# Patient Record
Sex: FEMALE | Race: WHITE | NOT HISPANIC OR LATINO | Employment: FULL TIME | URBAN - METROPOLITAN AREA
[De-identification: names, ages, dates, MRNs, and addresses within clinical notes are randomized per-mention and may not be internally consistent; named-entity substitution may affect disease eponyms.]

---

## 2018-01-19 ENCOUNTER — APPOINTMENT (OUTPATIENT)
Dept: LAB | Facility: HOSPITAL | Age: 58
End: 2018-01-19

## 2018-01-19 ENCOUNTER — OFFICE VISIT (OUTPATIENT)
Dept: INTERNAL MEDICINE | Facility: CLINIC | Age: 58
End: 2018-01-19

## 2018-01-19 VITALS
HEIGHT: 60 IN | SYSTOLIC BLOOD PRESSURE: 132 MMHG | WEIGHT: 220.7 LBS | HEART RATE: 83 BPM | DIASTOLIC BLOOD PRESSURE: 84 MMHG | OXYGEN SATURATION: 96 % | TEMPERATURE: 98 F | BODY MASS INDEX: 43.33 KG/M2

## 2018-01-19 DIAGNOSIS — Z79.899 HIGH RISK MEDICATION USE: ICD-10-CM

## 2018-01-19 DIAGNOSIS — M17.0 PRIMARY OSTEOARTHRITIS OF BOTH KNEES: Primary | ICD-10-CM

## 2018-01-19 DIAGNOSIS — Z87.892 HX OF ANAPHYLAXIS: ICD-10-CM

## 2018-01-19 PROBLEM — M17.10 PRIMARY OSTEOARTHRITIS OF KNEE: Status: ACTIVE | Noted: 2018-01-19

## 2018-01-19 LAB
ALBUMIN SERPL-MCNC: 4.1 G/DL (ref 3.5–5.2)
ALBUMIN/GLOB SERPL: 1.1 G/DL
ALP SERPL-CCNC: 64 U/L (ref 39–117)
ALT SERPL W P-5'-P-CCNC: 27 U/L (ref 1–33)
ANION GAP SERPL CALCULATED.3IONS-SCNC: 12.6 MMOL/L
AST SERPL-CCNC: 21 U/L (ref 1–32)
BILIRUB SERPL-MCNC: 0.3 MG/DL (ref 0.1–1.2)
BUN BLD-MCNC: 30 MG/DL (ref 6–20)
BUN/CREAT SERPL: 25.9 (ref 7–25)
CALCIUM SPEC-SCNC: 9.1 MG/DL (ref 8.6–10.5)
CHLORIDE SERPL-SCNC: 99 MMOL/L (ref 98–107)
CO2 SERPL-SCNC: 26.4 MMOL/L (ref 22–29)
CREAT BLD-MCNC: 1.16 MG/DL (ref 0.57–1)
GFR SERPL CREATININE-BSD FRML MDRD: 48 ML/MIN/1.73
GLOBULIN UR ELPH-MCNC: 3.8 GM/DL
GLUCOSE BLD-MCNC: 95 MG/DL (ref 65–99)
POTASSIUM BLD-SCNC: 3.8 MMOL/L (ref 3.5–5.2)
PROT SERPL-MCNC: 7.9 G/DL (ref 6–8.5)
SODIUM BLD-SCNC: 138 MMOL/L (ref 136–145)

## 2018-01-19 PROCEDURE — 99214 OFFICE O/P EST MOD 30 MIN: CPT | Performed by: FAMILY MEDICINE

## 2018-01-19 PROCEDURE — 80053 COMPREHEN METABOLIC PANEL: CPT | Performed by: FAMILY MEDICINE

## 2018-01-19 PROCEDURE — 36415 COLL VENOUS BLD VENIPUNCTURE: CPT | Performed by: FAMILY MEDICINE

## 2018-01-19 RX ORDER — DOXYCYCLINE HYCLATE 50 MG/1
324 CAPSULE, GELATIN COATED ORAL
COMMUNITY
End: 2018-08-27 | Stop reason: SDUPTHER

## 2018-01-19 RX ORDER — ACETAMINOPHEN 160 MG
TABLET,DISINTEGRATING ORAL
Refills: 5 | COMMUNITY
Start: 2018-01-05 | End: 2018-07-09 | Stop reason: SDUPTHER

## 2018-01-19 RX ORDER — DICLOFENAC SODIUM 75 MG/1
75 TABLET, DELAYED RELEASE ORAL ONCE
Qty: 1 TABLET | Refills: 0 | Status: SHIPPED | OUTPATIENT
Start: 2018-01-19 | End: 2018-01-19

## 2018-01-19 RX ORDER — ONDANSETRON 4 MG/1
TABLET, ORALLY DISINTEGRATING ORAL
Refills: 5 | COMMUNITY
Start: 2018-01-05 | End: 2018-07-09 | Stop reason: SDUPTHER

## 2018-01-19 RX ORDER — MECLIZINE HYDROCHLORIDE 25 MG/1
TABLET ORAL
Refills: 5 | COMMUNITY
Start: 2018-01-05 | End: 2018-01-25 | Stop reason: SDUPTHER

## 2018-01-19 RX ORDER — VALSARTAN AND HYDROCHLOROTHIAZIDE 320; 25 MG/1; MG/1
TABLET, FILM COATED ORAL
Refills: 5 | COMMUNITY
Start: 2017-12-20 | End: 2018-03-20 | Stop reason: SDUPTHER

## 2018-01-19 NOTE — PROGRESS NOTES
Subjective   Pedrito Powell is a 57 y.o. female.     Chief Complaint   Patient presents with   • Est care visit     saw dr andersen   • follow for HTN   • follow for cholesterol   • recent vertigo         History of Present Illness     Patient has a past medical history of having an anaphylaxis.  Patient is allergic to many antibiotics.  She's also allergic to many other agents when contact with the skin.  Patient's had evaluation done and a dermatologist.  Patient states that her EpiPen is  and she needs to have a new one.    Patient has a past medical history of some primary osteoarthritis affects both of her knees.  Patient notes that she gets regular steroid injections in the past for this.  Patient notes that she uses diclofenac once a day which seems have helped her pain.  She notes she is a refill on his diclofenac.    The following portions of the patient's history were reviewed and updated as appropriate: allergies, current medications, past family history, past medical history, past social history, past surgical history and problem list.    Review of Systems   Constitutional: Negative for chills and fever.   HENT: Negative for congestion, rhinorrhea, sinus pain and sore throat.    Eyes: Negative for photophobia and visual disturbance.   Respiratory: Negative for cough, chest tightness and shortness of breath.    Cardiovascular: Negative for chest pain and palpitations.   Gastrointestinal: Negative for diarrhea, nausea and vomiting.   Genitourinary: Negative for dysuria, frequency and urgency.   Musculoskeletal: Positive for arthralgias.   Skin: Negative for rash and wound.   Neurological: Negative for dizziness and syncope.   Psychiatric/Behavioral: Negative for behavioral problems and confusion.       Objective   Physical Exam   Constitutional: She is oriented to person, place, and time. She appears well-developed and well-nourished.   HENT:   Head: Normocephalic and atraumatic.   Right Ear:  External ear normal.   Left Ear: External ear normal.   Mouth/Throat: Oropharynx is clear and moist.   Eyes: EOM are normal.   Neck: Normal range of motion. Neck supple.   Cardiovascular: Normal rate, regular rhythm and normal heart sounds.    Pulmonary/Chest: Effort normal and breath sounds normal. No respiratory distress.   Musculoskeletal: Normal range of motion.   Lymphadenopathy:     She has no cervical adenopathy.   Neurological: She is alert and oriented to person, place, and time.   Skin: Skin is warm.   Psychiatric: She has a normal mood and affect. Her behavior is normal.   Nursing note and vitals reviewed.      Assessment/Plan   Pedrito was seen today for est care visit, follow for htn, follow for cholesterol and recent vertigo.    Diagnoses and all orders for this visit:    Primary osteoarthritis of both knees  -     diclofenac (VOLTAREN) 75 MG EC tablet; Take 1 tablet by mouth 1 (One) Time for 1 dose.    Hx of anaphylaxis        -     We'll give EpiPen.    High risk medication use  -     Comprehensive Metabolic Panel  -     Check CMP for renal function due to being on extended period of time on NSAIDs.          No Follow-up on file.    Dictated utilizing Dragon Voice Recognition Software

## 2018-01-20 NOTE — PROGRESS NOTES
Please inform the patient of the following abnormal results.  Creatnine slightly elevated. Take diclofenac once a day. Will recheck in one month. If it continues to rise or stay elevated, will need stop. Recommended drinking plenty of water.

## 2018-01-22 RX ORDER — DICLOFENAC SODIUM 75 MG/1
75 TABLET, DELAYED RELEASE ORAL DAILY
Qty: 30 TABLET | Refills: 0 | Status: SHIPPED | OUTPATIENT
Start: 2018-01-22 | End: 2018-02-22 | Stop reason: SDUPTHER

## 2018-01-23 ENCOUNTER — TELEPHONE (OUTPATIENT)
Dept: INTERNAL MEDICINE | Facility: CLINIC | Age: 58
End: 2018-01-23

## 2018-01-23 DIAGNOSIS — R79.89 ELEVATED SERUM CREATININE: Primary | ICD-10-CM

## 2018-01-23 NOTE — TELEPHONE ENCOUNTER
----- Message from Teo Fraser MD sent at 1/20/2018  5:49 PM EST -----  Please inform the patient of the following abnormal results.  Creatnine slightly elevated. Take diclofenac once a day. Will recheck in one month. If it continues to rise or stay elevated, will need stop. Recommended drinking plenty of water.

## 2018-01-23 NOTE — TELEPHONE ENCOUNTER
Patient notified and voiced understanding. Patient advised to contact office if they have any questions. Lab appointment scheduled for one month.

## 2018-01-23 NOTE — TELEPHONE ENCOUNTER
LVM- patient notified. Patient advised to contact office if they have any questions. Patient advised to contact office to schedule one month lab appointment. Lab order put into [facility] per Dr. Fraser.

## 2018-01-25 RX ORDER — MECLIZINE HYDROCHLORIDE 25 MG/1
25 TABLET ORAL 3 TIMES DAILY PRN
Qty: 90 TABLET | Refills: 5 | Status: SHIPPED | OUTPATIENT
Start: 2018-01-25 | End: 2018-08-31 | Stop reason: SDUPTHER

## 2018-02-22 RX ORDER — DICLOFENAC SODIUM 75 MG/1
75 TABLET, DELAYED RELEASE ORAL DAILY
Qty: 90 TABLET | Refills: 1 | Status: SHIPPED | OUTPATIENT
Start: 2018-02-22 | End: 2018-08-28 | Stop reason: SDUPTHER

## 2018-02-28 ENCOUNTER — RESULTS ENCOUNTER (OUTPATIENT)
Dept: INTERNAL MEDICINE | Facility: CLINIC | Age: 58
End: 2018-02-28

## 2018-02-28 DIAGNOSIS — R79.89 ELEVATED SERUM CREATININE: ICD-10-CM

## 2018-03-20 RX ORDER — OMEPRAZOLE 40 MG/1
40 CAPSULE, DELAYED RELEASE ORAL DAILY
Qty: 90 CAPSULE | Refills: 1 | Status: SHIPPED | OUTPATIENT
Start: 2018-03-20 | End: 2018-08-27 | Stop reason: SDUPTHER

## 2018-03-20 RX ORDER — VALSARTAN AND HYDROCHLOROTHIAZIDE 320; 25 MG/1; MG/1
1 TABLET, FILM COATED ORAL DAILY
Qty: 90 TABLET | Refills: 1 | Status: SHIPPED | OUTPATIENT
Start: 2018-03-20 | End: 2018-09-19 | Stop reason: ALTCHOICE

## 2018-04-03 ENCOUNTER — RESULTS ENCOUNTER (OUTPATIENT)
Dept: INTERNAL MEDICINE | Facility: CLINIC | Age: 58
End: 2018-04-03

## 2018-04-03 ENCOUNTER — OFFICE VISIT (OUTPATIENT)
Dept: INTERNAL MEDICINE | Facility: CLINIC | Age: 58
End: 2018-04-03

## 2018-04-03 VITALS
WEIGHT: 215.7 LBS | DIASTOLIC BLOOD PRESSURE: 68 MMHG | OXYGEN SATURATION: 98 % | HEART RATE: 82 BPM | HEIGHT: 60 IN | SYSTOLIC BLOOD PRESSURE: 104 MMHG | BODY MASS INDEX: 42.35 KG/M2

## 2018-04-03 DIAGNOSIS — I10 HYPERTENSION, UNSPECIFIED TYPE: ICD-10-CM

## 2018-04-03 DIAGNOSIS — Z23 NEED FOR TDAP VACCINATION: ICD-10-CM

## 2018-04-03 DIAGNOSIS — Z00.00 WELL WOMAN EXAM (NO GYNECOLOGICAL EXAM): Primary | ICD-10-CM

## 2018-04-03 DIAGNOSIS — E78.5 HYPERLIPIDEMIA, UNSPECIFIED HYPERLIPIDEMIA TYPE: ICD-10-CM

## 2018-04-03 DIAGNOSIS — Z13.1 SCREENING FOR DIABETES MELLITUS: ICD-10-CM

## 2018-04-03 DIAGNOSIS — Z13.29 SCREENING FOR THYROID DISORDER: ICD-10-CM

## 2018-04-03 DIAGNOSIS — Z11.59 ENCOUNTER FOR HEPATITIS C SCREENING TEST FOR LOW RISK PATIENT: ICD-10-CM

## 2018-04-03 DIAGNOSIS — Z12.39 SCREENING FOR BREAST CANCER: ICD-10-CM

## 2018-04-03 DIAGNOSIS — Z12.11 ENCOUNTER FOR SCREENING COLONOSCOPY: ICD-10-CM

## 2018-04-03 DIAGNOSIS — Z00.00 WELL WOMAN EXAM (NO GYNECOLOGICAL EXAM): ICD-10-CM

## 2018-04-03 PROCEDURE — 99396 PREV VISIT EST AGE 40-64: CPT | Performed by: FAMILY MEDICINE

## 2018-04-03 PROCEDURE — 90715 TDAP VACCINE 7 YRS/> IM: CPT | Performed by: FAMILY MEDICINE

## 2018-04-03 PROCEDURE — 90471 IMMUNIZATION ADMIN: CPT | Performed by: FAMILY MEDICINE

## 2018-04-03 NOTE — PROGRESS NOTES
Subjective   Pedrito Powell is a 57 y.o. female and is here for a comprehensive physical exam. The patient reports no problems.    Pt is not UTD with annual gyn exam and mammo. Patient is due for pap exam and will see gynecology for this. Will place referral for mammogram today.     Do you take any herbs or supplements that were not prescribed by a doctor? no      Social History:   Social History     Social History   • Marital status:      Spouse name: N/A   • Number of children: N/A   • Years of education: N/A     Occupational History   • Not on file.     Social History Main Topics   • Smoking status: Never Smoker   • Smokeless tobacco: Never Used   • Alcohol use No   • Drug use: No   • Sexual activity: Not on file     Other Topics Concern   • Not on file     Social History Narrative   • No narrative on file       Family History: No family history on file.    Past Medical History:   Past Medical History:   Diagnosis Date   • Arthritis    • GERD (gastroesophageal reflux disease)    • Hyperlipidemia    • Hypertension            Review of Systems    Review of Systems   Constitutional: Negative for chills and fever.   HENT: Negative for congestion, rhinorrhea, sinus pain and sore throat.    Eyes: Negative for photophobia and visual disturbance.   Respiratory: Negative for cough, chest tightness and shortness of breath.    Cardiovascular: Negative for chest pain and palpitations.   Gastrointestinal: Negative for diarrhea, nausea and vomiting.   Genitourinary: Negative for dysuria, frequency and urgency.   Skin: Negative for rash and wound.   Neurological: Negative for dizziness and syncope.   Psychiatric/Behavioral: Negative for behavioral problems and confusion.       Objective   Physical Exam   Constitutional: She is oriented to person, place, and time. She appears well-developed and well-nourished.   HENT:   Head: Normocephalic and atraumatic.   Right Ear: External ear normal.   Left Ear: External ear  normal.   Mouth/Throat: Oropharynx is clear and moist.   Eyes: EOM are normal.   Neck: Normal range of motion. Neck supple.   Cardiovascular: Normal rate, regular rhythm and normal heart sounds.    Pulmonary/Chest: Effort normal and breath sounds normal. No respiratory distress.   Musculoskeletal: Normal range of motion.   Lymphadenopathy:     She has no cervical adenopathy.   Neurological: She is alert and oriented to person, place, and time.   Skin: Skin is warm.   Psychiatric: She has a normal mood and affect. Her behavior is normal.   Nursing note and vitals reviewed.      Medications:   Current Outpatient Prescriptions:   •  EPINEPHrine (AUVI-Q IJ), Inject  as directed., Disp: , Rfl:   •  aspirin 325 MG tablet, Take 325 mg by mouth daily., Disp: , Rfl:   •  atorvastatin (LIPITOR) 20 MG tablet, Take 80 mg by mouth Daily., Disp: , Rfl:   •  Cholecalciferol (VITAMIN D3) 2000 units capsule, TK ONE C PO QD, Disp: , Rfl: 5  •  diclofenac (VOLTAREN) 75 MG EC tablet, Take 1 tablet by mouth Daily., Disp: 90 tablet, Rfl: 1  •  ferrous gluconate (FERGON) 324 MG tablet, Take 324 mg by mouth Daily With Breakfast., Disp: , Rfl:   •  meclizine (ANTIVERT) 25 MG tablet, Take 1 tablet by mouth 3 (Three) Times a Day As Needed for dizziness., Disp: 90 tablet, Rfl: 5  •  omeprazole (priLOSEC) 40 MG capsule, Take 1 capsule by mouth Daily., Disp: 90 capsule, Rfl: 1  •  ondansetron ODT (ZOFRAN-ODT) 4 MG disintegrating tablet, DISSOLVE ONE T PO BID, Disp: , Rfl: 5  •  valsartan-hydrochlorothiazide (DIOVAN-HCT) 320-25 MG per tablet, Take 1 tablet by mouth Daily., Disp: 90 tablet, Rfl: 1       Assessment/Plan   Healthy female exam.      1. Healthcare Maintenance:  2. Patient Counseling:  --Nutrition: Stressed importance of moderation in sodium/caffeine intake, saturated fat and cholesterol, caloric balance, sufficient intake of fresh fruits, vegetables, fiber, calcium and vit D  --Exercise: Recommended 30 mins of daily  exercise.  --Immunizations reviewed. Needs tdap.  --Discussed benefits of screening colonoscopy. Referral placed.  --Will order mammogram today.    Diagnoses and all orders for this visit:    Well woman exam (no gynecological exam)  -     Comprehensive Metabolic Panel; Future  -     CBC & Differential; Future    Hypertension, unspecified type  -     Comprehensive Metabolic Panel; Future    Hyperlipidemia, unspecified hyperlipidemia type  -     Lipid Panel With LDL / HDL Ratio; Future    Screening for diabetes mellitus  -     Hemoglobin A1c; Future    Screening for thyroid disorder  -     Thyroid Panel With TSH; Future    Encounter for hepatitis C screening test for low risk patient  -     Hepatitis C Antibody; Future    Encounter for screening colonoscopy  -     Ambulatory Referral For Screening Colonoscopy    Screening for breast cancer  -     Mammo Screening Bilateral With CAD; Future    Need for Tdap vaccination  -     Tdap Vaccine Greater Than or Equal To 8yo IM    Other orders  -     EPINEPHrine (AUVI-Q IJ); Inject  as directed.        No Follow-up on file.             Dictated utilizing Dragon Voice Recognition Software

## 2018-04-05 LAB
ALBUMIN SERPL-MCNC: 4.2 G/DL (ref 3.5–5.2)
ALBUMIN/GLOB SERPL: 1.5 G/DL
ALP SERPL-CCNC: 60 U/L (ref 39–117)
ALT SERPL-CCNC: 17 U/L (ref 1–33)
AST SERPL-CCNC: 16 U/L (ref 1–32)
BASOPHILS # BLD AUTO: 0.04 10*3/MM3 (ref 0–0.2)
BASOPHILS NFR BLD AUTO: 0.6 % (ref 0–1.5)
BILIRUB SERPL-MCNC: 0.6 MG/DL (ref 0.1–1.2)
BUN SERPL-MCNC: 37 MG/DL (ref 6–20)
BUN/CREAT SERPL: 21.9 (ref 7–25)
CALCIUM SERPL-MCNC: 9.1 MG/DL (ref 8.6–10.5)
CHLORIDE SERPL-SCNC: 100 MMOL/L (ref 98–107)
CHOLEST SERPL-MCNC: 167 MG/DL (ref 0–200)
CO2 SERPL-SCNC: 26.7 MMOL/L (ref 22–29)
CREAT SERPL-MCNC: 1.69 MG/DL (ref 0.57–1)
EOSINOPHIL # BLD AUTO: 0.27 10*3/MM3 (ref 0–0.7)
EOSINOPHIL NFR BLD AUTO: 4.1 % (ref 0.3–6.2)
ERYTHROCYTE [DISTWIDTH] IN BLOOD BY AUTOMATED COUNT: 12.8 % (ref 11.7–13)
FT4I SERPL CALC-MCNC: 2.3 (ref 1.2–4.9)
GFR SERPLBLD CREATININE-BSD FMLA CKD-EPI: 31 ML/MIN/1.73
GFR SERPLBLD CREATININE-BSD FMLA CKD-EPI: 38 ML/MIN/1.73
GLOBULIN SER CALC-MCNC: 2.8 GM/DL
GLUCOSE SERPL-MCNC: 92 MG/DL (ref 65–99)
HBA1C MFR BLD: 5.7 % (ref 4.8–5.6)
HCT VFR BLD AUTO: 31.6 % (ref 35.6–45.5)
HCV AB S/CO SERPL IA: <0.1 S/CO RATIO (ref 0–0.9)
HDLC SERPL-MCNC: 48 MG/DL (ref 40–60)
HGB BLD-MCNC: 10.1 G/DL (ref 11.9–15.5)
IMM GRANULOCYTES # BLD: 0 10*3/MM3 (ref 0–0.03)
IMM GRANULOCYTES NFR BLD: 0 % (ref 0–0.5)
LDLC SERPL CALC-MCNC: 102 MG/DL (ref 0–100)
LDLC/HDLC SERPL: 2.13 {RATIO}
LYMPHOCYTES # BLD AUTO: 1.79 10*3/MM3 (ref 0.9–4.8)
LYMPHOCYTES NFR BLD AUTO: 27.5 % (ref 19.6–45.3)
MCH RBC QN AUTO: 29.1 PG (ref 26.9–32)
MCHC RBC AUTO-ENTMCNC: 32 G/DL (ref 32.4–36.3)
MCV RBC AUTO: 91.1 FL (ref 80.5–98.2)
MONOCYTES # BLD AUTO: 0.38 10*3/MM3 (ref 0.2–1.2)
MONOCYTES NFR BLD AUTO: 5.8 % (ref 5–12)
NEUTROPHILS # BLD AUTO: 4.04 10*3/MM3 (ref 1.9–8.1)
NEUTROPHILS NFR BLD AUTO: 62 % (ref 42.7–76)
PLATELET # BLD AUTO: 241 10*3/MM3 (ref 140–500)
POTASSIUM SERPL-SCNC: 3.9 MMOL/L (ref 3.5–5.2)
PROT SERPL-MCNC: 7 G/DL (ref 6–8.5)
RBC # BLD AUTO: 3.47 10*6/MM3 (ref 3.9–5.2)
SODIUM SERPL-SCNC: 139 MMOL/L (ref 136–145)
T3RU NFR SERPL: 28 % (ref 24–39)
T4 SERPL-MCNC: 8.3 UG/DL (ref 4.5–12)
TRIGL SERPL-MCNC: 84 MG/DL (ref 0–150)
TSH SERPL DL<=0.005 MIU/L-ACNC: 1.58 UIU/ML (ref 0.45–4.5)
VLDLC SERPL CALC-MCNC: 16.8 MG/DL (ref 5–40)
WBC # BLD AUTO: 6.52 10*3/MM3 (ref 4.5–10.7)

## 2018-04-11 NOTE — PROGRESS NOTES
Please inform the patient of the following abnormal results.  LDL slightly elevated but patient on high dose statin. Patient should be taking 80mg of atorvastatin.  Patient is anemic. Unclear the etiology at this time. Patient should get iron studies done at next office visit.

## 2018-04-12 ENCOUNTER — TELEPHONE (OUTPATIENT)
Dept: INTERNAL MEDICINE | Facility: CLINIC | Age: 58
End: 2018-04-12

## 2018-04-12 NOTE — TELEPHONE ENCOUNTER
----- Message from Teo Fraser MD sent at 4/11/2018  6:40 PM EDT -----  Please inform the patient of the following abnormal results.  LDL slightly elevated but patient on high dose statin. Patient should be taking 80mg of atorvastatin.  Patient is anemic. Unclear the etiology at this time. Patient should get iron studies done at next office visit.

## 2018-05-02 ENCOUNTER — OFFICE VISIT (OUTPATIENT)
Dept: INTERNAL MEDICINE | Facility: CLINIC | Age: 58
End: 2018-05-02

## 2018-05-02 VITALS
DIASTOLIC BLOOD PRESSURE: 78 MMHG | RESPIRATION RATE: 18 BRPM | WEIGHT: 215 LBS | HEIGHT: 60 IN | OXYGEN SATURATION: 98 % | SYSTOLIC BLOOD PRESSURE: 111 MMHG | HEART RATE: 69 BPM | BODY MASS INDEX: 42.21 KG/M2

## 2018-05-02 DIAGNOSIS — L08.9 INFECTED BLISTER OF LEFT FOOT, SUBSEQUENT ENCOUNTER: ICD-10-CM

## 2018-05-02 DIAGNOSIS — S90.822D INFECTED BLISTER OF LEFT FOOT, SUBSEQUENT ENCOUNTER: ICD-10-CM

## 2018-05-02 DIAGNOSIS — R42 VERTIGO: Primary | ICD-10-CM

## 2018-05-02 PROBLEM — S90.822A INFECTED BLISTER OF LEFT FOOT: Status: ACTIVE | Noted: 2018-05-02

## 2018-05-02 PROCEDURE — 99214 OFFICE O/P EST MOD 30 MIN: CPT | Performed by: FAMILY MEDICINE

## 2018-05-02 RX ORDER — HEPATITIS A VACCINE, INACTIVATED 50 [IU]/ML
INJECTION, SUSPENSION INTRAMUSCULAR
Refills: 0 | COMMUNITY
Start: 2018-04-25 | End: 2018-07-09

## 2018-05-02 NOTE — PROGRESS NOTES
Subjective   Pedrito Powell is a 57 y.o. female.     Chief Complaint   Patient presents with   • follow up on vertigo   • follow up on foot infection         History of Present Illness     The patient presents today for follow-up after a blister on her left foot.  She is seen at the urgent care and was started on new percent ointment.  She notes that when he seems to be helping significantly.  She notes the blisters started from a sunburn that got infected.  Patient still has a wound that is appears to be open, but healing well on her left foot.    Patient's here for follow-up on her vertigo.  Patient created a vertigo log.  It appears that according to his vertigo log patient has vertigo signs and symptoms multiple times a day on average of 4 times.  She normally has the vertigo every time she wakes up in the morning.  However the vertigo can also happen while she is doing other activities such as walking, driving, which is household chores.  She has not seen a neurologist regarding this.  She does take some meclizine which seemed to help her signs and symptoms.  However does not keep the vertigo away.    The following portions of the patient's history were reviewed and updated as appropriate: allergies, current medications, past family history, past medical history, past social history, past surgical history and problem list.    Review of Systems   Constitutional: Negative for chills and fever.   HENT: Negative for congestion, rhinorrhea, sinus pain and sore throat.    Eyes: Negative for photophobia and visual disturbance.   Respiratory: Negative for cough, chest tightness and shortness of breath.    Cardiovascular: Negative for chest pain and palpitations.   Gastrointestinal: Negative for diarrhea, nausea and vomiting.   Genitourinary: Negative for dysuria, frequency and urgency.   Skin: Positive for wound. Negative for rash.   Neurological: Negative for syncope.   Psychiatric/Behavioral: Negative for  behavioral problems and confusion.       Objective   Physical Exam   Constitutional: She is oriented to person, place, and time. She appears well-developed and well-nourished.   HENT:   Head: Normocephalic and atraumatic.   Right Ear: External ear normal.   Left Ear: External ear normal.   Mouth/Throat: Oropharynx is clear and moist.   Eyes: EOM are normal.   Neck: Normal range of motion. Neck supple.   Cardiovascular: Normal rate, regular rhythm and normal heart sounds.    Pulmonary/Chest: Effort normal and breath sounds normal. No respiratory distress.   Musculoskeletal: Normal range of motion.   Lymphadenopathy:     She has no cervical adenopathy.   Neurological: She is alert and oriented to person, place, and time.   Skin: Skin is warm.   Open wound on left foot.  Wound is clean and dry.  Appears to be healing well.  Mild erythema around the open wound.   Psychiatric: She has a normal mood and affect. Her behavior is normal.   Nursing note and vitals reviewed.      Assessment/Plan   Pedrito was seen today for follow up on vertigo and follow up on foot infection.    Diagnoses and all orders for this visit:    Vertigo  -     Ambulatory Referral to Neurology  -     Patient should continue using meclizine.  I recommended patient at this time she needs to see neurology for further evaluation and treatment plans.    Infected blister of left foot, subsequent encounter  -     mupirocin (BACTROBAN) 2 % ointment; Apply  topically Daily.  -     Patient should keep wound clean and dry.  Patient should apply gauze with Kerlix.          No Follow-up on file.    Dictated utilizing Dragon Voice Recognition Software

## 2018-06-05 ENCOUNTER — HOSPITAL ENCOUNTER (OUTPATIENT)
Dept: MAMMOGRAPHY | Facility: HOSPITAL | Age: 58
Discharge: HOME | End: 2018-06-05
Admitting: FAMILY MEDICINE

## 2018-06-05 DIAGNOSIS — Z12.39 SCREENING FOR BREAST CANCER: ICD-10-CM

## 2018-06-05 PROCEDURE — 77067 SCR MAMMO BI INCL CAD: CPT

## 2018-06-07 ENCOUNTER — TELEPHONE (OUTPATIENT)
Dept: INTERNAL MEDICINE | Facility: CLINIC | Age: 58
End: 2018-06-07

## 2018-06-20 RX ORDER — ATORVASTATIN CALCIUM 40 MG/1
40 TABLET, FILM COATED ORAL DAILY
Qty: 90 TABLET | Refills: 1 | Status: SHIPPED | OUTPATIENT
Start: 2018-06-20 | End: 2018-12-13 | Stop reason: DRUGHIGH

## 2018-07-09 ENCOUNTER — OFFICE VISIT (OUTPATIENT)
Dept: INTERNAL MEDICINE | Facility: CLINIC | Age: 58
End: 2018-07-09

## 2018-07-09 VITALS
SYSTOLIC BLOOD PRESSURE: 119 MMHG | RESPIRATION RATE: 18 BRPM | DIASTOLIC BLOOD PRESSURE: 73 MMHG | HEART RATE: 68 BPM | HEIGHT: 60 IN | OXYGEN SATURATION: 99 % | WEIGHT: 225.2 LBS | BODY MASS INDEX: 44.21 KG/M2

## 2018-07-09 DIAGNOSIS — S90.822D INFECTED BLISTER OF LEFT FOOT, SUBSEQUENT ENCOUNTER: ICD-10-CM

## 2018-07-09 DIAGNOSIS — R42 VERTIGO: Primary | ICD-10-CM

## 2018-07-09 DIAGNOSIS — L08.9 INFECTED BLISTER OF LEFT FOOT, SUBSEQUENT ENCOUNTER: ICD-10-CM

## 2018-07-09 PROCEDURE — 99213 OFFICE O/P EST LOW 20 MIN: CPT | Performed by: FAMILY MEDICINE

## 2018-07-09 RX ORDER — ACETAMINOPHEN 160 MG
2000 TABLET,DISINTEGRATING ORAL DAILY
Qty: 90 CAPSULE | Refills: 3 | Status: SHIPPED | OUTPATIENT
Start: 2018-07-09 | End: 2019-07-11 | Stop reason: SDUPTHER

## 2018-07-09 RX ORDER — ONDANSETRON 4 MG/1
TABLET, ORALLY DISINTEGRATING ORAL
Qty: 180 TABLET | Refills: 1 | Status: SHIPPED | OUTPATIENT
Start: 2018-07-09 | End: 2018-10-08 | Stop reason: SDUPTHER

## 2018-07-09 RX ORDER — TRIAMCINOLONE ACETONIDE 1 MG/G
CREAM TOPICAL
Refills: 1 | COMMUNITY
Start: 2018-05-07 | End: 2018-11-12

## 2018-07-14 NOTE — PROGRESS NOTES
Subjective   Pedrito Powell is a 57 y.o. female.     Chief Complaint   Patient presents with   • Follow-up     for vertigo, saw dr mccrary   • Follow-up     rash on foot, still bothering her         History of Present Illness     Patient presents today for follow-up for vertigo.  Patient states that she saw neurology, and patient was told to continue the meclizine as needed.  Patient states that her vertigo happens every few days.  She states that she takes meclizine she feels better.  Is unclear the cause of patient's vertigo.  Was suggested by neurology for patient to continue the meclizine as needed.  She denies any side effects of the medication.    Patient notes that she has a wound on the left foot is not seemed to heal.  She states his been there for several weeks.  She has been applying some mupirocin ointment on it which seemed to make it better.    The following portions of the patient's history were reviewed and updated as appropriate: allergies, current medications, past family history, past medical history, past social history, past surgical history and problem list.    Review of Systems   Constitutional: Negative.    HENT: Negative.    Respiratory: Negative.    Cardiovascular: Negative.    Musculoskeletal: Negative.    Skin: Positive for wound.   Neurological: Positive for dizziness.   Psychiatric/Behavioral: Negative.        Objective   Physical Exam   Constitutional: She is oriented to person, place, and time. She appears well-developed and well-nourished.   HENT:   Head: Normocephalic and atraumatic.   Eyes: EOM are normal.   Neck: Normal range of motion. Neck supple.   Cardiovascular: Normal rate, regular rhythm and normal heart sounds.    Pulmonary/Chest: Effort normal and breath sounds normal. No respiratory distress.   Neurological: She is alert and oriented to person, place, and time.   Skin:   Mildly erythematous wound on left foot which appears to be clean and dry.  No exudate.   Psychiatric:  She has a normal mood and affect. Her behavior is normal.   Nursing note and vitals reviewed.      Assessment/Plan   Pedrito was seen today for follow-up and follow-up.    Diagnoses and all orders for this visit:    Vertigo        -     We'll continue meclizine as needed.  Patient's currently stable.    Infected blister of left foot, subsequent encounter  -     mupirocin (BACTROBAN) 2 % ointment; Apply  topically Daily.  -     Discussed the patient should continue the medicine.  She can also try bacitracin.  Recommended patient to allow the wound still open night for it to breathe.  Patient is to notify me if it gets any worse.          No Follow-up on file.    Dictated utilizing Dragon Voice Recognition Software

## 2018-08-27 ENCOUNTER — TELEPHONE (OUTPATIENT)
Dept: INTERNAL MEDICINE | Facility: CLINIC | Age: 58
End: 2018-08-27

## 2018-08-27 RX ORDER — DOXYCYCLINE HYCLATE 50 MG/1
324 CAPSULE, GELATIN COATED ORAL
Qty: 30 TABLET | Refills: 1 | Status: SHIPPED | OUTPATIENT
Start: 2018-08-27 | End: 2018-10-30 | Stop reason: SDUPTHER

## 2018-08-27 RX ORDER — OMEPRAZOLE 40 MG/1
40 CAPSULE, DELAYED RELEASE ORAL DAILY
Qty: 90 CAPSULE | Refills: 0 | Status: SHIPPED | OUTPATIENT
Start: 2018-08-27 | End: 2018-11-26 | Stop reason: SDUPTHER

## 2018-08-28 RX ORDER — DICLOFENAC SODIUM 75 MG/1
75 TABLET, DELAYED RELEASE ORAL DAILY
Qty: 90 TABLET | Refills: 0 | Status: SHIPPED | OUTPATIENT
Start: 2018-08-28 | End: 2018-11-26 | Stop reason: SDUPTHER

## 2018-08-31 RX ORDER — MECLIZINE HYDROCHLORIDE 25 MG/1
25 TABLET ORAL 3 TIMES DAILY PRN
Qty: 270 TABLET | Refills: 1 | Status: SHIPPED | OUTPATIENT
Start: 2018-08-31 | End: 2018-10-08 | Stop reason: SDUPTHER

## 2018-09-14 ENCOUNTER — TELEPHONE (OUTPATIENT)
Dept: INTERNAL MEDICINE | Facility: CLINIC | Age: 58
End: 2018-09-14

## 2018-09-19 RX ORDER — HYDROCHLOROTHIAZIDE 25 MG/1
25 TABLET ORAL DAILY
Qty: 90 TABLET | Refills: 1 | Status: SHIPPED | OUTPATIENT
Start: 2018-09-19 | End: 2019-03-06 | Stop reason: SDUPTHER

## 2018-09-19 RX ORDER — IRBESARTAN 300 MG/1
300 TABLET ORAL NIGHTLY
Qty: 90 TABLET | Refills: 1 | Status: SHIPPED | OUTPATIENT
Start: 2018-09-19 | End: 2019-03-06 | Stop reason: SDUPTHER

## 2018-09-19 NOTE — TELEPHONE ENCOUNTER
Estelle ([phone]) called stating that Irbesartan-HCTZ 300/25 mg is not made. Per Dr. Fraser separate the tablets. Irbesartan 300 mg daily and HCTZ 25 mg daily. LVM- Estelle notified.

## 2018-10-08 RX ORDER — MECLIZINE HYDROCHLORIDE 25 MG/1
25 TABLET ORAL 3 TIMES DAILY PRN
Qty: 270 TABLET | Refills: 1 | Status: SHIPPED | OUTPATIENT
Start: 2018-10-08 | End: 2019-06-25 | Stop reason: SDUPTHER

## 2018-10-08 RX ORDER — ONDANSETRON 4 MG/1
TABLET, ORALLY DISINTEGRATING ORAL
Qty: 180 TABLET | Refills: 1 | Status: SHIPPED | OUTPATIENT
Start: 2018-10-08 | End: 2018-12-03 | Stop reason: SDUPTHER

## 2018-10-30 RX ORDER — DOXYCYCLINE HYCLATE 50 MG/1
324 CAPSULE, GELATIN COATED ORAL
Qty: 90 TABLET | Refills: 1 | Status: SHIPPED | OUTPATIENT
Start: 2018-10-30 | End: 2019-03-06 | Stop reason: SDUPTHER

## 2018-11-12 ENCOUNTER — OFFICE VISIT (OUTPATIENT)
Dept: INTERNAL MEDICINE | Facility: CLINIC | Age: 58
End: 2018-11-12

## 2018-11-12 VITALS
HEART RATE: 78 BPM | HEIGHT: 60 IN | WEIGHT: 229.2 LBS | DIASTOLIC BLOOD PRESSURE: 80 MMHG | OXYGEN SATURATION: 98 % | SYSTOLIC BLOOD PRESSURE: 130 MMHG | TEMPERATURE: 98.5 F | BODY MASS INDEX: 45 KG/M2

## 2018-11-12 DIAGNOSIS — J02.9 SORE THROAT: ICD-10-CM

## 2018-11-12 DIAGNOSIS — J40 BRONCHITIS: Primary | ICD-10-CM

## 2018-11-12 DIAGNOSIS — R68.89 FLU-LIKE SYMPTOMS: ICD-10-CM

## 2018-11-12 LAB
EXPIRATION DATE: NORMAL
EXPIRATION DATE: NORMAL
FLUAV AG NPH QL: NEGATIVE
FLUBV AG NPH QL: NEGATIVE
INTERNAL CONTROL: NORMAL
INTERNAL CONTROL: NORMAL
Lab: NORMAL
Lab: NORMAL
S PYO AG THROAT QL: NEGATIVE

## 2018-11-12 PROCEDURE — 87804 INFLUENZA ASSAY W/OPTIC: CPT | Performed by: FAMILY MEDICINE

## 2018-11-12 PROCEDURE — 99214 OFFICE O/P EST MOD 30 MIN: CPT | Performed by: FAMILY MEDICINE

## 2018-11-12 PROCEDURE — 87880 STREP A ASSAY W/OPTIC: CPT | Performed by: FAMILY MEDICINE

## 2018-11-12 RX ORDER — IPRATROPIUM BROMIDE 42 UG/1
1 SPRAY, METERED NASAL DAILY
Refills: 11 | COMMUNITY
Start: 2018-08-30 | End: 2021-05-03

## 2018-11-12 RX ORDER — AZELASTINE 1 MG/ML
1 SPRAY, METERED NASAL DAILY
Refills: 11 | COMMUNITY
Start: 2018-08-30

## 2018-11-12 RX ORDER — GUAIFENESIN AND CODEINE PHOSPHATE 100; 10 MG/5ML; MG/5ML
5 SOLUTION ORAL 3 TIMES DAILY PRN
Qty: 150 ML | Refills: 0 | Status: SHIPPED | OUTPATIENT
Start: 2018-11-12 | End: 2018-11-27

## 2018-11-12 RX ORDER — CEFIXIME 400 MG/1
400 CAPSULE ORAL DAILY
Qty: 10 CAPSULE | Refills: 0 | Status: SHIPPED | OUTPATIENT
Start: 2018-11-12 | End: 2018-11-22

## 2018-11-12 NOTE — PROGRESS NOTES
Subjective   Pedrito Powell is a 58 y.o. female.     Chief Complaint   Patient presents with   • Sore Throat     x4 days   • Cough   • Chills   • Nasal Congestion         Sore Throat    This is a new problem. The current episode started in the past 7 days. The problem has been unchanged. There has been no fever. Associated symptoms include congestion and coughing. She has tried acetaminophen for the symptoms. The treatment provided no relief.   Cough   This is a new problem. The current episode started in the past 7 days. The problem has been unchanged. The problem occurs constantly. The cough is non-productive. Associated symptoms include chills, nasal congestion, a sore throat and wheezing. Nothing aggravates the symptoms. She has tried nothing for the symptoms. The treatment provided no relief. Her past medical history is significant for bronchitis.   Chills   Associated symptoms include chills, congestion, coughing and a sore throat.        The following portions of the patient's history were reviewed and updated as appropriate: allergies, current medications, past family history, past medical history, past social history, past surgical history and problem list.    Review of Systems   Constitutional: Positive for chills.   HENT: Positive for congestion and sore throat. Negative for sinus pressure and sinus pain.    Respiratory: Positive for cough and wheezing.    Cardiovascular: Negative.    Gastrointestinal: Negative.    Psychiatric/Behavioral: Negative.        Objective   Physical Exam   Constitutional: She is oriented to person, place, and time. She appears well-developed and well-nourished.   HENT:   Head: Normocephalic and atraumatic.   Right Ear: External ear normal.   Left Ear: External ear normal.   Mouth/Throat: Oropharynx is clear and moist.   Eyes: EOM are normal.   Neck: Normal range of motion. Neck supple.   Cardiovascular: Normal rate, regular rhythm and normal heart sounds.   Pulmonary/Chest:  Effort normal. No respiratory distress. She has wheezes.   Musculoskeletal: Normal range of motion.   Lymphadenopathy:     She has no cervical adenopathy.   Neurological: She is alert and oriented to person, place, and time.   Skin: Skin is warm.   Psychiatric: She has a normal mood and affect. Her behavior is normal.   Nursing note and vitals reviewed.      Assessment/Plan   Pedrito was seen today for sore throat, cough, chills and nasal congestion.    Diagnoses and all orders for this visit:    Bronchitis  -     guaifenesin-codeine (GUAIFENESIN AC) 100-10 MG/5ML liquid; Take 5 mL by mouth 3 (Three) Times a Day As Needed for Cough.  -     Cefixime (SUPRAX) 400 MG tablet; Take 1 tablet by mouth Daily for 10 days.  -     umeclidinium-vilanterol (ANORO ELLIPTA) 62.5-25 MCG/INH aerosol powder  inhaler; Inhale 1 puff Daily.    Sore throat  -     POCT rapid strep A    Flu-like symptoms  -     POCT Influenza A/B          No Follow-up on file.    Dictated utilizing Dragon Voice Recognition Software

## 2018-11-26 RX ORDER — OMEPRAZOLE 40 MG/1
40 CAPSULE, DELAYED RELEASE ORAL DAILY
Qty: 90 CAPSULE | Refills: 1 | Status: SHIPPED | OUTPATIENT
Start: 2018-11-26 | End: 2019-06-12 | Stop reason: SDUPTHER

## 2018-11-26 RX ORDER — DICLOFENAC SODIUM 75 MG/1
75 TABLET, DELAYED RELEASE ORAL DAILY
Qty: 90 TABLET | Refills: 1 | Status: SHIPPED | OUTPATIENT
Start: 2018-11-26 | End: 2018-11-27

## 2018-11-27 ENCOUNTER — OFFICE VISIT (OUTPATIENT)
Dept: INTERNAL MEDICINE | Facility: CLINIC | Age: 58
End: 2018-11-27

## 2018-11-27 VITALS
WEIGHT: 228.9 LBS | BODY MASS INDEX: 44.94 KG/M2 | HEART RATE: 69 BPM | HEIGHT: 60 IN | SYSTOLIC BLOOD PRESSURE: 116 MMHG | OXYGEN SATURATION: 98 % | DIASTOLIC BLOOD PRESSURE: 62 MMHG

## 2018-11-27 DIAGNOSIS — M67.431 GANGLION OF RIGHT WRIST: ICD-10-CM

## 2018-11-27 DIAGNOSIS — I10 HYPERTENSION, UNSPECIFIED TYPE: Primary | ICD-10-CM

## 2018-11-27 DIAGNOSIS — M17.0 PRIMARY OSTEOARTHRITIS OF BOTH KNEES: ICD-10-CM

## 2018-11-27 PROCEDURE — 99214 OFFICE O/P EST MOD 30 MIN: CPT | Performed by: FAMILY MEDICINE

## 2018-11-27 RX ORDER — NAPROXEN 500 MG/1
500 TABLET ORAL DAILY
Qty: 90 TABLET | Refills: 2 | OUTPATIENT
Start: 2018-11-27 | End: 2018-12-03

## 2018-11-27 NOTE — PROGRESS NOTES
Subjective   Pedrito Powell is a 58 y.o. female.     Chief Complaint   Patient presents with   • Hypertension   • Hyperlipidemia   • Osteoarthritis         History of Present Illness     Patient has essential hypertension.  Patient's blood pressures 116/62.  Patient currently taken hydrochlorothiazide 25 mg daily and olmesartan 300 mg daily.  Patient denies any side effects of the medication.    She continues to have osteoporosis in both knees which causes her pain.  Patient would like to continue taking diclofenac every day.  She notes that the medication seems to work well for her.    She notes having a ganglionic cyst on the right wrist.  Patient notes that it is tender.  Patient notes that she does not want any surgical intervention at this time.    The following portions of the patient's history were reviewed and updated as appropriate: allergies, current medications, past family history, past medical history, past social history, past surgical history and problem list.    Review of Systems   Constitutional: Negative for chills and fever.   HENT: Negative for congestion, rhinorrhea, sinus pain and sore throat.    Eyes: Negative for photophobia and visual disturbance.   Respiratory: Negative for cough, chest tightness and shortness of breath.    Cardiovascular: Negative for chest pain and palpitations.   Gastrointestinal: Negative for diarrhea, nausea and vomiting.   Genitourinary: Negative for dysuria, frequency and urgency.   Skin: Negative for rash and wound.   Neurological: Negative for dizziness and syncope.   Psychiatric/Behavioral: Negative for behavioral problems and confusion.       Objective   Physical Exam   Constitutional: She is oriented to person, place, and time. She appears well-developed and well-nourished.   HENT:   Head: Normocephalic and atraumatic.   Right Ear: External ear normal.   Left Ear: External ear normal.   Mouth/Throat: Oropharynx is clear and moist.   Eyes: EOM are normal.    Neck: Normal range of motion. Neck supple.   Cardiovascular: Normal rate, regular rhythm and normal heart sounds.   Pulmonary/Chest: Effort normal and breath sounds normal. No respiratory distress.   Musculoskeletal: Normal range of motion.   ganglionic right wrist.   Lymphadenopathy:     She has no cervical adenopathy.   Neurological: She is alert and oriented to person, place, and time.   Skin: Skin is warm.   Psychiatric: She has a normal mood and affect. Her behavior is normal.   Nursing note and vitals reviewed.      Assessment/Plan   Pedrito was seen today for hypertension, hyperlipidemia and osteoarthritis.    Diagnoses and all orders for this visit:    Hypertension, unspecified type  -     Comprehensive Metabolic Panel    Primary osteoarthritis of both knees  -     naproxen (NAPROSYN) 500 MG tablet; Take 1 tablet by mouth Daily.  -     Ambulatory Referral to Physical Therapy Aquatics, Evaluate and treat  -     We'll discontinue diclofenac at this time.  We'll switch patient over to naproxen.  Discussed benefits and naproxen.  More tolerable than diclofenac.    Ganglion of right wrist        -     No action at this time.  We'll continue to monitor.          No Follow-up on file.    Dictated utilizing Dragon Voice Recognition Software

## 2018-11-28 LAB
ALBUMIN SERPL-MCNC: 4.3 G/DL (ref 3.5–5.2)
ALBUMIN/GLOB SERPL: 1.5 G/DL
ALP SERPL-CCNC: 61 U/L (ref 39–117)
ALT SERPL-CCNC: 19 U/L (ref 1–33)
AST SERPL-CCNC: 20 U/L (ref 1–32)
BILIRUB SERPL-MCNC: 0.3 MG/DL (ref 0.1–1.2)
BUN SERPL-MCNC: 33 MG/DL (ref 6–20)
BUN/CREAT SERPL: 22.4 (ref 7–25)
CALCIUM SERPL-MCNC: 9.4 MG/DL (ref 8.6–10.5)
CHLORIDE SERPL-SCNC: 101 MMOL/L (ref 98–107)
CO2 SERPL-SCNC: 27.1 MMOL/L (ref 22–29)
CREAT SERPL-MCNC: 1.47 MG/DL (ref 0.57–1)
GLOBULIN SER CALC-MCNC: 2.9 GM/DL
GLUCOSE SERPL-MCNC: 98 MG/DL (ref 65–99)
POTASSIUM SERPL-SCNC: 4.3 MMOL/L (ref 3.5–5.2)
PROT SERPL-MCNC: 7.2 G/DL (ref 6–8.5)
SODIUM SERPL-SCNC: 141 MMOL/L (ref 136–145)

## 2018-12-03 ENCOUNTER — TELEPHONE (OUTPATIENT)
Dept: INTERNAL MEDICINE | Facility: CLINIC | Age: 58
End: 2018-12-03

## 2018-12-03 ENCOUNTER — HOSPITAL ENCOUNTER (EMERGENCY)
Facility: HOSPITAL | Age: 58
Discharge: HOME | End: 2018-12-03
Attending: EMERGENCY MEDICINE | Admitting: EMERGENCY MEDICINE

## 2018-12-03 VITALS
DIASTOLIC BLOOD PRESSURE: 91 MMHG | HEART RATE: 72 BPM | BODY MASS INDEX: 44.37 KG/M2 | SYSTOLIC BLOOD PRESSURE: 133 MMHG | TEMPERATURE: 98.8 F | WEIGHT: 226 LBS | OXYGEN SATURATION: 97 % | HEIGHT: 60 IN | RESPIRATION RATE: 16 BRPM

## 2018-12-03 DIAGNOSIS — N18.9 CHRONIC RENAL IMPAIRMENT, UNSPECIFIED CKD STAGE: ICD-10-CM

## 2018-12-03 DIAGNOSIS — D64.9 CHRONIC ANEMIA: ICD-10-CM

## 2018-12-03 DIAGNOSIS — T50.905A ADVERSE REACTION TO OVER-THE-COUNTER MEDICATION, INITIAL ENCOUNTER: ICD-10-CM

## 2018-12-03 DIAGNOSIS — R11.0 NAUSEA: Primary | ICD-10-CM

## 2018-12-03 LAB
ANION GAP SERPL CALCULATED.3IONS-SCNC: 11.6 MMOL/L
BASOPHILS # BLD AUTO: 0.05 10*3/MM3 (ref 0–0.2)
BASOPHILS NFR BLD AUTO: 0.7 % (ref 0–1.5)
BUN BLD-MCNC: 18 MG/DL (ref 6–20)
BUN/CREAT SERPL: 13 (ref 7–25)
CALCIUM SPEC-SCNC: 9.5 MG/DL (ref 8.6–10.5)
CHLORIDE SERPL-SCNC: 98 MMOL/L (ref 98–107)
CO2 SERPL-SCNC: 25.4 MMOL/L (ref 22–29)
CREAT BLD-MCNC: 1.38 MG/DL (ref 0.57–1)
DEPRECATED RDW RBC AUTO: 40.8 FL (ref 37–54)
EOSINOPHIL # BLD AUTO: 0.27 10*3/MM3 (ref 0–0.7)
EOSINOPHIL NFR BLD AUTO: 3.7 % (ref 0.3–6.2)
ERYTHROCYTE [DISTWIDTH] IN BLOOD BY AUTOMATED COUNT: 12.7 % (ref 11.7–13)
GFR SERPL CREATININE-BSD FRML MDRD: 39 ML/MIN/1.73
GLUCOSE BLD-MCNC: 121 MG/DL (ref 65–99)
HCT VFR BLD AUTO: 31.8 % (ref 35.6–45.5)
HGB BLD-MCNC: 10.7 G/DL (ref 11.9–15.5)
IMM GRANULOCYTES # BLD: 0.01 10*3/MM3 (ref 0–0.03)
IMM GRANULOCYTES NFR BLD: 0.1 % (ref 0–0.5)
LYMPHOCYTES # BLD AUTO: 1.33 10*3/MM3 (ref 0.9–4.8)
LYMPHOCYTES NFR BLD AUTO: 18 % (ref 19.6–45.3)
MCH RBC QN AUTO: 29.2 PG (ref 26.9–32)
MCHC RBC AUTO-ENTMCNC: 33.6 G/DL (ref 32.4–36.3)
MCV RBC AUTO: 86.6 FL (ref 80.5–98.2)
MONOCYTES # BLD AUTO: 0.52 10*3/MM3 (ref 0.2–1.2)
MONOCYTES NFR BLD AUTO: 7.1 % (ref 5–12)
NEUTROPHILS # BLD AUTO: 5.2 10*3/MM3 (ref 1.9–8.1)
NEUTROPHILS NFR BLD AUTO: 70.5 % (ref 42.7–76)
NRBC BLD MANUAL-RTO: 0 /100 WBC (ref 0–0)
PLAT MORPH BLD: NORMAL
PLATELET # BLD AUTO: 299 10*3/MM3 (ref 140–500)
PMV BLD AUTO: 10.3 FL (ref 6–12)
POTASSIUM BLD-SCNC: 3.7 MMOL/L (ref 3.5–5.2)
RBC # BLD AUTO: 3.67 10*6/MM3 (ref 3.9–5.2)
RBC MORPH BLD: NORMAL
SODIUM BLD-SCNC: 135 MMOL/L (ref 136–145)
WBC MORPH BLD: NORMAL
WBC NRBC COR # BLD: 7.37 10*3/MM3 (ref 4.5–10.7)

## 2018-12-03 PROCEDURE — 85025 COMPLETE CBC W/AUTO DIFF WBC: CPT | Performed by: EMERGENCY MEDICINE

## 2018-12-03 PROCEDURE — 99283 EMERGENCY DEPT VISIT LOW MDM: CPT

## 2018-12-03 PROCEDURE — 80048 BASIC METABOLIC PNL TOTAL CA: CPT | Performed by: EMERGENCY MEDICINE

## 2018-12-03 PROCEDURE — 85007 BL SMEAR W/DIFF WBC COUNT: CPT | Performed by: EMERGENCY MEDICINE

## 2018-12-03 RX ORDER — ONDANSETRON 4 MG/1
4 TABLET, ORALLY DISINTEGRATING ORAL EVERY 6 HOURS PRN
Qty: 12 TABLET | Refills: 0 | Status: SHIPPED | OUTPATIENT
Start: 2018-12-03 | End: 2018-12-07

## 2018-12-03 RX ORDER — ONDANSETRON 4 MG/1
4 TABLET, ORALLY DISINTEGRATING ORAL ONCE
Status: COMPLETED | OUTPATIENT
Start: 2018-12-03 | End: 2018-12-03

## 2018-12-03 RX ADMIN — ONDANSETRON 4 MG: 4 TABLET, ORALLY DISINTEGRATING ORAL at 16:48

## 2018-12-03 NOTE — DISCHARGE INSTRUCTIONS
Stop taking Naprosyn.  Take Zofran as prescribed.  Follow-up with your primary care doctor if symptoms persist.  Return to the emergency department for vomiting, worsening symptoms, or other concern.

## 2018-12-03 NOTE — ED PROVIDER NOTES
EMERGENCY DEPARTMENT ENCOUNTER    CHIEF COMPLAINT  Chief Complaint: Vomiting  History given by: Pt  History limited by: Nothing  Room Number:   PMD: Teo Fraser MD      HPI:  Pt is a 58 y.o. female who presents complaining of vomiting and dry heaves for 12 hours after taking naprosen 2 days ago.The pt has not vomited since. The pt had never taken the medication before, but her PCP wanted her to try it instead of diclofenac. The pt denies diarrhea, rash, difficulty swallowing, or difficulty breathing, and confirms nausea and dizziness. The pt was not around anyone with similar symptoms.     Duration:  12 hours  Onset: Gradual  Timing: Intermittent  Location: GI  Quality: Dry heaves  Intensity/Severity: Moderate  Progression: Improving  Associated Symptoms: Chills, nausea, dizziness  Aggravating Factors: Taking naprosen  Alleviating Factors: Unknown  Previous Episodes: None  Treatment before arrival: None    PAST MEDICAL HISTORY  Active Ambulatory Problems     Diagnosis Date Noted   • Hx of anaphylaxis 2018   • Primary osteoarthritis of knee 2018   • High risk medication use 2018   • Hypertension 2018   • Hyperlipidemia 2018   • Vertigo 2018   • Infected blister of left foot 2018   • Ganglion of right wrist 2018     Resolved Ambulatory Problems     Diagnosis Date Noted   • No Resolved Ambulatory Problems     Past Medical History:   Diagnosis Date   • Arthritis    • GERD (gastroesophageal reflux disease)    • Hyperlipidemia    • Hypertension        PAST SURGICAL HISTORY  Past Surgical History:   Procedure Laterality Date   •  SECTION     • HYSTERECTOMY     • OOPHORECTOMY     • SHOULDER MANIPULATION         FAMILY HISTORY  No family history on file.    SOCIAL HISTORY  Social History     Socioeconomic History   • Marital status:      Spouse name: Not on file   • Number of children: Not on file   • Years of education: Not on file   • Highest  Reason For Visit  69 year old male pt known to me from inpt consult  - Agree with note, okay to discharge home, will need outpatient angiogram  I reviewed the CT angiogram and he has bilateral superficial femoral artery and popliteal disease, and tibial disease     Past Medical History   1. History of Abnormal prothrombin time (PT) (R79.1)   2. History of Abnormal weight loss (R63.4)   3. History of Anemia (D64.9)   4. History of H/O total hip arthroplasty, right (Z96.641)   5. History of Hip pain, right (M25.551)   6. History of edema (Z87.898)   7. History of rheumatic fever (Z86.79)   8. History of upper gastrointestinal hemorrhage (Z87.19)   9. History of Knee pain (M25.569)   10. History of Laceration   11. History of Laryngeal Cancer   12. History of Lightheadedness (R42)   13. History of Lung mass (R91.8)   14. History of Olecranon bursitis of right elbow (M70.21)   15. History of Osteoarthritis of right hip, unspecified osteoarthritis type (M16.11)   16. History of Pneumonia    Surgical History   1. History of Appendectomy   2. History of Biopsy Lung Percutaneous   3. History of Surgical Lysis Of Intestinal Adhesions   4. History of Total Laryngectomy With Radical Neck Dissection    Family History   1. Family history of hypertension (Z82.49)   2. Family history of hypertension (Z82.49) : Mother   3. Family history of type 2 diabetes mellitus (Z83.3) : Mother    Social History   · Denied: History of Alcohol Use (History)   · Exercise Habits   · Personal history of nicotine dependence (Z87.891)    Allergies   1. No Known Drug Allergies    Presenting Meds   1. AmLODIPine Besylate 10 MG Oral Tablet; TAKE 1 TABLET DAILY;   Therapy: 31Jan2018 to (Last Rx:31Jan2018)  Requested for: 31Jan2018 Ordered   2. Aspirin EC 81 MG Oral Tablet Delayed Release; TAKE 1 TABLET (81 MG) BY ORAL   ROUTE ONCE DAILY;   Therapy: 23Apr2018 to (Evaluate:19Nov2018) Recorded   3. Atorvastatin Calcium 10 MG Oral Tablet; TAKE 1 TABLET  DAILY;   Therapy: 23Apr2018 to (Evaluate:[id number])  Requested for: 57Bem9510; Last   Rx:[id number] Ordered   4. Benazepril HCl - 40 MG Oral Tablet; TAKE 1 TABLET DAILY;   Therapy: 31Jan2018 to (Last Rx:31Jan2018)  Requested for: 31Jan2018 Ordered   5. Colchicine-Probenecid 0.5-500 MG Oral Tablet; TAKE 1 TABLET TWICE A DAY AS   DIRECTED;   Therapy: 14Apr2017 to (Last Rx:14Apr2017)  Requested for: 15Usa6204 Ordered   6. Leuprolide Acetate 1 MG/0.2ML Injection Kit; USE AS DIRECTED;   Therapy: (Recorded:20Mar2013) to Recorded   7. Metoprolol Succinate  MG Oral Tablet Extended Release 24 Hour; TAKE 1 TABLET   DAILY;   Therapy: 31Jan2018 to (Last Rx:31Jan2018)  Requested for: 31Jan2018 Ordered   8. PredniSONE 5 MG Oral Tablet; TAKE 1 TABLET TWICE DAILY;   Therapy: 89Wip9235 to (Evaluate:53Jja2819) Recorded   9. Triamterene-HCTZ 75-50 MG Oral Tablet; TAKE 1 TABLET DAILY;   Therapy: 31Jan2018 to (Last Rx:31Jan2018)  Requested for: 31Jan2018 Ordered   10. Viagra 100 MG Oral Tablet; TAKE AS DIRECTED; Last Rx:17Gbt2735 Ordered   11. Zytiga 250 MG Oral Tablet; TAKE 4 TABLETS DAILY;    Therapy: (Recorded:97Kng2276) to Recorded    Physical Exam    Lungs   Full expansion and clear to auscultation    Cardiovascular   Auscultation of heart: Regular rhythm, normal S1,S2 without S3. No pathological murmurs.      Venous Ulcers:. left inner distal calf 4x3 cm eschar, outer distal calf 3x2, 2x2 cm eschar. dry.      Time  Total face to face time spent with patient 15 minutes. Greater than 50% of the total time was spent counseling and/or coordinating care.      Discussion/Summary    Peripheral Arterial Disease ( PAD)   - counseled on atherosclerosis of the extremity arteries  - risk factors include Diabetes, hypercholesterolemia, smoking, overweight, sedentary lifestyle, hypertension, etc  - we discussed claudication, calf pain with walking relieved by rest, is most common symptom.   - counseled on exercise walking regimen, 4-5 days  education level: Not on file   Social Needs   • Financial resource strain: Not on file   • Food insecurity - worry: Not on file   • Food insecurity - inability: Not on file   • Transportation needs - medical: Not on file   • Transportation needs - non-medical: Not on file   Occupational History   • Not on file   Tobacco Use   • Smoking status: Never Smoker   • Smokeless tobacco: Never Used   Substance and Sexual Activity   • Alcohol use: No   • Drug use: No   • Sexual activity: Not on file   Other Topics Concern   • Not on file   Social History Narrative   • Not on file       ALLERGIES  Ciprofloxacin; Latex; Other; Penicillins; and Sulfa antibiotics    REVIEW OF SYSTEMS  Review of Systems   Constitutional: Positive for chills. Negative for fever.   HENT: Negative for sore throat.    Eyes: Negative.    Respiratory: Negative for cough and shortness of breath.    Cardiovascular: Negative for chest pain.   Gastrointestinal: Positive for nausea and vomiting. Negative for abdominal pain and diarrhea.   Genitourinary: Negative for dysuria.   Musculoskeletal: Negative for neck pain.   Skin: Negative for rash.   Allergic/Immunologic: Negative.    Neurological: Positive for dizziness. Negative for weakness, numbness and headaches.   Hematological: Negative.    Psychiatric/Behavioral: Negative.    All other systems reviewed and are negative.      PHYSICAL EXAM  ED Triage Vitals   Temp Heart Rate Resp BP SpO2   12/03/18 1538 12/03/18 1538 12/03/18 1538 12/03/18 1633 12/03/18 1538   98.8 °F (37.1 °C) 94 16 (!) 160/108 98 %      Temp src Heart Rate Source Patient Position BP Location FiO2 (%)   12/03/18 1538 12/03/18 1538 12/03/18 1633 12/03/18 1633 --   Tympanic Monitor Sitting Right arm        Physical Exam   Constitutional: She is oriented to person, place, and time. No distress.   HENT:   Mouth/Throat: Oropharynx is clear and moist.   No angioedema   Eyes: EOM are normal. Right eye exhibits no nystagmus. Left eye exhibits no  nystagmus.   Neck: Normal range of motion.   Cardiovascular: Normal rate and regular rhythm.   Pulmonary/Chest: Effort normal and breath sounds normal. No respiratory distress.   Abdominal: Soft. There is no tenderness.   Neurological: She is alert and oriented to person, place, and time. She has normal sensation and normal strength.   Skin: Skin is warm and dry.   Psychiatric: Affect normal.   Nursing note and vitals reviewed.      LAB RESULTS  Lab Results (last 24 hours)     Procedure Component Value Units Date/Time    CBC & Differential [[id number]] Collected:  12/03/18 1649    Specimen:  Blood Updated:  12/03/18 1723    Narrative:       The following orders were created for panel order CBC & Differential.  Procedure                               Abnormality         Status                     ---------                               -----------         ------                     Scan Slide[[id number]]                   Normal              Final result               CBC Auto Differential[[id number]]        Abnormal            Final result                 Please view results for these tests on the individual orders.    Basic Metabolic Panel [[id number]]  (Abnormal) Collected:  12/03/18 1649    Specimen:  Blood Updated:  12/03/18 1718     Glucose 121 mg/dL      BUN 18 mg/dL      Creatinine 1.38 mg/dL      Sodium 135 mmol/L      Potassium 3.7 mmol/L      Chloride 98 mmol/L      CO2 25.4 mmol/L      Calcium 9.5 mg/dL      eGFR Non African Amer 39 mL/min/1.73      BUN/Creatinine Ratio 13.0     Anion Gap 11.6 mmol/L     Narrative:       GFR Normal >60  Chronic Kidney Disease <60  Kidney Failure <15    CBC Auto Differential [[id number]]  (Abnormal) Collected:  12/03/18 1649    Specimen:  Blood Updated:  12/03/18 1723     WBC 7.37 10*3/mm3      RBC 3.67 10*6/mm3      Hemoglobin 10.7 g/dL      Hematocrit 31.8 %      MCV 86.6 fL      MCH 29.2 pg      MCHC 33.6 g/dL      RDW 12.7 %      RDW-SD 40.8 fl      MPV 10.3 fL       per week, for minimum 30-45 minutes of walking, and walk through pain as much as possible   - aspirin recommended, 81 or 325 mg qd    he has LEFT leg critical limb ischemia, w gangrenous changes distal calf  risk of leg amputation discussed in detail  - plan for LEFT lower extremity angiogram, possible angioplasty/stent under monitored anesthesia care (MAC) , outpatient low risk procedure  risk    - risks of bleeding, embolization , MI, contrast nephropathy etc discussed- patient agrees to proceed   - aspirin, plavix - don't stop for angiogram   -pre - op labs    of note, s/p laryngectomy 2004     PCP           Signatures   Electronically signed by : BRIAN OLIVEIRA M.D.; Oct 17 2018  3:46PM CST     Platelets 299 10*3/mm3      Neutrophil % 70.5 %      Lymphocyte % 18.0 %      Monocyte % 7.1 %      Eosinophil % 3.7 %      Basophil % 0.7 %      Immature Grans % 0.1 %      Neutrophils, Absolute 5.20 10*3/mm3      Lymphocytes, Absolute 1.33 10*3/mm3      Monocytes, Absolute 0.52 10*3/mm3      Eosinophils, Absolute 0.27 10*3/mm3      Basophils, Absolute 0.05 10*3/mm3      Immature Grans, Absolute 0.01 10*3/mm3      nRBC 0.0 /100 WBC     Scan Slide [[id number]]  (Normal) Collected:  12/03/18 1649    Specimen:  Blood Updated:  12/03/18 1723     RBC Morphology Normal     WBC Morphology Normal     Platelet Morphology Normal          I ordered the above labs and reviewed the results    PROCEDURES  Procedures      PROGRESS AND CONSULTS  ED Course as of Dec 03 1801   Mon Dec 03, 2018   1736 1.47 six days ago Creatinine: (!) 1.38 [WH]   1737 stable Hemoglobin: (!) 10.7 []      ED Course User Index  [WH] Severo Dahl MD     1644 Ordered BMP and CBC for further evaluation. Ordered Zofran for nausea.    1748 Rechecked the pt and discussed her lab results, which are similar to previous results. Discussed that the pt could be experiencing side effects from the medication, but it also could have been a stomach bug. Discussed that the pt can stop naprosen. Discussed the plan to discharge the pt and all questions were addressed.     MEDICAL DECISION MAKING  Results were reviewed/discussed with the patient and they were also made aware of online access. Pt also made aware that some labs, such as cultures, will not be resulted during ER visit and follow up with PMD is necessary.     MDM  Number of Diagnoses or Management Options     Amount and/or Complexity of Data Reviewed  Clinical lab tests: ordered and reviewed (Creatinine: 1.38, Sodium: 135)  Decide to obtain previous medical records or to obtain history from someone other than the patient: yes  Review and summarize past medical records: yes    Patient Progress  Patient  progress: improved         DIAGNOSIS  Final diagnoses:   Nausea   Chronic renal impairment, unspecified CKD stage   Chronic anemia   Adverse reaction to over-the-counter medication, initial encounter       DISPOSITION  Disposition: Discharged.    Patient discharged in stable condition.    Reviewed implications of results, diagnosis, meds, responsibility to follow up, warning signs and symptoms of possible worsening, potential complications and reasons to return to ER, including new or worsening symptoms.    Patient/Family voiced understanding of above instructions.    Discussed plan for discharge, as there is no emergent indication for admission.  Pt/family is agreeable and understands need for follow up and repeat testing.  Pt is aware that discharge does not mean that nothing is wrong but it indicates no emergency is present and they must continue care with follow-up as given below or physician of their choice.     FOLLOW-UP  Teo Fraser MD  [id number] [address]  [phone]    Call in 2 days  If symptoms persist         Medication List      Changed    ondansetron ODT 4 MG disintegrating tablet  Commonly known as:  ZOFRAN-ODT  Take 1 tablet by mouth Every 6 (Six) Hours As Needed for Nausea or   Vomiting. DISSOLVE ONE TABLET ON TONGUE BID  What changed:    how much to take  how to take this  when to take this  reasons to take this        Stop    naproxen 500 MG tablet  Commonly known as:  NAPROSYN            Latest Documented Vital Signs:  As of 6:01 PM  BP- (!) 160/108 HR- 82 Temp- 98.8 °F (37.1 °C) (Tympanic) O2 sat- 98%    --  Documentation assistance provided by evelin Small for Dr. Dahl.  Information recorded by the gatitoe was done at my direction and has been verified and validated by me.     Lucille Small  12/03/18 1706       Lucille Small  12/03/18 1731       Lucille Small  12/03/18 0701       Severo Dahl MD  12/03/18 [id number]

## 2018-12-03 NOTE — TELEPHONE ENCOUNTER
Patient states she took 2 doses of the naproxen and it has made her feel weak and shakey.  Please advise

## 2018-12-04 NOTE — TELEPHONE ENCOUNTER
Patient notified, states she went to ED yesterday and was treated with zofran  Patient states she is still feeling terrible, made appt for 12/6/18, advised to go to ER if sx worsen

## 2018-12-05 ENCOUNTER — OFFICE VISIT (OUTPATIENT)
Dept: INTERNAL MEDICINE | Facility: CLINIC | Age: 58
End: 2018-12-05

## 2018-12-05 VITALS
HEART RATE: 91 BPM | HEIGHT: 60 IN | DIASTOLIC BLOOD PRESSURE: 82 MMHG | OXYGEN SATURATION: 99 % | BODY MASS INDEX: 43.15 KG/M2 | SYSTOLIC BLOOD PRESSURE: 104 MMHG | TEMPERATURE: 98.4 F | WEIGHT: 219.8 LBS

## 2018-12-05 DIAGNOSIS — R10.84 GENERALIZED ABDOMINAL PAIN: Primary | ICD-10-CM

## 2018-12-05 PROCEDURE — 99213 OFFICE O/P EST LOW 20 MIN: CPT | Performed by: FAMILY MEDICINE

## 2018-12-05 NOTE — PROGRESS NOTES
Subjective   Pedrito Powell is a 58 y.o. female.     Chief Complaint   Patient presents with   • Chills   • Fatigue   • Constipation   • No Appetite         History of Present Illness     Patient notes that over the weekend when she started taken naproxen, she started to have significant abdominal pain.  Patient believes that she could be allergic to the medication.  Patient went to the emergency room was evaluated, and was believed the patient had a viral infection.  Patient notes that she still has not had a bowel movement in 5 days.  Patient notes that she is tender on abdomen.  Patient denies any constipation this time.  Patient denies any bloatedness time.    The following portions of the patient's history were reviewed and updated as appropriate: allergies, current medications, past family history, past medical history, past social history, past surgical history and problem list.    Review of Systems   Constitutional: Negative.    HENT: Negative.    Respiratory: Negative.    Cardiovascular: Negative.    Gastrointestinal: Positive for abdominal distention and abdominal pain. Negative for constipation, diarrhea and nausea.   Psychiatric/Behavioral: Negative.        Objective   Physical Exam   Constitutional: She is oriented to person, place, and time. She appears well-developed and well-nourished.   HENT:   Head: Normocephalic and atraumatic.   Eyes: EOM are normal.   Neck: Normal range of motion. Neck supple.   Cardiovascular: Normal rate, regular rhythm and normal heart sounds.   Pulmonary/Chest: Effort normal and breath sounds normal. No respiratory distress.   Abdominal: Soft. She exhibits no distension. There is no tenderness. There is no guarding.   Neurological: She is alert and oriented to person, place, and time.   Psychiatric: She has a normal mood and affect. Her behavior is normal.   Nursing note and vitals reviewed.      Assessment/Plan   Pedrito was seen today for chills, fatigue, constipation  and no appetite.    Diagnoses and all orders for this visit:    Generalized abdominal pain  -     CT Abdomen Pelvis Without Contrast  -     Patient has not had a bowel movement in 5+ days.  We'll get a CT scan of abdomen to rule out any obstruction.          No Follow-up on file.    Dictated utilizing Dragon Voice Recognition Software

## 2018-12-07 ENCOUNTER — HOSPITAL ENCOUNTER (OUTPATIENT)
Facility: HOSPITAL | Age: 58
Discharge: HOME | End: 2018-12-10
Attending: EMERGENCY MEDICINE | Admitting: EMERGENCY MEDICINE

## 2018-12-07 DIAGNOSIS — R11.10 VOMITING AND DIARRHEA: Primary | ICD-10-CM

## 2018-12-07 DIAGNOSIS — R11.2 INTRACTABLE VOMITING WITH NAUSEA, UNSPECIFIED VOMITING TYPE: ICD-10-CM

## 2018-12-07 DIAGNOSIS — N18.30 CHRONIC RENAL FAILURE, STAGE 3 (MODERATE) (HCC): ICD-10-CM

## 2018-12-07 DIAGNOSIS — R19.7 VOMITING AND DIARRHEA: Primary | ICD-10-CM

## 2018-12-07 LAB
ALBUMIN SERPL-MCNC: 3.9 G/DL (ref 3.5–5.2)
ALBUMIN/GLOB SERPL: 1.1 G/DL
ALP SERPL-CCNC: 62 U/L (ref 39–117)
ALT SERPL W P-5'-P-CCNC: 15 U/L (ref 1–33)
ANION GAP SERPL CALCULATED.3IONS-SCNC: 13.8 MMOL/L
AST SERPL-CCNC: 18 U/L (ref 1–32)
B PARAPERT DNA SPEC QL NAA+PROBE: NOT DETECTED
B PERT DNA SPEC QL NAA+PROBE: NOT DETECTED
BASOPHILS # BLD AUTO: 0.04 10*3/MM3 (ref 0–0.2)
BASOPHILS NFR BLD AUTO: 0.4 % (ref 0–1.5)
BILIRUB SERPL-MCNC: 0.6 MG/DL (ref 0.1–1.2)
BILIRUB UR QL STRIP: NEGATIVE
BUN BLD-MCNC: 24 MG/DL (ref 6–20)
BUN/CREAT SERPL: 14.6 (ref 7–25)
C PNEUM DNA NPH QL NAA+NON-PROBE: NOT DETECTED
CALCIUM SPEC-SCNC: 8.9 MG/DL (ref 8.6–10.5)
CHLORIDE SERPL-SCNC: 95 MMOL/L (ref 98–107)
CLARITY UR: CLEAR
CO2 SERPL-SCNC: 28.2 MMOL/L (ref 22–29)
COLOR UR: YELLOW
CREAT BLD-MCNC: 1.64 MG/DL (ref 0.57–1)
DEPRECATED RDW RBC AUTO: 39.4 FL (ref 37–54)
EOSINOPHIL # BLD AUTO: 0.23 10*3/MM3 (ref 0–0.7)
EOSINOPHIL NFR BLD AUTO: 2.4 % (ref 0.3–6.2)
ERYTHROCYTE [DISTWIDTH] IN BLOOD BY AUTOMATED COUNT: 12.3 % (ref 11.7–13)
FLUAV H1 2009 PAND RNA NPH QL NAA+PROBE: NOT DETECTED
FLUAV H1 HA GENE NPH QL NAA+PROBE: NOT DETECTED
FLUAV H3 RNA NPH QL NAA+PROBE: NOT DETECTED
FLUAV SUBTYP SPEC NAA+PROBE: NOT DETECTED
FLUBV RNA ISLT QL NAA+PROBE: NOT DETECTED
GFR SERPL CREATININE-BSD FRML MDRD: 32 ML/MIN/1.73
GLOBULIN UR ELPH-MCNC: 3.4 GM/DL
GLUCOSE BLD-MCNC: 123 MG/DL (ref 65–99)
GLUCOSE UR STRIP-MCNC: NEGATIVE MG/DL
HADV DNA SPEC NAA+PROBE: NOT DETECTED
HCOV 229E RNA SPEC QL NAA+PROBE: NOT DETECTED
HCOV HKU1 RNA SPEC QL NAA+PROBE: NOT DETECTED
HCOV NL63 RNA SPEC QL NAA+PROBE: NOT DETECTED
HCOV OC43 RNA SPEC QL NAA+PROBE: NOT DETECTED
HCT VFR BLD AUTO: 34.8 % (ref 35.6–45.5)
HGB BLD-MCNC: 11.4 G/DL (ref 11.9–15.5)
HGB UR QL STRIP.AUTO: NEGATIVE
HMPV RNA NPH QL NAA+NON-PROBE: NOT DETECTED
HPIV1 RNA SPEC QL NAA+PROBE: NOT DETECTED
HPIV2 RNA SPEC QL NAA+PROBE: NOT DETECTED
HPIV3 RNA NPH QL NAA+PROBE: NOT DETECTED
HPIV4 P GENE NPH QL NAA+PROBE: NOT DETECTED
IMM GRANULOCYTES # BLD: 0 10*3/MM3 (ref 0–0.03)
IMM GRANULOCYTES NFR BLD: 0 % (ref 0–0.5)
KETONES UR QL STRIP: NEGATIVE
LEUKOCYTE ESTERASE UR QL STRIP.AUTO: NEGATIVE
LIPASE SERPL-CCNC: 38 U/L (ref 13–60)
LYMPHOCYTES # BLD AUTO: 1.31 10*3/MM3 (ref 0.9–4.8)
LYMPHOCYTES NFR BLD AUTO: 13.9 % (ref 19.6–45.3)
M PNEUMO IGG SER IA-ACNC: NOT DETECTED
MCH RBC QN AUTO: 28.9 PG (ref 26.9–32)
MCHC RBC AUTO-ENTMCNC: 32.8 G/DL (ref 32.4–36.3)
MCV RBC AUTO: 88.3 FL (ref 80.5–98.2)
MONOCYTES # BLD AUTO: 0.83 10*3/MM3 (ref 0.2–1.2)
MONOCYTES NFR BLD AUTO: 8.8 % (ref 5–12)
NEUTROPHILS # BLD AUTO: 7 10*3/MM3 (ref 1.9–8.1)
NEUTROPHILS NFR BLD AUTO: 74.5 % (ref 42.7–76)
NITRITE UR QL STRIP: NEGATIVE
PH UR STRIP.AUTO: <=5 [PH] (ref 5–8)
PLATELET # BLD AUTO: 275 10*3/MM3 (ref 140–500)
PMV BLD AUTO: 9.2 FL (ref 6–12)
POTASSIUM BLD-SCNC: 3.3 MMOL/L (ref 3.5–5.2)
PROT SERPL-MCNC: 7.3 G/DL (ref 6–8.5)
PROT UR QL STRIP: NEGATIVE
RBC # BLD AUTO: 3.94 10*6/MM3 (ref 3.9–5.2)
RHINOVIRUS RNA SPEC NAA+PROBE: NOT DETECTED
RSV RNA NPH QL NAA+NON-PROBE: NOT DETECTED
SODIUM BLD-SCNC: 137 MMOL/L (ref 136–145)
SP GR UR STRIP: 1.01 (ref 1–1.03)
UROBILINOGEN UR QL STRIP: NORMAL
WBC NRBC COR # BLD: 9.41 10*3/MM3 (ref 4.5–10.7)

## 2018-12-07 PROCEDURE — 25010000002 ENOXAPARIN PER 10 MG: Performed by: HOSPITALIST

## 2018-12-07 PROCEDURE — 85025 COMPLETE CBC W/AUTO DIFF WBC: CPT | Performed by: EMERGENCY MEDICINE

## 2018-12-07 PROCEDURE — 25010000002 PROMETHAZINE PER 50 MG: Performed by: HOSPITALIST

## 2018-12-07 PROCEDURE — 99284 EMERGENCY DEPT VISIT MOD MDM: CPT

## 2018-12-07 PROCEDURE — 80053 COMPREHEN METABOLIC PANEL: CPT | Performed by: EMERGENCY MEDICINE

## 2018-12-07 PROCEDURE — 87798 DETECT AGENT NOS DNA AMP: CPT | Performed by: HOSPITALIST

## 2018-12-07 PROCEDURE — 82533 TOTAL CORTISOL: CPT | Performed by: INTERNAL MEDICINE

## 2018-12-07 PROCEDURE — G0378 HOSPITAL OBSERVATION PER HR: HCPCS

## 2018-12-07 PROCEDURE — 96375 TX/PRO/DX INJ NEW DRUG ADDON: CPT

## 2018-12-07 PROCEDURE — 96376 TX/PRO/DX INJ SAME DRUG ADON: CPT

## 2018-12-07 PROCEDURE — 25010000002 ONDANSETRON PER 1 MG: Performed by: EMERGENCY MEDICINE

## 2018-12-07 PROCEDURE — 96360 HYDRATION IV INFUSION INIT: CPT

## 2018-12-07 PROCEDURE — 87633 RESP VIRUS 12-25 TARGETS: CPT | Performed by: HOSPITALIST

## 2018-12-07 PROCEDURE — 87486 CHLMYD PNEUM DNA AMP PROBE: CPT | Performed by: HOSPITALIST

## 2018-12-07 PROCEDURE — 96372 THER/PROPH/DIAG INJ SC/IM: CPT

## 2018-12-07 PROCEDURE — 87581 M.PNEUMON DNA AMP PROBE: CPT | Performed by: HOSPITALIST

## 2018-12-07 PROCEDURE — 25010000002 PROMETHAZINE PER 50 MG: Performed by: EMERGENCY MEDICINE

## 2018-12-07 PROCEDURE — 81003 URINALYSIS AUTO W/O SCOPE: CPT | Performed by: EMERGENCY MEDICINE

## 2018-12-07 PROCEDURE — 96361 HYDRATE IV INFUSION ADD-ON: CPT

## 2018-12-07 PROCEDURE — 83690 ASSAY OF LIPASE: CPT | Performed by: EMERGENCY MEDICINE

## 2018-12-07 RX ORDER — SODIUM CHLORIDE 0.9 % (FLUSH) 0.9 %
3-10 SYRINGE (ML) INJECTION AS NEEDED
Status: DISCONTINUED | OUTPATIENT
Start: 2018-12-07 | End: 2018-12-10 | Stop reason: HOSPADM

## 2018-12-07 RX ORDER — ALBUTEROL SULFATE 90 UG/1
2 AEROSOL, METERED RESPIRATORY (INHALATION) EVERY 4 HOURS PRN
COMMUNITY

## 2018-12-07 RX ORDER — PROMETHAZINE HYDROCHLORIDE 25 MG/ML
12.5 INJECTION, SOLUTION INTRAMUSCULAR; INTRAVENOUS ONCE
Status: COMPLETED | OUTPATIENT
Start: 2018-12-07 | End: 2018-12-07

## 2018-12-07 RX ORDER — FLUTICASONE PROPIONATE 50 MCG
2 SPRAY, SUSPENSION (ML) NASAL DAILY
COMMUNITY
End: 2019-03-27

## 2018-12-07 RX ORDER — ATORVASTATIN CALCIUM 20 MG/1
40 TABLET, FILM COATED ORAL DAILY
Status: DISCONTINUED | OUTPATIENT
Start: 2018-12-07 | End: 2018-12-10 | Stop reason: HOSPADM

## 2018-12-07 RX ORDER — SODIUM CHLORIDE 0.9 % (FLUSH) 0.9 %
3 SYRINGE (ML) INJECTION EVERY 12 HOURS SCHEDULED
Status: DISCONTINUED | OUTPATIENT
Start: 2018-12-07 | End: 2018-12-10 | Stop reason: HOSPADM

## 2018-12-07 RX ORDER — BISACODYL 10 MG
10 SUPPOSITORY, RECTAL RECTAL DAILY PRN
Status: DISCONTINUED | OUTPATIENT
Start: 2018-12-07 | End: 2018-12-10 | Stop reason: HOSPADM

## 2018-12-07 RX ORDER — SODIUM CHLORIDE 0.9 % (FLUSH) 0.9 %
10 SYRINGE (ML) INJECTION AS NEEDED
Status: DISCONTINUED | OUTPATIENT
Start: 2018-12-07 | End: 2018-12-10 | Stop reason: HOSPADM

## 2018-12-07 RX ORDER — ONDANSETRON 2 MG/ML
4 INJECTION INTRAMUSCULAR; INTRAVENOUS ONCE
Status: COMPLETED | OUTPATIENT
Start: 2018-12-07 | End: 2018-12-07

## 2018-12-07 RX ORDER — SCOLOPAMINE TRANSDERMAL SYSTEM 1 MG/1
1 PATCH, EXTENDED RELEASE TRANSDERMAL
Status: DISCONTINUED | OUTPATIENT
Start: 2018-12-07 | End: 2018-12-10 | Stop reason: HOSPADM

## 2018-12-07 RX ORDER — DICLOFENAC SODIUM AND MISOPROSTOL 75; 200 MG/1; UG/1
1 TABLET, DELAYED RELEASE ORAL 2 TIMES DAILY
COMMUNITY
End: 2018-12-07

## 2018-12-07 RX ORDER — VALSARTAN AND HYDROCHLOROTHIAZIDE 160; 12.5 MG/1; MG/1
2 TABLET, FILM COATED ORAL DAILY
COMMUNITY
End: 2018-12-07 | Stop reason: ALTCHOICE

## 2018-12-07 RX ORDER — MECLIZINE HYDROCHLORIDE 25 MG/1
25 TABLET ORAL 3 TIMES DAILY PRN
Status: DISCONTINUED | OUTPATIENT
Start: 2018-12-07 | End: 2018-12-10 | Stop reason: HOSPADM

## 2018-12-07 RX ORDER — SODIUM CHLORIDE 9 MG/ML
100 INJECTION, SOLUTION INTRAVENOUS CONTINUOUS
Status: DISCONTINUED | OUTPATIENT
Start: 2018-12-07 | End: 2018-12-08

## 2018-12-07 RX ORDER — PANTOPRAZOLE SODIUM 40 MG/1
40 TABLET, DELAYED RELEASE ORAL EVERY MORNING
Status: DISCONTINUED | OUTPATIENT
Start: 2018-12-08 | End: 2018-12-10 | Stop reason: HOSPADM

## 2018-12-07 RX ORDER — PROMETHAZINE HYDROCHLORIDE 12.5 MG/1
12.5 TABLET ORAL EVERY 6 HOURS PRN
Status: DISCONTINUED | OUTPATIENT
Start: 2018-12-07 | End: 2018-12-10 | Stop reason: HOSPADM

## 2018-12-07 RX ORDER — ONDANSETRON 2 MG/ML
4 INJECTION INTRAMUSCULAR; INTRAVENOUS EVERY 6 HOURS PRN
Status: DISCONTINUED | OUTPATIENT
Start: 2018-12-07 | End: 2018-12-10 | Stop reason: HOSPADM

## 2018-12-07 RX ORDER — MONTELUKAST SODIUM 10 MG/1
10 TABLET ORAL DAILY
Status: DISCONTINUED | OUTPATIENT
Start: 2018-12-07 | End: 2018-12-10 | Stop reason: HOSPADM

## 2018-12-07 RX ORDER — POTASSIUM CHLORIDE 750 MG/1
40 CAPSULE, EXTENDED RELEASE ORAL ONCE
Status: COMPLETED | OUTPATIENT
Start: 2018-12-07 | End: 2018-12-07

## 2018-12-07 RX ORDER — DICLOFENAC SODIUM 75 MG/1
75 TABLET, DELAYED RELEASE ORAL DAILY
COMMUNITY
End: 2019-07-25 | Stop reason: SDUPTHER

## 2018-12-07 RX ORDER — MONTELUKAST SODIUM 10 MG/1
10 TABLET ORAL DAILY
COMMUNITY
End: 2019-01-09 | Stop reason: ALTCHOICE

## 2018-12-07 RX ORDER — ONDANSETRON 4 MG/1
4 TABLET, FILM COATED ORAL EVERY 6 HOURS PRN
Status: DISCONTINUED | OUTPATIENT
Start: 2018-12-07 | End: 2018-12-10 | Stop reason: HOSPADM

## 2018-12-07 RX ORDER — PROMETHAZINE HYDROCHLORIDE 25 MG/ML
12.5 INJECTION, SOLUTION INTRAMUSCULAR; INTRAVENOUS EVERY 6 HOURS PRN
Status: DISCONTINUED | OUTPATIENT
Start: 2018-12-07 | End: 2018-12-10 | Stop reason: HOSPADM

## 2018-12-07 RX ORDER — ONDANSETRON 4 MG/1
4 TABLET, ORALLY DISINTEGRATING ORAL EVERY 6 HOURS PRN
Status: DISCONTINUED | OUTPATIENT
Start: 2018-12-07 | End: 2018-12-10 | Stop reason: HOSPADM

## 2018-12-07 RX ORDER — ALBUTEROL SULFATE 2.5 MG/3ML
2.5 SOLUTION RESPIRATORY (INHALATION) EVERY 6 HOURS PRN
Status: DISCONTINUED | OUTPATIENT
Start: 2018-12-07 | End: 2018-12-10 | Stop reason: HOSPADM

## 2018-12-07 RX ORDER — ASPIRIN 325 MG
325 TABLET ORAL DAILY
Status: DISCONTINUED | OUTPATIENT
Start: 2018-12-07 | End: 2018-12-10 | Stop reason: HOSPADM

## 2018-12-07 RX ORDER — ACETAMINOPHEN 325 MG/1
650 TABLET ORAL EVERY 4 HOURS PRN
Status: DISCONTINUED | OUTPATIENT
Start: 2018-12-07 | End: 2018-12-10 | Stop reason: HOSPADM

## 2018-12-07 RX ORDER — ONDANSETRON 4 MG/1
4 TABLET, ORALLY DISINTEGRATING ORAL EVERY 6 HOURS PRN
COMMUNITY
End: 2019-03-06

## 2018-12-07 RX ORDER — BISACODYL 5 MG/1
5 TABLET, DELAYED RELEASE ORAL DAILY PRN
Status: DISCONTINUED | OUTPATIENT
Start: 2018-12-07 | End: 2018-12-10 | Stop reason: HOSPADM

## 2018-12-07 RX ADMIN — PROMETHAZINE HYDROCHLORIDE 12.5 MG: 25 INJECTION INTRAMUSCULAR; INTRAVENOUS at 20:46

## 2018-12-07 RX ADMIN — ENOXAPARIN SODIUM 40 MG: 40 INJECTION SUBCUTANEOUS at 17:42

## 2018-12-07 RX ADMIN — ONDANSETRON 4 MG: 2 INJECTION INTRAMUSCULAR; INTRAVENOUS at 13:35

## 2018-12-07 RX ADMIN — ACETAMINOPHEN 650 MG: 325 TABLET, FILM COATED ORAL at 20:46

## 2018-12-07 RX ADMIN — POTASSIUM CHLORIDE 40 MEQ: 750 CAPSULE, EXTENDED RELEASE ORAL at 17:41

## 2018-12-07 RX ADMIN — SODIUM CHLORIDE 100 ML/HR: 9 INJECTION, SOLUTION INTRAVENOUS at 16:18

## 2018-12-07 RX ADMIN — SCOPALAMINE 1 PATCH: 1 PATCH, EXTENDED RELEASE TRANSDERMAL at 17:41

## 2018-12-07 RX ADMIN — SODIUM CHLORIDE, PRESERVATIVE FREE 3 ML: 5 INJECTION INTRAVENOUS at 20:39

## 2018-12-07 RX ADMIN — PROMETHAZINE HYDROCHLORIDE 12.5 MG: 25 INJECTION INTRAMUSCULAR; INTRAVENOUS at 11:09

## 2018-12-07 RX ADMIN — SODIUM CHLORIDE 2000 ML: 9 INJECTION, SOLUTION INTRAVENOUS at 11:09

## 2018-12-07 NOTE — ED PROVIDER NOTES
" EMERGENCY DEPARTMENT ENCOUNTER    CHIEF COMPLAINT  Chief Complaint: V/D  History given by: Pt  History limited by: Nothing  Room Number:   PMD: Teo Fraser MD      HPI:  Pt is a 58 y.o. female who presents complaining of V/D for the last week. Pt reports that she was seen in the ER on 12/3 for her sx and stopped on her naprosyn, but states that since stopping the medication, her sx did not improve. Pt reports she had an outpt CT abd/pelvis at [facility] yesterday that was ordered by her PCP that she saw two days ago for recheck to rule out obstruction. She states she does not know the results. Pt also fatigue, weakness, chills, and \"dull\" abdominal pain. Pt denies fever, hematemesis, blood in stool, or dysuria.     Duration:  One week  Onset: gradual  Timing: intermittent  Quality: vomiting  Intensity/Severity: moderate  Progression: unchanged  Associated Symptoms: diarrhea, fatigue, chills, \"dull\" abdominal pain, generalized weakness  Aggravating Factors: none  Alleviating Factors: none  Previous Episodes: None stated  Treatment before arrival: Pt was seen in the ER on 12/3 and by her PCP on  for her sx. She had an outpt CT abd/pelvis at [facility]    PAST MEDICAL HISTORY  Active Ambulatory Problems     Diagnosis Date Noted   • Hx of anaphylaxis 2018   • Primary osteoarthritis of knee 2018   • High risk medication use 2018   • Hypertension 2018   • Hyperlipidemia 2018   • Vertigo 2018   • Infected blister of left foot 2018   • Ganglion of right wrist 2018     Resolved Ambulatory Problems     Diagnosis Date Noted   • No Resolved Ambulatory Problems     Past Medical History:   Diagnosis Date   • Arthritis    • GERD (gastroesophageal reflux disease)    • Hyperlipidemia    • Hypertension        PAST SURGICAL HISTORY  Past Surgical History:   Procedure Laterality Date   •  SECTION     • HYSTERECTOMY     • OOPHORECTOMY     • SHOULDER MANIPULATION   "       FAMILY HISTORY  History reviewed. No pertinent family history.    SOCIAL HISTORY  Social History     Socioeconomic History   • Marital status:      Spouse name: Not on file   • Number of children: Not on file   • Years of education: Not on file   • Highest education level: Not on file   Social Needs   • Financial resource strain: Not on file   • Food insecurity - worry: Not on file   • Food insecurity - inability: Not on file   • Transportation needs - medical: Not on file   • Transportation needs - non-medical: Not on file   Occupational History   • Not on file   Tobacco Use   • Smoking status: Never Smoker   • Smokeless tobacco: Never Used   Substance and Sexual Activity   • Alcohol use: No   • Drug use: No   • Sexual activity: Not on file   Other Topics Concern   • Not on file   Social History Narrative   • Not on file       ALLERGIES  Ciprofloxacin; Gold-containing drug products; Latex; Methyldibromoglutaronitrile; Neomycin; Omnicef [cefdinir]; Other; Palladium chloride; Penicillins; and Sulfa antibiotics    REVIEW OF SYSTEMS  Review of Systems   Constitutional: Positive for chills and fatigue. Negative for fever.   HENT: Negative for sore throat.    Eyes: Negative.    Respiratory: Negative for cough and shortness of breath.    Cardiovascular: Negative for chest pain.   Gastrointestinal: Positive for abdominal pain, diarrhea, nausea and vomiting.   Genitourinary: Negative for dysuria.   Musculoskeletal: Negative for neck pain.   Skin: Negative for rash.   Allergic/Immunologic: Negative.    Neurological: Positive for weakness (generalized). Negative for numbness and headaches.   Hematological: Negative.    Psychiatric/Behavioral: Negative.    All other systems reviewed and are negative.      PHYSICAL EXAM  ED Triage Vitals [12/07/18 1022]   Temp Heart Rate Resp BP SpO2   98.4 °F (36.9 °C) 96 18 -- 99 %      Temp src Heart Rate Source Patient Position BP Location FiO2 (%)   Tympanic -- -- -- --        Physical Exam   Constitutional: She is oriented to person, place, and time. No distress.   HENT:   Head: Normocephalic and atraumatic.   Mouth/Throat: Mucous membranes are dry.   Eyes: EOM are normal. Pupils are equal, round, and reactive to light.   Neck: Normal range of motion. Neck supple.   Cardiovascular: Normal rate, regular rhythm and normal heart sounds.   Pulmonary/Chest: Effort normal and breath sounds normal. No respiratory distress.   Abdominal: Soft. She exhibits no distension. There is tenderness (diffuse). There is no rebound and no guarding.   Musculoskeletal: Normal range of motion. She exhibits no edema.   Neurological: She is alert and oriented to person, place, and time. She has normal sensation and normal strength.   Skin: Skin is warm and dry. No rash noted.   Psychiatric: Mood and affect normal.   Nursing note and vitals reviewed.      LAB RESULTS  Lab Results (last 24 hours)     Procedure Component Value Units Date/Time    CBC & Differential [[id number]] Collected:  12/07/18 1106    Specimen:  Blood Updated:  12/07/18 1123    Narrative:       The following orders were created for panel order CBC & Differential.  Procedure                               Abnormality         Status                     ---------                               -----------         ------                     CBC Auto Differential[[id number]]        Abnormal            Final result                 Please view results for these tests on the individual orders.    Comprehensive Metabolic Panel [[id number]]  (Abnormal) Collected:  12/07/18 1106    Specimen:  Blood Updated:  12/07/18 1142     Glucose 123 mg/dL      BUN 24 mg/dL      Creatinine 1.64 mg/dL      Sodium 137 mmol/L      Potassium 3.3 mmol/L      Chloride 95 mmol/L      CO2 28.2 mmol/L      Calcium 8.9 mg/dL      Total Protein 7.3 g/dL      Albumin 3.90 g/dL      ALT (SGPT) 15 U/L      AST (SGOT) 18 U/L      Alkaline Phosphatase 62 U/L      Total Bilirubin 0.6  mg/dL      eGFR Non African Amer 32 mL/min/1.73      Globulin 3.4 gm/dL      A/G Ratio 1.1 g/dL      BUN/Creatinine Ratio 14.6     Anion Gap 13.8 mmol/L     Lipase [[id number]]  (Normal) Collected:  12/07/18 1106    Specimen:  Blood Updated:  12/07/18 1142     Lipase 38 U/L     CBC Auto Differential [[id number]]  (Abnormal) Collected:  12/07/18 1106    Specimen:  Blood Updated:  12/07/18 1123     WBC 9.41 10*3/mm3      RBC 3.94 10*6/mm3      Hemoglobin 11.4 g/dL      Hematocrit 34.8 %      MCV 88.3 fL      MCH 28.9 pg      MCHC 32.8 g/dL      RDW 12.3 %      RDW-SD 39.4 fl      MPV 9.2 fL      Platelets 275 10*3/mm3      Neutrophil % 74.5 %      Lymphocyte % 13.9 %      Monocyte % 8.8 %      Eosinophil % 2.4 %      Basophil % 0.4 %      Immature Grans % 0.0 %      Neutrophils, Absolute 7.00 10*3/mm3      Lymphocytes, Absolute 1.31 10*3/mm3      Monocytes, Absolute 0.83 10*3/mm3      Eosinophils, Absolute 0.23 10*3/mm3      Basophils, Absolute 0.04 10*3/mm3      Immature Grans, Absolute 0.00 10*3/mm3     Urinalysis With Microscopic If Indicated (No Culture) - Urine, Clean Catch [[id number]]  (Normal) Collected:  12/07/18 1243    Specimen:  Urine, Clean Catch Updated:  12/07/18 1302     Color, UA Yellow     Appearance, UA Clear     pH, UA <=5.0     Specific Gravity, UA 1.010     Glucose, UA Negative     Ketones, UA Negative     Bilirubin, UA Negative     Blood, UA Negative     Protein, UA Negative     Leuk Esterase, UA Negative     Nitrite, UA Negative     Urobilinogen, UA 0.2 E.U./dL    Narrative:       Urine microscopic not indicated.          I ordered the above labs and reviewed the results    PROCEDURES  Procedures      PROGRESS AND CONSULTS       1103 - Lab work ordered for further evaluation. IVF and phenergan ordered.      1125 - Rechecked pt. Pt is resting comfortably in NAD. Informed pt of the results of her CT abd/pelvis at [facility] which was negative acute. Stated the plan to collect lab work.     1248 -  Rechecked pt. Pt reports she feels dizzy, but states that her nausea is improved. Stated the results of her lab work which shows decreased renal function compared to the 3rd and restated her CT abd/pelvis results which was negative acute. D/w pt the option of admitting over night for observation. Pt understands and agrees with the plan for admission. All questions answered.     1249 - Call placed with A. Zofran ordered.     1311 - Discussed pt care with Dr. Saha ([facility]) who agrees to admit the pt to observation.     MEDICAL DECISION MAKING  Results were reviewed/discussed with the patient and they were also made aware of online access. Pt also made aware that some labs, such as cultures, will not be resulted during ER visit and follow up with PMD is necessary.     MDM  Number of Diagnoses or Management Options     Amount and/or Complexity of Data Reviewed  Clinical lab tests: ordered and reviewed (BUN - 24  Creatinine - 1.64)  Decide to obtain previous medical records or to obtain history from someone other than the patient: yes  Review and summarize past medical records: yes (Pt was seen in  ED on 12/3/18 for similar complaints and stopped on her naprosyn. Reviewed pt's CT abd/pelvis at [facility] yesterday which showed no acute abdominal or pelvic abnormality. )  Discuss the patient with other providers: yes (Dr. Saha ([facility]))           DIAGNOSIS  Final diagnoses:   Vomiting and diarrhea   Intractable vomiting with nausea, unspecified vomiting type   Chronic renal failure, stage 3 (moderate) (CMS/HCC)       DISPOSITION  ADMISSION    Discussed treatment plan and reason for admission with pt/family and admitting physician.  Pt/family voiced understanding of the plan for admission for further testing/treatment as needed.         Latest Documented Vital Signs:  As of 1:14 PM  BP- 117/81 HR- 67 Temp- 98.4 °F (36.9 °C) (Tympanic) O2 sat- 99%    --  Documentation assistance provided by evelin Hammer for  Dr. Muñoz.  Information recorded by the scribe was done at my direction and has been verified and validated by me.       Al Hammer  12/07/18 1315       Franklyn Muñoz MD  12/07/18 6734

## 2018-12-07 NOTE — H&P
Name: Pedrito Powell ADMIT: 2018   : 1960  PCP: Teo Fraser MD    MRN: [id number] LOS: 0 days   AGE/SEX: 58 y.o. female  ROOM: [facility]/     Chief Complaint   Patient presents with   • Vomiting   • Diarrhea   • Weakness - Generalized   • Dizziness       Subjective   Patient is a 58 y.o. female who presents to [facility] with the above chief complaint.   Her symptoms started about a week ago with nausea vomiting abdominal pain and diarrhea.  It lasted about a night and then resolved.  She didn't have any more bowel movements for a few days and went back to her primary care doctor.  From reading his note she didn't describe any of the symptoms she was having prior to this episode of constipation.  She continued to have nausea and some abdominal discomfort but no overt pain or tenderness.  This started until last night when she had symptoms all night long.  She's had 3 episodes of diarrhea today and 2 episodes of emesis.  She denies any blood or mucus in her stool or emesis.  Stools are mainly watery brown and emesis is mainly bilious.  She denies any changes to her diet in the last few weeks denies any travel.  She has recently changed her medication.  She was taken from Voltaren to Naprosyn.  This was about the same time her symptoms started.  She's been off the Naprosyn for a week but is still having symptoms.  She denies any fevers but has had chills all week.  She's felt very fatigued and weak she's lost a few pounds and is unable to quantify how much weight loss.  This is actually a very difficult history to take.  She had to be redirected multiple times to answer the questions that were asked.  She would often go off on tangents which made it difficult to focus the history.    History of Present Illness    Past Medical History:   Diagnosis Date   • Anemia    • Arthritis    • GERD (gastroesophageal reflux disease)    • Hyperlipidemia    • Hypertension    • Vertigo      Past  Surgical History:   Procedure Laterality Date   •  SECTION     • HYSTERECTOMY     • OOPHORECTOMY     • SHOULDER MANIPULATION       History reviewed. No pertinent family history.  Social History     Tobacco Use   • Smoking status: Never Smoker   • Smokeless tobacco: Never Used   Substance Use Topics   • Alcohol use: No   • Drug use: No     Medications Prior to Admission   Medication Sig Dispense Refill Last Dose   • albuterol 108 (90 Base) MCG/ACT inhaler Inhale 2 puffs Every 4 (Four) Hours As Needed for Wheezing.      • aspirin 325 MG tablet Take 325 mg by mouth daily.   Taking   • atorvastatin (LIPITOR) 40 MG tablet Take 1 tablet by mouth Daily. (Patient taking differently: Take 80 mg by mouth Daily.) 90 tablet 1 Taking   • azelastine (ASTELIN) 0.1 % nasal spray 1 spray into the nostril(s) as directed by provider Daily.  11 Taking   • Cholecalciferol (VITAMIN D3) 2000 units capsule Take 1 capsule by mouth Daily. 90 capsule 3 Taking   • diclofenac (VOLTAREN) 75 MG EC tablet Take 75 mg by mouth Daily.   Past Week at Unknown time   • EPINEPHrine (AUVI-Q IJ) Inject  as directed As Needed.      • ferrous gluconate (FERGON) 324 MG tablet Take 1 tablet by mouth Daily With Breakfast. (Patient taking differently: Take 324 mg by mouth Daily.) 90 tablet 1 Taking   • fluticasone (FLONASE) 50 MCG/ACT nasal spray 2 sprays into the nostril(s) as directed by provider Daily.      • hydrochlorothiazide (HYDRODIURIL) 25 MG tablet Take 1 tablet by mouth Daily. 90 tablet 1 Taking   • ipratropium (ATROVENT) 0.06 % nasal spray 1 spray into the nostril(s) as directed by provider Daily.  11 Taking   • irbesartan (AVAPRO) 300 MG tablet Take 1 tablet by mouth Every Night. (Patient taking differently: Take 300 mg by mouth Daily.) 90 tablet 1 Taking   • meclizine (ANTIVERT) 25 MG tablet Take 1 tablet by mouth 3 (Three) Times a Day As Needed for dizziness. 270 tablet 1 Taking   • montelukast (SINGULAIR) 10 MG tablet Take 10 mg by mouth  Daily.      • omeprazole (priLOSEC) 40 MG capsule Take 1 capsule by mouth Daily. 90 capsule 1 Taking   • ondansetron ODT (ZOFRAN-ODT) 4 MG disintegrating tablet Take 4 mg by mouth Every 6 (Six) Hours As Needed for Nausea or Vomiting.        Allergies:  Chocolate; Nickel; Nuts; Ciprofloxacin; Citric acid; Gold-containing drug products; Influenza vaccines; Latex; Methyldibromoglutaronitrile; Neomycin; Omnicef [cefdinir]; Other; Palladium chloride; Penicillins; Sulfa antibiotics; Tomato; and Yeast-related products    Review of Systems   Constitutional: Negative for chills, fatigue and fever.   HENT: Negative for congestion, nosebleeds and rhinorrhea.    Eyes: Negative for photophobia, redness and visual disturbance.   Respiratory: Negative for cough, chest tightness and wheezing.    Cardiovascular: Negative for chest pain, palpitations and leg swelling.   Gastrointestinal: Positive for diarrhea, nausea and vomiting. Negative for abdominal distention, blood in stool and constipation.   Endocrine: Negative for polydipsia, polyphagia and polyuria.   Genitourinary: Negative for difficulty urinating, dysuria and hematuria.   Musculoskeletal: Negative for arthralgias, gait problem and myalgias.   Skin: Negative for pallor and rash.   Neurological: Negative for dizziness, facial asymmetry and headaches.   Hematological: Negative for adenopathy. Does not bruise/bleed easily.   Psychiatric/Behavioral: Negative for agitation, behavioral problems and confusion.        Objective    Vital Signs  Temp:  [97.3 °F (36.3 °C)-98.4 °F (36.9 °C)] 97.3 °F (36.3 °C)  Heart Rate:  [50-96] 72  Resp:  [13-18] 16  BP: ()/(55-99) 104/55  SpO2:  [96 %-100 %] 100 %  on   ;   Device (Oxygen Therapy): room air  Body mass index is 42.77 kg/m².    Physical Exam   Constitutional: She is oriented to person, place, and time. She appears well-developed and well-nourished.   HENT:   Head: Normocephalic and atraumatic.   Nose: Nose normal.   Eyes:  Conjunctivae and EOM are normal.   Neck: Neck supple. No thyromegaly present.   Cardiovascular: Normal rate, regular rhythm and normal heart sounds.   Pulmonary/Chest: Effort normal and breath sounds normal.   Abdominal: Soft. Bowel sounds are normal. She exhibits no distension and no mass. There is tenderness. There is no guarding.   Musculoskeletal: Normal range of motion. She exhibits no edema.   Neurological: She is alert and oriented to person, place, and time. No cranial nerve deficit.   Skin: Skin is warm and dry. Capillary refill takes less than 2 seconds.   Psychiatric: She has a normal mood and affect. Her behavior is normal.   Nursing note and vitals reviewed.      Results Review:   I reviewed the patient's new clinical results.  Results from last 7 days   Lab Units  12/07/18   1106  12/03/18   1649   WBC 10*3/mm3  9.41  7.37   HEMOGLOBIN g/dL  11.4*  10.7*   PLATELETS 10*3/mm3  275  299     Results from last 7 days   Lab Units  12/07/18   1106  12/03/18   1649   SODIUM mmol/L  137  135*   POTASSIUM mmol/L  3.3*  3.7   CHLORIDE mmol/L  95*  98   CO2 mmol/L  28.2  25.4   BUN mg/dL  24*  18   CREATININE mg/dL  1.64*  1.38*   GLUCOSE mg/dL  123*  121*   ALBUMIN g/dL  3.90   --    BILIRUBIN mg/dL  0.6   --    ALK PHOS U/L  62   --    AST (SGOT) U/L  18   --    ALT (SGPT) U/L  15   --    Estimated Creatinine Clearance: 39.5 mL/min (A) (by C-G formula based on SCr of 1.64 mg/dL (H)).        Invalid input(s): LDLCALC  Results from last 7 days   Lab Units  12/07/18   1243   NITRITE UA   Negative     No orders to display     Assessment/Plan       Hypertension    Hyperlipidemia    Vertigo    Vomiting and diarrhea      Assessment & Plan  I think this is likely to be either a viral gastroenteritis or gastritis from the Naprosyn.  She has epigastric tenderness consistent with gastritis but this does not usually cause the diarrhea.  We'll check a GI PCR panel as well as a respiratory viral panel.  We'll continue her  PPI.  I've ordered Zofran and Phenergan to be alternated for the nausea and vomiting.  We'll reorder her meclizine for her vertigo symptoms.  Gastroenterology's been consulted for assistance will follow up their recommendations.  If her nausea and vomiting stopped and her diarrhea ceases she can probably go home tomorrow after some IV hydration.  We'll follow-up how she response to current plan.  It was discussed with her and her family at the bedside and all are in agreement with the plan as dictated.      I discussed the patients findings and my recommendations with patient, family and nursing staff.    Maynor Saha MD  [facility]  12/07/18  4:26 PM

## 2018-12-07 NOTE — ED TRIAGE NOTES
PT brought to er by family. Pt complains of nasuea, vomiting since Monday. PT states she was seen here for symptoms, has not improved. Complains of weakness and dizziness w activity. States she fell against her bed today due to weakness.

## 2018-12-07 NOTE — PROGRESS NOTES
Clinical Pharmacy Services: Medication History    Pedrito Powell is a 58 y.o. female presenting to [facility] for   Chief Complaint   Patient presents with   • Vomiting   • Diarrhea   • Weakness - Generalized   • Dizziness       She  has a past medical history of Arthritis, GERD (gastroesophageal reflux disease), Hyperlipidemia, and Hypertension.    Allergies as of 12/07/2018 - Reviewed 12/07/2018   Allergen Reaction Noted   • Ciprofloxacin  09/06/2016   • Gold-containing drug products Unknown (See Comments) 12/07/2018   • Latex  08/02/2016   • Methyldibromoglutaronitrile Unknown (See Comments) 12/07/2018   • Neomycin Unknown (See Comments) 12/07/2018   • Omnicef [cefdinir] Unknown (See Comments) 12/07/2018   • Other  09/06/2016   • Palladium chloride Unknown (See Comments) 12/07/2018   • Penicillins  09/06/2016   • Sulfa antibiotics  09/06/2016       Medication information was obtained from: Patient  Pharmacy and Phone Number: [facility] [phone]    Prior to Admission Medications     Prescriptions Last Dose Informant Patient Reported? Taking?    albuterol 108 (90 Base) MCG/ACT inhaler  Self Yes Yes    Inhale 2 puffs Every 4 (Four) Hours As Needed for Wheezing.    aspirin 325 MG tablet  Self Yes Yes    Take 325 mg by mouth daily.    atorvastatin (LIPITOR) 40 MG tablet  Self No Yes    Take 1 tablet by mouth Daily.    Patient taking differently:  Take 80 mg by mouth Daily.    azelastine (ASTELIN) 0.1 % nasal spray  Self Yes Yes    1 spray into the nostril(s) as directed by provider Daily.    Cholecalciferol (VITAMIN D3) 2000 units capsule  Self No Yes    Take 1 capsule by mouth Daily.    diclofenac (VOLTAREN) 75 MG EC tablet Past Week Self Yes Yes    Take 75 mg by mouth Daily.    EPINEPHrine (AUVI-Q IJ)  Self Yes Yes    Inject  as directed As Needed.    ferrous gluconate (FERGON) 324 MG tablet  Self No Yes    Take 1 tablet by mouth Daily With Breakfast.    Patient taking differently:  Take 324 mg  by mouth 2 (Two) Times a Day.    fluticasone (FLONASE) 50 MCG/ACT nasal spray  Self Yes Yes    2 sprays into the nostril(s) as directed by provider Daily.    hydrochlorothiazide (HYDRODIURIL) 25 MG tablet  Self No Yes    Take 1 tablet by mouth Daily.    ipratropium (ATROVENT) 0.06 % nasal spray  Self Yes Yes    1 spray into the nostril(s) as directed by provider Daily.    irbesartan (AVAPRO) 300 MG tablet  Self No Yes    Take 1 tablet by mouth Every Night.    meclizine (ANTIVERT) 25 MG tablet  Self No Yes    Take 1 tablet by mouth 3 (Three) Times a Day As Needed for dizziness.    montelukast (SINGULAIR) 10 MG tablet  Self Yes Yes    Take 10 mg by mouth Daily.    omeprazole (priLOSEC) 40 MG capsule  Self No Yes    Take 1 capsule by mouth Daily.    ondansetron ODT (ZOFRAN-ODT) 4 MG disintegrating tablet  Self Yes Yes    Take 4 mg by mouth Every 6 (Six) Hours As Needed for Nausea or Vomiting.            Medication notes: Valsartan-HCTZ removed, has been replaced with HCTZ and Avapro. Patient says she no longer uses the Anoro inhaler but does have a Ventolin inhaler to use PRN.    This medication list is complete to the best of my knowledge as of 12/7/2018    Please call if questions.    Savana Mendez, Medication History Technician  12/7/2018 3:10 PM

## 2018-12-08 ENCOUNTER — ON CAMPUS - OUTPATIENT (OUTPATIENT)
Dept: URBAN - METROPOLITAN AREA HOSPITAL 114 | Facility: HOSPITAL | Age: 58
End: 2018-12-08

## 2018-12-08 DIAGNOSIS — R19.7 DIARRHEA, UNSPECIFIED: ICD-10-CM

## 2018-12-08 DIAGNOSIS — R11.10 VOMITING, UNSPECIFIED: ICD-10-CM

## 2018-12-08 LAB
ADV 40+41 DNA STL QL NAA+NON-PROBE: NOT DETECTED
ANION GAP SERPL CALCULATED.3IONS-SCNC: 11.3 MMOL/L
ASTRO TYP 1-8 RNA STL QL NAA+NON-PROBE: NOT DETECTED
BUN BLD-MCNC: 14 MG/DL (ref 6–20)
BUN/CREAT SERPL: 12.6 (ref 7–25)
C CAYETANENSIS DNA STL QL NAA+NON-PROBE: NOT DETECTED
CALCIUM SPEC-SCNC: 8.5 MG/DL (ref 8.6–10.5)
CAMPY SP DNA.DIARRHEA STL QL NAA+PROBE: NOT DETECTED
CHLORIDE SERPL-SCNC: 103 MMOL/L (ref 98–107)
CO2 SERPL-SCNC: 25.7 MMOL/L (ref 22–29)
CORTIS SERPL-MCNC: 12.29 MCG/DL
CREAT BLD-MCNC: 1.11 MG/DL (ref 0.57–1)
CRYPTOSP STL CULT: NOT DETECTED
DEPRECATED RDW RBC AUTO: 41.5 FL (ref 37–54)
E COLI DNA SPEC QL NAA+PROBE: NOT DETECTED
E HISTOLYT AG STL-ACNC: NOT DETECTED
EAEC PAA PLAS AGGR+AATA ST NAA+NON-PRB: NOT DETECTED
EC STX1 + STX2 GENES STL NAA+PROBE: NOT DETECTED
EPEC EAE GENE STL QL NAA+NON-PROBE: NOT DETECTED
ERYTHROCYTE [DISTWIDTH] IN BLOOD BY AUTOMATED COUNT: 12.6 % (ref 11.7–13)
ETEC LTA+ST1A+ST1B TOX ST NAA+NON-PROBE: NOT DETECTED
G LAMBLIA DNA SPEC QL NAA+PROBE: NOT DETECTED
GFR SERPL CREATININE-BSD FRML MDRD: 50 ML/MIN/1.73
GLUCOSE BLD-MCNC: 95 MG/DL (ref 65–99)
HCT VFR BLD AUTO: 31.8 % (ref 35.6–45.5)
HGB BLD-MCNC: 10.1 G/DL (ref 11.9–15.5)
LACTOFERRIN STL QL LA: NEGATIVE
MAGNESIUM SERPL-MCNC: 1.7 MG/DL (ref 1.6–2.6)
MCH RBC QN AUTO: 28.7 PG (ref 26.9–32)
MCHC RBC AUTO-ENTMCNC: 31.8 G/DL (ref 32.4–36.3)
MCV RBC AUTO: 90.3 FL (ref 80.5–98.2)
NOROVIRUS GI+II RNA STL QL NAA+NON-PROBE: NOT DETECTED
P SHIGELLOIDES DNA STL QL NAA+NON-PROBE: NOT DETECTED
PHOSPHATE SERPL-MCNC: 3 MG/DL (ref 2.5–4.5)
PLATELET # BLD AUTO: 206 10*3/MM3 (ref 140–500)
PMV BLD AUTO: 9.1 FL (ref 6–12)
POTASSIUM BLD-SCNC: 3.9 MMOL/L (ref 3.5–5.2)
RBC # BLD AUTO: 3.52 10*6/MM3 (ref 3.9–5.2)
RV RNA STL NAA+PROBE: NOT DETECTED
SALMONELLA DNA SPEC QL NAA+PROBE: NOT DETECTED
SAPO I+II+IV+V RNA STL QL NAA+NON-PROBE: NOT DETECTED
SHIGELLA SP+EIEC IPAH STL QL NAA+PROBE: NOT DETECTED
SODIUM BLD-SCNC: 140 MMOL/L (ref 136–145)
V CHOLERAE DNA SPEC QL NAA+PROBE: NOT DETECTED
VIBRIO DNA SPEC NAA+PROBE: NOT DETECTED
WBC NRBC COR # BLD: 5.36 10*3/MM3 (ref 4.5–10.7)
YERSINIA STL CULT: NOT DETECTED

## 2018-12-08 PROCEDURE — 84100 ASSAY OF PHOSPHORUS: CPT | Performed by: HOSPITALIST

## 2018-12-08 PROCEDURE — 96376 TX/PRO/DX INJ SAME DRUG ADON: CPT

## 2018-12-08 PROCEDURE — 25010000002 ONDANSETRON PER 1 MG: Performed by: HOSPITALIST

## 2018-12-08 PROCEDURE — 99243 OFF/OP CNSLTJ NEW/EST LOW 30: CPT | Performed by: INTERNAL MEDICINE

## 2018-12-08 PROCEDURE — 80048 BASIC METABOLIC PNL TOTAL CA: CPT | Performed by: HOSPITALIST

## 2018-12-08 PROCEDURE — 83631 LACTOFERRIN FECAL (QUANT): CPT | Performed by: HOSPITALIST

## 2018-12-08 PROCEDURE — 85027 COMPLETE CBC AUTOMATED: CPT | Performed by: HOSPITALIST

## 2018-12-08 PROCEDURE — 25010000002 ENOXAPARIN PER 10 MG: Performed by: HOSPITALIST

## 2018-12-08 PROCEDURE — G0378 HOSPITAL OBSERVATION PER HR: HCPCS

## 2018-12-08 PROCEDURE — 83735 ASSAY OF MAGNESIUM: CPT | Performed by: HOSPITALIST

## 2018-12-08 PROCEDURE — 87507 IADNA-DNA/RNA PROBE TQ 12-25: CPT | Performed by: HOSPITALIST

## 2018-12-08 PROCEDURE — 96372 THER/PROPH/DIAG INJ SC/IM: CPT

## 2018-12-08 RX ORDER — CHOLESTYRAMINE 4 G/9G
1 POWDER, FOR SUSPENSION ORAL EVERY 12 HOURS SCHEDULED
Status: DISCONTINUED | OUTPATIENT
Start: 2018-12-08 | End: 2018-12-08

## 2018-12-08 RX ORDER — LOPERAMIDE HYDROCHLORIDE 2 MG/1
2 CAPSULE ORAL
Status: DISCONTINUED | OUTPATIENT
Start: 2018-12-08 | End: 2018-12-10 | Stop reason: HOSPADM

## 2018-12-08 RX ADMIN — LOPERAMIDE HYDROCHLORIDE 2 MG: 2 CAPSULE ORAL at 20:14

## 2018-12-08 RX ADMIN — SODIUM CHLORIDE, PRESERVATIVE FREE 3 ML: 5 INJECTION INTRAVENOUS at 09:06

## 2018-12-08 RX ADMIN — ONDANSETRON 4 MG: 2 INJECTION INTRAMUSCULAR; INTRAVENOUS at 23:27

## 2018-12-08 RX ADMIN — LOPERAMIDE HYDROCHLORIDE 2 MG: 2 CAPSULE ORAL at 23:04

## 2018-12-08 RX ADMIN — SODIUM CHLORIDE, PRESERVATIVE FREE 3 ML: 5 INJECTION INTRAVENOUS at 21:47

## 2018-12-08 RX ADMIN — PANTOPRAZOLE SODIUM 40 MG: 40 TABLET, DELAYED RELEASE ORAL at 09:11

## 2018-12-08 RX ADMIN — ENOXAPARIN SODIUM 40 MG: 40 INJECTION SUBCUTANEOUS at 17:36

## 2018-12-08 RX ADMIN — ONDANSETRON 4 MG: 2 INJECTION INTRAMUSCULAR; INTRAVENOUS at 17:37

## 2018-12-08 RX ADMIN — MONTELUKAST SODIUM 10 MG: 10 TABLET, FILM COATED ORAL at 09:05

## 2018-12-08 RX ADMIN — ASPIRIN 325 MG: 325 TABLET ORAL at 09:06

## 2018-12-08 RX ADMIN — ATORVASTATIN CALCIUM 40 MG: 20 TABLET, FILM COATED ORAL at 09:05

## 2018-12-08 NOTE — PLAN OF CARE
Problem: Patient Care Overview  Goal: Plan of Care Review  Outcome: Ongoing (interventions implemented as appropriate)   12/07/18 1942   Coping/Psychosocial   Plan of Care Reviewed With patient   Plan of Care Review   Progress no change   OTHER   Outcome Summary No vomiting or BM since admit. Intermittent nausea. Some dizziness noted with ambulation. Oneil CL diet. IVF infusing. GI consult called. Cont to monitor.       Problem: Nausea/Vomiting (Adult)  Goal: Identify Related Risk Factors and Signs and Symptoms  Outcome: Outcome(s) achieved Date Met: 12/07/18    Goal: Symptom Relief  Outcome: Ongoing (interventions implemented as appropriate)    Goal: Adequate Hydration  Outcome: Ongoing (interventions implemented as appropriate)      Problem: Fall Risk (Adult)  Goal: Identify Related Risk Factors and Signs and Symptoms  Outcome: Outcome(s) achieved Date Met: 12/07/18    Goal: Absence of Fall  Outcome: Ongoing (interventions implemented as appropriate)

## 2018-12-08 NOTE — CONSULTS
Inpatient Gastroenterology Consult  Consult performed by: Richie Reed MD  Consult ordered by: Maynor Saha MD          Patient Care Team:  Teo Fraser MD as PCP - General (Family Medicine)    Chief complaint: nausea and vomiting    Subjective     History of Present Illness  Pleasant lady who presented with recurrent nausea and vomiting.  She states this happened last weekend, then improved.  This did recur this week and she presented.  She had some diarrhea last weekend, but then this past Thursday just the nausea and  Vomiting.  She wondered if it was a food allergy, but does not think so .  She had been taking an NSAID as well.  With fluid hydration, she is much better, tolerating liquids,  Having no bowel movements . She has not had an upper or lower tract evaluation   Review of Systems   Constitutional: Positive for fatigue.   Gastrointestinal: Positive for abdominal pain, nausea and vomiting.   Neurological: Positive for weakness.        Past Medical History:   Diagnosis Date   • Anemia    • Arthritis    • GERD (gastroesophageal reflux disease)    • Hyperlipidemia    • Hypertension    • Vertigo    ,   Past Surgical History:   Procedure Laterality Date   •  SECTION     • HYSTERECTOMY     • OOPHORECTOMY     • SHOULDER MANIPULATION     , History reviewed. No pertinent family history.,   Social History     Tobacco Use   • Smoking status: Never Smoker   • Smokeless tobacco: Never Used   Substance Use Topics   • Alcohol use: No   • Drug use: No   ,   Medications Prior to Admission   Medication Sig Dispense Refill Last Dose   • albuterol 108 (90 Base) MCG/ACT inhaler Inhale 2 puffs Every 4 (Four) Hours As Needed for Wheezing.      • aspirin 325 MG tablet Take 325 mg by mouth daily.   Taking   • atorvastatin (LIPITOR) 40 MG tablet Take 1 tablet by mouth Daily. (Patient taking differently: Take 80 mg by mouth Daily.) 90 tablet 1 Taking   • azelastine (ASTELIN) 0.1 % nasal spray 1 spray into  the nostril(s) as directed by provider Daily.  11 Taking   • Cholecalciferol (VITAMIN D3) 2000 units capsule Take 1 capsule by mouth Daily. 90 capsule 3 Taking   • diclofenac (VOLTAREN) 75 MG EC tablet Take 75 mg by mouth Daily.   Past Week at Unknown time   • EPINEPHrine (AUVI-Q IJ) Inject  as directed As Needed.      • ferrous gluconate (FERGON) 324 MG tablet Take 1 tablet by mouth Daily With Breakfast. (Patient taking differently: Take 324 mg by mouth Daily.) 90 tablet 1 Taking   • fluticasone (FLONASE) 50 MCG/ACT nasal spray 2 sprays into the nostril(s) as directed by provider Daily.      • hydrochlorothiazide (HYDRODIURIL) 25 MG tablet Take 1 tablet by mouth Daily. 90 tablet 1 Taking   • ipratropium (ATROVENT) 0.06 % nasal spray 1 spray into the nostril(s) as directed by provider Daily.  11 Taking   • irbesartan (AVAPRO) 300 MG tablet Take 1 tablet by mouth Every Night. (Patient taking differently: Take 300 mg by mouth Daily.) 90 tablet 1 Taking   • meclizine (ANTIVERT) 25 MG tablet Take 1 tablet by mouth 3 (Three) Times a Day As Needed for dizziness. 270 tablet 1 Taking   • montelukast (SINGULAIR) 10 MG tablet Take 10 mg by mouth Daily.      • omeprazole (priLOSEC) 40 MG capsule Take 1 capsule by mouth Daily. 90 capsule 1 Taking   • ondansetron ODT (ZOFRAN-ODT) 4 MG disintegrating tablet Take 4 mg by mouth Every 6 (Six) Hours As Needed for Nausea or Vomiting.      , Scheduled Meds:    aspirin 325 mg Oral Daily   atorvastatin 40 mg Oral Daily   enoxaparin 40 mg Subcutaneous Q24H   montelukast 10 mg Oral Daily   pantoprazole 40 mg Oral QAM   Scopolamine 1 patch Transdermal Q72H   sodium chloride 3 mL Intravenous Q12H   , Continuous Infusions:    sodium chloride 100 mL/hr Last Rate: 100 mL/hr (12/07/18 1907)   , PRN Meds:  •  acetaminophen  •  albuterol  •  bisacodyl  •  bisacodyl  •  meclizine  •  ondansetron **OR** ondansetron ODT **OR** ondansetron  •  ondansetron ODT  •  promethazine **OR** promethazine  •   [COMPLETED] Insert peripheral IV **AND** sodium chloride  •  sodium chloride and Allergies:  Chocolate; Nickel; Nuts; Ciprofloxacin; Citric acid; Gold-containing drug products; Influenza vaccines; Latex; Methyldibromoglutaronitrile; Neomycin; Omnicef [cefdinir]; Other; Palladium chloride; Penicillins; Sulfa antibiotics; Tomato; and Yeast-related products    Objective      Vital Signs  Temp:  [97.3 °F (36.3 °C)-98.6 °F (37 °C)] 98.4 °F (36.9 °C)  Heart Rate:  [50-96] 78  Resp:  [13-18] 16  BP: ()/(46-99) 96/46    Physical Exam   Constitutional: She is oriented to person, place, and time. She appears well-developed and well-nourished.   HENT:   Mouth/Throat: Oropharynx is clear and moist.   Neck: Neck supple.   Cardiovascular: Normal rate and regular rhythm.   Pulmonary/Chest: Effort normal and breath sounds normal.   Abdominal: Soft. Bowel sounds are normal.   Neurological: She is alert and oriented to person, place, and time.   Skin: Skin is warm and dry.   Psychiatric: She has a normal mood and affect.       Results Review:    I reviewed the patient's new clinical results.        Assessment/Plan       Hypertension    Hyperlipidemia    Vertigo    Vomiting and diarrhea      Assessment:  (Nausea and vomiting  This may represent a self limited gastroenteritis, gastritis, or even early evolution of a functional syndrome, such as cyclic vomiting syndrome.  Given nsaid history cant exclude possible ulcer disease).     Plan:   (Advance diet,  Continue supportive caer  If she continues to improve, I am ok with discharge and consider outpatient endoscopic evalution (upper endoscopy and screening colonoscopy in the future)    Thanks for consult ).       I discussed the patients findings and my recommendations with patient and nursing staff    Richie Reed MD  12/08/18  7:13 AM    Time: Critical care 20 min

## 2018-12-08 NOTE — PROGRESS NOTES
LOS: 0 days     Name: Pedrito Powell  Age/Sex: 58 y.o. female  :  1960        PCP: Teo Fraser MD    Subjective   3 loose stools this AM with some consistency but no blood or mucus.  No fevers, Appetite is better  General: No Fever or Chills, Cardiac: No Chest Pain or Palpitations, Resp: No Cough or SOA and Other: No bleeding      aspirin 325 mg Oral Daily   atorvastatin 40 mg Oral Daily   cholestyramine 1 packet Oral Q12H   enoxaparin 40 mg Subcutaneous Q24H   montelukast 10 mg Oral Daily   pantoprazole 40 mg Oral QAM   Scopolamine 1 patch Transdermal Q72H   sodium chloride 3 mL Intravenous Q12H          Objective   Vital Signs  Temp:  [97.3 °F (36.3 °C)-98.6 °F (37 °C)] 98.3 °F (36.8 °C)  Heart Rate:  [70-85] 72  Resp:  [13-16] 16  BP: ()/(46-86) 106/75  Body mass index is 42.83 kg/m².    Intake/Output Summary (Last 24 hours) at 2018 1348  Last data filed at 2018 0536  Gross per 24 hour   Intake 720 ml   Output 1100 ml   Net -380 ml       Physical Exam   Constitutional: She is oriented to person, place, and time. She appears well-developed and well-nourished.   HENT:   Head: Normocephalic and atraumatic.   Eyes: Conjunctivae and EOM are normal.   Neck: Neck supple.   Cardiovascular: Normal rate, regular rhythm and normal heart sounds.   Pulmonary/Chest: Effort normal and breath sounds normal.   Abdominal: Soft. Bowel sounds are normal. There is tenderness.   Musculoskeletal: Normal range of motion. She exhibits no edema.   Neurological: She is alert and oriented to person, place, and time.   Skin: Skin is warm. Capillary refill takes less than 2 seconds.   Nursing note and vitals reviewed.        Results Review:       I reviewed the patient's new clinical results.  Results from last 7 days   Lab Units  18   0708  18   1106  18   1649   WBC 10*3/mm3  5.36  9.41  7.37   HEMOGLOBIN g/dL  10.1*  11.4*  10.7*   PLATELETS 10*3/mm3  206  275  299     Results from last  7 days   Lab Units  12/08/18   0708  12/07/18   1106  12/03/18   1649   SODIUM mmol/L  140  137  135*   POTASSIUM mmol/L  3.9  3.3*  3.7   CHLORIDE mmol/L  103  95*  98   CO2 mmol/L  25.7  28.2  25.4   BUN mg/dL  14  24*  18   CREATININE mg/dL  1.11*  1.64*  1.38*   CALCIUM mg/dL  8.5*  8.9  9.5   MAGNESIUM mg/dL  1.7   --    --    PHOSPHORUS mg/dL  3.0   --    --    Estimated Creatinine Clearance: 58.5 mL/min (A) (by C-G formula based on SCr of 1.11 mg/dL (H)).      Assessment/Plan     Hypertension    Hyperlipidemia    Vertigo    Vomiting and diarrhea      PLAN  - no further vomiting or nausea, but still with diarrhea, PCR negative from stool and nares.  Lactoferring negative.  Unlikely infectious or inflammatory.  ?IBS  - add questran today and discuss further with Dr Reed  - Cr back to baseline and drinking liquids will DC IVFs.  - still with some epigastric tenderness    Disposition  Home tomorrow if diarrhea is managable      Maynor Saha MD  [facility]  12/08/18  1:48 PM

## 2018-12-08 NOTE — PLAN OF CARE
Problem: Patient Care Overview  Goal: Plan of Care Review  Outcome: Ongoing (interventions implemented as appropriate)   12/08/18 0643   Coping/Psychosocial   Plan of Care Reviewed With patient   Plan of Care Review   Progress no change   OTHER   Outcome Summary Vss and on RA. A&0x4. Ambulating with stand-by assist. IV Phenergan administered X 1. Per patient, she wasn't able to open her eyes and continued to squeeze eyes shut after Phenergan administered. Pt. assessed and no issues noted. Patient opened eyes once nurse wasn't in her visual field. No c/o pain.  No diarrhea or vomiting noted.

## 2018-12-09 PROBLEM — R11.2 INTRACTABLE VOMITING WITH NAUSEA: Status: ACTIVE | Noted: 2018-12-07

## 2018-12-09 LAB
ALBUMIN SERPL-MCNC: 3.5 G/DL (ref 3.5–5.2)
ANION GAP SERPL CALCULATED.3IONS-SCNC: 10.5 MMOL/L
BUN BLD-MCNC: 12 MG/DL (ref 6–20)
BUN/CREAT SERPL: 10.4 (ref 7–25)
CALCIUM SPEC-SCNC: 8.4 MG/DL (ref 8.6–10.5)
CHLORIDE SERPL-SCNC: 104 MMOL/L (ref 98–107)
CO2 SERPL-SCNC: 26.5 MMOL/L (ref 22–29)
CREAT BLD-MCNC: 1.15 MG/DL (ref 0.57–1)
GFR SERPL CREATININE-BSD FRML MDRD: 48 ML/MIN/1.73
GLUCOSE BLD-MCNC: 89 MG/DL (ref 65–99)
PHOSPHATE SERPL-MCNC: 3 MG/DL (ref 2.5–4.5)
POTASSIUM BLD-SCNC: 3.4 MMOL/L (ref 3.5–5.2)
SODIUM BLD-SCNC: 141 MMOL/L (ref 136–145)

## 2018-12-09 PROCEDURE — 80069 RENAL FUNCTION PANEL: CPT | Performed by: HOSPITALIST

## 2018-12-09 PROCEDURE — 87493 C DIFF AMPLIFIED PROBE: CPT | Performed by: INTERNAL MEDICINE

## 2018-12-09 PROCEDURE — 99214 OFFICE O/P EST MOD 30 MIN: CPT | Performed by: INTERNAL MEDICINE

## 2018-12-09 PROCEDURE — G0378 HOSPITAL OBSERVATION PER HR: HCPCS

## 2018-12-09 PROCEDURE — 94799 UNLISTED PULMONARY SVC/PX: CPT

## 2018-12-09 RX ORDER — POLYETHYLENE GLYCOL 3350, SODIUM CHLORIDE, POTASSIUM CHLORIDE, SODIUM BICARBONATE, AND SODIUM SULFATE 240; 5.84; 2.98; 6.72; 22.72 G/4L; G/4L; G/4L; G/4L; G/4L
2000 POWDER, FOR SOLUTION ORAL DAILY
Status: COMPLETED | OUTPATIENT
Start: 2018-12-09 | End: 2018-12-10

## 2018-12-09 RX ADMIN — SODIUM CHLORIDE, PRESERVATIVE FREE 3 ML: 5 INJECTION INTRAVENOUS at 21:16

## 2018-12-09 RX ADMIN — ACETAMINOPHEN 650 MG: 325 TABLET, FILM COATED ORAL at 23:22

## 2018-12-09 RX ADMIN — SODIUM CHLORIDE, PRESERVATIVE FREE 3 ML: 5 INJECTION INTRAVENOUS at 09:57

## 2018-12-09 RX ADMIN — ATORVASTATIN CALCIUM 40 MG: 20 TABLET, FILM COATED ORAL at 09:57

## 2018-12-09 RX ADMIN — POLYETHYLENE GLYCOL-3350 AND ELECTROLYTES WITH FLAVOR PACK 2000 ML: 240; 5.84; 2.98; 6.72; 22.72 POWDER, FOR SOLUTION ORAL at 21:16

## 2018-12-09 RX ADMIN — MONTELUKAST SODIUM 10 MG: 10 TABLET, FILM COATED ORAL at 09:57

## 2018-12-09 RX ADMIN — PANTOPRAZOLE SODIUM 40 MG: 40 TABLET, DELAYED RELEASE ORAL at 06:29

## 2018-12-09 NOTE — PLAN OF CARE
Problem: Patient Care Overview  Goal: Plan of Care Review  Outcome: Ongoing (interventions implemented as appropriate)   12/09/18 0511   Coping/Psychosocial   Plan of Care Reviewed With patient   OTHER   Outcome Summary VItal signs as recorded. Not in any distress. Zofran given for nausea with good resultt. Two dosed of immodium given per MAR with good results.  Resting well at this time. Fall precautions enforced.Monitored,       Problem: Nausea/Vomiting (Adult)  Goal: Symptom Relief  Outcome: Ongoing (interventions implemented as appropriate)    Goal: Adequate Hydration  Outcome: Ongoing (interventions implemented as appropriate)      Problem: Fall Risk (Adult)  Goal: Absence of Fall  Outcome: Ongoing (interventions implemented as appropriate)

## 2018-12-09 NOTE — PROGRESS NOTES
LOS: 0 days     Name: Pedrito Powell  Age/Sex: 58 y.o. female  :  1960        PCP: Teo Fraser MD    Subjective   9 loose stools yesterday and 2 this AM no blood or mucus.  No fevers, Appetite is better  General: No Fever or Chills, Cardiac: No Chest Pain or Palpitations, Resp: No Cough or SOA and Other: No bleeding      aspirin 325 mg Oral Daily   atorvastatin 40 mg Oral Daily   enoxaparin 40 mg Subcutaneous Q24H   montelukast 10 mg Oral Daily   pantoprazole 40 mg Oral QAM   Scopolamine 1 patch Transdermal Q72H   sodium chloride 3 mL Intravenous Q12H          Objective   Vital Signs  Temp:  [98.1 °F (36.7 °C)-98.8 °F (37.1 °C)] 98.8 °F (37.1 °C)  Heart Rate:  [60-77] 63  Resp:  [16] 16  BP: ()/(56-99) 106/59  Body mass index is 41.85 kg/m².    Intake/Output Summary (Last 24 hours) at 2018 1334  Last data filed at 2018 0630  Gross per 24 hour   Intake 300 ml   Output 1700 ml   Net -1400 ml       Physical Exam   Constitutional: She is oriented to person, place, and time. She appears well-developed and well-nourished.   HENT:   Head: Normocephalic and atraumatic.   Eyes: Conjunctivae and EOM are normal.   Neck: Neck supple.   Cardiovascular: Normal rate, regular rhythm and normal heart sounds.   Pulmonary/Chest: Effort normal and breath sounds normal.   Abdominal: Soft. Bowel sounds are normal. There is tenderness.   Musculoskeletal: Normal range of motion. She exhibits no edema.   Neurological: She is alert and oriented to person, place, and time.   Skin: Skin is warm. Capillary refill takes less than 2 seconds.   Nursing note and vitals reviewed.        Results Review:       I reviewed the patient's new clinical results.  Results from last 7 days   Lab Units  18   0708  18   1106  18   1649   WBC 10*3/mm3  5.36  9.41  7.37   HEMOGLOBIN g/dL  10.1*  11.4*  10.7*   PLATELETS 10*3/mm3  206  275  299     Results from last 7 days   Lab Units  18   06   12/08/18   0708  12/07/18   1106  12/03/18   1649   SODIUM mmol/L  141  140  137  135*   POTASSIUM mmol/L  3.4*  3.9  3.3*  3.7   CHLORIDE mmol/L  104  103  95*  98   CO2 mmol/L  26.5  25.7  28.2  25.4   BUN mg/dL  12  14  24*  18   CREATININE mg/dL  1.15*  1.11*  1.64*  1.38*   CALCIUM mg/dL  8.4*  8.5*  8.9  9.5   MAGNESIUM mg/dL   --   1.7   --    --    PHOSPHORUS mg/dL  3.0  3.0   --    --    Estimated Creatinine Clearance: 55.7 mL/min (A) (by C-G formula based on SCr of 1.15 mg/dL (H)).      Assessment/Plan     Hypertension    Hyperlipidemia    Vertigo    Vomiting and diarrhea      PLAN  - no further vomiting or nausea, but still with diarrhea, PCR negative from stool and nares.  Lactoferring negative.  Unlikely infectious or inflammatory.  ?IBS, GI planning c-scope and EGD tomorrow to evaluate gastritis and microscopic colitis  - No improvement with immodium and refused questran over concern for allergy  - Cr back to baseline and drinking liquids will DC IVFs.  - still with some epigastric tenderness    Disposition  Home tomorrow following endoscopy      Maynor Saha MD  [facility]  12/09/18  1:34 PM

## 2018-12-09 NOTE — PROGRESS NOTES
"   LOS: 0 days   Patient Care Team:  Teo Fraser MD as PCP - General (Family Medicine)    Chief Complaint: diarrhea , nausea    Subjective     History of Present Illness  She had a good afternoon then developed diarrhea,  \"does not feel right\"  Subjective:  Symptoms:  Stable.    Diet:  Adequate intake.    Activity level: Impaired due to weakness.    Pain:  She complains of pain that is mild.        History taken from: patient chart family    Objective     Vital Signs  Temp:  [98.1 °F (36.7 °C)-98.8 °F (37.1 °C)] 98.8 °F (37.1 °C)  Heart Rate:  [60-77] 63  Resp:  [16] 16  BP: ()/(56-99) 106/59    Objective:  General Appearance:  Well-appearing.    Vital signs: (most recent): Blood pressure 106/59, pulse 63, temperature 98.8 °F (37.1 °C), temperature source Oral, resp. rate 16, height 152.4 cm (60\"), weight 97.2 kg (214 lb 4.6 oz), SpO2 100 %, not currently breastfeeding.  Vital signs are normal.    Output: Producing urine and producing stool.    Lungs:  Normal effort.    Heart: Normal rate.    Abdomen: Abdomen is soft.  Bowel sounds are normal.   There is no abdominal tenderness.     Neurological: Patient is alert and oriented to person, place and time.    Skin:  Warm and dry.              Results Review:     I reviewed the patient's new clinical results.    Medication Review: done    Assessment/Plan       Hypertension    Hyperlipidemia    Vertigo    Vomiting and diarrhea      Assessment:  (Nausea  Diarrhea  Dyspepsia    Certainly a post infecitous IBS is possible,  Altered rachel).     Plan:   (Continue supportive care, cdiff ordered  Patient keeps saying she does not \"feel right\" offered to do egd, given nsaid history and dyspepsia, and a colonoscopy to exclude microscopic colitis,  She will  Think about it,  Certainly she understands this could be done as outpatient eventually if she chooses. Can have dr Kapoor do them tomorrow. ).       Richie Reed MD  12/09/18  9:25 AM    Time: Critical care 20 " min

## 2018-12-10 ENCOUNTER — ANESTHESIA (OUTPATIENT)
Dept: GASTROENTEROLOGY | Facility: HOSPITAL | Age: 58
End: 2018-12-10

## 2018-12-10 ENCOUNTER — ANESTHESIA EVENT (OUTPATIENT)
Dept: GASTROENTEROLOGY | Facility: HOSPITAL | Age: 58
End: 2018-12-10

## 2018-12-10 VITALS
TEMPERATURE: 98.2 F | SYSTOLIC BLOOD PRESSURE: 145 MMHG | OXYGEN SATURATION: 100 % | HEART RATE: 77 BPM | BODY MASS INDEX: 43.09 KG/M2 | DIASTOLIC BLOOD PRESSURE: 73 MMHG | RESPIRATION RATE: 16 BRPM | HEIGHT: 60 IN | WEIGHT: 219.5 LBS

## 2018-12-10 LAB
ALBUMIN SERPL-MCNC: 3.9 G/DL (ref 3.5–5.2)
ANION GAP SERPL CALCULATED.3IONS-SCNC: 14.1 MMOL/L
BASOPHILS # BLD AUTO: 0.04 10*3/MM3 (ref 0–0.2)
BASOPHILS NFR BLD AUTO: 0.6 % (ref 0–1.5)
BUN BLD-MCNC: 12 MG/DL (ref 6–20)
BUN/CREAT SERPL: 10.4 (ref 7–25)
C DIFF TOX GENS STL QL NAA+PROBE: NEGATIVE
CALCIUM SPEC-SCNC: 8.8 MG/DL (ref 8.6–10.5)
CHLORIDE SERPL-SCNC: 100 MMOL/L (ref 98–107)
CO2 SERPL-SCNC: 25.9 MMOL/L (ref 22–29)
CREAT BLD-MCNC: 1.15 MG/DL (ref 0.57–1)
DEPRECATED RDW RBC AUTO: 39.7 FL (ref 37–54)
EOSINOPHIL # BLD AUTO: 0.41 10*3/MM3 (ref 0–0.7)
EOSINOPHIL NFR BLD AUTO: 6 % (ref 0.3–6.2)
ERYTHROCYTE [DISTWIDTH] IN BLOOD BY AUTOMATED COUNT: 12.4 % (ref 11.7–13)
GFR SERPL CREATININE-BSD FRML MDRD: 48 ML/MIN/1.73
GLUCOSE BLD-MCNC: 106 MG/DL (ref 65–99)
HCT VFR BLD AUTO: 31.2 % (ref 35.6–45.5)
HGB BLD-MCNC: 10.4 G/DL (ref 11.9–15.5)
IMM GRANULOCYTES # BLD: 0.02 10*3/MM3 (ref 0–0.03)
IMM GRANULOCYTES NFR BLD: 0.3 % (ref 0–0.5)
LYMPHOCYTES # BLD AUTO: 1.49 10*3/MM3 (ref 0.9–4.8)
LYMPHOCYTES NFR BLD AUTO: 21.9 % (ref 19.6–45.3)
MCH RBC QN AUTO: 28.9 PG (ref 26.9–32)
MCHC RBC AUTO-ENTMCNC: 33.3 G/DL (ref 32.4–36.3)
MCV RBC AUTO: 86.7 FL (ref 80.5–98.2)
MONOCYTES # BLD AUTO: 0.46 10*3/MM3 (ref 0.2–1.2)
MONOCYTES NFR BLD AUTO: 6.8 % (ref 5–12)
NEUTROPHILS # BLD AUTO: 4.39 10*3/MM3 (ref 1.9–8.1)
NEUTROPHILS NFR BLD AUTO: 64.7 % (ref 42.7–76)
PHOSPHATE SERPL-MCNC: 3.3 MG/DL (ref 2.5–4.5)
PLATELET # BLD AUTO: 236 10*3/MM3 (ref 140–500)
PMV BLD AUTO: 9.6 FL (ref 6–12)
POTASSIUM BLD-SCNC: 3.3 MMOL/L (ref 3.5–5.2)
RBC # BLD AUTO: 3.6 10*6/MM3 (ref 3.9–5.2)
SODIUM BLD-SCNC: 140 MMOL/L (ref 136–145)
WBC NRBC COR # BLD: 6.79 10*3/MM3 (ref 4.5–10.7)

## 2018-12-10 PROCEDURE — 43239 EGD BIOPSY SINGLE/MULTIPLE: CPT | Performed by: INTERNAL MEDICINE

## 2018-12-10 PROCEDURE — 80069 RENAL FUNCTION PANEL: CPT | Performed by: HOSPITALIST

## 2018-12-10 PROCEDURE — G0378 HOSPITAL OBSERVATION PER HR: HCPCS

## 2018-12-10 PROCEDURE — 45380 COLONOSCOPY AND BIOPSY: CPT | Performed by: INTERNAL MEDICINE

## 2018-12-10 PROCEDURE — 25010000002 PROPOFOL 10 MG/ML EMULSION: Performed by: ANESTHESIOLOGY

## 2018-12-10 PROCEDURE — 85025 COMPLETE CBC W/AUTO DIFF WBC: CPT | Performed by: HOSPITALIST

## 2018-12-10 PROCEDURE — 87081 CULTURE SCREEN ONLY: CPT | Performed by: INTERNAL MEDICINE

## 2018-12-10 PROCEDURE — 88305 TISSUE EXAM BY PATHOLOGIST: CPT | Performed by: INTERNAL MEDICINE

## 2018-12-10 RX ORDER — PROPOFOL 10 MG/ML
VIAL (ML) INTRAVENOUS CONTINUOUS PRN
Status: DISCONTINUED | OUTPATIENT
Start: 2018-12-10 | End: 2018-12-10 | Stop reason: SURG

## 2018-12-10 RX ORDER — LOPERAMIDE HYDROCHLORIDE 2 MG/1
2 CAPSULE ORAL
Start: 2018-12-10

## 2018-12-10 RX ORDER — PROPOFOL 10 MG/ML
VIAL (ML) INTRAVENOUS AS NEEDED
Status: DISCONTINUED | OUTPATIENT
Start: 2018-12-10 | End: 2018-12-10 | Stop reason: SURG

## 2018-12-10 RX ORDER — LIDOCAINE HYDROCHLORIDE 20 MG/ML
INJECTION, SOLUTION INFILTRATION; PERINEURAL AS NEEDED
Status: DISCONTINUED | OUTPATIENT
Start: 2018-12-10 | End: 2018-12-10 | Stop reason: SURG

## 2018-12-10 RX ORDER — SODIUM CHLORIDE, SODIUM LACTATE, POTASSIUM CHLORIDE, CALCIUM CHLORIDE 600; 310; 30; 20 MG/100ML; MG/100ML; MG/100ML; MG/100ML
1000 INJECTION, SOLUTION INTRAVENOUS CONTINUOUS
Status: DISCONTINUED | OUTPATIENT
Start: 2018-12-10 | End: 2018-12-10 | Stop reason: HOSPADM

## 2018-12-10 RX ADMIN — PANTOPRAZOLE SODIUM 40 MG: 40 TABLET, DELAYED RELEASE ORAL at 06:48

## 2018-12-10 RX ADMIN — SODIUM CHLORIDE, POTASSIUM CHLORIDE, SODIUM LACTATE AND CALCIUM CHLORIDE: 600; 310; 30; 20 INJECTION, SOLUTION INTRAVENOUS at 13:09

## 2018-12-10 RX ADMIN — MONTELUKAST SODIUM 10 MG: 10 TABLET, FILM COATED ORAL at 14:47

## 2018-12-10 RX ADMIN — POLYETHYLENE GLYCOL-3350 AND ELECTROLYTES WITH FLAVOR PACK 300 ML: 240; 5.84; 2.98; 6.72; 22.72 POWDER, FOR SOLUTION ORAL at 05:48

## 2018-12-10 RX ADMIN — PROPOFOL 100 MG: 10 INJECTION, EMULSION INTRAVENOUS at 13:11

## 2018-12-10 RX ADMIN — LIDOCAINE HYDROCHLORIDE 60 MG: 20 INJECTION, SOLUTION INFILTRATION; PERINEURAL at 13:11

## 2018-12-10 RX ADMIN — PROPOFOL 100 MCG/KG/MIN: 10 INJECTION, EMULSION INTRAVENOUS at 13:11

## 2018-12-10 RX ADMIN — SODIUM CHLORIDE, PRESERVATIVE FREE 3 ML: 5 INJECTION INTRAVENOUS at 14:50

## 2018-12-10 RX ADMIN — ATORVASTATIN CALCIUM 40 MG: 20 TABLET, FILM COATED ORAL at 14:48

## 2018-12-10 RX ADMIN — ASPIRIN 325 MG: 325 TABLET ORAL at 14:47

## 2018-12-10 RX ADMIN — SODIUM CHLORIDE, POTASSIUM CHLORIDE, SODIUM LACTATE AND CALCIUM CHLORIDE 1000 ML: 600; 310; 30; 20 INJECTION, SOLUTION INTRAVENOUS at 13:01

## 2018-12-10 NOTE — ANESTHESIA POSTPROCEDURE EVALUATION
Patient: Pedrito Powell    Procedure Summary     Date:  12/10/18 Room / Location:  [facility] ENDOSCOPY 1 /  [facility]    Anesthesia Start:  1309 Anesthesia Stop:  1352    Procedures:       ESOPHAGOGASTRODUODENOSCOPY with bx (N/A Esophagus)      colonscopy to cecum and ti (N/A ) Diagnosis:       Vomiting and diarrhea      Intractable vomiting with nausea, unspecified vomiting type      (Vomiting and diarrhea [R11.10, R19.7])      (Intractable vomiting with nausea, unspecified vomiting type [R11.2])    Surgeon:  Zbigniew Kapoor MD Provider:  Zahra Francis MD    Anesthesia Type:  MAC ASA Status:  3          Anesthesia Type: MAC  Last vitals  BP   90/49 (12/10/18 1351)   Temp   37 °C (98.6 °F) (12/10/18 1254)   Pulse   70 (12/10/18 1351)   Resp   14 (12/10/18 1351)     SpO2   99 % (12/10/18 1351)     Post Anesthesia Care and Evaluation    Patient location during evaluation: bedside  Patient participation: complete - patient participated  Level of consciousness: awake  Pain management: adequate  Airway patency: patent  Anesthetic complications: No anesthetic complications    Cardiovascular status: acceptable  Respiratory status: acceptable  Hydration status: acceptable

## 2018-12-10 NOTE — ANESTHESIA PREPROCEDURE EVALUATION
Anesthesia Evaluation                  Airway   Mallampati: III  TM distance: >3 FB  Neck ROM: full  No difficulty expected  Dental      Comment: One lower left front cap    Pulmonary - normal exam   Cardiovascular - normal exam    (+) hypertension, hyperlipidemia,       Neuro/Psych  GI/Hepatic/Renal/Endo    (+)  GERD,      Musculoskeletal     Abdominal    Substance History      OB/GYN          Other   (+) arthritis                   Anesthesia Plan    ASA 3     MAC     intravenous induction   Anesthetic plan, all risks, benefits, and alternatives have been provided, discussed and informed consent has been obtained with: patient.

## 2018-12-10 NOTE — DISCHARGE SUMMARY
Date of Admission: 12/7/2018  Date of Discharge:  12/10/2018    PCP: Teo Fraser MD      DISCHARGE DIAGNOSIS    Hypertension    Hyperlipidemia    Vertigo    Vomiting and diarrhea    Intractable vomiting with nausea      SECONDARY DIAGNOSES  Past Medical History:   Diagnosis Date   • Anemia    • Arthritis    • GERD (gastroesophageal reflux disease)    • Hyperlipidemia    • Hypertension    • Vertigo        CONSULTS   Consults     Date and Time Order Name Status Description    12/7/2018 1616 Inpatient Gastroenterology Consult Completed     12/7/2018 4379 [facility] (on-call MD unless specified) Completed           PROCEDURES PERFORMED  No orders to display   EGD and C-scope completed      HOSPITAL COURSE  Patient is a 58 y.o. female presented to [facility] complaining of diarrhea nausea and vomiting.  Please see the admitting history and physical for further details.  She was admitted to the hospital intractable nausea and vomiting as well as diarrhea.  Her symptoms and admitted ongoing for a few days she had some hemoconcentration and dehydration on admission.  She was aggressively hydrated and started on supportive medications with antiemetics and a scopolamine patch.  Her nausea and vomiting resolved but she continued to have diarrhea.  Given the continued diarrhea she was taken for endoscopic evaluation.  Biopsies were taken but no overt pathologic findings were noted.  She did have stool studies done that were negative for infectious etiologies and fecal lactoferrin was negative.  At this time she stable for discharge from the hospital.  I encouraged her to start a bland diet at home and slowly reintroduce foods.  She likely has some component of irritable bowel syndrome.  She has follow-up arranged with the gastroenterologist to go over her biopsy results and discuss her symptoms further in about a month.  She should follow-up with primary care physician shortly after the  "holidays.      CONDITION ON DISCHARGE  Stable.      VITAL SIGNS  /73 (BP Location: Right arm)   Pulse 77   Temp 98.2 °F (36.8 °C) (Oral)   Resp 16   Ht 152.4 cm (60\")   Wt 99.6 kg (219 lb 8 oz)   SpO2 100%   BMI 42.87 kg/m²   Objective      DISCHARGE DISPOSITION   Home or Self Care      DISCHARGE MEDICATIONS     Discharge Medications      New Medications      Instructions Start Date   loperamide 2 MG capsule  Commonly known as:  IMODIUM   2 mg, Oral, Every 2 Hours PRN         Changes to Medications      Instructions Start Date   atorvastatin 40 MG tablet  Commonly known as:  LIPITOR  What changed:  how much to take   40 mg, Oral, Daily      ferrous gluconate 324 MG tablet  Commonly known as:  FERGON  What changed:  when to take this   324 mg, Oral, Daily With Breakfast      irbesartan 300 MG tablet  Commonly known as:  AVAPRO  What changed:  when to take this   300 mg, Oral, Nightly         Continue These Medications      Instructions Start Date   albuterol 108 (90 Base) MCG/ACT inhaler  Commonly known as:  PROVENTIL HFA;VENTOLIN HFA;PROAIR HFA   2 puffs, Inhalation, Every 4 Hours PRN      aspirin 325 MG tablet   325 mg, Oral, Daily      AUVI-Q IJ   Injection, As Needed      azelastine 0.1 % nasal spray  Commonly known as:  ASTELIN   1 spray, Nasal, Daily      diclofenac 75 MG EC tablet  Commonly known as:  VOLTAREN   75 mg, Oral, Daily      fluticasone 50 MCG/ACT nasal spray  Commonly known as:  FLONASE   2 sprays, Nasal, Daily      hydrochlorothiazide 25 MG tablet  Commonly known as:  HYDRODIURIL   25 mg, Oral, Daily      ipratropium 0.06 % nasal spray  Commonly known as:  ATROVENT   1 spray, Nasal, Daily      meclizine 25 MG tablet  Commonly known as:  ANTIVERT   25 mg, Oral, 3 Times Daily PRN      montelukast 10 MG tablet  Commonly known as:  SINGULAIR   10 mg, Oral, Daily      omeprazole 40 MG capsule  Commonly known as:  priLOSEC   40 mg, Oral, Daily      ondansetron ODT 4 MG disintegrating " tablet  Commonly known as:  ZOFRAN-ODT   4 mg, Oral, Every 6 Hours PRN      Vitamin D3 2000 units capsule   2,000 Units, Oral, Daily           Diet Instructions     Diet: Cardiac      Discharge Diet:  Cardiac         Activity Instructions     Activity as Tolerated          Future Appointments   Date Time Provider Department Center   12/10/2018  4:30 PM Anais Aguilera, PT [facility] None   2/26/2019  4:00 PM Teo Fraser MD [facility] None     Additional Instructions for the Follow-ups that You Need to Schedule     Discharge Follow-up with PCP   As directed       Currently Documented PCP:    Teo Fraser MD    PCP Phone Number:    [phone]     Follow Up Details:  4-6 weeks         Discharge Follow-up with Specified Provider: Dr De Luna; 1 Month   As directed      To:  Dr De Luna    Follow Up:  1 Month           Follow-up Information     Teo Fraser MD .    Specialty:  Family Medicine  Why:  4-6 weeks  Contact information:  [id number] [address]  [phone]                   TEST  RESULTS PENDING AT DISCHARGE   Order Current Status    Tissue Pathology Exam In process    Urease For H Pylori - Tissue, Stomach In process             Maynor Saha MD  [facility]  12/10/18  4:03 PM      Time: greater than 30 minutes.

## 2018-12-10 NOTE — PLAN OF CARE
Problem: Patient Care Overview  Goal: Plan of Care Review  Outcome: Ongoing (interventions implemented as appropriate)   12/10/18 0549   Coping/Psychosocial   Plan of Care Reviewed With patient   OTHER   Outcome Summary Vital signs as charted. NPO except bowel prep. Last BM urine colored watery stool with no formed stool seen. Stool for C- diff sent prior to the start of bowel prep was negative.. Tylenol given for headache with good results. Consented for EGD and colonoscopy. Refused bed alarm,monitored closely.      Goal: Discharge Needs Assessment  Outcome: Ongoing (interventions implemented as appropriate)      Problem: Nausea/Vomiting (Adult)  Goal: Symptom Relief  Outcome: Ongoing (interventions implemented as appropriate)    Goal: Adequate Hydration  Outcome: Ongoing (interventions implemented as appropriate)      Problem: Fall Risk (Adult)  Goal: Absence of Fall  Outcome: Ongoing (interventions implemented as appropriate)

## 2018-12-11 LAB
CYTO UR: NORMAL
LAB AP CASE REPORT: NORMAL
PATH REPORT.FINAL DX SPEC: NORMAL
PATH REPORT.GROSS SPEC: NORMAL
UREASE TISS QL: NEGATIVE

## 2018-12-13 RX ORDER — ATORVASTATIN CALCIUM 80 MG/1
80 TABLET, FILM COATED ORAL DAILY
Qty: 90 TABLET | Refills: 1 | Status: SHIPPED | OUTPATIENT
Start: 2018-12-13 | End: 2019-07-02 | Stop reason: SDUPTHER

## 2018-12-19 ENCOUNTER — TREATMENT (OUTPATIENT)
Dept: PHYSICAL THERAPY | Facility: CLINIC | Age: 58
End: 2018-12-19

## 2018-12-19 DIAGNOSIS — G89.29 BILATERAL CHRONIC KNEE PAIN: ICD-10-CM

## 2018-12-19 DIAGNOSIS — R26.9 GAIT DISTURBANCE: ICD-10-CM

## 2018-12-19 DIAGNOSIS — M17.0 PRIMARY OSTEOARTHRITIS OF BOTH KNEES: Primary | ICD-10-CM

## 2018-12-19 DIAGNOSIS — M25.561 BILATERAL CHRONIC KNEE PAIN: ICD-10-CM

## 2018-12-19 DIAGNOSIS — M25.562 BILATERAL CHRONIC KNEE PAIN: ICD-10-CM

## 2018-12-19 PROCEDURE — 97035 APP MDLTY 1+ULTRASOUND EA 15: CPT | Performed by: PHYSICAL THERAPIST

## 2018-12-19 PROCEDURE — 97530 THERAPEUTIC ACTIVITIES: CPT | Performed by: PHYSICAL THERAPIST

## 2018-12-19 PROCEDURE — 97110 THERAPEUTIC EXERCISES: CPT | Performed by: PHYSICAL THERAPIST

## 2018-12-19 PROCEDURE — 97162 PT EVAL MOD COMPLEX 30 MIN: CPT | Performed by: PHYSICAL THERAPIST

## 2018-12-19 PROCEDURE — 97140 MANUAL THERAPY 1/> REGIONS: CPT | Performed by: PHYSICAL THERAPIST

## 2018-12-26 ENCOUNTER — TELEPHONE (OUTPATIENT)
Dept: INTERNAL MEDICINE | Facility: CLINIC | Age: 58
End: 2018-12-26

## 2019-01-02 ENCOUNTER — TREATMENT (OUTPATIENT)
Dept: PHYSICAL THERAPY | Facility: CLINIC | Age: 59
End: 2019-01-02

## 2019-01-02 DIAGNOSIS — M17.0 PRIMARY OSTEOARTHRITIS OF BOTH KNEES: Primary | ICD-10-CM

## 2019-01-02 DIAGNOSIS — G89.29 BILATERAL CHRONIC KNEE PAIN: ICD-10-CM

## 2019-01-02 DIAGNOSIS — R26.9 GAIT DISTURBANCE: ICD-10-CM

## 2019-01-02 DIAGNOSIS — M25.562 BILATERAL CHRONIC KNEE PAIN: ICD-10-CM

## 2019-01-02 DIAGNOSIS — M25.561 BILATERAL CHRONIC KNEE PAIN: ICD-10-CM

## 2019-01-02 PROCEDURE — 97110 THERAPEUTIC EXERCISES: CPT | Performed by: PHYSICAL THERAPIST

## 2019-01-02 PROCEDURE — 97140 MANUAL THERAPY 1/> REGIONS: CPT | Performed by: PHYSICAL THERAPIST

## 2019-01-02 PROCEDURE — 97530 THERAPEUTIC ACTIVITIES: CPT | Performed by: PHYSICAL THERAPIST

## 2019-01-02 NOTE — PROGRESS NOTES
Physical Therapy Daily Progress Note    Patient Name: Pedrito Powell         :  1960  Referring Physician: Teo Fraser MD    Subjective   Pedrito Powell reports: taping very helpful in decreasing pain and even felt better after tape came off - just starting on (R) knee today -   Cramping in post/lat lower legs and bottom of feet at night more so over the last two nights - was on her feet more over the last few days as well -     Objective   Mildly antalgic gait -   Very tender infrapatellar region, med PF Jt, medial joint line and pes region (B) (R>L) knees    See Exercise, Manual, and Modality Logs for complete treatment.     Functional / Therapeutic Activities:  20 min  · TAPING / BRACING: K-Tape to 1) Unload PF Jt (B) knees; 2) Unload Infrapatellar reg(L) knees; 3) Unload Medial jt knee (B)  · Jt protection, ADL modification; Posture and      Assessment/Plan  (B) Knee pain R>L:  (B) knee OA; RA  Taping greatly reduced her pain and improved gait -     Progress strengthening /stabilization /functional activity       _________________________________________________  Manual Therapy:    10     mins  41369;  Therapeutic Exercise:    30     mins  16205;     Neuromuscular Dion:        mins  07755;    Therapeutic Activity:     20     mins  56446;     Gait Training:           mins  85696;     Ultrasound:          mins  18888;    Electrical Stimulation:         mins  13052 ( );  Dry Needling          mins self-pay    Timed Treatment:   60   mins                  Total Treatment:     60   mins    Antonio Coley PT  Physical Therapist

## 2019-01-07 NOTE — PROGRESS NOTES
Physical Therapy Daily Progress Note     Patient Name: Pedrito Powell         :  1960  Referring Physician: Teo Fraser MD     Subjective   Pedrito Powell reports: taping very helpful in decreasing pain - popping ant/inf (R) knee with walking, -   Pain in (R) ant and medial; Less so on (L)    Objective   Mildly antalgic gait -   Very tender infrapatellar region, med PF Jt, medial joint line and pes region (B) (R>>L) knees     See Exercise, Manual, and Modality Logs for complete treatment.     Functional / Therapeutic Activities:  25 min  · TAPING / BRACING: K-Tape to 1) Unload PF Jt (B) knees; 2) Unload Infrapatellar reg(L) knees; 3) Unload Medial jt knee (B) (Difficult alleviating inf patellar popping (R) knee - had to re-apply and modify repeatedly)  · Jt protection, ADL modification; Posture and       Assessment/Plan  (B) Knee pain R>L:  (B) knee OA; RA  Taping greatly reduced her pain and improved gait -      Progress strengthening /stabilization /functional activity     _________________________________________________  Manual Therapy:                 mins  26317;  Therapeutic Exercise:    35     mins  83754;     Neuromuscular Dion:        mins  14065;    Therapeutic Activity:      25     mins  45914;     Gait Training:                      mins  19292;     Ultrasound:                          mins  23909;    Electrical Stimulation:         mins  39380 ( );  Dry Needling                       mins self-pay     Timed Treatment:   60   mins                  Total Treatment:     60   mins     Antonio Coley PT  Physical Therapist

## 2019-01-08 ENCOUNTER — TREATMENT (OUTPATIENT)
Dept: PHYSICAL THERAPY | Facility: CLINIC | Age: 59
End: 2019-01-08

## 2019-01-08 ENCOUNTER — TELEPHONE (OUTPATIENT)
Dept: INTERNAL MEDICINE | Facility: CLINIC | Age: 59
End: 2019-01-08

## 2019-01-08 DIAGNOSIS — M17.0 PRIMARY OSTEOARTHRITIS OF BOTH KNEES: Primary | ICD-10-CM

## 2019-01-08 DIAGNOSIS — G89.29 BILATERAL CHRONIC KNEE PAIN: ICD-10-CM

## 2019-01-08 DIAGNOSIS — R79.89 ELEVATED SERUM CREATININE: Primary | ICD-10-CM

## 2019-01-08 DIAGNOSIS — M25.562 BILATERAL CHRONIC KNEE PAIN: ICD-10-CM

## 2019-01-08 DIAGNOSIS — R26.9 GAIT DISTURBANCE: ICD-10-CM

## 2019-01-08 DIAGNOSIS — M25.561 BILATERAL CHRONIC KNEE PAIN: ICD-10-CM

## 2019-01-08 PROCEDURE — 97530 THERAPEUTIC ACTIVITIES: CPT | Performed by: PHYSICAL THERAPIST

## 2019-01-08 PROCEDURE — 97110 THERAPEUTIC EXERCISES: CPT | Performed by: PHYSICAL THERAPIST

## 2019-01-08 NOTE — TELEPHONE ENCOUNTER
LVM- patient notified (ok per HIPAA). Patient advised to contact office if they have any questions. Patient advised to contact office to schedule a one month nonfasting lab appointment. Lab order put into [facility].

## 2019-01-08 NOTE — TELEPHONE ENCOUNTER
----- Message from Teo Fraser MD sent at 12/31/2018 12:02 PM EST -----  Have a recheck in bmp in one month. Improving, but lets see if it can get better. It currently could be at baseline.

## 2019-01-09 ENCOUNTER — OFFICE VISIT (OUTPATIENT)
Dept: GASTROENTEROLOGY | Facility: CLINIC | Age: 59
End: 2019-01-09

## 2019-01-09 VITALS
DIASTOLIC BLOOD PRESSURE: 76 MMHG | WEIGHT: 224 LBS | TEMPERATURE: 98.1 F | HEIGHT: 60 IN | BODY MASS INDEX: 43.98 KG/M2 | SYSTOLIC BLOOD PRESSURE: 116 MMHG

## 2019-01-09 DIAGNOSIS — R10.12 LUQ ABDOMINAL PAIN: Primary | ICD-10-CM

## 2019-01-09 PROCEDURE — 99214 OFFICE O/P EST MOD 30 MIN: CPT | Performed by: INTERNAL MEDICINE

## 2019-01-09 NOTE — PROGRESS NOTES
Chief Complaint   Patient presents with   • Follow-up     post scopes   • Nausea     history of vertigo   • Diarrhea   • Difficulty Swallowing       History of Present Illness: 59 yo female who had an EGD and colonoscopy 12/10/18. She feels better. Her LUQ abdominal discomfort is better. She had a CT abd/pelvis without IV contrast on 12/7/18 at [facility]that showed a small ventral hernia below the umbilicus. The LUQ abdominal discomfort is not better and is getting better. No nausea or vomiting. NO fevers, chills. No diarrhea or constipation. NO rectal bleeding or melena. Her weight has increased. She was admitted to [facility] last month:  Date of Admission: 12/7/2018  Date of Discharge:  12/10/2018     PCP: Teo Fraser MD        DISCHARGE DIAGNOSIS    Hypertension    Hyperlipidemia    Vertigo    Vomiting and diarrhea    Intractable vomiting with nausea        SECONDARY DIAGNOSES  Medical History        Past Medical History:   Diagnosis Date   • Anemia     • Arthritis     • GERD (gastroesophageal reflux disease)     • Hyperlipidemia     • Hypertension     • Vertigo              CONSULTS          Consults      Date and Time Order Name Status Description     12/7/2018 1616 Inpatient Gastroenterology Consult Completed       12/7/2018 1249 [facility] (on-call MD unless specified) Completed               PROCEDURES PERFORMED  No orders to display   EGD and C-scope completed        HOSPITAL COURSE  Patient is a 58 y.o. female presented to [facility] complaining of diarrhea nausea and vomiting.  Please see the admitting history and physical for further details.  She was admitted to the hospital intractable nausea and vomiting as well as diarrhea.  Her symptoms and admitted ongoing for a few days she had some hemoconcentration and dehydration on admission.  She was aggressively hydrated and started on supportive medications with antiemetics and a scopolamine patch.  Her nausea and vomiting resolved  but she continued to have diarrhea.  Given the continued diarrhea she was taken for endoscopic evaluation.  Biopsies were taken but no overt pathologic findings were noted.  She did have stool studies done that were negative for infectious etiologies and fecal lactoferrin was negative.  At this time she stable for discharge from the hospital.  I encouraged her to start a bland diet at home and slowly reintroduce foods.  She likely has some component of irritable bowel syndrome.  She has follow-up arranged with the gastroenterologist to go over her biopsy results and discuss her symptoms further in about a month.  She should follow-up with primary care physician shortly after the holidays.             Past Medical History:   Diagnosis Date   • Anemia    • Arthritis    • GERD (gastroesophageal reflux disease)    • Hyperlipidemia    • Hypertension    • Vertigo        Past Surgical History:   Procedure Laterality Date   •  SECTION     • COLONOSCOPY N/A 12/10/2018    normal ileum, IH, hyperplastic polyp, otherwise normal exam   • ENDOSCOPY N/A 12/10/2018    no gross lesions in esophagus or examined duodenum, erythematous mucosa in stomach, biopsies benign   • HYSTERECTOMY     • OOPHORECTOMY     • SHOULDER MANIPULATION           Current Outpatient Medications:   •  albuterol 108 (90 Base) MCG/ACT inhaler, Inhale 2 puffs Every 4 (Four) Hours As Needed for Wheezing., Disp: , Rfl:   •  aspirin 325 MG tablet, Take 325 mg by mouth daily., Disp: , Rfl:   •  atorvastatin (LIPITOR) 80 MG tablet, Take 1 tablet by mouth Daily., Disp: 90 tablet, Rfl: 1  •  azelastine (ASTELIN) 0.1 % nasal spray, 1 spray into the nostril(s) as directed by provider Daily., Disp: , Rfl: 11  •  Cholecalciferol (VITAMIN D3) 2000 units capsule, Take 1 capsule by mouth Daily., Disp: 90 capsule, Rfl: 3  •  diclofenac (VOLTAREN) 75 MG EC tablet, Take 75 mg by mouth Daily., Disp: , Rfl:   •  EPINEPHrine (AUVI-Q IJ), Inject  as directed As Needed.,  Disp: , Rfl:   •  ferrous gluconate (FERGON) 324 MG tablet, Take 1 tablet by mouth Daily With Breakfast. (Patient taking differently: Take 324 mg by mouth Daily.), Disp: 90 tablet, Rfl: 1  •  fluticasone (FLONASE) 50 MCG/ACT nasal spray, 2 sprays into the nostril(s) as directed by provider Daily., Disp: , Rfl:   •  hydrochlorothiazide (HYDRODIURIL) 25 MG tablet, Take 1 tablet by mouth Daily., Disp: 90 tablet, Rfl: 1  •  ipratropium (ATROVENT) 0.06 % nasal spray, 1 spray into the nostril(s) as directed by provider Daily., Disp: , Rfl: 11  •  irbesartan (AVAPRO) 300 MG tablet, Take 1 tablet by mouth Every Night. (Patient taking differently: Take 300 mg by mouth Daily.), Disp: 90 tablet, Rfl: 1  •  loperamide (IMODIUM) 2 MG capsule, Take 1 capsule by mouth Every 2 (Two) Hours As Needed for Diarrhea (max 4 doses daily)., Disp: , Rfl:   •  meclizine (ANTIVERT) 25 MG tablet, Take 1 tablet by mouth 3 (Three) Times a Day As Needed for dizziness., Disp: 270 tablet, Rfl: 1  •  omeprazole (priLOSEC) 40 MG capsule, Take 1 capsule by mouth Daily., Disp: 90 capsule, Rfl: 1  •  ondansetron ODT (ZOFRAN-ODT) 4 MG disintegrating tablet, Take 4 mg by mouth Every 6 (Six) Hours As Needed for Nausea or Vomiting., Disp: , Rfl:     Allergies   Allergen Reactions   • Chocolate Anaphylaxis   • Nickel Anaphylaxis   • Nuts Anaphylaxis   • Ciprofloxacin Other (See Comments)     THRUSH PER PATIENT   • Citric Acid Unknown (See Comments)     Unsure     • Influenza Vaccines Nausea And Vomiting   • Methyldibromoglutaronitrile Unknown (See Comments)      PATIENT UNSURE OF REACTION.   • Neomycin Unknown (See Comments)      PATIENT UNSURE OF REACTION.   • Omnicef [Cefdinir] Other (See Comments)     THRUSH PER PATIENT   • Palladium Chloride Unknown (See Comments)      PATIENT UNSURE OF REACTION   • Penicillins Other (See Comments)     THRUSH PER PATIENT   • Sulfa Antibiotics Other (See Comments)     THRUSH PER PATIENT   • Tomato Hives   • Yeast-Related  Products Hives   • Gold-Containing Drug Products Rash         • Latex Rash       Family History   Problem Relation Age of Onset   • Colon cancer Maternal Grandfather        Social History     Socioeconomic History   • Marital status:      Spouse name: Not on file   • Number of children: Not on file   • Years of education: Not on file   • Highest education level: Not on file   Social Needs   • Financial resource strain: Not on file   • Food insecurity - worry: Not on file   • Food insecurity - inability: Not on file   • Transportation needs - medical: Not on file   • Transportation needs - non-medical: Not on file   Occupational History   • Not on file   Tobacco Use   • Smoking status: Never Smoker   • Smokeless tobacco: Never Used   Substance and Sexual Activity   • Alcohol use: No   • Drug use: No   • Sexual activity: Defer   Other Topics Concern   • Not on file   Social History Narrative   • Not on file       Review of Systems   HENT: Positive for rhinorrhea.    Gastrointestinal: Positive for abdominal pain.   All other systems reviewed and are negative.      Vitals:    01/09/19 1300   BP: 116/76   Temp: 98.1 °F (36.7 °C)       Physical Exam   Constitutional: She is oriented to person, place, and time. She appears well-developed and well-nourished. No distress.   HENT:   Head: Normocephalic and atraumatic. Hair is normal.   Right Ear: Hearing, tympanic membrane, external ear and ear canal normal. No drainage. No decreased hearing is noted.   Left Ear: Hearing, tympanic membrane, external ear and ear canal normal. No decreased hearing is noted.   Nose: No nasal deformity.   Mouth/Throat: Oropharynx is clear and moist.   Eyes: Conjunctivae, EOM and lids are normal. Pupils are equal, round, and reactive to light. Right eye exhibits no discharge. Left eye exhibits no discharge.   Neck: Normal range of motion. Neck supple. No JVD present. No tracheal deviation present. No thyromegaly present.   Cardiovascular:  Normal rate, regular rhythm, normal heart sounds, intact distal pulses and normal pulses. Exam reveals no gallop and no friction rub.   No murmur heard.  Pulmonary/Chest: Effort normal and breath sounds normal. No respiratory distress. She has no wheezes. She has no rales. She exhibits no tenderness.   Abdominal: Soft. Bowel sounds are normal. She exhibits no distension and no mass. There is no tenderness. There is no rebound and no guarding. No hernia.   Small umbilical hernia just below umbilcus, easily decompressed.    Musculoskeletal: Normal range of motion. She exhibits no edema, tenderness or deformity.   Lymphadenopathy:     She has no cervical adenopathy.   Neurological: She is alert and oriented to person, place, and time. She has normal reflexes. She displays normal reflexes. No cranial nerve deficit. She exhibits normal muscle tone. Coordination normal.   Skin: Skin is warm and dry. No rash noted. She is not diaphoretic. No erythema.   Psychiatric: She has a normal mood and affect. Her behavior is normal. Judgment and thought content normal.   Vitals reviewed.      Pedrito was seen today for follow-up, nausea, diarrhea and difficulty swallowing.    Diagnoses and all orders for this visit:    LUQ abdominal pain  -     FL Small Bowel Follow Through; Future      Assessment:  1) LUQ abdominal pain    Recommendations:  1) Small bowel follow through.  2) Patient to call for results.  3) Lose weight  4) We discussed her avoiding NSAIDs because of her CRI.    Return Patient to call for results of SBFT..    Zbigniew Kapoor MD  1/9/2019

## 2019-01-10 ENCOUNTER — TREATMENT (OUTPATIENT)
Dept: PHYSICAL THERAPY | Facility: CLINIC | Age: 59
End: 2019-01-10

## 2019-01-10 DIAGNOSIS — M25.561 BILATERAL CHRONIC KNEE PAIN: ICD-10-CM

## 2019-01-10 DIAGNOSIS — M17.0 PRIMARY OSTEOARTHRITIS OF BOTH KNEES: Primary | ICD-10-CM

## 2019-01-10 DIAGNOSIS — G89.29 BILATERAL CHRONIC KNEE PAIN: ICD-10-CM

## 2019-01-10 DIAGNOSIS — R26.9 GAIT DISTURBANCE: ICD-10-CM

## 2019-01-10 DIAGNOSIS — M25.562 BILATERAL CHRONIC KNEE PAIN: ICD-10-CM

## 2019-01-10 PROCEDURE — 97530 THERAPEUTIC ACTIVITIES: CPT | Performed by: PHYSICAL THERAPIST

## 2019-01-10 PROCEDURE — 97110 THERAPEUTIC EXERCISES: CPT | Performed by: PHYSICAL THERAPIST

## 2019-01-14 ENCOUNTER — TREATMENT (OUTPATIENT)
Dept: PHYSICAL THERAPY | Facility: CLINIC | Age: 59
End: 2019-01-14

## 2019-01-14 DIAGNOSIS — M17.0 PRIMARY OSTEOARTHRITIS OF BOTH KNEES: Primary | ICD-10-CM

## 2019-01-14 DIAGNOSIS — G89.29 BILATERAL CHRONIC KNEE PAIN: ICD-10-CM

## 2019-01-14 DIAGNOSIS — R26.9 GAIT DISTURBANCE: ICD-10-CM

## 2019-01-14 DIAGNOSIS — M25.561 BILATERAL CHRONIC KNEE PAIN: ICD-10-CM

## 2019-01-14 DIAGNOSIS — M25.562 BILATERAL CHRONIC KNEE PAIN: ICD-10-CM

## 2019-01-14 PROCEDURE — 97530 THERAPEUTIC ACTIVITIES: CPT | Performed by: PHYSICAL THERAPIST

## 2019-01-14 PROCEDURE — 97110 THERAPEUTIC EXERCISES: CPT | Performed by: PHYSICAL THERAPIST

## 2019-01-14 NOTE — PROGRESS NOTES
Physical Therapy Daily Progress Note     Patient Name: Pedrito Powell         :  1960  Referring Physician: Teo Fraser MD     Subjective   Pedrito Powell reports: taping very helpful in decreasing pain - popping ant/inf (R) knee with walking, -   Pain in (R) ant and medial; Less so on (L)     Objective   Mildly antalgic gait -   Very tender infrapatellar region, med PF Jt, medial joint line and pes region (B) (R>>L) knees     See Exercise, Manual, and Modality Logs for complete treatment.     Functional / Therapeutic Activities:  20 min  · TAPING / BRACING: K-Tape to 1) Unload PF Jt (B) knees; 2) Unload Infrapatellar reg(L) knees; 3) Unload Medial jt knee (B)   · Jt protection, ADL modification; Posture and       Assessment/Plan  (B) Knee pain R>L:  (B) knee OA; RA  Taping greatly reduced her pain and improved gait -      Progress strengthening /stabilization /functional activity     _________________________________________________  Manual Therapy:                 mins  51508;  Therapeutic Exercise:    35     mins  30937;     Neuromuscular Dion:        mins  82163;    Therapeutic Activity:      20     mins  98826;     Gait Training:                      mins  59238;     Ultrasound:                          mins  75507;    Electrical Stimulation:         mins  32076 ( );  Dry Needling                       mins self-pay     Timed Treatment:   55   mins                  Total Treatment:     55   mins     Antonio Coley PT  Physical Therapist

## 2019-01-14 NOTE — PROGRESS NOTES
Physical Therapy Daily Progress Note     Patient Name: Pedrito Powell         :  1960  Referring Physician: Teo Fraser MD     Subjective   Pedrito Powell reports: taping very helpful in decreasing pain - popping ant/inf (R) knee with walking, -   Pain in (R) ant and medial; Less so on (L)     Objective   Mildly antalgic gait -   Very tender infrapatellar region, med PF Jt, medial joint line and pes region (B) (R>>L) knees     See Exercise, Manual, and Modality Logs for complete treatment.     Functional / Therapeutic Activities:  15 min  · TAPING / BRACING: K-Tape to 1) Unload PF Jt (B) knees; 2) Unload Infrapatellar reg(L) knees; 3) Unload Medial jt knee (B)   · Jt protection, ADL modification; Posture and       Assessment/Plan  (B) Knee pain R>L:  (B) knee OA; RA  Taping greatly reduced her pain and improved gait -      Progress strengthening /stabilization /functional activity     _________________________________________________  Manual Therapy:                 mins  83374;  Therapeutic Exercise:    40     mins  07398;     Neuromuscular Dion:        mins  51041;    Therapeutic Activity:      15     mins  02582;     Gait Training:                      mins  99233;     Ultrasound:                          mins  17867;    Electrical Stimulation:         mins  43323 ( );  Dry Needling                       mins self-pay     Timed Treatment:   55   mins                  Total Treatment:     55   mins     Antonio Coley PT  Physical Therapist

## 2019-01-17 ENCOUNTER — TREATMENT (OUTPATIENT)
Dept: PHYSICAL THERAPY | Facility: CLINIC | Age: 59
End: 2019-01-17

## 2019-01-17 DIAGNOSIS — R26.9 GAIT DISTURBANCE: ICD-10-CM

## 2019-01-17 DIAGNOSIS — M25.561 BILATERAL CHRONIC KNEE PAIN: ICD-10-CM

## 2019-01-17 DIAGNOSIS — M17.0 PRIMARY OSTEOARTHRITIS OF BOTH KNEES: Primary | ICD-10-CM

## 2019-01-17 DIAGNOSIS — G89.29 BILATERAL CHRONIC KNEE PAIN: ICD-10-CM

## 2019-01-17 DIAGNOSIS — M25.562 BILATERAL CHRONIC KNEE PAIN: ICD-10-CM

## 2019-01-17 PROCEDURE — 97530 THERAPEUTIC ACTIVITIES: CPT | Performed by: PHYSICAL THERAPIST

## 2019-01-17 PROCEDURE — 97110 THERAPEUTIC EXERCISES: CPT | Performed by: PHYSICAL THERAPIST

## 2019-01-21 NOTE — PROGRESS NOTES
Physical Therapy Daily Progress Note     Patient Name: Pedrito Powell         :  1960  Referring Physician: Teo Fraser MD     Subjective   Pedrito Powell reports: taping very helpful in decreasing pain - Less popping ant/inf (R) knee with walking, -   Pain in (R) ant and medial; Less so on (L)  Cramping in inner thigh (L) after last session for that night and next day -      Objective   Mildly antalgic gait -   Very tender infrapatellar region, med PF Jt, medial joint line and pes region (B) (R>>L) knees       See Exercise, Manual, and Modality Logs for complete treatment.     Functional / Therapeutic Activities:  20 min  · TAPING / BRACING: K-Tape to 1) Unload PF Jt (B) knees; 2) Unload Infrapatellar reg(L) knees; 3) Unload Medial jt knee (B)   · Jt protection, ADL modification; Posture and       Assessment/Plan  (B) Knee pain R>L:  (B) knee OA; RA  Taping greatly reduced her pain and improved gait -      Progress strengthening /stabilization /functional activity     _________________________________________________  Manual Therapy:                 mins  49171;  Therapeutic Exercise:    35     mins  16569;     Neuromuscular Dion:        mins  52944;    Therapeutic Activity:      20     mins  29028;     Gait Training:                      mins  70400;     Ultrasound:                          mins  40330;    Electrical Stimulation:         mins  18995 ( );  Dry Needling                       mins self-pay     Timed Treatment:   35   mins                  Total Treatment:    55   mins     Antonio Coley PT  Physical Therapist

## 2019-01-22 ENCOUNTER — TREATMENT (OUTPATIENT)
Dept: PHYSICAL THERAPY | Facility: CLINIC | Age: 59
End: 2019-01-22

## 2019-01-22 DIAGNOSIS — M25.561 BILATERAL CHRONIC KNEE PAIN: ICD-10-CM

## 2019-01-22 DIAGNOSIS — M17.0 PRIMARY OSTEOARTHRITIS OF BOTH KNEES: Primary | ICD-10-CM

## 2019-01-22 DIAGNOSIS — M25.562 BILATERAL CHRONIC KNEE PAIN: ICD-10-CM

## 2019-01-22 DIAGNOSIS — G89.29 BILATERAL CHRONIC KNEE PAIN: ICD-10-CM

## 2019-01-22 DIAGNOSIS — R26.9 GAIT DISTURBANCE: ICD-10-CM

## 2019-01-22 PROCEDURE — 97530 THERAPEUTIC ACTIVITIES: CPT | Performed by: PHYSICAL THERAPIST

## 2019-01-22 PROCEDURE — 97110 THERAPEUTIC EXERCISES: CPT | Performed by: PHYSICAL THERAPIST

## 2019-01-24 ENCOUNTER — TREATMENT (OUTPATIENT)
Dept: PHYSICAL THERAPY | Facility: CLINIC | Age: 59
End: 2019-01-24

## 2019-01-24 DIAGNOSIS — R26.9 GAIT DISTURBANCE: ICD-10-CM

## 2019-01-24 DIAGNOSIS — M17.0 PRIMARY OSTEOARTHRITIS OF BOTH KNEES: Primary | ICD-10-CM

## 2019-01-24 DIAGNOSIS — M25.561 BILATERAL CHRONIC KNEE PAIN: ICD-10-CM

## 2019-01-24 DIAGNOSIS — G89.29 BILATERAL CHRONIC KNEE PAIN: ICD-10-CM

## 2019-01-24 DIAGNOSIS — M25.562 BILATERAL CHRONIC KNEE PAIN: ICD-10-CM

## 2019-01-24 PROCEDURE — 97530 THERAPEUTIC ACTIVITIES: CPT | Performed by: PHYSICAL THERAPIST

## 2019-01-24 PROCEDURE — 97035 APP MDLTY 1+ULTRASOUND EA 15: CPT | Performed by: PHYSICAL THERAPIST

## 2019-01-24 PROCEDURE — 97110 THERAPEUTIC EXERCISES: CPT | Performed by: PHYSICAL THERAPIST

## 2019-01-27 NOTE — PROGRESS NOTES
Physical Therapy Daily Progress Note     Patient Name: Pedrito Powell         :  1960  Referring Physician: Teo Fraser MD     Subjective   Pedrito Powell reports: taping very helpful in decreasing pain mostly in (L) knee - No longer noting Cramping in inner thigh (L) -  Persistent popping anterior inferior (R) knee with ambulation more-so as her tape loosens / comes off -       Objective   Antalgic gait - (R) LE -    Very tender infrapatellar region (R) knee; Tender medial jt line (B) knees        See Exercise, Manual, and Modality Logs for complete treatment.     Functional / Therapeutic Activities:  20 min  · TAPING / BRACING: K-Tape to 1) Unload PF Jt (B) knees; 2) Unload Infrapatellar reg(B) knees; 3) Unload Medial jt knee (B)   · Jt protection, ADL modification; Posture and       Assessment/Plan  (B) Knee pain R>L:  (B) knee OA; RA  Taping greatly reduced her pain and improved gait l<<R, but persistent popping infrapatellar region (R) knee -  Pt may benefit from seeing Orthopedist for further assessment / intervention (I.e. Injection, etc) -      Progress strengthening /stabilization /functional activity     _________________________________________________  Manual Therapy:                 mins  23188;  Therapeutic Exercise:    30     mins  64872;     Neuromuscular Dion:        mins  84756;    Therapeutic Activity:      20     mins  29773;     Gait Training:                      mins  88381;     Ultrasound:                          mins  94495;    Electrical Stimulation:         mins  80867 ( );  Dry Needling                       mins self-pay     Timed Treatment:   50   mins                  Total Treatment:    50   mins     Antonio Coley PT  Physical Therapist

## 2019-01-28 ENCOUNTER — TREATMENT (OUTPATIENT)
Dept: PHYSICAL THERAPY | Facility: CLINIC | Age: 59
End: 2019-01-28

## 2019-01-28 DIAGNOSIS — R26.9 GAIT DISTURBANCE: ICD-10-CM

## 2019-01-28 DIAGNOSIS — M25.561 BILATERAL CHRONIC KNEE PAIN: ICD-10-CM

## 2019-01-28 DIAGNOSIS — M25.562 BILATERAL CHRONIC KNEE PAIN: ICD-10-CM

## 2019-01-28 DIAGNOSIS — G89.29 BILATERAL CHRONIC KNEE PAIN: ICD-10-CM

## 2019-01-28 DIAGNOSIS — M17.0 PRIMARY OSTEOARTHRITIS OF BOTH KNEES: Primary | ICD-10-CM

## 2019-01-28 PROCEDURE — 97110 THERAPEUTIC EXERCISES: CPT | Performed by: PHYSICAL THERAPIST

## 2019-01-28 PROCEDURE — 97033 APP MDLTY 1+IONTPHRSIS EA 15: CPT | Performed by: PHYSICAL THERAPIST

## 2019-01-28 PROCEDURE — 97530 THERAPEUTIC ACTIVITIES: CPT | Performed by: PHYSICAL THERAPIST

## 2019-01-28 NOTE — PROGRESS NOTES
Physical Therapy Daily Progress Note     Patient Name: Pedrito Powell         :  1960  Referring Physician: Teo Fraser MD     Subjective   Pedrito Powell reports: taping very helpful in decreasing pain mostly in (L) knee - No longer noting Cramping in inner thigh (L) -  Persistent popping anterior inferior (R) knee with ambulation more-so as her tape loosens / comes off -       Objective   Antalgic gait - (R) LE -    Very tender infrapatellar region (R) knee; Tender medial jt line (B) knees   Inferior tipped patella (R)     See Exercise, Manual, and Modality Logs for complete treatment.     Functional / Therapeutic Activities:  20 min  · TAPING / BRACING: K-Tape to 1) w/ Leukotape to address inf tilt pat (R);  2) Unload Infrapatellar reg(R) ; 3) Unload Medial jt knee (L)   · Jt protection, ADL modification; Posture and       Assessment/Plan  (B) Knee pain R>L:  (B) knee OA; RA  Taping greatly reduced her pain and improved gait l<<R, but persistent popping infrapatellar region (R) knee -  Pt may benefit from seeing Orthopedist for further assessment / intervention (I.e. Injection, etc) -      Progress strengthening /stabilization /functional activity     _________________________________________________  Manual Therapy:                 mins  24477;  Therapeutic Exercise:    30     mins  94400;     Neuromuscular Dion:        mins  58021;    Therapeutic Activity:      20     mins  63082;     Gait Training:                      mins  53014;     Ultrasound:                    10      mins  72453;    Electrical Stimulation:         mins  78515 ( );  Dry Needling                       mins self-pay     Timed Treatment:   60   mins                  Total Treatment:    60   mins     Antonio Coley PT  Physical Therapist

## 2019-02-01 ENCOUNTER — OFFICE VISIT (OUTPATIENT)
Dept: INTERNAL MEDICINE | Facility: CLINIC | Age: 59
End: 2019-02-01

## 2019-02-01 VITALS
WEIGHT: 224.1 LBS | BODY MASS INDEX: 44 KG/M2 | HEIGHT: 60 IN | SYSTOLIC BLOOD PRESSURE: 138 MMHG | RESPIRATION RATE: 18 BRPM | HEART RATE: 71 BPM | TEMPERATURE: 98.2 F | OXYGEN SATURATION: 100 % | DIASTOLIC BLOOD PRESSURE: 92 MMHG

## 2019-02-01 DIAGNOSIS — D64.9 ANEMIA, UNSPECIFIED TYPE: ICD-10-CM

## 2019-02-01 DIAGNOSIS — K29.00 ACUTE GASTRITIS WITHOUT HEMORRHAGE, UNSPECIFIED GASTRITIS TYPE: ICD-10-CM

## 2019-02-01 DIAGNOSIS — R53.83 FATIGUE, UNSPECIFIED TYPE: Primary | ICD-10-CM

## 2019-02-01 PROCEDURE — 99214 OFFICE O/P EST MOD 30 MIN: CPT | Performed by: FAMILY MEDICINE

## 2019-02-02 LAB
ALBUMIN SERPL-MCNC: 4.1 G/DL (ref 3.5–5.2)
ALBUMIN/GLOB SERPL: 1.4 G/DL
ALP SERPL-CCNC: 62 U/L (ref 39–117)
ALT SERPL-CCNC: 18 U/L (ref 1–33)
AST SERPL-CCNC: 18 U/L (ref 1–32)
BASOPHILS # BLD AUTO: 0.04 10*3/MM3 (ref 0–0.2)
BASOPHILS NFR BLD AUTO: 0.5 % (ref 0–1.5)
BILIRUB SERPL-MCNC: 0.3 MG/DL (ref 0.1–1.2)
BUN SERPL-MCNC: 26 MG/DL (ref 6–20)
BUN/CREAT SERPL: 20.5 (ref 7–25)
CALCIUM SERPL-MCNC: 9.1 MG/DL (ref 8.6–10.5)
CHLORIDE SERPL-SCNC: 101 MMOL/L (ref 98–107)
CO2 SERPL-SCNC: 26.1 MMOL/L (ref 22–29)
CREAT SERPL-MCNC: 1.27 MG/DL (ref 0.57–1)
EOSINOPHIL # BLD AUTO: 0.25 10*3/MM3 (ref 0–0.7)
EOSINOPHIL NFR BLD AUTO: 3.1 % (ref 0.3–6.2)
ERYTHROCYTE [DISTWIDTH] IN BLOOD BY AUTOMATED COUNT: 13.3 % (ref 11.7–13)
FT4I SERPL CALC-MCNC: 2.4 (ref 1.2–4.9)
GLOBULIN SER CALC-MCNC: 3 GM/DL
GLUCOSE SERPL-MCNC: 90 MG/DL (ref 65–99)
HCT VFR BLD AUTO: 31.7 % (ref 35.6–45.5)
HGB BLD-MCNC: 10.1 G/DL (ref 11.9–15.5)
IMM GRANULOCYTES # BLD AUTO: 0.02 10*3/MM3 (ref 0–0.03)
IMM GRANULOCYTES NFR BLD AUTO: 0.2 % (ref 0–0.5)
LYMPHOCYTES # BLD AUTO: 1.9 10*3/MM3 (ref 0.9–4.8)
LYMPHOCYTES NFR BLD AUTO: 23.3 % (ref 19.6–45.3)
MCH RBC QN AUTO: 28.5 PG (ref 26.9–32)
MCHC RBC AUTO-ENTMCNC: 31.9 G/DL (ref 32.4–36.3)
MCV RBC AUTO: 89.5 FL (ref 80.5–98.2)
MONOCYTES # BLD AUTO: 0.42 10*3/MM3 (ref 0.2–1.2)
MONOCYTES NFR BLD AUTO: 5.1 % (ref 5–12)
NEUTROPHILS # BLD AUTO: 5.54 10*3/MM3 (ref 1.9–8.1)
NEUTROPHILS NFR BLD AUTO: 67.8 % (ref 42.7–76)
PLATELET # BLD AUTO: 282 10*3/MM3 (ref 140–500)
POTASSIUM SERPL-SCNC: 3.6 MMOL/L (ref 3.5–5.2)
PROT SERPL-MCNC: 7.1 G/DL (ref 6–8.5)
RBC # BLD AUTO: 3.54 10*6/MM3 (ref 3.9–5.2)
SODIUM SERPL-SCNC: 141 MMOL/L (ref 136–145)
T3RU NFR SERPL: 26 % (ref 24–39)
T4 SERPL-MCNC: 9.3 UG/DL (ref 4.5–12)
TSH SERPL DL<=0.005 MIU/L-ACNC: 1.36 UIU/ML (ref 0.45–4.5)
WBC # BLD AUTO: 8.17 10*3/MM3 (ref 4.5–10.7)

## 2019-02-05 ENCOUNTER — TREATMENT (OUTPATIENT)
Dept: PHYSICAL THERAPY | Facility: CLINIC | Age: 59
End: 2019-02-05

## 2019-02-05 DIAGNOSIS — G89.29 BILATERAL CHRONIC KNEE PAIN: ICD-10-CM

## 2019-02-05 DIAGNOSIS — M25.561 BILATERAL CHRONIC KNEE PAIN: ICD-10-CM

## 2019-02-05 DIAGNOSIS — M17.0 PRIMARY OSTEOARTHRITIS OF BOTH KNEES: Primary | ICD-10-CM

## 2019-02-05 DIAGNOSIS — R26.9 GAIT DISTURBANCE: ICD-10-CM

## 2019-02-05 DIAGNOSIS — M25.562 BILATERAL CHRONIC KNEE PAIN: ICD-10-CM

## 2019-02-05 PROCEDURE — 97110 THERAPEUTIC EXERCISES: CPT | Performed by: PHYSICAL THERAPIST

## 2019-02-06 LAB
FERRITIN SERPL-MCNC: 133.5 NG/ML (ref 13–150)
IRON SATN MFR SERPL: 14 % (ref 20–50)
IRON SERPL-MCNC: 43 MCG/DL (ref 37–145)
Lab: NORMAL
METHYLMALONATE SERPL-SCNC: 242 NMOL/L (ref 0–378)
TIBC SERPL-MCNC: 302 MCG/DL
UIBC SERPL-MCNC: 259 MCG/DL
VIT B12 SERPL-MCNC: 607 PG/ML (ref 211–946)

## 2019-02-06 NOTE — PROGRESS NOTES
Please inform the patient of the following abnormal results.  Anemia may be the cause of patients fatigue. Patient should get evaluation by hematology.

## 2019-02-08 ENCOUNTER — TELEPHONE (OUTPATIENT)
Dept: INTERNAL MEDICINE | Facility: CLINIC | Age: 59
End: 2019-02-08

## 2019-02-08 ENCOUNTER — RESULTS ENCOUNTER (OUTPATIENT)
Dept: INTERNAL MEDICINE | Facility: CLINIC | Age: 59
End: 2019-02-08

## 2019-02-08 DIAGNOSIS — R79.89 ELEVATED SERUM CREATININE: ICD-10-CM

## 2019-02-08 DIAGNOSIS — D64.9 ANEMIA, UNSPECIFIED TYPE: Primary | ICD-10-CM

## 2019-02-08 NOTE — TELEPHONE ENCOUNTER
LVM- patient notified (ok per HIPAA). Patient advised to contact office if they have any questions. Referral entered into Epic.

## 2019-02-08 NOTE — PROGRESS NOTES
Physical Therapy Daily Progress Note     Patient Name: Pedrito Powell         :  1960  Referring Physician: Teo Fraser MD     Subjective   Pedrito Powell reports: decreased popping of (R) knee - Pain mostly medial (R) knee > (L) - Unable to step up w/ (R) knee - Taping very helpful in decreasing medial knee pain and improving support / function-      Objective   Much less Antalgic gait with increased gait RLE --    Less tender infrapatellar region (R) knee; Tender medial jt line (R>L) knees   Inferior tipped patella (R)  (+++) Step Up test (R) with severe pain at infrapatellar region and pain lasting     See Exercise, Manual, and Modality Logs for complete treatment.     Functional / Therapeutic Activities:  05 min  · TAPING / BRACIN strips to unload medial knee (R) at end of session   · Jt protection, ADL modification; Posture and       Assessment/Plan  (B) Knee pain R>L:  (B) knee OA; RA  Taping greatly reduced her pain and improved gait , but now no longer requires taping-  Pt may benefit from seeing Orthopedist for further assessment / intervention (I.e. Injection, etc) due to persistent pain (R) knee and limited function -   Improved tolerance to TE/TA w/o tape , but severe pain with attempting step up RLE -      Progress strengthening /stabilization /functional activity     _________________________________________________  Manual Therapy:                 mins  89277;  Therapeutic Exercise:    30     mins  08541;     Neuromuscular Dion:        mins  51928;    Therapeutic Activity:      05    mins  18865;     Iontophoresis:                      mins  30854;     Ultrasound:                           mins  06561;    Electrical Stimulation:         mins  19397 ( );  Dry Needling                       mins self-pay     Timed Treatment:   30   mins                  Total Treatment:    30   mins     Antonio Coley, PT  Physical Therapist

## 2019-02-08 NOTE — TELEPHONE ENCOUNTER
----- Message from Teo Fraser MD sent at 2/5/2019  9:08 PM EST -----  Please inform the patient of the following abnormal results.  Anemia may be the cause of patients fatigue. Patient should get evaluation by hematology.

## 2019-02-11 ENCOUNTER — TREATMENT (OUTPATIENT)
Dept: PHYSICAL THERAPY | Facility: CLINIC | Age: 59
End: 2019-02-11

## 2019-02-11 DIAGNOSIS — M25.562 BILATERAL CHRONIC KNEE PAIN: ICD-10-CM

## 2019-02-11 DIAGNOSIS — M25.561 BILATERAL CHRONIC KNEE PAIN: ICD-10-CM

## 2019-02-11 DIAGNOSIS — G89.29 BILATERAL CHRONIC KNEE PAIN: ICD-10-CM

## 2019-02-11 DIAGNOSIS — M17.0 PRIMARY OSTEOARTHRITIS OF BOTH KNEES: Primary | ICD-10-CM

## 2019-02-11 DIAGNOSIS — R26.9 GAIT DISTURBANCE: ICD-10-CM

## 2019-02-11 PROCEDURE — 97140 MANUAL THERAPY 1/> REGIONS: CPT | Performed by: PHYSICAL THERAPIST

## 2019-02-11 PROCEDURE — 97530 THERAPEUTIC ACTIVITIES: CPT | Performed by: PHYSICAL THERAPIST

## 2019-02-11 PROCEDURE — 97110 THERAPEUTIC EXERCISES: CPT | Performed by: PHYSICAL THERAPIST

## 2019-02-11 NOTE — PROGRESS NOTES
Physical Therapy Daily Progress Note     Patient Name: Pedrito Powell         :  1960  Referring Physician: Teo Fraser MD     Subjective   Pedrito Powell reports: decreased popping of (R) knee - Pain mostly medial (R) knee > (L) - Unable to step up w/ (R) knee - Taping very helpful in decreasing medial knee pain and improving support / function-      Increased pain ant knee/ infrapatellar region and medial patella (R) with popping anterior / inf knee starting Saturday evening and ;  Locked Friday while walking and couldn't straighten knee and had severe pain ;  (L) sore medial / ant/med     Objective    Antalgic gait with decreased WB-ing RLE --    Very tender infrapatellar region, medial PF Jt, medial jt line and pes region (R) knee; Tender medial jt line (L) knee   Inferior tipped patella (R)  (+++) Step Up test (R) with severe pain at infrapatellar region and pain lasting     See Exercise, Manual, and Modality Logs for complete treatment.     Functional / Therapeutic Activities:  20 min  · TAPING / BRACING: K-Tape to 1) Unload PF Jt (R); 2) Unload Infrapatellar reg(; 3) Unload Medial knee (B);   · Jt protection, ADL modification; Posture and       Assessment/Plan  (B) Knee pain R>L:  (B) knee OA; RA  Taping greatly reduced her pain and improved gait , but now no longer requires taping-  Pt may benefit from seeing Orthopedist for further assessment / intervention (I.e. Injection, etc) due to persistent pain (R) knee and limited function -   Severe pain with attempting step up RLE -   Increased pain over weekend with increased activity - taping helpful      Progress strengthening /stabilization /functional activity     _________________________________________________  Manual Therapy:            10     mins  51267;  Therapeutic Exercise:    15     mins  99888;     Neuromuscular Dion:        mins  07740;    Therapeutic Activity:      20    mins  01366;      Iontophoresis:                      mins  67299;     Ultrasound:                    10       mins  54234;    Electrical Stimulation:         mins  54744 ( );  Dry Needling                       mins self-pay     Timed Treatment:   55   mins                  Total Treatment:    55   mins     Antonio Coley PT  Physical Therapist

## 2019-02-12 NOTE — PROGRESS NOTES
Subjective   Pedrito Powell is a 58 y.o. female.     Chief Complaint   Patient presents with    hospital follow up     12/07/18 for gastritis. doing better but still having fatigue         History of Present Illness     Patient presents today with history of fatigue.  Patient also was recently in the emergency room due to gastritis.  Patient also continues to feel fatigued.  She has a history of anemia in the past.  She does not show a chronic anemia.  Patient notes that she feels tired and weak.  However she denies any GI blood loss or any loss of blood in other forms.  Patient notes that she is taking omeprazole for gastritis.  She notes that the medication seems to be helping that well.  She denies any side effects of this medicine.    The following portions of the patient's history were reviewed and updated as appropriate: allergies, current medications, past family history, past medical history, past social history, past surgical history and problem list.    Review of Systems   Constitutional: Negative for chills and fever.   HENT: Negative for congestion, rhinorrhea, sinus pain and sore throat.    Eyes: Negative for photophobia and visual disturbance.   Respiratory: Negative for cough, chest tightness and shortness of breath.    Cardiovascular: Negative for chest pain and palpitations.   Gastrointestinal: Negative for diarrhea, nausea and vomiting.   Genitourinary: Negative for dysuria, frequency and urgency.   Skin: Negative for rash and wound.   Neurological: Negative for dizziness and syncope.   Psychiatric/Behavioral: Negative for behavioral problems and confusion.       Objective   Physical Exam   Constitutional: She is oriented to person, place, and time. She appears well-developed and well-nourished.   HENT:   Head: Normocephalic and atraumatic.   Right Ear: External ear normal.   Left Ear: External ear normal.   Mouth/Throat: Oropharynx is clear and moist.   Eyes: EOM are normal.   Neck: Normal  range of motion. Neck supple.   Cardiovascular: Normal rate, regular rhythm and normal heart sounds.   Pulmonary/Chest: Effort normal and breath sounds normal. No respiratory distress.   Musculoskeletal: Normal range of motion.   Lymphadenopathy:     She has no cervical adenopathy.   Neurological: She is alert and oriented to person, place, and time.   Skin: Skin is warm.   Psychiatric: She has a normal mood and affect. Her behavior is normal.   Nursing note and vitals reviewed.      Assessment/Plan   Pedrito was seen today for hospital follow up.    Diagnoses and all orders for this visit:    Fatigue, unspecified type  -     Comprehensive Metabolic Panel  -     CBC & Differential  -     Thyroid Panel With TSH    Acute gastritis without hemorrhage, unspecified gastritis type        -     Continue omeprazole at this time.  Patient signs and symptoms have improved significantly.  I also told patient to avoid the use of NSAIDs like her diclofenac which could aggravate her gastritis.    Anemia, unspecified type  -     Iron and TIBC  -     Ferritin  -     Vitamin B12  -     Methylmalonic Acid, Serum          No Follow-up on file.    Dictated utilizing Dragon Voice Recognition Software

## 2019-02-14 ENCOUNTER — TREATMENT (OUTPATIENT)
Dept: PHYSICAL THERAPY | Facility: CLINIC | Age: 59
End: 2019-02-14

## 2019-02-14 DIAGNOSIS — M25.561 BILATERAL CHRONIC KNEE PAIN: ICD-10-CM

## 2019-02-14 DIAGNOSIS — R26.9 GAIT DISTURBANCE: ICD-10-CM

## 2019-02-14 DIAGNOSIS — M17.0 PRIMARY OSTEOARTHRITIS OF BOTH KNEES: Primary | ICD-10-CM

## 2019-02-14 DIAGNOSIS — G89.29 BILATERAL CHRONIC KNEE PAIN: ICD-10-CM

## 2019-02-14 DIAGNOSIS — M25.562 BILATERAL CHRONIC KNEE PAIN: ICD-10-CM

## 2019-02-14 PROCEDURE — 97110 THERAPEUTIC EXERCISES: CPT | Performed by: PHYSICAL THERAPIST

## 2019-02-14 PROCEDURE — 97530 THERAPEUTIC ACTIVITIES: CPT | Performed by: PHYSICAL THERAPIST

## 2019-02-18 NOTE — PROGRESS NOTES
Physical Therapy Daily Progress Note     Patient Name: Pedrito Powell         :  1960  Referring Physician: Teo Fraser MD     Subjective   Pedrito Powell reports: feeling much better after last session with decreased pain and popping of (R) knee - Pain mostly anterior and medial (R) knee and medial (L) - Unable to step up w/ (R) knee - Taping very helpful in decreasing medial knee pain and improving support / function-      Objective    Less of an Antalgic gait with decreased WB-ing RLE --    Tender infrapatellar region, medial PF Jt, medial jt line and pes region (R) knee; Tender medial jt line (L) knee   Inferior tipped patella (R)  (+++) Step Up test (R) with severe pain at infrapatellar region and pain lasting     See Exercise, Manual, and Modality Logs for complete treatment.     Functional / Therapeutic Activities:  25 min  · TAPING / BRACING: K-Tape to 1) Unload PF Jt (R); 2) Unload Infrapatellar reg(; 3) Unload Medial knee (B);   · SEE EXERCISE FLOW SHEET -   · Jt protection, ADL modification; Posture and       Assessment/Plan  (B) Knee pain R>L:  (B) knee OA; RA  Taping greatly reduced her pain and improved gait , but now no longer requires taping-  Severe pain with attempting step up RLE -   Pt may benefit from seeing Orthopedist for further assessment / intervention (I.e. Injection, etc) due to persistent pain (R) knee and limited function -        Progress strengthening /stabilization /functional activity  _________________________________________________  Manual Therapy:                 mins  86802;  Therapeutic Exercise:   30     mins  76299;     Neuromuscular Dion:        mins  51301;    Therapeutic Activity:      25    mins  83359;     Iontophoresis:                      mins  24025;     Ultrasound:                           mins  11051;    Electrical Stimulation:         mins  18032 ( );  Dry Needling                       mins self-pay     Timed  Treatment:   55   mins                  Total Treatment:    55   mins     Antonio Coley, PT  Physical Therapist

## 2019-02-20 ENCOUNTER — TREATMENT (OUTPATIENT)
Dept: PHYSICAL THERAPY | Facility: CLINIC | Age: 59
End: 2019-02-20

## 2019-02-20 DIAGNOSIS — R26.9 GAIT DISTURBANCE: ICD-10-CM

## 2019-02-20 DIAGNOSIS — M25.561 BILATERAL CHRONIC KNEE PAIN: ICD-10-CM

## 2019-02-20 DIAGNOSIS — G89.29 BILATERAL CHRONIC KNEE PAIN: ICD-10-CM

## 2019-02-20 DIAGNOSIS — M25.562 BILATERAL CHRONIC KNEE PAIN: ICD-10-CM

## 2019-02-20 DIAGNOSIS — M17.0 PRIMARY OSTEOARTHRITIS OF BOTH KNEES: Primary | ICD-10-CM

## 2019-02-20 PROCEDURE — 97530 THERAPEUTIC ACTIVITIES: CPT | Performed by: PHYSICAL THERAPIST

## 2019-02-20 PROCEDURE — 97110 THERAPEUTIC EXERCISES: CPT | Performed by: PHYSICAL THERAPIST

## 2019-02-22 ENCOUNTER — TREATMENT (OUTPATIENT)
Dept: PHYSICAL THERAPY | Facility: CLINIC | Age: 59
End: 2019-02-22

## 2019-02-22 ENCOUNTER — TELEPHONE (OUTPATIENT)
Dept: INTERNAL MEDICINE | Facility: CLINIC | Age: 59
End: 2019-02-22

## 2019-02-22 DIAGNOSIS — G89.29 BILATERAL CHRONIC KNEE PAIN: ICD-10-CM

## 2019-02-22 DIAGNOSIS — M25.562 BILATERAL CHRONIC KNEE PAIN: ICD-10-CM

## 2019-02-22 DIAGNOSIS — M25.561 BILATERAL CHRONIC KNEE PAIN: ICD-10-CM

## 2019-02-22 DIAGNOSIS — R26.9 GAIT DISTURBANCE: ICD-10-CM

## 2019-02-22 DIAGNOSIS — M17.0 PRIMARY OSTEOARTHRITIS OF BOTH KNEES: Primary | ICD-10-CM

## 2019-02-22 PROCEDURE — 97530 THERAPEUTIC ACTIVITIES: CPT | Performed by: PHYSICAL THERAPIST

## 2019-02-22 PROCEDURE — 97110 THERAPEUTIC EXERCISES: CPT | Performed by: PHYSICAL THERAPIST

## 2019-02-22 NOTE — TELEPHONE ENCOUNTER
----- Message from Teo Fraser MD sent at 2/17/2019  9:12 PM EST -----  The labs were reviewed. Please inform patient that labs were normal.

## 2019-02-24 ENCOUNTER — HOSPITAL ENCOUNTER (EMERGENCY)
Facility: HOSPITAL | Age: 59
Discharge: HOME | End: 2019-02-24
Attending: EMERGENCY MEDICINE | Admitting: EMERGENCY MEDICINE

## 2019-02-24 ENCOUNTER — APPOINTMENT (OUTPATIENT)
Dept: GENERAL RADIOLOGY | Facility: HOSPITAL | Age: 59
End: 2019-02-24

## 2019-02-24 VITALS
SYSTOLIC BLOOD PRESSURE: 121 MMHG | HEIGHT: 60 IN | RESPIRATION RATE: 18 BRPM | WEIGHT: 218 LBS | OXYGEN SATURATION: 100 % | HEART RATE: 101 BPM | DIASTOLIC BLOOD PRESSURE: 74 MMHG | BODY MASS INDEX: 42.8 KG/M2 | TEMPERATURE: 100.5 F

## 2019-02-24 DIAGNOSIS — J10.1 INFLUENZA A: Primary | ICD-10-CM

## 2019-02-24 LAB
ALBUMIN SERPL-MCNC: 3.7 G/DL (ref 3.5–5.2)
ALBUMIN/GLOB SERPL: 1.2 G/DL
ALP SERPL-CCNC: 54 U/L (ref 39–117)
ALT SERPL W P-5'-P-CCNC: 23 U/L (ref 1–33)
ANION GAP SERPL CALCULATED.3IONS-SCNC: 12.5 MMOL/L
AST SERPL-CCNC: 18 U/L (ref 1–32)
BASOPHILS # BLD AUTO: 0.06 10*3/MM3 (ref 0–0.2)
BASOPHILS NFR BLD AUTO: 0.9 % (ref 0–1.5)
BILIRUB SERPL-MCNC: 0.4 MG/DL (ref 0.1–1.2)
BUN BLD-MCNC: 25 MG/DL (ref 6–20)
BUN/CREAT SERPL: 19.5 (ref 7–25)
CALCIUM SPEC-SCNC: 8.7 MG/DL (ref 8.6–10.5)
CHLORIDE SERPL-SCNC: 100 MMOL/L (ref 98–107)
CO2 SERPL-SCNC: 24.5 MMOL/L (ref 22–29)
CREAT BLD-MCNC: 1.28 MG/DL (ref 0.57–1)
D-LACTATE SERPL-SCNC: 0.9 MMOL/L (ref 0.5–2)
DEPRECATED RDW RBC AUTO: 42.9 FL (ref 37–54)
EOSINOPHIL # BLD AUTO: 0.28 10*3/MM3 (ref 0–0.4)
EOSINOPHIL NFR BLD AUTO: 4 % (ref 0.3–6.2)
ERYTHROCYTE [DISTWIDTH] IN BLOOD BY AUTOMATED COUNT: 13.2 % (ref 12.3–15.4)
FLUAV AG NPH QL: POSITIVE
FLUBV AG NPH QL IA: NEGATIVE
GFR SERPL CREATININE-BSD FRML MDRD: 43 ML/MIN/1.73
GLOBULIN UR ELPH-MCNC: 3.2 GM/DL
GLUCOSE BLD-MCNC: 105 MG/DL (ref 65–99)
HCT VFR BLD AUTO: 31.6 % (ref 34–46.6)
HGB BLD-MCNC: 10 G/DL (ref 12–15.9)
IMM GRANULOCYTES # BLD AUTO: 0.03 10*3/MM3 (ref 0–0.05)
IMM GRANULOCYTES NFR BLD AUTO: 0.4 % (ref 0–0.5)
LIPASE SERPL-CCNC: 24 U/L (ref 13–60)
LYMPHOCYTES # BLD AUTO: 0.31 10*3/MM3 (ref 0.7–3.1)
LYMPHOCYTES NFR BLD AUTO: 4.4 % (ref 19.6–45.3)
MCH RBC QN AUTO: 28.3 PG (ref 26.6–33)
MCHC RBC AUTO-ENTMCNC: 31.6 G/DL (ref 31.5–35.7)
MCV RBC AUTO: 89.5 FL (ref 79–97)
MONOCYTES # BLD AUTO: 0.52 10*3/MM3 (ref 0.1–0.9)
MONOCYTES NFR BLD AUTO: 7.4 % (ref 5–12)
NEUTROPHILS # BLD AUTO: 5.84 10*3/MM3 (ref 1.4–7)
NEUTROPHILS NFR BLD AUTO: 82.9 % (ref 42.7–76)
NRBC BLD AUTO-RTO: 0 /100 WBC (ref 0–0)
PLATELET # BLD AUTO: 212 10*3/MM3 (ref 140–450)
PMV BLD AUTO: 9.4 FL (ref 6–12)
POTASSIUM BLD-SCNC: 3.8 MMOL/L (ref 3.5–5.2)
PROCALCITONIN SERPL-MCNC: 0.05 NG/ML (ref 0.1–0.25)
PROT SERPL-MCNC: 6.9 G/DL (ref 6–8.5)
RBC # BLD AUTO: 3.53 10*6/MM3 (ref 3.77–5.28)
S PYO AG THROAT QL: NEGATIVE
SODIUM BLD-SCNC: 137 MMOL/L (ref 136–145)
WBC NRBC COR # BLD: 7.04 10*3/MM3 (ref 3.4–10.8)

## 2019-02-24 PROCEDURE — 96374 THER/PROPH/DIAG INJ IV PUSH: CPT

## 2019-02-24 PROCEDURE — 25010000002 ONDANSETRON PER 1 MG: Performed by: PHYSICIAN ASSISTANT

## 2019-02-24 PROCEDURE — 99284 EMERGENCY DEPT VISIT MOD MDM: CPT

## 2019-02-24 PROCEDURE — 84145 PROCALCITONIN (PCT): CPT | Performed by: PHYSICIAN ASSISTANT

## 2019-02-24 PROCEDURE — 71046 X-RAY EXAM CHEST 2 VIEWS: CPT

## 2019-02-24 PROCEDURE — 85025 COMPLETE CBC W/AUTO DIFF WBC: CPT | Performed by: PHYSICIAN ASSISTANT

## 2019-02-24 PROCEDURE — 87081 CULTURE SCREEN ONLY: CPT | Performed by: PHYSICIAN ASSISTANT

## 2019-02-24 PROCEDURE — 83605 ASSAY OF LACTIC ACID: CPT | Performed by: PHYSICIAN ASSISTANT

## 2019-02-24 PROCEDURE — 87880 STREP A ASSAY W/OPTIC: CPT | Performed by: PHYSICIAN ASSISTANT

## 2019-02-24 PROCEDURE — 87804 INFLUENZA ASSAY W/OPTIC: CPT | Performed by: PHYSICIAN ASSISTANT

## 2019-02-24 PROCEDURE — 83690 ASSAY OF LIPASE: CPT | Performed by: PHYSICIAN ASSISTANT

## 2019-02-24 PROCEDURE — 80053 COMPREHEN METABOLIC PANEL: CPT | Performed by: PHYSICIAN ASSISTANT

## 2019-02-24 RX ORDER — OSELTAMIVIR PHOSPHATE 30 MG/1
30 CAPSULE ORAL ONCE
Status: COMPLETED | OUTPATIENT
Start: 2019-02-24 | End: 2019-02-24

## 2019-02-24 RX ORDER — OSELTAMIVIR PHOSPHATE 30 MG/1
30 CAPSULE ORAL 2 TIMES DAILY
Qty: 10 CAPSULE | Refills: 0 | Status: SHIPPED | OUTPATIENT
Start: 2019-02-24 | End: 2019-03-06

## 2019-02-24 RX ORDER — ACETAMINOPHEN 500 MG
1000 TABLET ORAL ONCE
Status: COMPLETED | OUTPATIENT
Start: 2019-02-24 | End: 2019-02-24

## 2019-02-24 RX ORDER — SODIUM CHLORIDE 0.9 % (FLUSH) 0.9 %
10 SYRINGE (ML) INJECTION AS NEEDED
Status: DISCONTINUED | OUTPATIENT
Start: 2019-02-24 | End: 2019-02-24 | Stop reason: HOSPADM

## 2019-02-24 RX ORDER — ONDANSETRON 2 MG/ML
4 INJECTION INTRAMUSCULAR; INTRAVENOUS ONCE
Status: COMPLETED | OUTPATIENT
Start: 2019-02-24 | End: 2019-02-24

## 2019-02-24 RX ADMIN — ONDANSETRON HYDROCHLORIDE 4 MG: 2 SOLUTION INTRAMUSCULAR; INTRAVENOUS at 15:32

## 2019-02-24 RX ADMIN — OSELTAMIVIR PHOSPHATE 30 MG: 30 CAPSULE ORAL at 15:41

## 2019-02-24 RX ADMIN — ACETAMINOPHEN 1000 MG: 500 TABLET, FILM COATED ORAL at 13:23

## 2019-02-25 NOTE — PROGRESS NOTES
Physical Therapy Daily Progress Note     Patient Name: Pedrito Powell         :  1960  Referring Physician: Teo Fraser MD     Subjective   Pedrito Powell reports: feeling much better after last session with decreased pain and popping of (R) knee - Pain mostly anterior and medial (R) knee and medial (L) - Unable to step up w/ (R) knee - Taping very helpful in decreasing medial knee pain and improving support / function-      Objective    Less of an Antalgic gait with decreased WB-ing RLE --    Tender infrapatellar region, medial PF Jt, medial jt line and pes region (R) knee; Tender medial jt line (L) knee   Inferior tipped patella (R)  (+++) Step Up test (R) with severe pain at infrapatellar region and pain lasting     See Exercise, Manual, and Modality Logs for complete treatment.     Functional / Therapeutic Activities:  25 min  · TAPING / BRACING: K-Tape to 1) Unload PF Jt (R); 2) Unload Infrapatellar reg(R); 3) Unload Medial knee (B);   · SEE EXERCISE FLOW SHEET -   · Jt protection, ADL modification; Posture and       Assessment/Plan  (B) Knee pain R>L:  (B) knee OA; RA  Taping greatly reduced her pain and improved gait , but now no longer requires taping-  Severe pain with attempting step up RLE -   Pt may benefit from seeing Orthopedist for further assessment / intervention (I.e. Injection, etc) due to persistent pain (R) knee and limited function -         Progress strengthening /stabilization /functional activity  _________________________________________________  Manual Therapy:                 mins  34664;  Therapeutic Exercise:   30     mins  33016;     Neuromuscular Dion:        mins  66623;    Therapeutic Activity:      25    mins  60643;     Iontophoresis:                      mins  76115;     Ultrasound:                           mins  27366;    Electrical Stimulation:         mins  89478 ( );  Dry Needling                       mins self-pay     Timed  Treatment:   55   mins                  Total Treatment:    55   mins     Antonio Coley, PT  Physical Therapist

## 2019-02-25 NOTE — PROGRESS NOTES
Physical Therapy Daily Progress Note     Patient Name: Pedrito Powell         :  1960  Referring Physician: Teo Fraser MD     Subjective   Pedrito Powell reports: feeling much better after last session with decreased pain and popping of (R) knee - Pain mostly anterior and medial (R) knee and medial (L) - Unable to step up w/ (R) knee - Taping very helpful in decreasing medial knee pain and improving support / function-      Objective    Less of an Antalgic gait with decreased WB-ing RLE --    Tender infrapatellar region, medial PF Jt, medial jt line and pes region (R) knee; Tender medial jt line (L) knee   Inferior tipped patella (R)  (+++) Step Up test (R) with severe pain at infrapatellar region and pain lasting     See Exercise, Manual, and Modality Logs for complete treatment.  (DN 1in infrapatellar reg and med PF jt and med knee jt (R)) -      Functional / Therapeutic Activities:  25 min  · TAPING / BRACING: K-Tape to 1) Unload PF Jt (R); 2) Unload Infrapatellar reg(R); 3) Unload Medial knee (B);   · SEE EXERCISE FLOW SHEET -   · Jt protection, ADL modification; Posture and       Assessment/Plan  (B) Knee pain R>L:  (B) knee OA; RA  Taping greatly reduced her pain and improved gait , but now no longer requires taping-  Severe pain with attempting step up RLE -   Pt may benefit from seeing Orthopedist for further assessment / intervention (I.e. Injection, etc) due to persistent pain (R) knee and limited function -         Progress strengthening /stabilization /functional activity  _________________________________________________  Manual Therapy:                 mins  71903;  Therapeutic Exercise:   30     mins  27952;     Neuromuscular Dion:        mins  35677;    Therapeutic Activity:      25    mins  82939;     Iontophoresis:                      mins  56419;     Ultrasound:                           mins  58857;    Electrical Stimulation:         mins  40561 ( );  Dry  Jesica                       mins self-pay     Timed Treatment:   55   mins                  Total Treatment:    70   mins     Antonio Coley, PT  Physical Therapist

## 2019-02-26 LAB — BACTERIA SPEC AEROBE CULT: NORMAL

## 2019-03-04 ENCOUNTER — TREATMENT (OUTPATIENT)
Dept: PHYSICAL THERAPY | Facility: CLINIC | Age: 59
End: 2019-03-04

## 2019-03-04 DIAGNOSIS — G89.29 BILATERAL CHRONIC KNEE PAIN: ICD-10-CM

## 2019-03-04 DIAGNOSIS — R26.9 GAIT DISTURBANCE: ICD-10-CM

## 2019-03-04 DIAGNOSIS — M25.562 BILATERAL CHRONIC KNEE PAIN: ICD-10-CM

## 2019-03-04 DIAGNOSIS — M17.0 PRIMARY OSTEOARTHRITIS OF BOTH KNEES: Primary | ICD-10-CM

## 2019-03-04 DIAGNOSIS — M25.561 BILATERAL CHRONIC KNEE PAIN: ICD-10-CM

## 2019-03-04 PROCEDURE — 97530 THERAPEUTIC ACTIVITIES: CPT | Performed by: PHYSICAL THERAPIST

## 2019-03-04 PROCEDURE — 97110 THERAPEUTIC EXERCISES: CPT | Performed by: PHYSICAL THERAPIST

## 2019-03-06 ENCOUNTER — APPOINTMENT (OUTPATIENT)
Dept: ONCOLOGY | Facility: CLINIC | Age: 59
End: 2019-03-06

## 2019-03-06 ENCOUNTER — APPOINTMENT (OUTPATIENT)
Dept: OTHER | Facility: HOSPITAL | Age: 59
End: 2019-03-06

## 2019-03-06 ENCOUNTER — OFFICE VISIT (OUTPATIENT)
Dept: INTERNAL MEDICINE | Facility: CLINIC | Age: 59
End: 2019-03-06

## 2019-03-06 VITALS
HEART RATE: 80 BPM | WEIGHT: 226.9 LBS | HEIGHT: 60 IN | BODY MASS INDEX: 44.55 KG/M2 | DIASTOLIC BLOOD PRESSURE: 82 MMHG | TEMPERATURE: 98.2 F | OXYGEN SATURATION: 99 % | SYSTOLIC BLOOD PRESSURE: 126 MMHG

## 2019-03-06 DIAGNOSIS — D64.9 ANEMIA, UNSPECIFIED TYPE: ICD-10-CM

## 2019-03-06 DIAGNOSIS — I10 HYPERTENSION, UNSPECIFIED TYPE: Primary | ICD-10-CM

## 2019-03-06 DIAGNOSIS — R79.89 ELEVATED SERUM CREATININE: ICD-10-CM

## 2019-03-06 DIAGNOSIS — J40 BRONCHITIS: ICD-10-CM

## 2019-03-06 PROCEDURE — 99214 OFFICE O/P EST MOD 30 MIN: CPT | Performed by: FAMILY MEDICINE

## 2019-03-06 RX ORDER — IRBESARTAN 300 MG/1
300 TABLET ORAL DAILY
Qty: 90 TABLET | Refills: 1 | Status: SHIPPED | OUTPATIENT
Start: 2019-03-06 | End: 2019-09-25 | Stop reason: SDUPTHER

## 2019-03-06 RX ORDER — DOXYCYCLINE HYCLATE 50 MG/1
324 CAPSULE, GELATIN COATED ORAL
Qty: 90 TABLET | Refills: 1 | Status: SHIPPED | OUTPATIENT
Start: 2019-03-06 | End: 2019-09-04 | Stop reason: SDUPTHER

## 2019-03-06 RX ORDER — HYDROCHLOROTHIAZIDE 25 MG/1
25 TABLET ORAL DAILY
Qty: 90 TABLET | Refills: 1 | Status: SHIPPED | OUTPATIENT
Start: 2019-03-06 | End: 2019-09-18 | Stop reason: SDUPTHER

## 2019-03-06 RX ORDER — ONDANSETRON 4 MG/1
TABLET, FILM COATED ORAL
Refills: 0 | COMMUNITY
Start: 2019-02-24 | End: 2020-08-31 | Stop reason: SDUPTHER

## 2019-03-06 NOTE — PROGRESS NOTES
Subjective   Pedrito Powell is a 58 y.o. female.     Chief Complaint   Patient presents with   • Follow-up     FOR FATIGUE   • Follow-up     FOR ABDOMINAL PAIN   • Follow-up     SEEN AT ER FOR FLU 02/24/19. still coughing         History of Present Illness     Patient is here to follow-up from having the flu.  Patient was seen in the emergency room for the flu.  She notes that she is continuing to cough.  She does the cough keeps her up at night.  Patient has finished out the Tamiflu.  She is currently not taking anything for her cough.  Patient has not had fevers for almost a week now.    Patient also has essential hypertension.  Blood pressure at today's office visit 126/82.  She is currently taking hydrochlorothiazide 25 mg daily and irbesartan 300 mg daily.      Patient has a history of iron deficiency anemia.  She is currently taking ferrous gluconate 324 mg daily.  She states that she has an appointment with a hematologist at the end of the week.    Patient's most recent labs done last month, indicate patient having acute kidney injury.  Patient's elevated serum creatinine level of 1.28.  She does not have a prior history of kidney disease.    The following portions of the patient's history were reviewed and updated as appropriate: allergies, current medications, past family history, past medical history, past social history, past surgical history and problem list.    Review of Systems   Constitutional: Negative for chills and fever.   HENT: Positive for congestion. Negative for rhinorrhea, sinus pain and sore throat.    Eyes: Negative for photophobia and visual disturbance.   Respiratory: Positive for cough. Negative for chest tightness and shortness of breath.    Cardiovascular: Negative for chest pain and palpitations.   Gastrointestinal: Negative for diarrhea, nausea and vomiting.   Genitourinary: Negative for dysuria, frequency and urgency.   Skin: Negative for rash and wound.   Neurological: Negative  for dizziness and syncope.   Psychiatric/Behavioral: Negative for behavioral problems and confusion.       Objective   Physical Exam   Constitutional: She is oriented to person, place, and time. She appears well-developed and well-nourished.   HENT:   Head: Normocephalic and atraumatic.   Right Ear: External ear normal.   Left Ear: External ear normal.   Mouth/Throat: Oropharynx is clear and moist.   Eyes: EOM are normal.   Neck: Normal range of motion. Neck supple.   Cardiovascular: Normal rate, regular rhythm and normal heart sounds.   Pulmonary/Chest: Effort normal and breath sounds normal. No respiratory distress.   Musculoskeletal: Normal range of motion.   Lymphadenopathy:     She has no cervical adenopathy.   Neurological: She is alert and oriented to person, place, and time.   Skin: Skin is warm.   Psychiatric: She has a normal mood and affect. Her behavior is normal.   Nursing note and vitals reviewed.      Assessment/Plan   Pedrito was seen today for follow-up, follow-up and follow-up.    Diagnoses and all orders for this visit:    Hypertension, unspecified type  -     hydrochlorothiazide (HYDRODIURIL) 25 MG tablet; Take 1 tablet by mouth Daily.  -     irbesartan (AVAPRO) 300 MG tablet; Take 1 tablet by mouth Daily.  -     Comprehensive Metabolic Panel  -     Stable.  We will continue patient on current medication.    Anemia, unspecified type  -     ferrous gluconate (FERGON) 324 MG tablet; Take 1 tablet by mouth Daily With Breakfast.  -     CBC & Differential  -     Continue the ferrous gluconate daily.    Elevated serum creatinine  -     Comprehensive Metabolic Panel  -     We will continue to monitor.    Bronchitis  -     Codeine Polt-Chlorphen Polt ER 14.7-2.8 MG/5ML Suspension Extended Release; Take 10 mL by mouth 2 (Two) Times a Day As Needed (COUGH).          No Follow-up on file.    Dictated utilizing Dragon Voice Recognition Software

## 2019-03-07 ENCOUNTER — TREATMENT (OUTPATIENT)
Dept: PHYSICAL THERAPY | Facility: CLINIC | Age: 59
End: 2019-03-07

## 2019-03-07 DIAGNOSIS — M17.0 PRIMARY OSTEOARTHRITIS OF BOTH KNEES: Primary | ICD-10-CM

## 2019-03-07 DIAGNOSIS — M25.562 BILATERAL CHRONIC KNEE PAIN: ICD-10-CM

## 2019-03-07 DIAGNOSIS — R26.9 GAIT DISTURBANCE: ICD-10-CM

## 2019-03-07 DIAGNOSIS — M25.561 BILATERAL CHRONIC KNEE PAIN: ICD-10-CM

## 2019-03-07 DIAGNOSIS — G89.29 BILATERAL CHRONIC KNEE PAIN: ICD-10-CM

## 2019-03-07 LAB
ALBUMIN SERPL-MCNC: 4.1 G/DL (ref 3.5–5.2)
ALBUMIN/GLOB SERPL: 1.4 G/DL
ALP SERPL-CCNC: 55 U/L (ref 39–117)
ALT SERPL-CCNC: 20 U/L (ref 1–33)
AST SERPL-CCNC: 15 U/L (ref 1–32)
BASOPHILS # BLD AUTO: 0.06 10*3/MM3 (ref 0–0.2)
BASOPHILS NFR BLD AUTO: 0.8 % (ref 0–1.5)
BILIRUB SERPL-MCNC: 0.4 MG/DL (ref 0.1–1.2)
BUN SERPL-MCNC: 27 MG/DL (ref 6–20)
BUN/CREAT SERPL: 19.3 (ref 7–25)
CALCIUM SERPL-MCNC: 9 MG/DL (ref 8.6–10.5)
CHLORIDE SERPL-SCNC: 99 MMOL/L (ref 98–107)
CO2 SERPL-SCNC: 29.8 MMOL/L (ref 22–29)
CREAT SERPL-MCNC: 1.4 MG/DL (ref 0.57–1)
EOSINOPHIL # BLD AUTO: 0.45 10*3/MM3 (ref 0–0.4)
EOSINOPHIL NFR BLD AUTO: 5.9 % (ref 0.3–6.2)
ERYTHROCYTE [DISTWIDTH] IN BLOOD BY AUTOMATED COUNT: 13.2 % (ref 12.3–15.4)
GLOBULIN SER CALC-MCNC: 2.9 GM/DL
GLUCOSE SERPL-MCNC: 98 MG/DL (ref 65–99)
HCT VFR BLD AUTO: 29.6 % (ref 34–46.6)
HGB BLD-MCNC: 9.5 G/DL (ref 12–15.9)
IMM GRANULOCYTES # BLD AUTO: 0.02 10*3/MM3 (ref 0–0.05)
IMM GRANULOCYTES NFR BLD AUTO: 0.3 % (ref 0–0.5)
LYMPHOCYTES # BLD AUTO: 2.1 10*3/MM3 (ref 0.7–3.1)
LYMPHOCYTES NFR BLD AUTO: 27.5 % (ref 19.6–45.3)
MCH RBC QN AUTO: 28.6 PG (ref 26.6–33)
MCHC RBC AUTO-ENTMCNC: 32.1 G/DL (ref 31.5–35.7)
MCV RBC AUTO: 89.2 FL (ref 79–97)
MONOCYTES # BLD AUTO: 0.66 10*3/MM3 (ref 0.1–0.9)
MONOCYTES NFR BLD AUTO: 8.6 % (ref 5–12)
NEUTROPHILS # BLD AUTO: 4.35 10*3/MM3 (ref 1.4–7)
NEUTROPHILS NFR BLD AUTO: 56.9 % (ref 42.7–76)
NRBC BLD AUTO-RTO: 0 /100 WBC (ref 0–0)
PLATELET # BLD AUTO: 279 10*3/MM3 (ref 140–450)
POTASSIUM SERPL-SCNC: 3.5 MMOL/L (ref 3.5–5.2)
PROT SERPL-MCNC: 7 G/DL (ref 6–8.5)
RBC # BLD AUTO: 3.32 10*6/MM3 (ref 3.77–5.28)
SODIUM SERPL-SCNC: 140 MMOL/L (ref 136–145)
WBC # BLD AUTO: 7.64 10*3/MM3 (ref 3.4–10.8)

## 2019-03-07 PROCEDURE — 97110 THERAPEUTIC EXERCISES: CPT | Performed by: PHYSICAL THERAPIST

## 2019-03-07 PROCEDURE — 97530 THERAPEUTIC ACTIVITIES: CPT | Performed by: PHYSICAL THERAPIST

## 2019-03-07 NOTE — PROGRESS NOTES
Physical Therapy Daily Progress Note     Patient Name: Pedrito Powell         :  1960  Referring Physician: Teo Fraser MD     Subjective   Pedrito Powell reports:  that she was feeling much better after last session -  with decreased pain and popping of (R) knee with taping -     Today she is noting Pain mostly anterior and medial (R) knee with popping and medial (L) pain- Worse over the past few days -  Unable to step up w/ (R) knee - Taping very helpful in decreasing medial knee pain and improving support / function-   Is not able to see her Ortho MD until end of April!      Objective    Mildly Antalgic gait with decreased WB-ing RLE --    Tender infrapatellar region, medial PF Jt, medial jt line and pes region (R) knee; Tender medial jt line (L) knee   Inferior tipped patella (R)  (+++) Step Up test (R) with severe pain at infrapatellar region and pain lasting     See Exercise, Manual, and Modality Logs for complete treatment.  (DN 2in infrapatellar region, med PF jt and medial joint line (R) -      Functional / Therapeutic Activities:  20 min  · TAPING / BRACING: K-Tape to 1) Unload PF Jt (R); 2) Unload Infrapatellar reg(R); 3) Unload Medial knee (B);   · SEE EXERCISE FLOW SHEET -   · Jt protection, ADL modification; Posture and       Assessment/Plan  (B) Knee pain R>L:  (B) knee OA; RA  Taping greatly reduced her pain and improved gait , but now no longer requires taping-  Severe pain with attempting step up RLE -   Pt may benefit from seeing Orthopedist for further assessment / intervention (I.e. Injection, etc) due to persistent pain (R) knee and limited function -         Progress strengthening /stabilization /functional activity - Pt to contact [facility] for another Ortho MD  _________________________________________________  Manual Therapy:                 mins  01949;  Therapeutic Exercise:   25     mins  30540;     Neuromuscular Dion:        mins  19552;    Therapeutic  Activity:      20    mins  39052;     Iontophoresis:                      mins  36401;     Ultrasound:                           mins  11502;    Electrical Stimulation:         mins  38557 ( );  Dry Needling                  10     mins self-pay     Timed Treatment:   50   mins                  Total Treatment:    70   mins     Antonio Coley, PT  Physical Therapist

## 2019-03-07 NOTE — PROGRESS NOTES
Physical Therapy Daily Progress Note     Patient Name: Pedrito Powell         :  1960  Referring Physician: Teo Fraser MD     Subjective   Pedrito Powell reports: having been very sick with the FLU over the past week or so - Just now returning to work - MD says she is not contagious any more - Pt notes that she was feeling much better after last session -  with decreased pain and popping of (R) knee with taping -    Today she is noting Pain mostly anterior and medial (R) knee with popping and medial (L) pain- Worse over the past few days -  Unable to step up w/ (R) knee - Taping very helpful in decreasing medial knee pain and improving support / function-      Objective    Mildly Antalgic gait with decreased WB-ing RLE --    Tender infrapatellar region, medial PF Jt, medial jt line and pes region (R) knee; Tender medial jt line (L) knee   Inferior tipped patella (R)  (+++) Step Up test (R) with severe pain at infrapatellar region and pain lasting     See Exercise, Manual, and Modality Logs for complete treatment.       Functional / Therapeutic Activities:  25 min  · TAPING / BRACING: K-Tape to 1) Unload PF Jt (R); 2) Unload Infrapatellar reg(R); 3) Unload Medial knee (B);   · SEE EXERCISE FLOW SHEET -   · Jt protection, ADL modification; Posture and       Assessment/Plan  (B) Knee pain R>L:  (B) knee OA; RA  Taping greatly reduced her pain and improved gait , but now no longer requires taping-  Severe pain with attempting step up RLE -   Pt may benefit from seeing Orthopedist for further assessment / intervention (I.e. Injection, etc) due to persistent pain (R) knee and limited function -         Progress strengthening /stabilization /functional activity  _________________________________________________  Manual Therapy:                 mins  99691;  Therapeutic Exercise:   25     mins  51973;     Neuromuscular Dion:        mins  60522;    Therapeutic Activity:      25    mins  03142;      Iontophoresis:                      mins  65311;     Ultrasound:                           mins  54126;    Electrical Stimulation:         mins  56858 ( );  Dry Needling                       mins self-pay     Timed Treatment:   50   mins                  Total Treatment:    65   mins     Antonio Coley PT  Physical Therapist

## 2019-03-10 NOTE — PROGRESS NOTES
Please inform the patient of the following abnormal results.  Serum creatnine is continuing to get worse, will need to see nephrologisit.  Anemia is getting worse, will need to see hematology.

## 2019-03-11 ENCOUNTER — TREATMENT (OUTPATIENT)
Dept: PHYSICAL THERAPY | Facility: CLINIC | Age: 59
End: 2019-03-11

## 2019-03-11 DIAGNOSIS — M17.0 PRIMARY OSTEOARTHRITIS OF BOTH KNEES: Primary | ICD-10-CM

## 2019-03-11 DIAGNOSIS — M25.561 BILATERAL CHRONIC KNEE PAIN: ICD-10-CM

## 2019-03-11 DIAGNOSIS — R26.9 GAIT DISTURBANCE: ICD-10-CM

## 2019-03-11 DIAGNOSIS — G89.29 BILATERAL CHRONIC KNEE PAIN: ICD-10-CM

## 2019-03-11 DIAGNOSIS — M25.562 BILATERAL CHRONIC KNEE PAIN: ICD-10-CM

## 2019-03-11 PROCEDURE — 97110 THERAPEUTIC EXERCISES: CPT | Performed by: PHYSICAL THERAPIST

## 2019-03-11 PROCEDURE — 97530 THERAPEUTIC ACTIVITIES: CPT | Performed by: PHYSICAL THERAPIST

## 2019-03-12 ENCOUNTER — TELEPHONE (OUTPATIENT)
Dept: INTERNAL MEDICINE | Facility: CLINIC | Age: 59
End: 2019-03-12

## 2019-03-12 DIAGNOSIS — R79.89 ELEVATED SERUM CREATININE: Primary | ICD-10-CM

## 2019-03-12 NOTE — TELEPHONE ENCOUNTER
----- Message from Teo Fraser MD sent at 3/10/2019 12:04 PM EDT -----  Please inform the patient of the following abnormal results.  Serum creatnine is continuing to get worse, will need to see nephrologisit.  Anemia is getting worse, will need to see hematology.

## 2019-03-12 NOTE — TELEPHONE ENCOUNTER
Patient notified of results, and expressed understanding.  Patient states she has appt with [facility] 03/27/19  Will put in referral for nephrology

## 2019-03-14 ENCOUNTER — TREATMENT (OUTPATIENT)
Dept: PHYSICAL THERAPY | Facility: CLINIC | Age: 59
End: 2019-03-14

## 2019-03-14 DIAGNOSIS — M25.562 BILATERAL CHRONIC KNEE PAIN: ICD-10-CM

## 2019-03-14 DIAGNOSIS — R26.9 GAIT DISTURBANCE: ICD-10-CM

## 2019-03-14 DIAGNOSIS — M25.561 BILATERAL CHRONIC KNEE PAIN: ICD-10-CM

## 2019-03-14 DIAGNOSIS — M17.0 PRIMARY OSTEOARTHRITIS OF BOTH KNEES: Primary | ICD-10-CM

## 2019-03-14 DIAGNOSIS — G89.29 BILATERAL CHRONIC KNEE PAIN: ICD-10-CM

## 2019-03-14 PROCEDURE — 97110 THERAPEUTIC EXERCISES: CPT | Performed by: PHYSICAL THERAPIST

## 2019-03-14 PROCEDURE — 97530 THERAPEUTIC ACTIVITIES: CPT | Performed by: PHYSICAL THERAPIST

## 2019-03-17 NOTE — PROGRESS NOTES
Physical Therapy Daily Progress Note     Patient Name: Pedrito Powell         :  1960  Referring Physician: Teo Fraser MD     Subjective   Pedrito Powell reports:  that she was feeling much better after last session -  with decreased pain and popping of (R) knee with taping -     Today she is noting Pain mostly anterior and medial (R) knee with popping and medial (L) pain- Worse over the past few days -  Unable to step up w/ (R) knee - Taping very helpful in decreasing medial knee pain and improving support / function-        Objective    Mildly Antalgic gait with decreased WB-ing RLE --    Tender infrapatellar region, medial PF Jt, medial jt line and pes region (R) knee; Tender medial jt line (L) knee   Inferior tipped patella (R)  (+++) Step Up test (R) with severe pain at infrapatellar region and pain lasting     See Exercise, Manual, and Modality Logs for complete treatment.  (DN 2in infrapatellar region, med PF jt and medial joint line (R) - HELD      Functional / Therapeutic Activities:  15 min  · TAPING / BRACING: K-Tape to 1) Unload PF Jt (R); 2) Unload Infrapatellar reg(R); 3) Unload Medial knee (B);   · SEE EXERCISE FLOW SHEET -   · Jt protection, ADL modification; Posture and       Assessment/Plan  (B) Knee pain R>L:  (B) knee OA; RA  Taping greatly reduced her pain and improved gait , but now no longer requires taping-  Severe pain with attempting step up RLE -   Pt may benefit from seeing Orthopedist for further assessment / intervention (I.e. Injection, etc) due to persistent pain (R) knee and limited function - To see Ortho MD in April mid/late        Progress strengthening /stabilization /functional activity - Pt to contact [facility] for another Ortho MD  _________________________________________________  Manual Therapy:                 mins  81148;  Therapeutic Exercise:   25     mins  08833;     Neuromuscular Dion:        mins  37487;    Therapeutic Activity:      15    mins   80595;     Iontophoresis:                      mins  61883;     Ultrasound:                           mins  81156;    Electrical Stimulation:         mins  26071 ( );  Dry Needling                       mins self-pay     Timed Treatment:   40   mins                  Total Treatment:    60   mins     Antonio Coley, PT  Physical Therapist

## 2019-03-18 ENCOUNTER — TREATMENT (OUTPATIENT)
Dept: PHYSICAL THERAPY | Facility: CLINIC | Age: 59
End: 2019-03-18

## 2019-03-18 DIAGNOSIS — M25.562 BILATERAL CHRONIC KNEE PAIN: ICD-10-CM

## 2019-03-18 DIAGNOSIS — G89.29 BILATERAL CHRONIC KNEE PAIN: ICD-10-CM

## 2019-03-18 DIAGNOSIS — R26.9 GAIT DISTURBANCE: ICD-10-CM

## 2019-03-18 DIAGNOSIS — M25.561 BILATERAL CHRONIC KNEE PAIN: ICD-10-CM

## 2019-03-18 DIAGNOSIS — M17.0 PRIMARY OSTEOARTHRITIS OF BOTH KNEES: Primary | ICD-10-CM

## 2019-03-18 PROCEDURE — 97110 THERAPEUTIC EXERCISES: CPT | Performed by: PHYSICAL THERAPIST

## 2019-03-18 PROCEDURE — 97530 THERAPEUTIC ACTIVITIES: CPT | Performed by: PHYSICAL THERAPIST

## 2019-03-18 NOTE — PROGRESS NOTES
Physical Therapy Daily Progress Note     Patient Name: Pedrito Powell         :  1960  Referring Physician: Teo Fraser MD     Subjective   Pedrito Powell reports:  that she was feeling much better after last session -  with decreased pain and popping of (R) knee with taping -     Today she is noting Pain mostly anterior and medial (R) knee with less popping infrapatellar region (R) and medial (L) pain- Still Unable to step up w/ (R) knee - Taping very helpful in decreasing medial knee pain and improving support / function-         Objective    Mildly Antalgic gait with decreased WB-ing RLE --    Tender infrapatellar region, medial PF Jt, medial jt line and pes region (R) knee; Tender medial jt line (L) knee   Inferior tipped patella (R)  (+++) Step Up test (R) with severe pain at infrapatellar region and pain lasting     See Exercise, Manual, and Modality Logs for complete treatment.  (DN 2in infrapatellar region, med PF jt and medial joint line (R) - HELD      Functional / Therapeutic Activities:  15 min  · TAPING / BRACING: K-Tape to 1) Unload PF Jt (R); 2) Unload Infrapatellar reg(R); 3) Unload Medial knee (B);   · SEE EXERCISE FLOW SHEET -   · Jt protection, ADL modification; Posture and       Assessment/Plan  (B) Knee pain R>L:  (B) knee OA; RA  Taping greatly reduced her pain and improved gait , but now no longer requires taping-  Severe pain with attempting step up RLE -   Pt may benefit from seeing Orthopedist for further assessment / intervention (I.e. Injection, etc) due to persistent pain (R) knee and limited function - To see Ortho MD in April mid/late        Progress strengthening /stabilization /functional activity - Pt to contact [facility] for another Ortho MD  _________________________________________________  Manual Therapy:                 mins  82842;  Therapeutic Exercise:   25     mins  08823;     Neuromuscular Dion:        mins  58939;    Therapeutic  Activity:      15    mins  38401;     Iontophoresis:                      mins  86448;     Ultrasound:                           mins  97988;    Electrical Stimulation:         mins  68403 ( );  Dry Needling                       mins self-pay     Timed Treatment:   40   mins                  Total Treatment:    60   mins     Antonio Coley, PT  Physical Therapist

## 2019-03-21 ENCOUNTER — TREATMENT (OUTPATIENT)
Dept: PHYSICAL THERAPY | Facility: CLINIC | Age: 59
End: 2019-03-21

## 2019-03-21 DIAGNOSIS — G89.29 BILATERAL CHRONIC KNEE PAIN: ICD-10-CM

## 2019-03-21 DIAGNOSIS — M17.0 PRIMARY OSTEOARTHRITIS OF BOTH KNEES: Primary | ICD-10-CM

## 2019-03-21 DIAGNOSIS — R26.9 GAIT DISTURBANCE: ICD-10-CM

## 2019-03-21 DIAGNOSIS — M25.561 BILATERAL CHRONIC KNEE PAIN: ICD-10-CM

## 2019-03-21 DIAGNOSIS — M25.562 BILATERAL CHRONIC KNEE PAIN: ICD-10-CM

## 2019-03-21 PROCEDURE — 97530 THERAPEUTIC ACTIVITIES: CPT | Performed by: PHYSICAL THERAPIST

## 2019-03-21 PROCEDURE — 97110 THERAPEUTIC EXERCISES: CPT | Performed by: PHYSICAL THERAPIST

## 2019-03-21 NOTE — PROGRESS NOTES
Physical Therapy Daily Progress Note     Patient Name: Pedrito Powell         :  1960  Referring Physician: Teo Fraser MD     Subjective   Pedrito Powell reports:  that she was feeling much better after last session -  with decreased pain and popping of (R) knee with taping -     Noted sudden buring at (R) tape region Saturday and removed tape with skin irriation -   Noted a lot more pain and limited ability to walk after tape removal - Today she is noting Pain mostly anterior and medial (R) knee with less popping infrapatellar region (R) and medial (L) pain- Still Unable to step up w/ (R) knee - Taping very helpful in decreasing medial knee pain and improving support / function-         Objective    Mildly Antalgic gait with decreased WB-ing RLE --    Tender infrapatellar region, medial PF Jt, medial jt line and pes region (R) knee; Tender medial jt line (L) knee   Inferior tipped patella (R)  (+++) Step Up test (R) with severe pain at infrapatellar region and pain lasting     See Exercise, Manual, and Modality Logs for complete treatment.  (DN 2in infrapatellar region, med PF jt and medial joint line (R) - HELD      Functional / Therapeutic Activities:  15 min  · TAPING / BRACING: K-Tape to 1) Unload PF Jt (R); 2) Unload Infrapatellar reg(R); 3) Unload Medial knee (B); (Covered small irritated area ant/med region (R) knee w/ band-aid)  · SEE EXERCISE FLOW SHEET -   · Jt protection, ADL modification; Posture and       Assessment/Plan  (B) Knee pain R>L:  (B) knee OA; RA  Taping greatly reduced her pain and improved gait , but now no longer requires taping-  Severe pain with attempting step up RLE -   Pt may benefit from seeing Orthopedist for further assessment / intervention (I.e. Injection, etc) due to persistent pain (R) knee and limited function - To see Ortho MD in April mid/late        Progress strengthening /stabilization /functional activity - Pt to contact [facility] for another Ortho  MD  _________________________________________________  Manual Therapy:                 mins  29911;  Therapeutic Exercise:   25     mins  70073;     Neuromuscular Dion:        mins  70319;    Therapeutic Activity:      15    mins  91015;     Iontophoresis:                      mins  99220;     Ultrasound:                           mins  12634;    Electrical Stimulation:         mins  34536 ( );  Dry Needling                       mins self-pay     Timed Treatment:   40   mins                  Total Treatment:    60   mins     Antonio Coley PT  Physical Therapist

## 2019-03-24 NOTE — PROGRESS NOTES
Physical Therapy Daily Progress Note     Patient Name: Pedrito Powell         :  1960  Referring Physician: Teo Fraser MD     Subjective   Pedrito Powell reports:  that she was feeling much better after last session -  with decreased pain and popping of (R) knee with taping -     Noted sudden buring at (R) tape region Saturday and removed tape with skin irriation -   Noted a lot more pain and limited ability to walk after tape removal - Today she is noting Pain mostly anterior and medial (R) knee with less popping infrapatellar region (R) and medial (L) pain- Still Unable to step up w/ (R) knee - Taping very helpful in decreasing medial knee pain and improving support / function-         Objective    Mildly Antalgic gait with decreased WB-ing RLE --    Tender infrapatellar region, medial PF Jt, medial jt line and pes region (R) knee; Tender medial jt line (L) knee   Inferior tipped patella (R)  (+++) Step Up test (R) with severe pain at infrapatellar region and pain lasting     See Exercise, Manual, and Modality Logs for complete treatment.  (DN 2in infrapatellar region, med PF jt and medial joint line (R) - HELD      Functional / Therapeutic Activities:  15 min  · TAPING / BRACING: K-Tape to 1) Unload PF Jt (R); 2) Unload Infrapatellar reg(R); 3) Unload Medial knee (B); (Covered small irritated area ant/med region (R) knee w/ band-aid)  · SEE EXERCISE FLOW SHEET -   · Jt protection, ADL modification; Posture and       Assessment/Plan  (B) Knee pain R>L:  (B) knee OA; RA  Taping greatly reduced her pain and improved gait , but now no longer requires taping-  Severe pain with attempting step up RLE -   Pt may benefit from seeing Orthopedist for further assessment / intervention (I.e. Injection, etc) due to persistent pain (R) knee and limited function - To see Ortho MD in April mid/late        Progress strengthening /stabilization /functional activity - Pt to contact [facility] for another Ortho  MD  _________________________________________________  Manual Therapy:                 mins  42454;  Therapeutic Exercise:   35    mins  61877;     Neuromuscular Dion:        mins  03859;    Therapeutic Activity:      15    mins  51437;     Iontophoresis:                      mins  22677;     Ultrasound:                           mins  97882;    Electrical Stimulation:         mins  90477 ( );  Dry Needling                       mins self-pay     Timed Treatment:   50   mins                  Total Treatment:    65   mins     Antonio Coley PT  Physical Therapist

## 2019-03-27 ENCOUNTER — LAB (OUTPATIENT)
Dept: OTHER | Facility: HOSPITAL | Age: 59
End: 2019-03-27

## 2019-03-27 ENCOUNTER — CONSULT (OUTPATIENT)
Dept: ONCOLOGY | Facility: CLINIC | Age: 59
End: 2019-03-27

## 2019-03-27 VITALS
BODY MASS INDEX: 44.49 KG/M2 | HEIGHT: 60 IN | SYSTOLIC BLOOD PRESSURE: 126 MMHG | HEART RATE: 87 BPM | TEMPERATURE: 98.1 F | DIASTOLIC BLOOD PRESSURE: 86 MMHG | WEIGHT: 226.6 LBS | OXYGEN SATURATION: 100 % | RESPIRATION RATE: 16 BRPM

## 2019-03-27 DIAGNOSIS — R53.83 FATIGUE, UNSPECIFIED TYPE: ICD-10-CM

## 2019-03-27 DIAGNOSIS — D64.9 ANEMIA, UNSPECIFIED TYPE: Primary | ICD-10-CM

## 2019-03-27 DIAGNOSIS — R42 VERTIGO: ICD-10-CM

## 2019-03-27 LAB
ALBUMIN SERPL-MCNC: 4.1 G/DL (ref 3.5–5.2)
ALBUMIN/GLOB SERPL: 1.2 G/DL
ALP SERPL-CCNC: 59 U/L (ref 39–117)
ALT SERPL W P-5'-P-CCNC: 19 U/L (ref 1–33)
ANION GAP SERPL CALCULATED.3IONS-SCNC: 13.4 MMOL/L
AST SERPL-CCNC: 21 U/L (ref 1–32)
BASOPHILS # BLD AUTO: 0.09 10*3/MM3 (ref 0–0.2)
BASOPHILS NFR BLD AUTO: 1.1 % (ref 0–1.5)
BILIRUB SERPL-MCNC: 0.4 MG/DL (ref 0.2–1.2)
BUN BLD-MCNC: 24 MG/DL (ref 6–20)
BUN/CREAT SERPL: 16 (ref 7–25)
CALCIUM SPEC-SCNC: 8.8 MG/DL (ref 8.6–10.5)
CHLORIDE SERPL-SCNC: 97 MMOL/L (ref 98–107)
CHROMATIN AB SERPL-ACNC: <10 IU/ML (ref 0–14)
CO2 SERPL-SCNC: 27.6 MMOL/L (ref 22–29)
CREAT BLD-MCNC: 1.5 MG/DL (ref 0.57–1)
CRP SERPL-MCNC: 0.1 MG/DL (ref 0–0.5)
DEPRECATED RDW RBC AUTO: 40.1 FL (ref 37–54)
EOSINOPHIL # BLD AUTO: 0.57 10*3/MM3 (ref 0–0.4)
EOSINOPHIL NFR BLD AUTO: 7 % (ref 0.3–6.2)
ERYTHROCYTE [DISTWIDTH] IN BLOOD BY AUTOMATED COUNT: 13 % (ref 12.3–15.4)
ERYTHROCYTE [SEDIMENTATION RATE] IN BLOOD: 44 MM/HR (ref 0–30)
FERRITIN SERPL-MCNC: 189 NG/ML (ref 13–150)
GFR SERPL CREATININE-BSD FRML MDRD: 36 ML/MIN/1.73
GLOBULIN UR ELPH-MCNC: 3.5 GM/DL
GLUCOSE BLD-MCNC: 98 MG/DL (ref 65–99)
HCT VFR BLD AUTO: 29.2 % (ref 34–46.6)
HGB BLD-MCNC: 10 G/DL (ref 12–15.9)
HGB RETIC QN AUTO: 35 PG (ref 29.8–36.1)
IMM GRANULOCYTES # BLD AUTO: 0.05 10*3/MM3 (ref 0–0.05)
IMM GRANULOCYTES NFR BLD AUTO: 0.6 % (ref 0–0.5)
IMM RETICS NFR: 9.7 % (ref 3–15.8)
IRON 24H UR-MRATE: 51 MCG/DL (ref 37–145)
IRON SATN MFR SERPL: 16 % (ref 20–50)
LYMPHOCYTES # BLD AUTO: 2.19 10*3/MM3 (ref 0.7–3.1)
LYMPHOCYTES NFR BLD AUTO: 26.9 % (ref 19.6–45.3)
MCH RBC QN AUTO: 29.2 PG (ref 26.6–33)
MCHC RBC AUTO-ENTMCNC: 34.2 G/DL (ref 31.5–35.7)
MCV RBC AUTO: 85.1 FL (ref 79–97)
MONOCYTES # BLD AUTO: 0.71 10*3/MM3 (ref 0.1–0.9)
MONOCYTES NFR BLD AUTO: 8.7 % (ref 5–12)
NEUTROPHILS # BLD AUTO: 4.52 10*3/MM3 (ref 1.4–7)
NEUTROPHILS NFR BLD AUTO: 55.7 % (ref 42.7–76)
NRBC BLD AUTO-RTO: 0 /100 WBC (ref 0–0)
PLATELET # BLD AUTO: 233 10*3/MM3 (ref 140–450)
PMV BLD AUTO: 9.7 FL (ref 6–12)
POTASSIUM BLD-SCNC: 3.8 MMOL/L (ref 3.5–5.2)
PROT SERPL-MCNC: 7.6 G/DL (ref 6–8.5)
RBC # BLD AUTO: 3.43 10*6/MM3 (ref 3.77–5.28)
RETICS/RBC NFR AUTO: 1.31 % (ref 0.5–1.5)
SODIUM BLD-SCNC: 138 MMOL/L (ref 136–145)
TIBC SERPL-MCNC: 316 MCG/DL (ref 298–536)
TRANSFERRIN SERPL-MCNC: 212 MG/DL (ref 200–360)
VIT B12 BLD-MCNC: 691 PG/ML (ref 211–946)
WBC NRBC COR # BLD: 8.13 10*3/MM3 (ref 3.4–10.8)

## 2019-03-27 PROCEDURE — 82728 ASSAY OF FERRITIN: CPT | Performed by: INTERNAL MEDICINE

## 2019-03-27 PROCEDURE — 86235 NUCLEAR ANTIGEN ANTIBODY: CPT | Performed by: INTERNAL MEDICINE

## 2019-03-27 PROCEDURE — 83883 ASSAY NEPHELOMETRY NOT SPEC: CPT | Performed by: INTERNAL MEDICINE

## 2019-03-27 PROCEDURE — 36415 COLL VENOUS BLD VENIPUNCTURE: CPT

## 2019-03-27 PROCEDURE — 85014 HEMATOCRIT: CPT | Performed by: INTERNAL MEDICINE

## 2019-03-27 PROCEDURE — 80053 COMPREHEN METABOLIC PANEL: CPT | Performed by: INTERNAL MEDICINE

## 2019-03-27 PROCEDURE — 83540 ASSAY OF IRON: CPT | Performed by: INTERNAL MEDICINE

## 2019-03-27 PROCEDURE — 86431 RHEUMATOID FACTOR QUANT: CPT | Performed by: INTERNAL MEDICINE

## 2019-03-27 PROCEDURE — 99205 OFFICE O/P NEW HI 60 MIN: CPT | Performed by: INTERNAL MEDICINE

## 2019-03-27 PROCEDURE — 85025 COMPLETE CBC W/AUTO DIFF WBC: CPT | Performed by: INTERNAL MEDICINE

## 2019-03-27 PROCEDURE — 82668 ASSAY OF ERYTHROPOIETIN: CPT | Performed by: INTERNAL MEDICINE

## 2019-03-27 PROCEDURE — 88184 FLOWCYTOMETRY/ TC 1 MARKER: CPT | Performed by: INTERNAL MEDICINE

## 2019-03-27 PROCEDURE — 82607 VITAMIN B-12: CPT | Performed by: INTERNAL MEDICINE

## 2019-03-27 PROCEDURE — 86225 DNA ANTIBODY NATIVE: CPT | Performed by: INTERNAL MEDICINE

## 2019-03-27 PROCEDURE — 85046 RETICYTE/HGB CONCENTRATE: CPT | Performed by: INTERNAL MEDICINE

## 2019-03-27 PROCEDURE — 85652 RBC SED RATE AUTOMATED: CPT | Performed by: INTERNAL MEDICINE

## 2019-03-27 PROCEDURE — 88185 FLOWCYTOMETRY/TC ADD-ON: CPT | Performed by: INTERNAL MEDICINE

## 2019-03-27 PROCEDURE — 84466 ASSAY OF TRANSFERRIN: CPT | Performed by: INTERNAL MEDICINE

## 2019-03-27 PROCEDURE — 82784 ASSAY IGA/IGD/IGG/IGM EACH: CPT | Performed by: INTERNAL MEDICINE

## 2019-03-27 PROCEDURE — 86140 C-REACTIVE PROTEIN: CPT | Performed by: INTERNAL MEDICINE

## 2019-03-27 PROCEDURE — 82747 ASSAY OF FOLIC ACID RBC: CPT | Performed by: INTERNAL MEDICINE

## 2019-03-27 PROCEDURE — 84165 PROTEIN E-PHORESIS SERUM: CPT | Performed by: INTERNAL MEDICINE

## 2019-03-27 PROCEDURE — 83921 ORGANIC ACID SINGLE QUANT: CPT | Performed by: INTERNAL MEDICINE

## 2019-03-27 PROCEDURE — 88189 FLOWCYTOMETRY/READ 16 & >: CPT | Performed by: INTERNAL MEDICINE

## 2019-03-27 PROCEDURE — 86334 IMMUNOFIX E-PHORESIS SERUM: CPT | Performed by: INTERNAL MEDICINE

## 2019-03-27 RX ORDER — TRIAMCINOLONE ACETONIDE 55 UG/1
2 SPRAY, METERED NASAL DAILY
COMMUNITY
End: 2021-05-03

## 2019-03-27 RX ORDER — HYDROXYZINE PAMOATE 25 MG/1
25 CAPSULE ORAL 3 TIMES DAILY PRN
COMMUNITY
End: 2019-06-26

## 2019-03-27 RX ORDER — LORATADINE 10 MG/1
10 TABLET ORAL DAILY
COMMUNITY
End: 2021-05-03

## 2019-03-27 NOTE — PROGRESS NOTES
Subjective     REASON FOR CONSULTATION: Chronic anemia     provide an opinion on any further workup or treatment                             REQUESTING PHYSICIAN: Dr. Teo Fraser    RECORDS OBTAINED:  Records of the patients history including those obtained from the referring provider were reviewed and summarized in detail.    HISTORY OF PRESENT ILLNESS:  The patient is a 58 y.o. year old female who is here for an opinion about the above issue.    History of Present Illness patient is a 58-year-old female with history of rheumatoid arthritis with chronic anemia and is followed by Dr. Teo rFaser to evaluate her anemia.  She states she has been chronically iron deficient for the last 30 years and continues to take oral iron without much benefit she continues to be anemic.  More recently she has been feeling more fatigued.  But she also had influenza a infection about 3-4 weeks ago for which she received Tamiflu.  She has been fatigued since then.  Also her renal function has been always high since 2018.  And she has been referred to nephrology recently.  However patient does complain of some fatigue.  She has history of vertigo for which she is on meclizine but has not recently seen ENT.  She has not lost weight, she has got a good appetite.  She does take diclofenac for her arthritis.  She recently had some iron studies done when her ferritin was 133.  Her B12 and RBC folate were normal.    She currently does not have any fever or night sweats.  She has had a colonoscopy per Dr. Vann recently and it has been negative.  She also had an EGD which has been negative.    Underwent a CT abdomen pelvis in December 2018 and I have reviewed it personally with the patient and it is negative.  She is scheduled for a small bowel follow-through next Monday by Dr. Kg jaime.  She had a chest x-ray on February 24, 2019 which has been negative.  She is also up-to-date with her screening mammogram which is in June 2018 and is  negative.    Past Medical History:   Diagnosis Date   • Anemia    • Arthritis    • GERD (gastroesophageal reflux disease)    • History of carpal tunnel syndrome    • Hyperlipidemia    • Hypertension    • Vertigo         Past Surgical History:   Procedure Laterality Date   •  SECTION     • COLONOSCOPY N/A 12/10/2018    normal ileum, IH, hyperplastic polyp, otherwise normal exam   • ENDOSCOPY N/A 12/10/2018    no gross lesions in esophagus or examined duodenum, erythematous mucosa in stomach, biopsies benign   • HYSTERECTOMY     • OOPHORECTOMY     • SHOULDER MANIPULATION          Current Outpatient Medications on File Prior to Visit   Medication Sig Dispense Refill   • albuterol 108 (90 Base) MCG/ACT inhaler Inhale 2 puffs Every 4 (Four) Hours As Needed for Wheezing.     • aspirin 325 MG tablet Take 325 mg by mouth daily.     • atorvastatin (LIPITOR) 80 MG tablet Take 1 tablet by mouth Daily. 90 tablet 1   • azelastine (ASTELIN) 0.1 % nasal spray 1 spray into the nostril(s) as directed by provider Daily.  11   • Cholecalciferol (VITAMIN D3) 2000 units capsule Take 1 capsule by mouth Daily. 90 capsule 3   • diclofenac (VOLTAREN) 75 MG EC tablet Take 75 mg by mouth Daily.     • EPINEPHrine (AUVI-Q IJ) Inject  as directed As Needed.     • ferrous gluconate (FERGON) 324 MG tablet Take 1 tablet by mouth Daily With Breakfast. 90 tablet 1   • hydrochlorothiazide (HYDRODIURIL) 25 MG tablet Take 1 tablet by mouth Daily. 90 tablet 1   • hydrocortisone 2.5 % cream Apply  topically to the appropriate area as directed 2 (Two) Times a Day.     • hydrOXYzine pamoate (VISTARIL) 25 MG capsule Take 25 mg by mouth 3 (Three) Times a Day As Needed for Itching.     • ipratropium (ATROVENT) 0.06 % nasal spray 1 spray into the nostril(s) as directed by provider Daily.  11   • irbesartan (AVAPRO) 300 MG tablet Take 1 tablet by mouth Daily. 90 tablet 1   • loratadine (CLARITIN) 10 MG tablet Take 10 mg by mouth Daily.     • meclizine  (ANTIVERT) 25 MG tablet Take 1 tablet by mouth 3 (Three) Times a Day As Needed for dizziness. 270 tablet 1   • omeprazole (priLOSEC) 40 MG capsule Take 1 capsule by mouth Daily. 90 capsule 1   • ondansetron (ZOFRAN) 4 MG tablet TK 1 T PO  Q 6 H PRF NAUSEA  0   • triamcinolone (KENALOG) 0.1 % ointment Apply  topically to the appropriate area as directed 2 (Two) Times a Day.     • Triamcinolone Acetonide (NASACORT ALLERGY 24HR) 55 MCG/ACT nasal inhaler 2 sprays into the nostril(s) as directed by provider Daily.     • Codeine Polt-Chlorphen Polt ER 14.7-2.8 MG/5ML Suspension Extended Release Take 10 mL by mouth 2 (Two) Times a Day As Needed (COUGH). 120 mL 0   • fluticasone (FLONASE) 50 MCG/ACT nasal spray 2 sprays into the nostril(s) as directed by provider Daily.     • loperamide (IMODIUM) 2 MG capsule Take 1 capsule by mouth Every 2 (Two) Hours As Needed for Diarrhea (max 4 doses daily).       No current facility-administered medications on file prior to visit.         ALLERGIES:    Allergies   Allergen Reactions   • Chocolate Anaphylaxis   • Nickel Anaphylaxis   • Nuts Anaphylaxis   • Ciprofloxacin Other (See Comments)     THRUSH PER PATIENT   • Citric Acid Unknown (See Comments)     Unsure     • Influenza Vaccines Nausea And Vomiting   • Methyldibromoglutaronitrile Unknown (See Comments)      PATIENT UNSURE OF REACTION.   • Neomycin Unknown (See Comments)      PATIENT UNSURE OF REACTION.   • Omnicef [Cefdinir] Other (See Comments)     THRUSH PER PATIENT   • Palladium Chloride Unknown (See Comments)      PATIENT UNSURE OF REACTION   • Penicillins Other (See Comments)     THRUSH PER PATIENT   • Sulfa Antibiotics Other (See Comments)     THRUSH PER PATIENT   • Tomato Hives   • Yeast-Related Products Hives   • Gold-Containing Drug Products Rash         • Latex Rash        Social History     Socioeconomic History   • Marital status:      Spouse name: Not on file   • Number of children: Not on file   • Years of  "education: Not on file   • Highest education level: Not on file   Occupational History     Employer: [employer]   Tobacco Use   • Smoking status: Never Smoker   • Smokeless tobacco: Never Used   Substance and Sexual Activity   • Alcohol use: No   • Drug use: No   • Sexual activity: Defer        Family History   Problem Relation Age of Onset   • Colon cancer Maternal Grandfather         Review of Systems   Constitutional: Negative for appetite change, chills, diaphoresis, fatigue, fever and unexpected weight change.   HENT: Negative for hearing loss, sore throat and trouble swallowing.    Respiratory: Negative for cough, chest tightness, shortness of breath and wheezing.    Cardiovascular: Negative for chest pain, palpitations and leg swelling.   Gastrointestinal: Positive for nausea. Negative for abdominal distention, abdominal pain, constipation, diarrhea and vomiting.   Genitourinary: Negative for dysuria, frequency, hematuria and urgency.   Musculoskeletal: Negative for joint swelling.        No muscle weakness.   Skin: Negative for rash and wound.   Neurological: Positive for dizziness. Negative for seizures, syncope, speech difficulty, weakness, numbness and headaches.   Hematological: Negative for adenopathy. Does not bruise/bleed easily.   Psychiatric/Behavioral: Negative for behavioral problems, confusion and suicidal ideas.        Objective     Vitals:    03/27/19 1542   BP: 126/86   Pulse: 87   Resp: 16   Temp: 98.1 °F (36.7 °C)   TempSrc: Oral   SpO2: 100%   Weight: 103 kg (226 lb 9.6 oz)   Height: 152 cm (59.84\")   PainSc:   6   PainLoc: Generalized  Comment: Joint pain bilat knee and left shoulder.     Current Status 3/27/2019   ECOG score 0       Physical Exam    GENERAL:  Well-developed, well-nourished in no acute distress.   SKIN:  Warm, dry without rashes, purpura or petechiae.  EYES:  Pupils equal, round and reactive to light.  EOMs intact.  Conjunctivae normal.  EARS:  Hearing " intact.  NOSE:  Septum midline.  No excoriations or nasal discharge.  MOUTH:  Tongue is well-papillated; no stomatitis or ulcers.  Lips normal.  THROAT:  Oropharynx without lesions or exudates.  NECK:  Supple with good range of motion; no thyromegaly or masses, no JVD.  LYMPHATICS:  No cervical, supraclavicular, axillary or inguinal adenopathy.  CHEST:  Lungs clear to auscultation. Good airflow.  CARDIAC:  Regular rate and rhythm without murmurs, rubs or gallops. Normal S1,S2.  ABDOMEN:  Soft, nontender with no hepatosplenomegaly or masses.  EXTREMITIES:  No clubbing, cyanosis or edema.  NEUROLOGICAL:  Cranial Nerves II-XII grossly intact.  No focal neurological deficits.  PSYCHIATRIC:  Normal affect and mood.        RECENT LABS:  Hematology WBC   Date Value Ref Range Status   03/27/2019 8.13 3.40 - 10.80 10*3/mm3 Final   03/06/2019 7.64 3.40 - 10.80 10*3/mm3 Final     RBC   Date Value Ref Range Status   03/27/2019 3.43 (L) 3.77 - 5.28 10*6/mm3 Final   03/06/2019 3.32 (L) 3.77 - 5.28 10*6/mm3 Final     Hemoglobin   Date Value Ref Range Status   03/27/2019 10.0 (L) 12.0 - 15.9 g/dL Final     Hematocrit   Date Value Ref Range Status   03/27/2019 29.2 (L) 34.0 - 46.6 % Final     Platelets   Date Value Ref Range Status   03/27/2019 233 140 - 450 10*3/mm3 Final        Two View Chest X-Ray 02/24/19  FINDINGS: The lungs are moderately expanded and clear and there is no  evidence of localized pneumonia. The heart is borderline enlarged  without change from 07/23/2012.    CT Abdomen Pelvis 12/06/18 Scanned Image.  Assessment/Plan     1.  Chronic anemia, patient has had anemia for a long time her hemoglobin usually runs around 10-11 range.  She also has chronic anemia with chronic renal insufficiency stage III.  She does complain of fatigue.  Her iron studies and B12 and folate done recently have been normal.  But however patient continues to be fatigued.  She been taking oral iron chronically but without help.  I have  reviewed her recent iron studies and B12 and folate done at her primary care and they have been normal except that the percent saturation is low but the ferritin was normal.  I did discuss with the patient that we would need to do a complete anemia workup.  This could be multifactorial and we should certainly rule out any underlying MGUS given that she has renal insufficiency and anemia.  She may benefit from Procrit but given the side effects of Procrit probably hold it unless she becomes symptomatic and her hemoglobin was below 10.  She also had influenza A and that may be the cause for her low hemoglobin as well.  X    2.  Rheumatoid arthritis for which she takes diclofenac    3.  Chronic kidney disease stage III which could also be the cause for her anemia.    Plan 1.  Reviewed her peripheral smear and does not show evidence of krysten iron deficiency    2.  Check complete anemia workup with iron studies, B12 RBC folate, ESR, C-reactive protein, serum protein electrophoresis quantitative immunoglobulins serum immunofixation, E Po level.    3.  Will check CAROLINE and rheumatoid factor.    4.  Follow-up with me in 5 weeks with labs    5.  At this point there is no reason to do a bone marrow aspiration and biopsy.  I believe her anemia could be secondary to anemia of chronic kidney disease stage III, rheumatoid arthritis, recent flu and possible iron deficiency    Cassandra Pratt MD

## 2019-03-28 ENCOUNTER — TREATMENT (OUTPATIENT)
Dept: PHYSICAL THERAPY | Facility: CLINIC | Age: 59
End: 2019-03-28

## 2019-03-28 DIAGNOSIS — M25.562 BILATERAL CHRONIC KNEE PAIN: ICD-10-CM

## 2019-03-28 DIAGNOSIS — G89.29 BILATERAL CHRONIC KNEE PAIN: ICD-10-CM

## 2019-03-28 DIAGNOSIS — M25.561 BILATERAL CHRONIC KNEE PAIN: ICD-10-CM

## 2019-03-28 DIAGNOSIS — M17.0 PRIMARY OSTEOARTHRITIS OF BOTH KNEES: Primary | ICD-10-CM

## 2019-03-28 DIAGNOSIS — R26.9 GAIT DISTURBANCE: ICD-10-CM

## 2019-03-28 PROCEDURE — 97110 THERAPEUTIC EXERCISES: CPT | Performed by: PHYSICAL THERAPIST

## 2019-03-28 PROCEDURE — 97530 THERAPEUTIC ACTIVITIES: CPT | Performed by: PHYSICAL THERAPIST

## 2019-03-29 LAB
ALBUMIN SERPL-MCNC: 3.7 G/DL (ref 2.9–4.4)
ALBUMIN/GLOB SERPL: 1.1 {RATIO} (ref 0.7–1.7)
ALPHA1 GLOB FLD ELPH-MCNC: 0.2 G/DL (ref 0–0.4)
ALPHA2 GLOB SERPL ELPH-MCNC: 0.9 G/DL (ref 0.4–1)
B-GLOBULIN SERPL ELPH-MCNC: 1 G/DL (ref 0.7–1.3)
CENTROMERE B AB SER-ACNC: <0.2 AI (ref 0–0.9)
CHROMATIN AB SERPL-ACNC: <0.2 AI (ref 0–0.9)
DSDNA AB SER-ACNC: <1 IU/ML (ref 0–9)
ENA JO1 AB SER-ACNC: <0.2 AI (ref 0–0.9)
ENA RNP AB SER-ACNC: <0.2 AI (ref 0–0.9)
ENA SCL70 AB SER-ACNC: 0.3 AI (ref 0–0.9)
ENA SM AB SER-ACNC: <0.2 AI (ref 0–0.9)
ENA SS-A AB SER-ACNC: <0.2 AI (ref 0–0.9)
ENA SS-B AB SER-ACNC: <0.2 AI (ref 0–0.9)
ETHNIC BACKGROUND STATED: 7.9 MIU/ML (ref 2.6–18.5)
FOLATE BLD-MCNC: 367.5 NG/ML
FOLATE RBC-MCNC: 1185 NG/ML
GAMMA GLOB SERPL ELPH-MCNC: 1.3 G/DL (ref 0.4–1.8)
GLOBULIN SER CALC-MCNC: 3.5 G/DL (ref 2.2–3.9)
HCT VFR BLD AUTO: 31 % (ref 34–46.6)
IGA SERPL-MCNC: 250 MG/DL (ref 87–352)
IGG SERPL-MCNC: 1247 MG/DL (ref 700–1600)
IGM SERPL-MCNC: 71 MG/DL (ref 26–217)
INTERPRETATION SERPL IEP-IMP: ABNORMAL
KAPPA LC SERPL-MCNC: 37.5 MG/L (ref 3.3–19.4)
KAPPA LC/LAMBDA SER: 1.76 {RATIO} (ref 0.26–1.65)
LAMBDA LC FREE SERPL-MCNC: 21.3 MG/L (ref 5.7–26.3)
Lab: ABNORMAL
Lab: NORMAL
M-SPIKE: ABNORMAL G/DL
PROT SERPL-MCNC: 7.2 G/DL (ref 6–8.5)

## 2019-04-01 ENCOUNTER — TREATMENT (OUTPATIENT)
Dept: PHYSICAL THERAPY | Facility: CLINIC | Age: 59
End: 2019-04-01

## 2019-04-01 ENCOUNTER — HOSPITAL ENCOUNTER (OUTPATIENT)
Dept: GENERAL RADIOLOGY | Facility: HOSPITAL | Age: 59
Discharge: HOME | End: 2019-04-01
Attending: INTERNAL MEDICINE | Admitting: INTERNAL MEDICINE

## 2019-04-01 DIAGNOSIS — R26.9 GAIT DISTURBANCE: ICD-10-CM

## 2019-04-01 DIAGNOSIS — G89.29 BILATERAL CHRONIC KNEE PAIN: ICD-10-CM

## 2019-04-01 DIAGNOSIS — M25.562 BILATERAL CHRONIC KNEE PAIN: ICD-10-CM

## 2019-04-01 DIAGNOSIS — R10.12 LUQ ABDOMINAL PAIN: ICD-10-CM

## 2019-04-01 DIAGNOSIS — M17.0 PRIMARY OSTEOARTHRITIS OF BOTH KNEES: Primary | ICD-10-CM

## 2019-04-01 DIAGNOSIS — M25.561 BILATERAL CHRONIC KNEE PAIN: ICD-10-CM

## 2019-04-01 LAB — REF LAB TEST METHOD: NORMAL

## 2019-04-01 PROCEDURE — 97530 THERAPEUTIC ACTIVITIES: CPT | Performed by: PHYSICAL THERAPIST

## 2019-04-01 PROCEDURE — 97110 THERAPEUTIC EXERCISES: CPT | Performed by: PHYSICAL THERAPIST

## 2019-04-01 PROCEDURE — 74250 X-RAY XM SM INT 1CNTRST STD: CPT

## 2019-04-01 RX ADMIN — BARIUM SULFATE 183 ML: 960 POWDER, FOR SUSPENSION ORAL at 11:18

## 2019-04-01 NOTE — PROGRESS NOTES
Physical Therapy Daily Progress Note     Patient Name: Pedrito Powell         :  1960  Referring Physician: Teo Fraser MD     Subjective   Pedrito Powell reports: taping very helpful in decreasing her knee pain and allowing improved gait / function -   Without tape, she notes significantly increased knee pain R>>L and increased swelling - pain increased with WB-ing, gait, stairs, etc   Ortho appointment has been moved up -       Objective    Antalgic gait with decreased WB-ing RLE -- Increased swelling (R) knee -     Catarino Tender infrapatellar region, medial PF Jt, medial jt line and pes region (R) knee; Tender medial jt line (L) knee   Inferior tipped patella (R)  (+++) Step Up test (R) with severe pain at infrapatellar region and pain lasting     See Exercise, Manual, and Modality Logs for complete treatment.      Functional / Therapeutic Activities:  15 min  · TAPING / BRACING: K-Tape to 1) Unload PF Jt (R); 2) Unload Infrapatellar reg(R); 3) Unload Medial knee (B); (Covered small irritated area ant/med region (R) knee w/ band-aid)  · SEE EXERCISE FLOW SHEET -   · Jt protection, ADL modification; Posture and       Assessment/Plan  (B) Knee pain R>L:  (B) knee OA; RA  Taping greatly reduced her pain and improved gait , but now no longer requires taping-  Severe pain with attempting step up RLE -   Pt may benefit from seeing Orthopedist for further assessment / intervention (I.e. Injection, etc) due to persistent pain (R) knee and limited function -       Progress strengthening /stabilization /functional activity - Ortho appointment in near future -   _________________________________________________  Manual Therapy:                 mins  20155;  Therapeutic Exercise:   35    mins  02385;     Neuromuscular Dion:        mins  84555;    Therapeutic Activity:      15    mins  56234;     Iontophoresis:                      mins  95164;     Ultrasound:                           mins  96702;     Electrical Stimulation:         mins  44283 ( );  Dry Needling                       mins self-pay     Timed Treatment:   50   mins                  Total Treatment:    65   mins     Antonio Coley, PT  Physical Therapist

## 2019-04-02 LAB
Lab: NORMAL
METHYLMALONATE SERPL-SCNC: 351 NMOL/L (ref 0–378)

## 2019-04-08 ENCOUNTER — TREATMENT (OUTPATIENT)
Dept: PHYSICAL THERAPY | Facility: CLINIC | Age: 59
End: 2019-04-08

## 2019-04-08 DIAGNOSIS — G89.29 BILATERAL CHRONIC KNEE PAIN: ICD-10-CM

## 2019-04-08 DIAGNOSIS — M25.561 BILATERAL CHRONIC KNEE PAIN: ICD-10-CM

## 2019-04-08 DIAGNOSIS — M17.0 PRIMARY OSTEOARTHRITIS OF BOTH KNEES: Primary | ICD-10-CM

## 2019-04-08 DIAGNOSIS — R26.9 GAIT DISTURBANCE: ICD-10-CM

## 2019-04-08 DIAGNOSIS — M25.562 BILATERAL CHRONIC KNEE PAIN: ICD-10-CM

## 2019-04-08 PROCEDURE — 97110 THERAPEUTIC EXERCISES: CPT | Performed by: PHYSICAL THERAPIST

## 2019-04-08 PROCEDURE — 97530 THERAPEUTIC ACTIVITIES: CPT | Performed by: PHYSICAL THERAPIST

## 2019-04-09 ENCOUNTER — TRANSCRIBE ORDERS (OUTPATIENT)
Dept: PHYSICAL THERAPY | Facility: CLINIC | Age: 59
End: 2019-04-09

## 2019-04-09 DIAGNOSIS — M25.612 SHOULDER STIFFNESS, LEFT: ICD-10-CM

## 2019-04-09 DIAGNOSIS — M19.011 OSTEOARTHRITIS OF BOTH SHOULDERS, UNSPECIFIED OSTEOARTHRITIS TYPE: Primary | ICD-10-CM

## 2019-04-09 DIAGNOSIS — M19.012 OSTEOARTHRITIS OF BOTH SHOULDERS, UNSPECIFIED OSTEOARTHRITIS TYPE: Primary | ICD-10-CM

## 2019-04-09 DIAGNOSIS — M75.82 ROTATOR CUFF TENDONITIS, LEFT: ICD-10-CM

## 2019-04-10 ENCOUNTER — TELEPHONE (OUTPATIENT)
Dept: GASTROENTEROLOGY | Facility: CLINIC | Age: 59
End: 2019-04-10

## 2019-04-10 NOTE — PROGRESS NOTES
Physical Therapy Daily Progress Note     Patient Name: Pedrito Powell         :  1960  Referring Physician: Teo Fraser MD     Subjective   Pedrito Powell reports: taping very helpful in decreasing her knee pain and allowing improved gait / function -   Without tape, she notes significantly increased knee pain R>>L and increased swelling - pain increased with WB-ing, gait, stairs, etc   Saw Ortho and had X-Rays - To have Synvisc injections in future -   to start PT for shoulder     Objective    Antalgic gait with decreased WB-ing RLE -- Increased swelling (R) knee -     Catarino Tender infrapatellar region, medial PF Jt, medial jt line and pes region (R) knee; Tender medial jt line (L) knee   Inferior tipped patella (R)  (+++) Step Up test (R) with severe pain at infrapatellar region and pain lasting     See Exercise, Manual, and Modality Logs for complete treatment.      Functional / Therapeutic Activities:  30 min  · TAPING / BRACING: K-Tape to 1) Unload PF Jt (R); 2) Unload Infrapatellar reg(R); 3) Unload Medial knee (B); 4) Unload (L) shoulder  · REVIEWED X-RAYS w/ Pt -   · Jt protection, ADL modification; Posture and       Assessment/Plan  (B) Knee pain R>L:  (B) knee OA; RA  Taping greatly reduced her pain and improved gait , but now no longer requires taping-  Severe pain with attempting step up RLE -   Severe OA (Bone-on-Bone R>L)knee and apparent sig OA (L) Shoulder w/ deficient RTC  Pt may benefit from medial  brace (R) knee -      Progress strengthening /stabilization /functional activity - Evaluate shoulder next session - -   _________________________________________________  Manual Therapy:                 mins  76059;  Therapeutic Exercise:   25    mins  15124;     Neuromuscular Dion:        mins  97320;    Therapeutic Activity:      30    mins  02691;     Iontophoresis:                      mins  41632;     Ultrasound:                           mins  60029;    Electrical  Stimulation:         mins  53556 ( );  Dry Needling                       mins self-pay     Timed Treatment:   55   mins                  Total Treatment:    70   mins     Antonio Coley, PT  Physical Therapist

## 2019-04-10 NOTE — TELEPHONE ENCOUNTER
----- Message from Zbigniew Kapoor MD sent at 4/9/2019  5:37 PM EDT -----  MT/SA - Tell her that her small bowel follow through exam came back normal.    Dr. COLLINS HALL . Thx. kjh

## 2019-04-11 ENCOUNTER — TREATMENT (OUTPATIENT)
Dept: PHYSICAL THERAPY | Facility: CLINIC | Age: 59
End: 2019-04-11

## 2019-04-11 DIAGNOSIS — M19.012 OSTEOARTHRITIS OF BOTH SHOULDERS, UNSPECIFIED OSTEOARTHRITIS TYPE: Primary | ICD-10-CM

## 2019-04-11 DIAGNOSIS — M25.612 SHOULDER STIFFNESS, LEFT: ICD-10-CM

## 2019-04-11 DIAGNOSIS — M19.011 OSTEOARTHRITIS OF BOTH SHOULDERS, UNSPECIFIED OSTEOARTHRITIS TYPE: Primary | ICD-10-CM

## 2019-04-11 DIAGNOSIS — M75.82 ROTATOR CUFF TENDONITIS, LEFT: ICD-10-CM

## 2019-04-11 PROCEDURE — 97530 THERAPEUTIC ACTIVITIES: CPT | Performed by: PHYSICAL THERAPIST

## 2019-04-11 PROCEDURE — 97140 MANUAL THERAPY 1/> REGIONS: CPT | Performed by: PHYSICAL THERAPIST

## 2019-04-11 PROCEDURE — 97110 THERAPEUTIC EXERCISES: CPT | Performed by: PHYSICAL THERAPIST

## 2019-04-11 PROCEDURE — 97162 PT EVAL MOD COMPLEX 30 MIN: CPT | Performed by: PHYSICAL THERAPIST

## 2019-04-11 NOTE — PROGRESS NOTES
Orthopedic / Sports / Industrial Physical Therapy  Physical Therapy Initial Evaluation and Plan of Care    Patient Name: Pedrito Powell          :  1960  Referring Physician: Stephon Sheehan MD  Diagnosis: Osteoarthritis of both shoulders, unspecified osteoarthritis type [M19.011, M19.012]   Date of Evaluation: 2019  ______________________________________________________________________    Subjective Evaluation    History of Present Illness  Onset date: (L) Shoulder: 7 yr H/O w/ worse over last year;  (R) Fx 99 and  int achicness recently.  Mechanism of injury: (R) Shoulder - Fx in ?; (L) Shoulder Unknown       Patient Occupation: Teacher - Elementary  Pain  Pain scale: (L) 8/10; (R) 1/10.  Pain scale at highest: (L) 10+/10; (R) 3/10.  Quality: dull ache, sharp, tight and knife-like  Relieving factors: heat and rest  Exacerbated by: (L): AROM/Reaching; Lifting, push/pull;(L) [id number];  (R) same as (L) , but less and able to  slieep on (R)  Progression: worsening    Hand dominance: right    Diagnostic Tests  X-ray: abnormal (OA (L) shoulder; )    Treatments  Current treatment: injection treatment and medication  Patient Goals  Patient/family treatment goals: Pain alleviation; Functional mobility, strength, ADL's and normal work w/o restrictions -          ___________________________________________________  Objective       Postural Observations    Additional Postural Observation Details  Rounded shoulder posture w/ IR UE; Significant hypertrophy of supraclavicular / posterior cervical triangle (L);     Palpation     Additional Palpation Details  (L) Shoulder: Severely tender LH biceps tendon/anterior shoulder, lat delt, lat supbacrimial region and posterior shoulder ;   (R) shoulder; Tender anterior shoulder / LH biceps, ant/lat shoulder (RTC insertions) - ; Tender over AC Jt   Severely tender (L) UT, Scal[place] with multiple trigger points, Severely tender 1st rib    Active Range of Motion      Additional Active Range of Motion Details  (L) Shoulder: FE 85-deg; ERB to side of neck ; IRB to (L) buttock all with severee pain and catching. Clunking   (R) Sholder: -deg; ERBIRB WFL     Passive Range of Motion     Additional Passive Range of Motion Details  (L) Shoulder: -deg pain and crepitus; ERS/ER/ERA 45-deg w/ shoulder pain;  JEREMIAH 10-deg pain    Strength/Myotome Testing     Additional Strength Details  (L) Shoulder:  Painful and weak ABDuction, Empty Can, ERS > IRS  (R) Shoulder: Stronger ABDuction, Mildly weak Empty Can; Strong ERS/IRS    Tests     Additional Tests Details  (L) Shoulder: (+) Drop Arm; (+) Empty Can; (+) Speeds; (+) [place]'s   (R) Shoulder; (-) Drop Arm; Mildly (+) Empty Can; (-) Speeds / [place];s       See Treatment Flow sheet for Exercises, Manual therapy, and modalities.   FUNCTIONAL ACTIVITIES: X 25 min  · TAPING / BRACING: K-Tape to 1) Unload L>R shoulder; 2) Unload PF Jt and Med jt line Xx2 (R) knee; 3) Unload Medial (L) knee x 2 strips  · Jt protection, ADL modification; Posture and     ___________________________________________________  Assessment & Plan     Assessment  Assessment details: (L) Shoulder pain/weakness/Stiffness - Probable RTC deficient shoulder w/ OA -       Probable Elevated 1st rib (L) -   (R) Shoulder; Tendinitis RTC -     PROBLEMS: Pain; limited ROM; Weakness; intolerance to ADL's and job requirements - Postural dysfunction -   PROGNOSIS: Good (R) shoulder;  Fair/Poor (L) shoulder    GOALS:   SHORT TERM GOALS: 2 weeks:  1) HEP Initiated; 2) Pain decreased 50%:   3) AROM  increased:  4) Improved functional ability grossly;     LONG TERM GOALS: 4 weeks (or at time of DISCHARGE): 1) (I) HEP; 2) AROM WFL and pain free; 3) Strength / mobility to be able to perform all ADL's and job-related activities w/o restrictions;       Plan  Planned therapy interventions: flexibility, home exercise program, joint mobilization, manual therapy,  neuromuscular re-education, postural training, soft tissue mobilization, strengthening, stretching and therapeutic activities  Frequency: 2-3x week.  Duration in weeks: 4  Treatment plan discussed with: patient      ___________________________________________________  Manual Therapy:    10     mins  03522;   Therapeutic Exercise:    15     mins  37219;     Neuromuscular Dion:        mins  85992;   Therapeutic Activity:     25     mins  46387;     Ultrasound:          mins  78776;    Electrical Stimulation:        mins  08870 ( );  Dry Needling          mins self-pay   Gait Training:          mins  72397;  EVAL TIME:   25 min    Timed Treatment:   50   mins                Total Treatment:     90   mins    PT SIGNATURE:   Antonio Coley, PT  DATE TREATMENT INITIATED: 4/11/2019  ___________________________________________________  Initial Certification  Certification Period: 7/10/2019  I certify that the therapy services are furnished while this patient is under my care.  The services outlined above are required by this patient, and will be reviewed every 90 days.     PHYSICIAN: ________________________________  DATE: ______  Stephon Sheehan MD        Please sign and return via fax to [phone].. Thank you, [facility].  ______________________________________________________________________  [address] [place] [address]  Phone: [phone] Fax: [phone]

## 2019-04-15 ENCOUNTER — TREATMENT (OUTPATIENT)
Dept: PHYSICAL THERAPY | Facility: CLINIC | Age: 59
End: 2019-04-15

## 2019-04-15 DIAGNOSIS — M75.82 ROTATOR CUFF TENDONITIS, LEFT: ICD-10-CM

## 2019-04-15 DIAGNOSIS — M17.0 PRIMARY OSTEOARTHRITIS OF BOTH KNEES: ICD-10-CM

## 2019-04-15 DIAGNOSIS — M19.012 OSTEOARTHRITIS OF BOTH SHOULDERS, UNSPECIFIED OSTEOARTHRITIS TYPE: Primary | ICD-10-CM

## 2019-04-15 DIAGNOSIS — M25.562 BILATERAL CHRONIC KNEE PAIN: ICD-10-CM

## 2019-04-15 DIAGNOSIS — R26.9 GAIT DISTURBANCE: ICD-10-CM

## 2019-04-15 DIAGNOSIS — M25.612 SHOULDER STIFFNESS, LEFT: ICD-10-CM

## 2019-04-15 DIAGNOSIS — G89.29 BILATERAL CHRONIC KNEE PAIN: ICD-10-CM

## 2019-04-15 DIAGNOSIS — M19.011 OSTEOARTHRITIS OF BOTH SHOULDERS, UNSPECIFIED OSTEOARTHRITIS TYPE: Primary | ICD-10-CM

## 2019-04-15 DIAGNOSIS — M25.561 BILATERAL CHRONIC KNEE PAIN: ICD-10-CM

## 2019-04-15 PROCEDURE — 97110 THERAPEUTIC EXERCISES: CPT | Performed by: PHYSICAL THERAPIST

## 2019-04-15 PROCEDURE — 97530 THERAPEUTIC ACTIVITIES: CPT | Performed by: PHYSICAL THERAPIST

## 2019-04-17 NOTE — TELEPHONE ENCOUNTER
SBFT RESULTS - ADVISE PT OF NOTE-TEST NORMAL   Received: Today   Message Contents   [facility]             PT SAID OK THANKS

## 2019-04-18 ENCOUNTER — TREATMENT (OUTPATIENT)
Dept: PHYSICAL THERAPY | Facility: CLINIC | Age: 59
End: 2019-04-18

## 2019-04-18 DIAGNOSIS — M25.612 SHOULDER STIFFNESS, LEFT: ICD-10-CM

## 2019-04-18 DIAGNOSIS — R26.9 GAIT DISTURBANCE: ICD-10-CM

## 2019-04-18 DIAGNOSIS — M25.561 BILATERAL CHRONIC KNEE PAIN: ICD-10-CM

## 2019-04-18 DIAGNOSIS — M19.012 OSTEOARTHRITIS OF BOTH SHOULDERS, UNSPECIFIED OSTEOARTHRITIS TYPE: Primary | ICD-10-CM

## 2019-04-18 DIAGNOSIS — M19.011 OSTEOARTHRITIS OF BOTH SHOULDERS, UNSPECIFIED OSTEOARTHRITIS TYPE: Primary | ICD-10-CM

## 2019-04-18 DIAGNOSIS — G89.29 BILATERAL CHRONIC KNEE PAIN: ICD-10-CM

## 2019-04-18 DIAGNOSIS — M17.0 PRIMARY OSTEOARTHRITIS OF BOTH KNEES: ICD-10-CM

## 2019-04-18 DIAGNOSIS — M75.82 ROTATOR CUFF TENDONITIS, LEFT: ICD-10-CM

## 2019-04-18 DIAGNOSIS — M25.562 BILATERAL CHRONIC KNEE PAIN: ICD-10-CM

## 2019-04-18 PROCEDURE — 97110 THERAPEUTIC EXERCISES: CPT | Performed by: PHYSICAL THERAPIST

## 2019-04-18 PROCEDURE — 97530 THERAPEUTIC ACTIVITIES: CPT | Performed by: PHYSICAL THERAPIST

## 2019-04-19 NOTE — PROGRESS NOTES
Physical Therapy Daily Progress Note     Patient Name: Pedrito Powell         :  1960  Referring Physician: Teo Fraser MD     Subjective   Pedrito Powell reports: taping very helpful in decreasing her knee pain and shoulder pain allowing improved gait / function -      Objective    Antalgic gait with decreased WB-ing RLE -- Increased swelling (R) knee -     [place] Tender infrapatellar region, medial PF Jt, medial jt line and pes region (R) knee; Tender medial jt line (L) knee   Inferior tipped patella (R)  (+++) Step Up test (R) with severe pain at infrapatellar region and pain lasting     See Exercise, Manual, and Modality Logs for complete treatment.      Functional / Therapeutic Activities:  20 min  · TAPING / BRACING: K-Tape to 1) Unload PF Jt (R); 2) Unload medial knee (R) X x 2; 3) Unload Medial knee (B);    4) Unload (L) shoulder w/ kinesio tape over; 5) Unload (R) shoulder -   · Jt protection, ADL modification; Posture and       Assessment/Plan  (B) Knee pain R>L:  (B) knee OA; RA; Severe (L) Shoulder OA / RTC deficient ; (R) shoulder pain -   Taping greatly reduced her pain and improved gait , but now no longer requires taping-  Severe pain with attempting step up RLE -   Severe OA (Bone-on-Bone R>L)knee and apparent sig OA (L) Shoulder w/ deficient RTC  Pt may benefit from medial  brace (R) knee -      Progress strengthening /stabilization /functional activity - Evaluate shoulder next session - -   _________________________________________________  Manual Therapy:                 mins  46842;  Therapeutic Exercise:   35    mins  45101;     Neuromuscular Dion:        mins  13693;    Therapeutic Activity:      20    mins  72986;     Iontophoresis:                      mins  81135;     Ultrasound:                           mins  15622;    Electrical Stimulation:         mins  46663 ( );  Dry Needling                       mins self-pay     Timed Treatment:   55   mins                   Total Treatment:    70   mins     Antonio Coley, PT  Physical Therapist

## 2019-04-22 ENCOUNTER — TREATMENT (OUTPATIENT)
Dept: PHYSICAL THERAPY | Facility: CLINIC | Age: 59
End: 2019-04-22

## 2019-04-22 DIAGNOSIS — R26.9 GAIT DISTURBANCE: ICD-10-CM

## 2019-04-22 DIAGNOSIS — M25.562 BILATERAL CHRONIC KNEE PAIN: ICD-10-CM

## 2019-04-22 DIAGNOSIS — M75.82 ROTATOR CUFF TENDONITIS, LEFT: ICD-10-CM

## 2019-04-22 DIAGNOSIS — M25.612 SHOULDER STIFFNESS, LEFT: ICD-10-CM

## 2019-04-22 DIAGNOSIS — M17.0 PRIMARY OSTEOARTHRITIS OF BOTH KNEES: ICD-10-CM

## 2019-04-22 DIAGNOSIS — M25.561 BILATERAL CHRONIC KNEE PAIN: ICD-10-CM

## 2019-04-22 DIAGNOSIS — G89.29 BILATERAL CHRONIC KNEE PAIN: ICD-10-CM

## 2019-04-22 DIAGNOSIS — M19.011 OSTEOARTHRITIS OF BOTH SHOULDERS, UNSPECIFIED OSTEOARTHRITIS TYPE: Primary | ICD-10-CM

## 2019-04-22 DIAGNOSIS — M19.012 OSTEOARTHRITIS OF BOTH SHOULDERS, UNSPECIFIED OSTEOARTHRITIS TYPE: Primary | ICD-10-CM

## 2019-04-22 PROCEDURE — 97530 THERAPEUTIC ACTIVITIES: CPT | Performed by: PHYSICAL THERAPIST

## 2019-04-22 PROCEDURE — 97110 THERAPEUTIC EXERCISES: CPT | Performed by: PHYSICAL THERAPIST

## 2019-04-22 NOTE — PROGRESS NOTES
Physical Therapy Daily Progress Note     Patient Name: Pedrito Powell         :  1960  Referring Physician: Teo Fraser MD     Subjective   Pedrito Powell reports: taping very helpful in decreasing her knee pain and shoulder pain allowing improved gait / function -      Objective    Antalgic gait with decreased WB-ing RLE -- Increased swelling (R) knee -     [place] Tender infrapatellar region, medial PF Jt, medial jt line and pes region (R) knee; Tender medial jt line (L) knee   Inferior tipped patella (R)  (+++) Step Up test (R) with severe pain at infrapatellar region and pain lasting     See Exercise, Manual, and Modality Logs for complete treatment.      Functional / Therapeutic Activities:  20 min  · TAPING / BRACING: K-Tape to 1) Unload PF Jt (R); 2) Unload medial knee (R) X x 2; 3) Unload Medial knee (B);    4) Unload (L) shoulder w/ kinesio tape over; 5) Unload (R) shoulder -   · Jt protection, ADL modification; Posture and       Assessment/Plan  (B) Knee pain R>L:  (B) knee OA; RA; Severe (L) Shoulder OA / RTC deficient ; (R) shoulder pain -   Taping greatly reduced her pain and improved gait , but now no longer requires taping-  Severe pain with attempting step up RLE -   Severe OA (Bone-on-Bone R>L)knee and apparent sig OA (L) Shoulder w/ deficient RTC  Pt may benefit from medial  brace (R) knee -      Progress strengthening /stabilization /functional activity - Evaluate shoulder next session - -   _________________________________________________  Manual Therapy:                 mins  76568;  Therapeutic Exercise:   35    mins  57980;     Neuromuscular Dion:        mins  11373;    Therapeutic Activity:      20    mins  57672;     Iontophoresis:                      mins  26674;     Ultrasound:                           mins  64802;    Electrical Stimulation:         mins  40900 ( );  Dry Needling                       mins self-pay     Timed Treatment:   55   mins                   Total Treatment:    70   mins     Antonio Coley, PT  Physical Therapist

## 2019-04-25 ENCOUNTER — TREATMENT (OUTPATIENT)
Dept: PHYSICAL THERAPY | Facility: CLINIC | Age: 59
End: 2019-04-25

## 2019-04-25 DIAGNOSIS — M75.82 ROTATOR CUFF TENDONITIS, LEFT: ICD-10-CM

## 2019-04-25 DIAGNOSIS — M25.561 BILATERAL CHRONIC KNEE PAIN: ICD-10-CM

## 2019-04-25 DIAGNOSIS — G89.29 BILATERAL CHRONIC KNEE PAIN: ICD-10-CM

## 2019-04-25 DIAGNOSIS — M19.012 OSTEOARTHRITIS OF BOTH SHOULDERS, UNSPECIFIED OSTEOARTHRITIS TYPE: Primary | ICD-10-CM

## 2019-04-25 DIAGNOSIS — M17.0 PRIMARY OSTEOARTHRITIS OF BOTH KNEES: ICD-10-CM

## 2019-04-25 DIAGNOSIS — M19.011 OSTEOARTHRITIS OF BOTH SHOULDERS, UNSPECIFIED OSTEOARTHRITIS TYPE: Primary | ICD-10-CM

## 2019-04-25 DIAGNOSIS — R26.9 GAIT DISTURBANCE: ICD-10-CM

## 2019-04-25 DIAGNOSIS — M25.562 BILATERAL CHRONIC KNEE PAIN: ICD-10-CM

## 2019-04-25 DIAGNOSIS — M25.612 SHOULDER STIFFNESS, LEFT: ICD-10-CM

## 2019-04-25 PROCEDURE — 97530 THERAPEUTIC ACTIVITIES: CPT | Performed by: PHYSICAL THERAPIST

## 2019-04-25 PROCEDURE — 97110 THERAPEUTIC EXERCISES: CPT | Performed by: PHYSICAL THERAPIST

## 2019-04-28 NOTE — PROGRESS NOTES
Physical Therapy Daily Progress Note     Patient Name: Pedrito Powell         :  1960  Referring Physician: Teo Fraser MD     Subjective   Pedrito Powell reports: taping very helpful in decreasing her knee pain and shoulder pain allowing improved gait / function -   Increased pain (L) shoulder after activity - Pain anterior and medial (R) >(L) knee once tape gets loose and more swollen     Objective    Antalgic gait with decreased WB-ing RLE -- Increased swelling (R) knee -     Very Tender infrapatellar region, medial PF Jt, medial jt line and pes region (R) knee; Tender medial jt line (L) knee   Inferior tipped patella (R)-  More swelling (R) knee diffusely  (L) [place] FE 85-deg w/ pain and sig compensation -   (+++) Step Up test (R) with severe pain at infrapatellar region and pain lasting     See Exercise, Manual, and Modality Logs for complete treatment.      Functional / Therapeutic Activities:  20 min  · TAPING / BRACING: K-Tape to 1) Unload PF Jt (R); 2) Unload medial knee (R) X x 2; 3) Unload Medial knee (B);    4) Unload (L) shoulder w/ kinesio tape over; 5) Unload (R) shoulder -   · Jt protection, ADL modification; Posture and       Assessment/Plan  (B) Knee pain R>L:  (B) knee OA; RA; Severe (L) Shoulder OA / RTC deficient ; (R) shoulder pain -   Taping greatly reduced her pain and improved gait , but now no longer requires taping-  Severe pain with attempting step up RLE -   Severe OA (Bone-on-Bone R>L)knee and apparent sig OA (L) Shoulder w/ deficient RTC  Pt may benefit from medial  brace (R) knee -   Pt may require TKA (R>L) and RevTSA (L)     Progress strengthening /stabilization /functional activity - Evaluate shoulder next session - -   _________________________________________________  Manual Therapy:                 mins  41460;  Therapeutic Exercise:   35    mins  54882;     Neuromuscular Dion:        mins  09534;    Therapeutic Activity:      20    mins   94045;     Iontophoresis:                      mins  06118;     Ultrasound:                           mins  19574;    Electrical Stimulation:         mins  96307 ( );  Dry Needling                       mins self-pay     Timed Treatment:   55   mins                  Total Treatment:    70   mins     Antonio Coley, PT  Physical Therapist

## 2019-04-29 ENCOUNTER — TREATMENT (OUTPATIENT)
Dept: PHYSICAL THERAPY | Facility: CLINIC | Age: 59
End: 2019-04-29

## 2019-04-29 DIAGNOSIS — M19.012 OSTEOARTHRITIS OF BOTH SHOULDERS, UNSPECIFIED OSTEOARTHRITIS TYPE: Primary | ICD-10-CM

## 2019-04-29 DIAGNOSIS — M25.562 BILATERAL CHRONIC KNEE PAIN: ICD-10-CM

## 2019-04-29 DIAGNOSIS — M75.82 ROTATOR CUFF TENDONITIS, LEFT: ICD-10-CM

## 2019-04-29 DIAGNOSIS — M17.0 PRIMARY OSTEOARTHRITIS OF BOTH KNEES: ICD-10-CM

## 2019-04-29 DIAGNOSIS — G89.29 BILATERAL CHRONIC KNEE PAIN: ICD-10-CM

## 2019-04-29 DIAGNOSIS — M19.011 OSTEOARTHRITIS OF BOTH SHOULDERS, UNSPECIFIED OSTEOARTHRITIS TYPE: Primary | ICD-10-CM

## 2019-04-29 DIAGNOSIS — M25.612 SHOULDER STIFFNESS, LEFT: ICD-10-CM

## 2019-04-29 DIAGNOSIS — M25.561 BILATERAL CHRONIC KNEE PAIN: ICD-10-CM

## 2019-04-29 DIAGNOSIS — R26.9 GAIT DISTURBANCE: ICD-10-CM

## 2019-04-29 PROCEDURE — 97530 THERAPEUTIC ACTIVITIES: CPT | Performed by: PHYSICAL THERAPIST

## 2019-04-29 PROCEDURE — 97112 NEUROMUSCULAR REEDUCATION: CPT | Performed by: PHYSICAL THERAPIST

## 2019-04-29 PROCEDURE — 97110 THERAPEUTIC EXERCISES: CPT | Performed by: PHYSICAL THERAPIST

## 2019-04-29 NOTE — PROGRESS NOTES
Physical Therapy Daily Progress Note     Patient Name: Pedrito Powell         :  1960  Referring Physician: Teo Fraser MD     Subjective   Pedrito Powell reports: taping very helpful in decreasing her knee pain and shoulder pain allowing improved gait / function -   Increased pain (L) shoulder after activity - Pain anterior and medial (R) >(L) knee once tape gets loose and more swollen    Noted increased and constant anterior knee into prox tibia pain (L) starting Saturday PM and thru  - Not as bad today, but feels puffy / full -      Objective    Antalgic gait with decreased WB-ing RLE -- Increased swelling (R) knee -     Very Tender infrapatellar region, medial PF Jt, medial jt line and pes region (R) knee; Tender medial jt line (L) knee   Inferior tipped patella (R)-  More swelling (R) knee diffusely  (L) [place] FE 85-deg w/ pain and sig compensation -   (+++) Step Up test (R) with severe pain at infrapatellar region and pain lasting     See Exercise, Manual, and Modality Logs for complete treatment.      Functional / Therapeutic Activities:  20 min  · TAPING / BRACING: K-Tape to 1) Unload PF Jt (R); 2) Unload medial knee (R) X x 2; 3) Unload Medial knee (B);    4) Unload (L) shoulder w/ kinesio tape over; 5) Unload (R) shoulder -   · Jt protection, ADL modification; Posture and       Assessment/Plan  (B) Knee pain R>L:  (B) knee OA; RA; Severe (L) Shoulder OA / RTC deficient ; (R) shoulder pain -   Taping greatly reduced her pain and improved gait , but now no longer requires taping-  Severe pain with attempting step up RLE -   Severe OA (Bone-on-Bone R>L)knee and apparent sig OA (L) Shoulder w/ deficient RTC  Pt may benefit from medial  brace (R) knee -   Pt may require TKA (R>L) and RevTSA (L)     Progress strengthening /stabilization /functional activity -  -   _________________________________________________  Manual Therapy:                 mins   05838;  Therapeutic Exercise:   35    mins  94246;     Neuromuscular Dion:  10      mins  85815;    Therapeutic Activity:      20    mins  62312;     Iontophoresis:                      mins  55434;     Ultrasound:                      10     mins  90999;    Electrical Stimulation:         mins  18618 ( );  Dry Needling                       mins self-pay     Timed Treatment:   75   mins                  Total Treatment:    90   mins     Antonio Coley, PT  Physical Therapist

## 2019-04-29 NOTE — PROGRESS NOTES
Physical Therapy Daily Progress Note     Patient Name: Pedrito Powell         :  1960  Referring Physician: Teo Fraser MD     Subjective   Pedrito Powell reports: taping very helpful in decreasing her knee pain and shoulder pain allowing improved gait / function -   Increased pain (L) shoulder after activity - Pain anterior and medial (R) >(L) knee once tape gets loose and more swollen     Objective    Antalgic gait with decreased WB-ing RLE -- Increased swelling (R) knee -     Very Tender infrapatellar region, medial PF Jt, medial jt line and pes region (R) knee; Tender medial jt line (L) knee   Inferior tipped patella (R)-  More swelling (R) knee diffusely  (L) [place] FE 85-deg w/ pain and sig compensation -   (+++) Step Up test (R) with severe pain at infrapatellar region and pain lasting     See Exercise, Manual, and Modality Logs for complete treatment.      Functional / Therapeutic Activities:  20 min  · TAPING / BRACING: K-Tape to 1) Unload PF Jt (R); 2) Unload medial knee (R) X x 2; 3) Unload Medial knee (B);    4) Unload (L) shoulder w/ kinesio tape over; 5) Unload (R) shoulder -   · Jt protection, ADL modification; Posture and       Assessment/Plan  (B) Knee pain R>L:  (B) knee OA; RA; Severe (L) Shoulder OA / RTC deficient ; (R) shoulder pain -   Taping greatly reduced her pain and improved gait , but now no longer requires taping-  Severe pain with attempting step up RLE -   Severe OA (Bone-on-Bone R>L)knee and apparent sig OA (L) Shoulder w/ deficient RTC  Pt may benefit from medial  brace (R) knee -   Pt may require TKA (R>L) and RevTSA (L)     Progress strengthening /stabilization /functional activity - Evaluate shoulder next session - -   _________________________________________________  Manual Therapy:                 mins  84687;  Therapeutic Exercise:   35    mins  45812;     Neuromuscular Dion:        mins  04454;    Therapeutic Activity:      20    mins   62115;     Iontophoresis:                      mins  82117;     Ultrasound:                           mins  03688;    Electrical Stimulation:         mins  39787 ( );  Dry Needling                       mins self-pay     Timed Treatment:   55   mins                  Total Treatment:    70   mins     Antonio Coley, PT  Physical Therapist

## 2019-05-01 ENCOUNTER — APPOINTMENT (OUTPATIENT)
Dept: OTHER | Facility: HOSPITAL | Age: 59
End: 2019-05-01

## 2019-05-01 ENCOUNTER — APPOINTMENT (OUTPATIENT)
Dept: ONCOLOGY | Facility: CLINIC | Age: 59
End: 2019-05-01

## 2019-05-02 ENCOUNTER — TREATMENT (OUTPATIENT)
Dept: PHYSICAL THERAPY | Facility: CLINIC | Age: 59
End: 2019-05-02

## 2019-05-02 DIAGNOSIS — M19.012 OSTEOARTHRITIS OF BOTH SHOULDERS, UNSPECIFIED OSTEOARTHRITIS TYPE: Primary | ICD-10-CM

## 2019-05-02 DIAGNOSIS — M25.612 SHOULDER STIFFNESS, LEFT: ICD-10-CM

## 2019-05-02 DIAGNOSIS — R26.9 GAIT DISTURBANCE: ICD-10-CM

## 2019-05-02 DIAGNOSIS — M75.82 ROTATOR CUFF TENDONITIS, LEFT: ICD-10-CM

## 2019-05-02 DIAGNOSIS — M25.562 BILATERAL CHRONIC KNEE PAIN: ICD-10-CM

## 2019-05-02 DIAGNOSIS — M25.561 BILATERAL CHRONIC KNEE PAIN: ICD-10-CM

## 2019-05-02 DIAGNOSIS — G89.29 BILATERAL CHRONIC KNEE PAIN: ICD-10-CM

## 2019-05-02 DIAGNOSIS — M19.011 OSTEOARTHRITIS OF BOTH SHOULDERS, UNSPECIFIED OSTEOARTHRITIS TYPE: Primary | ICD-10-CM

## 2019-05-02 DIAGNOSIS — M17.0 PRIMARY OSTEOARTHRITIS OF BOTH KNEES: ICD-10-CM

## 2019-05-02 PROCEDURE — 97530 THERAPEUTIC ACTIVITIES: CPT | Performed by: PHYSICAL THERAPIST

## 2019-05-02 PROCEDURE — 97110 THERAPEUTIC EXERCISES: CPT | Performed by: PHYSICAL THERAPIST

## 2019-05-02 PROCEDURE — 97112 NEUROMUSCULAR REEDUCATION: CPT | Performed by: PHYSICAL THERAPIST

## 2019-05-06 ENCOUNTER — TREATMENT (OUTPATIENT)
Dept: PHYSICAL THERAPY | Facility: CLINIC | Age: 59
End: 2019-05-06

## 2019-05-06 DIAGNOSIS — M75.82 ROTATOR CUFF TENDONITIS, LEFT: ICD-10-CM

## 2019-05-06 DIAGNOSIS — M17.0 PRIMARY OSTEOARTHRITIS OF BOTH KNEES: ICD-10-CM

## 2019-05-06 DIAGNOSIS — M19.011 OSTEOARTHRITIS OF BOTH SHOULDERS, UNSPECIFIED OSTEOARTHRITIS TYPE: Primary | ICD-10-CM

## 2019-05-06 DIAGNOSIS — M25.612 SHOULDER STIFFNESS, LEFT: ICD-10-CM

## 2019-05-06 DIAGNOSIS — M25.561 BILATERAL CHRONIC KNEE PAIN: ICD-10-CM

## 2019-05-06 DIAGNOSIS — G89.29 BILATERAL CHRONIC KNEE PAIN: ICD-10-CM

## 2019-05-06 DIAGNOSIS — M25.562 BILATERAL CHRONIC KNEE PAIN: ICD-10-CM

## 2019-05-06 DIAGNOSIS — R26.9 GAIT DISTURBANCE: ICD-10-CM

## 2019-05-06 DIAGNOSIS — M19.012 OSTEOARTHRITIS OF BOTH SHOULDERS, UNSPECIFIED OSTEOARTHRITIS TYPE: Primary | ICD-10-CM

## 2019-05-06 PROCEDURE — 97530 THERAPEUTIC ACTIVITIES: CPT | Performed by: PHYSICAL THERAPIST

## 2019-05-06 PROCEDURE — 97110 THERAPEUTIC EXERCISES: CPT | Performed by: PHYSICAL THERAPIST

## 2019-05-06 PROCEDURE — 97112 NEUROMUSCULAR REEDUCATION: CPT | Performed by: PHYSICAL THERAPIST

## 2019-05-06 NOTE — PROGRESS NOTES
Physical Therapy Daily Progress Note     Patient Name: Pedrito Powell         :  1960  Referring Physician: Teo Fraser MD     Subjective   Pedrito Powell reports: taping very helpful in decreasing her knee pain and shoulder pain allowing improved gait / function -   Increased pain (L) shoulder after activity - Pain anterior and medial (R) >(L) knee once tape gets loose and more swollen     Less pain in (R) knee after last session -      Objective    Antalgic gait with decreased WB-ing RLE -- Increased swelling (R) knee -     Very Tender infrapatellar region, medial PF Jt, medial jt line and pes region (R) knee; Tender medial jt line (L) knee   Inferior tipped patella (R)-  More swelling (R) knee diffusely  (L) [place] FE 85-deg w/ pain and sig compensation -   (+++) Step Up test (R) with severe pain at infrapatellar region and pain lasting     See Exercise, Manual, and Modality Logs for complete treatment.      Functional / Therapeutic Activities:  20 min  · TAPING / BRACING: K-Tape to 1) Unload PF Jt (R); 2) Unload medial knee (R) X x 2; 3) Unload Medial knee (B);    4) Unload (L) shoulder w/ kinesio tape over; 5) Unload (R) shoulder -   · Jt protection, ADL modification; Posture and       Assessment/Plan  (B) Knee pain R>L:  (B) knee OA; RA; Severe (L) Shoulder OA / RTC deficient ; (R) shoulder pain -   Taping greatly reduced her pain and improved gait , but now no longer requires taping-  Severe pain with attempting step up RLE -   Severe OA (Bone-on-Bone R>L)knee and apparent sig OA (L) Shoulder w/ deficient RTC  Pt may benefit from medial  brace (R) knee -   Pt may require TKA (R>L) and RevTSA (L)     Progress strengthening /stabilization /functional activity -  -   _________________________________________________  Manual Therapy:                 mins  59523;  Therapeutic Exercise:   35    mins  95586;     Neuromuscular Dion:  10      mins  08237;    Therapeutic  Activity:      20    mins  93801;     Iontophoresis:                      mins  93464;     Ultrasound:                           mins  28748;    Electrical Stimulation:         mins  88387 ( );  Dry Needling                       mins self-pay     Timed Treatment:   65   mins                  Total Treatment:    75   mins     Antonio Coley, PT  Physical Therapist

## 2019-05-06 NOTE — PROGRESS NOTES
Physical Therapy Daily Progress Note     Patient Name: Pedrito Powell         :  1960  Referring Physician: Teo Fraser MD     Subjective   Pedrito Powell reports: taping very helpful in decreasing her knee pain and shoulder pain allowing improved gait / function -   Increased pain (L) shoulder after activity - Pain anterior and medial (R) >(L) knee once tape gets loose and more swollen     Saturday evening noted increased pain in (R) knee even at rest, but not as bad and did not last as long as her last flare - Today (R) knee feels full and aches -      Objective    Antalgic gait with decreased WB-ing RLE -- Increased swelling (R) knee -     Very Tender infrapatellar region, medial PF Jt, medial jt line and pes region (R) knee; Tender medial jt line (L) knee   Inferior tipped patella (R)-  More swelling (R) knee diffusely  (L) [place] FE 85-deg w/ pain and sig compensation -   (+++) Step Up test (R) with severe pain at infrapatellar region and pain lasting     See Exercise, Manual, and Modality Logs for complete treatment.      Functional / Therapeutic Activities:  20 min  · TAPING / BRACING: K-Tape to 1) Unload PF Jt (R); 2) Unload medial knee (R) X x 2; 3) Unload Medial knee (B);    4) Unload (L) shoulder w/ kinesio tape over; 5) Unload (R) shoulder -   · Jt protection, ADL modification; Posture and       Assessment/Plan  (B) Knee pain R>L:  (B) knee OA; RA; Severe (L) Shoulder OA / RTC deficient ; (R) shoulder pain -   Taping greatly reduced her pain and improved gait , but now no longer requires taping-  Severe pain with attempting step up RLE -   Severe OA (Bone-on-Bone R>L)knee and apparent sig OA (L) Shoulder w/ deficient RTC  Pt may benefit from medial  brace (R) knee -   Pt may require TKA (R>L) and RevTSA (L)     Progress strengthening /stabilization /functional activity -  -   _________________________________________________  Manual Therapy:                 mins   96692;  Therapeutic Exercise:   25    mins  21640;     Neuromuscular Dion:  10      mins  75644;    Therapeutic Activity:      20    mins  76347;     Iontophoresis:                      mins  82877;     Ultrasound:                           mins  04132;    Electrical Stimulation:         mins  02535 ( );  Dry Needling                       mins self-pay     Timed Treatment:   55   mins                  Total Treatment:    70   mins     Antonio Coley, PT  Physical Therapist

## 2019-05-09 ENCOUNTER — TREATMENT (OUTPATIENT)
Dept: PHYSICAL THERAPY | Facility: CLINIC | Age: 59
End: 2019-05-09

## 2019-05-09 DIAGNOSIS — M25.561 BILATERAL CHRONIC KNEE PAIN: ICD-10-CM

## 2019-05-09 DIAGNOSIS — M17.0 PRIMARY OSTEOARTHRITIS OF BOTH KNEES: ICD-10-CM

## 2019-05-09 DIAGNOSIS — G89.29 BILATERAL CHRONIC KNEE PAIN: ICD-10-CM

## 2019-05-09 DIAGNOSIS — R26.9 GAIT DISTURBANCE: ICD-10-CM

## 2019-05-09 DIAGNOSIS — M19.012 OSTEOARTHRITIS OF BOTH SHOULDERS, UNSPECIFIED OSTEOARTHRITIS TYPE: Primary | ICD-10-CM

## 2019-05-09 DIAGNOSIS — M25.612 SHOULDER STIFFNESS, LEFT: ICD-10-CM

## 2019-05-09 DIAGNOSIS — M75.82 ROTATOR CUFF TENDONITIS, LEFT: ICD-10-CM

## 2019-05-09 DIAGNOSIS — M25.562 BILATERAL CHRONIC KNEE PAIN: ICD-10-CM

## 2019-05-09 DIAGNOSIS — M19.011 OSTEOARTHRITIS OF BOTH SHOULDERS, UNSPECIFIED OSTEOARTHRITIS TYPE: Primary | ICD-10-CM

## 2019-05-09 PROCEDURE — 97530 THERAPEUTIC ACTIVITIES: CPT | Performed by: PHYSICAL THERAPIST

## 2019-05-09 PROCEDURE — 97110 THERAPEUTIC EXERCISES: CPT | Performed by: PHYSICAL THERAPIST

## 2019-05-13 ENCOUNTER — TREATMENT (OUTPATIENT)
Dept: PHYSICAL THERAPY | Facility: CLINIC | Age: 59
End: 2019-05-13

## 2019-05-13 DIAGNOSIS — M25.561 BILATERAL CHRONIC KNEE PAIN: ICD-10-CM

## 2019-05-13 DIAGNOSIS — R26.9 GAIT DISTURBANCE: ICD-10-CM

## 2019-05-13 DIAGNOSIS — M75.82 ROTATOR CUFF TENDONITIS, LEFT: ICD-10-CM

## 2019-05-13 DIAGNOSIS — M25.612 SHOULDER STIFFNESS, LEFT: ICD-10-CM

## 2019-05-13 DIAGNOSIS — M17.0 PRIMARY OSTEOARTHRITIS OF BOTH KNEES: ICD-10-CM

## 2019-05-13 DIAGNOSIS — M19.011 OSTEOARTHRITIS OF BOTH SHOULDERS, UNSPECIFIED OSTEOARTHRITIS TYPE: Primary | ICD-10-CM

## 2019-05-13 DIAGNOSIS — M19.012 OSTEOARTHRITIS OF BOTH SHOULDERS, UNSPECIFIED OSTEOARTHRITIS TYPE: Primary | ICD-10-CM

## 2019-05-13 DIAGNOSIS — M25.562 BILATERAL CHRONIC KNEE PAIN: ICD-10-CM

## 2019-05-13 DIAGNOSIS — G89.29 BILATERAL CHRONIC KNEE PAIN: ICD-10-CM

## 2019-05-13 PROCEDURE — 97110 THERAPEUTIC EXERCISES: CPT | Performed by: PHYSICAL THERAPIST

## 2019-05-13 PROCEDURE — 97530 THERAPEUTIC ACTIVITIES: CPT | Performed by: PHYSICAL THERAPIST

## 2019-05-13 NOTE — PROGRESS NOTES
Physical Therapy Daily Progress Note     Patient Name: Pedrito Powell         :  1960  Referring Physician: Teo Fraser MD     Subjective   Pedrito Powell reports: taping very helpful in decreasing her knee pain and shoulder pain allowing improved gait / function -   Increased pain (B) shoulder since last session - Pain anterior and medial (R) >(L) knee once tape gets loose and more swollen        Objective    Antalgic gait with decreased WB-ing RLE -- Increased swelling (R) knee -     Very Tender infrapatellar region, medial PF Jt, medial jt line and pes region (R) knee; Tender medial jt line (L) knee   Inferior tipped patella (R)-  More swelling (R) knee diffusely  (L) [place] FE 85-deg w/ pain and sig compensation -   (+++) Step Up test (R) with severe pain at infrapatellar region and pain lasting     See Exercise, Manual, and Modality Logs for complete treatment.      Functional / Therapeutic Activities:  20 min  · TAPING / BRACING: K-Tape to 1) Unload PF Jt (R); 2) Unload medial knee (R) X x 2; 3) Unload Medial knee (B); 4) Unload (L) shoulder w/ kinesio tape over; 5) Unload (R) shoulder -   · Jt protection, ADL modification; Posture and       Assessment/Plan  (B) Knee pain R>L:  (B) knee OA; RA; Severe (L) Shoulder OA / RTC deficient ; (R) shoulder pain -   Taping greatly reduced her pain and improved gait , but now no longer requires taping-  Severe pain with attempting step up RLE -   Severe OA (Bone-on-Bone R>L)knee and apparent sig OA (L) Shoulder w/ deficient RTC  Pt may benefit from medial  brace (R) knee -   Pt may require TKA (R>L) and RevTSA (L)     Progress strengthening /stabilization /functional activity -  -   _________________________________________________  Manual Therapy:                 mins  54757;  Therapeutic Exercise:   35    mins  70131;     Neuromuscular Dion:        mins  27709;    Therapeutic Activity:      20    mins  50577;      Iontophoresis:                      mins  04839;     Ultrasound:                           mins  83849;    Electrical Stimulation:         mins  82933 ( );  Dry Needling                       mins self-pay     Timed Treatment:   55   mins                  Total Treatment:    65   mins     Antonio Coley PT  Physical Therapist

## 2019-05-13 NOTE — PROGRESS NOTES
Physical Therapy Daily Progress Note     Patient Name: Pedrito Powell         :  1960  Referring Physician: Teo Fraser MD     Subjective   Pedrito Powell reports: taping very helpful in decreasing her knee pain and shoulder pain allowing improved gait / function -   Increased pain (B) shoulder especially (L)  - Pain anterior and medial (R) >(L) knee once tape gets loose and more swollen        Objective    Antalgic gait with decreased WB-ing RLE -- Increased swelling (R) knee -     Very Tender infrapatellar region, medial PF Jt, medial jt line and pes region (R) knee; Tender medial jt line (L) knee   Inferior tipped patella (R)-  More swelling (R) knee diffusely  (L) [place] FE 85-deg w/ pain and sig compensation -   (+++) Step Up test (R) with severe pain at infrapatellar region and pain lasting     See Exercise, Manual, and Modality Logs for complete treatment.      Functional / Therapeutic Activities:  20 min  · TAPING / BRACING: K-Tape to 1) Unload PF Jt (R); 2) Unload medial knee (R) X x 2; 3) Unload Medial knee (B); 4) Unload (L) shoulder w/ kinesio tape over; 5) Unload (R) shoulder -   · Jt protection, ADL modification; Posture and       Assessment/Plan  (B) Knee pain R>L:  (B) knee OA; RA; Severe (L) Shoulder OA / RTC deficient ; (R) shoulder pain -   Taping greatly reduced her pain and improved gait , but now no longer requires taping-  Severe pain with attempting step up RLE -   Severe OA (Bone-on-Bone R>L)knee and apparent sig OA (L) Shoulder w/ deficient RTC  Pt may benefit from medial  brace (R) knee -   Pt may require TKA (R>L) and RevTSA (L)     Progress strengthening /stabilization /functional activity -  -   _________________________________________________  Manual Therapy:                 mins  77546;  Therapeutic Exercise:   35    mins  88258;     Neuromuscular Dion:        mins  84629;    Therapeutic Activity:      20    mins  16887;      Iontophoresis:                      mins  79155;     Ultrasound:                           mins  09105;    Electrical Stimulation:         mins  14753 ( );  Dry Needling                       mins self-pay     Timed Treatment:   55   mins                  Total Treatment:    65   mins     Antonio Coley PT  Physical Therapist

## 2019-05-16 ENCOUNTER — TREATMENT (OUTPATIENT)
Dept: PHYSICAL THERAPY | Facility: CLINIC | Age: 59
End: 2019-05-16

## 2019-05-16 DIAGNOSIS — M25.561 BILATERAL CHRONIC KNEE PAIN: ICD-10-CM

## 2019-05-16 DIAGNOSIS — M75.82 ROTATOR CUFF TENDONITIS, LEFT: ICD-10-CM

## 2019-05-16 DIAGNOSIS — M25.612 SHOULDER STIFFNESS, LEFT: ICD-10-CM

## 2019-05-16 DIAGNOSIS — R26.9 GAIT DISTURBANCE: ICD-10-CM

## 2019-05-16 DIAGNOSIS — G89.29 BILATERAL CHRONIC KNEE PAIN: ICD-10-CM

## 2019-05-16 DIAGNOSIS — M19.011 OSTEOARTHRITIS OF BOTH SHOULDERS, UNSPECIFIED OSTEOARTHRITIS TYPE: Primary | ICD-10-CM

## 2019-05-16 DIAGNOSIS — M25.562 BILATERAL CHRONIC KNEE PAIN: ICD-10-CM

## 2019-05-16 DIAGNOSIS — M19.012 OSTEOARTHRITIS OF BOTH SHOULDERS, UNSPECIFIED OSTEOARTHRITIS TYPE: Primary | ICD-10-CM

## 2019-05-16 DIAGNOSIS — M17.0 PRIMARY OSTEOARTHRITIS OF BOTH KNEES: ICD-10-CM

## 2019-05-16 PROCEDURE — 97110 THERAPEUTIC EXERCISES: CPT | Performed by: PHYSICAL THERAPIST

## 2019-05-16 PROCEDURE — 97530 THERAPEUTIC ACTIVITIES: CPT | Performed by: PHYSICAL THERAPIST

## 2019-05-20 ENCOUNTER — TREATMENT (OUTPATIENT)
Dept: PHYSICAL THERAPY | Facility: CLINIC | Age: 59
End: 2019-05-20

## 2019-05-20 DIAGNOSIS — M25.562 BILATERAL CHRONIC KNEE PAIN: ICD-10-CM

## 2019-05-20 DIAGNOSIS — M17.0 PRIMARY OSTEOARTHRITIS OF BOTH KNEES: ICD-10-CM

## 2019-05-20 DIAGNOSIS — R26.9 GAIT DISTURBANCE: ICD-10-CM

## 2019-05-20 DIAGNOSIS — M25.612 SHOULDER STIFFNESS, LEFT: ICD-10-CM

## 2019-05-20 DIAGNOSIS — M75.82 ROTATOR CUFF TENDONITIS, LEFT: ICD-10-CM

## 2019-05-20 DIAGNOSIS — G89.29 BILATERAL CHRONIC KNEE PAIN: ICD-10-CM

## 2019-05-20 DIAGNOSIS — M19.011 OSTEOARTHRITIS OF BOTH SHOULDERS, UNSPECIFIED OSTEOARTHRITIS TYPE: Primary | ICD-10-CM

## 2019-05-20 DIAGNOSIS — M25.561 BILATERAL CHRONIC KNEE PAIN: ICD-10-CM

## 2019-05-20 DIAGNOSIS — M19.012 OSTEOARTHRITIS OF BOTH SHOULDERS, UNSPECIFIED OSTEOARTHRITIS TYPE: Primary | ICD-10-CM

## 2019-05-20 PROCEDURE — 97110 THERAPEUTIC EXERCISES: CPT | Performed by: PHYSICAL THERAPIST

## 2019-05-20 PROCEDURE — 97530 THERAPEUTIC ACTIVITIES: CPT | Performed by: PHYSICAL THERAPIST

## 2019-05-20 NOTE — PROGRESS NOTES
Physical Therapy Daily Progress Note     Patient Name: Pedrito Powell         :  1960  Referring Physician: Teo Fraser MD     Subjective   Pedrito Powell reports: taping very helpful in decreasing her knee pain and shoulder pain allowing improved gait / function -   Increased pain (B) shoulder especially anterior shoulders into (B) Biceps - Pain anterior and medial (R) >(L) knee once tape gets loose and more swollen        Objective    Antalgic gait with decreased WB-ing RLE -- Increased swelling (R) knee -     Very Tender infrapatellar region, medial PF Jt, medial jt line and pes region (R) knee; Tender medial jt line (L) knee   Inferior tipped patella (R)-  More swelling (R) knee diffusely  (L) [place] FE 85-deg w/ pain and sig compensation -   (+++) Step Up test (R) with severe pain at infrapatellar region and pain lasting     See Exercise, Manual, and Modality Logs for complete treatment.      Functional / Therapeutic Activities:  20 min  · TAPING / BRACING: K-Tape to 1) Unload PF Jt (R); 2) Unload medial knee (R) X x 2; 3) Unload Medial knee (B); 4) Unload (L) shoulder w/ kinesio tape over; 5) Unload (R) shoulder -   · Jt protection, ADL modification; Posture and       Assessment/Plan  (B) Knee pain R>L:  (B) knee OA; RA; Severe (L) Shoulder OA / RTC deficient ; (R) shoulder pain -   Taping greatly reduced her pain and improved gait , but now no longer requires taping-  Severe pain with attempting step up RLE -   Severe OA (Bone-on-Bone R>L)knee and apparent sig OA (L) Shoulder w/ deficient RTC  Pt may benefit from medial  brace (R) knee -   Pt may require TKA (R>L) and RevTSA (L)     Progress strengthening /stabilization /functional activity -  -   _________________________________________________  Manual Therapy:                 mins  98448;  Therapeutic Exercise:   30    mins  83064;     Neuromuscular Dion:        mins  75006;    Therapeutic Activity:      20    mins   58889;     Iontophoresis:                      mins  47874;     Ultrasound:                           mins  39872;    Electrical Stimulation:         mins  11794 ( );  Dry Needling                       mins self-pay     Timed Treatment:   50   mins                  Total Treatment:    60   mins     Antonio Coley, PT  Physical Therapist

## 2019-05-21 NOTE — PROGRESS NOTES
Physical Therapy Daily Progress Note     Patient Name: Pedrito Powell         :  1960  Referring Physician: Teo Fraser MD     Subjective   Pedrito Powell reports: taping very helpful in decreasing her knee pain and shoulder pain allowing improved gait / function -   Increased pain (B) shoulder since starting PT - Pain anterior and medial (R) >(L) knee once tape gets loose and more swollen, but knees improved overall since starting PT -         Objective    Antalgic gait with decreased WB-ing RLE -- Increased swelling (R) knee -     Very Tender infrapatellar region, medial PF Jt, medial jt line and pes region (R) knee; Tender medial jt line (L) knee   Inferior tipped patella (R)-  More swelling (R) knee diffusely  (L) [place] FE 85-deg w/ pain and sig compensation -   (+++) Step Up test (R) with severe pain at infrapatellar region and pain lasting     See Exercise, Manual, and Modality Logs for complete treatment.      Functional / Therapeutic Activities:  20 min  · TAPING / BRACING: K-Tape to 1) Unload PF Jt (R); 2) Unload medial knee (R) X x 2; 3) Unload Medial knee (B); 4) Unload (L) shoulder w/ kinesio tape over; 5) Unload (R) shoulder -   · Jt protection, ADL modification; Posture and       Assessment/Plan  (B) Knee pain R>L:  (B) knee OA; RA; Severe (L) Shoulder OA / RTC deficient ; (R) shoulder pain -   Taping greatly reduced her pain and improved gait , but now no longer requires taping-  Severe pain with attempting step up RLE -   Severe OA (Bone-on-Bone R>L)knee and apparent sig OA (L) Shoulder w/ deficient RTC  Pt may benefit from medial  brace (R) knee -   Pt may require TKA (R>L) and RevTSA (L)     Progress strengthening /stabilization /functional activity -  -   _________________________________________________  Manual Therapy:                 mins  93251;  Therapeutic Exercise:   25    mins  61037;     Neuromuscular Dion:        mins  49480;    Therapeutic  Activity:      20    mins  10927;     Iontophoresis:                      mins  22405;     Ultrasound:                           mins  07472;    Electrical Stimulation:         mins  91096 ( );  Dry Needling                       mins self-pay     Timed Treatment:   45   mins                  Total Treatment:    60   mins     Antonio Coley, PT  Physical Therapist

## 2019-06-12 ENCOUNTER — TREATMENT (OUTPATIENT)
Dept: PHYSICAL THERAPY | Facility: CLINIC | Age: 59
End: 2019-06-12

## 2019-06-12 DIAGNOSIS — G89.29 BILATERAL CHRONIC KNEE PAIN: ICD-10-CM

## 2019-06-12 DIAGNOSIS — K21.9 GASTROESOPHAGEAL REFLUX DISEASE, ESOPHAGITIS PRESENCE NOT SPECIFIED: Primary | ICD-10-CM

## 2019-06-12 DIAGNOSIS — M25.562 BILATERAL CHRONIC KNEE PAIN: ICD-10-CM

## 2019-06-12 DIAGNOSIS — R26.9 GAIT DISTURBANCE: ICD-10-CM

## 2019-06-12 DIAGNOSIS — M19.012 OSTEOARTHRITIS OF BOTH SHOULDERS, UNSPECIFIED OSTEOARTHRITIS TYPE: Primary | ICD-10-CM

## 2019-06-12 DIAGNOSIS — M19.011 OSTEOARTHRITIS OF BOTH SHOULDERS, UNSPECIFIED OSTEOARTHRITIS TYPE: Primary | ICD-10-CM

## 2019-06-12 DIAGNOSIS — M25.561 BILATERAL CHRONIC KNEE PAIN: ICD-10-CM

## 2019-06-12 DIAGNOSIS — M17.0 PRIMARY OSTEOARTHRITIS OF BOTH KNEES: ICD-10-CM

## 2019-06-12 PROCEDURE — 97110 THERAPEUTIC EXERCISES: CPT | Performed by: PHYSICAL THERAPIST

## 2019-06-12 PROCEDURE — 97530 THERAPEUTIC ACTIVITIES: CPT | Performed by: PHYSICAL THERAPIST

## 2019-06-12 RX ORDER — OMEPRAZOLE 40 MG/1
40 CAPSULE, DELAYED RELEASE ORAL DAILY
Qty: 90 CAPSULE | Refills: 1 | Status: SHIPPED | OUTPATIENT
Start: 2019-06-12 | End: 2019-12-10 | Stop reason: SDUPTHER

## 2019-06-17 NOTE — PROGRESS NOTES
Physical Therapy Daily Progress Note     Patient Name: Pedrito Powell         :  1960  Referring Physician: Teo Fraser MD     Subjective   Pedrito Powell reports: taping very helpful in decreasing her knee pain and shoulder pain allowing improved gait / function -   Increased pain (B) shoulder since starting PT -   Has had to miss PT due to work schedule, etc - Has done fairly well w/o taping of knees - Pain anterior and medial (R) >(L) knee, but less overall - (B)  knees improved overall since starting PT -         Objective    Antalgic gait with decreased WB-ing RLE -- Increased swelling (R) knee -     Very Tender infrapatellar region, medial PF Jt, medial jt line and pes region (R) knee; Tender medial jt line (L) knee   Inferior tipped patella (R)-  More swelling (R) knee diffusely  (L) [place] FE 85-deg w/ pain and sig compensation -   (+++) Step Up test (R) with severe pain at infrapatellar region and pain lasting     See Exercise, Manual, and Modality Logs for complete treatment.      Functional / Therapeutic Activities:  10 min  · TAPING / BRACING: K-Tape to 1) Unload PF Jt (R); 2) Unload medial knee (R) X x 2; 3) Unload Medial knee (B); 4) Unload (L) shoulder w/ kinesio tape over; 5) Unload (R) shoulder - (ONLY TAPED (L) SHOULDER TODAY)  · Jt protection, ADL modification; Posture and       Assessment/Plan  (B) Knee pain R>L:  (B) knee OA; RA; Severe (L) Shoulder OA / RTC deficient ; (R) shoulder pain -   Taping greatly reduced her pain and improved gait , but now no longer requires taping-  Severe pain with attempting step up RLE -   Severe OA (Bone-on-Bone R>L)knee and apparent sig OA (L) Shoulder w/ deficient RTC  Pt may benefit from medial  brace (R) knee -   Pt may require TKA (R>L) and RevTSA (L)     Progress strengthening /stabilization /functional activity -  -   _________________________________________________  Manual Therapy:                 mins   74782;  Therapeutic Exercise:  35    mins  06285;     Neuromuscular Dion:        mins  64476;    Therapeutic Activity:      10    mins  15013;     Iontophoresis:                      mins  99795;     Ultrasound:                           mins  57796;    Electrical Stimulation:         mins  56907 ( );  Dry Needling                       mins self-pay     Timed Treatment:   45   mins                  Total Treatment:    60   mins     Antonio Coley, PT  Physical Therapist

## 2019-06-18 ENCOUNTER — TREATMENT (OUTPATIENT)
Dept: PHYSICAL THERAPY | Facility: CLINIC | Age: 59
End: 2019-06-18

## 2019-06-18 DIAGNOSIS — M19.012 OSTEOARTHRITIS OF BOTH SHOULDERS, UNSPECIFIED OSTEOARTHRITIS TYPE: Primary | ICD-10-CM

## 2019-06-18 DIAGNOSIS — R26.9 GAIT DISTURBANCE: ICD-10-CM

## 2019-06-18 DIAGNOSIS — M17.0 PRIMARY OSTEOARTHRITIS OF BOTH KNEES: ICD-10-CM

## 2019-06-18 DIAGNOSIS — M25.562 BILATERAL CHRONIC KNEE PAIN: ICD-10-CM

## 2019-06-18 DIAGNOSIS — M25.561 BILATERAL CHRONIC KNEE PAIN: ICD-10-CM

## 2019-06-18 DIAGNOSIS — M75.82 ROTATOR CUFF TENDONITIS, LEFT: ICD-10-CM

## 2019-06-18 DIAGNOSIS — G89.29 BILATERAL CHRONIC KNEE PAIN: ICD-10-CM

## 2019-06-18 DIAGNOSIS — M19.011 OSTEOARTHRITIS OF BOTH SHOULDERS, UNSPECIFIED OSTEOARTHRITIS TYPE: Primary | ICD-10-CM

## 2019-06-18 DIAGNOSIS — M25.612 SHOULDER STIFFNESS, LEFT: ICD-10-CM

## 2019-06-18 PROCEDURE — 97110 THERAPEUTIC EXERCISES: CPT | Performed by: PHYSICAL THERAPIST

## 2019-06-18 PROCEDURE — 97530 THERAPEUTIC ACTIVITIES: CPT | Performed by: PHYSICAL THERAPIST

## 2019-06-23 NOTE — PROGRESS NOTES
Physical Therapy Daily Progress Note     Patient Name: Pedrito Powell         :  1960  Referring Physician: Teo Fraser MD     Subjective   Pedrito Powell reports: overall decreased knee pain and improved tolerance to ADL's, but persistent knee pain Rt > Lt -   Severe popping / pain (L) shoulder with even the slightest movement - Taping helpful in reducing knee pain with activity and decreased (L) shoulder pain at rest -   Pain anterior and medial (R) knee pain and anterior (L) knee pain - Diffuse (L) Shoulder pain -      Objective    Antalgic gait with decreased WB-ing RLE -- Increased swelling (R) knee -     Very Tender infrapatellar region, medial PF Jt, medial jt line and pes region (R) knee; Tender anterior knee/PF jt and infrapatellar region -(L) knee   Inferior tipped patella (R)-  More swelling (R) knee diffusely  (L) [place] FE 85-deg w/ pain and sig compensation -   (+++) Step Up test (R) with severe pain at infrapatellar region and pain lasting     See Exercise, Manual, and Modality Logs for complete treatment.      Functional / Therapeutic Activities:  15 min  · TAPING / BRACING: K-Tape to 1) Unload PF Jt (R); 2) Unload medial knee (R) X x 2; 3) Unload Medial knee (B); 4) Unload (L) shoulder and facilitate deltoid w/ kinesio tape over; 5) Unload (R) shoulder -   · Jt protection, ADL modification; Posture and       Assessment/Plan  (B) Knee pain R>L:  (B) knee OA; RA; Severe (L) Shoulder OA / RTC deficient ; (R) shoulder pain -   Taping greatly reduces knee pain and (L) shoulder pain and improved ability to ambulate / function except stairs, squatting, kneeling - and poor mobility, strength, functional use of (L) shoulder - Severe OA (Bone-on-Bone R>L)knee and apparent sig OA (L) Shoulder w/ deficient RTC  Pt may benefit from medial  brace (R) knee -   Pt may require TKA (R>L) and RevTSA (L)     Progress strengthening /stabilization /functional  activity -  -   _________________________________________________  Manual Therapy:                 mins  44438;  Therapeutic Exercise:  35    mins  66087;     Neuromuscular Dion:        mins  55974;    Therapeutic Activity:      15    mins  70439;     Iontophoresis:                      mins  88084;     Ultrasound:                           mins  00474;    Electrical Stimulation:         mins  79047 ( );  Dry Needling                       mins self-pay     Timed Treatment:   50   mins                  Total Treatment:    60   mins     Antonio Coley PT  Physical Therapist

## 2019-06-25 ENCOUNTER — TREATMENT (OUTPATIENT)
Dept: PHYSICAL THERAPY | Facility: CLINIC | Age: 59
End: 2019-06-25

## 2019-06-25 DIAGNOSIS — M19.011 OSTEOARTHRITIS OF BOTH SHOULDERS, UNSPECIFIED OSTEOARTHRITIS TYPE: Primary | ICD-10-CM

## 2019-06-25 DIAGNOSIS — G89.29 BILATERAL CHRONIC KNEE PAIN: ICD-10-CM

## 2019-06-25 DIAGNOSIS — M25.562 BILATERAL CHRONIC KNEE PAIN: ICD-10-CM

## 2019-06-25 DIAGNOSIS — M17.0 PRIMARY OSTEOARTHRITIS OF BOTH KNEES: ICD-10-CM

## 2019-06-25 DIAGNOSIS — M19.012 OSTEOARTHRITIS OF BOTH SHOULDERS, UNSPECIFIED OSTEOARTHRITIS TYPE: Primary | ICD-10-CM

## 2019-06-25 DIAGNOSIS — M25.612 SHOULDER STIFFNESS, LEFT: ICD-10-CM

## 2019-06-25 DIAGNOSIS — R26.9 GAIT DISTURBANCE: ICD-10-CM

## 2019-06-25 DIAGNOSIS — M25.561 BILATERAL CHRONIC KNEE PAIN: ICD-10-CM

## 2019-06-25 DIAGNOSIS — M75.82 ROTATOR CUFF TENDONITIS, LEFT: ICD-10-CM

## 2019-06-25 PROCEDURE — 97110 THERAPEUTIC EXERCISES: CPT | Performed by: PHYSICAL THERAPIST

## 2019-06-25 PROCEDURE — 97530 THERAPEUTIC ACTIVITIES: CPT | Performed by: PHYSICAL THERAPIST

## 2019-06-25 RX ORDER — MECLIZINE HYDROCHLORIDE 25 MG/1
25 TABLET ORAL 3 TIMES DAILY PRN
Qty: 270 TABLET | Refills: 1 | Status: SHIPPED | OUTPATIENT
Start: 2019-06-25 | End: 2020-08-31

## 2019-06-26 ENCOUNTER — OFFICE VISIT (OUTPATIENT)
Dept: INTERNAL MEDICINE | Facility: CLINIC | Age: 59
End: 2019-06-26

## 2019-06-26 VITALS
OXYGEN SATURATION: 98 % | WEIGHT: 226.6 LBS | BODY MASS INDEX: 44.49 KG/M2 | DIASTOLIC BLOOD PRESSURE: 80 MMHG | SYSTOLIC BLOOD PRESSURE: 124 MMHG | HEART RATE: 90 BPM | HEIGHT: 60 IN

## 2019-06-26 DIAGNOSIS — I10 HYPERTENSION, UNSPECIFIED TYPE: Primary | ICD-10-CM

## 2019-06-26 DIAGNOSIS — M17.0 PRIMARY OSTEOARTHRITIS OF BOTH KNEES: ICD-10-CM

## 2019-06-26 DIAGNOSIS — E78.5 HYPERLIPIDEMIA, UNSPECIFIED HYPERLIPIDEMIA TYPE: ICD-10-CM

## 2019-06-26 LAB
ALBUMIN SERPL-MCNC: 4 G/DL (ref 3.5–5.2)
ALBUMIN/GLOB SERPL: 1.3 G/DL
ALP SERPL-CCNC: 60 U/L (ref 39–117)
ALT SERPL-CCNC: 14 U/L (ref 1–33)
AST SERPL-CCNC: 13 U/L (ref 1–32)
BILIRUB SERPL-MCNC: 0.6 MG/DL (ref 0.2–1.2)
BUN SERPL-MCNC: 33 MG/DL (ref 6–20)
BUN/CREAT SERPL: 23.4 (ref 7–25)
CALCIUM SERPL-MCNC: 9.1 MG/DL (ref 8.6–10.5)
CHLORIDE SERPL-SCNC: 99 MMOL/L (ref 98–107)
CHOLEST SERPL-MCNC: 173 MG/DL (ref 0–200)
CO2 SERPL-SCNC: 27.8 MMOL/L (ref 22–29)
CREAT SERPL-MCNC: 1.41 MG/DL (ref 0.57–1)
GLOBULIN SER CALC-MCNC: 3 GM/DL
GLUCOSE SERPL-MCNC: 84 MG/DL (ref 65–99)
HDLC SERPL-MCNC: 48 MG/DL (ref 40–60)
LDLC SERPL CALC-MCNC: 103 MG/DL (ref 0–100)
LDLC/HDLC SERPL: 2.14 {RATIO}
POTASSIUM SERPL-SCNC: 4.3 MMOL/L (ref 3.5–5.2)
PROT SERPL-MCNC: 7 G/DL (ref 6–8.5)
SODIUM SERPL-SCNC: 139 MMOL/L (ref 136–145)
TRIGL SERPL-MCNC: 111 MG/DL (ref 0–150)
VLDLC SERPL CALC-MCNC: 22.2 MG/DL

## 2019-06-26 PROCEDURE — 99214 OFFICE O/P EST MOD 30 MIN: CPT | Performed by: FAMILY MEDICINE

## 2019-06-26 NOTE — PROGRESS NOTES
Subjective   Pedrito Powell is a 58 y.o. female.     Chief Complaint   Patient presents with   • Hypertension   • Hyperlipidemia   • Osteoarthritis         History of Present Illness     Patient is a past medical history for essential hypertension.  At today's office visit her blood pressure is 124/80.  She is currently taking irbesartan 300 mg daily.  She is also taking hydrochlorothiazide 25 mg daily.  She denies any side effects of these medications.    She is a past medical history for hyperlipidemia.  Patient is currently taking Lipitor 80 mg daily.  She does not have any side effects of this medicine.    Patient has a long history of osteoarthritis.  She is currently undergoing physical therapy, and she knows of the physical therapy is helping her knees.  We are doing many modalities which include taping of the knee.  She is progressing well with the physical therapy.  She knows there was a physical therapy, she is no longer needing to take the extra Tylenol during the day.  Patient however still continue to use the diclofenac.    The following portions of the patient's history were reviewed and updated as appropriate: allergies, current medications, past family history, past medical history, past social history, past surgical history and problem list.    Review of Systems   Constitutional: Negative for chills and fever.   HENT: Negative for congestion, rhinorrhea, sinus pain and sore throat.    Eyes: Negative for photophobia and visual disturbance.   Respiratory: Negative for cough, chest tightness and shortness of breath.    Cardiovascular: Negative for chest pain and palpitations.   Gastrointestinal: Negative for diarrhea, nausea and vomiting.   Genitourinary: Negative for dysuria, frequency and urgency.   Skin: Negative for rash and wound.   Neurological: Negative for dizziness and syncope.   Psychiatric/Behavioral: Negative for behavioral problems and confusion.       Objective   Physical Exam    Constitutional: She is oriented to person, place, and time. She appears well-developed and well-nourished.   HENT:   Head: Normocephalic and atraumatic.   Right Ear: External ear normal.   Left Ear: External ear normal.   Eyes: EOM are normal.   Neck: Normal range of motion. Neck supple.   Cardiovascular: Normal rate, regular rhythm and normal heart sounds.   Pulmonary/Chest: Effort normal and breath sounds normal. No respiratory distress.   Musculoskeletal: Normal range of motion.   Lymphadenopathy:     She has no cervical adenopathy.   Neurological: She is alert and oriented to person, place, and time.   Skin: Skin is warm.   Psychiatric: She has a normal mood and affect. Her behavior is normal.   Nursing note and vitals reviewed.      Assessment/Plan   Pedrito was seen today for hypertension, hyperlipidemia and osteoarthritis.    Diagnoses and all orders for this visit:    Hypertension, unspecified type  - Stable, continue current medication.     Hyperlipidemia, unspecified hyperlipidemia type  - Stable, we will continue lipitor.     Primary osteoarthritis of both knees  - Recommend continue PT. Start pennsaid.           No Follow-up on file.    Dictated utilizing Dragon Voice Recognition Software

## 2019-07-02 DIAGNOSIS — E78.5 HYPERLIPIDEMIA, UNSPECIFIED HYPERLIPIDEMIA TYPE: Primary | ICD-10-CM

## 2019-07-02 RX ORDER — ATORVASTATIN CALCIUM 80 MG/1
80 TABLET, FILM COATED ORAL DAILY
Qty: 90 TABLET | Refills: 1 | Status: SHIPPED | OUTPATIENT
Start: 2019-07-02 | End: 2019-12-24 | Stop reason: SDUPTHER

## 2019-07-03 ENCOUNTER — TREATMENT (OUTPATIENT)
Dept: PHYSICAL THERAPY | Facility: CLINIC | Age: 59
End: 2019-07-03

## 2019-07-03 DIAGNOSIS — R26.9 GAIT DISTURBANCE: ICD-10-CM

## 2019-07-03 DIAGNOSIS — G89.29 BILATERAL CHRONIC KNEE PAIN: ICD-10-CM

## 2019-07-03 DIAGNOSIS — M25.612 SHOULDER STIFFNESS, LEFT: ICD-10-CM

## 2019-07-03 DIAGNOSIS — M17.0 PRIMARY OSTEOARTHRITIS OF BOTH KNEES: Primary | ICD-10-CM

## 2019-07-03 DIAGNOSIS — M19.011 OSTEOARTHRITIS OF BOTH SHOULDERS, UNSPECIFIED OSTEOARTHRITIS TYPE: ICD-10-CM

## 2019-07-03 DIAGNOSIS — M25.562 BILATERAL CHRONIC KNEE PAIN: ICD-10-CM

## 2019-07-03 DIAGNOSIS — M75.82 ROTATOR CUFF TENDONITIS, LEFT: ICD-10-CM

## 2019-07-03 DIAGNOSIS — M25.561 BILATERAL CHRONIC KNEE PAIN: ICD-10-CM

## 2019-07-03 DIAGNOSIS — M19.012 OSTEOARTHRITIS OF BOTH SHOULDERS, UNSPECIFIED OSTEOARTHRITIS TYPE: ICD-10-CM

## 2019-07-03 PROCEDURE — 97112 NEUROMUSCULAR REEDUCATION: CPT | Performed by: PHYSICAL THERAPIST

## 2019-07-03 PROCEDURE — 97110 THERAPEUTIC EXERCISES: CPT | Performed by: PHYSICAL THERAPIST

## 2019-07-03 PROCEDURE — 97530 THERAPEUTIC ACTIVITIES: CPT | Performed by: PHYSICAL THERAPIST

## 2019-07-07 NOTE — PROGRESS NOTES
Physical Therapy Daily Progress Note     Patient Name: Pedrito Powell         :  1960  Referring Physician: Teo Fraser MD     Subjective   Pedrito Powell reports: overall decreased knee pain and improved tolerance to ADL's, but persistent knee pain Rt > Lt -   Severe popping / pain (L) shoulder with even the slightest movement - Taping helpful in reducing knee pain with activity, but recently has had skin irritation (R) knee -  Tape decreases (L) shoulder pain at rest -   Pain anterior and medial (R) knee pain and anterior (L) knee pain and swelling (R) knee - Diffuse (L) Shoulder pain -      Saw MD who recommended continued Physical Therapy -     Objective    Antalgic gait with decreased WB-ing RLE -- Increased swelling (R) knee -     Very Tender infrapatellar region, medial PF Jt, medial jt line and pes region (R) knee; Tender anterior knee/PF jt and infrapatellar region -(L) knee   Inferior tipped patella (R)-  More swelling (R) knee diffusely  (L) [place] FE 85-deg w/ pain and sig compensation -   (+++) Step Up test (R) with severe pain at infrapatellar region and pain lasting    Skin irritation anterior/med/lat (R) knee from tape -      See Exercise, Manual, and Modality Logs for complete treatment.      Functional / Therapeutic Activities:  10 min  · TAPING / BRACIN) K-Tape w/ Kinesio Tape over to unload /support (L) shoulder; 2) K-Tape to Facilitate (L) deltoid -       HELD KNEE TAPING due to skin irritation -   · Jt protection, ADL modification; Posture and       Assessment/Plan  (B) Knee pain R>L:  (B) knee OA; RA; Severe (L) Shoulder OA / RTC deficient ; (R) shoulder pain -   Taping greatly reduces knee pain and (L) shoulder pain and improved ability to ambulate / function except stairs, squatting, kneeling - and poor mobility, strength, functional use of (L) shoulder - Severe OA (Bone-on-Bone R>L)knee and apparent sig OA (L) Shoulder w/ deficient RTC  Pt may benefit from  medial  brace (R) knee -   Pt may require TKA (R>L) and RevTSA (L)     Progress strengthening /stabilization /functional activity -  -   _________________________________________________  Manual Therapy:                 mins  19314;  Therapeutic Exercise:  35    mins  92979;     Neuromuscular Dion:  10      mins  68526;    Therapeutic Activity:      10    mins  09899;     Iontophoresis:                      mins  87900;     Ultrasound:                           mins  25056;    Electrical Stimulation:         mins  68850 ( );  Dry Needling                       mins self-pay     Timed Treatment:   55   mins                  Total Treatment:    65   mins     Antonio Coley PT  Physical Therapist

## 2019-07-10 ENCOUNTER — TREATMENT (OUTPATIENT)
Dept: PHYSICAL THERAPY | Facility: CLINIC | Age: 59
End: 2019-07-10

## 2019-07-10 DIAGNOSIS — M19.011 OSTEOARTHRITIS OF BOTH SHOULDERS, UNSPECIFIED OSTEOARTHRITIS TYPE: ICD-10-CM

## 2019-07-10 DIAGNOSIS — R26.9 GAIT DISTURBANCE: ICD-10-CM

## 2019-07-10 DIAGNOSIS — M79.672 FOOT PAIN, LEFT: ICD-10-CM

## 2019-07-10 DIAGNOSIS — M25.562 BILATERAL CHRONIC KNEE PAIN: ICD-10-CM

## 2019-07-10 DIAGNOSIS — M75.82 ROTATOR CUFF TENDONITIS, LEFT: ICD-10-CM

## 2019-07-10 DIAGNOSIS — M17.0 PRIMARY OSTEOARTHRITIS OF BOTH KNEES: Primary | ICD-10-CM

## 2019-07-10 DIAGNOSIS — M19.012 OSTEOARTHRITIS OF BOTH SHOULDERS, UNSPECIFIED OSTEOARTHRITIS TYPE: ICD-10-CM

## 2019-07-10 DIAGNOSIS — M25.612 SHOULDER STIFFNESS, LEFT: ICD-10-CM

## 2019-07-10 DIAGNOSIS — M25.561 BILATERAL CHRONIC KNEE PAIN: ICD-10-CM

## 2019-07-10 DIAGNOSIS — G89.29 BILATERAL CHRONIC KNEE PAIN: ICD-10-CM

## 2019-07-10 PROCEDURE — 97530 THERAPEUTIC ACTIVITIES: CPT | Performed by: PHYSICAL THERAPIST

## 2019-07-10 PROCEDURE — 97110 THERAPEUTIC EXERCISES: CPT | Performed by: PHYSICAL THERAPIST

## 2019-07-11 ENCOUNTER — TELEPHONE (OUTPATIENT)
Dept: INTERNAL MEDICINE | Facility: CLINIC | Age: 59
End: 2019-07-11

## 2019-07-11 RX ORDER — ACETAMINOPHEN 160 MG
2000 TABLET,DISINTEGRATING ORAL DAILY
Qty: 90 CAPSULE | Refills: 3 | Status: SHIPPED | OUTPATIENT
Start: 2019-07-11 | End: 2020-07-23

## 2019-07-11 NOTE — TELEPHONE ENCOUNTER
----- Message from Teo Fraser MD sent at 7/1/2019 10:09 AM EDT -----  The labs were reviewed. Please inform patient that labs were abnormal.  LDL is slightly elevated.  She is on max dose statin, patient to monitor diet.  Data serum creatinine levels, however appears to be baseline.

## 2019-07-11 NOTE — PROGRESS NOTES
Physical Therapy Daily Progress Note     Patient Name: Pedrito Powell         :  1960  Referring Physician: Teo Fraser MD     Subjective   Pedrito Powell reports: overall decreased knee pain and improved tolerance to ADL's, but persistent knee pain Rt > Lt -   Severe popping / pain (L) shoulder with even the slightest movement - Taping helpful in reducing knee pain with activity, but recently has had skin irritation (R) knee -  Tape decreases (L) shoulder pain at rest -   Pain anterior and medial (R) knee pain and anterior (L) knee pain and swelling (R) knee - Diffuse (L) Shoulder pain -     Pt reports increased (L) foot pain since walking in sandals - injured foot when it was stepped on some time ago -   Feels SLS exercises triggered a bad vertigo episode after last session        Objective    Antalgic gait with decreased WB-ing LLE --   Severe pes planus (B) - Tender along posterior tibialis tendon, Navicular, medial / dorsal foot -Tender along plantar fascia  (L)      See Exercise, Manual, and Modality Logs for complete treatment.      Functional / Therapeutic Activities:  25 min  · TAPING / BRACIN) K-Tape w/ Kinesio Tape over to unload /support (L) shoulder; 2) K-Tape to Facilitate (L) deltoid - 3) Tape with  Leukotape over to a) Unload PF, b) Unload /SUpport navicular, c) Support medial arch; (modifications for comfort during treatment) -        HELD KNEE TAPING due to skin irritation -   · Assessed foot -   · Jt protection, ADL modification; Posture and       Assessment/Plan  (B) Knee pain R>L:  (B) knee OA; RA; Severe (L) Shoulder OA / RTC deficient ; (R) shoulder pain -   Taping greatly reduces knee pain and (L) shoulder pain and improved ability to ambulate / function except stairs, squatting, kneeling - and poor mobility, strength, functional use of (L) shoulder - Severe OA (Bone-on-Bone R>L)knee and apparent sig OA (L) Shoulder w/ deficient RTC  Pt may benefit from medial   brace (R) knee -   Pt may require TKA (R>L) and RevTSA (L)    Chronic foot injury / pain w/ pes planus (L) - Pain alleviated with taping - Pt may benefit from customized orthotics -     Progress strengthening /stabilization /functional activity -  -   _________________________________________________  Manual Therapy:                 mins  89258;  Therapeutic Exercise:  30    mins  68038;     Neuromuscular Dion:        mins  72431;    Therapeutic Activity:      25    mins  51212;     Iontophoresis:                      mins  93034;     Ultrasound:                           mins  56793;    Electrical Stimulation:         mins  23095 ( );  Dry Needling                       mins self-pay     Timed Treatment:   55   mins                  Total Treatment:    70   mins     Antonio Coley PT  Physical Therapist

## 2019-07-17 ENCOUNTER — TREATMENT (OUTPATIENT)
Dept: PHYSICAL THERAPY | Facility: CLINIC | Age: 59
End: 2019-07-17

## 2019-07-17 DIAGNOSIS — M25.612 SHOULDER STIFFNESS, LEFT: ICD-10-CM

## 2019-07-17 DIAGNOSIS — M75.82 ROTATOR CUFF TENDONITIS, LEFT: ICD-10-CM

## 2019-07-17 DIAGNOSIS — M25.561 BILATERAL CHRONIC KNEE PAIN: ICD-10-CM

## 2019-07-17 DIAGNOSIS — R26.9 GAIT DISTURBANCE: ICD-10-CM

## 2019-07-17 DIAGNOSIS — M17.0 PRIMARY OSTEOARTHRITIS OF BOTH KNEES: Primary | ICD-10-CM

## 2019-07-17 DIAGNOSIS — M25.562 BILATERAL CHRONIC KNEE PAIN: ICD-10-CM

## 2019-07-17 DIAGNOSIS — G89.29 BILATERAL CHRONIC KNEE PAIN: ICD-10-CM

## 2019-07-17 DIAGNOSIS — M79.672 FOOT PAIN, LEFT: ICD-10-CM

## 2019-07-17 DIAGNOSIS — M19.011 OSTEOARTHRITIS OF BOTH SHOULDERS, UNSPECIFIED OSTEOARTHRITIS TYPE: ICD-10-CM

## 2019-07-17 DIAGNOSIS — M19.012 OSTEOARTHRITIS OF BOTH SHOULDERS, UNSPECIFIED OSTEOARTHRITIS TYPE: ICD-10-CM

## 2019-07-17 PROCEDURE — 97530 THERAPEUTIC ACTIVITIES: CPT | Performed by: PHYSICAL THERAPIST

## 2019-07-17 PROCEDURE — 97110 THERAPEUTIC EXERCISES: CPT | Performed by: PHYSICAL THERAPIST

## 2019-07-21 NOTE — PROGRESS NOTES
Physical Therapy Daily Progress Note     Patient Name: Pedrito Powell         :  1960  Referring Physician: Teo Fraser MD     Subjective   Pedrito Powell reports: overall decreased knee pain and improved tolerance to ADL's, but persistent knee pain Rt > Lt -   Severe popping / pain (L) shoulder with even the slightest movement - Taping helpful in reducing knee pain with activity, but recently has had skin irritation (R) knee -  Tape decreases (L) shoulder pain at rest -   Pain anterior and medial (R) knee pain and anterior (L) knee pain and swelling (R) knee - Diffuse (L) Shoulder pain -      Pt reports decreased (L) foot pain after taping last session - injured foot when it was stepped on some time ago -   Feels SLS exercises triggered a bad vertigo episode-        Objective    Antalgic gait with decreased WB-ing LLE --   Severe pes planus (B) - Tender along posterior tibialis tendon, Navicular, medial / dorsal foot -Tender along plantar fascia  (L)      See Exercise, Manual, and Modality Logs for complete treatment.      Functional / Therapeutic Activities:  25 min  · TAPING / BRACIN) K-Tape w/ Kinesio Tape over to unload /support (L) shoulder; 2) K-Tape to Facilitate (L) deltoid - 3) Tape with  Leukotape over to a) Unload PF, b) Unload /SUpport navicular, c) Support medial arch; (modifications for comfort during treatment) -        HELD KNEE TAPING due to skin irritation -   · Jt protection, ADL modification; Posture and       Assessment/Plan  (B) Knee pain R>L:  (B) knee OA; RA; Severe (L) Shoulder OA / RTC deficient ; (R) shoulder pain -   Taping greatly reduces knee pain and (L) shoulder pain and improved ability to ambulate / function except stairs, squatting, kneeling - and poor mobility, strength, functional use of (L) shoulder - Severe OA (Bone-on-Bone R>L)knee and apparent sig OA (L) Shoulder w/ deficient RTC  Pt may benefit from medial  brace (R) knee -   Pt may  require TKA (R>L) and RevTSA (L)     Chronic foot injury / pain w/ pes planus (L) - Pain alleviated with taping - Pt may benefit from customized orthotics -     Progress strengthening /stabilization /functional activity -  -   _________________________________________________  Manual Therapy:                 mins  30078;  Therapeutic Exercise:  30    mins  60586;     Neuromuscular Dion:        mins  14034;    Therapeutic Activity:      25    mins  33134;     Iontophoresis:                      mins  03259;     Ultrasound:                           mins  07616;    Electrical Stimulation:         mins  31446 ( );  Dry Needling                       mins self-pay     Timed Treatment:   55   mins                  Total Treatment:    70   mins     Antonio Coley, PT  Physical Therapist

## 2019-07-22 DIAGNOSIS — M17.0 PRIMARY OSTEOARTHRITIS OF BOTH KNEES: ICD-10-CM

## 2019-07-24 ENCOUNTER — TREATMENT (OUTPATIENT)
Dept: PHYSICAL THERAPY | Facility: CLINIC | Age: 59
End: 2019-07-24

## 2019-07-24 DIAGNOSIS — R26.9 GAIT DISTURBANCE: ICD-10-CM

## 2019-07-24 DIAGNOSIS — M19.011 OSTEOARTHRITIS OF BOTH SHOULDERS, UNSPECIFIED OSTEOARTHRITIS TYPE: ICD-10-CM

## 2019-07-24 DIAGNOSIS — M25.561 BILATERAL CHRONIC KNEE PAIN: ICD-10-CM

## 2019-07-24 DIAGNOSIS — M25.562 BILATERAL CHRONIC KNEE PAIN: ICD-10-CM

## 2019-07-24 DIAGNOSIS — M75.82 ROTATOR CUFF TENDONITIS, LEFT: ICD-10-CM

## 2019-07-24 DIAGNOSIS — G89.29 BILATERAL CHRONIC KNEE PAIN: ICD-10-CM

## 2019-07-24 DIAGNOSIS — M79.672 FOOT PAIN, LEFT: ICD-10-CM

## 2019-07-24 DIAGNOSIS — M17.0 PRIMARY OSTEOARTHRITIS OF BOTH KNEES: Primary | ICD-10-CM

## 2019-07-24 DIAGNOSIS — M25.612 SHOULDER STIFFNESS, LEFT: ICD-10-CM

## 2019-07-24 DIAGNOSIS — M19.012 OSTEOARTHRITIS OF BOTH SHOULDERS, UNSPECIFIED OSTEOARTHRITIS TYPE: ICD-10-CM

## 2019-07-24 PROCEDURE — 97110 THERAPEUTIC EXERCISES: CPT | Performed by: PHYSICAL THERAPIST

## 2019-07-24 PROCEDURE — 97530 THERAPEUTIC ACTIVITIES: CPT | Performed by: PHYSICAL THERAPIST

## 2019-07-25 RX ORDER — DICLOFENAC SODIUM 75 MG/1
75 TABLET, DELAYED RELEASE ORAL DAILY
Qty: 30 TABLET | Refills: 1 | Status: SHIPPED | OUTPATIENT
Start: 2019-07-25 | End: 2019-09-25 | Stop reason: SDUPTHER

## 2019-07-28 NOTE — PROGRESS NOTES
Physical Therapy Daily Progress Note     Patient Name: Pedrito Powell         :  1960  Referring Physician: Teo Fraser MD     Subjective   Perdito Powell reports: not able to get her pain meds and a new ointment that has not been authorized by her MD yet - Thus has increased persistent knee pain Left > Right and Shoulder R>L - Constant pain - Cannot raise/move her (R) arm/shoulder due to severe pain and increased swelling and pain anterior/ant-medial knee L>R -   Severe popping / pain (L) shoulder with even the slightest movement - Taping helpful in reducing knee pain with activity, but recently has had skin irritation (R) knee -  Tape decreases (L) shoulder pain at rest -   Pain anterior and medial (R) knee pain and anterior (L) knee pain and swelling (R) knee - Diffuse (L) Shoulder pain -      Pt reports decreased (L) foot pain after taping last session -  Pain increased w/o tape -     Objective    Antalgic gait with decreased WB-ing LLE --   Severe pes planus (B) - Tender along posterior tibialis tendon, Navicular, medial / dorsal foot -Tender along plantar fascia  (L)   Severe pain ant/medial, medial (L) > (R) knee and swelling as well anterior knee- (R) Shoulder demonstrates significant crepitus / clunking pain      See Exercise, Manual, and Modality Logs for complete treatment.      Functional / Therapeutic Activities:  35 min  · TAPING / BRACIN) K-Tape w/ Kinesio Tape over to unload /support (L) shoulder; 2) K-Tape to Facilitate (L) deltoid - 3) Tape with  Leukotape over to a) Unload PF, b) Unload /SUpport navicular, c) Support medial arch; (modifications for comfort during treatment) -        KNEE TAPING: (L) Kinesio Tape to 1) Unload PF Jt and Medial Knee jt;   (R) Unload medial knee joint -   · Jt protection, ADL modification; Posture and       Assessment/Plan  (B) Knee pain R>L:  (B) knee OA; RA; Severe (L) Shoulder OA / RTC deficient ; (R) shoulder pain -   Taping greatly  reduces knee pain and (L) shoulder pain and improved ability to ambulate / function except stairs, squatting, kneeling - and poor mobility, strength, functional use of (L) shoulder - Severe OA (Bone-on-Bone R>L)knee and apparent sig OA (L) Shoulder w/ deficient RTC  Pt may benefit from medial  brace (R) knee -   Pt may require TKA (R>L) and RevTSA (L)    Increased pain due to not having medications -      Chronic foot injury / pain w/ pes planus (L) - Pain alleviated with taping - Pt may benefit from customized orthotics -     Progress strengthening /stabilization /functional activity -  -   _________________________________________________  Manual Therapy:                 mins  39793;  Therapeutic Exercise:  30    mins  54168;     Neuromuscular Dion:        mins  05686;    Therapeutic Activity:      35    mins  93259;     Iontophoresis:                      mins  24630;     Ultrasound:                           mins  99080;    Electrical Stimulation:         mins  79009 ( );  Dry Needling                       mins self-pay     Timed Treatment:   65   mins                  Total Treatment:    80   mins     Antonio Coley PT  Physical Therapist

## 2019-07-30 ENCOUNTER — TREATMENT (OUTPATIENT)
Dept: PHYSICAL THERAPY | Facility: CLINIC | Age: 59
End: 2019-07-30

## 2019-07-30 DIAGNOSIS — G89.29 BILATERAL CHRONIC KNEE PAIN: ICD-10-CM

## 2019-07-30 DIAGNOSIS — M25.561 BILATERAL CHRONIC KNEE PAIN: ICD-10-CM

## 2019-07-30 DIAGNOSIS — M25.562 BILATERAL CHRONIC KNEE PAIN: ICD-10-CM

## 2019-07-30 DIAGNOSIS — R26.9 GAIT DISTURBANCE: ICD-10-CM

## 2019-07-30 DIAGNOSIS — M17.0 PRIMARY OSTEOARTHRITIS OF BOTH KNEES: Primary | ICD-10-CM

## 2019-07-30 DIAGNOSIS — M79.672 FOOT PAIN, LEFT: ICD-10-CM

## 2019-07-30 PROCEDURE — 97110 THERAPEUTIC EXERCISES: CPT | Performed by: PHYSICAL THERAPIST

## 2019-07-30 PROCEDURE — 97530 THERAPEUTIC ACTIVITIES: CPT | Performed by: PHYSICAL THERAPIST

## 2019-08-01 NOTE — PROGRESS NOTES
Physical Therapy Daily Progress Note     Patient Name: Pedrito Powell         :  1960  Referring Physician: Teo Fraser MD     Subjective   Pedrito Powell reports: she was able to get the medication and creams she needed - Med and taping helped in reducing (B) knee pain and (L) foot pain - Had skin reaction to tape on (R) shoulder -      Pt reports decreased (L) foot pain after taping last session -  Pain increased w/o tape -     Objective    Antalgic gait with decreased WB-ing LLE --   Severe pes planus (B) - Tender along posterior tibialis tendon, Navicular, medial / dorsal foot -Tender along plantar fascia  (L)   Severe pain ant/medial, medial (L) > (R) knee and swelling as well anterior knee- (R) Shoulder demonstrates significant crepitus / clunking pain      See Exercise, Manual, and Modality Logs for complete treatment.      Functional / Therapeutic Activities:  25 min  · TAPING / BRACIN) K-Tape w/ Kinesio Tape over to unload /support (L) shoulder; 2) K-Tape to Facilitate (L) deltoid - (modifications for comfort during treatment) - HELD due to skin irritation   · FOOT TAPING: (L) 1) Tape 1in to unload PF and flex/abd GT x 4 strips: 2) Tape with  Leukotape over to a) Unload PF, b) Unload /SUpport navicular, c) Support medial arch;        KNEE TAPING: (L) Kinesio Tape to 1) Unload PF Jt, Infrapatellar region and Medial Knee jt;   (R) Unload medial knee joint -   · Jt protection, ADL modification; Posture and       Assessment/Plan  (B) Knee pain R>L:  (B) knee OA; RA; Severe (L) Shoulder OA / RTC deficient ; (R) shoulder pain -   Taping greatly reduces knee pain and (L) shoulder pain and improved ability to ambulate / function except stairs, squatting, kneeling - and poor mobility, strength, functional use of (L) shoulder - Severe OA (Bone-on-Bone R>L)knee and apparent sig OA (L) Shoulder w/ deficient RTC  Pt may benefit from medial  brace (R) knee -   Pt may require TKA  (R>L) and RevTSA (L)     Increased pain due to not having medications -      Chronic foot injury / pain w/ pes planus (L) - Pain alleviated with taping - Pt may benefit from customized orthotics -     Progress strengthening /stabilization /functional activity -    _________________________________________________  Manual Therapy:                 mins  94815;  Therapeutic Exercise:  30    mins  42895;     Neuromuscular Dion:        mins  26812;    Therapeutic Activity:      25    mins  73411;     Iontophoresis:                      mins  30525;     Ultrasound:                           mins  04204;    Electrical Stimulation:         mins  65001 ( );  Dry Needling                       mins self-pay     Timed Treatment:   55   mins                  Total Treatment:    70   mins     Antonio Coley PT  Physical Therapist

## 2019-08-20 ENCOUNTER — TREATMENT (OUTPATIENT)
Dept: PHYSICAL THERAPY | Facility: CLINIC | Age: 59
End: 2019-08-20

## 2019-08-20 DIAGNOSIS — M75.82 ROTATOR CUFF TENDONITIS, LEFT: ICD-10-CM

## 2019-08-20 DIAGNOSIS — R26.9 GAIT DISTURBANCE: ICD-10-CM

## 2019-08-20 DIAGNOSIS — M79.672 FOOT PAIN, LEFT: ICD-10-CM

## 2019-08-20 DIAGNOSIS — M19.011 OSTEOARTHRITIS OF BOTH SHOULDERS, UNSPECIFIED OSTEOARTHRITIS TYPE: ICD-10-CM

## 2019-08-20 DIAGNOSIS — M25.562 BILATERAL CHRONIC KNEE PAIN: ICD-10-CM

## 2019-08-20 DIAGNOSIS — M17.0 PRIMARY OSTEOARTHRITIS OF BOTH KNEES: Primary | ICD-10-CM

## 2019-08-20 DIAGNOSIS — G89.29 BILATERAL CHRONIC KNEE PAIN: ICD-10-CM

## 2019-08-20 DIAGNOSIS — M19.012 OSTEOARTHRITIS OF BOTH SHOULDERS, UNSPECIFIED OSTEOARTHRITIS TYPE: ICD-10-CM

## 2019-08-20 DIAGNOSIS — M25.561 BILATERAL CHRONIC KNEE PAIN: ICD-10-CM

## 2019-08-20 DIAGNOSIS — M25.612 SHOULDER STIFFNESS, LEFT: ICD-10-CM

## 2019-08-20 PROCEDURE — 97110 THERAPEUTIC EXERCISES: CPT | Performed by: PHYSICAL THERAPIST

## 2019-08-20 PROCEDURE — 97530 THERAPEUTIC ACTIVITIES: CPT | Performed by: PHYSICAL THERAPIST

## 2019-08-25 NOTE — PROGRESS NOTES
Physical Therapy Daily Progress Note     Patient Name: Pedrito Powell         :  1960  Referring Physician: Teo Fraser MD     Subjective   Pedrito Powell reports: she was able to get the medication and creams she needed -   Pt reports severe reaction dorsum of foot from tape, despite use of pre-wrap - Taping was very helpful in decreasing her pain until the reaction -    (L) Shoulder severely painful - tape helpful -  Anterior and ant/medial knee pain R>L -      Objective    Antalgic gait with decreased WB-ing LLE --   Tender anterior and medial knee (R) >L     See Exercise, Manual, and Modality Logs for complete treatment.      Functional / Therapeutic Activities:  10 min  · TAPING / BRACIN) K-Tape w/ Kinesio Tape over to unload /support (L) shoulder; 2) K-Tape to Facilitate (L) deltoid - (modifications for comfort during treatment) -    · FOOT TAPING: (L) 1) Tape 1in to unload PF and flex/abd GT x 4 strips: 2) Tape with  Leukotape over to a) Unload PF, b) Unload /SUpport navicular, c) Support medial arch; -HELD DUE TO SKIN IRRITATION -       KNEE TAPING: (L) Kinesio Tape to 1) Unload PF Jt, Infrapatellar region and Medial Knee jt;   (R) Unload medial knee joint - HELD DUE TO Pt Just putting on medicated cream -   · Jt protection, ADL modification; Posture and       Assessment/Plan  (B) Knee pain R>L:  (B) knee OA; RA; Severe (L) Shoulder OA / RTC deficient ; (R) shoulder pain -   Taping greatly reduces knee pain and (L) shoulder pain and improved ability to ambulate / function except stairs, squatting, kneeling - and poor mobility, strength, functional use of (L) shoulder - Severe OA (Bone-on-Bone R>L)knee and apparent sig OA (L) Shoulder w/ deficient RTC  Pt may benefit from medial  brace (R) knee -   Pt may require TKA (R>L) and RevTSA (L)    Chronic foot injury / pain w/ pes planus (L) - Pain alleviated with taping - Pt may benefit from customized orthotics -     Progress  strengthening /stabilization /functional activity -  Request order for  brace for (R)  Knee -   _________________________________________________  Manual Therapy:                 mins  47488;  Therapeutic Exercise:  30    mins  21431;     Neuromuscular Dion:        mins  82551;    Therapeutic Activity:      10    mins  07768;     Iontophoresis:                      mins  03063;     Ultrasound:                           mins  98893;    Electrical Stimulation:         mins  29192 ( );  Dry Needling                       mins self-pay     Timed Treatment:   40   mins                  Total Treatment:    55   mins     Antonio Coley PT  Physical Therapist

## 2019-08-28 ENCOUNTER — TREATMENT (OUTPATIENT)
Dept: PHYSICAL THERAPY | Facility: CLINIC | Age: 59
End: 2019-08-28

## 2019-08-28 DIAGNOSIS — M25.562 BILATERAL CHRONIC KNEE PAIN: ICD-10-CM

## 2019-08-28 DIAGNOSIS — M79.672 FOOT PAIN, LEFT: ICD-10-CM

## 2019-08-28 DIAGNOSIS — M25.561 BILATERAL CHRONIC KNEE PAIN: ICD-10-CM

## 2019-08-28 DIAGNOSIS — G89.29 BILATERAL CHRONIC KNEE PAIN: ICD-10-CM

## 2019-08-28 DIAGNOSIS — M17.0 PRIMARY OSTEOARTHRITIS OF BOTH KNEES: Primary | ICD-10-CM

## 2019-08-28 DIAGNOSIS — R26.9 GAIT DISTURBANCE: ICD-10-CM

## 2019-08-28 PROCEDURE — 97530 THERAPEUTIC ACTIVITIES: CPT | Performed by: PHYSICAL THERAPIST

## 2019-08-28 PROCEDURE — 97110 THERAPEUTIC EXERCISES: CPT | Performed by: PHYSICAL THERAPIST

## 2019-09-02 NOTE — PROGRESS NOTES
Physical Therapy Daily Progress Note     Patient Name: Pedrito Powell         :  1960  Referring Physician: Teo Fraser MD     Subjective   Pedrito Powell reports: she was able to get the medication and creams she needed -   Pt reports severe reaction dorsum of foot from tape, despite use of pre-wrap - Taping was very helpful in decreasing her pain until the reaction -    (L) Shoulder severely painful - tape helpful -  Anterior and ant/medial knee pain R>L -      Objective    Antalgic gait with decreased WB-ing LLE --   Tender anterior and medial knee (R) >L    Feet Assessment: Compensatory forefoot varus; Rear foot varus (B)     See Exercise, Manual, and Modality Logs for complete treatment.      Functional / Therapeutic Activities:  25 min  · TAPING / BRACING:   · Assessment of feet for orthotics  · Discussion about type(s) of orthotics and shopped for same on line for Pt to purchase   · Discussed shoe types best suited for her feet -   · Discussed  brace for (R) knee -   · Jt protection, ADL modification; Posture and       Assessment/Plan  (B) Knee pain R>L:  (B) knee OA; RA; Severe (L) Shoulder OA / RTC deficient ; (R) shoulder pain -   Taping greatly reduces knee pain and (L) shoulder pain and improved ability to ambulate / function except stairs, squatting, kneeling - and poor mobility, strength, functional use of (L) shoulder - Severe OA (Bone-on-Bone R>L)knee and apparent sig OA (L) Shoulder w/ deficient RTC  Pt may benefit from medial  brace (R) knee -   Pt may require TKA (R>L) and RevTSA (L)     Chronic foot injury / pain w/ pes planus (L) - Pain alleviated with taping - Pt may benefit from customized orthotics -     Progress strengthening /stabilization /functional activity -  Request order for  brace for (R)  Knee    _________________________________________________  Manual Therapy:                 mins  13855;  Therapeutic Exercise:  25    mins  32450;      Neuromuscular Dion:        mins  88922;    Therapeutic Activity:      25    mins  17810;     Iontophoresis:                      mins  87129;     Ultrasound:                           mins  20923;    Electrical Stimulation:         mins  43215 ( );  Dry Needling                       mins self-pay     Timed Treatment:   50   mins                  Total Treatment:    60   mins     Antonio Coley PT  Physical Therapist

## 2019-09-04 ENCOUNTER — TREATMENT (OUTPATIENT)
Dept: PHYSICAL THERAPY | Facility: CLINIC | Age: 59
End: 2019-09-04

## 2019-09-04 DIAGNOSIS — R26.9 GAIT DISTURBANCE: ICD-10-CM

## 2019-09-04 DIAGNOSIS — M25.561 BILATERAL CHRONIC KNEE PAIN: ICD-10-CM

## 2019-09-04 DIAGNOSIS — M19.012 OSTEOARTHRITIS OF BOTH SHOULDERS, UNSPECIFIED OSTEOARTHRITIS TYPE: ICD-10-CM

## 2019-09-04 DIAGNOSIS — M25.562 BILATERAL CHRONIC KNEE PAIN: ICD-10-CM

## 2019-09-04 DIAGNOSIS — M75.82 ROTATOR CUFF TENDONITIS, LEFT: ICD-10-CM

## 2019-09-04 DIAGNOSIS — M79.672 FOOT PAIN, LEFT: ICD-10-CM

## 2019-09-04 DIAGNOSIS — M17.0 PRIMARY OSTEOARTHRITIS OF BOTH KNEES: Primary | ICD-10-CM

## 2019-09-04 DIAGNOSIS — G89.29 BILATERAL CHRONIC KNEE PAIN: ICD-10-CM

## 2019-09-04 DIAGNOSIS — D64.9 ANEMIA, UNSPECIFIED TYPE: ICD-10-CM

## 2019-09-04 DIAGNOSIS — M25.612 SHOULDER STIFFNESS, LEFT: ICD-10-CM

## 2019-09-04 DIAGNOSIS — M19.011 OSTEOARTHRITIS OF BOTH SHOULDERS, UNSPECIFIED OSTEOARTHRITIS TYPE: ICD-10-CM

## 2019-09-04 PROCEDURE — 97530 THERAPEUTIC ACTIVITIES: CPT | Performed by: PHYSICAL THERAPIST

## 2019-09-04 PROCEDURE — 97110 THERAPEUTIC EXERCISES: CPT | Performed by: PHYSICAL THERAPIST

## 2019-09-04 RX ORDER — DOXYCYCLINE HYCLATE 50 MG/1
324 CAPSULE, GELATIN COATED ORAL
Qty: 90 TABLET | Refills: 1 | Status: SHIPPED | OUTPATIENT
Start: 2019-09-04 | End: 2020-03-10

## 2019-09-11 ENCOUNTER — TREATMENT (OUTPATIENT)
Dept: PHYSICAL THERAPY | Facility: CLINIC | Age: 59
End: 2019-09-11

## 2019-09-11 DIAGNOSIS — M25.612 SHOULDER STIFFNESS, LEFT: ICD-10-CM

## 2019-09-11 DIAGNOSIS — M75.82 ROTATOR CUFF TENDONITIS, LEFT: ICD-10-CM

## 2019-09-11 DIAGNOSIS — M25.562 BILATERAL CHRONIC KNEE PAIN: ICD-10-CM

## 2019-09-11 DIAGNOSIS — M25.561 BILATERAL CHRONIC KNEE PAIN: ICD-10-CM

## 2019-09-11 DIAGNOSIS — M19.011 OSTEOARTHRITIS OF BOTH SHOULDERS, UNSPECIFIED OSTEOARTHRITIS TYPE: ICD-10-CM

## 2019-09-11 DIAGNOSIS — R26.9 GAIT DISTURBANCE: ICD-10-CM

## 2019-09-11 DIAGNOSIS — G89.29 BILATERAL CHRONIC KNEE PAIN: ICD-10-CM

## 2019-09-11 DIAGNOSIS — M79.672 FOOT PAIN, LEFT: ICD-10-CM

## 2019-09-11 DIAGNOSIS — M19.012 OSTEOARTHRITIS OF BOTH SHOULDERS, UNSPECIFIED OSTEOARTHRITIS TYPE: ICD-10-CM

## 2019-09-11 DIAGNOSIS — M17.0 PRIMARY OSTEOARTHRITIS OF BOTH KNEES: Primary | ICD-10-CM

## 2019-09-11 PROCEDURE — 97530 THERAPEUTIC ACTIVITIES: CPT | Performed by: PHYSICAL THERAPIST

## 2019-09-11 PROCEDURE — 97110 THERAPEUTIC EXERCISES: CPT | Performed by: PHYSICAL THERAPIST

## 2019-09-11 NOTE — PROGRESS NOTES
Physical Therapy Daily Progress Note     Patient Name: Pedrito Powell         :  1960  Referring Physician: Teo Fraser MD     Subjective   Pedrito Powell reports: (L) Shoulder severely painful - tape helpful -  Anterior and ant/medial knee pain R>L -   Has not ordered orthotics yet and has not looked at shoes yet -       Objective    Antalgic gait with decreased WB-ing LLE --   Tender anterior and medial knee (R) >L and (L) Shoulder ant/lat/posterior tender and LH biceps tendon and deltoid - Minimal AROM (L) shoulder and severely weak ABDuction, Empty Can, ER L>R shoulder  Severely tender ant/medial and medial knee pain R>L -   Discussed  BRACE for (R) knee to decrease pain and improve function and pain control-   Encouraged Pt to make an appointment with her Ortho surgeon for knee assessment and seek orders for and  Brace (R) knee -      See Exercise, Manual, and Modality Logs for complete treatment.      Functional / Therapeutic Activities:  10 min  · TAPING / BRACIN) K-Tape w/ Kinesio Tape over to unload /support (L) shoulder; 2) K-Tape to Facilitate (L) deltoid - (modifications for comfort during treatment) -    · FOOT TAPING: (L) 1) Tape 1in to unload PF and flex/abd GT x 4 strips: 2) Tape with  Leukotape over to a) Unload PF, b) Unload /SUpport navicular, c) Support medial arch; -HELD DUE TO SKIN IRRITATION -       KNEE TAPING: (L) Kinesio Tape to 1) Unload PF Jt, Infrapatellar region and Medial Knee jt;   (R) Unload medial knee joint - HELD DUE TO Pt Just putting on medicated cream -   · Jt protection, ADL modification; Posture and       Assessment/Plan  (B) Knee pain R>L:  (B) knee OA; RA; Severe (L) Shoulder OA / RTC deficient ; (R) shoulder pain -   Taping greatly reduces knee pain and (L) shoulder pain and improved ability to ambulate / function except stairs, squatting, kneeling - and poor mobility, strength, functional use of (L) shoulder - Severe OA  (Bone-on-Bone R>L)knee and apparent sig OA (L) Shoulder w/ deficient RTC  Pt may benefit from medial  brace (R) knee -   Pt may require TKA (R>L) and RevTSA (L)     Chronic foot injury / pain w/ pes planus (L) - Pain alleviated with taping - Pt may benefit from customized orthotics and new shoes providing more stability / motion control -     Progress strengthening /stabilization /functional activity -  Request order for  brace for (R)  Knee -   Fit / modify orthotics once Pt orders them and receives them -   _________________________________________________  Manual Therapy:                 mins  43075;  Therapeutic Exercise:  30    mins  94252;     Neuromuscular Dion:        mins  52901;    Therapeutic Activity:      10    mins  45219;     Iontophoresis:                      mins  54865;     Ultrasound:                           mins  04736;    Electrical Stimulation:         mins  65780 ( );  Dry Needling                       mins self-pay     Timed Treatment:   40   mins                  Total Treatment:    55   mins     Antonio Coley, PT  Physical Therapist

## 2019-09-15 NOTE — PROGRESS NOTES
Physical Therapy Daily Progress Note     Patient Name: Pedrito Powell         :  1960  Referring Physician: Teo Fraser MD     Subjective   Pedrito Powell reports: (L) Shoulder severely painful - tape helpful -  Anterior and ant/medial knee pain R>L -   Has not ordered orthotics yet and has not looked at shoes yet -       Objective    Antalgic gait with decreased WB-ing LLE --   Tender anterior and medial knee (R) >L and (L) Shoulder ant/lat/posterior tender and LH biceps tendon and deltoid - Minimal AROM (L) shoulder and severely weak ABDuction, Empty Can, ER L>R shoulder  Severely tender ant/medial and medial knee pain R>L -   Discussed  BRACE for (R) knee to decrease pain and improve function and pain control-   Encouraged Pt to make an appointment with her Ortho surgeon for knee assessment and seek orders for and  Brace (R) knee -      See Exercise, Manual, and Modality Logs for complete treatment.      Functional / Therapeutic Activities:  15 min  · TAPING / BRACIN) K-Tape w/ Kinesio Tape over to unload /support (L) shoulder; 2) K-Tape to Facilitate (L) deltoid - (modifications for comfort during treatment) -    · FOOT TAPING: (L) 1) Tape 1in to unload PF and flex/abd GT x 4 strips: 2) Tape with  Leukotape over to a) Unload PF, b) Unload /SUpport navicular, c) Support medial arch; -HELD DUE TO SKIN IRRITATION -       KNEE TAPING: (L) Kinesio Tape to 1) Unload PF Jt, Infrapatellar region and Medial Knee jt;   (R) Unload medial knee joint -after washing knees to remove oil/lotions -   · Jt protection, ADL modification; Posture and       Assessment/Plan  (B) Knee pain R>L:  (B) knee OA; RA; Severe (L) Shoulder OA / RTC deficient ; (R) shoulder pain -   Taping greatly reduces knee pain and (L) shoulder pain and improved ability to ambulate / function except stairs, squatting, kneeling - and poor mobility, strength, functional use of (L) shoulder - Severe OA  (Bone-on-Bone R>L)knee and apparent sig OA (L) Shoulder w/ deficient RTC  Pt may benefit from medial  brace (R) knee -   Pt may require TKA (R>L) and RevTSA (L)     Chronic foot injury / pain w/ pes planus (L) - Pain alleviated with taping - Pt may benefit from customized orthotics and new shoes providing more stability / motion control -     Progress strengthening /stabilization /functional activity -  Request order for  brace for (R)  Knee -   Fit / modify orthotics once Pt orders them and receives them -and aid Pt in finding correct shoes as noted above -   _________________________________________________  Manual Therapy:                 mins  47434;  Therapeutic Exercise:  25    mins  57696;     Neuromuscular Dion:        mins  59017;    Therapeutic Activity:      15    mins  88108;     Iontophoresis:                      mins  64558;     Ultrasound:                           mins  73452;    Electrical Stimulation:         mins  93499 ( );  Dry Needling                       mins self-pay     Timed Treatment:   40   mins                  Total Treatment:    60   mins     Antonio Coley PT  Physical Therapist

## 2019-09-18 ENCOUNTER — TREATMENT (OUTPATIENT)
Dept: PHYSICAL THERAPY | Facility: CLINIC | Age: 59
End: 2019-09-18

## 2019-09-18 DIAGNOSIS — M25.562 BILATERAL CHRONIC KNEE PAIN: ICD-10-CM

## 2019-09-18 DIAGNOSIS — M79.672 FOOT PAIN, LEFT: ICD-10-CM

## 2019-09-18 DIAGNOSIS — M75.82 ROTATOR CUFF TENDONITIS, LEFT: ICD-10-CM

## 2019-09-18 DIAGNOSIS — M19.012 OSTEOARTHRITIS OF BOTH SHOULDERS, UNSPECIFIED OSTEOARTHRITIS TYPE: ICD-10-CM

## 2019-09-18 DIAGNOSIS — M25.612 SHOULDER STIFFNESS, LEFT: ICD-10-CM

## 2019-09-18 DIAGNOSIS — M17.0 PRIMARY OSTEOARTHRITIS OF BOTH KNEES: Primary | ICD-10-CM

## 2019-09-18 DIAGNOSIS — G89.29 BILATERAL CHRONIC KNEE PAIN: ICD-10-CM

## 2019-09-18 DIAGNOSIS — I10 HYPERTENSION, UNSPECIFIED TYPE: ICD-10-CM

## 2019-09-18 DIAGNOSIS — R26.9 GAIT DISTURBANCE: ICD-10-CM

## 2019-09-18 DIAGNOSIS — M25.561 BILATERAL CHRONIC KNEE PAIN: ICD-10-CM

## 2019-09-18 DIAGNOSIS — M19.011 OSTEOARTHRITIS OF BOTH SHOULDERS, UNSPECIFIED OSTEOARTHRITIS TYPE: ICD-10-CM

## 2019-09-18 PROCEDURE — 97140 MANUAL THERAPY 1/> REGIONS: CPT | Performed by: PHYSICAL THERAPIST

## 2019-09-18 PROCEDURE — 97014 ELECTRIC STIMULATION THERAPY: CPT | Performed by: PHYSICAL THERAPIST

## 2019-09-18 PROCEDURE — 97530 THERAPEUTIC ACTIVITIES: CPT | Performed by: PHYSICAL THERAPIST

## 2019-09-18 RX ORDER — HYDROCHLOROTHIAZIDE 25 MG/1
25 TABLET ORAL DAILY
Qty: 90 TABLET | Refills: 0 | Status: SHIPPED | OUTPATIENT
Start: 2019-09-18 | End: 2019-12-16 | Stop reason: SDUPTHER

## 2019-09-22 NOTE — PATIENT INSTRUCTIONS
Continue HEP  Search for shoes and order orthotics; make appointment with Ortho about (L) knee and  brace

## 2019-09-22 NOTE — PROGRESS NOTES
Physical Therapy Daily Progress Note     Patient Name: Pedrito Powell         :  1960  Referring Physician: Teo Fraser MD     Subjective   Pedrito Powell reports: Significantly increased (R) knee pain after being stuck in traffic for a long period of time - Increased pain anterior and ant/medial knee (R) -  (L) Shoulder severely painful - tape helpful -    Has not ordered orthotics yet and has not found new shoes yet -       Objective    Antalgic gait with decreased WB-ing LLE -- Grossly increased swelling (L) knee -   Tender anterior and medial knee (R) >L and (L) Shoulder ant/lat/posterior tender and LH biceps tendon and deltoid - Minimal AROM (L) shoulder and severely weak ABDuction, Empty Can, ER L>R shoulder  Severely tender ant/medial and medial knee pain R>L -   Reminded Pt about  BRACE for (R) knee to decrease pain and improve function and pain control-   Again encouraged Pt to make an appointment with her Ortho surgeon for knee assessment and seek orders for and  Brace (R) knee -      See Exercise, Manual, and Modality Logs for complete treatment.      Functional / Therapeutic Activities:  15 min  · TAPING / BRACIN) K-Tape w/ Kinesio Tape over to unload /support (L) shoulder; 2) K-Tape to Facilitate (L) deltoid - (modifications for comfort during treatment) -    ·  KNEE TAPING: (L) Kinesio Tape to 1) Unload PF Jt, Infrapatellar region and Medial Knee jt;   (R) Unload medial knee joint -  · FOOT TAPING: (L) 1) Tape 1in to unload PF and flex/abd GT x 4 strips: 2) Tape with  Leukotape over to a) Unload PF, b) Unload /SUpport navicular, c) Support medial arch; -HELD DUE TO SKIN IRRITATION -  ·      Jt protection, ADL modification; Posture and       Assessment/Plan  (B) Knee pain R>L:  (B) knee OA; RA; Severe (L) Shoulder OA / RTC deficient ; (R) shoulder pain -   Taping greatly reduces knee pain and (L) shoulder pain and improved ability to ambulate / function  except stairs, squatting, kneeling - and poor mobility, strength, functional use of (L) shoulder - Severe OA (Bone-on-Bone R>L)knee and apparent sig OA (L) Shoulder w/ deficient RTC  Pt may benefit from medial  brace (R) knee -   Pt may require TKA (R>L) and RevTSA (L)     Chronic foot injury / pain w/ pes planus (L) - Pain alleviated with taping - Pt may benefit from customized orthotics and new shoes providing more stability / motion control -    **FLARE-UP of (L) knee pain due to being stuck in traffic - Felt better after taping, etc.**      Progress strengthening /stabilization /functional activity -  Request order for  brace for (R)  Knee -   Fit / modify orthotics once Pt orders them and receives them -and aid Pt in finding correct shoes as noted above -   _________________________________________________  Manual Therapy:           10      mins  34337;  Therapeutic Exercise:      mins  87553;     Neuromuscular Dion:        mins  52254;    Therapeutic Activity:      15    mins  12056;     Iontophoresis:                      mins  84015;     Ultrasound:                    08       mins  64675;    Electrical Stimulation:    20     mins  48716 ( );  Dry Needling                       mins self-pay     Timed Treatment:   33   mins                  Total Treatment:    60   mins     Antonio Coley, PT  Physical Therapist

## 2019-09-25 ENCOUNTER — TREATMENT (OUTPATIENT)
Dept: PHYSICAL THERAPY | Facility: CLINIC | Age: 59
End: 2019-09-25

## 2019-09-25 DIAGNOSIS — M75.82 ROTATOR CUFF TENDONITIS, LEFT: ICD-10-CM

## 2019-09-25 DIAGNOSIS — I10 HYPERTENSION, UNSPECIFIED TYPE: ICD-10-CM

## 2019-09-25 DIAGNOSIS — G89.29 BILATERAL CHRONIC KNEE PAIN: ICD-10-CM

## 2019-09-25 DIAGNOSIS — M17.0 PRIMARY OSTEOARTHRITIS OF BOTH KNEES: Primary | ICD-10-CM

## 2019-09-25 DIAGNOSIS — M25.612 SHOULDER STIFFNESS, LEFT: ICD-10-CM

## 2019-09-25 DIAGNOSIS — M25.561 BILATERAL CHRONIC KNEE PAIN: ICD-10-CM

## 2019-09-25 DIAGNOSIS — M19.011 OSTEOARTHRITIS OF BOTH SHOULDERS, UNSPECIFIED OSTEOARTHRITIS TYPE: ICD-10-CM

## 2019-09-25 DIAGNOSIS — M25.562 BILATERAL CHRONIC KNEE PAIN: ICD-10-CM

## 2019-09-25 DIAGNOSIS — R26.9 GAIT DISTURBANCE: ICD-10-CM

## 2019-09-25 DIAGNOSIS — M19.012 OSTEOARTHRITIS OF BOTH SHOULDERS, UNSPECIFIED OSTEOARTHRITIS TYPE: ICD-10-CM

## 2019-09-25 PROCEDURE — 97530 THERAPEUTIC ACTIVITIES: CPT | Performed by: PHYSICAL THERAPIST

## 2019-09-25 PROCEDURE — 97110 THERAPEUTIC EXERCISES: CPT | Performed by: PHYSICAL THERAPIST

## 2019-09-25 RX ORDER — IRBESARTAN 300 MG/1
300 TABLET ORAL DAILY
Qty: 90 TABLET | Refills: 0 | Status: SHIPPED | OUTPATIENT
Start: 2019-09-25 | End: 2019-12-23 | Stop reason: SDUPTHER

## 2019-09-25 RX ORDER — DICLOFENAC SODIUM 75 MG/1
75 TABLET, DELAYED RELEASE ORAL DAILY
Qty: 30 TABLET | Refills: 1 | Status: SHIPPED | OUTPATIENT
Start: 2019-09-25 | End: 2019-12-03 | Stop reason: SDUPTHER

## 2019-09-29 NOTE — PROGRESS NOTES
Physical Therapy Daily Progress Note     Patient Name: Pedrito Powell         :  1960  Referring Physician: Teo Fraser MD     Subjective   Pedrito Powell reports: (L) Shoulder severely painful - tape helpful -  Anterior and ant/medial knee pain R>L -   Has not ordered orthotics yet and has not looked at shoes yet -   (R) knee flare-up calmed down since last session -   Pain dorsal foot (L)      Objective    Antalgic gait with decreased WB-ing LLE --   Tender anterior and medial knee (R) >L and (L) Shoulder ant/lat/posterior tender and LH biceps tendon and deltoid - Minimal AROM (L) shoulder and severely weak ABDuction, Empty Can, ER L>R shoulder  Severely tender ant/medial and medial knee pain R>L -   Discussed  BRACE for (R) knee to decrease pain and improve function and pain control-   Encouraged Pt to make an appointment with her Ortho surgeon for knee assessment and seek orders for and  Brace (R) knee -      See Exercise, Manual, and Modality Logs for complete treatment.      Functional / Therapeutic Activities:  15 min  · TAPING / BRACIN) K-Tape w/ Kinesio Tape over to unload /support (L) shoulder; 2) K-Tape to Facilitate (L) deltoid - (modifications for comfort during treatment) -    · FOOT TAPING: (L) 1) Tape 1in to unload PF and flex/abd GT x 4 strips: 2) Tape with  Leukotape over to a) Unload PF, b) Unload /SUpport navicular, c) Support medial arch; -HELD DUE TO SKIN IRRITATION -       KNEE TAPING: (L) Kinesio Tape to 1) Unload PF Jt, Infrapatellar region and Medial Knee jt;   (R) Unload medial knee joint -after washing knees to remove oil/lotions -   · Jt protection, ADL modification; Posture and       Assessment/Plan  (B) Knee pain R>L:  (B) knee OA; RA; Severe (L) Shoulder OA / RTC deficient ; (R) shoulder pain -   Taping greatly reduces knee pain and (L) shoulder pain and improved ability to ambulate / function except stairs, squatting, kneeling - and poor  mobility, strength, functional use of (L) shoulder - Severe OA (Bone-on-Bone R>L)knee and apparent sig OA (L) Shoulder w/ deficient RTC  Pt may benefit from medial  brace (R) knee -   Pt may require TKA (R>L) and RevTSA (L)     Chronic foot injury / pain w/ pes planus (L) - Pain alleviated with taping - Pt may benefit from customized orthotics and new shoes providing more stability / motion control -     Progress strengthening /stabilization /functional activity -  Request order for  brace for (R)  Knee -   Fit / modify orthotics once Pt orders them and receives them -and aid Pt in finding correct shoes as noted above -   _________________________________________________  Manual Therapy:                 mins  06524;  Therapeutic Exercise:  25    mins  80270;     Neuromuscular Dion:        mins  66562;    Therapeutic Activity:      15    mins  79945;     Iontophoresis:                      mins  61954;     Ultrasound:                           mins  34875;    Electrical Stimulation:         mins  49661 ( );  Dry Needling                       mins self-pay     Timed Treatment:   40   mins                  Total Treatment:    60   mins     Antonio Coley, PT  Physical Therapist

## 2019-10-02 ENCOUNTER — TREATMENT (OUTPATIENT)
Dept: PHYSICAL THERAPY | Facility: CLINIC | Age: 59
End: 2019-10-02

## 2019-10-02 DIAGNOSIS — M19.012 OSTEOARTHRITIS OF BOTH SHOULDERS, UNSPECIFIED OSTEOARTHRITIS TYPE: ICD-10-CM

## 2019-10-02 DIAGNOSIS — M19.011 OSTEOARTHRITIS OF BOTH SHOULDERS, UNSPECIFIED OSTEOARTHRITIS TYPE: ICD-10-CM

## 2019-10-02 DIAGNOSIS — M17.0 PRIMARY OSTEOARTHRITIS OF BOTH KNEES: Primary | ICD-10-CM

## 2019-10-02 DIAGNOSIS — M25.562 BILATERAL CHRONIC KNEE PAIN: ICD-10-CM

## 2019-10-02 DIAGNOSIS — M25.612 SHOULDER STIFFNESS, LEFT: ICD-10-CM

## 2019-10-02 DIAGNOSIS — G89.29 BILATERAL CHRONIC KNEE PAIN: ICD-10-CM

## 2019-10-02 DIAGNOSIS — M25.561 BILATERAL CHRONIC KNEE PAIN: ICD-10-CM

## 2019-10-02 DIAGNOSIS — M75.82 ROTATOR CUFF TENDONITIS, LEFT: ICD-10-CM

## 2019-10-02 DIAGNOSIS — R26.9 GAIT DISTURBANCE: ICD-10-CM

## 2019-10-02 PROCEDURE — 97110 THERAPEUTIC EXERCISES: CPT | Performed by: PHYSICAL THERAPIST

## 2019-10-02 PROCEDURE — 97530 THERAPEUTIC ACTIVITIES: CPT | Performed by: PHYSICAL THERAPIST

## 2019-10-07 NOTE — PROGRESS NOTES
Physical Therapy Daily Progress Note     Patient Name: Pedrito Powell         :  1960  Referring Physician: Teo Fraser MD     Subjective   Pedrito Powell reports: (L) Shoulder severely painful - tape helpful -  Anterior and ant/medial knee pain R>L -   Has not ordered orthotics yet and has not looked at shoes yet -   (R) knee flare-up calmed down since last session -   Pain dorsal foot (L)      Objective    Antalgic gait with decreased WB-ing LLE --   Tender anterior and medial knee (R) >L and (L) Shoulder ant/lat/posterior tender and LH biceps tendon and deltoid - Minimal AROM (L) shoulder and severely weak ABDuction, Empty Can, ER L>R shoulder  Severely tender ant/medial and medial knee pain R>L -   Discussed  BRACE for (R) knee to decrease pain and improve function and pain control-   Encouraged Pt to make an appointment with her Ortho surgeon for knee assessment and seek orders for and  Brace (R) knee -      See Exercise, Manual, and Modality Logs for complete treatment.      Functional / Therapeutic Activities:  15 min  · TAPING / BRACIN) K-Tape w/ Kinesio Tape over to unload /support (L) shoulder; 2) K-Tape to Facilitate (L) deltoid - (modifications for comfort during treatment) -    · FOOT TAPING: (L) 1) Tape 1in to unload PF and flex/abd GT x 4 strips: 2) Tape with  Leukotape over to a) Unload PF, b) Unload /SUpport navicular, c) Support medial arch; -HELD DUE TO SKIN IRRITATION -       KNEE TAPING: (L) Kinesio Tape to 1) Unload PF Jt, Infrapatellar region and Medial Knee jt;                (R) Unload medial knee joint -after washing knees to remove oil/lotions -   · Jt protection, ADL modification; Posture and       Assessment/Plan  (B) Knee pain R>L:  (B) knee OA; RA; Severe (L) Shoulder OA / RTC deficient ; (R) shoulder pain -   Taping greatly reduces knee pain and (L) shoulder pain and improved ability to ambulate / function except stairs, squatting,  kneeling - and poor mobility, strength, functional use of (L) shoulder - Severe OA (Bone-on-Bone R>L)knee and apparent sig OA (L) Shoulder w/ deficient RTC  Pt may benefit from medial  brace (R) knee -   Pt may require TKA (R>L) and RevTSA (L)     Chronic foot injury / pain w/ pes planus (L) - Pain alleviated with taping - Pt may benefit from customized orthotics and new shoes providing more stability / motion control -     Progress strengthening /stabilization /functional activity -  Request order for  brace for (R)  Knee -   Fit / modify orthotics once Pt orders them and receives them -and aid Pt in finding correct shoes as noted above -   _________________________________________________  Manual Therapy:                 mins  83049;  Therapeutic Exercise:   25    mins  63007;     Neuromuscular Dion:        mins  24820;    Therapeutic Activity:      15    mins  17732;     Iontophoresis:                      mins  49598;     Ultrasound:                           mins  40476;    Electrical Stimulation:         mins  83844 ( );  Dry Needling                       mins self-pay     Timed Treatment:   40   mins                  Total Treatment:    60   mins     Antonio Coley PT  Physical Therapist

## 2019-10-16 ENCOUNTER — TREATMENT (OUTPATIENT)
Dept: PHYSICAL THERAPY | Facility: CLINIC | Age: 59
End: 2019-10-16

## 2019-10-16 DIAGNOSIS — G89.29 BILATERAL CHRONIC KNEE PAIN: ICD-10-CM

## 2019-10-16 DIAGNOSIS — M75.82 ROTATOR CUFF TENDONITIS, LEFT: ICD-10-CM

## 2019-10-16 DIAGNOSIS — M19.012 OSTEOARTHRITIS OF BOTH SHOULDERS, UNSPECIFIED OSTEOARTHRITIS TYPE: ICD-10-CM

## 2019-10-16 DIAGNOSIS — M25.562 BILATERAL CHRONIC KNEE PAIN: ICD-10-CM

## 2019-10-16 DIAGNOSIS — R26.9 GAIT DISTURBANCE: ICD-10-CM

## 2019-10-16 DIAGNOSIS — M25.612 SHOULDER STIFFNESS, LEFT: ICD-10-CM

## 2019-10-16 DIAGNOSIS — M19.011 OSTEOARTHRITIS OF BOTH SHOULDERS, UNSPECIFIED OSTEOARTHRITIS TYPE: ICD-10-CM

## 2019-10-16 DIAGNOSIS — M25.561 BILATERAL CHRONIC KNEE PAIN: ICD-10-CM

## 2019-10-16 DIAGNOSIS — M17.0 PRIMARY OSTEOARTHRITIS OF BOTH KNEES: Primary | ICD-10-CM

## 2019-10-16 PROCEDURE — 97530 THERAPEUTIC ACTIVITIES: CPT | Performed by: PHYSICAL THERAPIST

## 2019-10-16 PROCEDURE — 97014 ELECTRIC STIMULATION THERAPY: CPT | Performed by: PHYSICAL THERAPIST

## 2019-10-16 PROCEDURE — 97110 THERAPEUTIC EXERCISES: CPT | Performed by: PHYSICAL THERAPIST

## 2019-10-20 NOTE — PROGRESS NOTES
Physical Therapy Daily Progress Note     Patient Name: Pedrito Powell         :  1960  Referring Physician: Teo Fraser MD     Subjective   Pedrito Powell reports: (L) Shoulder severely painful - tape helpful -  Anterior and ant/medial knee pain R>L -   Has not ordered orthotics yet and has not looked at shoes yet -   (R) knee flare-up calmed down since last session -   Pain dorsal foot (L)      Objective    Antalgic gait with decreased WB-ing LLE --   Tender anterior and medial knee (R) >L and (L) Shoulder ant/lat/posterior tender and LH biceps tendon and deltoid - Minimal AROM (L) shoulder and severely weak ABDuction, Empty Can, ER L>R shoulder  Severely tender ant/medial and medial knee pain R>L -   Discussed  BRACE for (R) knee to decrease pain and improve function and pain control-   Encouraged Pt to make an appointment with her Ortho surgeon for knee assessment and seek orders for and  Brace (R) knee -      See Exercise, Manual, and Modality Logs for complete treatment.      Functional / Therapeutic Activities:  15 min  · TAPING / BRACIN) K-Tape w/ Kinesio Tape over to unload /support (L) shoulder; 2) K-Tape to Facilitate (L) deltoid -     · FOOT TAPING: (L) 1) Tape 1in to unload PF and flex/abd GT x 4 strips: 2) Tape with  Leukotape over to a) Unload PF, b) Unload /SUpport navicular, c) Support medial arch; -HELD DUE TO SKIN IRRITATION -       KNEE TAPING: (L) Kinesio Tape to 1) Unload PF Jt, Infrapatellar region and Medial Knee jt;                (R) Unload medial knee joint -after washing knees to remove oil/lotions -   · Jt protection, ADL modification; Posture and       Assessment/Plan  (B) Knee pain R>L:  (B) knee OA; RA; Severe (L) Shoulder OA / RTC deficient ; (R) shoulder pain -   Taping greatly reduces knee pain and (L) shoulder pain and improved ability to ambulate / function except stairs, squatting, kneeling - and poor mobility, strength, functional  use of (L) shoulder - Severe OA (Bone-on-Bone R>L)knee and apparent sig OA (L) Shoulder w/ deficient RTC  Pt may benefit from medial  brace (R) knee -   Pt may require TKA (R>L) and RevTSA (L)     Chronic foot injury / pain w/ pes planus (L) - Pain alleviated with taping - Pt may benefit from customized orthotics and new shoes providing more stability / motion control -     Progress strengthening /stabilization /functional activity -  Request order for  brace for (R)  Knee -   Fit / modify orthotics once Pt orders them and receives them -and aid Pt in finding correct shoes as noted above -   _________________________________________________  Manual Therapy:                 mins  07991;  Therapeutic Exercise:   25    mins  88036;     Neuromuscular Dion:        mins  26008;    Therapeutic Activity:      15    mins  58605;     Iontophoresis:                      mins  54537;     Ultrasound:                           mins  73795;    Electrical Stimulation:   20      mins  24828 ( );  Dry Needling                       mins self-pay     Timed Treatment:   40   mins                  Total Treatment:    70   mins     Antonio Coley PT  Physical Therapist

## 2019-10-23 ENCOUNTER — TREATMENT (OUTPATIENT)
Dept: PHYSICAL THERAPY | Facility: CLINIC | Age: 59
End: 2019-10-23

## 2019-10-23 DIAGNOSIS — M19.012 OSTEOARTHRITIS OF BOTH SHOULDERS, UNSPECIFIED OSTEOARTHRITIS TYPE: ICD-10-CM

## 2019-10-23 DIAGNOSIS — M75.82 ROTATOR CUFF TENDONITIS, LEFT: ICD-10-CM

## 2019-10-23 DIAGNOSIS — M25.612 SHOULDER STIFFNESS, LEFT: ICD-10-CM

## 2019-10-23 DIAGNOSIS — M25.561 BILATERAL CHRONIC KNEE PAIN: ICD-10-CM

## 2019-10-23 DIAGNOSIS — M19.011 OSTEOARTHRITIS OF BOTH SHOULDERS, UNSPECIFIED OSTEOARTHRITIS TYPE: ICD-10-CM

## 2019-10-23 DIAGNOSIS — R26.9 GAIT DISTURBANCE: ICD-10-CM

## 2019-10-23 DIAGNOSIS — G89.29 BILATERAL CHRONIC KNEE PAIN: ICD-10-CM

## 2019-10-23 DIAGNOSIS — M17.0 PRIMARY OSTEOARTHRITIS OF BOTH KNEES: Primary | ICD-10-CM

## 2019-10-23 DIAGNOSIS — M25.562 BILATERAL CHRONIC KNEE PAIN: ICD-10-CM

## 2019-10-23 PROCEDURE — 97140 MANUAL THERAPY 1/> REGIONS: CPT | Performed by: PHYSICAL THERAPIST

## 2019-10-23 PROCEDURE — 97530 THERAPEUTIC ACTIVITIES: CPT | Performed by: PHYSICAL THERAPIST

## 2019-10-23 PROCEDURE — 97014 ELECTRIC STIMULATION THERAPY: CPT | Performed by: PHYSICAL THERAPIST

## 2019-10-23 PROCEDURE — 97110 THERAPEUTIC EXERCISES: CPT | Performed by: PHYSICAL THERAPIST

## 2019-10-27 NOTE — PROGRESS NOTES
Physical Therapy Daily Progress Note     Patient Name: Pedrito Powell         :  1960  Referring Physician: Teo Fraser MD     Subjective   Pedrito Powell reports: skin irritation from tape (L) Shoulder -   Anterior and ant/medial knee pain R>L -   Has not ordered orthotics yet and has not looked at shoes yet -   Increased (R) knee pain anterior and medial with giving out the last couple of days -       Objective    Antalgic gait with decreased WB-ing LLE --   (R) knee diffusely swollen -   Very tender anterior and medial knee (R) >L and (L) Shoulder ant/lat/posterior tender and LH biceps tendon and deltoid - Minimal AROM (L) shoulder and severely weak ABDuction, Empty Can, ER L>R shoulder and palpable clunking with even attempted AROM (L) shoulder     Encouraged Pt to make an appointment with her Ortho surgeon for knee assessment and seek orders for and  Brace (R) knee -      See Exercise, Manual, and Modality Logs for complete treatment.      Functional / Therapeutic Activities:  15 min  · TAPING / BRACIN) K-Tape w/ Kinesio Tape over to unload /support (L) shoulder; 2) K-Tape to Facilitate (L) deltoid -   HELD DUE TO SKIN IRRITATION  · FOOT TAPING: (L) 1) Tape 1in to unload PF and flex/abd GT x 4 strips: 2) Tape with  Leukotape over to a) Unload PF, b) Unload /SUpport navicular, c) Support medial arch; -HELD DUE TO SKIN IRRITATION -       KNEE TAPING: (R) Kinesio Tape to 1) Unload PF Jt, Infrapatellar region and Medial Knee jt;                (L) Unload medial knee joint -after washing knees to remove oil/lotions -   · Jt protection, ADL modification; Posture and       Assessment/Plan  (B) Knee pain R>L:  (B) knee OA; RA; Severe (L) Shoulder OA / RTC deficient ; (R) shoulder pain -   Taping greatly reduces knee pain and (L) shoulder pain and improved ability to ambulate / function except stairs, squatting, kneeling - and poor mobility, strength, functional use of (L) shoulder  - Severe OA (Bone-on-Bone R>L)knee and apparent sig OA (L) Shoulder w/ deficient RTC  Pt may benefit from medial  brace (R) knee -   Pt may require TKA (R>L) and RevTSA (L)     Chronic foot injury / pain w/ pes planus (L) - Pain alleviated with taping - Pt may benefit from customized orthotics and new shoes providing more stability / motion control -     Progress strengthening /stabilization /functional activity -  Request order for  brace for (R)  Knee -   Fit / modify orthotics once Pt orders them and receives them -and aid Pt in finding correct shoes as noted above -   _________________________________________________  Manual Therapy:           10      mins  29623;  Therapeutic Exercise:   30    mins  68602;     Neuromuscular Dion:        mins  13222;    Therapeutic Activity:      15    mins  51351;     Iontophoresis:                      mins  79164;     Ultrasound:                           mins  00020;    Electrical Stimulation:         mins  97526 ( );  Dry Needling                       mins self-pay     Timed Treatment:   55   mins                  Total Treatment:    70   mins     Antonio Coley PT  Physical Therapist

## 2019-10-28 ENCOUNTER — OFFICE VISIT (OUTPATIENT)
Dept: INTERNAL MEDICINE | Facility: CLINIC | Age: 59
End: 2019-10-28

## 2019-10-28 VITALS
OXYGEN SATURATION: 100 % | TEMPERATURE: 98 F | DIASTOLIC BLOOD PRESSURE: 80 MMHG | HEIGHT: 60 IN | RESPIRATION RATE: 15 BRPM | HEART RATE: 85 BPM | BODY MASS INDEX: 44.58 KG/M2 | WEIGHT: 227.07 LBS | SYSTOLIC BLOOD PRESSURE: 120 MMHG

## 2019-10-28 DIAGNOSIS — B36.9 FUNGAL RASH OF TORSO: ICD-10-CM

## 2019-10-28 DIAGNOSIS — L03.311 CELLULITIS OF ABDOMINAL WALL: ICD-10-CM

## 2019-10-28 DIAGNOSIS — J40 BRONCHITIS: Primary | ICD-10-CM

## 2019-10-28 PROCEDURE — 99214 OFFICE O/P EST MOD 30 MIN: CPT | Performed by: FAMILY MEDICINE

## 2019-10-28 RX ORDER — DOXYCYCLINE 50 MG/1
100 CAPSULE ORAL 2 TIMES DAILY
Qty: 28 CAPSULE | Refills: 0 | Status: SHIPPED | OUTPATIENT
Start: 2019-10-28 | End: 2019-11-04

## 2019-10-28 RX ORDER — NYSTATIN 100000 [USP'U]/G
POWDER TOPICAL 3 TIMES DAILY
Qty: 45 G | Refills: 0 | Status: SHIPPED | OUTPATIENT
Start: 2019-10-28 | End: 2019-11-04

## 2019-10-28 RX ORDER — ONDANSETRON 4 MG/1
TABLET, ORALLY DISINTEGRATING ORAL
Refills: 0 | COMMUNITY
Start: 2019-08-29 | End: 2020-02-03

## 2019-10-28 NOTE — PROGRESS NOTES
Subjective   Pedrito Powell is a 59 y.o. female.     Chief Complaint   Patient presents with   • Fever   • Chills   • Fatigue   • Rash   • Nasal Congestion         History of Present Illness     Presents today with a past medical history of having a fever since Saturday.  Patient's temperature has been over 100.5.  Patient notes that there is been redness on her abdomen, which is warm, tender, slightly pruritic as well.  Patient also has a cough, fatigue, and has some nasal congestion.  Patient is currently wearing a mask at today's office visit.  Patient denies being around anyone sick.  She does state that the rash can be pruritic at times.  However she notes that is more warm and tender.  Patient states that the rash seemed to have gone down in size a little bit yesterday.  She is currently not taking any medications for her upper respiratory infection.  She states that occasionally she is wheezing.    The following portions of the patient's history were reviewed and updated as appropriate: allergies, current medications, past family history, past medical history, past social history, past surgical history and problem list.    Review of Systems   Constitutional: Positive for chills and fever.   HENT: Positive for congestion. Negative for facial swelling, postnasal drip and sneezing.    Respiratory: Positive for cough.    Cardiovascular: Negative.    Gastrointestinal: Negative.    Skin: Positive for rash.   Psychiatric/Behavioral: Negative.        Objective   Physical Exam   Constitutional: She is oriented to person, place, and time. She appears well-developed and well-nourished.   HENT:   Head: Normocephalic and atraumatic.   Right Ear: External ear normal.   Left Ear: External ear normal.   Mouth/Throat: Oropharynx is clear and moist.   Eyes: EOM are normal.   Neck: Normal range of motion. Neck supple.   Cardiovascular: Normal rate, regular rhythm and normal heart sounds.   Pulmonary/Chest: Effort normal and  breath sounds normal. No respiratory distress.   Musculoskeletal: Normal range of motion.   Lymphadenopathy:     She has no cervical adenopathy.   Neurological: She is alert and oriented to person, place, and time.   Skin: Skin is warm. Rash noted.   Erythematic, warm, pruritic rash located on patient's abdomen under the umbilicus.  Rash extends through the bottom half of the abdomen.   Psychiatric: She has a normal mood and affect. Her behavior is normal.   Nursing note and vitals reviewed.      Assessment/Plan   Pedrito was seen today for fever, chills, fatigue, rash and nasal congestion.    Diagnoses and all orders for this visit:    Bronchitis  -     doxycycline (MONODOX) 50 MG capsule; Take 2 capsules by mouth 2 (Two) Times a Day for 7 days.  -     Patient has inhalers which she may use.  Will start on doxycycline.    Cellulitis of abdominal wall  -     doxycycline (MONODOX) 50 MG capsule; Take 2 capsules by mouth 2 (Two) Times a Day for 7 days.  -     nystatin (MYCOSTATIN) 460314 UNIT/GM powder; Apply  topically to the appropriate area as directed 3 (Three) Times a Day for 7 days  -     Signs and symptoms have been given to patient about warning signs, and if these were to arise, patient is advised to go to the emergency room.    Fungal rash of torso  -     nystatin (MYCOSTATIN) 970544 UNIT/GM powder; Apply  topically to the appropriate area as directed 3 (Three) Times a Day for 7 days.  -     The pruritic nature, will treat her with nystatin as well in case it is a fungal infection.  However more likely bacterial.          No Follow-up on file.    Dictated utilizing Dragon Voice Recognition Software

## 2019-11-06 ENCOUNTER — TREATMENT (OUTPATIENT)
Dept: PHYSICAL THERAPY | Facility: CLINIC | Age: 59
End: 2019-11-06

## 2019-11-06 DIAGNOSIS — M19.011 OSTEOARTHRITIS OF BOTH SHOULDERS, UNSPECIFIED OSTEOARTHRITIS TYPE: ICD-10-CM

## 2019-11-06 DIAGNOSIS — M25.561 BILATERAL CHRONIC KNEE PAIN: ICD-10-CM

## 2019-11-06 DIAGNOSIS — M17.0 PRIMARY OSTEOARTHRITIS OF BOTH KNEES: Primary | ICD-10-CM

## 2019-11-06 DIAGNOSIS — G89.29 BILATERAL CHRONIC KNEE PAIN: ICD-10-CM

## 2019-11-06 DIAGNOSIS — M25.562 BILATERAL CHRONIC KNEE PAIN: ICD-10-CM

## 2019-11-06 DIAGNOSIS — R26.9 GAIT DISTURBANCE: ICD-10-CM

## 2019-11-06 DIAGNOSIS — M25.612 SHOULDER STIFFNESS, LEFT: ICD-10-CM

## 2019-11-06 DIAGNOSIS — M19.012 OSTEOARTHRITIS OF BOTH SHOULDERS, UNSPECIFIED OSTEOARTHRITIS TYPE: ICD-10-CM

## 2019-11-06 DIAGNOSIS — M75.82 ROTATOR CUFF TENDONITIS, LEFT: ICD-10-CM

## 2019-11-06 PROCEDURE — 97110 THERAPEUTIC EXERCISES: CPT | Performed by: PHYSICAL THERAPIST

## 2019-11-06 PROCEDURE — 97530 THERAPEUTIC ACTIVITIES: CPT | Performed by: PHYSICAL THERAPIST

## 2019-11-11 ENCOUNTER — OFFICE VISIT (OUTPATIENT)
Dept: INTERNAL MEDICINE | Facility: CLINIC | Age: 59
End: 2019-11-11

## 2019-11-11 VITALS
HEIGHT: 60 IN | WEIGHT: 221 LBS | TEMPERATURE: 97.8 F | SYSTOLIC BLOOD PRESSURE: 122 MMHG | HEART RATE: 80 BPM | BODY MASS INDEX: 43.39 KG/M2 | OXYGEN SATURATION: 100 % | RESPIRATION RATE: 15 BRPM | DIASTOLIC BLOOD PRESSURE: 89 MMHG

## 2019-11-11 DIAGNOSIS — I10 HYPERTENSION, UNSPECIFIED TYPE: ICD-10-CM

## 2019-11-11 DIAGNOSIS — L03.311 CELLULITIS OF ABDOMINAL WALL: ICD-10-CM

## 2019-11-11 DIAGNOSIS — B36.9 FUNGAL RASH OF TORSO: Primary | ICD-10-CM

## 2019-11-11 PROCEDURE — 99214 OFFICE O/P EST MOD 30 MIN: CPT | Performed by: FAMILY MEDICINE

## 2019-11-11 NOTE — PROGRESS NOTES
Subjective   Pedrito Powell is a 59 y.o. female.     Chief Complaint   Patient presents with   • Hypertension         History of Present Illness     Follow-up on her cellulitis of her abdomen as well as a fungal rash on the torso.  Patient states that she still continue to apply the nystatin.  She is finished the doxycycline.  She states that the rash is improved significantly.  She is going to the urgent care shortly after seeing me last month, as she noted that the rash started to continue to spread.  However the present time she states that she is feeling much better.  She states the skin is slightly peeling, and is still slightly pruritic.  She is still applying the nystatin.  However the erythema seems to have mostly resolved.  It is no longer warm as it once was.     Patient also has follow-up on her essential hypertension.  Her blood pressure today's office visit is within normal limits.  Patient's blood pressure is 122/89.  She is currently taking hydrochlorothiazide 25 mg daily and irbesartan 300 mg daily.  She denies any side effects of the medications.    The following portions of the patient's history were reviewed and updated as appropriate: allergies, current medications, past family history, past medical history, past social history, past surgical history and problem list.    Review of Systems   Constitutional: Negative for chills and fever.   HENT: Negative for congestion, rhinorrhea, sinus pain and sore throat.    Eyes: Negative for photophobia and visual disturbance.   Respiratory: Negative for cough, chest tightness and shortness of breath.    Cardiovascular: Negative for chest pain and palpitations.   Gastrointestinal: Negative for diarrhea, nausea and vomiting.   Genitourinary: Negative for dysuria, frequency and urgency.   Skin: Negative for rash and wound.   Neurological: Negative for dizziness and syncope.   Psychiatric/Behavioral: Negative for behavioral problems and confusion.        Objective   Physical Exam   Constitutional: She is oriented to person, place, and time. She appears well-developed and well-nourished.   HENT:   Head: Normocephalic and atraumatic.   Right Ear: External ear normal.   Left Ear: External ear normal.   Eyes: EOM are normal.   Neck: Normal range of motion. Neck supple.   Cardiovascular: Normal rate, regular rhythm and normal heart sounds.   Pulmonary/Chest: Effort normal and breath sounds normal. No respiratory distress.   Musculoskeletal: Normal range of motion.   Lymphadenopathy:     She has no cervical adenopathy.   Neurological: She is alert and oriented to person, place, and time.   Skin: Skin is warm.        Psychiatric: She has a normal mood and affect. Her behavior is normal.   Nursing note and vitals reviewed.      Assessment/Plan   Pedrito was seen today for hypertension.    Diagnoses and all orders for this visit:    Fungal rash of torso  -Patient may finish out the nystatin powder.    Cellulitis of abdominal wall  -Antibiotics has been finished.  Patient cellulitis may have been more fungal than bacterial.    Hypertension, unspecified type  -Continue current blood pressure with irbesartan and hydrochlorothiazide.          No Follow-up on file.    Dictated utilizing Dragon Voice Recognition Software

## 2019-11-11 NOTE — PROGRESS NOTES
Physical Therapy Daily Progress Note    Patient Name: Pedrito Powell         :  1960  Referring Physician: Stephon Sheehan MD      Subjective   Pedrito Powell reports: progressively worsening (L) Shoulder pain, loss of mobility and function / strength -   R>L  Knee pain - taping very helpful in decreasing pain and improved gait / function -     Objective   Pt ambulating with less of an antalgic gait overall -   Very tender infrapatellar region, medial joint line, pes region, medial PF Jt and lateral jt line (R>L)    See Exercise, Manual, and Modality Logs for complete treatment.     Functional / Therapeutic Activities:  15 min  · TAPING / BRACING: K-Tape to (R) 1) Unload PF Jt; 2) Unload infrapatellar region; 3) Unload medial knee jt x 2 strips:   (L) Unload medial knee x 2 strips  · Jt protection, ADL modification; Posture and      Assessment/Plan  (B) Knee pain R>L:  (B) knee OA; RA; Severe (L) Shoulder OA / RTC deficient ; (R) shoulder pain -   Taping greatly reduces knee pain and (L) shoulder pain and improved ability to ambulate / function except stairs, squatting, kneeling - and poor mobility, strength, functional use of (L) shoulder - Severe OA (Bone-on-Bone R>L)knee and apparent sig OA (L) Shoulder w/ deficient RTC  Pt may benefit from medial  brace (R) knee -   Pt may require TKA (R>L) and RevTSA (L)    Progress strengthening /stabilization /functional activity       _________________________________________________  Manual Therapy:         mins  47853;  Therapeutic Exercise:    35     mins  40743;     Neuromuscular Dion:        mins  23951;    Therapeutic Activity:     15     mins  10955;     Gait Training:           mins  49257;     Ultrasound:          mins  60131;    Electrical Stimulation:         mins  41595 ( );  Dry Needling          mins self-pay    Timed Treatment:   50   mins                  Total Treatment:     60   mins    Antonio Coley PT  Physical  Therapist

## 2019-11-13 ENCOUNTER — TREATMENT (OUTPATIENT)
Dept: PHYSICAL THERAPY | Facility: CLINIC | Age: 59
End: 2019-11-13

## 2019-11-13 DIAGNOSIS — G89.29 BILATERAL CHRONIC KNEE PAIN: ICD-10-CM

## 2019-11-13 DIAGNOSIS — M17.0 PRIMARY OSTEOARTHRITIS OF BOTH KNEES: Primary | ICD-10-CM

## 2019-11-13 DIAGNOSIS — M25.561 BILATERAL CHRONIC KNEE PAIN: ICD-10-CM

## 2019-11-13 DIAGNOSIS — M25.562 BILATERAL CHRONIC KNEE PAIN: ICD-10-CM

## 2019-11-13 DIAGNOSIS — R26.9 GAIT DISTURBANCE: ICD-10-CM

## 2019-11-13 PROCEDURE — 97530 THERAPEUTIC ACTIVITIES: CPT | Performed by: PHYSICAL THERAPIST

## 2019-11-13 PROCEDURE — 97110 THERAPEUTIC EXERCISES: CPT | Performed by: PHYSICAL THERAPIST

## 2019-11-18 NOTE — PROGRESS NOTES
Physical Therapy Daily Progress Note    Patient Name: Pedrito Powell         :  1960  Referring Physician: Stephon Sheehan MD      Subjective   Pedrito Powell reports: taping helpful in reducing knee pain - Increased pain R>L knee when tape loosens or when removed - Even w/ tape stairs and squatting severely painful, but walking and pain at rest improved - Shoulder (L) continues to deteriorate - Pain (R) Knee anterior and medial; Pain (L) knee ant/med and medial knee -     Objective   Mildly antalgic gait RLE -   Very tender PF jt, infrapatella and medial joint line (R); Tender medial joint (L) knee -     See Exercise, Manual, and Modality Logs for complete treatment.     Functional / Therapeutic Activities: 15 min  · TAPING / BRACING: K-Tape to (R) 1) Unload PF Jt; 2) Unload infrapatellar region; 3) Unload medial knee jt x 2 strips:   (L) Unload medial knee x 2 strips  · Jt protection, ADL modification; Posture and       Assessment/Plan  (B) Knee pain R>L:  (B) knee OA; RA; Severe (L) Shoulder OA / RTC deficient ; (R) shoulder pain -   Taping greatly reduces knee pain and (L) shoulder pain and improved ability to ambulate / function except stairs, squatting, kneeling - and poor mobility, strength, functional use of (L) shoulder - Severe OA (Bone-on-Bone R>L)knee and apparent sig OA (L) Shoulder w/ deficient RTC  Pt may benefit from medial  brace (R) knee -   Pt may require TKA (R>L) and RevTSA (L)    Pt felt much better with much less knee pain and improved gait -      Progress strengthening /stabilization /functional activity       _________________________________________________  Manual Therapy:                 mins  85605;  Therapeutic Exercise:    35     mins  58827;     Neuromuscular Dion:        mins  21146;    Therapeutic Activity:      15     mins  14644;     Gait Training:                      mins  74911;     Ultrasound:                          mins  67309;     Electrical Stimulation:         mins  19461 ( );  Dry Needling                       mins self-pay     Timed Treatment:   50   mins                  Total Treatment:     60   mins    Antonio Coley, PT  Physical Therapist

## 2019-11-20 ENCOUNTER — TREATMENT (OUTPATIENT)
Dept: PHYSICAL THERAPY | Facility: CLINIC | Age: 59
End: 2019-11-20

## 2019-11-20 DIAGNOSIS — M25.561 BILATERAL CHRONIC KNEE PAIN: ICD-10-CM

## 2019-11-20 DIAGNOSIS — G89.29 BILATERAL CHRONIC KNEE PAIN: ICD-10-CM

## 2019-11-20 DIAGNOSIS — M17.0 PRIMARY OSTEOARTHRITIS OF BOTH KNEES: Primary | ICD-10-CM

## 2019-11-20 DIAGNOSIS — R26.9 GAIT DISTURBANCE: ICD-10-CM

## 2019-11-20 DIAGNOSIS — M25.562 BILATERAL CHRONIC KNEE PAIN: ICD-10-CM

## 2019-11-20 PROCEDURE — 97110 THERAPEUTIC EXERCISES: CPT | Performed by: PHYSICAL THERAPIST

## 2019-11-20 PROCEDURE — 97530 THERAPEUTIC ACTIVITIES: CPT | Performed by: PHYSICAL THERAPIST

## 2019-11-25 NOTE — PROGRESS NOTES
Physical Therapy Daily Progress Note     Patient Name: Pedrito Powell         :  1960  Referring Physician: Stephon Sheehan MD        Subjective   Pedrito Powell reports: taping helpful in reducing knee pain - Increased pain R>L knee when tape loosens or when removed - Even w/ tape stairs and squatting severely painful, but walking and pain at rest improved - Shoulder (L) continues to deteriorate - Pain (R) Knee anterior and medial; Pain (L) knee ant/med and medial knee -   Considering speaking to Ortho MD about knee replacement and shoulder - starting in May perhaps -      Objective   Mildly antalgic gait RLE -   Very tender PF jt, infrapatella and medial joint line (R); Tender medial joint (L) knee -      See Exercise, Manual, and Modality Logs for complete treatment.     Functional / Therapeutic Activities: 15 min  · TAPING / BRACING: K-Tape to (R) 1) Unload PF Jt; 2) Unload infrapatellar region; 3) Unload medial knee jt x 2 strips:   (L) Unload medial knee x 2 strips  · Jt protection, ADL modification; Posture and       Assessment/Plan  (B) Knee pain R>L:  (B) knee OA; RA; Severe (L) Shoulder OA / RTC deficient ; (R) shoulder pain -   Taping greatly reduces knee pain and (L) shoulder pain and improved ability to ambulate / function except stairs, squatting, kneeling - and poor mobility, strength, functional use of (L) shoulder - Severe OA (Bone-on-Bone R>L)knee and apparent sig OA (L) Shoulder w/ deficient RTC  Pt may benefit from medial  brace (R) knee -   Pt may require TKA (R>L) and RevTSA (L)     Pt felt much better with much less knee pain and improved gait -      Progress strengthening /stabilization /functional activity     _________________________________________________  Manual Therapy:                 mins  36658;  Therapeutic Exercise:    35     mins  34091;     Neuromuscular Dion:        mins  19821;    Therapeutic Activity:      15     mins  22446;     Gait  Training:                      mins  04899;     Ultrasound:                          mins  19496;    Electrical Stimulation:         mins  14807 ( );  Dry Needling                       mins self-pay     Timed Treatment:   50   mins                  Total Treatment:     60   mins     Antonio Coley PT  Physical Therapist

## 2019-11-27 ENCOUNTER — TREATMENT (OUTPATIENT)
Dept: PHYSICAL THERAPY | Facility: CLINIC | Age: 59
End: 2019-11-27

## 2019-11-27 DIAGNOSIS — M17.0 PRIMARY OSTEOARTHRITIS OF BOTH KNEES: Primary | ICD-10-CM

## 2019-11-27 DIAGNOSIS — R26.9 GAIT DISTURBANCE: ICD-10-CM

## 2019-11-27 DIAGNOSIS — M25.562 BILATERAL CHRONIC KNEE PAIN: ICD-10-CM

## 2019-11-27 DIAGNOSIS — M25.561 BILATERAL CHRONIC KNEE PAIN: ICD-10-CM

## 2019-11-27 DIAGNOSIS — G89.29 BILATERAL CHRONIC KNEE PAIN: ICD-10-CM

## 2019-11-27 PROCEDURE — 97110 THERAPEUTIC EXERCISES: CPT | Performed by: PHYSICAL THERAPIST

## 2019-11-27 PROCEDURE — 97530 THERAPEUTIC ACTIVITIES: CPT | Performed by: PHYSICAL THERAPIST

## 2019-12-02 NOTE — PROGRESS NOTES
Physical Therapy Daily Progress Note    Patient Name: Pedrito Powell         :  1960  Referring Physician: Stephon Sheehan MD      Subjective   Pedrito Powell reports: taping very helpful for reducing knee pain and allowing improved function with walking, but stairs still severely  Painful - R>L    Objective   Guarded / antalgic gait -  Very tender infrapatellar, PF Jt, Medial jt line, pes region R>L knee   Severe crepitus (B) knees -     See Exercise, Manual, and Modality Logs for complete treatment.     Functional / Therapeutic Activities:  20 min  · TAPING / BRACING: K-Tape to 1) Unload PF Jt; 2) Unload Medial Jt line x 2 strips; 3) Unload lat knee (B) knees -   · Jt protection, ADL modification; Posture and      Assessment/Plan  (B) Knee OA R>L; Antalgic gait;   Pt would benefit from TKA R>L - Taping helpful in allowing basic functioning to allow her to work and perform ADL's -   Pt would benefit from continued Physical Therapy to allow improved function and pain control -     Progress strengthening /stabilization /functional activity       _________________________________________________  Manual Therapy:         mins  90294;  Therapeutic Exercise:    35     mins  96131;     Neuromuscular Dion:        mins  84445;    Therapeutic Activity:     20     mins  23830;     Gait Training:           mins  87182;     Ultrasound:    05      mins  93717;    Electrical Stimulation:         mins  35719 ( );  Dry Needling          mins self-pay    Timed Treatment:   60   mins                  Total Treatment:     75   mins    Antonio Coley PT  Physical Therapist

## 2019-12-04 ENCOUNTER — TREATMENT (OUTPATIENT)
Dept: PHYSICAL THERAPY | Facility: CLINIC | Age: 59
End: 2019-12-04

## 2019-12-04 DIAGNOSIS — G89.29 BILATERAL CHRONIC KNEE PAIN: ICD-10-CM

## 2019-12-04 DIAGNOSIS — R26.9 GAIT DISTURBANCE: ICD-10-CM

## 2019-12-04 DIAGNOSIS — M17.0 PRIMARY OSTEOARTHRITIS OF BOTH KNEES: Primary | ICD-10-CM

## 2019-12-04 DIAGNOSIS — M25.561 BILATERAL CHRONIC KNEE PAIN: ICD-10-CM

## 2019-12-04 DIAGNOSIS — M25.562 BILATERAL CHRONIC KNEE PAIN: ICD-10-CM

## 2019-12-04 PROCEDURE — 97530 THERAPEUTIC ACTIVITIES: CPT | Performed by: PHYSICAL THERAPIST

## 2019-12-04 PROCEDURE — 97110 THERAPEUTIC EXERCISES: CPT | Performed by: PHYSICAL THERAPIST

## 2019-12-09 NOTE — PROGRESS NOTES
Physical Therapy Daily Progress Note     Patient Name: Pedrito Powell         :  1960  Referring Physician: Stephon Sheehan MD        Subjective   Pedrito Powell reports: taping very helpful for reducing knee pain and allowing improved function with walking, but stairs still severely  Painful - R>L     Objective   Guarded / antalgic gait -  Very tender infrapatellar, PF Jt, Medial jt line, pes region R>L knee   Severe crepitus (B) knees -      See Exercise, Manual, and Modality Logs for complete treatment.     Functional / Therapeutic Activities:  20 min  · TAPING / BRACING: K-Tape to 1) Unload PF Jt; 2) Unload Medial Jt line x 2 strips; 3) Unload lat knee (B) knees -   · Jt protection, ADL modification; Posture and       Assessment/Plan  (B) Knee OA R>L; Antalgic gait;   Pt would benefit from TKA R>L - Taping helpful in allowing basic functioning to allow her to work and perform ADL's -   Pt would benefit from continued Physical Therapy to allow improved function and pain control -   Pt may benefit from assessment for TKA R>L knee near future -      Progress strengthening /stabilization /functional activity     _________________________________________________  Manual Therapy:                 mins  00073;  Therapeutic Exercise:    35     mins  71018;     Neuromuscular Dion:        mins  18450;    Therapeutic Activity:      20     mins  80194;     Gait Training:                      mins  78986;     Ultrasound:                    05      mins  35321;    Electrical Stimulation:         mins  53740 ( );  Dry Needling                       mins self-pay     Timed Treatment:   60   mins                  Total Treatment:     75   mins     Antonio Coley PT  Physical Therapist

## 2019-12-10 ENCOUNTER — TREATMENT (OUTPATIENT)
Dept: PHYSICAL THERAPY | Facility: CLINIC | Age: 59
End: 2019-12-10

## 2019-12-10 DIAGNOSIS — M75.82 ROTATOR CUFF TENDONITIS, LEFT: ICD-10-CM

## 2019-12-10 DIAGNOSIS — G89.29 BILATERAL CHRONIC KNEE PAIN: ICD-10-CM

## 2019-12-10 DIAGNOSIS — M19.012 OSTEOARTHRITIS OF BOTH SHOULDERS, UNSPECIFIED OSTEOARTHRITIS TYPE: ICD-10-CM

## 2019-12-10 DIAGNOSIS — M19.011 OSTEOARTHRITIS OF BOTH SHOULDERS, UNSPECIFIED OSTEOARTHRITIS TYPE: ICD-10-CM

## 2019-12-10 DIAGNOSIS — M25.562 BILATERAL CHRONIC KNEE PAIN: ICD-10-CM

## 2019-12-10 DIAGNOSIS — M25.612 SHOULDER STIFFNESS, LEFT: ICD-10-CM

## 2019-12-10 DIAGNOSIS — M25.561 BILATERAL CHRONIC KNEE PAIN: ICD-10-CM

## 2019-12-10 DIAGNOSIS — M17.0 PRIMARY OSTEOARTHRITIS OF BOTH KNEES: Primary | ICD-10-CM

## 2019-12-10 DIAGNOSIS — R26.9 GAIT DISTURBANCE: ICD-10-CM

## 2019-12-10 PROCEDURE — 97530 THERAPEUTIC ACTIVITIES: CPT | Performed by: PHYSICAL THERAPIST

## 2019-12-10 PROCEDURE — 97110 THERAPEUTIC EXERCISES: CPT | Performed by: PHYSICAL THERAPIST

## 2019-12-10 PROCEDURE — 97014 ELECTRIC STIMULATION THERAPY: CPT | Performed by: PHYSICAL THERAPIST

## 2019-12-17 NOTE — PROGRESS NOTES
Physical Therapy Daily Progress Note    Patient Name: Pedrito Powell         :  1960  Referring Physician: Stephon Sheehan MD      Subjective   Pedrito Powell reports: tripping and bashing into multiple object, but not falling, but striking her (L) shoulder resulting in significantly increased pain in (L) Shoulder and even less mobility than normal -     Objective   LUE held in guarded posture -   Severely tender anterior shoulder/LH biceps / biceps mm; Lateral and post shoulder into deltoid (L)    See Exercise, Manual, and Modality Logs for complete treatment. (could not tolerate most TE due to shoulder pain (L))     Functional / Therapeutic Activities:  10 min  · TAPING / BRACING: K-Tape to Unload (L) Shoulder  · Jt protection, ADL modification; Posture and      Assessment/Plan  Severe knee OA R>L; (L) Shoulder pain probable RTC deficient shoulder w/ OA  Pt may benefit from further assessment / interventions for possible (R) knee / TKA and (L) Shoulder / Rev TSA  Taping helps with knee pain and improves function   Tripping and falling against items increased (L) shoulder pain =     Progress strengthening /stabilization /functional activity       _________________________________________________  Manual Therapy:         mins  15563;  Therapeutic Exercise:    10     mins  03178;     Neuromuscular Dion:        mins  34153;    Therapeutic Activity:     10     mins  88153;     Gait Training:           mins  35764;     Ultrasound:     06     mins  88649;    Electrical Stimulation:    20     mins  67174 ( );  Dry Needling          mins self-pay    Timed Treatment:   26   mins                  Total Treatment:     55   mins    Antonio Coley PT  Physical Therapist

## 2019-12-18 ENCOUNTER — TREATMENT (OUTPATIENT)
Dept: PHYSICAL THERAPY | Facility: CLINIC | Age: 59
End: 2019-12-18

## 2019-12-18 DIAGNOSIS — R26.9 GAIT DISTURBANCE: ICD-10-CM

## 2019-12-18 DIAGNOSIS — M25.561 BILATERAL CHRONIC KNEE PAIN: ICD-10-CM

## 2019-12-18 DIAGNOSIS — M25.562 BILATERAL CHRONIC KNEE PAIN: ICD-10-CM

## 2019-12-18 DIAGNOSIS — M19.012 OSTEOARTHRITIS OF BOTH SHOULDERS, UNSPECIFIED OSTEOARTHRITIS TYPE: ICD-10-CM

## 2019-12-18 DIAGNOSIS — G89.29 BILATERAL CHRONIC KNEE PAIN: ICD-10-CM

## 2019-12-18 DIAGNOSIS — M75.82 ROTATOR CUFF TENDONITIS, LEFT: ICD-10-CM

## 2019-12-18 DIAGNOSIS — M19.011 OSTEOARTHRITIS OF BOTH SHOULDERS, UNSPECIFIED OSTEOARTHRITIS TYPE: ICD-10-CM

## 2019-12-18 DIAGNOSIS — M25.612 SHOULDER STIFFNESS, LEFT: ICD-10-CM

## 2019-12-18 DIAGNOSIS — M17.0 PRIMARY OSTEOARTHRITIS OF BOTH KNEES: Primary | ICD-10-CM

## 2019-12-18 PROCEDURE — 97530 THERAPEUTIC ACTIVITIES: CPT | Performed by: PHYSICAL THERAPIST

## 2019-12-18 PROCEDURE — 97110 THERAPEUTIC EXERCISES: CPT | Performed by: PHYSICAL THERAPIST

## 2019-12-30 NOTE — PROGRESS NOTES
Physical Therapy Daily Progress Note    Patient Name: Pedrito Powell         :  1960  Referring Physician: Stephon Sheehan MD      Subjective   Pedrito Powell reports: (L) Shoulder flare less, back to pre-LOB, but less able to raise her arm -   Taping helpful with knee pain, allowing improved gait, but still unable to ambulate up/down stairs -   Taping helpful for (L) shoulder pain at rest -     Objective   Tender ant/medial, medial knee and PF jt (R) > (L) knee  Antalgic gait with decreased WB-ing R>L   Improved after taping    See Exercise, Manual, and Modality Logs for complete treatment.     Functional / Therapeutic Activities:  15 min  · TAPING / BRACING: K-Tape to 1) Unload PF Jt; 2) Unload Medial Knee jt (B);    K-Tape to Unload (L) Shoulder -   · Jt protection, ADL modification; Posture and      Assessment/Plan  Significant Knee OA R>L; Severe L>R Shoulder pain probable RTC deficient L>R and probable OA R>L  Pt may benefit from further intervention R knee, Left shoulder -   Taping very helpful in allowing improved function and tolerating her job, etc.     Progress strengthening /stabilization /functional activity       _________________________________________________  Manual Therapy:         mins  83134;  Therapeutic Exercise:    35     mins  51255;     Neuromuscular Dion:        mins  45935;    Therapeutic Activity:     15     mins  17370;     Gait Training:           mins  58654;     Ultrasound:          mins  21420;    Electrical Stimulation:         mins  87719 ( );  Dry Needling          mins self-pay    Timed Treatment:   50   mins                  Total Treatment:     65   mins    Antonio Coley PT  Physical Therapist

## 2020-01-08 ENCOUNTER — TREATMENT (OUTPATIENT)
Dept: PHYSICAL THERAPY | Facility: CLINIC | Age: 60
End: 2020-01-08

## 2020-01-08 DIAGNOSIS — M75.82 ROTATOR CUFF TENDONITIS, LEFT: ICD-10-CM

## 2020-01-08 DIAGNOSIS — M25.612 SHOULDER STIFFNESS, LEFT: ICD-10-CM

## 2020-01-08 DIAGNOSIS — M19.012 OSTEOARTHRITIS OF BOTH SHOULDERS, UNSPECIFIED OSTEOARTHRITIS TYPE: ICD-10-CM

## 2020-01-08 DIAGNOSIS — M25.562 BILATERAL CHRONIC KNEE PAIN: ICD-10-CM

## 2020-01-08 DIAGNOSIS — M17.0 PRIMARY OSTEOARTHRITIS OF BOTH KNEES: Primary | ICD-10-CM

## 2020-01-08 DIAGNOSIS — R26.9 GAIT DISTURBANCE: ICD-10-CM

## 2020-01-08 DIAGNOSIS — M25.561 BILATERAL CHRONIC KNEE PAIN: ICD-10-CM

## 2020-01-08 DIAGNOSIS — G89.29 BILATERAL CHRONIC KNEE PAIN: ICD-10-CM

## 2020-01-08 DIAGNOSIS — M19.011 OSTEOARTHRITIS OF BOTH SHOULDERS, UNSPECIFIED OSTEOARTHRITIS TYPE: ICD-10-CM

## 2020-01-08 PROCEDURE — 97110 THERAPEUTIC EXERCISES: CPT | Performed by: PHYSICAL THERAPIST

## 2020-01-08 PROCEDURE — 97530 THERAPEUTIC ACTIVITIES: CPT | Performed by: PHYSICAL THERAPIST

## 2020-01-12 NOTE — PROGRESS NOTES
Physical Therapy Daily Progress Note    Patient Name: Pedrito Powell         :  1960  Referring Physician: Stephon Sheehan MD      Subjective   Pedrito Powell reports: tape helpful in decreasing pain at rest (L) Shoulder - Pt says she had tests that show she is sensitive to tape - Only wants it on (L) shoulder today -     Objective   Pt demonstrating antalgic gait - Tender medial jt and infrapatellar region R>L    See Exercise, Manual, and Modality Logs for complete treatment.     Functional / Therapeutic Activities:  10 min  · TAPING / BRACING: K-tape to Unload (L) Shoulder  · SEE EXERCISE FLOW SHEET -   · Jt protection, ADL modification; Posture and      Assessment/Plan  Significant Knee OA R>L; Severe L>R Shoulder pain probable RTC deficient L>R and probable OA R>L  Pt may benefit from further intervention R knee, Left shoulder -   Taping very helpful in allowing improved function and tolerating her job, etc.     Progress strengthening /stabilization /functional activity       _________________________________________________  Manual Therapy:         mins  26839;  Therapeutic Exercise:    35     mins  61067;     Neuromuscular Dion:        mins  57921;    Therapeutic Activity:     10     mins  94810;     Gait Training:           mins  00993;     Ultrasound:          mins  54593;    Electrical Stimulation:         mins  52523 ( );  Dry Needling          mins self-pay    Timed Treatment:   45   mins                  Total Treatment:     60   mins    Antonio Coley PT  Physical Therapist

## 2020-01-15 ENCOUNTER — TREATMENT (OUTPATIENT)
Dept: PHYSICAL THERAPY | Facility: CLINIC | Age: 60
End: 2020-01-15

## 2020-01-15 DIAGNOSIS — M17.0 PRIMARY OSTEOARTHRITIS OF BOTH KNEES: Primary | ICD-10-CM

## 2020-01-15 DIAGNOSIS — R26.9 GAIT DISTURBANCE: ICD-10-CM

## 2020-01-15 DIAGNOSIS — M25.562 BILATERAL CHRONIC KNEE PAIN: ICD-10-CM

## 2020-01-15 DIAGNOSIS — G89.29 BILATERAL CHRONIC KNEE PAIN: ICD-10-CM

## 2020-01-15 DIAGNOSIS — M25.561 BILATERAL CHRONIC KNEE PAIN: ICD-10-CM

## 2020-01-15 PROCEDURE — 97110 THERAPEUTIC EXERCISES: CPT | Performed by: PHYSICAL THERAPIST

## 2020-01-19 NOTE — PROGRESS NOTES
Physical Therapy Daily Progress Note     Patient Name: Pedrito Powell         :  1960  Referring Physician: Stephon Sheehan MD        Subjective   Pedrito Powell reports: no new problems - Knees feeling better overall - -      Objective   Pt demonstrating less antalgic gait - Grossly less swelling (R) knees -      See Exercise, Manual, and Modality Logs for complete treatment.     Functional / Therapeutic Activities:   min  · TAPING / BRACING: K-tape to Unload (L) Shoulder - HELD  · SEE EXERCISE FLOW SHEET -   · Jt protection, ADL modification; Posture and       Assessment/Plan  Significant Knee OA R>L; Severe L>R Shoulder pain probable RTC deficient L>R and probable OA R>L  Pt may benefit from further intervention R knee, Left shoulder -   Taping very helpful in allowing improved function and tolerating her job, etc.      Progress strengthening /stabilization /functional activity     _________________________________________________  Manual Therapy:                 mins  07467;  Therapeutic Exercise:    35     mins  36407;     Neuromuscular Dion:        mins  45779;    Therapeutic Activity:           mins  61940;     Gait Training:                      mins  22305;     Ultrasound:                          mins  23885;    Electrical Stimulation:         mins  07885 ( );  Dry Needling                       mins self-pay     Timed Treatment:   35   mins                  Total Treatment:     55   mins     Antonio Coley PT  Physical Therapist

## 2020-01-22 ENCOUNTER — TREATMENT (OUTPATIENT)
Dept: PHYSICAL THERAPY | Facility: CLINIC | Age: 60
End: 2020-01-22

## 2020-01-22 DIAGNOSIS — M25.562 BILATERAL CHRONIC KNEE PAIN: ICD-10-CM

## 2020-01-22 DIAGNOSIS — R26.9 GAIT DISTURBANCE: ICD-10-CM

## 2020-01-22 DIAGNOSIS — M25.561 BILATERAL CHRONIC KNEE PAIN: ICD-10-CM

## 2020-01-22 DIAGNOSIS — G89.29 BILATERAL CHRONIC KNEE PAIN: ICD-10-CM

## 2020-01-22 DIAGNOSIS — M17.0 PRIMARY OSTEOARTHRITIS OF BOTH KNEES: Primary | ICD-10-CM

## 2020-01-22 PROCEDURE — 97110 THERAPEUTIC EXERCISES: CPT | Performed by: PHYSICAL THERAPIST

## 2020-01-24 NOTE — PROGRESS NOTES
Physical Therapy Daily Progress Note     Patient Name: Pedrito Powell         :  1960  Referring Physician: Stephon Sheehan MD        Subjective   Pedrito Powell reports: no new problems - Knees continue to hurt an unable to squat or go up stairs w.o severepain, but feeling better overall - -      Objective   Pt demonstrating less antalgic gait - Grossly less swelling (R) knees - Severely tender inferior, medial knee and PF Jt (B)     See Exercise, Manual, and Modality Logs for complete treatment.     Functional / Therapeutic Activities:   min  · TAPING / BRACING: K-tape to Unload (L) Shoulder - HELD    · Jt protection, ADL modification; Posture and       Assessment/Plan  Significant Knee OA R>L; Severe L>R Shoulder pain probable RTC deficient L>R and probable OA R>L  Pt may benefit from further intervention R knee, Left shoulder -   Taping very helpful in allowing improved function and tolerating her job, etc.      Progress strengthening /stabilization /functional activity     _________________________________________________  Manual Therapy:                 mins  38715;  Therapeutic Exercise:    38     mins  22183;     Neuromuscular Dion:        mins  50623;    Therapeutic Activity:           mins  29330;     Gait Training:                      mins  43953;     Ultrasound:                          mins  56708;    Electrical Stimulation:         mins  16313 ( );  Dry Needling                       mins self-pay     Timed Treatment:   38   mins                  Total Treatment:     55   mins     Antonio Coley PT  Physical Therapist

## 2020-01-29 ENCOUNTER — TREATMENT (OUTPATIENT)
Dept: PHYSICAL THERAPY | Facility: CLINIC | Age: 60
End: 2020-01-29

## 2020-01-29 DIAGNOSIS — G89.29 BILATERAL CHRONIC KNEE PAIN: ICD-10-CM

## 2020-01-29 DIAGNOSIS — M25.561 BILATERAL CHRONIC KNEE PAIN: ICD-10-CM

## 2020-01-29 DIAGNOSIS — R26.9 GAIT DISTURBANCE: ICD-10-CM

## 2020-01-29 DIAGNOSIS — M25.562 BILATERAL CHRONIC KNEE PAIN: ICD-10-CM

## 2020-01-29 DIAGNOSIS — M17.0 PRIMARY OSTEOARTHRITIS OF BOTH KNEES: Primary | ICD-10-CM

## 2020-01-29 PROCEDURE — 97110 THERAPEUTIC EXERCISES: CPT | Performed by: PHYSICAL THERAPIST

## 2020-01-30 NOTE — PROGRESS NOTES
Physical Therapy Daily Progress Note     Patient Name: Pedrito Powell         :  1960  Referring Physician: Stephon Sheehan MD        Subjective   Pedrito Powell reports: no new problems - Knees continue to hurt an unable to squat or go up stairs w.o severepain, but feeling better overall - -   Pt has made an appointment with Dr. Devi about her (L) Shoulder - in February -     Objective   Pt demonstrating less antalgic gait - Grossly less swelling (R) knees - Severely tender inferior, medial knee and PF Jt (B)  Pt unable to perform step-up or SLS / Wobble board exercises due to severe pain -      See Exercise, Manual, and Modality Logs for complete treatment.     Functional / Therapeutic Activities:   min  · TAPING / BRACING: K-tape to Unload (L) Shoulder - HELD   Jt protection, ADL modification; Posture and       Assessment/Plan  Significant Knee OA R>L; Severe L>R Shoulder pain probable RTC deficient L>R and probable OA R>L  Pt may benefit from further intervention R knee, Left shoulder -   Taping very helpful in allowing improved function and tolerating her job, etc.      Progress strengthening /stabilization /functional activity - Pt to see Dr. Devi about her (R) shoulder 2020.      _________________________________________________  Manual Therapy:                 mins  95654;  Therapeutic Exercise:    38     mins  14629;     Neuromuscular Dion:        mins  74848;    Therapeutic Activity:           mins  70245;     Gait Training:                      mins  97414;     Ultrasound:                          mins  38050;    Electrical Stimulation:         mins  94363 ( );  Dry Needling                       mins self-pay     Timed Treatment:   38   mins                  Total Treatment:     50   mins     Antonio Coley PT  Physical Therapist

## 2020-02-03 RX ORDER — ONDANSETRON 4 MG/1
TABLET, ORALLY DISINTEGRATING ORAL
Qty: 180 TABLET | Refills: 0 | Status: SHIPPED | OUTPATIENT
Start: 2020-02-03 | End: 2020-09-08 | Stop reason: SDUPTHER

## 2020-02-10 ENCOUNTER — OFFICE VISIT (OUTPATIENT)
Dept: INTERNAL MEDICINE | Facility: CLINIC | Age: 60
End: 2020-02-10

## 2020-02-10 VITALS
HEIGHT: 61 IN | SYSTOLIC BLOOD PRESSURE: 120 MMHG | WEIGHT: 225 LBS | OXYGEN SATURATION: 98 % | BODY MASS INDEX: 42.48 KG/M2 | RESPIRATION RATE: 15 BRPM | HEART RATE: 68 BPM | DIASTOLIC BLOOD PRESSURE: 70 MMHG

## 2020-02-10 DIAGNOSIS — I10 HYPERTENSION, UNSPECIFIED TYPE: ICD-10-CM

## 2020-02-10 DIAGNOSIS — A08.4 VIRAL GASTROENTERITIS: Primary | ICD-10-CM

## 2020-02-10 DIAGNOSIS — E78.5 HYPERLIPIDEMIA, UNSPECIFIED HYPERLIPIDEMIA TYPE: ICD-10-CM

## 2020-02-10 DIAGNOSIS — M17.0 PRIMARY OSTEOARTHRITIS OF BOTH KNEES: ICD-10-CM

## 2020-02-10 DIAGNOSIS — D64.9 ANEMIA, UNSPECIFIED TYPE: ICD-10-CM

## 2020-02-10 PROCEDURE — 99214 OFFICE O/P EST MOD 30 MIN: CPT | Performed by: FAMILY MEDICINE

## 2020-02-10 RX ORDER — CLOBETASOL PROPIONATE 0.5 MG/G
1 CREAM TOPICAL AS NEEDED
COMMUNITY
Start: 2020-01-03

## 2020-02-10 RX ORDER — TERBINAFINE HYDROCHLORIDE 250 MG/1
TABLET ORAL
COMMUNITY
Start: 2020-01-02 | End: 2021-05-03

## 2020-02-10 NOTE — PROGRESS NOTES
Subjective   Pedrito Powell is a 59 y.o. female.     Chief Complaint   Patient presents with   • Follow-up         History of Present Illness     Patient with past medical history for essential hypertension.  Patient is currently taking irbesartan 300 mg daily.  Also takes hydrochlorothiazide 25 mg daily.  Patient denies any side effects of the medication.  Patient blood pressure at today's office is 120/70.    Patient also has a past medical history for hyperlipidemia.  Patient current take Lipitor 80 mg daily.  She denies any side effects of the medication.    Patient has a past medical history of anemia.  Her anemia is most likely due to iron deficiency.  She is currently taking ferrous gluconate 324 mg daily.  She denies any side effects of the medication.    Patient notes that from yesterday, she started experiencing signs and symptoms of viral gastroenteritis.  She is had multiple episodes of nausea as well as vomiting and diarrhea.  She does note that she feels a little bit better today, however she still feels weak.  She is claimed that she is drinking plenty of fluids, however her physical appearance appear that she may look dry.  Patient notes that she initially had belly pain, however that seemed to have resolved in the early hours of today as she has not been as sick today as she was last couple days.    The following portions of the patient's history were reviewed and updated as appropriate: allergies, current medications, past family history, past medical history, past social history, past surgical history and problem list.    Review of Systems   Constitutional: Negative for chills and fever.   HENT: Negative for congestion, rhinorrhea, sinus pain and sore throat.    Eyes: Negative for photophobia and visual disturbance.   Respiratory: Negative for cough, chest tightness and shortness of breath.    Cardiovascular: Negative for chest pain and palpitations.   Gastrointestinal: Negative for diarrhea,  nausea and vomiting.   Genitourinary: Negative for dysuria, frequency and urgency.   Skin: Negative for rash and wound.   Neurological: Negative for dizziness and syncope.   Psychiatric/Behavioral: Negative for behavioral problems and confusion.       Objective   Physical Exam   Constitutional: She is oriented to person, place, and time. She appears well-developed and well-nourished.   HENT:   Head: Normocephalic and atraumatic.   Right Ear: External ear normal.   Left Ear: External ear normal.   Eyes: EOM are normal.   Neck: Normal range of motion. Neck supple.   Cardiovascular: Normal rate, regular rhythm and normal heart sounds.   Pulmonary/Chest: Effort normal and breath sounds normal. No respiratory distress.   Musculoskeletal: Normal range of motion.   Lymphadenopathy:     She has no cervical adenopathy.   Neurological: She is alert and oriented to person, place, and time.   Skin: Skin is warm.   Psychiatric: She has a normal mood and affect. Her behavior is normal.   Nursing note and vitals reviewed.      Vitals:    02/10/20 1553   BP: 120/70   Pulse: 68   Resp: 15   SpO2: 98%     Body mass index is 42.92 kg/m².      Assessment/Plan   Pedrito was seen today for follow-up.    Diagnoses and all orders for this visit:    Viral gastroenteritis  -     CBC & Differential  -     Comprehensive Metabolic Panel  -     Likely viral illness.  Patient needs to stay hydrated with plenty of water and Gatorade.  She may use Zofran as needed.  We will check a CBC and CMP at today's visit.    Primary osteoarthritis of both knees  -     Ambulatory Referral to Physical Therapy Evaluate and treat  -     She states that her physical therapy seem to have helped her with her knee pain.  I am okay with her continuing doing physical therapy.    Hyperlipidemia, unspecified hyperlipidemia type  -     Lipid Panel With LDL / HDL Ratio  -     Continue Lipitor 80 mg daily.    Hypertension, unspecified type        -     Continue Avapro 300  mg daily and hydrochlorothiazide 25 mg daily.    Anemia, unspecified type  -     CBC & Differential  -     Iron Profile  -     Ferritin  -     Continue ferrous gluconate 324.          No follow-ups on file.    Dictated utilizing Dragon Voice Recognition Software

## 2020-02-11 LAB
ALBUMIN SERPL-MCNC: 4 G/DL (ref 3.8–4.9)
ALBUMIN/GLOB SERPL: 1.4 {RATIO} (ref 1.2–2.2)
ALP SERPL-CCNC: 62 IU/L (ref 39–117)
ALT SERPL-CCNC: 19 IU/L (ref 0–32)
AST SERPL-CCNC: 16 IU/L (ref 0–40)
BASOPHILS # BLD AUTO: 0.1 X10E3/UL (ref 0–0.2)
BASOPHILS NFR BLD AUTO: 1 %
BILIRUB SERPL-MCNC: 0.4 MG/DL (ref 0–1.2)
BUN SERPL-MCNC: 23 MG/DL (ref 6–24)
BUN/CREAT SERPL: 14 (ref 9–23)
CALCIUM SERPL-MCNC: 8.8 MG/DL (ref 8.7–10.2)
CHLORIDE SERPL-SCNC: 100 MMOL/L (ref 96–106)
CHOLEST SERPL-MCNC: 162 MG/DL (ref 100–199)
CO2 SERPL-SCNC: 24 MMOL/L (ref 20–29)
CREAT SERPL-MCNC: 1.6 MG/DL (ref 0.57–1)
EOSINOPHIL # BLD AUTO: 0.3 X10E3/UL (ref 0–0.4)
EOSINOPHIL NFR BLD AUTO: 4 %
ERYTHROCYTE [DISTWIDTH] IN BLOOD BY AUTOMATED COUNT: 13 % (ref 11.7–15.4)
FERRITIN SERPL-MCNC: 131 NG/ML (ref 15–150)
GLOBULIN SER CALC-MCNC: 2.9 G/DL (ref 1.5–4.5)
GLUCOSE SERPL-MCNC: 105 MG/DL (ref 65–99)
HCT VFR BLD AUTO: 31.9 % (ref 34–46.6)
HDLC SERPL-MCNC: 47 MG/DL
HGB BLD-MCNC: 10.3 G/DL (ref 11.1–15.9)
IMM GRANULOCYTES # BLD AUTO: 0 X10E3/UL (ref 0–0.1)
IMM GRANULOCYTES NFR BLD AUTO: 0 %
IRON SATN MFR SERPL: 17 % (ref 15–55)
IRON SERPL-MCNC: 40 UG/DL (ref 27–159)
LDLC SERPL CALC-MCNC: 97 MG/DL (ref 0–99)
LDLC/HDLC SERPL: 2.1 RATIO (ref 0–3.2)
LYMPHOCYTES # BLD AUTO: 1.4 X10E3/UL (ref 0.7–3.1)
LYMPHOCYTES NFR BLD AUTO: 20 %
MCH RBC QN AUTO: 29.3 PG (ref 26.6–33)
MCHC RBC AUTO-ENTMCNC: 32.3 G/DL (ref 31.5–35.7)
MCV RBC AUTO: 91 FL (ref 79–97)
MONOCYTES # BLD AUTO: 0.5 X10E3/UL (ref 0.1–0.9)
MONOCYTES NFR BLD AUTO: 7 %
NEUTROPHILS # BLD AUTO: 5 X10E3/UL (ref 1.4–7)
NEUTROPHILS NFR BLD AUTO: 68 %
PLATELET # BLD AUTO: 265 X10E3/UL (ref 150–450)
POTASSIUM SERPL-SCNC: 4.2 MMOL/L (ref 3.5–5.2)
PROT SERPL-MCNC: 6.9 G/DL (ref 6–8.5)
RBC # BLD AUTO: 3.51 X10E6/UL (ref 3.77–5.28)
SODIUM SERPL-SCNC: 138 MMOL/L (ref 134–144)
TIBC SERPL-MCNC: 239 UG/DL (ref 250–450)
TRIGL SERPL-MCNC: 88 MG/DL (ref 0–149)
UIBC SERPL-MCNC: 199 UG/DL (ref 131–425)
VLDLC SERPL CALC-MCNC: 18 MG/DL (ref 5–40)
WBC # BLD AUTO: 7.2 X10E3/UL (ref 3.4–10.8)

## 2020-02-15 DIAGNOSIS — R79.89 ELEVATED SERUM CREATININE: Primary | ICD-10-CM

## 2020-02-15 NOTE — PROGRESS NOTES
Please inform the patient of the following abnormal results.  Hemoglobin still shows anemia, but improving.   Serum creatnine levels are continuing to rise. Patient would benefit from seeing a nephrologist.

## 2020-02-24 ENCOUNTER — OFFICE VISIT (OUTPATIENT)
Dept: ORTHOPEDIC SURGERY | Facility: CLINIC | Age: 60
End: 2020-02-24

## 2020-02-24 VITALS — HEIGHT: 60 IN | WEIGHT: 230 LBS | BODY MASS INDEX: 45.16 KG/M2 | TEMPERATURE: 98.3 F

## 2020-02-24 DIAGNOSIS — M19.019 ARTHRITIS OF SHOULDER: ICD-10-CM

## 2020-02-24 DIAGNOSIS — M25.512 LEFT SHOULDER PAIN, UNSPECIFIED CHRONICITY: Primary | ICD-10-CM

## 2020-02-24 PROCEDURE — 99203 OFFICE O/P NEW LOW 30 MIN: CPT | Performed by: ORTHOPAEDIC SURGERY

## 2020-02-24 PROCEDURE — 20610 DRAIN/INJ JOINT/BURSA W/O US: CPT | Performed by: ORTHOPAEDIC SURGERY

## 2020-02-24 PROCEDURE — 73030 X-RAY EXAM OF SHOULDER: CPT | Performed by: ORTHOPAEDIC SURGERY

## 2020-02-24 RX ADMIN — METHYLPREDNISOLONE ACETATE 80 MG: 80 INJECTION, SUSPENSION INTRA-ARTICULAR; INTRALESIONAL; INTRAMUSCULAR; SOFT TISSUE at 16:15

## 2020-02-24 NOTE — PROGRESS NOTES
Large Joint Arthrocentesis: L glenohumeral  Date/Time: 2/24/2020 4:15 PM  Consent given by: patient  Site marked: site marked  Timeout: Immediately prior to procedure a time out was called to verify the correct patient, procedure, equipment, support staff and site/side marked as required   Supporting Documentation  Indications: pain and joint swelling   Procedure Details  Location: shoulder - L glenohumeral  Preparation: Patient was prepped and draped in the usual sterile fashion  Needle gauge: 21g.  Approach: anterolateral  Medications administered: 80 mg methylPREDNISolone acetate 80 MG/ML; 2 mL lidocaine (cardiac)  Patient tolerance: patient tolerated the procedure well with no immediate complications        Patient: Pedrito Powell    YOB: 1960    Medical Record Number: [id number]    Chief Complaints:  Left shoulder pain    History of Present Illness:     59 y.o. female patient who presents with a long history of progressively worsening pain and dysfunction affecting the left shoulder.  She tells me that about 6 or 7 years ago she underwent a manipulation under anesthesia by Dr. Scales.  She did well after that initial procedure but then she started to have pain again about a year later.  She has continued to have pain in the shoulder really since that time.  The pain is moderate to severe, constant and both aching and stabbing.  She did see Dr. Sheehan for the shoulder a couple of years ago.  She was told at that time that she had significant arthritis.  She had injections done and it seemed to help somewhat.  She reports clicking, popping and associated swelling with the pain.  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  She cannot take anti-inflammatories because she had a reaction to naproxen in December 2018.    Allergies:   Allergies   Allergen Reactions   • Chocolate Anaphylaxis   • Nickel Anaphylaxis   • Nuts Anaphylaxis   • Ciprofloxacin Other (See Comments)     THRUSH PER PATIENT    • Citric Acid Unknown (See Comments)     Unsure     • Influenza Vaccines Nausea And Vomiting   • Methyldibromoglutaronitrile Unknown (See Comments)      PATIENT UNSURE OF REACTION.   • Neomycin Unknown (See Comments)      PATIENT UNSURE OF REACTION.   • Omnicef [Cefdinir] Other (See Comments)     THRUSH PER PATIENT   • Palladium Chloride Unknown (See Comments)      PATIENT UNSURE OF REACTION   • Penicillins Other (See Comments)     THRUSH PER PATIENT   • Sulfa Antibiotics Other (See Comments)     THRUSH PER PATIENT   • Tomato Hives   • Yeast-Related Products Hives   • Gold-Containing Drug Products Rash         • Latex Rash       Home Medications:    Current Outpatient Medications:   •  albuterol 108 (90 Base) MCG/ACT inhaler, Inhale 2 puffs Every 4 (Four) Hours As Needed for Wheezing., Disp: , Rfl:   •  aspirin 325 MG tablet, Take 325 mg by mouth daily., Disp: , Rfl:   •  atorvastatin (LIPITOR) 80 MG tablet, Take 1 tablet by mouth Daily., Disp: 90 tablet, Rfl: 1  •  azelastine (ASTELIN) 0.1 % nasal spray, 1 spray into the nostril(s) as directed by provider Daily., Disp: , Rfl: 11  •  Cholecalciferol (VITAMIN D3) 2000 units capsule, Take 1 capsule by mouth Daily., Disp: 90 capsule, Rfl: 3  •  clobetasol (TEMOVATE) 0.05 % cream, LAITH TO SCALP AND LEFT DORSAL FOOT BID PRN, Disp: , Rfl:   •  diclofenac (VOLTAREN) 75 MG EC tablet, Take 1 tablet by mouth Daily., Disp: 90 tablet, Rfl: 1  •  Diclofenac Sodium (PENNSAID) 2 % solution, Place 2 application on the skin as directed by provider 2 (Two) Times a Day As Needed (PAIN/SWELLING)., Disp: 112 g, Rfl: 2  •  EPINEPHrine (AUVI-Q IJ), Inject  as directed As Needed., Disp: , Rfl:   •  ferrous gluconate (FERGON) 324 MG tablet, Take 1 tablet by mouth Daily With Breakfast., Disp: 90 tablet, Rfl: 1  •  hydroCHLOROthiazide (HYDRODIURIL) 25 MG tablet, Take 1 tablet by mouth Daily., Disp: 90 tablet, Rfl: 0  •  hydrocortisone 2.5 % cream, Apply  topically to the appropriate area as  directed 2 (Two) Times a Day., Disp: , Rfl:   •  ipratropium (ATROVENT) 0.06 % nasal spray, 1 spray into the nostril(s) as directed by provider Daily., Disp: , Rfl: 11  •  irbesartan (AVAPRO) 300 MG tablet, Take 1 tablet by mouth Daily., Disp: 90 tablet, Rfl: 0  •  loperamide (IMODIUM) 2 MG capsule, Take 1 capsule by mouth Every 2 (Two) Hours As Needed for Diarrhea (max 4 doses daily)., Disp: , Rfl:   •  loratadine (CLARITIN) 10 MG tablet, Take 10 mg by mouth Daily., Disp: , Rfl:   •  meclizine (ANTIVERT) 25 MG tablet, Take 1 tablet by mouth 3 (Three) Times a Day As Needed for dizziness., Disp: 270 tablet, Rfl: 1  •  omeprazole (priLOSEC) 40 MG capsule, Take 1 capsule by mouth Daily., Disp: 90 capsule, Rfl: 1  •  ondansetron (ZOFRAN) 4 MG tablet, TK 1 T PO  Q 6 H PRF NAUSEA, Disp: , Rfl: 0  •  ondansetron ODT (ZOFRAN-ODT) 4 MG disintegrating tablet, DISSOLVE 1 TABLET ON THE TONGUE TWICE DAILY, Disp: 180 tablet, Rfl: 0  •  terbinafine (lamiSIL) 250 MG tablet, TK 8 TS PO ONCE A WEEK, Disp: , Rfl:   •  triamcinolone (KENALOG) 0.1 % ointment, Apply  topically to the appropriate area as directed 2 (Two) Times a Day., Disp: , Rfl:   •  Triamcinolone Acetonide (NASACORT ALLERGY 24HR) 55 MCG/ACT nasal inhaler, 2 sprays into the nostril(s) as directed by provider Daily., Disp: , Rfl:     Past Medical History:   Diagnosis Date   • Anemia    • Arthritis    • GERD (gastroesophageal reflux disease)    • History of carpal tunnel syndrome    • Hyperlipidemia    • Hypertension    • Vertigo        Past Surgical History:   Procedure Laterality Date   •  SECTION  , ,    • COLONOSCOPY N/A 12/10/2018    normal ileum, IH, hyperplastic polyp, otherwise normal exam   • ENDOSCOPY N/A 12/10/2018    no gross lesions in esophagus or examined duodenum, erythematous mucosa in stomach, biopsies benign   • HYSTERECTOMY     • OOPHORECTOMY     • SHOULDER MANIPULATION         Social History     Occupational History   •  "Occupation: Teacher     Employer: [employer]   Tobacco Use   • Smoking status: Never Smoker   • Smokeless tobacco: Never Used   Substance and Sexual Activity   • Alcohol use: No   • Drug use: No   • Sexual activity: Defer      Social History     Social History Narrative   • Not on file       Family History   Problem Relation Age of Onset   • Colon cancer Maternal Grandfather    • Asthma Mother    • Thyroid disease Father    • Diabetes Maternal Grandmother        Review of Systems:      Constitutional: Denies fever, shaking or chills   Eyes: Denies change in visual acuity   HEENT: Denies nasal congestion or sore throat   Respiratory: Denies cough or shortness of breath   Cardiovascular: Denies chest pain or edema  Endocrine: Denies tremors, palpitations, intolerance of heat or cold, polyuria, polydipsia.  GI: Denies abdominal pain, nausea, vomiting, bloody stools or diarrhea  : Denies frequency, urgency, incontinence, retention, or nocturia.  Musculoskeletal: Denies numbness, tingling or loss of motor function   Integument: Denies rash, lesion or ulceration   Neurologic: Denies headache or focal weakness, deficits  Heme: Denies spontaneous or excessive bleeding, epistaxis, hematuria, melena, fatigue, enlarged or tender lymph nodes.      All other pertinent positives and negatives as noted above in HPI.    Physical Exam: 59 y.o. female  Vitals:    02/24/20 1541   Temp: 98.3 °F (36.8 °C)   TempSrc: Temporal   Weight: 104 kg (230 lb)   Height: 152.4 cm (60\")       General:  Patient is awake and alert.  Appears in no acute distress or discomfort.    Psych:  Affect and demeanor are appropriate.    Eyes:  Conjunctiva and sclera appear grossly normal.  Eyes track well and EOM seem to be intact.    Ears:  No gross abnormalities.  Hearing adequate for the exam.    Cardiovascular:  Regular rate and rhythm.    Lungs:  Good chest expansion.  Breathing unlabored.    Spine:  Neck appears grossly normal.  No " palpable masses or adenopathy.  Good motion.  Spurling's maneuver is negative for any shoulder or arm symptoms.    Extremities:  Skin is benign.  No obvious gross abnormalities about left shoulder.  No palpable masses or adenopathy.  Moderate tenderness noted over anterior glenohumeral joint and rotator interval.  Motion is to about 85 to 90 degrees of forward elevation, maybe 5 to 10 degrees of external rotation, she lacks at least 15 to 20 degrees of horizontal abduction, between her side and back pocket internal rotation.  She has crepitus with range of motion.  No instability.  Rotator cuff strength seems to be well preserved but the exam is limited due to patient discomfort with resisted active motion.  Good motor and sensory function in lower arm and hand.  Palpable radial pulse.  Brisk capillary refill         Radiology:  AP, scapular Y, and axillary views of the left shoulder are ordered by myself and reviewed to evaluate the patient's complaint.  No comparison films are immediately available.  The x-rays show severe glenohumeral osteoarthritis with joint space narrowing, osteophyte formation, and subchondral sclerosis.  The acromiohumeral interval measures normal.    Assessment/Plan:  Left shoulder osteoarthritis    We discussed treatment options in detail including conservative treatment versus surgical options.  She has severe disease and I consider an arthroplasty is a reasonable option for her at this point.  We had a long discussion about an arthroplasty and all that this would potentially entail.  She asked a number of questions about this.  She does have a history of nickel allergy and she asked about that.  I assured her that I would recommend using an all titanium component.  Regarding conservative treatment, we discussed appropriate activity modifications, anti-inflammatories, injections, and continued physical therapy.  For now, she is not ready to consider surgery.  The therapy has not helped  much so I do not know that there is much using her continuing that.  She wanted to try another injection.  The risk, benefits and alternatives were discussed.  She consented and the injection was performed as described below.  She will follow-up as needed.    Bethel Devi MD    02/24/2020    CC to Teo Fraser MD

## 2020-02-26 RX ORDER — METHYLPREDNISOLONE ACETATE 80 MG/ML
80 INJECTION, SUSPENSION INTRA-ARTICULAR; INTRALESIONAL; INTRAMUSCULAR; SOFT TISSUE
Status: COMPLETED | OUTPATIENT
Start: 2020-02-24 | End: 2020-02-24

## 2020-03-10 DIAGNOSIS — D64.9 ANEMIA, UNSPECIFIED TYPE: ICD-10-CM

## 2020-03-10 RX ORDER — DOXYCYCLINE HYCLATE 50 MG/1
324 CAPSULE, GELATIN COATED ORAL
Qty: 90 TABLET | Refills: 1 | Status: SHIPPED | OUTPATIENT
Start: 2020-03-10 | End: 2020-09-08 | Stop reason: SDUPTHER

## 2020-03-24 DIAGNOSIS — I10 HYPERTENSION, UNSPECIFIED TYPE: ICD-10-CM

## 2020-03-24 RX ORDER — IRBESARTAN 300 MG/1
300 TABLET ORAL DAILY
Qty: 90 TABLET | Refills: 0 | Status: SHIPPED | OUTPATIENT
Start: 2020-03-24 | End: 2020-05-19 | Stop reason: SDUPTHER

## 2020-05-19 ENCOUNTER — TELEMEDICINE (OUTPATIENT)
Dept: INTERNAL MEDICINE | Facility: CLINIC | Age: 60
End: 2020-05-19

## 2020-05-19 DIAGNOSIS — D64.9 ANEMIA, UNSPECIFIED TYPE: ICD-10-CM

## 2020-05-19 DIAGNOSIS — F51.01 PRIMARY INSOMNIA: ICD-10-CM

## 2020-05-19 DIAGNOSIS — E78.5 HYPERLIPIDEMIA, UNSPECIFIED HYPERLIPIDEMIA TYPE: Primary | ICD-10-CM

## 2020-05-19 DIAGNOSIS — I10 HYPERTENSION, UNSPECIFIED TYPE: ICD-10-CM

## 2020-05-19 DIAGNOSIS — Z00.00 HEALTHCARE MAINTENANCE: ICD-10-CM

## 2020-05-19 PROCEDURE — 99214 OFFICE O/P EST MOD 30 MIN: CPT | Performed by: FAMILY MEDICINE

## 2020-05-19 RX ORDER — AMITRIPTYLINE HYDROCHLORIDE 25 MG/1
25 TABLET, FILM COATED ORAL NIGHTLY PRN
Qty: 30 TABLET | Refills: 3 | Status: SHIPPED | OUTPATIENT
Start: 2020-05-19 | End: 2020-09-09 | Stop reason: SDUPTHER

## 2020-05-19 RX ORDER — IRBESARTAN 300 MG/1
300 TABLET ORAL DAILY
Qty: 90 TABLET | Refills: 3 | Status: SHIPPED | OUTPATIENT
Start: 2020-05-19 | End: 2021-06-18 | Stop reason: SDUPTHER

## 2020-05-19 RX ORDER — ATORVASTATIN CALCIUM 80 MG/1
80 TABLET, FILM COATED ORAL DAILY
Qty: 90 TABLET | Refills: 1 | Status: SHIPPED | OUTPATIENT
Start: 2020-05-19 | End: 2020-12-14

## 2020-05-19 RX ORDER — HYDROCHLOROTHIAZIDE 25 MG/1
25 TABLET ORAL DAILY
Qty: 90 TABLET | Refills: 3 | Status: SHIPPED | OUTPATIENT
Start: 2020-05-19 | End: 2021-06-08 | Stop reason: SDUPTHER

## 2020-05-19 NOTE — PROGRESS NOTES
Subjective   Pedrito Powell is a 59 y.o. female.     No chief complaint on file.  Chief complaint hyperlipidemia    This was an audio and video enabled telemedicine encounter.      History of Present Illness     Patient with past medical history for hyperlipidemia.  Patient is currently on Lipitor 80 mg daily.  Patient denies any side effects of the medication.     Patient past medical history for essential hypertension.  She states that her blood pressure has been normal when she checks it.  She is currently on hydrochlorothiazide 25 mg daily and irbesartan 300 mg daily.  Patient denies any side effects of the medication.    Patient also notes that she is experiencing some insomnia.  Patient states that she would like something to help her sleep a little bit better.        The following portions of the patient's history were reviewed and updated as appropriate: allergies, current medications, past family history, past medical history, past social history, past surgical history and problem list.    Review of Systems   Constitutional: Negative for chills and fever.   HENT: Negative for congestion, rhinorrhea, sinus pain and sore throat.    Eyes: Negative for photophobia and visual disturbance.   Respiratory: Negative for cough, chest tightness and shortness of breath.    Cardiovascular: Negative for chest pain and palpitations.   Gastrointestinal: Negative for diarrhea, nausea and vomiting.   Genitourinary: Negative for dysuria, frequency and urgency.   Skin: Negative for rash and wound.   Neurological: Negative for dizziness and syncope.   Psychiatric/Behavioral: Negative for behavioral problems and confusion.       Objective   Physical Exam   Constitutional: She is oriented to person, place, and time. She appears well-developed and well-nourished.   HENT:   Head: Normocephalic and atraumatic.   Eyes: EOM are normal.   Neck: Normal range of motion. Neck supple.   Neurological: She is alert and oriented to  person, place, and time.   Psychiatric: She has a normal mood and affect. Her behavior is normal.   Nursing note and vitals reviewed.      There were no vitals filed for this visit.  There is no height or weight on file to calculate BMI.      Assessment/Plan   Diagnoses and all orders for this visit:    Hyperlipidemia, unspecified hyperlipidemia type  -     atorvastatin (Lipitor) 80 MG tablet; Take 1 tablet by mouth Daily.  -     Comprehensive Metabolic Panel  -     Lipid Panel With LDL / HDL Ratio  -     Continue lipitor.     Hypertension, unspecified type  -     irbesartan (Avapro) 300 MG tablet; Take 1 tablet by mouth Daily.  -     hydroCHLOROthiazide (HYDRODIURIL) 25 MG tablet; Take 1 tablet by mouth Daily.  -     Comprehensive Metabolic Panel  -     Continue irbesartan and hctz.     Anemia, unspecified type  -     CBC & Differential  -     Iron Profile  -     Ferritin    Primary insomnia  -     amitriptyline (ELAVIL) 25 MG tablet; Take 1 tablet by mouth At Night As Needed for Sleep.  -     Will start on amitriptiline 25mg nightly.     Healthcare maintenance  -     CBC & Differential; Future  -     Comprehensive Metabolic Panel; Future  -     Hemoglobin A1c; Future  -     Thyroid Panel With TSH; Future  -     Lipid Panel With LDL / HDL Ratio; Future  -     Vitamin D 25 Hydroxy; Future  -     Ferritin; Future  -     Iron Profile; Future  -     Vitamin B12; Future  -     Urinalysis With Microscopic - Urine, Clean Catch; Future          No follow-ups on file.    Dictated utilizing Dragon Voice Recognition Software

## 2020-05-26 ENCOUNTER — LAB (OUTPATIENT)
Dept: LAB | Facility: HOSPITAL | Age: 60
End: 2020-05-26

## 2020-05-26 DIAGNOSIS — Z00.00 HEALTHCARE MAINTENANCE: ICD-10-CM

## 2020-05-26 LAB
25(OH)D3 SERPL-MCNC: 42 NG/ML (ref 30–100)
ALBUMIN SERPL-MCNC: 3.8 G/DL (ref 3.5–5.2)
ALBUMIN/GLOB SERPL: 1.3 G/DL
ALP SERPL-CCNC: 47 U/L (ref 39–117)
ALT SERPL W P-5'-P-CCNC: 14 U/L (ref 1–33)
ANION GAP SERPL CALCULATED.3IONS-SCNC: 9.5 MMOL/L (ref 5–15)
AST SERPL-CCNC: 19 U/L (ref 1–32)
BACTERIA UR QL AUTO: ABNORMAL /HPF
BASOPHILS # BLD AUTO: 0.05 10*3/MM3 (ref 0–0.2)
BASOPHILS NFR BLD AUTO: 0.9 % (ref 0–1.5)
BILIRUB SERPL-MCNC: 0.3 MG/DL (ref 0.2–1.2)
BILIRUB UR QL STRIP: NEGATIVE
BUN BLD-MCNC: 24 MG/DL (ref 6–20)
BUN/CREAT SERPL: 13.8 (ref 7–25)
CALCIUM SPEC-SCNC: 8.6 MG/DL (ref 8.6–10.5)
CHLORIDE SERPL-SCNC: 102 MMOL/L (ref 98–107)
CHOLEST SERPL-MCNC: 132 MG/DL (ref 0–200)
CLARITY UR: ABNORMAL
CO2 SERPL-SCNC: 26.5 MMOL/L (ref 22–29)
COLOR UR: YELLOW
CREAT BLD-MCNC: 1.74 MG/DL (ref 0.57–1)
DEPRECATED RDW RBC AUTO: 41.1 FL (ref 37–54)
EOSINOPHIL # BLD AUTO: 0.24 10*3/MM3 (ref 0–0.4)
EOSINOPHIL NFR BLD AUTO: 4.4 % (ref 0.3–6.2)
ERYTHROCYTE [DISTWIDTH] IN BLOOD BY AUTOMATED COUNT: 12.7 % (ref 12.3–15.4)
FERRITIN SERPL-MCNC: 168 NG/ML (ref 13–150)
GFR SERPL CREATININE-BSD FRML MDRD: 30 ML/MIN/1.73
GLOBULIN UR ELPH-MCNC: 3 GM/DL
GLUCOSE BLD-MCNC: 100 MG/DL (ref 65–99)
GLUCOSE UR STRIP-MCNC: NEGATIVE MG/DL
HBA1C MFR BLD: 6 % (ref 4.8–5.6)
HCT VFR BLD AUTO: 28.4 % (ref 34–46.6)
HDLC SERPL-MCNC: 40 MG/DL (ref 40–60)
HGB BLD-MCNC: 9.5 G/DL (ref 12–15.9)
HGB UR QL STRIP.AUTO: NEGATIVE
HYALINE CASTS UR QL AUTO: ABNORMAL /LPF
IMM GRANULOCYTES # BLD AUTO: 0.01 10*3/MM3 (ref 0–0.05)
IMM GRANULOCYTES NFR BLD AUTO: 0.2 % (ref 0–0.5)
IRON 24H UR-MRATE: 48 MCG/DL (ref 37–145)
IRON SATN MFR SERPL: 19 % (ref 20–50)
KETONES UR QL STRIP: ABNORMAL
LDLC SERPL CALC-MCNC: 71 MG/DL (ref 0–100)
LDLC/HDLC SERPL: 1.77 {RATIO}
LEUKOCYTE ESTERASE UR QL STRIP.AUTO: NEGATIVE
LYMPHOCYTES # BLD AUTO: 1.38 10*3/MM3 (ref 0.7–3.1)
LYMPHOCYTES NFR BLD AUTO: 25.4 % (ref 19.6–45.3)
MCH RBC QN AUTO: 29.6 PG (ref 26.6–33)
MCHC RBC AUTO-ENTMCNC: 33.5 G/DL (ref 31.5–35.7)
MCV RBC AUTO: 88.5 FL (ref 79–97)
MONOCYTES # BLD AUTO: 0.6 10*3/MM3 (ref 0.1–0.9)
MONOCYTES NFR BLD AUTO: 11 % (ref 5–12)
NEUTROPHILS # BLD AUTO: 3.16 10*3/MM3 (ref 1.7–7)
NEUTROPHILS NFR BLD AUTO: 58.1 % (ref 42.7–76)
NITRITE UR QL STRIP: NEGATIVE
NRBC BLD AUTO-RTO: 0 /100 WBC (ref 0–0.2)
PH UR STRIP.AUTO: <=5 [PH] (ref 5–8)
PLATELET # BLD AUTO: 219 10*3/MM3 (ref 140–450)
PMV BLD AUTO: 10.6 FL (ref 6–12)
POTASSIUM BLD-SCNC: 3.8 MMOL/L (ref 3.5–5.2)
PROT SERPL-MCNC: 6.8 G/DL (ref 6–8.5)
PROT UR QL STRIP: ABNORMAL
RBC # BLD AUTO: 3.21 10*6/MM3 (ref 3.77–5.28)
RBC # UR: ABNORMAL /HPF
REF LAB TEST METHOD: ABNORMAL
SODIUM BLD-SCNC: 138 MMOL/L (ref 136–145)
SP GR UR STRIP: >=1.03 (ref 1–1.03)
SQUAMOUS #/AREA URNS HPF: ABNORMAL /HPF
T-UPTAKE NFR SERPL: 1.09 TBI (ref 0.8–1.3)
T4 SERPL-MCNC: 8.08 MCG/DL (ref 4.5–11.7)
TIBC SERPL-MCNC: 247 MCG/DL (ref 298–536)
TRANSFERRIN SERPL-MCNC: 166 MG/DL (ref 200–360)
TRIGL SERPL-MCNC: 106 MG/DL (ref 0–150)
TSH SERPL DL<=0.05 MIU/L-ACNC: 1.41 UIU/ML (ref 0.27–4.2)
UROBILINOGEN UR QL STRIP: ABNORMAL
VIT B12 BLD-MCNC: 583 PG/ML (ref 211–946)
VLDLC SERPL-MCNC: 21.2 MG/DL (ref 5–40)
WBC NRBC COR # BLD: 5.44 10*3/MM3 (ref 3.4–10.8)
WBC UR QL AUTO: ABNORMAL /HPF

## 2020-05-26 PROCEDURE — 80061 LIPID PANEL: CPT | Performed by: FAMILY MEDICINE

## 2020-05-26 PROCEDURE — 80053 COMPREHEN METABOLIC PANEL: CPT | Performed by: FAMILY MEDICINE

## 2020-05-26 PROCEDURE — 82607 VITAMIN B-12: CPT | Performed by: FAMILY MEDICINE

## 2020-05-26 PROCEDURE — 82306 VITAMIN D 25 HYDROXY: CPT | Performed by: FAMILY MEDICINE

## 2020-05-26 PROCEDURE — 84479 ASSAY OF THYROID (T3 OR T4): CPT | Performed by: FAMILY MEDICINE

## 2020-05-26 PROCEDURE — 84443 ASSAY THYROID STIM HORMONE: CPT | Performed by: FAMILY MEDICINE

## 2020-05-26 PROCEDURE — 84436 ASSAY OF TOTAL THYROXINE: CPT | Performed by: FAMILY MEDICINE

## 2020-05-26 PROCEDURE — 36415 COLL VENOUS BLD VENIPUNCTURE: CPT

## 2020-05-26 PROCEDURE — 83540 ASSAY OF IRON: CPT | Performed by: FAMILY MEDICINE

## 2020-05-26 PROCEDURE — 81001 URINALYSIS AUTO W/SCOPE: CPT | Performed by: FAMILY MEDICINE

## 2020-05-26 PROCEDURE — 85025 COMPLETE CBC W/AUTO DIFF WBC: CPT | Performed by: FAMILY MEDICINE

## 2020-05-26 PROCEDURE — 82728 ASSAY OF FERRITIN: CPT | Performed by: FAMILY MEDICINE

## 2020-05-26 PROCEDURE — 84466 ASSAY OF TRANSFERRIN: CPT | Performed by: FAMILY MEDICINE

## 2020-05-26 PROCEDURE — 83036 HEMOGLOBIN GLYCOSYLATED A1C: CPT | Performed by: FAMILY MEDICINE

## 2020-05-28 ENCOUNTER — TELEPHONE (OUTPATIENT)
Dept: INTERNAL MEDICINE | Facility: CLINIC | Age: 60
End: 2020-05-28

## 2020-05-28 NOTE — TELEPHONE ENCOUNTER
"PATIENT CALLED STATING YESTERDAY  WENT TO  TO GET TESTED FOR COVID19. TODAY WOKE UP WITH A RASH ON STOMACH AREA. REMEMBERS THE SAME RASH SHE'S HAD WHEN SHE HAD BRONCHITIS.  AT THAT TIME WAS PRESCRIBED A POWDER TO TREAT THE RASH    PLEASE ADVISE    CB#\" [phone]  "

## 2020-05-29 DIAGNOSIS — R79.89 ELEVATED SERUM CREATININE: Primary | ICD-10-CM

## 2020-05-29 DIAGNOSIS — D64.9 ANEMIA, UNSPECIFIED TYPE: ICD-10-CM

## 2020-05-29 NOTE — PROGRESS NOTES
Please inform the patient of the following abnormal results.  Patient has worsening anemia, and may be anemia of chronic disease. Patient may benefit from seeing hematology.  Worsening kidney function, and patient should be seeing nephrology.

## 2020-06-04 DIAGNOSIS — K21.9 GASTROESOPHAGEAL REFLUX DISEASE, ESOPHAGITIS PRESENCE NOT SPECIFIED: ICD-10-CM

## 2020-06-04 RX ORDER — OMEPRAZOLE 40 MG/1
40 CAPSULE, DELAYED RELEASE ORAL DAILY
Qty: 90 CAPSULE | Refills: 1 | Status: SHIPPED | OUTPATIENT
Start: 2020-06-04 | End: 2020-12-01 | Stop reason: SDUPTHER

## 2020-07-15 ENCOUNTER — LAB (OUTPATIENT)
Dept: LAB | Facility: HOSPITAL | Age: 60
End: 2020-07-15

## 2020-07-15 ENCOUNTER — OFFICE VISIT (OUTPATIENT)
Dept: INTERNAL MEDICINE | Facility: CLINIC | Age: 60
End: 2020-07-15

## 2020-07-15 VITALS
OXYGEN SATURATION: 95 % | SYSTOLIC BLOOD PRESSURE: 120 MMHG | HEART RATE: 78 BPM | WEIGHT: 235.1 LBS | HEIGHT: 60 IN | BODY MASS INDEX: 46.16 KG/M2 | DIASTOLIC BLOOD PRESSURE: 80 MMHG

## 2020-07-15 DIAGNOSIS — Z12.31 ENCOUNTER FOR SCREENING MAMMOGRAM FOR MALIGNANT NEOPLASM OF BREAST: ICD-10-CM

## 2020-07-15 DIAGNOSIS — Z13.220 SCREENING FOR LIPID DISORDERS: ICD-10-CM

## 2020-07-15 DIAGNOSIS — Z13.29 SCREENING FOR THYROID DISORDER: ICD-10-CM

## 2020-07-15 DIAGNOSIS — Z13.1 SCREENING FOR DIABETES MELLITUS: ICD-10-CM

## 2020-07-15 DIAGNOSIS — Z00.00 WELL WOMAN EXAM (NO GYNECOLOGICAL EXAM): Primary | ICD-10-CM

## 2020-07-15 LAB
ALBUMIN SERPL-MCNC: 3.7 G/DL (ref 3.5–5.2)
ALBUMIN/GLOB SERPL: 1.1 G/DL
ALP SERPL-CCNC: 55 U/L (ref 39–117)
ALT SERPL W P-5'-P-CCNC: 17 U/L (ref 1–33)
ANION GAP SERPL CALCULATED.3IONS-SCNC: 8.9 MMOL/L (ref 5–15)
AST SERPL-CCNC: 15 U/L (ref 1–32)
BASOPHILS # BLD AUTO: 0.06 10*3/MM3 (ref 0–0.2)
BASOPHILS NFR BLD AUTO: 0.9 % (ref 0–1.5)
BILIRUB SERPL-MCNC: 0.4 MG/DL (ref 0–1.2)
BUN SERPL-MCNC: 30 MG/DL (ref 6–20)
BUN/CREAT SERPL: 23.3 (ref 7–25)
CALCIUM SPEC-SCNC: 9.1 MG/DL (ref 8.6–10.5)
CHLORIDE SERPL-SCNC: 101 MMOL/L (ref 98–107)
CHOLEST SERPL-MCNC: 178 MG/DL (ref 0–200)
CO2 SERPL-SCNC: 27.1 MMOL/L (ref 22–29)
CREAT SERPL-MCNC: 1.29 MG/DL (ref 0.57–1)
DEPRECATED RDW RBC AUTO: 41.2 FL (ref 37–54)
EOSINOPHIL # BLD AUTO: 0.26 10*3/MM3 (ref 0–0.4)
EOSINOPHIL NFR BLD AUTO: 4 % (ref 0.3–6.2)
ERYTHROCYTE [DISTWIDTH] IN BLOOD BY AUTOMATED COUNT: 12.9 % (ref 12.3–15.4)
GFR SERPL CREATININE-BSD FRML MDRD: 42 ML/MIN/1.73
GLOBULIN UR ELPH-MCNC: 3.3 GM/DL
GLUCOSE SERPL-MCNC: 97 MG/DL (ref 65–99)
HBA1C MFR BLD: 5.8 % (ref 4.8–5.6)
HCT VFR BLD AUTO: 30 % (ref 34–46.6)
HDLC SERPL-MCNC: 49 MG/DL (ref 40–60)
HGB BLD-MCNC: 10 G/DL (ref 12–15.9)
IMM GRANULOCYTES # BLD AUTO: 0.01 10*3/MM3 (ref 0–0.05)
IMM GRANULOCYTES NFR BLD AUTO: 0.2 % (ref 0–0.5)
LDLC SERPL CALC-MCNC: 112 MG/DL (ref 0–100)
LDLC/HDLC SERPL: 2.29 {RATIO}
LYMPHOCYTES # BLD AUTO: 1.43 10*3/MM3 (ref 0.7–3.1)
LYMPHOCYTES NFR BLD AUTO: 22.2 % (ref 19.6–45.3)
MCH RBC QN AUTO: 29.2 PG (ref 26.6–33)
MCHC RBC AUTO-ENTMCNC: 33.3 G/DL (ref 31.5–35.7)
MCV RBC AUTO: 87.7 FL (ref 79–97)
MONOCYTES # BLD AUTO: 0.46 10*3/MM3 (ref 0.1–0.9)
MONOCYTES NFR BLD AUTO: 7.1 % (ref 5–12)
NEUTROPHILS NFR BLD AUTO: 4.22 10*3/MM3 (ref 1.7–7)
NEUTROPHILS NFR BLD AUTO: 65.6 % (ref 42.7–76)
NRBC BLD AUTO-RTO: 0 /100 WBC (ref 0–0.2)
PLATELET # BLD AUTO: 250 10*3/MM3 (ref 140–450)
PMV BLD AUTO: 10.8 FL (ref 6–12)
POTASSIUM SERPL-SCNC: 4.1 MMOL/L (ref 3.5–5.2)
PROT SERPL-MCNC: 7 G/DL (ref 6–8.5)
RBC # BLD AUTO: 3.42 10*6/MM3 (ref 3.77–5.28)
SODIUM SERPL-SCNC: 137 MMOL/L (ref 136–145)
TRIGL SERPL-MCNC: 83 MG/DL (ref 0–150)
VLDLC SERPL-MCNC: 16.6 MG/DL (ref 5–40)
WBC # BLD AUTO: 6.44 10*3/MM3 (ref 3.4–10.8)

## 2020-07-15 PROCEDURE — 36415 COLL VENOUS BLD VENIPUNCTURE: CPT | Performed by: FAMILY MEDICINE

## 2020-07-15 PROCEDURE — 80061 LIPID PANEL: CPT | Performed by: FAMILY MEDICINE

## 2020-07-15 PROCEDURE — 80053 COMPREHEN METABOLIC PANEL: CPT | Performed by: FAMILY MEDICINE

## 2020-07-15 PROCEDURE — 83036 HEMOGLOBIN GLYCOSYLATED A1C: CPT | Performed by: FAMILY MEDICINE

## 2020-07-15 PROCEDURE — 84436 ASSAY OF TOTAL THYROXINE: CPT | Performed by: FAMILY MEDICINE

## 2020-07-15 PROCEDURE — 84479 ASSAY OF THYROID (T3 OR T4): CPT | Performed by: FAMILY MEDICINE

## 2020-07-15 PROCEDURE — 85025 COMPLETE CBC W/AUTO DIFF WBC: CPT | Performed by: FAMILY MEDICINE

## 2020-07-15 PROCEDURE — 99396 PREV VISIT EST AGE 40-64: CPT | Performed by: FAMILY MEDICINE

## 2020-07-15 PROCEDURE — 84443 ASSAY THYROID STIM HORMONE: CPT | Performed by: FAMILY MEDICINE

## 2020-07-15 NOTE — PROGRESS NOTES
Subjective   Pedrtio Powell is a 59 y.o. female and is here for a comprehensive physical exam. The patient reports no problems.    Pt is due for annual gyn exam and mammo           Social History:   Social History     Socioeconomic History   • Marital status:      Spouse name: Not on file   • Number of children: 3   • Years of education: College   • Highest education level: Not on file   Occupational History   • Occupation: Teacher     Employer: [employer]   Tobacco Use   • Smoking status: Never Smoker   • Smokeless tobacco: Never Used   Substance and Sexual Activity   • Alcohol use: No   • Drug use: No   • Sexual activity: Defer       Family History:   Family History   Problem Relation Age of Onset   • Colon cancer Maternal Grandfather    • Asthma Mother    • Thyroid disease Father    • Diabetes Maternal Grandmother        Past Medical History:   Past Medical History:   Diagnosis Date   • Anemia    • Arthritis    • GERD (gastroesophageal reflux disease)    • History of carpal tunnel syndrome    • Hyperlipidemia    • Hypertension    • Vertigo        The following portions of the patient's history were reviewed and updated as appropriate: allergies, current medications, past family history, past medical history, past social history, past surgical history and problem list.    Review of Systems    Review of Systems   Constitutional: Negative for chills and fever.   HENT: Negative for congestion, rhinorrhea, sinus pain and sore throat.    Eyes: Negative for photophobia and visual disturbance.   Respiratory: Negative for cough, chest tightness and shortness of breath.    Cardiovascular: Negative for chest pain and palpitations.   Gastrointestinal: Negative for diarrhea, nausea and vomiting.   Genitourinary: Negative for dysuria, frequency and urgency.   Skin: Negative for rash and wound.   Neurological: Negative for dizziness and syncope.   Psychiatric/Behavioral: Negative for behavioral problems  and confusion.       Objective   Physical Exam   Constitutional: She is oriented to person, place, and time. She appears well-developed and well-nourished.   HENT:   Head: Normocephalic and atraumatic.   Right Ear: External ear normal.   Left Ear: External ear normal.   Mouth/Throat: Oropharynx is clear and moist.   Eyes: EOM are normal.   Neck: Normal range of motion. Neck supple.   Cardiovascular: Normal rate, regular rhythm and normal heart sounds.   Pulmonary/Chest: Effort normal and breath sounds normal. No respiratory distress.   Abdominal: Soft. There is no tenderness. There is no guarding.   Musculoskeletal: Normal range of motion.   Lymphadenopathy:     She has no cervical adenopathy.   Neurological: She is alert and oriented to person, place, and time.   Skin: Skin is warm.   Psychiatric: She has a normal mood and affect. Her behavior is normal.   Nursing note and vitals reviewed.      Vitals:    07/15/20 1042   BP: 120/80   Pulse: 78   SpO2: 95%     Body mass index is 45.91 kg/m².      Medications:   Current Outpatient Medications:   •  albuterol 108 (90 Base) MCG/ACT inhaler, Inhale 2 puffs Every 4 (Four) Hours As Needed for Wheezing., Disp: , Rfl:   •  amitriptyline (ELAVIL) 25 MG tablet, Take 1 tablet by mouth At Night As Needed for Sleep., Disp: 30 tablet, Rfl: 3  •  aspirin 325 MG tablet, Take 325 mg by mouth daily., Disp: , Rfl:   •  atorvastatin (Lipitor) 80 MG tablet, Take 1 tablet by mouth Daily., Disp: 90 tablet, Rfl: 1  •  azelastine (ASTELIN) 0.1 % nasal spray, 1 spray into the nostril(s) as directed by provider Daily., Disp: , Rfl: 11  •  Cholecalciferol (VITAMIN D3) 2000 units capsule, Take 1 capsule by mouth Daily., Disp: 90 capsule, Rfl: 3  •  clobetasol (TEMOVATE) 0.05 % cream, LAITH TO SCALP AND LEFT DORSAL FOOT BID PRN, Disp: , Rfl:   •  diclofenac (VOLTAREN) 75 MG EC tablet, Take 1 tablet by mouth Daily., Disp: 90 tablet, Rfl: 1  •  EPINEPHrine (AUVI-Q IJ), Inject  as directed As  Needed., Disp: , Rfl:   •  ferrous gluconate (FERGON) 324 MG tablet, TAKE 1 TABLET BY MOUTH DAILY WITH BREAKFAST, Disp: 90 tablet, Rfl: 1  •  hydroCHLOROthiazide (HYDRODIURIL) 25 MG tablet, Take 1 tablet by mouth Daily., Disp: 90 tablet, Rfl: 3  •  hydrocortisone 2.5 % cream, Apply  topically to the appropriate area as directed 2 (Two) Times a Day., Disp: , Rfl:   •  ipratropium (ATROVENT) 0.06 % nasal spray, 1 spray into the nostril(s) as directed by provider Daily., Disp: , Rfl: 11  •  irbesartan (Avapro) 300 MG tablet, Take 1 tablet by mouth Daily., Disp: 90 tablet, Rfl: 3  •  loperamide (IMODIUM) 2 MG capsule, Take 1 capsule by mouth Every 2 (Two) Hours As Needed for Diarrhea (max 4 doses daily)., Disp: , Rfl:   •  loratadine (CLARITIN) 10 MG tablet, Take 10 mg by mouth Daily., Disp: , Rfl:   •  meclizine (ANTIVERT) 25 MG tablet, Take 1 tablet by mouth 3 (Three) Times a Day As Needed for dizziness., Disp: 270 tablet, Rfl: 1  •  omeprazole (priLOSEC) 40 MG capsule, Take 1 capsule by mouth Daily., Disp: 90 capsule, Rfl: 1  •  ondansetron ODT (ZOFRAN-ODT) 4 MG disintegrating tablet, DISSOLVE 1 TABLET ON THE TONGUE TWICE DAILY, Disp: 180 tablet, Rfl: 0  •  terbinafine (lamiSIL) 250 MG tablet, TK 8 TS PO ONCE A WEEK, Disp: , Rfl:   •  triamcinolone (KENALOG) 0.1 % ointment, Apply  topically to the appropriate area as directed 2 (Two) Times a Day., Disp: , Rfl:   •  Triamcinolone Acetonide (NASACORT ALLERGY 24HR) 55 MCG/ACT nasal inhaler, 2 sprays into the nostril(s) as directed by provider Daily., Disp: , Rfl:   •  Diclofenac Sodium (PENNSAID) 2 % solution, Place 2 application on the skin as directed by provider 2 (Two) Times a Day As Needed (PAIN/SWELLING)., Disp: 112 g, Rfl: 2  •  ondansetron (ZOFRAN) 4 MG tablet, TK 1 T PO  Q 6 H PRF NAUSEA, Disp: , Rfl: 0       Assessment/Plan   Healthy female exam.      1. Healthcare Maintenance:  2. Patient Counseling:  --Nutrition: Stressed importance of moderation in  sodium/caffeine intake, saturated fat and cholesterol, caloric balance, sufficient intake of fresh fruits, vegetables, fiber, calcium and vit D  --Exercise: Recommended 30 minutes of exercise daily.  --Immunizations reviewed.  --Discussed benefits of screening colonoscopy. Up to date.     Diagnoses and all orders for this visit:    Well woman exam (no gynecological exam)  -     CBC & Differential  -     Comprehensive Metabolic Panel    Screening for lipid disorders  -     Lipid Panel With LDL / HDL Ratio    Screening for thyroid disorder  -     Thyroid Panel With TSH    Screening for diabetes mellitus  -     Hemoglobin A1c    Encounter for screening mammogram for malignant neoplasm of breast  -     Mammo Screening Bilateral With CAD        No follow-ups on file.             Dictated utilizing Dragon Voice Recognition Software

## 2020-07-16 LAB
T-UPTAKE NFR SERPL: 1.03 TBI (ref 0.8–1.3)
T4 SERPL-MCNC: 7.93 MCG/DL (ref 4.5–11.7)
TSH SERPL DL<=0.05 MIU/L-ACNC: 1.4 UIU/ML (ref 0.27–4.2)

## 2020-07-16 NOTE — PROGRESS NOTES
Please inform the patient of the following abnormal results.  Kidney function is improving. Patient hba1c is stable. Still anemic. MCV is still normal. Most likely from iron deficiency based on labs of iron profile in may. Needs to take ferrous sulfate 325 daily.

## 2020-07-23 RX ORDER — ACETAMINOPHEN 160 MG
2000 TABLET,DISINTEGRATING ORAL DAILY
Qty: 90 CAPSULE | Refills: 1 | Status: SHIPPED | OUTPATIENT
Start: 2020-07-23 | End: 2021-02-03

## 2020-08-03 ENCOUNTER — OFFICE VISIT (OUTPATIENT)
Dept: ORTHOPEDIC SURGERY | Facility: CLINIC | Age: 60
End: 2020-08-03

## 2020-08-03 VITALS — WEIGHT: 235 LBS | TEMPERATURE: 98.6 F | BODY MASS INDEX: 46.13 KG/M2 | HEIGHT: 60 IN

## 2020-08-03 DIAGNOSIS — M25.522 LEFT ELBOW PAIN: Primary | ICD-10-CM

## 2020-08-03 DIAGNOSIS — M19.019 ARTHRITIS OF SHOULDER: ICD-10-CM

## 2020-08-03 PROCEDURE — 20610 DRAIN/INJ JOINT/BURSA W/O US: CPT | Performed by: ORTHOPAEDIC SURGERY

## 2020-08-03 PROCEDURE — 20550 NJX 1 TENDON SHEATH/LIGAMENT: CPT | Performed by: ORTHOPAEDIC SURGERY

## 2020-08-03 PROCEDURE — 73070 X-RAY EXAM OF ELBOW: CPT | Performed by: ORTHOPAEDIC SURGERY

## 2020-08-03 PROCEDURE — 99214 OFFICE O/P EST MOD 30 MIN: CPT | Performed by: ORTHOPAEDIC SURGERY

## 2020-08-03 RX ORDER — METHYLPREDNISOLONE ACETATE 80 MG/ML
80 INJECTION, SUSPENSION INTRA-ARTICULAR; INTRALESIONAL; INTRAMUSCULAR; SOFT TISSUE
Status: COMPLETED | OUTPATIENT
Start: 2020-08-03 | End: 2020-08-03

## 2020-08-03 RX ORDER — METHYLPREDNISOLONE ACETATE 80 MG/ML
160 INJECTION, SUSPENSION INTRA-ARTICULAR; INTRALESIONAL; INTRAMUSCULAR; SOFT TISSUE
Status: COMPLETED | OUTPATIENT
Start: 2020-08-03 | End: 2020-08-03

## 2020-08-03 RX ADMIN — METHYLPREDNISOLONE ACETATE 160 MG: 80 INJECTION, SUSPENSION INTRA-ARTICULAR; INTRALESIONAL; INTRAMUSCULAR; SOFT TISSUE at 10:30

## 2020-08-03 RX ADMIN — METHYLPREDNISOLONE ACETATE 80 MG: 80 INJECTION, SUSPENSION INTRA-ARTICULAR; INTRALESIONAL; INTRAMUSCULAR; SOFT TISSUE at 10:34

## 2020-08-03 NOTE — PROGRESS NOTES
Pedrito Powell     : 1960     MRN: [id number]     DATE: 8/3/2020    Chief Complaints: New complaint left elbow pain, follow-up regarding left shoulder arthritis    History of Present Illness:     59 y.o. female patient who presents for follow-up of the left shoulder.  When I last saw her, we did an injection but it did not really help all that much.  She is considering knee replacement but they are starting school here in the next few weeks.  Now is really not an optimal time to have the surgery.  She would like to try the injection again.    She also comes in for a new complaint of right elbow pain.  She reports that the symptoms started about a month ago.  The pain is severe, intermittent and both aching and burning.  The pain is associated with certain reaching and lifting movements.  The patient localizes the pain to the lateral aspect of the elbow.  Rest and anti-inflammatories do help somewhat.    Allergies:   Allergies   Allergen Reactions   • Chocolate Anaphylaxis   • Nickel Anaphylaxis   • Nuts Anaphylaxis   • Ciprofloxacin Other (See Comments)     THRUSH PER PATIENT   • Citric Acid Unknown (See Comments)     Unsure     • Eggs Or Egg-Derived Products Nausea And Vomiting   • Influenza Vaccines Nausea And Vomiting   • Methyldibromoglutaronitrile Unknown (See Comments)      PATIENT UNSURE OF REACTION.   • Neomycin Unknown (See Comments)      PATIENT UNSURE OF REACTION.   • Omnicef [Cefdinir] Other (See Comments)     THRUSH PER PATIENT   • Palladium Chloride Unknown (See Comments)      PATIENT UNSURE OF REACTION   • Penicillins Other (See Comments)     THRUSH PER PATIENT   • Sulfa Antibiotics Other (See Comments)     THRUSH PER PATIENT   • Tomato Hives   • Yeast-Related Products Hives   • Gold-Containing Drug Products Rash         • Latex Rash       Home Medications:    Current Outpatient Medications:   •  albuterol 108 (90 Base) MCG/ACT inhaler, Inhale 2 puffs Every 4 (Four) Hours As Needed for  Wheezing., Disp: , Rfl:   •  amitriptyline (ELAVIL) 25 MG tablet, Take 1 tablet by mouth At Night As Needed for Sleep., Disp: 30 tablet, Rfl: 3  •  aspirin 325 MG tablet, Take 325 mg by mouth daily., Disp: , Rfl:   •  atorvastatin (Lipitor) 80 MG tablet, Take 1 tablet by mouth Daily., Disp: 90 tablet, Rfl: 1  •  azelastine (ASTELIN) 0.1 % nasal spray, 1 spray into the nostril(s) as directed by provider Daily., Disp: , Rfl: 11  •  Cholecalciferol (VITAMIN D3) 50 MCG (2000 UT) capsule, TAKE 1 CAPSULE BY MOUTH DAILY, Disp: 90 capsule, Rfl: 1  •  clobetasol (TEMOVATE) 0.05 % cream, LAITH TO SCALP AND LEFT DORSAL FOOT BID PRN, Disp: , Rfl:   •  diclofenac (VOLTAREN) 75 MG EC tablet, Take 1 tablet by mouth Daily., Disp: 90 tablet, Rfl: 1  •  EPINEPHrine (AUVI-Q IJ), Inject  as directed As Needed., Disp: , Rfl:   •  ferrous gluconate (FERGON) 324 MG tablet, TAKE 1 TABLET BY MOUTH DAILY WITH BREAKFAST, Disp: 90 tablet, Rfl: 1  •  hydroCHLOROthiazide (HYDRODIURIL) 25 MG tablet, Take 1 tablet by mouth Daily., Disp: 90 tablet, Rfl: 3  •  hydrocortisone 2.5 % cream, Apply  topically to the appropriate area as directed 2 (Two) Times a Day., Disp: , Rfl:   •  ipratropium (ATROVENT) 0.06 % nasal spray, 1 spray into the nostril(s) as directed by provider Daily., Disp: , Rfl: 11  •  irbesartan (Avapro) 300 MG tablet, Take 1 tablet by mouth Daily., Disp: 90 tablet, Rfl: 3  •  loratadine (CLARITIN) 10 MG tablet, Take 10 mg by mouth Daily., Disp: , Rfl:   •  omeprazole (priLOSEC) 40 MG capsule, Take 1 capsule by mouth Daily., Disp: 90 capsule, Rfl: 1  •  terbinafine (lamiSIL) 250 MG tablet, TK 8 TS PO ONCE A WEEK, Disp: , Rfl:   •  triamcinolone (KENALOG) 0.1 % ointment, Apply  topically to the appropriate area as directed 2 (Two) Times a Day., Disp: , Rfl:   •  Triamcinolone Acetonide (NASACORT ALLERGY 24HR) 55 MCG/ACT nasal inhaler, 2 sprays into the nostril(s) as directed by provider Daily., Disp: , Rfl:   •  Diclofenac Sodium  (PENNSAID) 2 % solution, Place 2 application on the skin as directed by provider 2 (Two) Times a Day As Needed (PAIN/SWELLING)., Disp: 112 g, Rfl: 2  •  loperamide (IMODIUM) 2 MG capsule, Take 1 capsule by mouth Every 2 (Two) Hours As Needed for Diarrhea (max 4 doses daily)., Disp: , Rfl:   •  meclizine (ANTIVERT) 25 MG tablet, Take 1 tablet by mouth 3 (Three) Times a Day As Needed for dizziness., Disp: 270 tablet, Rfl: 1  •  ondansetron (ZOFRAN) 4 MG tablet, TK 1 T PO  Q 6 H PRF NAUSEA, Disp: , Rfl: 0  •  ondansetron ODT (ZOFRAN-ODT) 4 MG disintegrating tablet, DISSOLVE 1 TABLET ON THE TONGUE TWICE DAILY, Disp: 180 tablet, Rfl: 0  Past Medical History:   Diagnosis Date   • Anemia    • Arthritis    • GERD (gastroesophageal reflux disease)    • History of carpal tunnel syndrome    • Hyperlipidemia    • Hypertension    • Vertigo      Past Surgical History:   Procedure Laterality Date   •  SECTION  , ,    • COLONOSCOPY N/A 12/10/2018    normal ileum, IH, hyperplastic polyp, otherwise normal exam   • ENDOSCOPY N/A 12/10/2018    no gross lesions in esophagus or examined duodenum, erythematous mucosa in stomach, biopsies benign   • HYSTERECTOMY     • OOPHORECTOMY     • SHOULDER MANIPULATION       Social History     Occupational History   • Occupation: Teacher     Employer: [employer]   Tobacco Use   • Smoking status: Never Smoker   • Smokeless tobacco: Never Used   Substance and Sexual Activity   • Alcohol use: No   • Drug use: No   • Sexual activity: Defer      Social History     Social History Narrative   • Not on file     Family History   Problem Relation Age of Onset   • Colon cancer Maternal Grandfather    • Asthma Mother    • Thyroid disease Father    • Diabetes Maternal Grandmother        Review of Systems:      Constitutional: Denies fever, shaking or chills   Eyes: Denies change in visual acuity   HEENT: Denies nasal congestion or sore throat   Respiratory: Denies cough or  "shortness of breath   Cardiovascular: Denies chest pain or edema  Endocrine: Denies tremors, palpitations, intolerance of heat or cold, polyuria, polydipsia.  GI: Denies abdominal pain, nausea, vomiting, bloody stools or diarrhea  : Denies frequency, urgency, incontinence, retention, or nocturia.  Musculoskeletal: Denies numbness, tingling or loss of motor function except as above  Integument: Denies rash, lesion or ulceration   Neurologic: Denies headache or focal weakness, deficits  Heme: Denies spontaneous or excessive bleeding, epistaxis, hematuria, melena, fatigue, enlarged or tender lymph nodes.      All other pertinent positives and negatives as noted above in HPI.    Physical Exam: 59 y.o. female    Vitals:    08/03/20 0917   Temp: 98.6 °F (37 °C)   TempSrc: Temporal   Weight: 107 kg (235 lb)   Height: 152.4 cm (60\")     General:  Patient is awake and alert.  Appears in no acute distress or discomfort.    Psych:  Affect and demeanor are appropriate.    Eyes:  Conjunctiva and sclera appear grossly normal.  Eyes track well and EOM seem to be intact.    Ears:  No gross abnormalities.  Hearing adequate for the exam.    Cardiovascular:  Regular rate and rhythm.    Lungs:  Good chest expansion.  Breathing unlabored.    Extremities:  Left shoulder appears benign.  No atrophy.  Moderate anterior tenderness without effusion.  Her shoulder motion is very limited.  The elbow skin appears benign.  No obvious lesions, swellings, masses or adenopathy.  Focal tenderness noted over the lateral epicondyle.  No tenderness over the radial tunnel or medial side.  Full elbow motion.  No instability.  Good strength with elbow flexion, extension, supination, pronation.  Pain at the lateral epicondyle with resisted wrist extension.  Good strength in the hand with , pinch, finger and thumb abduction.  Sensation is intact and subjectively normal.  Palpable radial pulse with good skin turgor and brisk capillary " refill.    DIAGNOSTIC STUDIES    Xrays:  AP and lateral views of the left elbow are ordered by myself and reviewed to evaluate the patient's complaint.  No comparison films are immediately available.  The x-rays show no obvious acute abnormalities, lesions, masses, significant degenerative changes, or other concerning findings.    ASSESSMENT: 1.  Left elbow lateral epicondylitis  2.  Left shoulder osteoarthritis    PLAN:  We discussed options for the tennis elbow in detail, both surgical and non-surgical.  I have recommended that we start with a conservative approach.  We discussed all available conservative treatment options including activity modifications, bracing, anti-inflammatories, physical therapy, and injections.  I explained the likely short-term benefits of cortisone injection and the associated risks.  We also talked about the available evidence on PRP which suggest that this could have up to an 80% success rate at 6 months.  The patient acknowledged understanding of this information.  She has elected for a cortisone injection.  She would also like to get the shoulder injected as well.  The risk, benefits and alternatives were discussed.  She consented and the injections were performed as described below.  She will follow-up as needed.    Bethel Devi MD    08/03/2020    CC to Teo Fraser MD     Large Joint Arthrocentesis: L glenohumeral  Date/Time: 8/3/2020 10:30 AM  Consent given by: patient  Site marked: site marked  Timeout: Immediately prior to procedure a time out was called to verify the correct patient, procedure, equipment, support staff and site/side marked as required   Supporting Documentation  Indications: pain   Procedure Details  Location: shoulder - L glenohumeral  Preparation: Patient was prepped and draped in the usual sterile fashion  Needle gauge: 21.  Approach: anterior  Medications administered: 160 mg methylPREDNISolone acetate 80 MG/ML; 2 mL lidocaine (cardiac)  Patient  tolerance: patient tolerated the procedure well with no immediate complications    Medium Joint Arthrocentesis: L elbow  Date/Time: 8/3/2020 10:34 AM  Consent given by: patient  Site marked: site marked  Timeout: Immediately prior to procedure a time out was called to verify the correct patient, procedure, equipment, support staff and site/side marked as required   Supporting Documentation  Indications: pain   Procedure Details  Location: elbow - L elbow  Preparation: Patient was prepped and draped in the usual sterile fashion  Needle size: 25 G  Approach: anterolateral  Medications administered: 2 mL lidocaine (cardiac); 80 mg methylPREDNISolone acetate 80 MG/ML  Patient tolerance: patient tolerated the procedure well with no immediate complications

## 2020-08-21 ENCOUNTER — HOSPITAL ENCOUNTER (OUTPATIENT)
Dept: MAMMOGRAPHY | Facility: HOSPITAL | Age: 60
Discharge: HOME | End: 2020-08-21
Admitting: FAMILY MEDICINE

## 2020-08-21 PROCEDURE — 77067 SCR MAMMO BI INCL CAD: CPT

## 2020-08-21 PROCEDURE — 77063 BREAST TOMOSYNTHESIS BI: CPT

## 2020-08-31 DIAGNOSIS — A08.4 VIRAL GASTROENTERITIS: Primary | ICD-10-CM

## 2020-08-31 RX ORDER — ONDANSETRON 4 MG/1
4 TABLET, FILM COATED ORAL EVERY 12 HOURS PRN
Qty: 180 TABLET | Refills: 0 | Status: SHIPPED | OUTPATIENT
Start: 2020-08-31 | End: 2021-03-15 | Stop reason: SDUPTHER

## 2020-08-31 RX ORDER — MECLIZINE HYDROCHLORIDE 25 MG/1
TABLET ORAL
Qty: 270 TABLET | Refills: 0 | Status: SHIPPED | OUTPATIENT
Start: 2020-08-31 | End: 2021-03-23 | Stop reason: SDUPTHER

## 2020-09-08 DIAGNOSIS — D64.9 ANEMIA, UNSPECIFIED TYPE: ICD-10-CM

## 2020-09-08 RX ORDER — ONDANSETRON 4 MG/1
TABLET, ORALLY DISINTEGRATING ORAL
Qty: 180 TABLET | Refills: 0 | Status: SHIPPED | OUTPATIENT
Start: 2020-09-08 | End: 2021-03-16

## 2020-09-08 RX ORDER — DOXYCYCLINE HYCLATE 50 MG/1
324 CAPSULE, GELATIN COATED ORAL
Qty: 90 TABLET | Refills: 1 | Status: SHIPPED | OUTPATIENT
Start: 2020-09-08 | End: 2021-03-15 | Stop reason: SDUPTHER

## 2020-09-09 DIAGNOSIS — F51.01 PRIMARY INSOMNIA: ICD-10-CM

## 2020-09-09 RX ORDER — AMITRIPTYLINE HYDROCHLORIDE 25 MG/1
25 TABLET, FILM COATED ORAL NIGHTLY PRN
Qty: 30 TABLET | Refills: 3 | Status: SHIPPED | OUTPATIENT
Start: 2020-09-09 | End: 2021-05-03

## 2020-10-01 DIAGNOSIS — M17.0 PRIMARY OSTEOARTHRITIS OF BOTH KNEES: ICD-10-CM

## 2020-10-01 DIAGNOSIS — M17.0 PRIMARY OSTEOARTHRITIS OF BOTH KNEES: Primary | ICD-10-CM

## 2020-10-01 RX ORDER — DICLOFENAC SODIUM 75 MG/1
75 TABLET, DELAYED RELEASE ORAL DAILY
Qty: 90 TABLET | Refills: 1 | Status: SHIPPED | OUTPATIENT
Start: 2020-10-01 | End: 2021-04-07

## 2020-10-01 RX ORDER — DICLOFENAC SODIUM 75 MG/1
75 TABLET, DELAYED RELEASE ORAL DAILY
Qty: 90 TABLET | Refills: 1 | Status: SHIPPED | OUTPATIENT
Start: 2020-10-01 | End: 2020-10-01 | Stop reason: SDUPTHER

## 2020-12-01 DIAGNOSIS — K21.9 GASTROESOPHAGEAL REFLUX DISEASE: ICD-10-CM

## 2020-12-02 RX ORDER — OMEPRAZOLE 40 MG/1
40 CAPSULE, DELAYED RELEASE ORAL DAILY
Qty: 90 CAPSULE | Refills: 1 | Status: SHIPPED | OUTPATIENT
Start: 2020-12-02 | End: 2021-12-20 | Stop reason: SDUPTHER

## 2020-12-13 DIAGNOSIS — E78.5 HYPERLIPIDEMIA, UNSPECIFIED HYPERLIPIDEMIA TYPE: ICD-10-CM

## 2020-12-14 RX ORDER — ATORVASTATIN CALCIUM 80 MG/1
80 TABLET, FILM COATED ORAL DAILY
Qty: 90 TABLET | Refills: 1 | Status: SHIPPED | OUTPATIENT
Start: 2020-12-14 | End: 2021-06-14

## 2021-01-19 ENCOUNTER — OFFICE VISIT (OUTPATIENT)
Dept: INTERNAL MEDICINE | Facility: CLINIC | Age: 61
End: 2021-01-19

## 2021-01-19 ENCOUNTER — LAB (OUTPATIENT)
Dept: LAB | Facility: HOSPITAL | Age: 61
End: 2021-01-19

## 2021-01-19 VITALS
DIASTOLIC BLOOD PRESSURE: 82 MMHG | SYSTOLIC BLOOD PRESSURE: 134 MMHG | BODY MASS INDEX: 46.53 KG/M2 | RESPIRATION RATE: 18 BRPM | HEIGHT: 60 IN | OXYGEN SATURATION: 99 % | HEART RATE: 76 BPM | TEMPERATURE: 98.3 F | WEIGHT: 237 LBS

## 2021-01-19 DIAGNOSIS — E78.5 HYPERLIPIDEMIA, UNSPECIFIED HYPERLIPIDEMIA TYPE: ICD-10-CM

## 2021-01-19 DIAGNOSIS — R22.41 LOWER LEG MASS, RIGHT: Primary | ICD-10-CM

## 2021-01-19 DIAGNOSIS — I10 HYPERTENSION, UNSPECIFIED TYPE: ICD-10-CM

## 2021-01-19 DIAGNOSIS — R73.03 PREDIABETES: ICD-10-CM

## 2021-01-19 LAB
ALBUMIN SERPL-MCNC: 3.9 G/DL (ref 3.5–5.2)
ALBUMIN/GLOB SERPL: 1.3 G/DL
ALP SERPL-CCNC: 58 U/L (ref 39–117)
ALT SERPL W P-5'-P-CCNC: 12 U/L (ref 1–33)
ANION GAP SERPL CALCULATED.3IONS-SCNC: 10.6 MMOL/L (ref 5–15)
AST SERPL-CCNC: 16 U/L (ref 1–32)
BASOPHILS # BLD AUTO: 0.07 10*3/MM3 (ref 0–0.2)
BASOPHILS NFR BLD AUTO: 1 % (ref 0–1.5)
BILIRUB SERPL-MCNC: 0.3 MG/DL (ref 0–1.2)
BUN SERPL-MCNC: 32 MG/DL (ref 8–23)
BUN/CREAT SERPL: 23 (ref 7–25)
CALCIUM SPEC-SCNC: 8.6 MG/DL (ref 8.6–10.5)
CHLORIDE SERPL-SCNC: 98 MMOL/L (ref 98–107)
CHOLEST SERPL-MCNC: 131 MG/DL (ref 0–200)
CO2 SERPL-SCNC: 26.4 MMOL/L (ref 22–29)
CREAT SERPL-MCNC: 1.39 MG/DL (ref 0.57–1)
DEPRECATED RDW RBC AUTO: 41.3 FL (ref 37–54)
EOSINOPHIL # BLD AUTO: 0.23 10*3/MM3 (ref 0–0.4)
EOSINOPHIL NFR BLD AUTO: 3.3 % (ref 0.3–6.2)
ERYTHROCYTE [DISTWIDTH] IN BLOOD BY AUTOMATED COUNT: 12.4 % (ref 12.3–15.4)
GFR SERPL CREATININE-BSD FRML MDRD: 39 ML/MIN/1.73
GLOBULIN UR ELPH-MCNC: 2.9 GM/DL
GLUCOSE SERPL-MCNC: 91 MG/DL (ref 65–99)
HBA1C MFR BLD: 5.9 % (ref 4.8–5.6)
HCT VFR BLD AUTO: 29.5 % (ref 34–46.6)
HDLC SERPL-MCNC: 38 MG/DL (ref 40–60)
HGB BLD-MCNC: 9.4 G/DL (ref 12–15.9)
IMM GRANULOCYTES # BLD AUTO: 0.01 10*3/MM3 (ref 0–0.05)
IMM GRANULOCYTES NFR BLD AUTO: 0.1 % (ref 0–0.5)
LDLC SERPL CALC-MCNC: 73 MG/DL (ref 0–100)
LDLC/HDLC SERPL: 1.88 {RATIO}
LYMPHOCYTES # BLD AUTO: 1.48 10*3/MM3 (ref 0.7–3.1)
LYMPHOCYTES NFR BLD AUTO: 20.9 % (ref 19.6–45.3)
MCH RBC QN AUTO: 28.7 PG (ref 26.6–33)
MCHC RBC AUTO-ENTMCNC: 31.9 G/DL (ref 31.5–35.7)
MCV RBC AUTO: 90.2 FL (ref 79–97)
MONOCYTES # BLD AUTO: 0.62 10*3/MM3 (ref 0.1–0.9)
MONOCYTES NFR BLD AUTO: 8.8 % (ref 5–12)
NEUTROPHILS NFR BLD AUTO: 4.66 10*3/MM3 (ref 1.7–7)
NEUTROPHILS NFR BLD AUTO: 65.9 % (ref 42.7–76)
NRBC BLD AUTO-RTO: 0 /100 WBC (ref 0–0.2)
PLATELET # BLD AUTO: 249 10*3/MM3 (ref 140–450)
PMV BLD AUTO: 10.3 FL (ref 6–12)
POTASSIUM SERPL-SCNC: 4.3 MMOL/L (ref 3.5–5.2)
PROT SERPL-MCNC: 6.8 G/DL (ref 6–8.5)
RBC # BLD AUTO: 3.27 10*6/MM3 (ref 3.77–5.28)
SODIUM SERPL-SCNC: 135 MMOL/L (ref 136–145)
TRIGL SERPL-MCNC: 107 MG/DL (ref 0–150)
VLDLC SERPL-MCNC: 20 MG/DL (ref 5–40)
WBC # BLD AUTO: 7.07 10*3/MM3 (ref 3.4–10.8)

## 2021-01-19 PROCEDURE — 83036 HEMOGLOBIN GLYCOSYLATED A1C: CPT | Performed by: FAMILY MEDICINE

## 2021-01-19 PROCEDURE — 80053 COMPREHEN METABOLIC PANEL: CPT | Performed by: FAMILY MEDICINE

## 2021-01-19 PROCEDURE — 36415 COLL VENOUS BLD VENIPUNCTURE: CPT | Performed by: FAMILY MEDICINE

## 2021-01-19 PROCEDURE — 85025 COMPLETE CBC W/AUTO DIFF WBC: CPT | Performed by: FAMILY MEDICINE

## 2021-01-19 PROCEDURE — 80061 LIPID PANEL: CPT | Performed by: FAMILY MEDICINE

## 2021-01-19 PROCEDURE — 99214 OFFICE O/P EST MOD 30 MIN: CPT | Performed by: FAMILY MEDICINE

## 2021-01-19 NOTE — PROGRESS NOTES
"Chief Complaint  spot on leg    Subjective          Pedrito Powell presents to [facility] for   History of Present Illness     Patient's past medical history of having essential hypertension.  Patient is currently taking hydrochlorothiazide 25 mg daily along with irbesartan 300 mg daily.  Patient denies any side effects of the medication.    Patient also has hyperlipidemia.  Patient currently Lipitor 80 mg daily.  Patient denies any side effects of the medication.    Patient also has a history of an prediabetes.  Patient is currently monitoring with diet and exercise.    Patient notes that few weeks back she was walking and she hit her lower extremity against a  that was open.  Patient states that since then she has had some redness as well as a knot/mass.  Patient states that it can be tender.  Patient states that is problematic at times when she is sleeping and she touches it.  She has not noticed any excessive swelling.    Objective   Vital Signs:   /82   Pulse 76   Temp 98.3 °F (36.8 °C) (Infrared)   Resp 18   Ht 152.4 cm (60\")   Wt 108 kg (237 lb)   SpO2 99%   BMI 46.29 kg/m²     Physical Exam  Vitals signs and nursing note reviewed.   Constitutional:       Appearance: She is well-developed.   HENT:      Head: Normocephalic and atraumatic.      Right Ear: External ear normal.      Left Ear: External ear normal.   Neck:      Musculoskeletal: Normal range of motion and neck supple.   Cardiovascular:      Rate and Rhythm: Normal rate and regular rhythm.      Heart sounds: Normal heart sounds.   Pulmonary:      Effort: Pulmonary effort is normal. No respiratory distress.      Breath sounds: Normal breath sounds.   Abdominal:      Palpations: Abdomen is soft.      Tenderness: There is no abdominal tenderness. There is no guarding.   Musculoskeletal: Normal range of motion.        Legs:    Lymphadenopathy:      Cervical: No cervical adenopathy. "   Skin:     General: Skin is warm.   Neurological:      Mental Status: She is alert and oriented to person, place, and time.   Psychiatric:         Behavior: Behavior normal.        Result Review :     Common labs    Common Labsle 2/10/20 2/10/20 2/10/20 5/26/20 5/26/20 5/26/20 5/26/20 7/15/20 7/15/20 7/15/20 7/15/20    1617 1617 1620 1219 1219 1219 1219 1115 1115 1116 1116   Glucose  105 (A)            BUN  23    24 (A)  30 (A)      Creatinine  1.60 (A)    1.74 (A)  1.29 (A)      eGFR Non  Am  35 (A)    30 (A)  42 (A)      eGFR  Am  40 (A)            Sodium  138    138  137      Potassium  4.2    3.8  4.1      Chloride  100    102  101      Calcium  8.8    8.6  9.1      Total Protein  6.9            Albumin  4.0    3.80  3.70      Total Bilirubin  0.4    0.3  0.4      Alkaline Phosphatase  62    47  55      AST (SGOT)  16    19  15      ALT (SGPT)  19    14  17      WBC 7.2   5.44      6.44    Hemoglobin 10.3 (A)   9.5 (A)      10.0 (A)    Hematocrit 31.9 (A)   28.4 (A)      30.0 (A)    Platelets 265   219      250    Total Cholesterol   162           Triglycerides   88    106  83     HDL Cholesterol   47    40  49     LDL Cholesterol    97    71  112 (A)     Hemoglobin A1C     6.00 (A)      5.80 (A)   (A) Abnormal value       Comments are available for some flowsheets but are not being displayed.                     Assessment and Plan    Problem List Items Addressed This Visit        Cardiac and Vasculature    Hypertension    Relevant Orders    Comprehensive Metabolic Panel    CBC & Differential    CBC Auto Differential    Hyperlipidemia    Relevant Orders    Lipid Panel With LDL / HDL Ratio      Other Visit Diagnoses     Lower leg mass, right    -  Primary    Relevant Orders    US Venous Doppler Lower Extremity Right (duplex)    Prediabetes        Relevant Orders    Hemoglobin A1c          Patient has a history of having prediabetes.  I discussed with her to continue to monitor with diet and  exercise.  As for her hyperlipidemia, patient should still continue taking Lipitor 80 mg daily.  As for her essential hypertension, patient should continue taking irbesartan and hydrochlorothiazide, she is doing well on these medicines.  Would like to check a CMP as well as a lipid panel.  I discussed with patient that I do not think her right lower leg mass is anything too serious as it may be a superficial thrombophlebitis, however to ruleout a DVT we will get a Doppler done on the area.  In the meantime I discussed with patient that if it were to worsen, turn red, or he does not feel right, she can always go to the emergency room for further evaluation.  Patient understood and agreed with plan.      Follow Up   No follow-ups on file.  Patient was given instructions and counseling regarding her condition or for health maintenance advice. Please see specific information pulled into the AVS if appropriate.

## 2021-01-23 DIAGNOSIS — D64.9 ANEMIA, UNSPECIFIED TYPE: ICD-10-CM

## 2021-01-23 DIAGNOSIS — R79.89 ELEVATED SERUM CREATININE: Primary | ICD-10-CM

## 2021-01-24 NOTE — PROGRESS NOTES
Please inform the patient of the following abnormal results.  Continues to have anemia. But its stable over the last 8 mos of labs. Since chronic may benefit from seeing hematology.   Elevated serum creatnine. Needs to follow up with nephrology.

## 2021-01-27 ENCOUNTER — TELEPHONE (OUTPATIENT)
Dept: ONCOLOGY | Facility: CLINIC | Age: 61
End: 2021-01-27

## 2021-01-27 NOTE — TELEPHONE ENCOUNTER
CALLER: DINORA    REASON:    PT WAS NOT AWARE SHE HAD BEEN REFERRED BACK TO SEE US. SHE WOULD LIKE TO SPEAK TO SOMEONE MORE INDEPTH ON WHETHER SHE TRULY NEEDS THIS APPT.     PT STATED THAT IF SHE DOES END UP KEEPING THE APPT SHE WILL NEED TO HAVE IT R/S. PT WILL BE IN SCHOOL ON 2/9/21    PLEASE ADVISE    BEST C/B: [phone]

## 2021-01-27 NOTE — TELEPHONE ENCOUNTER
Pt wanted to change time of appt on 2/9.  Called and left msg that we could make it earlier to later and to just call back-

## 2021-01-27 NOTE — TELEPHONE ENCOUNTER
Pt missed call to resched and would like a call back. Pt states she is busy all week of the 2/9. Pt would like a call back to resched.

## 2021-01-27 NOTE — TELEPHONE ENCOUNTER
"Returning call to pt. Pt stated that she did not realize that she was referred back to our office, that her PCP did not tell her. Pt was wondering what she was being seen for. Told pt based off of her PCPs note we are f/u on her anemia. Pt stated she has been seen here for it in the past and they wanted to do an \"obscure test\" that the pt refused and if that's what they're going to suggest again then shed rather not come because she feels as though her anemia is controlled. Told pt I would discuss with an NP and give her a call back.     Discussed with Leilani CAGE who suggested pt keep her appt so that her worsening anemia be investigated further, that they may not recommend a BMA at this time.     Called pt and let her know and she v/u. Pt stated she needs the time of her appt changed because she will be in school. Told pt I would have someone from scheduling give her a call to reschedule. Pt v/u.   "

## 2021-01-29 ENCOUNTER — TELEPHONE (OUTPATIENT)
Dept: ONCOLOGY | Facility: CLINIC | Age: 61
End: 2021-01-29

## 2021-01-29 NOTE — TELEPHONE ENCOUNTER
Caller: PATIENT    Relationship to patient:     Best call back number: [phone] LEAVE A DETAIL MESSAGE WITH NEW APPT INFO    Chief complaint: PATIENT NEEDS TO RESCHEDULE APPT FOR 2-9-21, PATIENT CAN DO 2-19-21. 2-22-21, 2-23-21, 2-26-21, 3-2-21 OR 3-5-21 AFTER 3:30, PT CAN DO [facility], PLEASE ADVISE?    Type of visit: LAB & FOLLOW UP    Requested date: SEE ABOVE    If rescheduling, when is the original appointment: 2-9-21    Additional notes:

## 2021-02-03 RX ORDER — ACETAMINOPHEN 160 MG
2000 TABLET,DISINTEGRATING ORAL DAILY
Qty: 90 CAPSULE | Refills: 1 | Status: SHIPPED | OUTPATIENT
Start: 2021-02-03 | End: 2021-07-30

## 2021-02-04 ENCOUNTER — HOSPITAL ENCOUNTER (OUTPATIENT)
Dept: CARDIOLOGY | Facility: HOSPITAL | Age: 61
Discharge: HOME | End: 2021-02-04
Admitting: FAMILY MEDICINE

## 2021-02-04 DIAGNOSIS — R22.41 LOWER LEG MASS, RIGHT: ICD-10-CM

## 2021-02-04 LAB
BH CV LOWER VASCULAR LEFT COMMON FEMORAL AUGMENT: NORMAL
BH CV LOWER VASCULAR LEFT COMMON FEMORAL COMPETENT: NORMAL
BH CV LOWER VASCULAR LEFT COMMON FEMORAL COMPRESS: NORMAL
BH CV LOWER VASCULAR LEFT COMMON FEMORAL PHASIC: NORMAL
BH CV LOWER VASCULAR LEFT COMMON FEMORAL SPONT: NORMAL
BH CV LOWER VASCULAR RIGHT COMMON FEMORAL AUGMENT: NORMAL
BH CV LOWER VASCULAR RIGHT COMMON FEMORAL COMPETENT: NORMAL
BH CV LOWER VASCULAR RIGHT COMMON FEMORAL COMPRESS: NORMAL
BH CV LOWER VASCULAR RIGHT COMMON FEMORAL PHASIC: NORMAL
BH CV LOWER VASCULAR RIGHT COMMON FEMORAL SPONT: NORMAL
BH CV LOWER VASCULAR RIGHT DISTAL FEMORAL COMPRESS: NORMAL
BH CV LOWER VASCULAR RIGHT GASTRONEMIUS COMPRESS: NORMAL
BH CV LOWER VASCULAR RIGHT GREATER SAPH AK COMPRESS: NORMAL
BH CV LOWER VASCULAR RIGHT GREATER SAPH BK COMPRESS: NORMAL
BH CV LOWER VASCULAR RIGHT LESSER SAPH COMPRESS: NORMAL
BH CV LOWER VASCULAR RIGHT MID FEMORAL AUGMENT: NORMAL
BH CV LOWER VASCULAR RIGHT MID FEMORAL COMPETENT: NORMAL
BH CV LOWER VASCULAR RIGHT MID FEMORAL COMPRESS: NORMAL
BH CV LOWER VASCULAR RIGHT MID FEMORAL PHASIC: NORMAL
BH CV LOWER VASCULAR RIGHT MID FEMORAL SPONT: NORMAL
BH CV LOWER VASCULAR RIGHT PERONEAL COMPRESS: NORMAL
BH CV LOWER VASCULAR RIGHT POPLITEAL AUGMENT: NORMAL
BH CV LOWER VASCULAR RIGHT POPLITEAL COMPETENT: NORMAL
BH CV LOWER VASCULAR RIGHT POPLITEAL COMPRESS: NORMAL
BH CV LOWER VASCULAR RIGHT POPLITEAL PHASIC: NORMAL
BH CV LOWER VASCULAR RIGHT POPLITEAL SPONT: NORMAL
BH CV LOWER VASCULAR RIGHT POSTERIOR TIBIAL COMPRESS: NORMAL
BH CV LOWER VASCULAR RIGHT PROFUNDA FEMORAL COMPRESS: NORMAL
BH CV LOWER VASCULAR RIGHT PROXIMAL FEMORAL COMPRESS: NORMAL
BH CV LOWER VASCULAR RIGHT SAPHENOFEMORAL JUNCTION COMPRESS: NORMAL
BH CV LOWER VASCULAR RIGHT VARICOSITY BK COMPRESS: NORMAL

## 2021-02-04 PROCEDURE — 93971 EXTREMITY STUDY: CPT

## 2021-02-09 ENCOUNTER — APPOINTMENT (OUTPATIENT)
Dept: LAB | Facility: HOSPITAL | Age: 61
End: 2021-02-09

## 2021-02-22 ENCOUNTER — OFFICE VISIT (OUTPATIENT)
Dept: ORTHOPEDIC SURGERY | Facility: CLINIC | Age: 61
End: 2021-02-22

## 2021-02-22 VITALS — TEMPERATURE: 97.2 F | WEIGHT: 237 LBS | BODY MASS INDEX: 46.53 KG/M2 | HEIGHT: 60 IN

## 2021-02-22 DIAGNOSIS — M19.019 ARTHRITIS OF SHOULDER: Primary | ICD-10-CM

## 2021-02-22 PROCEDURE — 99214 OFFICE O/P EST MOD 30 MIN: CPT | Performed by: ORTHOPAEDIC SURGERY

## 2021-02-22 RX ORDER — PREGABALIN 75 MG/1
150 CAPSULE ORAL ONCE
Status: CANCELLED | OUTPATIENT
Start: 2021-05-13 | End: 2021-02-22

## 2021-02-22 RX ORDER — TRAMADOL HYDROCHLORIDE 50 MG/1
50 TABLET ORAL EVERY 4 HOURS PRN
Qty: 60 TABLET | Refills: 0 | Status: SHIPPED | OUTPATIENT
Start: 2021-02-22 | End: 2021-05-14 | Stop reason: HOSPADM

## 2021-02-22 RX ORDER — MELOXICAM 15 MG/1
15 TABLET ORAL ONCE
Status: CANCELLED | OUTPATIENT
Start: 2021-05-13 | End: 2021-02-22

## 2021-02-22 RX ORDER — ACETAMINOPHEN 325 MG/1
1000 TABLET ORAL ONCE
Status: CANCELLED | OUTPATIENT
Start: 2021-05-13 | End: 2021-02-22

## 2021-02-22 NOTE — PROGRESS NOTES
Patient: Pedrito Powell    YOB: 1960    Medical Record Number: [id number]    Chief Complaints: Follow-up regarding left shoulder pain    History of Present Illness:     60 y.o. female patient who presents for follow-up of the left shoulder.  She reports progressively worsening pain and dysfunction.  Conservative treatment has been ineffective thus far.  The last injection did not help nearly as much.   She reports difficulty performing daily activities and has expressed interest in pursuing an arthroplasty.    Allergies:   Allergies   Allergen Reactions   • Chocolate Anaphylaxis   • Nickel Anaphylaxis   • Nuts Anaphylaxis   • Ciprofloxacin Other (See Comments)     THRUSH PER PATIENT   • Citric Acid Unknown (See Comments)     Unsure     • Eggs Or Egg-Derived Products Nausea And Vomiting   • Influenza Vaccines Nausea And Vomiting   • Methyldibromoglutaronitrile Unknown (See Comments)      PATIENT UNSURE OF REACTION.   • Neomycin Unknown (See Comments)      PATIENT UNSURE OF REACTION.   • Omnicef [Cefdinir] Other (See Comments)     THRUSH PER PATIENT   • Palladium Chloride Unknown (See Comments)      PATIENT UNSURE OF REACTION   • Penicillins Other (See Comments)     THRUSH PER PATIENT   • Sulfa Antibiotics Other (See Comments)     THRUSH PER PATIENT   • Tomato Hives   • Yeast-Related Products Hives   • Gold-Containing Drug Products Rash         • Latex Rash       Home Medications:    Current Outpatient Medications:   •  albuterol 108 (90 Base) MCG/ACT inhaler, Inhale 2 puffs Every 4 (Four) Hours As Needed for Wheezing., Disp: , Rfl:   •  amitriptyline (ELAVIL) 25 MG tablet, Take 1 tablet by mouth At Night As Needed for Sleep., Disp: 30 tablet, Rfl: 3  •  aspirin 325 MG tablet, Take 325 mg by mouth daily., Disp: , Rfl:   •  atorvastatin (LIPITOR) 80 MG tablet, TAKE 1 TABLET BY MOUTH DAILY, Disp: 90 tablet, Rfl: 1  •  azelastine (ASTELIN) 0.1 % nasal spray, 1 spray into the nostril(s) as directed by  provider Daily., Disp: , Rfl: 11  •  Cholecalciferol (Vitamin D3) 50 MCG (2000 UT) capsule, TAKE 1 CAPSULE BY MOUTH DAILY, Disp: 90 capsule, Rfl: 1  •  clobetasol (TEMOVATE) 0.05 % cream, LAITH TO SCALP AND LEFT DORSAL FOOT BID PRN, Disp: , Rfl:   •  diclofenac (VOLTAREN) 75 MG EC tablet, Take 1 tablet by mouth Daily., Disp: 90 tablet, Rfl: 1  •  Diclofenac Sodium (PENNSAID) 2 % solution, Place 2 application on the skin as directed by provider 2 (Two) Times a Day As Needed (PAIN/SWELLING)., Disp: 112 g, Rfl: 2  •  EPINEPHrine (AUVI-Q IJ), Inject  as directed As Needed., Disp: , Rfl:   •  ferrous gluconate (FERGON) 324 MG tablet, Take 1 tablet by mouth Daily With Breakfast., Disp: 90 tablet, Rfl: 1  •  hydroCHLOROthiazide (HYDRODIURIL) 25 MG tablet, Take 1 tablet by mouth Daily., Disp: 90 tablet, Rfl: 3  •  hydrocortisone 2.5 % cream, Apply  topically to the appropriate area as directed 2 (Two) Times a Day., Disp: , Rfl:   •  ipratropium (ATROVENT) 0.06 % nasal spray, 1 spray into the nostril(s) as directed by provider Daily., Disp: , Rfl: 11  •  irbesartan (Avapro) 300 MG tablet, Take 1 tablet by mouth Daily., Disp: 90 tablet, Rfl: 3  •  loperamide (IMODIUM) 2 MG capsule, Take 1 capsule by mouth Every 2 (Two) Hours As Needed for Diarrhea (max 4 doses daily)., Disp: , Rfl:   •  loratadine (CLARITIN) 10 MG tablet, Take 10 mg by mouth Daily., Disp: , Rfl:   •  meclizine (ANTIVERT) 25 MG tablet, TAKE 1 TABLET BY MOUTH THREE TIMES DAILY AS NEEDED FOR DIZZINESS, Disp: 270 tablet, Rfl: 0  •  omeprazole (priLOSEC) 40 MG capsule, Take 1 capsule by mouth Daily., Disp: 90 capsule, Rfl: 1  •  ondansetron (ZOFRAN) 4 MG tablet, Take 1 tablet by mouth Every 12 (Twelve) Hours As Needed for Nausea or Vomiting., Disp: 180 tablet, Rfl: 0  •  ondansetron ODT (ZOFRAN-ODT) 4 MG disintegrating tablet, Dissolve 1 tablet under the tongue twice daily, Disp: 180 tablet, Rfl: 0  •  terbinafine (lamiSIL) 250 MG tablet, TK 8 TS PO ONCE A WEEK,  Disp: , Rfl:   •  triamcinolone (KENALOG) 0.1 % ointment, Apply  topically to the appropriate area as directed 2 (Two) Times a Day., Disp: , Rfl:   •  Triamcinolone Acetonide (NASACORT ALLERGY 24HR) 55 MCG/ACT nasal inhaler, 2 sprays into the nostril(s) as directed by provider Daily., Disp: , Rfl:     Past Medical History:   Diagnosis Date   • Anemia    • Arthritis    • GERD (gastroesophageal reflux disease)    • History of carpal tunnel syndrome    • Hyperlipidemia    • Hypertension    • Vertigo        Past Surgical History:   Procedure Laterality Date   •  SECTION  , ,    • COLONOSCOPY N/A 12/10/2018    normal ileum, IH, hyperplastic polyp, otherwise normal exam   • ENDOSCOPY N/A 12/10/2018    no gross lesions in esophagus or examined duodenum, erythematous mucosa in stomach, biopsies benign   • HYSTERECTOMY     • OOPHORECTOMY     • SHOULDER MANIPULATION         Social History     Occupational History   • Occupation: Teacher     Employer: [email or web address]   Tobacco Use   • Smoking status: Never Smoker   • Smokeless tobacco: Never Used   Substance and Sexual Activity   • Alcohol use: No   • Drug use: No   • Sexual activity: Defer      Social History     Social History Narrative   • Not on file       Family History   Problem Relation Age of Onset   • Colon cancer Maternal Grandfather    • Asthma Mother    • Thyroid disease Father    • Diabetes Maternal Grandmother        Review of Systems:      Constitutional: Denies fever, shaking or chills   Eyes: Denies change in visual acuity   HEENT: Denies nasal congestion or sore throat   Respiratory: Denies cough or shortness of breath   Cardiovascular: Denies chest pain or edema  Endocrine: Denies tremors, palpitations, intolerance of heat or cold, polyuria, polydipsia.  GI: Denies abdominal pain, nausea, vomiting, bloody stools or diarrhea  : Denies frequency, urgency, incontinence, retention, or nocturia.  Musculoskeletal: Denies  "numbness, tingling or loss of motor function except as above  Integument: Denies rash, lesion or ulceration   Neurologic: Denies headache or focal weakness, deficits  Heme: Denies spontaneous or excessive bleeding, epistaxis, hematuria, melena, fatigue, enlarged or tender lymph nodes.      All other pertinent positives and negatives as noted above in HPI.    Physical Exam:   60 y.o. female  Vitals:    02/22/21 1519   Temp: 97.2 °F (36.2 °C)   Weight: 108 kg (237 lb)   Height: 152.4 cm (60\")     General:  Patient is awake and alert.  Appears in no acute distress or discomfort.    Psych:  Affect and demeanor are appropriate.    Eyes:  Conjunctiva and sclera appear grossly normal.  Eyes track well and EOM seem to be intact.    Ears:  No gross abnormalities.  Hearing adequate for the exam.    Cardiovascular:  Regular rate and rhythm.    Lungs:  Good chest expansion.  Breathing unlabored.    Extremities:  Left shoulder is examined.  Skin is benign.  Moderate anterior tenderness.  Motion: 80 degrees FE, 15 degrees ER, between side and back pocket IR.  Intact deltoid function and axillary nerve sensation.  Normal motor and sensory function in the lower arm and hand.  Palpable radial pulse.  Brisk capillary refill.    Imaging: Previous x-rays of the left shoulder are reviewed and show end-stage glenohumeral osteoarthritis    Assessment/Plan:  1.  Left shoulder end-stage osteoarthritis  2.  Nickel allergy    We discussed the natural history of this condition and all available treatment options, both surgical and nonsurgical.  The risk, benefits and alternatives to a total shoulder arthroplasty were carefully discussed.  All questions posed by the patient were answered in detail.  She wants to move forward with an arthroplasty.  She will need a titanium implant due to her nickel allergy she would like to wait until May.  We will look at getting this scheduled.  I will have her follow-up with either myself or Sherie for a " preoperative visit.  I did agree to give her a prescription for tramadol to take in the interim.  Risk were discussed.    Bethel Devi MD  02/22/2021

## 2021-02-23 ENCOUNTER — LAB (OUTPATIENT)
Dept: LAB | Facility: HOSPITAL | Age: 61
End: 2021-02-23

## 2021-02-23 ENCOUNTER — OFFICE VISIT (OUTPATIENT)
Dept: ONCOLOGY | Facility: CLINIC | Age: 61
End: 2021-02-23

## 2021-02-23 VITALS
RESPIRATION RATE: 16 BRPM | OXYGEN SATURATION: 98 % | SYSTOLIC BLOOD PRESSURE: 132 MMHG | BODY MASS INDEX: 47 KG/M2 | HEIGHT: 60 IN | TEMPERATURE: 98.2 F | DIASTOLIC BLOOD PRESSURE: 84 MMHG | WEIGHT: 239.4 LBS | HEART RATE: 71 BPM

## 2021-02-23 DIAGNOSIS — D64.9 ANEMIA, UNSPECIFIED TYPE: ICD-10-CM

## 2021-02-23 DIAGNOSIS — D64.9 ANEMIA, UNSPECIFIED TYPE: Primary | ICD-10-CM

## 2021-02-23 DIAGNOSIS — N18.32 STAGE 3B CHRONIC KIDNEY DISEASE (HCC): ICD-10-CM

## 2021-02-23 LAB
ALBUMIN SERPL-MCNC: 3.9 G/DL (ref 3.5–5.2)
ALBUMIN/GLOB SERPL: 1.2 G/DL (ref 1.1–2.4)
ALP SERPL-CCNC: 59 U/L (ref 38–116)
ALT SERPL W P-5'-P-CCNC: 21 U/L (ref 0–33)
ANION GAP SERPL CALCULATED.3IONS-SCNC: 10.1 MMOL/L (ref 5–15)
AST SERPL-CCNC: 23 U/L (ref 0–32)
BASOPHILS # BLD AUTO: 0.07 10*3/MM3 (ref 0–0.2)
BASOPHILS NFR BLD AUTO: 1 % (ref 0–1.5)
BILIRUB SERPL-MCNC: 0.2 MG/DL (ref 0.2–1.2)
BUN SERPL-MCNC: 29 MG/DL (ref 6–20)
BUN/CREAT SERPL: 19.5 (ref 7.3–30)
CALCIUM SPEC-SCNC: 9 MG/DL (ref 8.5–10.2)
CHLORIDE SERPL-SCNC: 102 MMOL/L (ref 98–107)
CO2 SERPL-SCNC: 26.9 MMOL/L (ref 22–29)
CREAT SERPL-MCNC: 1.49 MG/DL (ref 0.6–1.1)
DEPRECATED RDW RBC AUTO: 42.6 FL (ref 37–54)
EOSINOPHIL # BLD AUTO: 0.3 10*3/MM3 (ref 0–0.4)
EOSINOPHIL NFR BLD AUTO: 4.5 % (ref 0.3–6.2)
ERYTHROCYTE [DISTWIDTH] IN BLOOD BY AUTOMATED COUNT: 12.6 % (ref 12.3–15.4)
ERYTHROCYTE [SEDIMENTATION RATE] IN BLOOD: 35 MM/HR (ref 0–30)
FERRITIN SERPL-MCNC: 160.7 NG/ML (ref 13–150)
GFR SERPL CREATININE-BSD FRML MDRD: 36 ML/MIN/1.73
GLOBULIN UR ELPH-MCNC: 3.2 GM/DL (ref 1.8–3.5)
GLUCOSE SERPL-MCNC: 106 MG/DL (ref 74–124)
HCT VFR BLD AUTO: 30.6 % (ref 34–46.6)
HGB BLD-MCNC: 9.8 G/DL (ref 12–15.9)
IMM GRANULOCYTES # BLD AUTO: 0.01 10*3/MM3 (ref 0–0.05)
IMM GRANULOCYTES NFR BLD AUTO: 0.1 % (ref 0–0.5)
IRON 24H UR-MRATE: 36 MCG/DL (ref 37–145)
IRON SATN MFR SERPL: 13 % (ref 14–48)
LYMPHOCYTES # BLD AUTO: 1.48 10*3/MM3 (ref 0.7–3.1)
LYMPHOCYTES NFR BLD AUTO: 22.1 % (ref 19.6–45.3)
MCH RBC QN AUTO: 29.7 PG (ref 26.6–33)
MCHC RBC AUTO-ENTMCNC: 32 G/DL (ref 31.5–35.7)
MCV RBC AUTO: 92.7 FL (ref 79–97)
MONOCYTES # BLD AUTO: 0.43 10*3/MM3 (ref 0.1–0.9)
MONOCYTES NFR BLD AUTO: 6.4 % (ref 5–12)
NEUTROPHILS NFR BLD AUTO: 4.41 10*3/MM3 (ref 1.7–7)
NEUTROPHILS NFR BLD AUTO: 65.9 % (ref 42.7–76)
NRBC BLD AUTO-RTO: 0 /100 WBC (ref 0–0.2)
PLATELET # BLD AUTO: 235 10*3/MM3 (ref 140–450)
PMV BLD AUTO: 9.2 FL (ref 6–12)
POTASSIUM SERPL-SCNC: 3.9 MMOL/L (ref 3.5–4.7)
PROT SERPL-MCNC: 7.1 G/DL (ref 6.3–8)
RBC # BLD AUTO: 3.3 10*6/MM3 (ref 3.77–5.28)
SODIUM SERPL-SCNC: 139 MMOL/L (ref 134–145)
TIBC SERPL-MCNC: 280 MCG/DL (ref 249–505)
TRANSFERRIN SERPL-MCNC: 200 MG/DL (ref 200–360)
VIT B12 BLD-MCNC: 494 PG/ML (ref 211–946)
WBC # BLD AUTO: 6.7 10*3/MM3 (ref 3.4–10.8)

## 2021-02-23 PROCEDURE — 85025 COMPLETE CBC W/AUTO DIFF WBC: CPT | Performed by: NURSE PRACTITIONER

## 2021-02-23 PROCEDURE — 85652 RBC SED RATE AUTOMATED: CPT | Performed by: NURSE PRACTITIONER

## 2021-02-23 PROCEDURE — 36415 COLL VENOUS BLD VENIPUNCTURE: CPT | Performed by: NURSE PRACTITIONER

## 2021-02-23 PROCEDURE — 82728 ASSAY OF FERRITIN: CPT | Performed by: NURSE PRACTITIONER

## 2021-02-23 PROCEDURE — 83540 ASSAY OF IRON: CPT | Performed by: NURSE PRACTITIONER

## 2021-02-23 PROCEDURE — 82607 VITAMIN B-12: CPT | Performed by: NURSE PRACTITIONER

## 2021-02-23 PROCEDURE — 99215 OFFICE O/P EST HI 40 MIN: CPT | Performed by: NURSE PRACTITIONER

## 2021-02-23 PROCEDURE — 80053 COMPREHEN METABOLIC PANEL: CPT | Performed by: NURSE PRACTITIONER

## 2021-02-23 PROCEDURE — 84466 ASSAY OF TRANSFERRIN: CPT | Performed by: NURSE PRACTITIONER

## 2021-02-23 NOTE — PROGRESS NOTES
Subjective     REASON FOR CONSULTATION: Chronic anemia                              REQUESTING PHYSICIAN: Dr. Teo Fraser    RECORDS OBTAINED:  Records of the patients history including those obtained from the referring provider were reviewed and summarized in detail.    HISTORY OF PRESENT ILLNESS:  The patient is a 60 y.o. year old female who is here for an opinion about the above issue.    History of Present Illness Ms. Powell is seen today after being referred back to us by her primary care physician, Dr. Teo Fraser.  We saw the patient in consultation in March 2019 for chronic anemia.  Patient has had a long history of rheumatoid arthritis with chronic anemia and reported chronic difficulty with iron deficiency, taking oral iron without much benefit.  At that time of initial consultation we discussed her anemia was likely related to underlying CKD stage III, as well as possibly her rheumatoid arthritis.  We did evaluate for vitamin deficiencies and she was not found to be folate, B12, or iron deficient.  Sed rate was slightly elevated at 44 at that time.    Most recent lab work performed by Dr. Fraser 1/19/2021 showed a hemoglobin of 9.4, creatinine 1.3, BUN 32, GFR 39.  Upon review of the EMR it appears at least for the last roughly 2 years her creatinine has been anywhere from 1.2 to as high as 1.7, BUN ranging in the 20s to 30s.  So overall relatively stable.      Last colonoscopy and EGD were performed per Dr. Kapoor, negative.  Patient also had a small bowel follow-through which was within normal limits. Other pertinent imaging include a CT of the abdomen pelvis performed December 2018 which was negative.  She is up-to-date on her mammogram, last performed August 2020, negative.    As she is reviewed today, hemoglobin is at stable at 9.8.  Patient denies any significant fatigue and feels that she has fairly good performance status.  She does note a few times a week taking a 3 to 4-hour nap in the  afternoon but does not require this on a regular basis.  She is still able to complete the activity she desires.  Patient is a  and lives alone.  She denies any obvious GI blood loss.      She reports normal appetite.  Weight is stable.  She does have significant left shoulder pain and will undergo replacement surgery per Dr. Devi in May 2021.    Otherwise she denies further concerns at this time.    Past Medical History:   Diagnosis Date   • Anemia    • Arthritis    • GERD (gastroesophageal reflux disease)    • History of carpal tunnel syndrome    • Hyperlipidemia    • Hypertension    • Vertigo         Past Surgical History:   Procedure Laterality Date   •  SECTION  , ,    • COLONOSCOPY N/A 12/10/2018    normal ileum, IH, hyperplastic polyp, otherwise normal exam   • ENDOSCOPY N/A 12/10/2018    no gross lesions in esophagus or examined duodenum, erythematous mucosa in stomach, biopsies benign   • HYSTERECTOMY     • OOPHORECTOMY     • SHOULDER MANIPULATION          Current Outpatient Medications on File Prior to Visit   Medication Sig Dispense Refill   • albuterol 108 (90 Base) MCG/ACT inhaler Inhale 2 puffs Every 4 (Four) Hours As Needed for Wheezing.     • amitriptyline (ELAVIL) 25 MG tablet Take 1 tablet by mouth At Night As Needed for Sleep. 30 tablet 3   • aspirin 325 MG tablet Take 325 mg by mouth daily.     • atorvastatin (LIPITOR) 80 MG tablet TAKE 1 TABLET BY MOUTH DAILY 90 tablet 1   • azelastine (ASTELIN) 0.1 % nasal spray 1 spray into the nostril(s) as directed by provider Daily.  11   • Cholecalciferol (Vitamin D3) 50 MCG ( UT) capsule TAKE 1 CAPSULE BY MOUTH DAILY 90 capsule 1   • clobetasol (TEMOVATE) 0.05 % cream LAITH TO SCALP AND LEFT DORSAL FOOT BID PRN     • diclofenac (VOLTAREN) 75 MG EC tablet Take 1 tablet by mouth Daily. 90 tablet 1   • Diclofenac Sodium (PENNSAID) 2 % solution Place 2 application on the skin as directed by provider 2 (Two) Times a Day As  Needed (PAIN/SWELLING). 112 g 2   • EPINEPHrine (AUVI-Q IJ) Inject  as directed As Needed.     • ferrous gluconate (FERGON) 324 MG tablet Take 1 tablet by mouth Daily With Breakfast. 90 tablet 1   • hydroCHLOROthiazide (HYDRODIURIL) 25 MG tablet Take 1 tablet by mouth Daily. 90 tablet 3   • hydrocortisone 2.5 % cream Apply  topically to the appropriate area as directed 2 (Two) Times a Day.     • ipratropium (ATROVENT) 0.06 % nasal spray 1 spray into the nostril(s) as directed by provider Daily.  11   • irbesartan (Avapro) 300 MG tablet Take 1 tablet by mouth Daily. 90 tablet 3   • loperamide (IMODIUM) 2 MG capsule Take 1 capsule by mouth Every 2 (Two) Hours As Needed for Diarrhea (max 4 doses daily).     • loratadine (CLARITIN) 10 MG tablet Take 10 mg by mouth Daily.     • meclizine (ANTIVERT) 25 MG tablet TAKE 1 TABLET BY MOUTH THREE TIMES DAILY AS NEEDED FOR DIZZINESS 270 tablet 0   • omeprazole (priLOSEC) 40 MG capsule Take 1 capsule by mouth Daily. 90 capsule 1   • ondansetron (ZOFRAN) 4 MG tablet Take 1 tablet by mouth Every 12 (Twelve) Hours As Needed for Nausea or Vomiting. 180 tablet 0   • ondansetron ODT (ZOFRAN-ODT) 4 MG disintegrating tablet Dissolve 1 tablet under the tongue twice daily 180 tablet 0   • terbinafine (lamiSIL) 250 MG tablet TK 8 TS PO ONCE A WEEK     • traMADol (ULTRAM) 50 MG tablet Take 1 tablet by mouth Every 4 (Four) Hours As Needed for Moderate Pain . 60 tablet 0   • triamcinolone (KENALOG) 0.1 % ointment Apply  topically to the appropriate area as directed 2 (Two) Times a Day.     • Triamcinolone Acetonide (NASACORT ALLERGY 24HR) 55 MCG/ACT nasal inhaler 2 sprays into the nostril(s) as directed by provider Daily.       No current facility-administered medications on file prior to visit.         ALLERGIES:    Allergies   Allergen Reactions   • Chocolate Anaphylaxis   • Nickel Anaphylaxis   • Nuts Anaphylaxis   • Ciprofloxacin Other (See Comments)     THRUSH PER PATIENT   • Citric Acid  Unknown (See Comments)     Unsure     • Eggs Or Egg-Derived Products Nausea And Vomiting   • Influenza Vaccines Nausea And Vomiting   • Methyldibromoglutaronitrile Unknown (See Comments)      PATIENT UNSURE OF REACTION.   • Neomycin Unknown (See Comments)      PATIENT UNSURE OF REACTION.   • Omnicef [Cefdinir] Other (See Comments)     THRUSH PER PATIENT   • Palladium Chloride Unknown (See Comments)      PATIENT UNSURE OF REACTION   • Penicillins Other (See Comments)     THRUSH PER PATIENT   • Sulfa Antibiotics Other (See Comments)     THRUSH PER PATIENT   • Tomato Hives   • Yeast-Related Products Hives   • Gold-Containing Drug Products Rash         • Latex Rash        Social History     Socioeconomic History   • Marital status:      Spouse name: Not on file   • Number of children: 3   • Years of education: College   • Highest education level: Not on file   Occupational History   • Occupation: Teacher     Employer: [employer]   Tobacco Use   • Smoking status: Never Smoker   • Smokeless tobacco: Never Used   Substance and Sexual Activity   • Alcohol use: No   • Drug use: No   • Sexual activity: Defer        Family History   Problem Relation Age of Onset   • Colon cancer Maternal Grandfather    • Asthma Mother    • Thyroid disease Father    • Diabetes Maternal Grandmother         Review of Systems   Constitutional: Positive for fatigue (mild, stable). Negative for appetite change, chills, diaphoresis, fever and unexpected weight change.   HENT: Negative for hearing loss, sore throat and trouble swallowing.    Respiratory: Negative for cough, chest tightness, shortness of breath and wheezing.    Cardiovascular: Negative for chest pain, palpitations and leg swelling.   Gastrointestinal: Negative for abdominal distention, abdominal pain, constipation, diarrhea, nausea and vomiting.   Genitourinary: Negative for dysuria, frequency, hematuria and urgency.   Musculoskeletal: Positive for arthralgias.  "Negative for joint swelling. Back pain: left shoulder.        No muscle weakness.   Skin: Negative for rash and wound.   Neurological: Negative for dizziness, seizures, syncope, speech difficulty, weakness, numbness and headaches.   Hematological: Negative for adenopathy. Does not bruise/bleed easily.   Psychiatric/Behavioral: Negative for behavioral problems, confusion and suicidal ideas.        Objective     Vitals:    02/23/21 1554   BP: 132/84   Pulse: 71   Resp: 16   Temp: 98.2 °F (36.8 °C)   TempSrc: Skin   SpO2: 98%   Weight: 109 kg (239 lb 6.4 oz)   Height: 152.4 cm (60\")   PainSc:   7   PainLoc: Generalized  Comment: Lt shoulder, Rajendra Knee     Current Status 2/23/2021   ECOG score 0       Physical Exam    GENERAL:  Well-developed, well-nourished in no acute distress. Wearing mask.  SKIN:  Warm, dry without rashes, purpura or petechiae.  EYES:  Pupils equal, round and reactive to light.  EOMs intact.  Conjunctivae normal.  EARS:  Hearing intact.  NOSE:  Septum midline.  No excoriations or nasal discharge.  MOUTH:  Tongue is well-papillated; no stomatitis or ulcers.  Lips normal.  THROAT:  Oropharynx without lesions or exudates.  NECK:  Supple with good range of motion; no thyromegaly or masses, no JVD.  LYMPHATICS:  No cervical, supraclavicular, axillary or inguinal adenopathy.  CHEST:  Lungs clear to auscultation. Good airflow.  CARDIAC:  Regular rate and rhythm without murmurs, rubs or gallops. Normal S1,S2.  ABDOMEN:  Soft, nontender with no hepatosplenomegaly or masses.  EXTREMITIES:  No clubbing, cyanosis or edema.  NEUROLOGICAL:  Cranial Nerves II-XII grossly intact.  No focal neurological deficits.  PSYCHIATRIC:  Normal affect and mood.      I have reexamined the patient and the results are consistent with the previously documented exam except as updated. Kaylee Ramirez, KALYANI       RECENT LABS:  Results from last 7 days   Lab Units 02/23/21  1609   WBC 10*3/mm3 6.70   NEUTROS ABS 10*3/mm3 4.41 "   HEMOGLOBIN g/dL 9.8*   HEMATOCRIT % 30.6*   PLATELETS 10*3/mm3 235               Two View Chest X-Ray 02/24/19  FINDINGS: The lungs are moderately expanded and clear and there is no  evidence of localized pneumonia. The heart is borderline enlarged  without change from 07/23/2012.    CT Abdomen Pelvis 12/06/18 scanned Image.    Assessment/Plan     1.  Chronic anemia.  · Seen in consultation March 2019.  Patient reporting longstanding anemia with hemoglobin running in the 10-11 range.  Patient has underlying CKD stage III.  Work-up at that time did not reveal any vitamin deficiency including no iron, B12 or folate deficiency.  No underlying monoclonal protein.  Anemia felt to be related to CKD with also possibly an element of chronic disease with underlying rheumatoid arthritis.  Sed rate was mildly elevated at 44.  Patient lost to follow-up since that time.  · Last colonoscopy and EGD were performed per Dr. Kapoor, negative.  Patient also had a small bowel follow-through which was within normal limits. Other pertinent imaging include a CT of the abdomen pelvis performed December 2018 which was negative.  She is up-to-date on her mammogram, last performed August 2020, negative.  · Patient referred back to our office now, 2/23/2021 for ongoing anemia, somewhat worse over the last year in the 9-10 range.  Hemoglobin 9.8 today.  Patient is not overly symptomatic.  She has some mild fatigue but feels that she is able to perform ADLs and has a decent performance status.  She takes a nap a few times a week but nothing unusual.  We discussed additional lab work to be checked today again to rule out underlying causes.  I do suspect that her stable but ongoing anemia is needed to underlying CKD with also an element of chronic disease in relation to her arthritis.  We discussed that if she had some type of GI blood loss or iron deficiency I would have expected her hemoglobin to be going down but rather it has been stable over  the last year.  We will check additional blood work today and see her back in 1 to 2 weeks for full review and further recommendations.    2.  Rheumatoid arthritis for which she takes diclofenac    3.  Chronic kidney disease stage III.  This is managed per Dr. Fraser.  Creatinine appears relatively stable range from 1.2-1.7 over the last 2 years.  BUN 20s to 30s.  GFR last measured around 39.  This could be the cause of her anemia.      PLAN:  1. Additional blood work ordered today, pending, including sed rate, SPEP, CMP, EPO level, RBC folate, B12, iron profile, and ferritin.  2. Patient will follow up with Dr. Pratt in 1 to 2 weeks with repeat CBC and review of extensive blood work pending today.  3. We did discuss that if her anemia is found to be related to her CKD, she would be now a candidate for Procrit with her hemoglobin being below 10 g/dL.  However with her hemoglobin being relatively stable and this is something we could hold off on.  We discussed that Procrit can be given in our office or could be done through her nephrologist.  We will discuss this further based on pending lab work today however.    I did spend 50 minutes with the patient today, 25 minutes in face-to-face consultation and coordination of care reviewing issues outlined in the assessment and plan above.  Additional 25 minutes spent reviewing extensive medical records including past consultation in this office 2 years ago, recent records from PCP, extensive lab work; time spent preparing this note, ordering extensive lab work today, coordinating care going forward.  This patient was new to me today.        KALYANI Murguia

## 2021-02-24 LAB
ALBUMIN SERPL ELPH-MCNC: 3.5 G/DL (ref 2.9–4.4)
ALBUMIN/GLOB SERPL: 0.9 {RATIO} (ref 0.7–1.7)
ALPHA1 GLOB SERPL ELPH-MCNC: 0.3 G/DL (ref 0–0.4)
ALPHA2 GLOB SERPL ELPH-MCNC: 1 G/DL (ref 0.4–1)
B-GLOBULIN SERPL ELPH-MCNC: 1 G/DL (ref 0.7–1.3)
EPO SERPL-ACNC: 8.3 MIU/ML (ref 2.6–18.5)
FOLATE BLD-MCNC: 297 NG/ML
FOLATE RBC-MCNC: 1031 NG/ML
GAMMA GLOB SERPL ELPH-MCNC: 1.4 G/DL (ref 0.4–1.8)
GLOBULIN SER CALC-MCNC: 3.7 G/DL (ref 2.2–3.9)
HCT VFR BLD AUTO: 28.8 % (ref 34–46.6)
LABORATORY COMMENT REPORT: NORMAL
M PROTEIN SERPL ELPH-MCNC: NORMAL G/DL
PROT PATTERN SERPL ELPH-IMP: NORMAL
PROT SERPL-MCNC: 7.2 G/DL (ref 6–8.5)

## 2021-02-26 PROBLEM — M19.019 ARTHRITIS OF SHOULDER: Status: ACTIVE | Noted: 2021-02-26

## 2021-03-10 ENCOUNTER — LAB (OUTPATIENT)
Dept: OTHER | Facility: HOSPITAL | Age: 61
End: 2021-03-10

## 2021-03-10 ENCOUNTER — OFFICE VISIT (OUTPATIENT)
Dept: ONCOLOGY | Facility: CLINIC | Age: 61
End: 2021-03-10

## 2021-03-10 VITALS
OXYGEN SATURATION: 99 % | TEMPERATURE: 97.1 F | BODY MASS INDEX: 46.08 KG/M2 | HEIGHT: 60 IN | SYSTOLIC BLOOD PRESSURE: 104 MMHG | HEART RATE: 72 BPM | WEIGHT: 234.7 LBS | DIASTOLIC BLOOD PRESSURE: 69 MMHG | RESPIRATION RATE: 16 BRPM

## 2021-03-10 DIAGNOSIS — Z86.2 HISTORY OF ANEMIA DUE TO CHRONIC KIDNEY DISEASE: ICD-10-CM

## 2021-03-10 DIAGNOSIS — I10 HYPERTENSION, UNSPECIFIED TYPE: Primary | ICD-10-CM

## 2021-03-10 DIAGNOSIS — D64.9 ANEMIA, UNSPECIFIED TYPE: ICD-10-CM

## 2021-03-10 DIAGNOSIS — N18.9 HISTORY OF ANEMIA DUE TO CHRONIC KIDNEY DISEASE: ICD-10-CM

## 2021-03-10 DIAGNOSIS — M06.9: ICD-10-CM

## 2021-03-10 LAB
BASOPHILS # BLD AUTO: 0.07 10*3/MM3 (ref 0–0.2)
BASOPHILS NFR BLD AUTO: 0.9 % (ref 0–1.5)
DEPRECATED RDW RBC AUTO: 40.5 FL (ref 37–54)
EOSINOPHIL # BLD AUTO: 0.33 10*3/MM3 (ref 0–0.4)
EOSINOPHIL NFR BLD AUTO: 4.2 % (ref 0.3–6.2)
ERYTHROCYTE [DISTWIDTH] IN BLOOD BY AUTOMATED COUNT: 12.6 % (ref 12.3–15.4)
HCT VFR BLD AUTO: 31.4 % (ref 34–46.6)
HGB BLD-MCNC: 10.3 G/DL (ref 12–15.9)
IMM GRANULOCYTES # BLD AUTO: 0.08 10*3/MM3 (ref 0–0.05)
IMM GRANULOCYTES NFR BLD AUTO: 1 % (ref 0–0.5)
LYMPHOCYTES # BLD AUTO: 2.04 10*3/MM3 (ref 0.7–3.1)
LYMPHOCYTES NFR BLD AUTO: 26.1 % (ref 19.6–45.3)
MCH RBC QN AUTO: 28.8 PG (ref 26.6–33)
MCHC RBC AUTO-ENTMCNC: 32.8 G/DL (ref 31.5–35.7)
MCV RBC AUTO: 87.7 FL (ref 79–97)
MONOCYTES # BLD AUTO: 0.61 10*3/MM3 (ref 0.1–0.9)
MONOCYTES NFR BLD AUTO: 7.8 % (ref 5–12)
NEUTROPHILS NFR BLD AUTO: 4.7 10*3/MM3 (ref 1.7–7)
NEUTROPHILS NFR BLD AUTO: 60 % (ref 42.7–76)
NRBC BLD AUTO-RTO: 0 /100 WBC (ref 0–0.2)
PLATELET # BLD AUTO: 241 10*3/MM3 (ref 140–450)
PMV BLD AUTO: 10.5 FL (ref 6–12)
RBC # BLD AUTO: 3.58 10*6/MM3 (ref 3.77–5.28)
WBC # BLD AUTO: 7.83 10*3/MM3 (ref 3.4–10.8)

## 2021-03-10 PROCEDURE — 99214 OFFICE O/P EST MOD 30 MIN: CPT | Performed by: INTERNAL MEDICINE

## 2021-03-10 PROCEDURE — 85025 COMPLETE CBC W/AUTO DIFF WBC: CPT | Performed by: NURSE PRACTITIONER

## 2021-03-10 RX ORDER — AZITHROMYCIN 250 MG/1
TABLET, FILM COATED ORAL
COMMUNITY
Start: 2021-03-09 | End: 2021-05-03

## 2021-03-10 NOTE — PROGRESS NOTES
Subjective     REASON FOR follow-up: Multifactorial anemia, anemia of chronic kidney disease and rheumatoid arthritis                               History of Present Illness patient is a 60-year-old female who was referred to us for evaluation of chronic anemia.  She does have an autoimmune component with her history of rheumatoid arthritis.  She also has mild renal insufficiency.    Anemia work-up showed a B12 of 494,Ferritin 160, serum iron of 36 with percent saturation of 13, EPO level of 8.3, creatinine of 1.49, serum protein electrophoresis did not show monoclonal protein.  ESR is 35.  There are no signs of infection.  Currently her hemoglobin is 9.8 and she is asymptomatic.  I did discuss about the possibility of Procrit but at the present time after discussing the side effects she wants to be observed.    We will try to get approval for Procrit and if she is willing and her hemoglobin drops in the near future we can always start Procrit        Hematology history:  Ms. Powell is seen today after being referred back to us by her primary care physician, Dr. Teo Fraser.  We saw the patient in consultation in March 2019 for chronic anemia.  Patient has had a long history of rheumatoid arthritis with chronic anemia and reported chronic difficulty with iron deficiency, taking oral iron without much benefit.  At that time of initial consultation we discussed her anemia was likely related to underlying CKD stage III, as well as possibly her rheumatoid arthritis.  We did evaluate for vitamin deficiencies and she was not found to be folate, B12, or iron deficient.  Sed rate was slightly elevated at 44 at that time.    Most recent lab work performed by Dr. Fraser 1/19/2021 showed a hemoglobin of 9.4, creatinine 1.3, BUN 32, GFR 39.  Upon review of the EMR it appears at least for the last roughly 2 years her creatinine has been anywhere from 1.2 to as high as 1.7, BUN ranging in the 20s to 30s.  So overall  relatively stable.      Last colonoscopy and EGD were performed per Dr. Kapoor, negative.  Patient also had a small bowel follow-through which was within normal limits. Other pertinent imaging include a CT of the abdomen pelvis performed 2018 which was negative.  She is up-to-date on her mammogram, last performed 2020, negative.    As she is reviewed today, hemoglobin is at stable at 9.8.  Patient denies any significant fatigue and feels that she has fairly good performance status.  She does note a few times a week taking a 3 to 4-hour nap in the afternoon but does not require this on a regular basis.  She is still able to complete the activity she desires.  Patient is a  and lives alone.  She denies any obvious GI blood loss.      She reports normal appetite.  Weight is stable.  She does have significant left shoulder pain and will undergo replacement surgery per Dr. Devi in May 2021.    Otherwise she denies further concerns at this time.    Past Medical History:   Diagnosis Date   • Anemia    • Arthritis    • GERD (gastroesophageal reflux disease)    • History of carpal tunnel syndrome    • Hyperlipidemia    • Hypertension    • Vertigo         Past Surgical History:   Procedure Laterality Date   •  SECTION  , ,    • COLONOSCOPY N/A 12/10/2018    normal ileum, IH, hyperplastic polyp, otherwise normal exam   • ENDOSCOPY N/A 12/10/2018    no gross lesions in esophagus or examined duodenum, erythematous mucosa in stomach, biopsies benign   • HYSTERECTOMY     • OOPHORECTOMY     • SHOULDER MANIPULATION          Current Outpatient Medications on File Prior to Visit   Medication Sig Dispense Refill   • albuterol 108 (90 Base) MCG/ACT inhaler Inhale 2 puffs Every 4 (Four) Hours As Needed for Wheezing.     • amitriptyline (ELAVIL) 25 MG tablet Take 1 tablet by mouth At Night As Needed for Sleep. 30 tablet 3   • aspirin 325 MG tablet Take 325 mg by mouth daily.     •  atorvastatin (LIPITOR) 80 MG tablet TAKE 1 TABLET BY MOUTH DAILY 90 tablet 1   • azelastine (ASTELIN) 0.1 % nasal spray 1 spray into the nostril(s) as directed by provider Daily.  11   • azithromycin (ZITHROMAX) 250 MG tablet      • Cholecalciferol (Vitamin D3) 50 MCG (2000 UT) capsule TAKE 1 CAPSULE BY MOUTH DAILY 90 capsule 1   • clobetasol (TEMOVATE) 0.05 % cream LAITH TO SCALP AND LEFT DORSAL FOOT BID PRN     • diclofenac (VOLTAREN) 75 MG EC tablet Take 1 tablet by mouth Daily. 90 tablet 1   • Diclofenac Sodium (PENNSAID) 2 % solution Place 2 application on the skin as directed by provider 2 (Two) Times a Day As Needed (PAIN/SWELLING). 112 g 2   • EPINEPHrine (AUVI-Q IJ) Inject  as directed As Needed.     • ferrous gluconate (FERGON) 324 MG tablet Take 1 tablet by mouth Daily With Breakfast. 90 tablet 1   • hydroCHLOROthiazide (HYDRODIURIL) 25 MG tablet Take 1 tablet by mouth Daily. 90 tablet 3   • hydrocortisone 2.5 % cream Apply  topically to the appropriate area as directed 2 (Two) Times a Day.     • ipratropium (ATROVENT) 0.06 % nasal spray 1 spray into the nostril(s) as directed by provider Daily.  11   • irbesartan (Avapro) 300 MG tablet Take 1 tablet by mouth Daily. 90 tablet 3   • loperamide (IMODIUM) 2 MG capsule Take 1 capsule by mouth Every 2 (Two) Hours As Needed for Diarrhea (max 4 doses daily).     • loratadine (CLARITIN) 10 MG tablet Take 10 mg by mouth Daily.     • meclizine (ANTIVERT) 25 MG tablet TAKE 1 TABLET BY MOUTH THREE TIMES DAILY AS NEEDED FOR DIZZINESS 270 tablet 0   • omeprazole (priLOSEC) 40 MG capsule Take 1 capsule by mouth Daily. 90 capsule 1   • ondansetron (ZOFRAN) 4 MG tablet Take 1 tablet by mouth Every 12 (Twelve) Hours As Needed for Nausea or Vomiting. 180 tablet 0   • ondansetron ODT (ZOFRAN-ODT) 4 MG disintegrating tablet Dissolve 1 tablet under the tongue twice daily 180 tablet 0   • terbinafine (lamiSIL) 250 MG tablet TK 8 TS PO ONCE A WEEK     • traMADol (ULTRAM) 50 MG  tablet Take 1 tablet by mouth Every 4 (Four) Hours As Needed for Moderate Pain . 60 tablet 0   • triamcinolone (KENALOG) 0.1 % ointment Apply  topically to the appropriate area as directed 2 (Two) Times a Day.     • Triamcinolone Acetonide (NASACORT ALLERGY 24HR) 55 MCG/ACT nasal inhaler 2 sprays into the nostril(s) as directed by provider Daily.       No current facility-administered medications on file prior to visit.        ALLERGIES:    Allergies   Allergen Reactions   • Chocolate Anaphylaxis   • Nickel Anaphylaxis   • Nuts Anaphylaxis   • Ciprofloxacin Other (See Comments)     THRUSH PER PATIENT   • Citric Acid Unknown (See Comments)     Unsure     • Eggs Or Egg-Derived Products Nausea And Vomiting   • Influenza Vaccines Nausea And Vomiting   • Methyldibromoglutaronitrile Unknown (See Comments)      PATIENT UNSURE OF REACTION.   • Neomycin Unknown (See Comments)      PATIENT UNSURE OF REACTION.   • Omnicef [Cefdinir] Other (See Comments)     THRUSH PER PATIENT   • Palladium Chloride Unknown (See Comments)      PATIENT UNSURE OF REACTION   • Penicillins Other (See Comments)     THRUSH PER PATIENT   • Sulfa Antibiotics Other (See Comments)     THRUSH PER PATIENT   • Tomato Hives   • Yeast-Related Products Hives   • Gold-Containing Drug Products Rash         • Latex Rash        Social History     Socioeconomic History   • Marital status:      Spouse name: Not on file   • Number of children: 3   • Years of education: College   • Highest education level: Not on file   Tobacco Use   • Smoking status: Never Smoker   • Smokeless tobacco: Never Used   Vaping Use   • Vaping Use: Never used   Substance and Sexual Activity   • Alcohol use: No   • Drug use: No   • Sexual activity: Defer        Family History   Problem Relation Age of Onset   • Colon cancer Maternal Grandfather    • Asthma Mother    • Thyroid disease Father    • Diabetes Maternal Grandmother         Review of Systems   Constitutional: Positive for  "fatigue (mild, stable  03/10/21-Unchanged). Negative for appetite change, chills, diaphoresis, fever and unexpected weight change.   HENT: Negative for hearing loss, sore throat and trouble swallowing.    Respiratory: Negative for cough, chest tightness, shortness of breath and wheezing.    Cardiovascular: Negative for chest pain, palpitations and leg swelling.   Gastrointestinal: Negative for abdominal distention, abdominal pain, constipation, diarrhea, nausea and vomiting.   Genitourinary: Negative for dysuria, frequency, hematuria and urgency.   Musculoskeletal: Positive for arthralgias ( 03/10/21-Unchanged). Negative for joint swelling. Back pain: left shoulder.        No muscle weakness.   Skin: Negative for rash and wound.   Neurological: Negative for dizziness, seizures, syncope, speech difficulty, weakness, numbness and headaches.   Hematological: Negative for adenopathy. Does not bruise/bleed easily.   Psychiatric/Behavioral: Negative for behavioral problems, confusion and suicidal ideas.   All other systems reviewed and are negative.     I have reviewed and confirmed the accuracy of the ROS as documented by the MA/LPN/RN Cassandra Pratt MD      Objective     Vitals:    03/10/21 1458   BP: 104/69   Pulse: 72   Resp: 16   Temp: 97.1 °F (36.2 °C)   TempSrc: Temporal   SpO2: 99%   Weight: 106 kg (234 lb 11.2 oz)   Height: 152.4 cm (60\")   PainSc:   7   PainLoc: Generalized  Comment: Arthritis pain Lt shoulder and bilat knee.     Current Status 3/10/2021   ECOG score 0       Physical Exam    GENERAL:  Well-developed, well-nourished in no acute distress. Wearing mask.  SKIN:  Warm, dry without rashes, purpura or petechiae.  EYES:  Pupils equal, round and reactive to light.  EOMs intact.  Conjunctivae normal.  EARS:  Hearing intact.  NOSE:  Septum midline.  No excoriations or nasal discharge.  MOUTH:  Tongue is well-papillated; no stomatitis or ulcers.  Lips normal.  THROAT:  Oropharynx without lesions or " exudates.  NECK:  Supple with good range of motion; no thyromegaly or masses, no JVD.  LYMPHATICS:  No cervical, supraclavicular, axillary or inguinal adenopathy.  CHEST:  Lungs clear to auscultation. Good airflow.  CARDIAC:  Regular rate and rhythm without murmurs, rubs or gallops. Normal S1,S2.  ABDOMEN:  Soft, nontender with no hepatosplenomegaly or masses.  EXTREMITIES:  No clubbing, cyanosis or edema.  NEUROLOGICAL:  Cranial Nerves II-XII grossly intact.  No focal neurological deficits.  PSYCHIATRIC:  Normal affect and mood.      I have reexamined the patient and the results are consistent with the previously documented exam. Cassandra Pratt MD     RECENT LABS:  Results from last 7 days   Lab Units 03/10/21  1459   WBC 10*3/mm3 7.83   NEUTROS ABS 10*3/mm3 4.70   HEMOGLOBIN g/dL 10.3*   HEMATOCRIT % 31.4*   PLATELETS 10*3/mm3 241               Two View Chest X-Ray 02/24/19  FINDINGS: The lungs are moderately expanded and clear and there is no  evidence of localized pneumonia. The heart is borderline enlarged  without change from 07/23/2012.    CT Abdomen Pelvis 12/06/18 scanned Image.    Assessment/Plan     1.  Multifactorial  anemia.  With anemia of chronic kidney disease and rheumatoid arthritis  · Seen in consultation March 2019.  Patient reporting longstanding anemia with hemoglobin running in the 10-11 range.  Patient has underlying CKD stage III.  Work-up at that time did not reveal any vitamin deficiency including no iron, B12 or folate deficiency.  No underlying monoclonal protein.  Anemia felt to be related to CKD with also possibly an element of chronic disease with underlying rheumatoid arthritis.  Sed rate was mildly elevated at 44.  Patient lost to follow-up since that time.  · Last colonoscopy and EGD were performed per Dr. Kapoor, negative.  Patient also had a small bowel follow-through which was within normal limits. Other pertinent imaging include a CT of the abdomen pelvis performed December  2018 which was negative.  She is up-to-date on her mammogram, last performed August 2020, negative.  · March 10, 2021: Patient's hemoglobin today is 9.8, discussed consideration of Procrit and will get approval.  Discussed side effects in length with patient today.  We could consider starting Procrit if her hemoglobin continues to drop.    2.  Rheumatoid arthritis for which she takes diclofenac    3.  Chronic kidney disease stage III.  This is managed per Dr. Fraser.  Creatinine appears relatively stable range from 1.2-1.7 over the last 2 years.  BUN 20s to 30s.  GFR last measured around 39.  This could be the cause of her anemia.      4.  Left shoulder replacement surgery to be done on May 10, 2021.    5.  Hypertension stable    PLAN:  · Discussed in length side effects of Procrit and consideration of starting Procrit if her hemoglobin drops further  · Patient is agreeable  · Follow-up in with nurse practitioner in 1 month with possibility of starting Procrit if hemoglobin drops further down and stays below 10 as she is trying to go for surgery for left shoulder replacement on May 10  · Follow-up with me in 2 months with labs      MD Dr. Teo Pearce

## 2021-03-11 PROBLEM — N18.30 CHRONIC RENAL FAILURE IN PEDIATRIC PATIENT, STAGE 3 (MODERATE): Status: ACTIVE | Noted: 2021-03-11

## 2021-03-11 NOTE — PROGRESS NOTES
Supportive plan entered for Retacrit to be started at visit in one month 5/5/21, re: chronic renal failure stage 3 per in-basket message Dr. Pratt  Retacrit will need to be approved if hgb is less than 10 on that day.

## 2021-03-15 DIAGNOSIS — A08.4 VIRAL GASTROENTERITIS: ICD-10-CM

## 2021-03-15 DIAGNOSIS — D64.9 ANEMIA, UNSPECIFIED TYPE: ICD-10-CM

## 2021-03-16 ENCOUNTER — BULK ORDERING (OUTPATIENT)
Dept: CASE MANAGEMENT | Facility: OTHER | Age: 61
End: 2021-03-16

## 2021-03-16 DIAGNOSIS — Z23 IMMUNIZATION DUE: ICD-10-CM

## 2021-03-16 RX ORDER — DOXYCYCLINE HYCLATE 50 MG/1
324 CAPSULE, GELATIN COATED ORAL
Qty: 90 TABLET | Refills: 1 | Status: SHIPPED | OUTPATIENT
Start: 2021-03-16 | End: 2021-09-08

## 2021-03-16 RX ORDER — ONDANSETRON 4 MG/1
4 TABLET, FILM COATED ORAL EVERY 12 HOURS PRN
Qty: 180 TABLET | Refills: 0 | Status: SHIPPED | OUTPATIENT
Start: 2021-03-16 | End: 2022-03-15 | Stop reason: SDUPTHER

## 2021-03-18 ENCOUNTER — TELEPHONE (OUTPATIENT)
Dept: INTERNAL MEDICINE | Facility: CLINIC | Age: 61
End: 2021-03-18

## 2021-03-18 NOTE — TELEPHONE ENCOUNTER
Hub staff attempted to follow warm transfer process and was unsuccessful     Pharmacy Name: [id number] - [place], [place] - [address] AT [address] - [phone] [facility] [phone]      Pharmacy representative name: PRECIOUS    Pharmacy representative phone number: [phone]    What medication are you calling in regards to:   ondansetron (ZOFRAN) 4 MG tablet  4 mg, Every 12 Hours PRN         What question does the pharmacy have: [facility]CIOUS FROM [facility] REQUESTED TO FOLLOW UP ON A REFILL REQUEST FOR THIS, DID NOT SPECIFY OTHERWISE     Who is the provider that prescribed the medication: DR. LYNN    Additional notes: REQUESTED CALLBACK

## 2021-03-23 RX ORDER — MECLIZINE HYDROCHLORIDE 25 MG/1
25 TABLET ORAL 3 TIMES DAILY PRN
Qty: 270 TABLET | Refills: 0 | Status: SHIPPED | OUTPATIENT
Start: 2021-03-23 | End: 2022-05-31

## 2021-04-02 ENCOUNTER — TRANSCRIBE ORDERS (OUTPATIENT)
Dept: ADMINISTRATIVE | Facility: HOSPITAL | Age: 61
End: 2021-04-02

## 2021-04-02 DIAGNOSIS — D63.1 ANEMIA IN CHRONIC KIDNEY DISEASE (CODE): Primary | ICD-10-CM

## 2021-04-02 DIAGNOSIS — N18.30 RENAL FAILURE, CHRONIC, STAGE 3 (MODERATE) (HCC): ICD-10-CM

## 2021-04-07 ENCOUNTER — LAB (OUTPATIENT)
Dept: OTHER | Facility: HOSPITAL | Age: 61
End: 2021-04-07

## 2021-04-07 ENCOUNTER — APPOINTMENT (OUTPATIENT)
Dept: ONCOLOGY | Facility: HOSPITAL | Age: 61
End: 2021-04-07

## 2021-04-07 ENCOUNTER — OFFICE VISIT (OUTPATIENT)
Dept: ONCOLOGY | Facility: CLINIC | Age: 61
End: 2021-04-07

## 2021-04-07 VITALS
RESPIRATION RATE: 16 BRPM | HEIGHT: 60 IN | TEMPERATURE: 97.1 F | BODY MASS INDEX: 45.1 KG/M2 | HEART RATE: 89 BPM | SYSTOLIC BLOOD PRESSURE: 130 MMHG | OXYGEN SATURATION: 98 % | DIASTOLIC BLOOD PRESSURE: 73 MMHG | WEIGHT: 229.7 LBS

## 2021-04-07 DIAGNOSIS — I10 HYPERTENSION, UNSPECIFIED TYPE: ICD-10-CM

## 2021-04-07 DIAGNOSIS — N18.9 HISTORY OF ANEMIA DUE TO CHRONIC KIDNEY DISEASE: Primary | ICD-10-CM

## 2021-04-07 DIAGNOSIS — Z86.2 HISTORY OF ANEMIA DUE TO CHRONIC KIDNEY DISEASE: Primary | ICD-10-CM

## 2021-04-07 LAB
ALBUMIN SERPL-MCNC: 4.3 G/DL (ref 3.5–5.2)
ALBUMIN/GLOB SERPL: 1.3 G/DL
ALP SERPL-CCNC: 59 U/L (ref 39–117)
ALT SERPL W P-5'-P-CCNC: 16 U/L (ref 1–33)
ANION GAP SERPL CALCULATED.3IONS-SCNC: 11.6 MMOL/L (ref 5–15)
AST SERPL-CCNC: 18 U/L (ref 1–32)
BASOPHILS # BLD AUTO: 0.07 10*3/MM3 (ref 0–0.2)
BASOPHILS NFR BLD AUTO: 0.9 % (ref 0–1.5)
BILIRUB SERPL-MCNC: 0.3 MG/DL (ref 0–1.2)
BUN SERPL-MCNC: 47 MG/DL (ref 8–23)
BUN/CREAT SERPL: 32.6 (ref 7–25)
CALCIUM SPEC-SCNC: 9.1 MG/DL (ref 8.6–10.5)
CHLORIDE SERPL-SCNC: 99 MMOL/L (ref 98–107)
CO2 SERPL-SCNC: 24.4 MMOL/L (ref 22–29)
CREAT SERPL-MCNC: 1.44 MG/DL (ref 0.57–1)
DEPRECATED RDW RBC AUTO: 42.5 FL (ref 37–54)
EOSINOPHIL # BLD AUTO: 0.26 10*3/MM3 (ref 0–0.4)
EOSINOPHIL NFR BLD AUTO: 3.4 % (ref 0.3–6.2)
ERYTHROCYTE [DISTWIDTH] IN BLOOD BY AUTOMATED COUNT: 13 % (ref 12.3–15.4)
GFR SERPL CREATININE-BSD FRML MDRD: 37 ML/MIN/1.73
GLOBULIN UR ELPH-MCNC: 3.3 GM/DL
GLUCOSE SERPL-MCNC: 118 MG/DL (ref 65–99)
HCT VFR BLD AUTO: 31.5 % (ref 34–46.6)
HGB BLD-MCNC: 10.3 G/DL (ref 12–15.9)
IMM GRANULOCYTES # BLD AUTO: 0.02 10*3/MM3 (ref 0–0.05)
IMM GRANULOCYTES NFR BLD AUTO: 0.3 % (ref 0–0.5)
LYMPHOCYTES # BLD AUTO: 1.64 10*3/MM3 (ref 0.7–3.1)
LYMPHOCYTES NFR BLD AUTO: 21.3 % (ref 19.6–45.3)
MCH RBC QN AUTO: 29 PG (ref 26.6–33)
MCHC RBC AUTO-ENTMCNC: 32.7 G/DL (ref 31.5–35.7)
MCV RBC AUTO: 88.7 FL (ref 79–97)
MONOCYTES # BLD AUTO: 0.58 10*3/MM3 (ref 0.1–0.9)
MONOCYTES NFR BLD AUTO: 7.5 % (ref 5–12)
NEUTROPHILS NFR BLD AUTO: 5.12 10*3/MM3 (ref 1.7–7)
NEUTROPHILS NFR BLD AUTO: 66.6 % (ref 42.7–76)
NRBC BLD AUTO-RTO: 0 /100 WBC (ref 0–0.2)
PLATELET # BLD AUTO: 256 10*3/MM3 (ref 140–450)
PMV BLD AUTO: 9.5 FL (ref 6–12)
POTASSIUM SERPL-SCNC: 3.8 MMOL/L (ref 3.5–5.2)
PROT SERPL-MCNC: 7.6 G/DL (ref 6–8.5)
RBC # BLD AUTO: 3.55 10*6/MM3 (ref 3.77–5.28)
SODIUM SERPL-SCNC: 135 MMOL/L (ref 136–145)
WBC # BLD AUTO: 7.69 10*3/MM3 (ref 3.4–10.8)

## 2021-04-07 PROCEDURE — 36415 COLL VENOUS BLD VENIPUNCTURE: CPT

## 2021-04-07 PROCEDURE — 99213 OFFICE O/P EST LOW 20 MIN: CPT | Performed by: NURSE PRACTITIONER

## 2021-04-07 PROCEDURE — 80053 COMPREHEN METABOLIC PANEL: CPT | Performed by: INTERNAL MEDICINE

## 2021-04-07 PROCEDURE — 85025 COMPLETE CBC W/AUTO DIFF WBC: CPT | Performed by: INTERNAL MEDICINE

## 2021-04-07 NOTE — PROGRESS NOTES
Subjective     REASON FOR follow-up: Multifactorial anemia, anemia of chronic kidney disease and rheumatoid arthritis                             History of Present Illness patient is a 60-year-old female who was referred to us for evaluation of chronic anemia.  She does have an autoimmune component with her history of rheumatoid arthritis.  She also has mild renal insufficiency.    Anemia work-up showed a B12 of 494,Ferritin 160, serum iron of 36 with percent saturation of 13, EPO level of 8.3, creatinine of 1.49, serum protein electrophoresis did not show monoclonal protein.  ESR is 35.  There are no signs of infection.  Currently her hemoglobin is 9.8 and she is asymptomatic.  Dr. Pratt did discuss about the possibility of Procrit but at the present time after discussing the side effects she wants to be observed.    Patient returns today for follow up of anemia.  She is scheduled to have a left shoulder replacement surgery on May 10, 2021 and would therefore like to get Retacrit if her hemoglobin is low enough to help improve her hemoglobin before surgery.  She was also seen by her nephrologist who scheduled her for two doses of IV Feraheme prior to shoulder surgery, first dose 4/8/2021.  She is feeling well today.  She denies shortness of breath, dizziness, lightheadedness, or fatigue outside of her baseline.    Hematology history:  Ms. Powell is seen today after being referred back to us by her primary care physician, Dr. Teo Fraser.  We saw the patient in consultation in March 2019 for chronic anemia.  Patient has had a long history of rheumatoid arthritis with chronic anemia and reported chronic difficulty with iron deficiency, taking oral iron without much benefit.  At that time of initial consultation we discussed her anemia was likely related to underlying CKD stage III, as well as possibly her rheumatoid arthritis.  We did evaluate for vitamin deficiencies and she was not found to be folate, B12,  or iron deficient.  Sed rate was slightly elevated at 44 at that time.    Most recent lab work performed by Dr. Fraser 2021 showed a hemoglobin of 9.4, creatinine 1.3, BUN 32, GFR 39.  Upon review of the EMR it appears at least for the last roughly 2 years her creatinine has been anywhere from 1.2 to as high as 1.7, BUN ranging in the 20s to 30s.  So overall relatively stable.      Last colonoscopy and EGD were performed per Dr. Kapoor, negative.  Patient also had a small bowel follow-through which was within normal limits. Other pertinent imaging include a CT of the abdomen pelvis performed 2018 which was negative.  She is up-to-date on her mammogram, last performed 2020, negative.    As she is reviewed today, hemoglobin is at stable at 9.8.  Patient denies any significant fatigue and feels that she has fairly good performance status.  She does note a few times a week taking a 3 to 4-hour nap in the afternoon but does not require this on a regular basis.  She is still able to complete the activity she desires.  Patient is a  and lives alone.  She denies any obvious GI blood loss.      She reports normal appetite.  Weight is stable.  She does have significant left shoulder pain and will undergo replacement surgery per Dr. Devi in May 2021.    Otherwise she denies further concerns at this time.    Past Medical History:   Diagnosis Date   • Anemia    • Arthritis    • GERD (gastroesophageal reflux disease)    • History of carpal tunnel syndrome    • Hyperlipidemia    • Hypertension    • Vertigo         Past Surgical History:   Procedure Laterality Date   •  SECTION  , ,    • COLONOSCOPY N/A 12/10/2018    normal ileum, IH, hyperplastic polyp, otherwise normal exam   • ENDOSCOPY N/A 12/10/2018    no gross lesions in esophagus or examined duodenum, erythematous mucosa in stomach, biopsies benign   • HYSTERECTOMY     • OOPHORECTOMY     • SHOULDER MANIPULATION           Current Outpatient Medications on File Prior to Visit   Medication Sig Dispense Refill   • albuterol 108 (90 Base) MCG/ACT inhaler Inhale 2 puffs Every 4 (Four) Hours As Needed for Wheezing.     • amitriptyline (ELAVIL) 25 MG tablet Take 1 tablet by mouth At Night As Needed for Sleep. 30 tablet 3   • aspirin 325 MG tablet Take 325 mg by mouth daily.     • atorvastatin (LIPITOR) 80 MG tablet TAKE 1 TABLET BY MOUTH DAILY 90 tablet 1   • azelastine (ASTELIN) 0.1 % nasal spray 1 spray into the nostril(s) as directed by provider Daily.  11   • azithromycin (ZITHROMAX) 250 MG tablet      • Cholecalciferol (Vitamin D3) 50 MCG (2000 UT) capsule TAKE 1 CAPSULE BY MOUTH DAILY 90 capsule 1   • clobetasol (TEMOVATE) 0.05 % cream LAITH TO SCALP AND LEFT DORSAL FOOT BID PRN     • Diclofenac Sodium (PENNSAID) 2 % solution Place 2 application on the skin as directed by provider 2 (Two) Times a Day As Needed (PAIN/SWELLING). 112 g 2   • EPINEPHrine (AUVI-Q IJ) Inject  as directed As Needed.     • ferrous gluconate (FERGON) 324 MG tablet Take 1 tablet by mouth Daily With Breakfast. 90 tablet 1   • hydroCHLOROthiazide (HYDRODIURIL) 25 MG tablet Take 1 tablet by mouth Daily. 90 tablet 3   • hydrocortisone 2.5 % cream Apply  topically to the appropriate area as directed 2 (Two) Times a Day.     • ipratropium (ATROVENT) 0.06 % nasal spray 1 spray into the nostril(s) as directed by provider Daily.  11   • irbesartan (Avapro) 300 MG tablet Take 1 tablet by mouth Daily. 90 tablet 3   • loperamide (IMODIUM) 2 MG capsule Take 1 capsule by mouth Every 2 (Two) Hours As Needed for Diarrhea (max 4 doses daily).     • loratadine (CLARITIN) 10 MG tablet Take 10 mg by mouth Daily.     • meclizine (ANTIVERT) 25 MG tablet Take 1 tablet by mouth 3 (Three) Times a Day As Needed for Dizziness. 270 tablet 0   • omeprazole (priLOSEC) 40 MG capsule Take 1 capsule by mouth Daily. 90 capsule 1   • ondansetron (ZOFRAN) 4 MG tablet Take 1 tablet by mouth  Every 12 (Twelve) Hours As Needed for Nausea or Vomiting. 180 tablet 0   • terbinafine (lamiSIL) 250 MG tablet TK 8 TS PO ONCE A WEEK     • traMADol (ULTRAM) 50 MG tablet Take 1 tablet by mouth Every 4 (Four) Hours As Needed for Moderate Pain . 60 tablet 0   • triamcinolone (KENALOG) 0.1 % ointment Apply  topically to the appropriate area as directed 2 (Two) Times a Day.     • Triamcinolone Acetonide (NASACORT ALLERGY 24HR) 55 MCG/ACT nasal inhaler 2 sprays into the nostril(s) as directed by provider Daily.     • [DISCONTINUED] diclofenac (VOLTAREN) 75 MG EC tablet Take 1 tablet by mouth Daily. 90 tablet 1     No current facility-administered medications on file prior to visit.        ALLERGIES:    Allergies   Allergen Reactions   • Chocolate Anaphylaxis   • Nickel Anaphylaxis   • Nuts Anaphylaxis   • Ciprofloxacin Other (See Comments)     THRUSH PER PATIENT   • Citric Acid Unknown (See Comments)     Unsure     • Eggs Or Egg-Derived Products Nausea And Vomiting   • Influenza Vaccines Nausea And Vomiting   • Methyldibromoglutaronitrile Unknown (See Comments)      PATIENT UNSURE OF REACTION.   • Neomycin Unknown (See Comments)      PATIENT UNSURE OF REACTION.   • Omnicef [Cefdinir] Other (See Comments)     THRUSH PER PATIENT   • Palladium Chloride Unknown (See Comments)      PATIENT UNSURE OF REACTION   • Penicillins Other (See Comments)     THRUSH PER PATIENT   • Sulfa Antibiotics Other (See Comments)     THRUSH PER PATIENT   • Tomato Hives   • Yeast-Related Products Hives   • Gold-Containing Drug Products Rash         • Latex Rash        Social History     Socioeconomic History   • Marital status:      Spouse name: Not on file   • Number of children: 3   • Years of education: College   • Highest education level: Not on file   Tobacco Use   • Smoking status: Never Smoker   • Smokeless tobacco: Never Used   Vaping Use   • Vaping Use: Never used   Substance and Sexual Activity   • Alcohol use: No   • Drug use:  "No   • Sexual activity: Defer        Family History   Problem Relation Age of Onset   • Colon cancer Maternal Grandfather    • Asthma Mother    • Thyroid disease Father    • Diabetes Maternal Grandmother         Review of Systems   Constitutional: Positive for fatigue. Negative for appetite change and chills.   Respiratory: Negative for shortness of breath and wheezing.    Cardiovascular: Negative for chest pain and leg swelling.   Gastrointestinal: Negative for abdominal distention, abdominal pain, blood in stool, constipation, diarrhea, nausea and vomiting.   Genitourinary: Negative for hematuria.   Musculoskeletal: Positive for arthralgias. Negative for joint swelling. Back pain: left shoulder.   Skin: Negative for rash and wound.   Neurological: Negative for dizziness, weakness and headaches.   All other systems reviewed and are negative.     I have reviewed and confirmed the accuracy of the ROS as documented 04/07/2021 KALYANI Brunson    Objective     Vitals:    04/07/21 1542   BP: 130/73   Pulse: 89   Resp: 16   Temp: 97.1 °F (36.2 °C)   TempSrc: Infrared   SpO2: 98%   Weight: 104 kg (229 lb 11.2 oz)   Height: 152.4 cm (60\")   PainSc:   6   PainLoc: Shoulder     Current Status 4/7/2021   ECOG score 0       Physical Exam    GENERAL:  Well-developed, well-nourished in no acute distress. Wearing mask.  SKIN:  Warm, dry without rashes, purpura or petechiae.  EYES:  Pupils equal, round and reactive to light.  Conjunctivae normal.  EARS:  Hearing intact.  CHEST:  Lungs clear to auscultation. Good airflow.  CARDIAC:  Regular rate and rhythm without murmurs, rubs or gallops. Normal S1,S2.  ABDOMEN:  Soft, nontender, bowel sounds active  EXTREMITIES:  No clubbing, cyanosis or edema.  NEUROLOGICAL:  No focal neurological deficits.  Alert and oriented.  PSYCHIATRIC:  Normal affect and mood.      I have reexamined the patient and the results are consistent with the previously documented exam. 04/07/2021 Christine" KALYANI Alonso     RECENT LABS:  Results from last 7 days   Lab Units 04/07/21  1538   WBC 10*3/mm3 7.69   NEUTROS ABS 10*3/mm3 5.12   HEMOGLOBIN g/dL 10.3*   HEMATOCRIT % 31.5*   PLATELETS 10*3/mm3 256     Results from last 7 days   Lab Units 04/07/21  1538   SODIUM mmol/L 135*   POTASSIUM mmol/L 3.8   CHLORIDE mmol/L 99   CO2 mmol/L 24.4   BUN mg/dL 47*   CREATININE mg/dL 1.44*   CALCIUM mg/dL 9.1   ALBUMIN g/dL 4.30   BILIRUBIN mg/dL 0.3   ALK PHOS U/L 59   ALT (SGPT) U/L 16   AST (SGOT) U/L 18   GLUCOSE mg/dL 118*           Two View Chest X-Ray 02/24/19  FINDINGS: The lungs are moderately expanded and clear and there is no  evidence of localized pneumonia. The heart is borderline enlarged  without change from 07/23/2012.    CT Abdomen Pelvis 12/06/18 scanned Image.    Assessment/Plan     1.  Multifactorial  anemia.  With anemia of chronic kidney disease and rheumatoid arthritis  · Seen in consultation March 2019.  Patient reporting longstanding anemia with hemoglobin running in the 10-11 range.  Patient has underlying CKD stage III.  Work-up at that time did not reveal any vitamin deficiency including no iron, B12 or folate deficiency.  No underlying monoclonal protein.  Anemia felt to be related to CKD with also possibly an element of chronic disease with underlying rheumatoid arthritis.  Sed rate was mildly elevated at 44.  Patient lost to follow-up since that time.  · Last colonoscopy and EGD were performed per Dr. Kapoor, negative.  Patient also had a small bowel follow-through which was within normal limits. Other pertinent imaging include a CT of the abdomen pelvis performed December 2018 which was negative.  She is up-to-date on her mammogram, last performed August 2020, negative.  · March 10, 2021: Patient's hemoglobin today is 9.8, discussed consideration of Procrit and will get approval.  Discussed side effects in length with patient today.  We could consider starting Procrit if her hemoglobin continues to  drop.  · 4/7/2021, hemoglobin today 10.3, patient does not qualify for Retacrit.  She is receiving IV Feraheme x 2 doses per nephrology, first dose tomorrow 4/8/2021.    2.  Rheumatoid arthritis for which she takes diclofenac.    3.  Chronic kidney disease stage III.  This is managed per Dr. Fraser.  Creatinine appears relatively stable range from 1.2-1.7 over the last 2 years.  BUN 20s to 30s.  GFR last measured around 39.  This could be the cause of her anemia.  Creatinine today 1.44    4.  Left shoulder replacement surgery to be done on May 10, 2021.    5.  Hypertension stable    PLAN:  · No retacrit today  · Follow up with Dr. Pratt 1 month with labs and possibility of retacrit if hemoglobin drops further down and stays below 10 as she is trying to go for surgery for left shoulder replacement on May 10.      KALYANI Brunson  04/07/2021    I reviewed documentation performed by KALYANI Brunson and am in agreement as outlined above.  I also spoke with the patient in the exam room.  Patient is going to be receiving iron infusions as scheduled by Nephrology- first dose tomorrow.  She is overall feeling well.  She will be having a shoulder surgery in May.  Patient will be returning in one month for follow up with Dr. Pratt, which will be prior to her surgery.    Caroline Reyes, KALYANI  04/07/2021          Dr. Teo Fraser

## 2021-04-08 ENCOUNTER — HOSPITAL ENCOUNTER (OUTPATIENT)
Dept: INFUSION THERAPY | Facility: HOSPITAL | Age: 61
Discharge: HOME | End: 2021-04-08
Admitting: NURSE PRACTITIONER

## 2021-04-08 VITALS
OXYGEN SATURATION: 99 % | HEIGHT: 60 IN | SYSTOLIC BLOOD PRESSURE: 116 MMHG | BODY MASS INDEX: 44.96 KG/M2 | DIASTOLIC BLOOD PRESSURE: 61 MMHG | HEART RATE: 65 BPM | TEMPERATURE: 96.9 F | WEIGHT: 229 LBS | RESPIRATION RATE: 20 BRPM

## 2021-04-08 DIAGNOSIS — D63.1 ANEMIA DUE TO STAGE 3 CHRONIC KIDNEY DISEASE, UNSPECIFIED WHETHER STAGE 3A OR 3B CKD (HCC): Primary | ICD-10-CM

## 2021-04-08 DIAGNOSIS — N18.30 ANEMIA DUE TO STAGE 3 CHRONIC KIDNEY DISEASE, UNSPECIFIED WHETHER STAGE 3A OR 3B CKD (HCC): Primary | ICD-10-CM

## 2021-04-08 PROCEDURE — 96365 THER/PROPH/DIAG IV INF INIT: CPT

## 2021-04-08 PROCEDURE — 25010000002 FERUMOXYTOL 510 MG/17ML SOLUTION 510 MG VIAL: Performed by: NURSE PRACTITIONER

## 2021-04-08 PROCEDURE — 96374 THER/PROPH/DIAG INJ IV PUSH: CPT

## 2021-04-08 RX ADMIN — FERUMOXYTOL 510 MG: 510 INJECTION INTRAVENOUS at 14:06

## 2021-04-08 NOTE — PATIENT INSTRUCTIONS
Ferumoxytol injection  What is this medicine?  FERUMOXYTOL is an iron complex. Iron is used to make healthy red blood cells, which carry oxygen and nutrients throughout the body. This medicine is used to treat iron deficiency anemia.  This medicine may be used for other purposes; ask your health care provider or pharmacist if you have questions.  COMMON BRAND NAME(S): Feraheme  What should I tell my health care provider before I take this medicine?  They need to know if you have any of these conditions:  · anemia not caused by low iron levels  · high levels of iron in the blood  · magnetic resonance imaging (MRI) test scheduled  · an unusual or allergic reaction to iron, other medicines, foods, dyes, or preservatives  · pregnant or trying to get pregnant  · breast-feeding  How should I use this medicine?  This medicine is for injection into a vein. It is given by a health care professional in a hospital or clinic setting.  Talk to your pediatrician regarding the use of this medicine in children. Special care may be needed.  Overdosage: If you think you have taken too much of this medicine contact a poison control center or emergency room at once.  NOTE: This medicine is only for you. Do not share this medicine with others.  What if I miss a dose?  It is important not to miss your dose. Call your doctor or health care professional if you are unable to keep an appointment.  What may interact with this medicine?  This medicine may interact with the following medications:  · other iron products  This list may not describe all possible interactions. Give your health care provider a list of all the medicines, herbs, non-prescription drugs, or dietary supplements you use. Also tell them if you smoke, drink alcohol, or use illegal drugs. Some items may interact with your medicine.  What should I watch for while using this medicine?  Visit your doctor or healthcare professional regularly. Tell your doctor or healthcare  professional if your symptoms do not start to get better or if they get worse. You may need blood work done while you are taking this medicine.  You may need to follow a special diet. Talk to your doctor. Foods that contain iron include: whole grains/cereals, dried fruits, beans, or peas, leafy green vegetables, and organ meats (liver, kidney).  What side effects may I notice from receiving this medicine?  Side effects that you should report to your doctor or health care professional as soon as possible:  · allergic reactions like skin rash, itching or hives, swelling of the face, lips, or tongue  · breathing problems  · changes in blood pressure  · feeling faint or lightheaded, falls  · fever or chills  · flushing, sweating, or hot feelings  · swelling of the ankles or feet  Side effects that usually do not require medical attention (report to your doctor or health care professional if they continue or are bothersome):  · diarrhea  · headache  · nausea, vomiting  · stomach pain  This list may not describe all possible side effects. Call your doctor for medical advice about side effects. You may report side effects to FDA at 3-610-FDA-9977.  Where should I keep my medicine?  This drug is given in a hospital or clinic and will not be stored at home.  NOTE: This sheet is a summary. It may not cover all possible information. If you have questions about this medicine, talk to your doctor, pharmacist, or health care provider.  © 2021 Elsevier/Gold Standard (2018-02-05 20:21:10)  Iron Deficiency Anemia, Adult  Iron deficiency anemia is a condition in which the concentration of red blood cells or hemoglobin in the blood is below normal because of too little iron. Hemoglobin is a substance in red blood cells that carries oxygen to the body's tissues. When the concentration of red blood cells or hemoglobin is too low, not enough oxygen reaches these tissues.  Iron deficiency anemia is usually long-lasting, and it develops  over time. It may or may not cause symptoms. It is a common type of anemia.  What are the causes?  This condition may be caused by:  · Not enough iron in the diet.  · Abnormal absorption in the gut.  · Increased need for iron because of pregnancy or heavy menstrual periods, for females.  · Cancers of the gastrointestinal system, such as colon cancer.  · Blood loss caused by bleeding in the intestine. This may be from a gastrointestinal condition like Crohn's disease.  · Frequent blood draws, such as from blood donation.  What increases the risk?  The following factors may make you more likely to develop this condition:  · Being pregnant.  · Being a teenage girl going through a growth spurt.  What are the signs or symptoms?  Symptoms of this condition may include:  · Pale skin, lips, and nail beds.  · Weakness, dizziness, and getting tired easily.  · Headache.  · Shortness of breath when moving or exercising.  · Cold hands and feet.  · Fast or irregular heartbeat.  · Irritability or rapid breathing. These are more common in severe anemia.  Mild anemia may not cause any symptoms.  How is this diagnosed?  This condition is diagnosed based on:  · Your medical history.  · A physical exam.  · Blood tests.  You may have additional tests to find the underlying cause of your anemia, such as:  · Testing for blood in the stool (fecal occult blood test).  · A procedure to see inside your colon and rectum (colonoscopy).  · A procedure to see inside your esophagus and stomach (endoscopy).  · A test in which cells are removed from bone marrow (bone marrow aspiration) or fluid is removed from the bone marrow to be examined. This is rarely needed.  How is this treated?  This condition is treated by correcting the cause of your iron deficiency. Treatment may involve:  · Adding iron-rich foods to your diet.  · Taking iron supplements. If you are pregnant or breastfeeding, you may need to take extra iron because your normal diet  usually does not provide the amount of iron that you need.  · Increasing vitamin C intake. Vitamin C helps your body absorb iron. Your health care provider may recommend that you take iron supplements along with a glass of orange juice or a vitamin C supplement.  · Medicines to make heavy menstrual flow lighter.  · Surgery.  You may need repeat blood tests to determine whether treatment is working. If the treatment does not seem to be working, you may need more tests.  Follow these instructions at home:  Medicines  · Take over-the-counter and prescription medicines only as told by your health care provider. This includes iron supplements and vitamins.  ? For the best iron absorption, you should take iron supplements when your stomach is empty. If you cannot tolerate them on an empty stomach, you may need to take them with food.  ? Do not drink milk or take antacids at the same time as your iron supplements. Milk and antacids may interfere with iron absorption.  ? Iron supplements may turn stool (feces) a darker color and it may appear black.  · If you cannot tolerate taking iron supplements by mouth, talk with your health care provider about taking them through an IV or through an injection into a muscle.  Eating and drinking    · Talk with your health care provider before changing your diet. He or she may recommend that you eat foods that contain a lot of iron, such as:  ? Liver.  ? Low-fat (lean) beef.  ? Breads and cereals that have iron added to them (are fortified).  ? Eggs.  ? Dried fruit.  ? Dark green, leafy vegetables.  · To help your body use the iron from iron-rich foods, eat those foods at the same time as fresh fruits and vegetables that are high in vitamin C. Foods that are high in vitamin C include:  ? Oranges.  ? Peppers.  ? Tomatoes.  ? Mangoes.  · Drink enough fluid to keep your urine pale yellow.  Managing constipation  If you are taking an iron supplement, it may cause constipation. To prevent or  treat constipation, you may need to:  · Take over-the-counter or prescription medicines.  · Eat foods that are high in fiber, such as beans, whole grains, and fresh fruits and vegetables.  · Limit foods that are high in fat and processed sugars, such as fried or sweet foods.  General instructions  · Return to your normal activities as told by your health care provider. Ask your health care provider what activities are safe for you.  · Practice good hygiene. Anemia can make you more prone to illness and infection.  · Keep all follow-up visits as told by your health care provider. This is important.  Contact a health care provider if you:  · Feel nauseous or you vomit.  · Feel weak.  · Have unexplained sweating.  · Develop symptoms of constipation, such as:  ? Having fewer than three bowel movements a week.  ? Straining to have a bowel movement.  ? Having stools that are hard, dry, or larger than normal.  ? Feeling full or bloated.  ? Pain in the lower abdomen.  ? Not feeling relief after having a bowel movement.  Get help right away if you:  · Faint. If this happens, do not drive yourself to the hospital.  · Have chest pain.  · Have shortness of breath that:  ? Is severe.  ? Gets worse with physical activity.  · Have an irregular or rapid heartbeat.  · Become light-headed when getting up from a sitting or lying down position.  These symptoms may represent a serious problem that is an emergency. Do not wait to see if the symptoms will go away. Get medical help right away. Call your local emergency services (911 in the [place] Do not drive yourself to the hospital.  Summary  · Iron deficiency anemia is a condition in which the concentration of red blood cells or hemoglobin in the blood is below normal because of too little iron.  · This condition is treated by correcting the cause of your iron deficiency.  · Take over-the-counter and prescription medicines only as told by your health care provider. This includes iron  supplements and vitamins.  · To help your body use the iron from iron-rich foods, eat those foods at the same time as fresh fruits and vegetables that are high in vitamin C.  · Get help right away if you have shortness of breath that gets worse with physical activity.  This information is not intended to replace advice given to you by your health care provider. Make sure you discuss any questions you have with your health care provider.  Document Revised: 08/25/2020 Document Reviewed: 08/25/2020  Elsevier Patient Education © 2021 [employer]

## 2021-04-08 NOTE — PROGRESS NOTES
Tolerated infusion well.  VSS during and throughout observation time of 30 minutes.  Bottled water and cranberry juice po while here and BRP ad chante.  AVS given and discharged ambulatory after appointment completed.

## 2021-04-09 ENCOUNTER — TELEPHONE (OUTPATIENT)
Dept: ORTHOPEDIC SURGERY | Facility: CLINIC | Age: 61
End: 2021-04-09

## 2021-04-09 ENCOUNTER — DOCUMENTATION (OUTPATIENT)
Dept: ONCOLOGY | Facility: CLINIC | Age: 61
End: 2021-04-09

## 2021-04-09 NOTE — TELEPHONE ENCOUNTER
Patient has a history of anemia.  The nurse practitioner with a [facility] wanted to know what would be an acceptable hemoglobin in order for the patient to proceed with surgery.  Dr. Devi said that anything 10 and above.  Left her a message with this information and asked her to call if she has any questions or concerns

## 2021-04-09 NOTE — PROGRESS NOTES
Spoke to Lizbeth with Dr. Devi's office in regards to hemoglobin level acceptable for surgery.  As long as patient's hemoglobin is 10 g/dL or above, they are able to proceed with left shoulder replacement surgery.

## 2021-04-15 ENCOUNTER — HOSPITAL ENCOUNTER (OUTPATIENT)
Dept: INFUSION THERAPY | Facility: HOSPITAL | Age: 61
Setting detail: INFUSION SERIES
Discharge: HOME | End: 2021-04-15

## 2021-04-15 VITALS
RESPIRATION RATE: 20 BRPM | OXYGEN SATURATION: 100 % | HEART RATE: 69 BPM | TEMPERATURE: 97.7 F | SYSTOLIC BLOOD PRESSURE: 116 MMHG | DIASTOLIC BLOOD PRESSURE: 65 MMHG

## 2021-04-15 DIAGNOSIS — N18.30 CHRONIC RENAL FAILURE IN PEDIATRIC PATIENT, STAGE 3 (MODERATE) (HCC): Primary | ICD-10-CM

## 2021-04-15 PROCEDURE — 96365 THER/PROPH/DIAG IV INF INIT: CPT

## 2021-04-15 PROCEDURE — 25010000002 FERUMOXYTOL 510 MG/17ML SOLUTION 510 MG VIAL: Performed by: INTERNAL MEDICINE

## 2021-04-15 PROCEDURE — 96374 THER/PROPH/DIAG INJ IV PUSH: CPT

## 2021-04-15 RX ADMIN — FERUMOXYTOL 510 MG: 510 INJECTION INTRAVENOUS at 14:41

## 2021-04-27 ENCOUNTER — TRANSCRIBE ORDERS (OUTPATIENT)
Dept: PREADMISSION TESTING | Facility: HOSPITAL | Age: 61
End: 2021-04-27

## 2021-04-27 DIAGNOSIS — Z01.818 OTHER SPECIFIED PRE-OPERATIVE EXAMINATION: Primary | ICD-10-CM

## 2021-05-03 ENCOUNTER — PRE-ADMISSION TESTING (OUTPATIENT)
Dept: PREADMISSION TESTING | Facility: HOSPITAL | Age: 61
End: 2021-05-03

## 2021-05-03 ENCOUNTER — TELEPHONE (OUTPATIENT)
Dept: ORTHOPEDIC SURGERY | Facility: CLINIC | Age: 61
End: 2021-05-03

## 2021-05-03 VITALS
WEIGHT: 229.4 LBS | DIASTOLIC BLOOD PRESSURE: 87 MMHG | OXYGEN SATURATION: 99 % | TEMPERATURE: 98.8 F | SYSTOLIC BLOOD PRESSURE: 149 MMHG | HEART RATE: 89 BPM | BODY MASS INDEX: 45.04 KG/M2 | HEIGHT: 60 IN | RESPIRATION RATE: 16 BRPM

## 2021-05-03 DIAGNOSIS — M19.019 ARTHRITIS OF SHOULDER: ICD-10-CM

## 2021-05-03 LAB
ANION GAP SERPL CALCULATED.3IONS-SCNC: 10.4 MMOL/L (ref 5–15)
BACTERIA UR QL AUTO: NORMAL /HPF
BILIRUB UR QL STRIP: NEGATIVE
BUN SERPL-MCNC: 30 MG/DL (ref 8–23)
BUN/CREAT SERPL: 24.6 (ref 7–25)
CALCIUM SPEC-SCNC: 8.6 MG/DL (ref 8.6–10.5)
CHLORIDE SERPL-SCNC: 102 MMOL/L (ref 98–107)
CLARITY UR: ABNORMAL
CO2 SERPL-SCNC: 25.6 MMOL/L (ref 22–29)
COLOR UR: YELLOW
CREAT SERPL-MCNC: 1.22 MG/DL (ref 0.57–1)
DEPRECATED RDW RBC AUTO: 48 FL (ref 37–54)
ERYTHROCYTE [DISTWIDTH] IN BLOOD BY AUTOMATED COUNT: 14.2 % (ref 12.3–15.4)
GFR SERPL CREATININE-BSD FRML MDRD: 45 ML/MIN/1.73
GLUCOSE SERPL-MCNC: 116 MG/DL (ref 65–99)
GLUCOSE UR STRIP-MCNC: NEGATIVE MG/DL
HCT VFR BLD AUTO: 33.5 % (ref 34–46.6)
HGB BLD-MCNC: 11 G/DL (ref 12–15.9)
HGB UR QL STRIP.AUTO: NEGATIVE
HYALINE CASTS UR QL AUTO: NORMAL /LPF
KETONES UR QL STRIP: NEGATIVE
LEUKOCYTE ESTERASE UR QL STRIP.AUTO: ABNORMAL
MCH RBC QN AUTO: 30 PG (ref 26.6–33)
MCHC RBC AUTO-ENTMCNC: 32.8 G/DL (ref 31.5–35.7)
MCV RBC AUTO: 91.3 FL (ref 79–97)
NITRITE UR QL STRIP: NEGATIVE
PH UR STRIP.AUTO: 5.5 [PH] (ref 5–8)
PLATELET # BLD AUTO: 237 10*3/MM3 (ref 140–450)
PMV BLD AUTO: 9.5 FL (ref 6–12)
POTASSIUM SERPL-SCNC: 3.6 MMOL/L (ref 3.5–5.2)
PROT UR QL STRIP: NEGATIVE
QT INTERVAL: 422 MS
RBC # BLD AUTO: 3.67 10*6/MM3 (ref 3.77–5.28)
RBC # UR: NORMAL /HPF
REF LAB TEST METHOD: NORMAL
SODIUM SERPL-SCNC: 138 MMOL/L (ref 136–145)
SP GR UR STRIP: >=1.03 (ref 1–1.03)
SQUAMOUS #/AREA URNS HPF: NORMAL /HPF
UROBILINOGEN UR QL STRIP: ABNORMAL
WBC # BLD AUTO: 8.31 10*3/MM3 (ref 3.4–10.8)
WBC UR QL AUTO: NORMAL /HPF

## 2021-05-03 PROCEDURE — 36415 COLL VENOUS BLD VENIPUNCTURE: CPT

## 2021-05-03 PROCEDURE — 85027 COMPLETE CBC AUTOMATED: CPT

## 2021-05-03 PROCEDURE — 93005 ELECTROCARDIOGRAM TRACING: CPT

## 2021-05-03 PROCEDURE — 93010 ELECTROCARDIOGRAM REPORT: CPT | Performed by: INTERNAL MEDICINE

## 2021-05-03 PROCEDURE — 81001 URINALYSIS AUTO W/SCOPE: CPT

## 2021-05-03 PROCEDURE — 80048 BASIC METABOLIC PNL TOTAL CA: CPT

## 2021-05-03 RX ORDER — IPRATROPIUM BROMIDE 21 UG/1
1 SPRAY, METERED NASAL 3 TIMES DAILY
COMMUNITY

## 2021-05-03 RX ORDER — AZELASTINE 1 MG/ML
1 SPRAY, METERED NASAL 2 TIMES DAILY
COMMUNITY
End: 2021-12-14

## 2021-05-05 ENCOUNTER — OFFICE VISIT (OUTPATIENT)
Dept: ONCOLOGY | Facility: CLINIC | Age: 61
End: 2021-05-05

## 2021-05-05 ENCOUNTER — LAB (OUTPATIENT)
Dept: OTHER | Facility: HOSPITAL | Age: 61
End: 2021-05-05

## 2021-05-05 ENCOUNTER — INFUSION (OUTPATIENT)
Dept: ONCOLOGY | Facility: HOSPITAL | Age: 61
End: 2021-05-05

## 2021-05-05 VITALS
DIASTOLIC BLOOD PRESSURE: 78 MMHG | HEART RATE: 66 BPM | OXYGEN SATURATION: 97 % | RESPIRATION RATE: 18 BRPM | WEIGHT: 230.2 LBS | TEMPERATURE: 97.3 F | HEIGHT: 60 IN | SYSTOLIC BLOOD PRESSURE: 130 MMHG | BODY MASS INDEX: 45.2 KG/M2

## 2021-05-05 DIAGNOSIS — I10 HYPERTENSION, UNSPECIFIED TYPE: ICD-10-CM

## 2021-05-05 DIAGNOSIS — D63.1 ANEMIA DUE TO STAGE 3 CHRONIC KIDNEY DISEASE, UNSPECIFIED WHETHER STAGE 3A OR 3B CKD (HCC): Primary | ICD-10-CM

## 2021-05-05 DIAGNOSIS — N18.30 ANEMIA DUE TO STAGE 3 CHRONIC KIDNEY DISEASE, UNSPECIFIED WHETHER STAGE 3A OR 3B CKD (HCC): Primary | ICD-10-CM

## 2021-05-05 DIAGNOSIS — R53.83 FATIGUE, UNSPECIFIED TYPE: ICD-10-CM

## 2021-05-05 DIAGNOSIS — Z98.890 H/O SHOULDER SURGERY: ICD-10-CM

## 2021-05-05 LAB
ALBUMIN SERPL-MCNC: 4.1 G/DL (ref 3.5–5.2)
ALBUMIN/GLOB SERPL: 1.2 G/DL
ALP SERPL-CCNC: 63 U/L (ref 39–117)
ALT SERPL W P-5'-P-CCNC: 22 U/L (ref 1–33)
ANION GAP SERPL CALCULATED.3IONS-SCNC: 8.7 MMOL/L (ref 5–15)
AST SERPL-CCNC: 21 U/L (ref 1–32)
BASOPHILS # BLD AUTO: 0.06 10*3/MM3 (ref 0–0.2)
BASOPHILS NFR BLD AUTO: 0.7 % (ref 0–1.5)
BILIRUB SERPL-MCNC: 0.3 MG/DL (ref 0–1.2)
BUN SERPL-MCNC: 30 MG/DL (ref 8–23)
BUN/CREAT SERPL: 22.7 (ref 7–25)
CALCIUM SPEC-SCNC: 9.2 MG/DL (ref 8.6–10.5)
CHLORIDE SERPL-SCNC: 101 MMOL/L (ref 98–107)
CO2 SERPL-SCNC: 29.3 MMOL/L (ref 22–29)
CREAT SERPL-MCNC: 1.32 MG/DL (ref 0.57–1)
DEPRECATED RDW RBC AUTO: 45.6 FL (ref 37–54)
EOSINOPHIL # BLD AUTO: 0.24 10*3/MM3 (ref 0–0.4)
EOSINOPHIL NFR BLD AUTO: 2.7 % (ref 0.3–6.2)
ERYTHROCYTE [DISTWIDTH] IN BLOOD BY AUTOMATED COUNT: 13.9 % (ref 12.3–15.4)
GFR SERPL CREATININE-BSD FRML MDRD: 41 ML/MIN/1.73
GLOBULIN UR ELPH-MCNC: 3.4 GM/DL
GLUCOSE SERPL-MCNC: 121 MG/DL (ref 65–99)
HCT VFR BLD AUTO: 33.3 % (ref 34–46.6)
HGB BLD-MCNC: 11 G/DL (ref 12–15.9)
IMM GRANULOCYTES # BLD AUTO: 0.02 10*3/MM3 (ref 0–0.05)
IMM GRANULOCYTES NFR BLD AUTO: 0.2 % (ref 0–0.5)
LYMPHOCYTES # BLD AUTO: 1.32 10*3/MM3 (ref 0.7–3.1)
LYMPHOCYTES NFR BLD AUTO: 14.6 % (ref 19.6–45.3)
MCH RBC QN AUTO: 29.7 PG (ref 26.6–33)
MCHC RBC AUTO-ENTMCNC: 33 G/DL (ref 31.5–35.7)
MCV RBC AUTO: 90 FL (ref 79–97)
MONOCYTES # BLD AUTO: 0.61 10*3/MM3 (ref 0.1–0.9)
MONOCYTES NFR BLD AUTO: 6.7 % (ref 5–12)
NEUTROPHILS NFR BLD AUTO: 6.8 10*3/MM3 (ref 1.7–7)
NEUTROPHILS NFR BLD AUTO: 75.1 % (ref 42.7–76)
NRBC BLD AUTO-RTO: 0 /100 WBC (ref 0–0.2)
PLATELET # BLD AUTO: 240 10*3/MM3 (ref 140–450)
PMV BLD AUTO: 9.3 FL (ref 6–12)
POTASSIUM SERPL-SCNC: 3.8 MMOL/L (ref 3.5–5.2)
PROT SERPL-MCNC: 7.5 G/DL (ref 6–8.5)
RBC # BLD AUTO: 3.7 10*6/MM3 (ref 3.77–5.28)
SODIUM SERPL-SCNC: 139 MMOL/L (ref 136–145)
WBC # BLD AUTO: 9.05 10*3/MM3 (ref 3.4–10.8)

## 2021-05-05 PROCEDURE — 36415 COLL VENOUS BLD VENIPUNCTURE: CPT

## 2021-05-05 PROCEDURE — 85025 COMPLETE CBC W/AUTO DIFF WBC: CPT | Performed by: INTERNAL MEDICINE

## 2021-05-05 PROCEDURE — 99214 OFFICE O/P EST MOD 30 MIN: CPT | Performed by: INTERNAL MEDICINE

## 2021-05-05 PROCEDURE — 80053 COMPREHEN METABOLIC PANEL: CPT | Performed by: INTERNAL MEDICINE

## 2021-05-05 NOTE — NURSING NOTE
Patient arrived today for procrit and was held hemoglobin was to high per Md order.  Lab Results   Component Value Date    WBC 9.05 05/05/2021    HGB 11.0 (L) 05/05/2021    HCT 33.3 (L) 05/05/2021    MCV 90.0 05/05/2021     05/05/2021

## 2021-05-05 NOTE — PROGRESS NOTES
Subjective     REASON FOR follow-up: Multifactorial anemia, anemia of chronic kidney disease and rheumatoid arthritis                             History of Present Illness patient is a 60-year-old female who was referred to us for evaluation of chronic anemia.  She does have an autoimmune component with her history of rheumatoid arthritis.  She also has mild renal insufficiency.    Anemia work-up showed a B12 of 494,Ferritin 160, serum iron of 36 with percent saturation of 13, EPO level of 8.3, creatinine of 1.49, serum protein electrophoresis did not show monoclonal protein.  ESR is 35.  There are no signs of infection.  Currently her hemoglobin is 9.8 and she is asymptomatic.  Dr. Pratt did discuss about the possibility of Procrit but at the present time after discussing the side effects she wants to be observed.    Patient returns today for follow up of anemia.  She is scheduled to have a left shoulder replacement surgery on May 10, 2021 and would therefore like to get Retacrit if her hemoglobin is low enough to help improve her hemoglobin before surgery.  She was also seen by her nephrologist who scheduled her for two doses of IV Feraheme prior to shoulder surgery, first dose 4/8/2021.  She is feeling well today.  She denies shortness of breath, dizziness, lightheadedness, or fatigue outside of her baseline.    Interval history:    Patient is going for left shoulder replacement.  Orthopedic wanted hemoglobin around 10.  Currently patient's hemoglobin is 11 with white count of 9.05 and platelet count of 240.  Her creatinine is 1.32.  He does have chronic renal insufficiency.  But not a candidate for Procrit.  From our point patient is okay to go for shoulder surgery.  Apparently Dr. Devi is planning to operate.  Patient is s/p 2 iron infusions 1 on April 8 and the second 1 on April 15.    Hematology history:  Ms. Powell is seen today after being referred back to us by her primary care physician,   Teo Fraser.  We saw the patient in consultation in March 2019 for chronic anemia.  Patient has had a long history of rheumatoid arthritis with chronic anemia and reported chronic difficulty with iron deficiency, taking oral iron without much benefit.  At that time of initial consultation we discussed her anemia was likely related to underlying CKD stage III, as well as possibly her rheumatoid arthritis.  We did evaluate for vitamin deficiencies and she was not found to be folate, B12, or iron deficient.  Sed rate was slightly elevated at 44 at that time.    Most recent lab work performed by Dr. Fraser 1/19/2021 showed a hemoglobin of 9.4, creatinine 1.3, BUN 32, GFR 39.  Upon review of the EMR it appears at least for the last roughly 2 years her creatinine has been anywhere from 1.2 to as high as 1.7, BUN ranging in the 20s to 30s.  So overall relatively stable.      Last colonoscopy and EGD were performed per Dr. Kapoor, negative.  Patient also had a small bowel follow-through which was within normal limits. Other pertinent imaging include a CT of the abdomen pelvis performed December 2018 which was negative.  She is up-to-date on her mammogram, last performed August 2020, negative.    As she is reviewed today, hemoglobin is at stable at 9.8.  Patient denies any significant fatigue and feels that she has fairly good performance status.  She does note a few times a week taking a 3 to 4-hour nap in the afternoon but does not require this on a regular basis.  She is still able to complete the activity she desires.  Patient is a  and lives alone.  She denies any obvious GI blood loss.      She reports normal appetite.  Weight is stable.  She does have significant left shoulder pain and will undergo replacement surgery per Dr. Devi in May 2021.    Otherwise she denies further concerns at this time.    Past Medical History:   Diagnosis Date   • Anemia     IRON INFUSION RECENTLY   • Arthritis    • Asthma    •  Chronic kidney disease     stage 3   • Elevated cholesterol    • GERD (gastroesophageal reflux disease)    • History of carpal tunnel syndrome    • Hyperlipidemia    • Hypertension    • Left shoulder pain    • Limited mobility    • Vertigo    • Weakness     AT TIMES LEFT SHOULDER/LEFT ARM        Past Surgical History:   Procedure Laterality Date   •  SECTION  , ,    • COLONOSCOPY N/A 12/10/2018    normal ileum, IH, hyperplastic polyp, otherwise normal exam   • ENDOSCOPY N/A 12/10/2018    no gross lesions in esophagus or examined duodenum, erythematous mucosa in stomach, biopsies benign   • HYSTERECTOMY     • OOPHORECTOMY Bilateral    • SHOULDER MANIPULATION Left         Current Outpatient Medications on File Prior to Visit   Medication Sig Dispense Refill   • albuterol 108 (90 Base) MCG/ACT inhaler Inhale 2 puffs Every 4 (Four) Hours As Needed for Wheezing.     • aspirin 325 MG tablet Take 325 mg by mouth Daily. HOLD ONE WEEK PRIOR TO SURGERY     • atorvastatin (LIPITOR) 80 MG tablet TAKE 1 TABLET BY MOUTH DAILY 90 tablet 1   • azelastine (ASTELIN) 0.1 % nasal spray 1 spray into the nostril(s) as directed by provider Daily.  11   • azelastine (ASTELIN) 0.1 % nasal spray 1 spray into the nostril(s) as directed by provider 2 (Two) Times a Day. Use in each nostril as directed     • B Complex Vitamins (B COMPLEX PO) Take 1 capsule by mouth Daily. HOLD FOR SURGERY 1 WEEK     • Chlorhexidine Gluconate 2 % pads Apply  topically. AS DIRECTED PAT     • Cholecalciferol (Vitamin D3) 50 MCG (2000 UT) capsule TAKE 1 CAPSULE BY MOUTH DAILY 90 capsule 1   • clobetasol (TEMOVATE) 0.05 % cream Apply 1 application topically to the appropriate area as directed As Needed.     • EPINEPHrine (AUVI-Q IJ) Inject  as directed As Needed.     • ferrous gluconate (FERGON) 324 MG tablet Take 1 tablet by mouth Daily With Breakfast. 90 tablet 1   • hydroCHLOROthiazide (HYDRODIURIL) 25 MG tablet Take 1 tablet by mouth  Daily. 90 tablet 3   • hydrocortisone 2.5 % cream Apply  topically to the appropriate area as directed As Needed.     • ipratropium (ATROVENT) 0.03 % nasal spray 1 spray into the nostril(s) as directed by provider 3 (Three) Times a Day.     • irbesartan (Avapro) 300 MG tablet Take 1 tablet by mouth Daily. 90 tablet 3   • loperamide (IMODIUM) 2 MG capsule Take 1 capsule by mouth Every 2 (Two) Hours As Needed for Diarrhea (max 4 doses daily).     • meclizine (ANTIVERT) 25 MG tablet Take 1 tablet by mouth 3 (Three) Times a Day As Needed for Dizziness. 270 tablet 0   • mupirocin (BACTROBAN) 2 % ointment Apply  topically to the appropriate area as directed 3 (Three) Times a Day. AS DIRECTED PAT     • omeprazole (priLOSEC) 40 MG capsule Take 1 capsule by mouth Daily. 90 capsule 1   • ondansetron (ZOFRAN) 4 MG tablet Take 1 tablet by mouth Every 12 (Twelve) Hours As Needed for Nausea or Vomiting. 180 tablet 0   • traMADol (ULTRAM) 50 MG tablet Take 1 tablet by mouth Every 4 (Four) Hours As Needed for Moderate Pain . 60 tablet 0   • triamcinolone (KENALOG) 0.1 % ointment Apply 1 application topically to the appropriate area as directed As Needed.       Current Facility-Administered Medications on File Prior to Visit   Medication Dose Route Frequency Provider Last Rate Last Admin   • Chlorhexidine Gluconate 2 % pads 1 each  1 each Apply externally Take As Directed Bethel Devi MD            ALLERGIES:    Allergies   Allergen Reactions   • Chocolate Anaphylaxis   • Nickel Anaphylaxis   • Nuts Anaphylaxis   • Ciprofloxacin Other (See Comments)     THRUSH PER PATIENT   • Citric Acid Unknown (See Comments)     Unsure     • Covid-19 (Mrna) Vaccine Other (See Comments)     INSTRUCTED PER ALLERGIST SHE CAN NOT TAKE D/T ONE OF THE PRESERVATIVES   • Eggs Or Egg-Derived Products Nausea And Vomiting   • Influenza Vaccines Nausea And Vomiting   • Methyldibromoglutaronitrile Unknown (See Comments)      PATIENT UNSURE OF REACTION.    • Neomycin Unknown (See Comments)      PATIENT UNSURE OF REACTION.   • Omnicef [Cefdinir] Other (See Comments)     THRUSH PER PATIENT   • Palladium Chloride Unknown (See Comments)      PATIENT UNSURE OF REACTION   • Penicillins Other (See Comments)     THRUSH PER PATIENT   • Sulfa Antibiotics Other (See Comments)     THRUSH PER PATIENT   • Tomato Hives   • Yeast-Related Products Hives   • Gold-Containing Drug Products Rash         • Latex Rash        Social History     Socioeconomic History   • Marital status:      Spouse name: Not on file   • Number of children: 3   • Years of education: College   • Highest education level: Not on file   Tobacco Use   • Smoking status: Never Smoker   • Smokeless tobacco: Never Used   Vaping Use   • Vaping Use: Never used   Substance and Sexual Activity   • Alcohol use: Yes     Comment: WEEKLY   • Drug use: No   • Sexual activity: Defer        Family History   Problem Relation Age of Onset   • Colon cancer Maternal Grandfather    • Asthma Mother    • Thyroid disease Father    • Diabetes Maternal Grandmother    • Malig Hyperthermia Neg Hx         Review of Systems   Constitutional: Positive for fatigue (05/05/21-Improved). Negative for appetite change, chills, diaphoresis, fever and unexpected weight change.   HENT: Negative for hearing loss, sore throat and trouble swallowing.    Respiratory: Negative for cough, chest tightness, shortness of breath and wheezing.    Cardiovascular: Negative for chest pain, palpitations and leg swelling.   Gastrointestinal: Negative for abdominal distention, abdominal pain, blood in stool, constipation, diarrhea, nausea and vomiting.   Genitourinary: Negative for dysuria, frequency, hematuria and urgency.   Musculoskeletal: Positive for arthralgias (05/05/21-Unchanged). Negative for joint swelling.        No muscle weakness.   Skin: Negative for rash and wound.   Neurological: Negative for dizziness, seizures, syncope, speech difficulty,  "weakness, numbness and headaches.   Hematological: Negative for adenopathy. Does not bruise/bleed easily.   Psychiatric/Behavioral: Negative for behavioral problems, confusion and suicidal ideas.   All other systems reviewed and are negative.     I have reviewed and confirmed the accuracy of the ROS as documented 04/07/2021 Cassandra Pratt MD    Objective     Vitals:    05/05/21 1537   BP: 130/78   Pulse: 66   Resp: 18   Temp: 97.3 °F (36.3 °C)   TempSrc: Temporal   SpO2: 97%   Weight: 104 kg (230 lb 3.2 oz)   Height: 152.4 cm (60\")   PainSc:   6   PainLoc: Generalized  Comment: Joint pain     Current Status 5/5/2021   ECOG score 0       Physical Exam      CONSTITUTIONAL:  Vital signs reviewed.    No distress, looks comfortable.  EYES:  Conjunctiva and lids unremarkable.  Extraocular eye movements intact.  EARS,NOSE,MOUTH,THROAT:  Ears and nose appear unremarkable.  Lips, teeth, gums appear unremarkable.  RESPIRATORY:  Normal respiratory effort.  , no rales  or wheezing, clear   CARDIOVASCULAR:  Regular rate and rhythm, no murmur  No significant lower extremity edema.  SKIN: No wounds.  No rashes.  MUSCULOSKELETAL/EXTREMITIES: No clubbing or cyanosis.  No apparent unilateral weakness.  NEURO: CN 2-12 appear intact. No focal neurological deficits noted.  PSYCHIATRIC:  Normal judgment and insight.  Normal mood and affect.      RECENT LABS:  Results from last 7 days   Lab Units 05/05/21  1538 05/03/21  1531   WBC 10*3/mm3 9.05 8.31   NEUTROS ABS 10*3/mm3 6.80  --    HEMOGLOBIN g/dL 11.0* 11.0*   HEMATOCRIT % 33.3* 33.5*   PLATELETS 10*3/mm3 240 237     Results from last 7 days   Lab Units 05/05/21  1538 05/03/21  1531   SODIUM mmol/L 139 138   POTASSIUM mmol/L 3.8 3.6   CHLORIDE mmol/L 101 102   CO2 mmol/L 29.3* 25.6   BUN mg/dL 30* 30*   CREATININE mg/dL 1.32* 1.22*   CALCIUM mg/dL 9.2 8.6   ALBUMIN g/dL 4.10  --    BILIRUBIN mg/dL 0.3  --    ALK PHOS U/L 63  --    ALT (SGPT) U/L 22  --    AST (SGOT) U/L 21  --  "   GLUCOSE mg/dL 121* 116*           Two View Chest X-Ray 02/24/19  FINDINGS: The lungs are moderately expanded and clear and there is no  evidence of localized pneumonia. The heart is borderline enlarged  without change from 07/23/2012.    CT Abdomen Pelvis 12/06/18 scanned Image.    Assessment/Plan     1.  Multifactorial  anemia.  With anemia of chronic kidney disease and rheumatoid arthritis  · Seen in consultation March 2019.  Patient reporting longstanding anemia with hemoglobin running in the 10-11 range.  Patient has underlying CKD stage III.  Work-up at that time did not reveal any vitamin deficiency including no iron, B12 or folate deficiency.  No underlying monoclonal protein.  Anemia felt to be related to CKD with also possibly an element of chronic disease with underlying rheumatoid arthritis.  Sed rate was mildly elevated at 44.  Patient lost to follow-up since that time.  · Last colonoscopy and EGD were performed per Dr. Kapoor, negative.  Patient also had a small bowel follow-through which was within normal limits. Other pertinent imaging include a CT of the abdomen pelvis performed December 2018 which was negative.  She is up-to-date on her mammogram, last performed August 2020, negative.  · March 10, 2021: Patient's hemoglobin today is 9.8, discussed consideration of Procrit and will get approval.  Discussed side effects in length with patient today.  We could consider starting Procrit if her hemoglobin continues to drop.  · 4/7/2021, hemoglobin today 10.3, patient does not qualify for Retacrit.  She is receiving IV Feraheme x 2 doses per nephrology, first dose tomorrow 4/8/2021.  · May 5, 2021: Patient's hemoglobin is 11.  She is scheduled to undergo left shoulder surgery by Dr. Devi and he preferred hemoglobin around 10.  Currently her hemoglobin after iron infusions has gone up to 11.  She is cleared to go for surgery.    2.  Rheumatoid arthritis for which she takes diclofenac.    3.  Chronic  kidney disease stage III.  This is managed per Dr. Fraser.  Creatinine appears relatively stable range from 1.2-1.7 over the last 2 years.  BUN 20s to 30s.  GFR last measured around 39.  This could be the cause of her anemia.  Creatinine today 1.44  · Creatinine today is 1.32.  · Still not a candidate for Procrit given hemoglobin greater than 10    4.  Left shoulder replacement surgery to be done on May 10, 2021.  To be done by Dr. Anderson    5.  Hypertension stable    PLAN:  · Patient is cleared for orthopedic surgery by Dr. Devi, left shoulder replacement  · S/p iron infusions x2 on April 8 and April 16.  With improvement in her hemoglobin  · Patient denies any active bleeding  · She is s/p EGD which basically had shown some gastritis back in 2018 and a colonoscopy which showed a polyp in the colon which was resected and benign.  · Follow-up with nurse practitioner in 6 months with labs and follow-up with me in 1 year with labs    Cassandra Pratt MD  05/05/2021          Dr. Teo Devi, orthopedic

## 2021-05-06 PROBLEM — Z98.890 H/O SHOULDER SURGERY: Status: ACTIVE | Noted: 2021-05-06

## 2021-05-07 NOTE — ADDENDUM NOTE
Addended by: DIOR BIRMINGHAM on: 5/6/2021 10:29 PM     Modules accepted: Orders, Level of Service

## 2021-05-10 ENCOUNTER — OFFICE VISIT (OUTPATIENT)
Dept: ORTHOPEDIC SURGERY | Facility: CLINIC | Age: 61
End: 2021-05-10

## 2021-05-10 VITALS — HEIGHT: 60 IN | BODY MASS INDEX: 45.16 KG/M2 | WEIGHT: 230 LBS | TEMPERATURE: 96.6 F

## 2021-05-10 DIAGNOSIS — Z01.818 PREOP EXAMINATION: Primary | ICD-10-CM

## 2021-05-10 PROCEDURE — S0260 H&P FOR SURGERY: HCPCS | Performed by: NURSE PRACTITIONER

## 2021-05-10 NOTE — PROGRESS NOTES
History & Physical       Patient: Pedrito Powell    YOB: 1960    Medical Record Number: [id number]    Chief Complaints:   Chief Complaint   Patient presents with   • Left Shoulder - Pre-op Exam       History of Present Illness: 60 y.o. female who comes in today in anticipation of upcoming total shoulder replacement surgery. Reports a long history of progressively worsening pain, motion loss, and dysfunction.  Describes current pain as severe.  Has failed prolonged conservative treatment.  Denies any new complaints or issues.    Allergies:   Allergies   Allergen Reactions   • Chocolate Anaphylaxis   • Nickel Anaphylaxis   • Nuts Anaphylaxis   • Ciprofloxacin Other (See Comments)     THRUSH PER PATIENT   • Citric Acid Unknown (See Comments)     Unsure     • Covid-19 (Mrna) Vaccine Other (See Comments)     INSTRUCTED PER ALLERGIST SHE CAN NOT TAKE D/T ONE OF THE PRESERVATIVES   • Eggs Or Egg-Derived Products Nausea And Vomiting   • Influenza Vaccines Nausea And Vomiting   • Methyldibromoglutaronitrile Unknown (See Comments)      PATIENT UNSURE OF REACTION.   • Neomycin Unknown (See Comments)      PATIENT UNSURE OF REACTION.   • Omnicef [Cefdinir] Other (See Comments)     THRUSH PER PATIENT   • Palladium Chloride Unknown (See Comments)      PATIENT UNSURE OF REACTION   • Penicillins Other (See Comments)     THRUSH PER PATIENT   • Sulfa Antibiotics Other (See Comments)     THRUSH PER PATIENT   • Tomato Hives   • Yeast-Related Products Hives   • Gold-Containing Drug Products Rash         • Latex Rash       Medications:   Home Medications:    Current Outpatient Medications:   •  albuterol 108 (90 Base) MCG/ACT inhaler, Inhale 2 puffs Every 4 (Four) Hours As Needed for Wheezing., Disp: , Rfl:   •  aspirin 325 MG tablet, Take 325 mg by mouth Daily. HOLD ONE WEEK PRIOR TO SURGERY, Disp: , Rfl:   •  atorvastatin (LIPITOR) 80 MG tablet, TAKE 1 TABLET BY MOUTH DAILY, Disp: 90 tablet, Rfl: 1  •  azelastine  (ASTELIN) 0.1 % nasal spray, 1 spray into the nostril(s) as directed by provider Daily., Disp: , Rfl: 11  •  azelastine (ASTELIN) 0.1 % nasal spray, 1 spray into the nostril(s) as directed by provider 2 (Two) Times a Day. Use in each nostril as directed, Disp: , Rfl:   •  B Complex Vitamins (B COMPLEX PO), Take 1 capsule by mouth Daily. HOLD FOR SURGERY 1 WEEK, Disp: , Rfl:   •  Chlorhexidine Gluconate 2 % pads, Apply  topically. AS DIRECTED PAT, Disp: , Rfl:   •  Cholecalciferol (Vitamin D3) 50 MCG (2000 UT) capsule, TAKE 1 CAPSULE BY MOUTH DAILY, Disp: 90 capsule, Rfl: 1  •  clobetasol (TEMOVATE) 0.05 % cream, Apply 1 application topically to the appropriate area as directed As Needed., Disp: , Rfl:   •  EPINEPHrine (AUVI-Q IJ), Inject  as directed As Needed., Disp: , Rfl:   •  ferrous gluconate (FERGON) 324 MG tablet, Take 1 tablet by mouth Daily With Breakfast., Disp: 90 tablet, Rfl: 1  •  hydroCHLOROthiazide (HYDRODIURIL) 25 MG tablet, Take 1 tablet by mouth Daily., Disp: 90 tablet, Rfl: 3  •  hydrocortisone 2.5 % cream, Apply  topically to the appropriate area as directed As Needed., Disp: , Rfl:   •  ipratropium (ATROVENT) 0.03 % nasal spray, 1 spray into the nostril(s) as directed by provider 3 (Three) Times a Day., Disp: , Rfl:   •  irbesartan (Avapro) 300 MG tablet, Take 1 tablet by mouth Daily., Disp: 90 tablet, Rfl: 3  •  loperamide (IMODIUM) 2 MG capsule, Take 1 capsule by mouth Every 2 (Two) Hours As Needed for Diarrhea (max 4 doses daily)., Disp: , Rfl:   •  meclizine (ANTIVERT) 25 MG tablet, Take 1 tablet by mouth 3 (Three) Times a Day As Needed for Dizziness., Disp: 270 tablet, Rfl: 0  •  mupirocin (BACTROBAN) 2 % ointment, Apply  topically to the appropriate area as directed 3 (Three) Times a Day. AS DIRECTED PAT, Disp: , Rfl:   •  omeprazole (priLOSEC) 40 MG capsule, Take 1 capsule by mouth Daily., Disp: 90 capsule, Rfl: 1  •  ondansetron (ZOFRAN) 4 MG tablet, Take 1 tablet by mouth Every 12  (Twelve) Hours As Needed for Nausea or Vomiting., Disp: 180 tablet, Rfl: 0  •  traMADol (ULTRAM) 50 MG tablet, Take 1 tablet by mouth Every 4 (Four) Hours As Needed for Moderate Pain ., Disp: 60 tablet, Rfl: 0  •  triamcinolone (KENALOG) 0.1 % ointment, Apply 1 application topically to the appropriate area as directed As Needed., Disp: , Rfl:   No current facility-administered medications for this visit.    Facility-Administered Medications Ordered in Other Visits:   •  Chlorhexidine Gluconate 2 % pads 1 each, 1 each, Apply externally, Take As Directed, Bethel Devi MD    Past Medical History:   Diagnosis Date   • Anemia     IRON INFUSION RECENTLY   • Arthritis    • Asthma    • Chronic kidney disease     stage 3   • Elevated cholesterol    • GERD (gastroesophageal reflux disease)    • History of carpal tunnel syndrome    • Hyperlipidemia    • Hypertension    • Left shoulder pain    • Limited mobility    • Vertigo    • Weakness     AT TIMES LEFT SHOULDER/LEFT ARM          Past Surgical History:   Procedure Laterality Date   •  SECTION  , ,    • COLONOSCOPY N/A 12/10/2018    normal ileum, IH, hyperplastic polyp, otherwise normal exam   • ENDOSCOPY N/A 12/10/2018    no gross lesions in esophagus or examined duodenum, erythematous mucosa in stomach, biopsies benign   • HYSTERECTOMY     • OOPHORECTOMY Bilateral    • SHOULDER MANIPULATION Left           Social History     Occupational History   • Occupation: Teacher     Employer: [employer]   Tobacco Use   • Smoking status: Never Smoker   • Smokeless tobacco: Never Used   Vaping Use   • Vaping Use: Never used   Substance and Sexual Activity   • Alcohol use: Yes     Comment: WEEKLY   • Drug use: No   • Sexual activity: Defer      Social History     Social History Narrative   • Not on file          Family History   Problem Relation Age of Onset   • Colon cancer Maternal Grandfather    • Asthma Mother    • Thyroid disease Father   "  • Diabetes Maternal Grandmother    • Malig Hyperthermia Neg Hx        Review of Systems:     Constitutional:  Denies fever, shaking or chills   Eyes:  Denies change in visual acuity   HEENT:  Denies nasal congestion or sore throat   Respiratory:  Denies cough or shortness of breath   Cardiovascular:  Denies chest pain or edema   GI:  Denies abdominal pain, nausea, vomiting, bloody stools or diarrhea   Musculoskeletal:  Denies numbness tingling or loss of motor function except as outlined above in history of present illness.  Integument:  Denies rash, lesion or ulceration   Neurologic:  Denies headache or focal weakness, deficits      All other pertinent positives and negatives as noted above in HPI.    Physical Exam: 60 y.o. female    Vitals:    05/10/21 1613   Temp: 96.6 °F (35.9 °C)   TempSrc: Temporal   Weight: 104 kg (230 lb)   Height: 152.4 cm (60\")     General:  Patient is awake and alert.  Appears in no acute distress or discomfort.    Psych:  Affect and demeanor are appropriate.    Eyes:  Conjunctiva and sclera appear grossly normal.  Eyes track well and EOM seem to be intact.    Ears:  No gross abnormalities.  Hearing adequate for the exam.    Cardiovascular:  Regular rate and rhythm.    Lungs:  Good chest expansion.  Breathing unlabored.    Lymph:  No palpable adenopathy about neck or axilla.    Neck:  Supple.  Normal ROM.  Negative Spurling's for shoulder or arm pain.    Left upper extremity:  Skin benign and intact without evidence for swelling, masses or atrophy.  No palpable masses.  No focal areas of exquisite tenderness.  Shoulder ROM limited and uncomfortable --80° forward elevation, 15° external rotation, internal rotation.  She has palpable and audible crepitus with passive range of motion.  Intact deltoid function and axillary nerve sensation.  Palpable radial pulse with brisk cap refill.  Brisk capillary refill.    Diagnostic Tests:  Lab Results   Component Value Date    GLUCOSE 121 (H) " 05/05/2021    CALCIUM 9.2 05/05/2021     05/05/2021    K 3.8 05/05/2021    CO2 29.3 (H) 05/05/2021     05/05/2021    BUN 30 (H) 05/05/2021    CREATININE 1.32 (H) 05/05/2021    EGFRIFAFRI 40 (L) 02/10/2020    EGFRIFNONA 41 (L) 05/05/2021    BCR 22.7 05/05/2021    ANIONGAP 8.7 05/05/2021     Lab Results   Component Value Date    WBC 9.05 05/05/2021    HGB 11.0 (L) 05/05/2021    HCT 33.3 (L) 05/05/2021    MCV 90.0 05/05/2021     05/05/2021     No results found for: INR, PROTIME    Imaging:   Previous x-rays of the shoulder are reviewed.  The x-rays show end-stage glenohumeral osteoarthritis.    Assessment:  1.  Left shoulder stage osteoarthritis  2.  Nickel allergy    Plan: We had a thorough discussion regarding the risks, benefits and alternatives to an arthroplasty and non-surgical management versus surgery.  I explained that surgical risks include infection, hematoma, hardware related complications including failure of fixation, loosening, fracture, subscapularis repair failure and/or rotator cuff tear necessitating revision surgery, persistent pain and/or loss of motion, iatrogenic nerve and/or blood vessel injury resulting in permanent weakness, numbness or dysfunction, DVT, PE, positioning related neuropraxia, and anesthesia related complications resulting in death.  I did explain the possible need for reverse arthroplasty depending upon the degree of retroversion and the status of the rotator cuff at the time of surgery.  I further explained the risks inherent to reverse arthroplasty as compared to a standard total shoulder.  Specifically, we discussed the risks of notching, glenoid loosening, instability, and traction related neuropraxia, any of which could result in persistent pain or problems requiring further surgery.   Lastly, we discussed the rehab and all that will be expected of the patient post operatively to ensure an optimal outcome.  The patient has acknowledged understanding and  consents to proceed.      Patient is planning for overnight hospital stay.  I reminded Dr. Devi of patient's nickel allergy.    I have given her a referral to physical therapy.    Date: 5/10/2021    KALYANI Cornelius

## 2021-05-11 ENCOUNTER — TELEPHONE (OUTPATIENT)
Dept: ORTHOPEDIC SURGERY | Facility: CLINIC | Age: 61
End: 2021-05-11

## 2021-05-11 ENCOUNTER — LAB (OUTPATIENT)
Dept: LAB | Facility: HOSPITAL | Age: 61
End: 2021-05-11

## 2021-05-11 DIAGNOSIS — Z01.818 OTHER SPECIFIED PRE-OPERATIVE EXAMINATION: ICD-10-CM

## 2021-05-11 PROCEDURE — C9803 HOPD COVID-19 SPEC COLLECT: HCPCS

## 2021-05-11 PROCEDURE — U0004 COV-19 TEST NON-CDC HGH THRU: HCPCS

## 2021-05-11 NOTE — TELEPHONE ENCOUNTER
Provider: MARKUS HUESTON  Caller: SELF  Relationship to Patient: SELF     Phone Number: [phone]  Reason for Call: PHYSICAL THERAPY  When was the patient last seen: 5-10-21    PATIENT WOULD LIKE A CALL BACK TO DISCUSS PHYSICAL THERAPY. PATIENT STATED THAT SHE WAS UNDER THE IMPRESSION SHE WAS TO START PHYSICAL THERAPY AFTER SX, HOWEVER, SHE RECEIVED A CALL FROM PHYSICAL THERAPY TO SCHEDULE A PRE-VISIT.  PATIENT WOULD ALSO LIKE TO DISCUSS SURGERY TIME AND ARRIVAL TIME FOR SURGERY.  SHE ALSO NEEDS TO DISCUSS THE GLUE THAT WILL BE USED FOR INCISION.

## 2021-05-12 NOTE — TELEPHONE ENCOUNTER
Left message for patient explaining that I have spoken to the physical therapy office at [facility] to clarify my orders.  She has physical therapy scheduled to begin 1 week after surgery.

## 2021-05-13 ENCOUNTER — APPOINTMENT (OUTPATIENT)
Dept: GENERAL RADIOLOGY | Facility: HOSPITAL | Age: 61
End: 2021-05-13

## 2021-05-13 ENCOUNTER — ANESTHESIA (OUTPATIENT)
Dept: PERIOP | Facility: HOSPITAL | Age: 61
End: 2021-05-13

## 2021-05-13 ENCOUNTER — ANESTHESIA EVENT (OUTPATIENT)
Dept: PERIOP | Facility: HOSPITAL | Age: 61
End: 2021-05-13

## 2021-05-13 ENCOUNTER — HOSPITAL ENCOUNTER (OUTPATIENT)
Facility: HOSPITAL | Age: 61
Setting detail: OBSERVATION
Discharge: HOME | End: 2021-05-14
Attending: ORTHOPAEDIC SURGERY | Admitting: ORTHOPAEDIC SURGERY

## 2021-05-13 DIAGNOSIS — M19.019 ARTHRITIS OF SHOULDER: ICD-10-CM

## 2021-05-13 PROCEDURE — 73020 X-RAY EXAM OF SHOULDER: CPT

## 2021-05-13 PROCEDURE — G0378 HOSPITAL OBSERVATION PER HR: HCPCS

## 2021-05-13 PROCEDURE — C9290 INJ, BUPIVACAINE LIPOSOME: HCPCS | Performed by: ANESTHESIOLOGY

## 2021-05-13 PROCEDURE — 25010000002 DEXAMETHASONE PER 1 MG: Performed by: NURSE ANESTHETIST, CERTIFIED REGISTERED

## 2021-05-13 PROCEDURE — 25010000002 SUCCINYLCHOLINE PER 20 MG: Performed by: NURSE ANESTHETIST, CERTIFIED REGISTERED

## 2021-05-13 PROCEDURE — 25010000003 BUPIVACAINE LIPOSOME 1.3 % SUSPENSION: Performed by: ANESTHESIOLOGY

## 2021-05-13 PROCEDURE — 25010000002 NEOSTIGMINE 5 MG/10ML SOLUTION: Performed by: NURSE ANESTHETIST, CERTIFIED REGISTERED

## 2021-05-13 PROCEDURE — 25010000003 CEFAZOLIN IN DEXTROSE 2-4 GM/100ML-% SOLUTION: Performed by: ORTHOPAEDIC SURGERY

## 2021-05-13 PROCEDURE — 23430 REPAIR BICEPS TENDON: CPT | Performed by: ORTHOPAEDIC SURGERY

## 2021-05-13 PROCEDURE — 76942 ECHO GUIDE FOR BIOPSY: CPT | Performed by: ORTHOPAEDIC SURGERY

## 2021-05-13 PROCEDURE — 25010000002 MIDAZOLAM PER 1 MG: Performed by: ANESTHESIOLOGY

## 2021-05-13 PROCEDURE — 25010000002 FENTANYL CITRATE (PF) 100 MCG/2ML SOLUTION: Performed by: ANESTHESIOLOGY

## 2021-05-13 PROCEDURE — C1776 JOINT DEVICE (IMPLANTABLE): HCPCS | Performed by: ORTHOPAEDIC SURGERY

## 2021-05-13 PROCEDURE — C1713 ANCHOR/SCREW BN/BN,TIS/BN: HCPCS | Performed by: ORTHOPAEDIC SURGERY

## 2021-05-13 PROCEDURE — 25010000002 PHENYLEPHRINE PER 1 ML: Performed by: NURSE ANESTHETIST, CERTIFIED REGISTERED

## 2021-05-13 PROCEDURE — 25010000003 MEPIVACAINE PER 10 ML: Performed by: ANESTHESIOLOGY

## 2021-05-13 PROCEDURE — 25010000002 VANCOMYCIN 10 G RECONSTITUTED SOLUTION: Performed by: ORTHOPAEDIC SURGERY

## 2021-05-13 PROCEDURE — 25010000002 ONDANSETRON PER 1 MG: Performed by: NURSE ANESTHETIST, CERTIFIED REGISTERED

## 2021-05-13 PROCEDURE — C1889 IMPLANT/INSERT DEVICE, NOC: HCPCS | Performed by: ORTHOPAEDIC SURGERY

## 2021-05-13 PROCEDURE — 23472 RECONSTRUCT SHOULDER JOINT: CPT | Performed by: ORTHOPAEDIC SURGERY

## 2021-05-13 PROCEDURE — 25010000002 PROPOFOL 10 MG/ML EMULSION: Performed by: NURSE ANESTHETIST, CERTIFIED REGISTERED

## 2021-05-13 DEVICE — IMPLANTABLE DEVICE
Type: IMPLANTABLE DEVICE | Site: SHOULDER | Status: FUNCTIONAL
Brand: COMPREHENSIVE REVERSE SHOULDER

## 2021-05-13 DEVICE — KNOTLESS TISSUE CONTROL DEVICE, UNDYED UNIDIRECTIONAL (ANTIBACTERIAL) SYNTHETIC ABSORBABLE DEVICE
Type: IMPLANTABLE DEVICE | Site: SHOULDER | Status: FUNCTIONAL
Brand: STRATAFIX

## 2021-05-13 DEVICE — STEM HUM/SHLDR COMPREHENSIVE MINI 12X83MM: Type: IMPLANTABLE DEVICE | Site: SHOULDER | Status: FUNCTIONAL

## 2021-05-13 DEVICE — BLUE CO-BRAID POLYETHYLENE SIZE 2 38" C-7 NEEDLE BIOMET
Type: IMPLANTABLE DEVICE | Site: SHOULDER | Status: FUNCTIONAL
Brand: MAXBRAID PE

## 2021-05-13 DEVICE — TRY HUM/SHLDR COMPREHENSIVE/REVERSE MINI TI STD PLS3: Type: IMPLANTABLE DEVICE | Site: SHOULDER | Status: FUNCTIONAL

## 2021-05-13 DEVICE — IMPLANTABLE DEVICE
Type: IMPLANTABLE DEVICE | Site: SHOULDER | Status: FUNCTIONAL
Brand: COMPREHENSIVE® REVERSE SHOULDER

## 2021-05-13 DEVICE — TOTL SHLDER REV: Type: IMPLANTABLE DEVICE | Site: SHOULDER | Status: FUNCTIONAL

## 2021-05-13 DEVICE — BEAR HUM PROLNG PLS3 36MM: Type: IMPLANTABLE DEVICE | Site: SHOULDER | Status: FUNCTIONAL

## 2021-05-13 RX ORDER — POLYETHYLENE GLYCOL 3350 17 G/17G
17 POWDER, FOR SOLUTION ORAL DAILY
Status: DISCONTINUED | OUTPATIENT
Start: 2021-05-13 | End: 2021-05-14 | Stop reason: HOSPADM

## 2021-05-13 RX ORDER — FLUMAZENIL 0.1 MG/ML
0.2 INJECTION INTRAVENOUS AS NEEDED
Status: DISCONTINUED | OUTPATIENT
Start: 2021-05-13 | End: 2021-05-13 | Stop reason: HOSPADM

## 2021-05-13 RX ORDER — ONDANSETRON 2 MG/ML
4 INJECTION INTRAMUSCULAR; INTRAVENOUS ONCE AS NEEDED
Status: DISCONTINUED | OUTPATIENT
Start: 2021-05-13 | End: 2021-05-13 | Stop reason: HOSPADM

## 2021-05-13 RX ORDER — DIPHENHYDRAMINE HCL 25 MG
25 CAPSULE ORAL
Status: DISCONTINUED | OUTPATIENT
Start: 2021-05-13 | End: 2021-05-13 | Stop reason: HOSPADM

## 2021-05-13 RX ORDER — FERROUS SULFATE 325(65) MG
325 TABLET ORAL
Status: DISCONTINUED | OUTPATIENT
Start: 2021-05-13 | End: 2021-05-14 | Stop reason: HOSPADM

## 2021-05-13 RX ORDER — OXYCODONE AND ACETAMINOPHEN 7.5; 325 MG/1; MG/1
1 TABLET ORAL ONCE AS NEEDED
Status: DISCONTINUED | OUTPATIENT
Start: 2021-05-13 | End: 2021-05-13 | Stop reason: HOSPADM

## 2021-05-13 RX ORDER — FENTANYL CITRATE 50 UG/ML
50 INJECTION, SOLUTION INTRAMUSCULAR; INTRAVENOUS
Status: DISCONTINUED | OUTPATIENT
Start: 2021-05-13 | End: 2021-05-13 | Stop reason: HOSPADM

## 2021-05-13 RX ORDER — HYDRALAZINE HYDROCHLORIDE 20 MG/ML
5 INJECTION INTRAMUSCULAR; INTRAVENOUS
Status: DISCONTINUED | OUTPATIENT
Start: 2021-05-13 | End: 2021-05-13 | Stop reason: HOSPADM

## 2021-05-13 RX ORDER — ONDANSETRON 2 MG/ML
INJECTION INTRAMUSCULAR; INTRAVENOUS AS NEEDED
Status: DISCONTINUED | OUTPATIENT
Start: 2021-05-13 | End: 2021-05-13 | Stop reason: SURG

## 2021-05-13 RX ORDER — ONDANSETRON 4 MG/1
4 TABLET, FILM COATED ORAL EVERY 6 HOURS PRN
Status: DISCONTINUED | OUTPATIENT
Start: 2021-05-13 | End: 2021-05-14 | Stop reason: HOSPADM

## 2021-05-13 RX ORDER — ATORVASTATIN CALCIUM 80 MG/1
80 TABLET, FILM COATED ORAL DAILY
Status: DISCONTINUED | OUTPATIENT
Start: 2021-05-13 | End: 2021-05-14 | Stop reason: HOSPADM

## 2021-05-13 RX ORDER — NALOXONE HCL 0.4 MG/ML
0.2 VIAL (ML) INJECTION AS NEEDED
Status: DISCONTINUED | OUTPATIENT
Start: 2021-05-13 | End: 2021-05-13 | Stop reason: HOSPADM

## 2021-05-13 RX ORDER — FENTANYL CITRATE 50 UG/ML
INJECTION, SOLUTION INTRAMUSCULAR; INTRAVENOUS
Status: COMPLETED | OUTPATIENT
Start: 2021-05-13 | End: 2021-05-13

## 2021-05-13 RX ORDER — PROPOFOL 10 MG/ML
VIAL (ML) INTRAVENOUS AS NEEDED
Status: DISCONTINUED | OUTPATIENT
Start: 2021-05-13 | End: 2021-05-13 | Stop reason: SURG

## 2021-05-13 RX ORDER — NEOSTIGMINE METHYLSULFATE 0.5 MG/ML
INJECTION, SOLUTION INTRAVENOUS AS NEEDED
Status: DISCONTINUED | OUTPATIENT
Start: 2021-05-13 | End: 2021-05-13 | Stop reason: SURG

## 2021-05-13 RX ORDER — SODIUM CHLORIDE, SODIUM LACTATE, POTASSIUM CHLORIDE, CALCIUM CHLORIDE 600; 310; 30; 20 MG/100ML; MG/100ML; MG/100ML; MG/100ML
9 INJECTION, SOLUTION INTRAVENOUS CONTINUOUS
Status: DISCONTINUED | OUTPATIENT
Start: 2021-05-13 | End: 2021-05-13 | Stop reason: HOSPADM

## 2021-05-13 RX ORDER — VASOPRESSIN 20 U/ML
INJECTION PARENTERAL AS NEEDED
Status: DISCONTINUED | OUTPATIENT
Start: 2021-05-13 | End: 2021-05-13 | Stop reason: SURG

## 2021-05-13 RX ORDER — HYDROCODONE BITARTRATE AND ACETAMINOPHEN 7.5; 325 MG/1; MG/1
2 TABLET ORAL EVERY 4 HOURS PRN
Status: DISCONTINUED | OUTPATIENT
Start: 2021-05-13 | End: 2021-05-14 | Stop reason: HOSPADM

## 2021-05-13 RX ORDER — LIDOCAINE HYDROCHLORIDE 20 MG/ML
INJECTION, SOLUTION INFILTRATION; PERINEURAL AS NEEDED
Status: DISCONTINUED | OUTPATIENT
Start: 2021-05-13 | End: 2021-05-13 | Stop reason: SURG

## 2021-05-13 RX ORDER — AZELASTINE 1 MG/ML
1 SPRAY, METERED NASAL DAILY
Status: DISCONTINUED | OUTPATIENT
Start: 2021-05-13 | End: 2021-05-13 | Stop reason: SDUPTHER

## 2021-05-13 RX ORDER — PANTOPRAZOLE SODIUM 40 MG/1
40 TABLET, DELAYED RELEASE ORAL EVERY MORNING
Status: DISCONTINUED | OUTPATIENT
Start: 2021-05-14 | End: 2021-05-14 | Stop reason: HOSPADM

## 2021-05-13 RX ORDER — ONDANSETRON 2 MG/ML
4 INJECTION INTRAMUSCULAR; INTRAVENOUS EVERY 6 HOURS PRN
Status: DISCONTINUED | OUTPATIENT
Start: 2021-05-13 | End: 2021-05-14 | Stop reason: HOSPADM

## 2021-05-13 RX ORDER — BUPIVACAINE HYDROCHLORIDE 2.5 MG/ML
INJECTION, SOLUTION EPIDURAL; INFILTRATION; INTRACAUDAL
Status: COMPLETED | OUTPATIENT
Start: 2021-05-13 | End: 2021-05-13

## 2021-05-13 RX ORDER — LIDOCAINE HYDROCHLORIDE 10 MG/ML
0.5 INJECTION, SOLUTION EPIDURAL; INFILTRATION; INTRACAUDAL; PERINEURAL ONCE AS NEEDED
Status: DISCONTINUED | OUTPATIENT
Start: 2021-05-13 | End: 2021-05-13 | Stop reason: HOSPADM

## 2021-05-13 RX ORDER — LOPERAMIDE HYDROCHLORIDE 2 MG/1
2 CAPSULE ORAL
Status: DISCONTINUED | OUTPATIENT
Start: 2021-05-13 | End: 2021-05-14 | Stop reason: HOSPADM

## 2021-05-13 RX ORDER — BISACODYL 10 MG
10 SUPPOSITORY, RECTAL RECTAL DAILY PRN
Status: DISCONTINUED | OUTPATIENT
Start: 2021-05-13 | End: 2021-05-14 | Stop reason: HOSPADM

## 2021-05-13 RX ORDER — HYDROMORPHONE HYDROCHLORIDE 1 MG/ML
0.5 INJECTION, SOLUTION INTRAMUSCULAR; INTRAVENOUS; SUBCUTANEOUS
Status: DISCONTINUED | OUTPATIENT
Start: 2021-05-13 | End: 2021-05-13 | Stop reason: HOSPADM

## 2021-05-13 RX ORDER — IPRATROPIUM BROMIDE 21 UG/1
1 SPRAY, METERED NASAL 3 TIMES DAILY
Status: DISCONTINUED | OUTPATIENT
Start: 2021-05-13 | End: 2021-05-14 | Stop reason: HOSPADM

## 2021-05-13 RX ORDER — CEFAZOLIN SODIUM 2 G/100ML
2 INJECTION, SOLUTION INTRAVENOUS EVERY 8 HOURS
Status: COMPLETED | OUTPATIENT
Start: 2021-05-13 | End: 2021-05-13

## 2021-05-13 RX ORDER — TRAMADOL HYDROCHLORIDE 50 MG/1
50 TABLET ORAL EVERY 4 HOURS PRN
Status: DISCONTINUED | OUTPATIENT
Start: 2021-05-13 | End: 2021-05-14 | Stop reason: HOSPADM

## 2021-05-13 RX ORDER — MAGNESIUM HYDROXIDE 1200 MG/15ML
LIQUID ORAL AS NEEDED
Status: DISCONTINUED | OUTPATIENT
Start: 2021-05-13 | End: 2021-05-13 | Stop reason: HOSPADM

## 2021-05-13 RX ORDER — DIPHENHYDRAMINE HYDROCHLORIDE 50 MG/ML
12.5 INJECTION INTRAMUSCULAR; INTRAVENOUS
Status: DISCONTINUED | OUTPATIENT
Start: 2021-05-13 | End: 2021-05-13 | Stop reason: HOSPADM

## 2021-05-13 RX ORDER — MIDAZOLAM HYDROCHLORIDE 1 MG/ML
INJECTION INTRAMUSCULAR; INTRAVENOUS
Status: COMPLETED | OUTPATIENT
Start: 2021-05-13 | End: 2021-05-13

## 2021-05-13 RX ORDER — HYDROCODONE BITARTRATE AND ACETAMINOPHEN 7.5; 325 MG/1; MG/1
1 TABLET ORAL ONCE AS NEEDED
Status: DISCONTINUED | OUTPATIENT
Start: 2021-05-13 | End: 2021-05-13 | Stop reason: HOSPADM

## 2021-05-13 RX ORDER — MIDAZOLAM HYDROCHLORIDE 1 MG/ML
1 INJECTION INTRAMUSCULAR; INTRAVENOUS
Status: DISCONTINUED | OUTPATIENT
Start: 2021-05-13 | End: 2021-05-13 | Stop reason: HOSPADM

## 2021-05-13 RX ORDER — MECLIZINE HYDROCHLORIDE 25 MG/1
25 TABLET ORAL 3 TIMES DAILY PRN
Status: DISCONTINUED | OUTPATIENT
Start: 2021-05-13 | End: 2021-05-14 | Stop reason: HOSPADM

## 2021-05-13 RX ORDER — MELOXICAM 15 MG/1
15 TABLET ORAL ONCE
Status: COMPLETED | OUTPATIENT
Start: 2021-05-13 | End: 2021-05-13

## 2021-05-13 RX ORDER — HYDROMORPHONE HYDROCHLORIDE 1 MG/ML
0.5 INJECTION, SOLUTION INTRAMUSCULAR; INTRAVENOUS; SUBCUTANEOUS
Status: DISCONTINUED | OUTPATIENT
Start: 2021-05-13 | End: 2021-05-14 | Stop reason: HOSPADM

## 2021-05-13 RX ORDER — ACETAMINOPHEN 325 MG/1
650 TABLET ORAL EVERY 4 HOURS PRN
Status: DISCONTINUED | OUTPATIENT
Start: 2021-05-13 | End: 2021-05-14 | Stop reason: HOSPADM

## 2021-05-13 RX ORDER — ALBUTEROL SULFATE 2.5 MG/3ML
2.5 SOLUTION RESPIRATORY (INHALATION) EVERY 4 HOURS PRN
Status: DISCONTINUED | OUTPATIENT
Start: 2021-05-13 | End: 2021-05-14 | Stop reason: HOSPADM

## 2021-05-13 RX ORDER — EPHEDRINE SULFATE 50 MG/ML
5 INJECTION, SOLUTION INTRAVENOUS ONCE AS NEEDED
Status: DISCONTINUED | OUTPATIENT
Start: 2021-05-13 | End: 2021-05-13 | Stop reason: HOSPADM

## 2021-05-13 RX ORDER — FAMOTIDINE 10 MG/ML
20 INJECTION, SOLUTION INTRAVENOUS ONCE
Status: COMPLETED | OUTPATIENT
Start: 2021-05-13 | End: 2021-05-13

## 2021-05-13 RX ORDER — LOSARTAN POTASSIUM 25 MG/1
25 TABLET ORAL
Status: DISCONTINUED | OUTPATIENT
Start: 2021-05-13 | End: 2021-05-14 | Stop reason: HOSPADM

## 2021-05-13 RX ORDER — KETOROLAC TROMETHAMINE 15 MG/ML
15 INJECTION, SOLUTION INTRAMUSCULAR; INTRAVENOUS EVERY 6 HOURS PRN
Status: DISCONTINUED | OUTPATIENT
Start: 2021-05-13 | End: 2021-05-14 | Stop reason: HOSPADM

## 2021-05-13 RX ORDER — HYDROCODONE BITARTRATE AND ACETAMINOPHEN 7.5; 325 MG/1; MG/1
1 TABLET ORAL EVERY 4 HOURS PRN
Status: DISCONTINUED | OUTPATIENT
Start: 2021-05-13 | End: 2021-05-14 | Stop reason: HOSPADM

## 2021-05-13 RX ORDER — MELOXICAM 15 MG/1
15 TABLET ORAL DAILY
Status: DISCONTINUED | OUTPATIENT
Start: 2021-05-14 | End: 2021-05-14 | Stop reason: HOSPADM

## 2021-05-13 RX ORDER — CEFAZOLIN SODIUM 2 G/100ML
2 INJECTION, SOLUTION INTRAVENOUS ONCE
Status: COMPLETED | OUTPATIENT
Start: 2021-05-13 | End: 2021-05-13

## 2021-05-13 RX ORDER — NALOXONE HCL 0.4 MG/ML
0.1 VIAL (ML) INJECTION
Status: DISCONTINUED | OUTPATIENT
Start: 2021-05-13 | End: 2021-05-14 | Stop reason: HOSPADM

## 2021-05-13 RX ORDER — HYDROCHLOROTHIAZIDE 25 MG/1
25 TABLET ORAL DAILY
Status: DISCONTINUED | OUTPATIENT
Start: 2021-05-13 | End: 2021-05-14 | Stop reason: HOSPADM

## 2021-05-13 RX ORDER — ACETAMINOPHEN 325 MG/1
1000 TABLET ORAL ONCE
Status: COMPLETED | OUTPATIENT
Start: 2021-05-13 | End: 2021-05-13

## 2021-05-13 RX ORDER — GLYCOPYRROLATE 0.2 MG/ML
INJECTION INTRAMUSCULAR; INTRAVENOUS AS NEEDED
Status: DISCONTINUED | OUTPATIENT
Start: 2021-05-13 | End: 2021-05-13 | Stop reason: SURG

## 2021-05-13 RX ORDER — SODIUM CHLORIDE 0.9 % (FLUSH) 0.9 %
3-10 SYRINGE (ML) INJECTION AS NEEDED
Status: DISCONTINUED | OUTPATIENT
Start: 2021-05-13 | End: 2021-05-13 | Stop reason: HOSPADM

## 2021-05-13 RX ORDER — SUCCINYLCHOLINE CHLORIDE 20 MG/ML
INJECTION INTRAMUSCULAR; INTRAVENOUS AS NEEDED
Status: DISCONTINUED | OUTPATIENT
Start: 2021-05-13 | End: 2021-05-13 | Stop reason: SURG

## 2021-05-13 RX ORDER — LABETALOL HYDROCHLORIDE 5 MG/ML
5 INJECTION, SOLUTION INTRAVENOUS
Status: DISCONTINUED | OUTPATIENT
Start: 2021-05-13 | End: 2021-05-13 | Stop reason: HOSPADM

## 2021-05-13 RX ORDER — ONDANSETRON 4 MG/1
4 TABLET, FILM COATED ORAL EVERY 12 HOURS PRN
Status: DISCONTINUED | OUTPATIENT
Start: 2021-05-13 | End: 2021-05-13 | Stop reason: SDUPTHER

## 2021-05-13 RX ORDER — AZELASTINE 1 MG/ML
1 SPRAY, METERED NASAL 2 TIMES DAILY
Status: DISCONTINUED | OUTPATIENT
Start: 2021-05-13 | End: 2021-05-14 | Stop reason: HOSPADM

## 2021-05-13 RX ORDER — SODIUM CHLORIDE 0.9 % (FLUSH) 0.9 %
10 SYRINGE (ML) INJECTION AS NEEDED
Status: DISCONTINUED | OUTPATIENT
Start: 2021-05-13 | End: 2021-05-14 | Stop reason: HOSPADM

## 2021-05-13 RX ORDER — DEXAMETHASONE SODIUM PHOSPHATE 10 MG/ML
INJECTION INTRAMUSCULAR; INTRAVENOUS AS NEEDED
Status: DISCONTINUED | OUTPATIENT
Start: 2021-05-13 | End: 2021-05-13 | Stop reason: SURG

## 2021-05-13 RX ORDER — SODIUM CHLORIDE, SODIUM LACTATE, POTASSIUM CHLORIDE, CALCIUM CHLORIDE 600; 310; 30; 20 MG/100ML; MG/100ML; MG/100ML; MG/100ML
100 INJECTION, SOLUTION INTRAVENOUS CONTINUOUS
Status: DISCONTINUED | OUTPATIENT
Start: 2021-05-13 | End: 2021-05-14 | Stop reason: HOSPADM

## 2021-05-13 RX ORDER — DOCUSATE SODIUM 100 MG/1
100 CAPSULE, LIQUID FILLED ORAL 2 TIMES DAILY
Status: DISCONTINUED | OUTPATIENT
Start: 2021-05-13 | End: 2021-05-14 | Stop reason: HOSPADM

## 2021-05-13 RX ORDER — SODIUM CHLORIDE 0.9 % (FLUSH) 0.9 %
3 SYRINGE (ML) INJECTION EVERY 12 HOURS SCHEDULED
Status: DISCONTINUED | OUTPATIENT
Start: 2021-05-13 | End: 2021-05-14 | Stop reason: HOSPADM

## 2021-05-13 RX ORDER — SODIUM CHLORIDE 0.9 % (FLUSH) 0.9 %
3 SYRINGE (ML) INJECTION EVERY 12 HOURS SCHEDULED
Status: DISCONTINUED | OUTPATIENT
Start: 2021-05-13 | End: 2021-05-13 | Stop reason: HOSPADM

## 2021-05-13 RX ORDER — ROCURONIUM BROMIDE 10 MG/ML
INJECTION, SOLUTION INTRAVENOUS AS NEEDED
Status: DISCONTINUED | OUTPATIENT
Start: 2021-05-13 | End: 2021-05-13 | Stop reason: SURG

## 2021-05-13 RX ORDER — PREGABALIN 75 MG/1
150 CAPSULE ORAL ONCE
Status: COMPLETED | OUTPATIENT
Start: 2021-05-13 | End: 2021-05-13

## 2021-05-13 RX ADMIN — HYDROCODONE BITARTRATE AND ACETAMINOPHEN 2 TABLET: 7.5; 325 TABLET ORAL at 22:12

## 2021-05-13 RX ADMIN — SODIUM CHLORIDE, PRESERVATIVE FREE 3 ML: 5 INJECTION INTRAVENOUS at 15:05

## 2021-05-13 RX ADMIN — VASOPRESSIN 2 UNITS: 20 INJECTION INTRAVENOUS at 07:43

## 2021-05-13 RX ADMIN — ACETAMINOPHEN 975 MG: 325 TABLET, FILM COATED ORAL at 06:32

## 2021-05-13 RX ADMIN — AZELASTINE HYDROCHLORIDE 1 SPRAY: 137 SPRAY, METERED NASAL at 11:30

## 2021-05-13 RX ADMIN — SODIUM CHLORIDE, POTASSIUM CHLORIDE, SODIUM LACTATE AND CALCIUM CHLORIDE 100 ML/HR: 600; 310; 30; 20 INJECTION, SOLUTION INTRAVENOUS at 14:05

## 2021-05-13 RX ADMIN — PHENYLEPHRINE HYDROCHLORIDE 200 MCG: 10 INJECTION INTRAVENOUS at 07:26

## 2021-05-13 RX ADMIN — AZELASTINE HYDROCHLORIDE 1 SPRAY: 137 SPRAY, METERED NASAL at 15:05

## 2021-05-13 RX ADMIN — HYDROCODONE BITARTRATE AND ACETAMINOPHEN 1 TABLET: 7.5; 325 TABLET ORAL at 17:21

## 2021-05-13 RX ADMIN — FERROUS SULFATE TAB 325 MG (65 MG ELEMENTAL FE) 325 MG: 325 (65 FE) TAB at 15:03

## 2021-05-13 RX ADMIN — CEFAZOLIN SODIUM 2 G: 2 INJECTION, SOLUTION INTRAVENOUS at 06:59

## 2021-05-13 RX ADMIN — VANCOMYCIN HYDROCHLORIDE 1500 MG: 10 INJECTION, POWDER, LYOPHILIZED, FOR SOLUTION INTRAVENOUS at 06:59

## 2021-05-13 RX ADMIN — SODIUM CHLORIDE, POTASSIUM CHLORIDE, SODIUM LACTATE AND CALCIUM CHLORIDE 100 ML/HR: 600; 310; 30; 20 INJECTION, SOLUTION INTRAVENOUS at 22:12

## 2021-05-13 RX ADMIN — FAMOTIDINE 20 MG: 10 INJECTION INTRAVENOUS at 06:39

## 2021-05-13 RX ADMIN — GLYCOPYRROLATE 0.4 MG: 0.2 INJECTION INTRAMUSCULAR; INTRAVENOUS at 08:27

## 2021-05-13 RX ADMIN — SODIUM CHLORIDE, PRESERVATIVE FREE 3 ML: 5 INJECTION INTRAVENOUS at 20:48

## 2021-05-13 RX ADMIN — VASOPRESSIN 2 UNITS: 20 INJECTION INTRAVENOUS at 08:21

## 2021-05-13 RX ADMIN — LIDOCAINE HYDROCHLORIDE 60 MG: 20 INJECTION, SOLUTION INFILTRATION; PERINEURAL at 07:09

## 2021-05-13 RX ADMIN — SODIUM CHLORIDE, POTASSIUM CHLORIDE, SODIUM LACTATE AND CALCIUM CHLORIDE: 600; 310; 30; 20 INJECTION, SOLUTION INTRAVENOUS at 07:02

## 2021-05-13 RX ADMIN — ROCURONIUM BROMIDE 20 MG: 50 INJECTION INTRAVENOUS at 07:16

## 2021-05-13 RX ADMIN — VASOPRESSIN 2 UNITS: 20 INJECTION INTRAVENOUS at 07:39

## 2021-05-13 RX ADMIN — CEFAZOLIN SODIUM 2 G: 2 INJECTION, SOLUTION INTRAVENOUS at 22:27

## 2021-05-13 RX ADMIN — CEFAZOLIN SODIUM 2 G: 2 INJECTION, SOLUTION INTRAVENOUS at 15:04

## 2021-05-13 RX ADMIN — PROPOFOL 150 MG: 10 INJECTION, EMULSION INTRAVENOUS at 07:09

## 2021-05-13 RX ADMIN — MEPIVACAINE HYDROCHLORIDE 10 ML: 15 INJECTION, SOLUTION EPIDURAL; INFILTRATION at 06:38

## 2021-05-13 RX ADMIN — PHENYLEPHRINE HYDROCHLORIDE 200 MCG: 10 INJECTION INTRAVENOUS at 07:21

## 2021-05-13 RX ADMIN — ATORVASTATIN CALCIUM 80 MG: 80 TABLET, FILM COATED ORAL at 15:04

## 2021-05-13 RX ADMIN — MUPIROCIN 1 APPLICATION: 20 OINTMENT TOPICAL at 14:07

## 2021-05-13 RX ADMIN — MIDAZOLAM 2 MG: 1 INJECTION INTRAMUSCULAR; INTRAVENOUS at 06:45

## 2021-05-13 RX ADMIN — MECLIZINE HYDROCHLORIDE 25 MG: 25 TABLET ORAL at 14:05

## 2021-05-13 RX ADMIN — MUPIROCIN 1 APPLICATION: 20 OINTMENT TOPICAL at 20:49

## 2021-05-13 RX ADMIN — VASOPRESSIN 2 UNITS: 20 INJECTION INTRAVENOUS at 07:55

## 2021-05-13 RX ADMIN — NEOSTIGMINE METHYLSULFATE 2 MG: 0.5 INJECTION INTRAVENOUS at 08:28

## 2021-05-13 RX ADMIN — BUPIVACAINE HYDROCHLORIDE 20 ML: 2.5 INJECTION, SOLUTION EPIDURAL; INFILTRATION; INTRACAUDAL; PERINEURAL at 06:38

## 2021-05-13 RX ADMIN — DOCUSATE SODIUM 100 MG: 100 CAPSULE ORAL at 20:49

## 2021-05-13 RX ADMIN — PREGABALIN 150 MG: 75 CAPSULE ORAL at 06:32

## 2021-05-13 RX ADMIN — GLYCOPYRROLATE 0.4 MG: 0.2 INJECTION INTRAMUSCULAR; INTRAVENOUS at 08:24

## 2021-05-13 RX ADMIN — BUPIVACAINE 10 ML: 13.3 INJECTION, SUSPENSION, LIPOSOMAL INFILTRATION at 06:38

## 2021-05-13 RX ADMIN — IPRATROPIUM BROMIDE 1 SPRAY: 21 SPRAY NASAL at 17:22

## 2021-05-13 RX ADMIN — DOCUSATE SODIUM 100 MG: 100 CAPSULE ORAL at 14:05

## 2021-05-13 RX ADMIN — DEXAMETHASONE SODIUM PHOSPHATE 8 MG: 10 INJECTION INTRAMUSCULAR; INTRAVENOUS at 07:24

## 2021-05-13 RX ADMIN — PHENYLEPHRINE HYDROCHLORIDE 200 MCG: 10 INJECTION INTRAVENOUS at 07:16

## 2021-05-13 RX ADMIN — SODIUM CHLORIDE, PRESERVATIVE FREE 3 ML: 5 INJECTION INTRAVENOUS at 15:04

## 2021-05-13 RX ADMIN — SUCCINYLCHOLINE CHLORIDE 140 MG: 20 INJECTION, SOLUTION INTRAMUSCULAR; INTRAVENOUS; PARENTERAL at 07:09

## 2021-05-13 RX ADMIN — FENTANYL CITRATE 50 MCG: 50 INJECTION INTRAMUSCULAR; INTRAVENOUS at 06:45

## 2021-05-13 RX ADMIN — NEOSTIGMINE METHYLSULFATE 3 MG: 0.5 INJECTION INTRAVENOUS at 08:24

## 2021-05-13 RX ADMIN — MELOXICAM 15 MG: 15 TABLET ORAL at 06:32

## 2021-05-13 RX ADMIN — POLYETHYLENE GLYCOL 3350 17 G: 17 POWDER, FOR SOLUTION ORAL at 14:05

## 2021-05-13 RX ADMIN — ROCURONIUM BROMIDE 10 MG: 50 INJECTION INTRAVENOUS at 07:09

## 2021-05-13 RX ADMIN — ONDANSETRON 4 MG: 2 INJECTION INTRAMUSCULAR; INTRAVENOUS at 08:17

## 2021-05-13 RX ADMIN — VASOPRESSIN 2 UNITS: 20 INJECTION INTRAVENOUS at 07:30

## 2021-05-13 NOTE — ANESTHESIA POSTPROCEDURE EVALUATION
Patient: Pedrito Powell    Procedure Summary     Date: 05/13/21 Room / Location: [facility] OR [address] [place] [address] MAIN OR    Anesthesia Start: 0702 Anesthesia Stop: 0839    Procedure: REVERSE TOTAL SHOULDER ARTHROPLASTY,  BICEP TENDONDESIS (Left Shoulder) Diagnosis:       Arthritis of shoulder      (Arthritis of shoulder [M19.019])    Surgeons: Bethel Devi MD Provider: Rush Carnes MD    Anesthesia Type: general with block ASA Status: 3          Anesthesia Type: general with block    Vitals  Vitals Value Taken Time   BP 98/64 05/13/21 0900   Temp 37.9 °C (100.2 °F) 05/13/21 0837   Pulse 87 05/13/21 0902   Resp 16 05/13/21 0845   SpO2 98 % 05/13/21 0902   Vitals shown include unvalidated device data.        Post Anesthesia Care and Evaluation    Patient location during evaluation: PACU  Patient participation: complete - patient participated  Level of consciousness: awake and alert  Pain management: adequate  Airway patency: patent  Anesthetic complications: No anesthetic complications    Cardiovascular status: acceptable  Respiratory status: acceptable  Hydration status: acceptable    Comments: --------------------            05/13/21               0845     --------------------   BP:       107/69     Pulse:      83       Resp:       16       Temp:                SpO2:      97%      --------------------

## 2021-05-13 NOTE — ANESTHESIA PROCEDURE NOTES
Airway  Urgency: elective    Date/Time: 5/13/2021 7:11 AM  Airway not difficult    General Information and Staff    Patient location during procedure: OR  Anesthesiologist: Rush Carnes MD  CRNA: Jacinta Boyer CRNA    Indications and Patient Condition  Indications for airway management: airway protection    Preoxygenated: yes  MILS maintained throughout  Mask difficulty assessment: 1 - vent by mask    Final Airway Details  Final airway type: endotracheal airway      Successful airway: ETT  Cuffed: yes   Successful intubation technique: direct laryngoscopy  Facilitating devices/methods: intubating stylet  Endotracheal tube insertion site: oral  Blade: Cuevas  Blade size: 2  ETT size (mm): 7.0  Cormack-Lehane Classification: grade I - full view of glottis  Placement verified by: chest auscultation   Cuff volume (mL): 8  Measured from: lips  ETT/EBT  to lips (cm): 21  Number of attempts at approach: 1  Assessment: lips, teeth, and gum same as pre-op and atraumatic intubation    Additional Comments  Pt preoxygenated, SIVI, bag mask vent, ATETI, dentition as before

## 2021-05-13 NOTE — ANESTHESIA PROCEDURE NOTES
Peripheral Block    Pre-sedation assessment completed: 5/13/2021 6:38 AM    Patient reassessed immediately prior to procedure    Patient location during procedure: holding area  Start time: 5/13/2021 6:38 AM  Stop time: 5/13/2021 6:48 AM  Reason for block: at surgeon's request and post-op pain management  Performed by  Anesthesiologist: Rush Carnes MD  Preanesthetic Checklist  Completed: patient identified, IV checked, site marked, risks and benefits discussed, surgical consent, monitors and equipment checked, pre-op evaluation and timeout performed  Prep:  Pt Position: sitting  Sterile barriers:cap, gloves, gown, mask and sterile barriers  Prep: ChloraPrep  Patient monitoring: blood pressure monitoring, continuous pulse oximetry and EKG  Procedure  Sedation:yes    Guidance:ultrasound guided  ULTRASOUND INTERPRETATION.  Using ultrasound guidance a 21 G gauge needle was placed in close proximity to the brachial plexus nerve, at which point, under ultrasound guidance anesthetic was injected in the area of the nerve and spread of the anesthesia was seen on ultrasound in close proximity thereto.  There were no abnormalities seen on ultrasound; a digital image was taken; and the patient tolerated the procedure with no complications. Images:still images obtained    Laterality:left  Block Type:interscalene  Injection Technique:single-shot  Needle Type:echogenic  Needle Gauge:21 G      Medications Used: bupivacaine liposome (EXPAREL) 1.3 % injection, 10 mL  bupivacaine PF (MARCAINE) 0.25 % injection, 20 mL  mepivacaine (CARBOCAINE) 1.5 % injection, 10 mL      Post Assessment  Injection Assessment: negative aspiration for heme, no paresthesia on injection and incremental injection  Patient Tolerance:comfortable throughout block  Complications:no

## 2021-05-13 NOTE — ANESTHESIA PREPROCEDURE EVALUATION
Anesthesia Evaluation     Patient summary reviewed and Nursing notes reviewed                Airway   Mallampati: II  TM distance: >3 FB  Neck ROM: full  Dental      Pulmonary    (+) asthma,  Cardiovascular     ECG reviewed  Rhythm: regular  Rate: normal    (+) hypertension, hyperlipidemia,       Neuro/Psych  (+) dizziness/light headedness, weakness,     GI/Hepatic/Renal/Endo    (+) morbid obesity, GERD,  renal disease CRI,     Musculoskeletal     Abdominal    Substance History - negative use     OB/GYN negative ob/gyn ROS         Other   arthritis,                      Anesthesia Plan    ASA 3     general with block   (Left ISB with exparel PSR for POPC  BMI    I have reviewed the patient's history with the patient and the chart, including all pertinent laboratory results and imaging. I have explained the risks of anesthesia including but not limited to dental damage, corneal abrasion, nerve injury, MI, stroke, and death. Questions asked and answered. Anesthetic plan discussed with patient and team as indicated. Patient expressed understanding of the above.  )  intravenous induction     Anesthetic plan, all risks, benefits, and alternatives have been provided, discussed and informed consent has been obtained with: patient.

## 2021-05-14 ENCOUNTER — READMISSION MANAGEMENT (OUTPATIENT)
Dept: CALL CENTER | Facility: HOSPITAL | Age: 61
End: 2021-05-14

## 2021-05-14 VITALS
DIASTOLIC BLOOD PRESSURE: 53 MMHG | RESPIRATION RATE: 16 BRPM | HEIGHT: 60 IN | BODY MASS INDEX: 45.32 KG/M2 | OXYGEN SATURATION: 96 % | HEART RATE: 67 BPM | SYSTOLIC BLOOD PRESSURE: 91 MMHG | TEMPERATURE: 97 F | WEIGHT: 230.82 LBS

## 2021-05-14 PROCEDURE — G0378 HOSPITAL OBSERVATION PER HR: HCPCS

## 2021-05-14 PROCEDURE — 97165 OT EVAL LOW COMPLEX 30 MIN: CPT

## 2021-05-14 PROCEDURE — 97535 SELF CARE MNGMENT TRAINING: CPT

## 2021-05-14 PROCEDURE — 97110 THERAPEUTIC EXERCISES: CPT

## 2021-05-14 RX ORDER — HYDROCODONE BITARTRATE AND ACETAMINOPHEN 7.5; 325 MG/1; MG/1
1 TABLET ORAL EVERY 4 HOURS PRN
Qty: 50 TABLET | Refills: 0 | Status: SHIPPED | OUTPATIENT
Start: 2021-05-14 | End: 2021-05-22 | Stop reason: SDUPTHER

## 2021-05-14 RX ORDER — PSEUDOEPHEDRINE HCL 30 MG
100 TABLET ORAL 2 TIMES DAILY
Qty: 60 CAPSULE | Refills: 0 | Status: SHIPPED | OUTPATIENT
Start: 2021-05-14 | End: 2022-05-04 | Stop reason: DRUGHIGH

## 2021-05-14 RX ADMIN — MELOXICAM 15 MG: 15 TABLET ORAL at 08:41

## 2021-05-14 RX ADMIN — TRAMADOL HYDROCHLORIDE 50 MG: 50 TABLET, FILM COATED ORAL at 14:57

## 2021-05-14 RX ADMIN — ATORVASTATIN CALCIUM 80 MG: 80 TABLET, FILM COATED ORAL at 08:41

## 2021-05-14 RX ADMIN — PANTOPRAZOLE SODIUM 40 MG: 40 TABLET, DELAYED RELEASE ORAL at 08:41

## 2021-05-14 RX ADMIN — AZELASTINE HYDROCHLORIDE 1 SPRAY: 137 SPRAY, METERED NASAL at 11:04

## 2021-05-14 RX ADMIN — MUPIROCIN 1 APPLICATION: 20 OINTMENT TOPICAL at 08:41

## 2021-05-14 RX ADMIN — POLYETHYLENE GLYCOL 3350 17 G: 17 POWDER, FOR SOLUTION ORAL at 08:41

## 2021-05-14 RX ADMIN — DOCUSATE SODIUM 100 MG: 100 CAPSULE ORAL at 08:41

## 2021-05-14 RX ADMIN — HYDROCHLOROTHIAZIDE 25 MG: 25 TABLET ORAL at 08:41

## 2021-05-14 RX ADMIN — HYDROCODONE BITARTRATE AND ACETAMINOPHEN 2 TABLET: 7.5; 325 TABLET ORAL at 08:11

## 2021-05-14 RX ADMIN — FERROUS SULFATE TAB 325 MG (65 MG ELEMENTAL FE) 325 MG: 325 (65 FE) TAB at 08:41

## 2021-05-14 NOTE — OUTREACH NOTE
Prep Survey      Responses   [facility] facility patient discharged from?  [place]   Is LACE score < 7 ?  Yes   Emergency Room discharge w/ pulse ox?  No   Eligibility  [facility]   Date of Admission  05/13/21   Date of Discharge  05/14/21   Discharge Disposition  Home or Self Care   Discharge diagnosis  Left reverse total shoulder arthroplasty   Does the patient have one of the following disease processes/diagnoses(primary or secondary)?  Total Joint Replacement   Does the patient have Home health ordered?  No   Is there a DME ordered?  No   Prep survey completed?  Yes          Monalisa Monae RN

## 2021-05-17 ENCOUNTER — TRANSITIONAL CARE MANAGEMENT TELEPHONE ENCOUNTER (OUTPATIENT)
Dept: CALL CENTER | Facility: HOSPITAL | Age: 61
End: 2021-05-17

## 2021-05-17 NOTE — OUTREACH NOTE
Call Center TCM Note      Responses   [facility] patient discharged from?  [place]   Does the patient have one of the following disease processes/diagnoses(primary or secondary)?  Total Joint Replacement   Joint surgery performed?  Shoulder   TCM attempt successful?  Yes   Call start time  1704   Call end time  1714   Has the patient been back in either the hospital or Emergency Department since discharge?  No   Discharge diagnosis  Left reverse total shoulder arthroplasty   Is patient permission given to speak with other caregiver?  Yes   Person spoke with today (if not patient) and relationship  Patient ans son   Does the patient have all medications related to this admission filled (includes all antibiotics, pain medications, etc.)  Yes   Is the patient able to teach back alternate methods of pain control?  Ice, Shoulder-elevate above heart/ keep in sling as advised, Reposition, Correct alignment, Short, frequent activity   Comments regarding appointments  Has a followup with surgeon on 5/26/2021 and Pt appt on 5/20/2021, declines PCP appt at this time.    Does the patient have a follow up appointment with their surgeon?  Yes   Has the patient kept scheduled appointments due by today?  N/A   Has home health visited the patient within 72 hours of discharge?  N/A   Psychosocial issues?  No   If the patient has started attending therapy, what post op day did they begin to attend (either in home or as an out patient)?    Patient is working with PT    Does the patient have a wound vac in place?  N/A   Has the patient fallen since discharge?  No   Did the patient receive a copy of their discharge instructions?  Yes   Nursing interventions  Reviewed instructions with patient   What is the patient's perception of their functional status since discharge?  Improving   Is the patient able to teach back signs and symptoms of infection?  Temp >100.4 for 24h or longer, Incisional drainage, Blisters around incision,  Increased swelling or redness around incision (not associated with surgical edema), Severe discomfort or pain, Changes in mobility, Shortness of breath or chest pain   Is the patient able to teach back how to prevent infection?  Check incision daily, Keep incision covered if drainage, Wash hands before and after touching incision, Shower only as directed by surgeon, No tub baths, hot tub or swimming   Is the patient able to teach back signs and symptoms of DVT?  Redness in calf, Area hot to touch, Shortness of breath or chest pain, Severe pain in calf, Swelling in calf   Is the patient able to teach back home safety measures?  Ability to shower   Did the patient implement home safety suggestions from pre-surgery classes if attended?  N/A   If the patient is a current smoker, are they able to teach back resources for cessation?  Not a smoker   Is the patient/caregiver able to teach back the hierarchy of who to call/visit for symptoms/problems? PCP, Specialist, Home health nurse, Urgent Care, ED, 911  Yes   TCM call completed?  Yes          Vinod Hwang RN    5/17/2021, 17:14 EDT

## 2021-05-21 ENCOUNTER — TREATMENT (OUTPATIENT)
Dept: PHYSICAL THERAPY | Facility: CLINIC | Age: 61
End: 2021-05-21

## 2021-05-21 DIAGNOSIS — Z96.612 S/P REVERSE TOTAL SHOULDER ARTHROPLASTY, LEFT: Primary | ICD-10-CM

## 2021-05-21 PROCEDURE — 97140 MANUAL THERAPY 1/> REGIONS: CPT | Performed by: PHYSICAL THERAPIST

## 2021-05-21 PROCEDURE — 97162 PT EVAL MOD COMPLEX 30 MIN: CPT | Performed by: PHYSICAL THERAPIST

## 2021-05-21 PROCEDURE — 97110 THERAPEUTIC EXERCISES: CPT | Performed by: PHYSICAL THERAPIST

## 2021-05-21 PROCEDURE — 97530 THERAPEUTIC ACTIVITIES: CPT | Performed by: PHYSICAL THERAPIST

## 2021-05-21 NOTE — PROGRESS NOTES
Physical Therapy Initial Evaluation and Plan of Care    Patient Name: Pedrito Powell          :  1960  Referring Physician: Sherie Meehan APRN  Diagnosis: S/P reverse total shoulder arthroplasty, left [Z96.612]    Date of Evaluation: 2021  ______________________________________________________________________    Subjective Evaluation    History of Present Illness  Date of surgery: 2021  Mechanism of injury: End stage OA -(L) shoulder - pain got so bad she couldn't sleep / self care  Sx RTSA 2021  Since surgery:  Doing well overall - sharp pains last night -   Denies NT-ing in hand  Difficulty with bending elbow - due to pain in biceps  A lot of bruising upper arm -      Patient Occupation: Teacher Pain  Current pain ratin  At worst pain ratin  Location: Anterior shoulder into biceps (L)   Quality: Ache - Sharp.  Alleviating factors: Vicodin, ice.  Exacerbated by: Supine, HE, mvmt; elbow AROM; Sidelying (L)  Progression: improved    Social Support  Lives with: Son.    Hand dominance: right    Treatments  Previous treatment: physical therapy  Patient Goals  Patient/family treatment goals: Pain alleviation;Mobility, strength to allow ADLs and normal job          ___________________________________________________  Objective          Postural Observations    Additional Postural Observation Details  Pt w/ sling (L) arm -   Sig dark bruising distal biceps (L) arm  Hypertrophy (L) Supraclavicular region     Tenderness     Additional Tenderness Details  Tender diffusely anterior, lateral shoulder/delto-pec region (L)  Tender distal 1/3 biceps (L)    Active Range of Motion     Additional Active Range of Motion Details  (L) Shoulder NA        Elbow: Grossly limited into extension 10-deg w/ pain biceps      Passive Range of Motion     Additional Passive Range of Motion Details  (L) Shoulder: ER(POS)  10-deg from neutral;                         FE:  75-deg        Elbow: WNL  Passively    Strength/Myotome Testing     Additional Strength Details  NA (L) Shoulder due to recent RTSA -     Tests     Additional Tests Details  (L) Shoulder: NA due to recent RTSA         See Treatment Flow sheet for Exercises, Manual therapy, and modalities.   FUNCTIONAL ACTIVITIES: X 15 min  · TAPING / BRACING: NA  · Precautions; Positioning LUE for sleeping, etc to prevent HE of shoulder -   · Jt protection, ADL modification; Posture and     ___________________________________________________  Assessment & Plan     Assessment  Assessment details: S/P (L) RTSA - 5/13/2021  Sig hematoma distal biceps (L) limiting elbow AROM -     GOALS:   SHORT TERM GOALS: 6-8 weeks:  1) HEP Initiated; 2) Pain decreased 50%:   3) PROM  Increased to WFL FE and ER:  4) Improved functional ability to shoulder level - ;     LONG TERM GOALS: 12 weeks (or at time of DISCHARGE): 1) (I) HEP; 2) AROM WFL and pain free; 3) Strength / mobility to be able to perform all ADL's and job-related activities w/o restrictions;       Plan  Planned therapy interventions: Modalities prn; Taping prn;   Other planned therapy interventions: Per treatment guidelines -   Frequency: 2x week  Duration in weeks: 12  Treatment plan discussed with: patient and family      ___________________________________________________  Manual Therapy:    25     mins  28547;  Therapeutic Exercise:    20     mins  82503;     Neuromuscular Dion:        mins  98312;    Therapeutic Activity:     15     mins  91337;   Self Care:                           mins  56610  Ultrasound:          mins  04034;  Iontophoresis:          mins  24781;    Electrical Stimulation:         mins  55548 ( );  Mechanical Traction:          mins  54069  Dry Needling          mins self-pay    Eval:   20   mins    Timed Treatment:   60   mins                  Total Treatment:     90   mins    PT SIGNATURE:   Antonio Coley, LENNOX  DATE TREATMENT INITIATED:  5/21/2021  ___________________________________________________  Initial Certification  Certification Period: 8/19/2021  I certify that the therapy services are furnished while this patient is under my care.  The services outlined above are required by this patient, and will be reviewed every 90 days.     PHYSICIAN: ________________________________  DATE: ______  Sherie Meehan APRN        Please sign and return via fax to [phone].. Thank you, [facility].  ______________________________________________________________________  [address] [place] [address]  Phone: [phone] Fax: [phone]

## 2021-05-24 RX ORDER — HYDROCODONE BITARTRATE AND ACETAMINOPHEN 7.5; 325 MG/1; MG/1
1 TABLET ORAL EVERY 4 HOURS PRN
Qty: 50 TABLET | Refills: 0 | Status: SHIPPED | OUTPATIENT
Start: 2021-05-24 | End: 2021-06-15 | Stop reason: SDUPTHER

## 2021-05-26 ENCOUNTER — OFFICE VISIT (OUTPATIENT)
Dept: ORTHOPEDIC SURGERY | Facility: CLINIC | Age: 61
End: 2021-05-26

## 2021-05-26 ENCOUNTER — TREATMENT (OUTPATIENT)
Dept: PHYSICAL THERAPY | Facility: CLINIC | Age: 61
End: 2021-05-26

## 2021-05-26 VITALS — WEIGHT: 220 LBS | HEIGHT: 60 IN | TEMPERATURE: 96.4 F | BODY MASS INDEX: 43.19 KG/M2

## 2021-05-26 DIAGNOSIS — Z09 SURGERY FOLLOW-UP: ICD-10-CM

## 2021-05-26 DIAGNOSIS — M25.612 SHOULDER STIFFNESS, LEFT: ICD-10-CM

## 2021-05-26 DIAGNOSIS — Z96.612 S/P REVERSE TOTAL SHOULDER ARTHROPLASTY, LEFT: Primary | ICD-10-CM

## 2021-05-26 DIAGNOSIS — Z96.612 STATUS POST REVERSE TOTAL REPLACEMENT OF LEFT SHOULDER: ICD-10-CM

## 2021-05-26 DIAGNOSIS — M19.019 ARTHRITIS OF SHOULDER: Primary | ICD-10-CM

## 2021-05-26 PROCEDURE — 97110 THERAPEUTIC EXERCISES: CPT | Performed by: PHYSICAL THERAPIST

## 2021-05-26 PROCEDURE — 73030 X-RAY EXAM OF SHOULDER: CPT | Performed by: ORTHOPAEDIC SURGERY

## 2021-05-26 PROCEDURE — 99024 POSTOP FOLLOW-UP VISIT: CPT | Performed by: ORTHOPAEDIC SURGERY

## 2021-05-26 PROCEDURE — 97140 MANUAL THERAPY 1/> REGIONS: CPT | Performed by: PHYSICAL THERAPIST

## 2021-05-26 NOTE — PROGRESS NOTES
"Pedrito Powell : 1960 MRN: [id number] DATE: 2021    DIAGNOSIS:  2 week follow up left shoulder arthroplasty      SUBJECTIVE:  Patient returns today for 2 week follow up of left shoulder replacement. Patient reports doing well with no unusual complaints.      OBJECTIVE:    Temp 96.4 °F (35.8 °C)   Ht 152.4 cm (60\")   Wt 99.8 kg (220 lb)   BMI 42.97 kg/m²     Exam:  The incision is well approximated.  No erythema or drainage. Shoulder moves fluidly with pendulums.  The arm is soft and nontender.  Intact motor and sensory function in the lower arm and hand.  Palpable radial pulse.    DIAGNOSTIC STUDIES    Xrays: AP and scapular Y views of the left shoulder are ordered and reviewed for evaluation of the recent shoulder replacement.  The x-rays demonstrate a well positioned, well aligned replacement without complicating factors noted.  In comparison with previous films, there has been no change.    ASSESSMENT: 2 week follow up left shoulder replacement.    PLAN:   1.  Begin PT per protocol--prescription given along with 2 copies of my protocol.  2.  Continue sling until next visit.  3.  Counseled patient about appropriate activity modifications and restrictions, including no driving at this point.    Bethel Devi MD    "

## 2021-05-26 NOTE — PROGRESS NOTES
Physical Therapy Daily Progress Note    Patient Name: Pedrito Powell         :  1960  Referring Physician:     NO SHOULDER HE and NO ER > 30-deg and NO FE > 90; NO IR/IRB (SEE GUIDELINES)    Subjective   Pedrito Powell reports: decreased bruising distal biceps (B) and improved mobility of elbow flex/ext -     Objective   Decreasing hematoma distal biceps (L) - Near full AROM elbow/forearm (L) -   PROM: FE to 90-deg;  ER (POS) more stiff and uncomfortable: able to achieve about 0-5-deg    See Exercise, Manual, and Modality Logs for complete treatment. (DOES NOT LIKE ESTIM)     Functional / Therapeutic Activities:   min  · TAPING / BRACING: NA  · Jt protection, ADL modification; Posture and      Assessment/Plan  59 yo female - S/P (L) RTSA - 2021  Doing well w/ PROM into FE, but more limited and painful ER -     Progress per Plan of Care -Per treatment guidelines -        _________________________________________________  Manual Therapy:    15     mins  89968;  Therapeutic Exercise:    20     mins  74186;     Neuromuscular Dion:        mins  87043;    Therapeutic Activity:          mins  35821;     Ultrasound:          mins  95520;  Iontophoresis:          mins  70509;    Electrical Stimulation:         mins  32773 ( );  Mechanical Traction:          mins  35500  CP (at end)     15      mins NC                     Timed Treatment:   35   mins                  Total Treatment:     55   mins    Antonio Coley PT  Physical Therapist

## 2021-05-27 ENCOUNTER — TELEPHONE (OUTPATIENT)
Dept: ORTHOPEDIC SURGERY | Facility: CLINIC | Age: 61
End: 2021-05-27

## 2021-05-27 NOTE — TELEPHONE ENCOUNTER
----- Message from Pedrito Powell sent at 5/27/2021 11:13 AM EDT -----  Regarding: Non-Urgent Medical Question  Contact: [phone]  Dear Dr. Devi,    I was just notified by my school  that I need a medical statement indicating that I have been under your care since surgery on May 13 and the length of time I am to be out of work.    Thank you,  Pedrito Powell

## 2021-05-28 ENCOUNTER — TREATMENT (OUTPATIENT)
Dept: PHYSICAL THERAPY | Facility: CLINIC | Age: 61
End: 2021-05-28

## 2021-05-28 DIAGNOSIS — Z96.612 S/P REVERSE TOTAL SHOULDER ARTHROPLASTY, LEFT: Primary | ICD-10-CM

## 2021-05-28 PROCEDURE — 97140 MANUAL THERAPY 1/> REGIONS: CPT | Performed by: PHYSICAL THERAPIST

## 2021-05-28 PROCEDURE — 97110 THERAPEUTIC EXERCISES: CPT | Performed by: PHYSICAL THERAPIST

## 2021-06-01 ENCOUNTER — TREATMENT (OUTPATIENT)
Dept: PHYSICAL THERAPY | Facility: CLINIC | Age: 61
End: 2021-06-01

## 2021-06-01 DIAGNOSIS — Z96.612 S/P REVERSE TOTAL SHOULDER ARTHROPLASTY, LEFT: Primary | ICD-10-CM

## 2021-06-01 PROCEDURE — 97140 MANUAL THERAPY 1/> REGIONS: CPT | Performed by: PHYSICAL THERAPIST

## 2021-06-01 PROCEDURE — 97110 THERAPEUTIC EXERCISES: CPT | Performed by: PHYSICAL THERAPIST

## 2021-06-01 NOTE — PROGRESS NOTES
Physical Therapy Daily Progress Note    Patient Name: Pedrito Powell         :  1960  Referring Physician: Sherie Meehan APRN      Subjective   Pedrito Powell reports: improved use of (L) arm below shoulder level - able to help wash hair if bending over and was able to put on shoes and tying them for the first time -     Objective   Tender w/ TPs distal 2/3 biceps  PROM: Up to 25-deg ER;                 Up to 120-deg FE    See Exercise, Manual, and Modality Logs for complete treatment.     Functional / Therapeutic Activities:   min  · TAPING / BRACING: NA  · Jt protection, ADL modification; Posture and      Assessment/Plan  59 yo female - S/P (L) RTSA - 2021  Doing well w/ PROM into FE, but more limited and painful ER     Progress per Plan of Care per guidelines -        _________________________________________________  Manual Therapy:    15     mins  73986;  Therapeutic Exercise:    20     mins  69561;     Neuromuscular Dion:        mins  26596;    Therapeutic Activity:          mins  34248;     Ultrasound:          mins  83178;  Iontophoresis:          mins  99663;    Electrical Stimulation:         mins  43590 ( );  Mechanical Traction:          mins  59528  Dry Needling          mins self-pay  CP (R) shoulder x 15 min NC    Timed Treatment:   35   mins                  Total Treatment:     55   mins    Antonio Coley PT  Physical Therapist

## 2021-06-01 NOTE — PROGRESS NOTES
Physical Therapy Daily Progress Note    Patient Name: Pedrito Powell         :  1960  Referring Physician: Sherie Meehan APRN      Subjective   Pedrito Powell reports: seeing Dr. Devi - said she was doing well - told her that pain is her limitation and avoid ext and IRB -     Objective   Reminded Pt about precautions -   Up to 10-deg ER (POS)   Up to 95-deg FE -     See Exercise, Manual, and Modality Logs for complete treatment.     Functional / Therapeutic Activities:   min  · TAPING / BRACING: NA  · Jt protection, ADL modification; Posture and      Assessment/Plan  59 yo female - S/P (L) RTSA - 2021  Doing well w/ PROM into FE, but more limited and painful ER     Progress per Plan of Care per guidelines -        _________________________________________________  Manual Therapy:    15     mins  71480;  Therapeutic Exercise:    20     mins  96998;     Neuromuscular Dion:        mins  89747;    Therapeutic Activity:          mins  63999;     Ultrasound:          mins  27422;  Iontophoresis:          mins  16231;    Electrical Stimulation:         mins  29143 ( );  Mechanical Traction:          mins  90451  Dry Needling          mins self-pay  ICE 15 min NC    Timed Treatment:   35   mins                  Total Treatment:     50   mins    Antonio Coley, PT  Physical Therapist

## 2021-06-03 ENCOUNTER — TREATMENT (OUTPATIENT)
Dept: PHYSICAL THERAPY | Facility: CLINIC | Age: 61
End: 2021-06-03

## 2021-06-03 DIAGNOSIS — Z96.612 S/P REVERSE TOTAL SHOULDER ARTHROPLASTY, LEFT: Primary | ICD-10-CM

## 2021-06-03 DIAGNOSIS — M25.612 SHOULDER STIFFNESS, LEFT: ICD-10-CM

## 2021-06-03 PROCEDURE — 97110 THERAPEUTIC EXERCISES: CPT | Performed by: PHYSICAL THERAPIST

## 2021-06-03 PROCEDURE — 97140 MANUAL THERAPY 1/> REGIONS: CPT | Performed by: PHYSICAL THERAPIST

## 2021-06-03 NOTE — PROGRESS NOTES
Physical Therapy Daily Progress Note    Visit # : 4  Pedrito Powell reports: yesterday was a tough day with increased pain rated 8/10; I've been waking up without my sling the last few days even though I'm going to bed with my sling on.  Pain is better today rated 2-3/10     Subjective     Objective   See Exercise, Manual, and Modality Logs for complete treatment.     Assessment/Plan  Notable tenderness/trigger pt L subscap contributing to ER restriction; improved mobility following trigger pt release and addition of scap mobilizations. Pt educated on strategies to maintain use of sling while sleeping.   Progress per Plan of Care       Timed:  Manual Therapy:    28     mins  04717;  Therapeutic Exercise:   12      mins  79176;     Neuromuscular Dion:    -    mins  43824;    Therapeutic Activity:     5     mins  90181;     Gait Training:      -     mins  69942;     Ultrasound:     -     mins  72472;    Iontophoresis                 -     mins 84914    Timed Treatment:   45   mins   Total Treatment:     60   mins      Yuly Leong, PT  Physical Therapist  [place] License # [id number]

## 2021-06-08 ENCOUNTER — TREATMENT (OUTPATIENT)
Dept: PHYSICAL THERAPY | Facility: CLINIC | Age: 61
End: 2021-06-08

## 2021-06-08 ENCOUNTER — TELEPHONE (OUTPATIENT)
Dept: ORTHOPEDIC SURGERY | Facility: HOSPITAL | Age: 61
End: 2021-06-08

## 2021-06-08 ENCOUNTER — TELEPHONE (OUTPATIENT)
Dept: INTERNAL MEDICINE | Facility: CLINIC | Age: 61
End: 2021-06-08

## 2021-06-08 DIAGNOSIS — Z96.612 S/P REVERSE TOTAL SHOULDER ARTHROPLASTY, LEFT: Primary | ICD-10-CM

## 2021-06-08 DIAGNOSIS — I10 HYPERTENSION, UNSPECIFIED TYPE: ICD-10-CM

## 2021-06-08 PROCEDURE — 97530 THERAPEUTIC ACTIVITIES: CPT | Performed by: PHYSICAL THERAPIST

## 2021-06-08 PROCEDURE — 97140 MANUAL THERAPY 1/> REGIONS: CPT | Performed by: PHYSICAL THERAPIST

## 2021-06-08 PROCEDURE — 97110 THERAPEUTIC EXERCISES: CPT | Performed by: PHYSICAL THERAPIST

## 2021-06-08 NOTE — TELEPHONE ENCOUNTER
Pharmacy Name: [id number] [address] AT [facility] - [phone] [facility] [phone]      Pharmacy representative name: PRECIOUS    Pharmacy representative phone number: [phone]    What medication are you calling in regards to: hydroCHLOROthiazide (HYDRODIURIL) 25 MG tablet    What question does the pharmacy have: THEY HAVE BEEN FAXING A REQUEST FOR OVER A WEEK AND HAVE NOT HEARD BACK.    Who is the provider that prescribed the medication: DR. LYNN    Additional notes:

## 2021-06-08 NOTE — TELEPHONE ENCOUNTER
Called and spoke with Ms. Powell today as she is SP Total Shoulder. She states that she is doing ok. But could be better. She is still taking the pain medication consistently and still having quite a bit of pain. She has started OP PT twice a week. She states that she is still tired all the time. Ms Powell states that her incision looks good she is having no issues there. She was concerned about a sharp pain that she has been getting down her arm occasionally. Encouraged her to be careful and not to overextend that arm as it can be easy to do. She states it comes and goes and it is usually after exercising or shower.  She has had no issues with B/B. She was wondering about putting something over the incision to help with scarring. Advised to wait a week or so and make sure the glue is off and it is fully healed before putting anything on there. She verbalized understanding. She has no other questions/concerns at this time. I gave her my contact information should she have any issues. She verbalized understanding.

## 2021-06-08 NOTE — PROGRESS NOTES
Physical Therapy Daily Progress Note    Patient Name: Pedrito Powell         :  1960  Referring Physician: Sherie Meehan APRN      Subjective   Pedrito Powell reports:  sharp pains thruout arm -all day since Saturday - using arm more as MD said she could - Sharp pain w/ exercises  morning - Saturday was fine -nothing happened - slept with sling -   Better Monday - much  Better  Today - back to baseline - not able to sleep well - only able to sleep for 1-2 hrs at a time then up for a couple of hours -   Still very fatigued after showering - very tired during day - only able to nap for a couple of hours -       Objective   RUE in sling  Very tight / tender w/ multiple trigger points delto-pec and pectoral region (L) -  Painful ER stretching  PROM: Up to 25-deg ER;  130-deg FE    See Exercise, Manual, and Modality Logs for complete treatment.     Functional / Therapeutic Activities:  08 min  · TAPING / BRACING: NA  · Shoulder assessment   · Reviewed precautions and discussed using LUE for light activities below shoulder level  · Jt protection, ADL modification; Posture corrections      Assessment/Plan  61 yo female - S/P (L) RTSA - 2021  Doing well w/ PROM into FE, but more limited and painful ER (less painful stretching FE w/ cane assist)    Progress strengthening /stabilization /functional activity       _________________________________________________  Manual Therapy:    25    mins  98207;  Therapeutic Exercise:    20     mins  57679;     Neuromuscular Dion:        mins  35493;    Therapeutic Activity:     08     mins  72069;     Ultrasound:          mins  37516;  Iontophoresis:          mins  34129;    Electrical Stimulation:         mins  67210 ( );  Mechanical Traction:          mins  18150  Dry Needling          mins self-pay  CP (after treatment) to (L) Shoulder:  15 min / NC    Timed Treatment:   53   mins                  Total Treatment:     75   mins    Antonio Coley  PT  Physical Therapist

## 2021-06-09 RX ORDER — HYDROCHLOROTHIAZIDE 25 MG/1
25 TABLET ORAL DAILY
Qty: 90 TABLET | Refills: 3 | Status: SHIPPED | OUTPATIENT
Start: 2021-06-09 | End: 2022-05-04 | Stop reason: DRUGHIGH

## 2021-06-10 ENCOUNTER — TREATMENT (OUTPATIENT)
Dept: PHYSICAL THERAPY | Facility: CLINIC | Age: 61
End: 2021-06-10

## 2021-06-10 DIAGNOSIS — Z96.612 S/P REVERSE TOTAL SHOULDER ARTHROPLASTY, LEFT: Primary | ICD-10-CM

## 2021-06-10 DIAGNOSIS — M25.612 SHOULDER STIFFNESS, LEFT: ICD-10-CM

## 2021-06-10 PROCEDURE — 97110 THERAPEUTIC EXERCISES: CPT | Performed by: PHYSICAL THERAPIST

## 2021-06-10 PROCEDURE — 97140 MANUAL THERAPY 1/> REGIONS: CPT | Performed by: PHYSICAL THERAPIST

## 2021-06-13 DIAGNOSIS — E78.5 HYPERLIPIDEMIA, UNSPECIFIED HYPERLIPIDEMIA TYPE: ICD-10-CM

## 2021-06-13 NOTE — PROGRESS NOTES
Physical Therapy Daily Progress Note    Patient Name: Pedrito Powell         :  1960  Referring Physician: Sherie Meehan APRN      Subjective   Pedrito Powell reports: tenderness in pec and scar region - ER stretch most painful    Objective   Tender w/ TPs and painful delto-pec and pec region (L) -   PROM FE >120-deg;  ER(POS) 25-30-deg    See Exercise, Manual, and Modality Logs for complete treatment.     Functional / Therapeutic Activities:   min  · TAPING / BRACING: NA  · Jt protection, ADL modification; Posture and      Assessment/Plan  61 yo female - S/P (L) RTSA - 2021  Doing well w/ PROM into FE, but more limited and painful ER (less painful stretching FE w/ cane assist) but improving     Progress per Plan of Care       _________________________________________________  Manual Therapy:    15     mins  85391;  Therapeutic Exercise:    23     mins  93993;     Neuromuscular Dion:        mins  08401;    Therapeutic Activity:          mins  81573;     Ultrasound:          mins  33297;  Iontophoresis:          mins  00673;    Electrical Stimulation:         mins  10370 ( );  Mechanical Traction:          mins  17856  CP: (at end)     15     mins NA    Timed Treatment:   38   mins                  Total Treatment:     60   mins    Antonio Coley PT  Physical Therapist

## 2021-06-14 RX ORDER — ATORVASTATIN CALCIUM 80 MG/1
80 TABLET, FILM COATED ORAL DAILY
Qty: 90 TABLET | Refills: 1 | Status: SHIPPED | OUTPATIENT
Start: 2021-06-14 | End: 2021-12-23 | Stop reason: SDUPTHER

## 2021-06-15 ENCOUNTER — TELEPHONE (OUTPATIENT)
Dept: ORTHOPEDIC SURGERY | Facility: CLINIC | Age: 61
End: 2021-06-15

## 2021-06-15 ENCOUNTER — TREATMENT (OUTPATIENT)
Dept: PHYSICAL THERAPY | Facility: CLINIC | Age: 61
End: 2021-06-15

## 2021-06-15 DIAGNOSIS — M25.612 SHOULDER STIFFNESS, LEFT: ICD-10-CM

## 2021-06-15 DIAGNOSIS — Z96.612 S/P REVERSE TOTAL SHOULDER ARTHROPLASTY, LEFT: Primary | ICD-10-CM

## 2021-06-15 PROCEDURE — 97140 MANUAL THERAPY 1/> REGIONS: CPT | Performed by: PHYSICAL THERAPIST

## 2021-06-15 PROCEDURE — 97530 THERAPEUTIC ACTIVITIES: CPT | Performed by: PHYSICAL THERAPIST

## 2021-06-15 PROCEDURE — 97110 THERAPEUTIC EXERCISES: CPT | Performed by: PHYSICAL THERAPIST

## 2021-06-15 NOTE — PROGRESS NOTES
Physical Therapy Daily Progress Note    Patient Name: Pedrito Powell         :  1960  Referring Physician: Sherie Meehan APRN      Subjective   Pedrito Powell reports: received silicone scar sheet - Intermittent sharp pain, but less often -     Objective   Pt presents w/ box of silicone scar sheets -   Very painful TPs delto-pec and into pec (L) -   Very tight ER, but improves w/ stretching - FE stiff, but >120-deg (L) shoulder -     See Exercise, Manual, and Modality Logs for complete treatment.     Functional / Therapeutic Activities:  08 min  · TAPING / BRACING: NA  · Assessed Silicone scar sheets, instructed Pt in use/care, placement -   · Placed silicone scar sheet over scar anterior (L) shoulder -   · Jt protection, ADL modification; Posture and      Assessment/Plan  61 yo female - S/P (L) RTSA - 2021  Doing well w/ PROM into FE, but more limited and painful ER (less painful stretching FE w/ cane assist) but improving     Progress strengthening /stabilization /functional activity       _________________________________________________  Manual Therapy:    20     mins  25002;  Therapeutic Exercise:    28     mins  43634;     Neuromuscular Dion:        mins  75570;   Therapeutic Activity:     08     mins  66973;     Ultrasound:          mins  99995;  Iontophoresis:          mins  10173;    Electrical Stimulation:         mins  95330 ( );  Mechanical Traction:          mins  50929  DIAN (L) Mitul     15     mins NC    Timed Treatment:   56   mins                  Total Treatment:     75   mins    Antonio Coley PT  Physical Therapist

## 2021-06-15 NOTE — TELEPHONE ENCOUNTER
Patient had a 6 week post op follow up with Sherie Meehan scheduled for 6-23-21 that needed to be rescheduled. Patient says she would rather see Dr. Devi for this appointment and his next available was 7-12-21. Is this ok to wait to see him? Please advise.

## 2021-06-15 NOTE — TELEPHONE ENCOUNTER
-- DO NOT REPLY / DO NOT REPLY ALL --  -- Message is from the [facility]--    Provider paged via [employer] Documentation - The below message was copied and pasted from a miacosa page:    Initiated Date/Time  11/28/2020 10:07 am  Message Sent Date/Time  11/28/2020 10:10 am  Source  [facility]  Department  [facility]  Method  Secure Text  Contacted  Julio Redding MD  Requested  Jarod Medina MD  Details  Patient  Message  [phone] [facility] CALLER NAME: MARLON RE: MARLON ANTUNEZ PATIENT 1957 PATIENT PCP: BINTA MEDINA PATIENT NEED HER MEDICAL SUPPLIES PAPERWORK FAX BACK , THE FAX NUMBER IS ON THE PAPERWORK AND SHE NEED HER SUPPLIES AS SOON AS POSSIBLE SHE WOULD ALSO LIKE TO RECEIVE A CALL.     It is okay to work her in with me during the first week of July if that works.

## 2021-06-16 RX ORDER — HYDROCODONE BITARTRATE AND ACETAMINOPHEN 7.5; 325 MG/1; MG/1
1 TABLET ORAL EVERY 4 HOURS PRN
Qty: 50 TABLET | Refills: 0 | Status: SHIPPED | OUTPATIENT
Start: 2021-06-16 | End: 2021-07-10 | Stop reason: SDUPTHER

## 2021-06-17 ENCOUNTER — TREATMENT (OUTPATIENT)
Dept: PHYSICAL THERAPY | Facility: CLINIC | Age: 61
End: 2021-06-17

## 2021-06-17 DIAGNOSIS — M25.612 SHOULDER STIFFNESS, LEFT: ICD-10-CM

## 2021-06-17 DIAGNOSIS — Z96.612 S/P REVERSE TOTAL SHOULDER ARTHROPLASTY, LEFT: Primary | ICD-10-CM

## 2021-06-17 PROCEDURE — 97110 THERAPEUTIC EXERCISES: CPT | Performed by: PHYSICAL THERAPIST

## 2021-06-17 PROCEDURE — 97140 MANUAL THERAPY 1/> REGIONS: CPT | Performed by: PHYSICAL THERAPIST

## 2021-06-17 NOTE — PROGRESS NOTES
Physical Therapy Daily Progress Note    Patient Name: Pedrito Powell         :  1960  Referring Physician: Sherie Meehan APRN      Subjective   Pedrito Powell reports: difficulty sleeping - No new problems -     Objective   Very tender w/ painful TPs Delt/pec, pec and tender scar   TIght ER, but improved to 40-deg w/ stretching/MT, etc.    See Exercise, Manual, and Modality Logs for complete treatment.     Functional / Therapeutic Activities:   min  · TAPING / BRACING: NA  · Jt protection, ADL modification; Posture and      Assessment/Plan  59 yo female - S/P (L) RTSA - 2021  Doing well w/ PROM into FE, but more limited and painful ER (less painful stretching FE w/ cane assist) but improving    Progress strengthening /stabilization /functional activity       _________________________________________________  Manual Therapy:            23     mins  80223;  Therapeutic Exercise:    30     mins  28243;     Neuromuscular Dion:        mins  08582;   Therapeutic Activity:           mins  33099;     Ultrasound:                          mins  52335;  Iontophoresis:                     mins  85447;    Electrical Stimulation:         mins  81404 ( );  Mechanical Traction:          mins  09041  DIAN Bauman (L)             15     mins NC     Timed Treatment:   53   mins                  Total Treatment:     75   mins    Antonio Coley PT  Physical Therapist

## 2021-06-18 DIAGNOSIS — I10 HYPERTENSION, UNSPECIFIED TYPE: ICD-10-CM

## 2021-06-18 RX ORDER — IRBESARTAN 300 MG/1
300 TABLET ORAL DAILY
Qty: 90 TABLET | Refills: 3 | Status: SHIPPED | OUTPATIENT
Start: 2021-06-18 | End: 2022-06-13

## 2021-06-22 ENCOUNTER — TREATMENT (OUTPATIENT)
Dept: PHYSICAL THERAPY | Facility: CLINIC | Age: 61
End: 2021-06-22

## 2021-06-22 DIAGNOSIS — M25.612 SHOULDER STIFFNESS, LEFT: ICD-10-CM

## 2021-06-22 DIAGNOSIS — Z96.612 S/P REVERSE TOTAL SHOULDER ARTHROPLASTY, LEFT: Primary | ICD-10-CM

## 2021-06-22 PROCEDURE — 97140 MANUAL THERAPY 1/> REGIONS: CPT | Performed by: PHYSICAL THERAPIST

## 2021-06-22 PROCEDURE — 97110 THERAPEUTIC EXERCISES: CPT | Performed by: PHYSICAL THERAPIST

## 2021-06-22 PROCEDURE — 97530 THERAPEUTIC ACTIVITIES: CPT | Performed by: PHYSICAL THERAPIST

## 2021-06-22 NOTE — PROGRESS NOTES
------------------------------------------------------------------------------------------------------   MD PROGRESS NOTE    Patient: Pedrito Powell        : 1960  Diagnosis/ICD-10 Code:  S/P reverse total shoulder arthroplasty, left [Z96.612]  Referring practitioner: KALYANI Cornelius  Date of Initial Visit: 2021                  Today's Date: 2021  _________________________________________________________________    Thank you for the referral of Ms. Powell to [facility] Physical Therapy.  Ms. Powell has attended 10 PT sessions and their treatment has consisted of: modalities prn, manual therapy, therapeutic exercise, patient education, and HEP.     Subjective   Pedrito Powell reports: she was supposed to have a f/u tomorrow, but it was cancelled by the office and no f/u until July  No sharp pains this weekend - Went to [place] - relative in ICU, etc. Rode in car for 4 hours painful - Couldn't move it while visiting and warm, so skin irritation - Skin irritation also from leaving silicone scar sheet on 24 hrs - unable to do exercises while out of town -   ___________________________________________________________________  Objective              OBSERVATION: skin / scar look fine - scar healing well -               PALPATION: Significantly tender along scar and delto-pec/pec mm w/ painful trigger points -         AROM: NA due to treatment guidelines               PROM: ER (plane of scapula) 30-deg;     STRENGTH: Grossly 3/5 FF, ABD, Ext (L)    SENSATION: Grossly intact LUE to LT   SPECIAL TESTS: NA   ACTIVITY TOLERANCE: Improved tolerance to light ADLs below shoulder level -      See Exercise, Manual, and Modality Logs for complete treatment.      Functional / Therapeutic Activities:  X 15 min  · TAPING / BRACING: NA  · FUNCTIONAL ASSESSMENT    · Jt protection, ADL modification; Posture and    ___________________________________________________________________    Assessment/Plan  59 yo female - S/P (L) RTSA - 5/13/2021  Doing well w/ PROM into FE, but more limited and painful ER (less painful stretching FE w/ cane assist) but improving  Ms. Powell would benefit from continued Physical Therapy.     P:Recommend continued Physical Therapy to allow a full and safe return to ADL's and normal job duties 2 times/wk x 4 weeks progressing per treatment guidelines .     Thank you again for this referral of Ms. Powell to [facility].    PT Signature: ______________________________   Antonio Coley, PT  ______________________________________________________  Based upon review of the patient's progress and continued therapy plan, it is my medical opinion that Pedrito Powell should continue physical therapy treatment per the recommendation above.     Signature: ____________________________   Date:  ________   Sherie Meehan APRN  ____________________________________________________________________  Manual Therapy:    15     mins  16115;  Therapeutic Exercise:    30     mins  02274;     Neuromuscular Dion:        mins  24755;    Therapeutic Activity:     15     mins  72323;   Self Care:                          mins  86229  Ultrasound:          mins  83934;  Iontophoresis:          mins  16295;    Electrical Stimulation:         mins  49423 ( );  Mechanical Traction:          mins  56663  DIAN Bauman (L)             15     mins NC    Timed Treatment:   60   mins                  Total Treatment:     80   mins    ______________________________________________________________________  [address] [place] [address]  Phone: [phone] Fax: [phone]

## 2021-06-24 ENCOUNTER — TREATMENT (OUTPATIENT)
Dept: PHYSICAL THERAPY | Facility: CLINIC | Age: 61
End: 2021-06-24

## 2021-06-24 DIAGNOSIS — M25.612 SHOULDER STIFFNESS, LEFT: ICD-10-CM

## 2021-06-24 DIAGNOSIS — Z96.612 S/P REVERSE TOTAL SHOULDER ARTHROPLASTY, LEFT: Primary | ICD-10-CM

## 2021-06-24 PROCEDURE — 97530 THERAPEUTIC ACTIVITIES: CPT | Performed by: PHYSICAL THERAPIST

## 2021-06-24 PROCEDURE — 97110 THERAPEUTIC EXERCISES: CPT | Performed by: PHYSICAL THERAPIST

## 2021-06-24 PROCEDURE — 97140 MANUAL THERAPY 1/> REGIONS: CPT | Performed by: PHYSICAL THERAPIST

## 2021-06-24 NOTE — PROGRESS NOTES
Physical Therapy Daily Progress Note    Patient Name: Pedrito Powell         :  1960  Referring Physician: Sherie Meehan APRN      Subjective   Pedrito Powell reports: fatigue from showering/ getting ready,etc - Using arm more doing dishes -     Objective   Delto-pec,pec region much less tight and fewer painful trigger points noted-   ER up to 45-deg;  +-deg (L) shoulder      See Exercise, Manual, and Modality Logs for complete treatment.     Functional / Therapeutic Activities:  08 min  · TAPING / BRACING: NA  · SEE EXERCISE FLOW SHEET -   · Jt protection, ADL modification; Posture and      Assessment/Plan  59 yo female - S/P (L) RTSA - 2021  Doing well w/ PROM into FE, but more limited and painful ER (less painful stretching FE w/ cane assist) but improving    Progress per Plan of Care per guidelines        _________________________________________________    Manual Therapy:            20     mins  09282;  Therapeutic Exercise:    35    mins  92069;     Neuromuscular Dion:        mins  23784;    Therapeutic Activity:       08     mins  00259;     Ultrasound:                          mins  02723;  Iontophoresis:                     mins  13465;    Electrical Stimulation:         mins  21506 ( );  Mechanical Traction:          mins  59848  DIAN (L) Mitul             15     mins NC     Timed Treatment:   63    mins                  Total Treatment:     80   mins    Antonio Coley PT  Physical Therapist

## 2021-06-29 ENCOUNTER — TREATMENT (OUTPATIENT)
Dept: PHYSICAL THERAPY | Facility: CLINIC | Age: 61
End: 2021-06-29

## 2021-06-29 DIAGNOSIS — Z96.612 S/P REVERSE TOTAL SHOULDER ARTHROPLASTY, LEFT: Primary | ICD-10-CM

## 2021-06-29 DIAGNOSIS — M25.612 SHOULDER STIFFNESS, LEFT: ICD-10-CM

## 2021-06-29 PROCEDURE — 97110 THERAPEUTIC EXERCISES: CPT | Performed by: PHYSICAL THERAPIST

## 2021-06-29 PROCEDURE — 97530 THERAPEUTIC ACTIVITIES: CPT | Performed by: PHYSICAL THERAPIST

## 2021-06-29 PROCEDURE — 97140 MANUAL THERAPY 1/> REGIONS: CPT | Performed by: PHYSICAL THERAPIST

## 2021-06-29 NOTE — PROGRESS NOTES
Physical Therapy Daily Progress Note    Patient Name: Pedrito Powell         :  1960  Referring Physician: Sherie Meehan APRN      Subjective   Pedrito Powell reports: had to drive to [place] again this weekend resulting in increased pain in her (L) shoulder - unable to stretch due to being in car and sitting in hospital due to mother-in-law illness -     Objective   Tender w/ TPs delto-pec / pec, and into prox biceps - less overall -     See Exercise, Manual, and Modality Logs for complete treatment.     Functional / Therapeutic Activities: 15 min  · TAPING / BRACING: NA  · SEE EXERCISE FLOW SHEET -   · Jt protection, ADL modification; Posture and      Assessment/Plan  59 yo female - S/P (L) RTSA - 2021  Doing well w/ PROM into FE, but more limited and painful ER (less painful stretching FE w/ cane assist) but improving    Progress per Plan of Care       _________________________________________________    Manual Therapy:            15     mins  30870;  Therapeutic Exercise:    30    mins  22812;     Neuromuscular Dion:        mins  62855;    Therapeutic Activity:       15     mins  49266;     Ultrasound:                          mins  81396;  Iontophoresis:                     mins  16759;    Electrical Stimulation:         mins  98654 ( );  Mechanical Traction:          mins  67113  CP (L) Mitul             15     mins NC     Timed Treatment:   60    mins                  Total Treatment:     80   mins    Antonio Coley PT  Physical Therapist

## 2021-07-01 ENCOUNTER — TREATMENT (OUTPATIENT)
Dept: PHYSICAL THERAPY | Facility: CLINIC | Age: 61
End: 2021-07-01

## 2021-07-01 DIAGNOSIS — Z96.612 S/P REVERSE TOTAL SHOULDER ARTHROPLASTY, LEFT: Primary | ICD-10-CM

## 2021-07-01 DIAGNOSIS — M25.612 SHOULDER STIFFNESS, LEFT: ICD-10-CM

## 2021-07-01 PROCEDURE — 97110 THERAPEUTIC EXERCISES: CPT | Performed by: PHYSICAL THERAPIST

## 2021-07-01 PROCEDURE — 97140 MANUAL THERAPY 1/> REGIONS: CPT | Performed by: PHYSICAL THERAPIST

## 2021-07-01 PROCEDURE — 97530 THERAPEUTIC ACTIVITIES: CPT | Performed by: PHYSICAL THERAPIST

## 2021-07-01 NOTE — PROGRESS NOTES
------------------------------------------------------------------------------------------------------   MD PROGRESS NOTE     Patient: Pedrito Powell        : 1960  Diagnosis/ICD-10 Code:  S/P reverse total shoulder arthroplasty, left [Z96.612]  Referring practitioner: Bethel Devi MD / KALYANI Cornelius  Date of Initial Visit: 2021                  Today's Date: 2021  _________________________________________________________________     Thank you for the referral of Ms. Powell to [facility].  Ms. Powell has attended 13 PT sessions and their treatment has consisted of: modalities prn, manual therapy, therapeutic exercise, patient education, and HEP.      Subjective   Pedrito Powell reports: soreness, but much fewer sharp pains overall - using it more and easier below shoulder level - More pain when she can't do exercises like after long car trips, etc. More able to wash hair, face, etc w/ bending over - able to dress self now - hair care w/    ___________________________________________________________________  Objective              OBSERVATION: skin / scar look fine - scar healing well -               PALPATION: Significantly tender along scar and delto-pec/pec mm w/ painful trigger points               AROM: FE/FF 80-deg (better than before surgery)                PROM: ER (plane of scapula) up to 45-deg;  -deg              STRENGTH: Grossly 3/5 FF, ABD, Ext (L)               SENSATION: Grossly intact LUE to LT              SPECIAL TESTS: NA              ACTIVITY TOLERANCE: Improved tolerance to light ADLs below shoulder level -               See Exercise, Manual, and Modality Logs for complete treatment.      Functional / Therapeutic Activities:  X 23 min  · TAPING / BRACING: NA  · SEE EXERCISE FLOW SHEET -   · FUNCTIONAL ASSESSMENT    · Jt protection, ADL modification; Posture and     ___________________________________________________________________   Assessment/Plan  59 yo female - S/P (L) RTSA - 5/13/2021  Doing well w/ P/AA/AROM into FE, but more limited and painful ER but improving.   Ms. Powell would benefit from continued Physical Therapy.      P: Recommend continued Physical Therapy to allow a full and safe return to ADL's and normal job duties 2 times/wk x 4 weeks progressing per treatment guidelines .      Thank you again for this referral of Ms. Powell to [facility].     PT Signature: ______________________________   Antonio Coley, PT  ______________________________________________________  Based upon review of the patient's progress and continued therapy plan, it is my medical opinion that Pedrito Powell should continue physical therapy treatment per the recommendation above.      Signature: ____________________________   Date:  ________   Bethel Devi MD / Sherie Meehan, KALYANI  ____________________________________________________________________  Manual Therapy:            15     mins  57336;  Therapeutic Exercise:    30     mins  83805;     Neuromuscular Dion:        mins  32498;    Therapeutic Activity:      23     mins  45699;   Self Care:                          mins  69753  Ultrasound:                          mins  86946;  Iontophoresis:                     mins  88157;    Electrical Stimulation:         mins  83088 ( );  Mechanical Traction:          mins  89856  DIAN Bauman (L)             15     mins NC     Timed Treatment:   68   mins                  Total Treatment:     90   mins     ______________________________________________________________________  [address] [place] [address]  Phone: [phone]                 Fax: [phone]

## 2021-07-01 NOTE — PROGRESS NOTES
Physical Therapy Daily Progress Note    Patient Name: Pedrito Powell         :  1960  Referring Physician: Sherie Meehan APRN      Subjective   Pedrito Powell reports: -SEE MD PROGRESS NOTE-      Objective   -SEE MD PROGRESS NOTE-    Assessment/Plan  -SEE MD PROGRESS NOTE-        Antonio Coley, PT  Physical Therapist

## 2021-07-06 NOTE — PROGRESS NOTES
I have reviewed the notes, assessments, and/or procedures performed by Antonio Coley PT, I concur with her/his documentation of Pedrito Powell.

## 2021-07-07 ENCOUNTER — OFFICE VISIT (OUTPATIENT)
Dept: ORTHOPEDIC SURGERY | Facility: CLINIC | Age: 61
End: 2021-07-07

## 2021-07-07 VITALS — HEIGHT: 60 IN | BODY MASS INDEX: 43.19 KG/M2 | WEIGHT: 220 LBS | TEMPERATURE: 96.8 F

## 2021-07-07 DIAGNOSIS — Z09 SURGERY FOLLOW-UP: Primary | ICD-10-CM

## 2021-07-07 PROCEDURE — 99024 POSTOP FOLLOW-UP VISIT: CPT | Performed by: ORTHOPAEDIC SURGERY

## 2021-07-07 NOTE — PROGRESS NOTES
"Pedrito Powell : 1960 MRN: [id number] DATE: 2021    DIAGNOSIS: 6 week follow up left shoulder arthroplasty      SUBJECTIVE:  Patient returns today for 6 week follow up of left shoulder replacement. Patient reports doing well with no unusual complaints. She missed several PT appointment due to her mother-in-laws unexpected death.    OBJECTIVE:    Temp 96.8 °F (36 °C)   Ht 152.4 cm (60\")   Wt 99.8 kg (220 lb)   BMI 42.97 kg/m²     Exam: The incision is well healed. No erythema or drainage. Shoulder moves fluidly with pendulums.  Motion is on track per protocol.  The arm is soft and nontender.  Good motor and sensory function.  Palpable distal pulses.     DIAGNOSTIC STUDIES    Xrays: AP and scapular Y views of the left shoulder are ordered and reviewed for evaluation of shoulder replacement.  They demonstrate a well positioned, well aligned replacement without complicating factors noted.  In comparison with previous films there has been no change.    ASSESSMENT: 6 week follow up left shoulder replacement    PLAN:   1.  Continue PT per protocol.  2.  Discontinue sling and begin working on progressing ROM as tolerated.  3.  Counseled patient about appropriate activity modifications and restrictions.  Released to drive at this point.    Bethel Devi MD    2021     "

## 2021-07-08 ENCOUNTER — TREATMENT (OUTPATIENT)
Dept: PHYSICAL THERAPY | Facility: CLINIC | Age: 61
End: 2021-07-08

## 2021-07-08 DIAGNOSIS — M25.612 SHOULDER STIFFNESS, LEFT: ICD-10-CM

## 2021-07-08 DIAGNOSIS — M17.0 PRIMARY OSTEOARTHRITIS OF BOTH KNEES: ICD-10-CM

## 2021-07-08 DIAGNOSIS — Z96.612 S/P REVERSE TOTAL SHOULDER ARTHROPLASTY, LEFT: Primary | ICD-10-CM

## 2021-07-08 PROCEDURE — 97140 MANUAL THERAPY 1/> REGIONS: CPT | Performed by: PHYSICAL THERAPIST

## 2021-07-08 PROCEDURE — 97110 THERAPEUTIC EXERCISES: CPT | Performed by: PHYSICAL THERAPIST

## 2021-07-08 PROCEDURE — 97530 THERAPEUTIC ACTIVITIES: CPT | Performed by: PHYSICAL THERAPIST

## 2021-07-08 NOTE — PROGRESS NOTES
Physical Therapy Daily Progress Note    Patient Name: Pedrito Powell         :  1960  Referring Physician: Sherie Meehan APRN      Subjective   Pedrito Powell reports: seeing MD - continue Physical Therapy -  Told not to lift anything heavier than a cup of knight    Objective   Very stiff into ER and ER/ABDuction - (R) Shoulder - very tender / tight delto-pec, pec region and into bicep (L) Shoulder    See Exercise, Manual, and Modality Logs for complete treatment.     Functional / Therapeutic Activities:  20 min  · TAPING / BRACING: NA  · Jt protection, ADL modification; Posture and      Assessment/Plan  59 yo female - S/P (L) RTSA - 2021  Doing well w/ P/AA/AROM into FE, but more limited and painful ER but improving    Progress strengthening /stabilization /functional activity per guidelines -        _________________________________________________    Manual Therapy:            20     mins  87626;  Therapeutic Exercise:    28    mins  90024;     Neuromuscular Dion:        mins  68594;    Therapeutic Activity:     20      mins  99313;     Ultrasound:                          mins  77866;  Iontophoresis:                     mins  46695;    Electrical Stimulation:         mins  16558 ( );  Mechanical Traction:          mins  58351  Dry Needling                       mins self-pay  CP (L) Mitul             15     mins NC     Timed Treatment:   68    mins                  Total Treatment:     90    mins    Antonio Coley PT  Physical Therapist

## 2021-07-12 RX ORDER — HYDROCODONE BITARTRATE AND ACETAMINOPHEN 7.5; 325 MG/1; MG/1
1 TABLET ORAL EVERY 4 HOURS PRN
Qty: 50 TABLET | Refills: 0 | Status: SHIPPED | OUTPATIENT
Start: 2021-07-12 | End: 2021-08-03 | Stop reason: SDUPTHER

## 2021-07-13 ENCOUNTER — TREATMENT (OUTPATIENT)
Dept: PHYSICAL THERAPY | Facility: CLINIC | Age: 61
End: 2021-07-13

## 2021-07-13 DIAGNOSIS — M25.612 SHOULDER STIFFNESS, LEFT: ICD-10-CM

## 2021-07-13 DIAGNOSIS — Z96.612 S/P REVERSE TOTAL SHOULDER ARTHROPLASTY, LEFT: Primary | ICD-10-CM

## 2021-07-13 PROCEDURE — 97140 MANUAL THERAPY 1/> REGIONS: CPT | Performed by: PHYSICAL THERAPIST

## 2021-07-13 PROCEDURE — 97530 THERAPEUTIC ACTIVITIES: CPT | Performed by: PHYSICAL THERAPIST

## 2021-07-13 PROCEDURE — 97110 THERAPEUTIC EXERCISES: CPT | Performed by: PHYSICAL THERAPIST

## 2021-07-13 NOTE — PROGRESS NOTES
Physical Therapy Daily Progress Note    Patient Name: Pedrito Powell         :  1960  Referring Physician: Sherie Meehan APRN      Subjective   Pedrito Powell reports: incision pain yesterday - stitching at the time - only lasted 5 min or so -   Trying to use arm more - Drove self to clinic today - Had been practicing for the last few days -     Objective   Less tight and tender pec, delt-pec region and less tender anterior shoulder and biceps (L)  Grossly improved PROM ER and FE    See Exercise, Manual, and Modality Logs for complete treatment.     Functional / Therapeutic Activities:  23 min  · TAPING / BRACING: NA  · SEE EXERCISE FLOW SHEET -   · Jt protection, ADL modification; Posture and      Assessment/Plan  59 yo female - S/P (L) RTSA - 2021  Doing well w/ P/AA/AROM into FE, but more limited and painful ER but improving     Progress strengthening /stabilization /functional activity per guidelines -      _________________________________________________    Manual Therapy:            10     mins  62283;  Therapeutic Exercise:    30    mins  47529;     Neuromuscular Dion:        mins  12019;    Therapeutic Activity:     23      mins  90015;     Ultrasound:                          mins  49847;  Iontophoresis:                     mins  31438;    Electrical Stimulation:         mins  41227 ( );  Mechanical Traction:          mins  76578  Dry Needling                       mins self-pay  CP (L) Mitul             15     mins NC     Timed Treatment:   63    mins                  Total Treatment:     85    mins    Antonio Coley PT  Physical Therapist

## 2021-07-16 ENCOUNTER — TREATMENT (OUTPATIENT)
Dept: PHYSICAL THERAPY | Facility: CLINIC | Age: 61
End: 2021-07-16

## 2021-07-16 DIAGNOSIS — M25.612 SHOULDER STIFFNESS, LEFT: ICD-10-CM

## 2021-07-16 DIAGNOSIS — Z96.612 S/P REVERSE TOTAL SHOULDER ARTHROPLASTY, LEFT: Primary | ICD-10-CM

## 2021-07-16 PROCEDURE — 97110 THERAPEUTIC EXERCISES: CPT | Performed by: PHYSICAL THERAPIST

## 2021-07-16 PROCEDURE — 97530 THERAPEUTIC ACTIVITIES: CPT | Performed by: PHYSICAL THERAPIST

## 2021-07-16 PROCEDURE — 97140 MANUAL THERAPY 1/> REGIONS: CPT | Performed by: PHYSICAL THERAPIST

## 2021-07-16 NOTE — PROGRESS NOTES
Physical Therapy Daily Progress Note    Patient Name: Pedrito Powell         :  1960  Referring Physician: Sherie Meehan APRN      Subjective   Pedrito Powell reports: continued improvement overall - using arm more - feels itchy and achy at times - No shooting pains since last session     Objective   Less tight, tender and fewer trigger points ant shoulder, pec, delto-pec region (L)   Grossly improved AROM w/ therapy activiites LUE -     See Exercise, Manual, and Modality Logs for complete treatment.     Functional / Therapeutic Activities:  23 min  · TAPING / BRACING: NA  · SEE EXERCISE FLOW SHEET -   · Jt protection, ADL modification; Posture and       Assessment/Plan  61 yo female - S/P (L) RTSA - 2021  Doing well w/ P/AA/AROM into FE, but more limited and painful ER but improving     Progress strengthening /stabilization /functional activity per guidelines -   _________________________________________________     Manual Therapy:            10     mins  25644;  Therapeutic Exercise:    25    mins  34088;     Neuromuscular Dion:        mins  28329;    Therapeutic Activity:     25      mins  38901;     Ultrasound:                          mins  95460;  Iontophoresis:                     mins  26369;    Electrical Stimulation:         mins  21652 ( );  Mechanical Traction:          mins  96236  Dry Needling                       mins self-pay  CP (L) Mitul             15     mins NC     Timed Treatment:   60    mins                  Total Treatment:     85    mins    Antonio Coley PT  Physical Therapist

## 2021-07-20 ENCOUNTER — TREATMENT (OUTPATIENT)
Dept: PHYSICAL THERAPY | Facility: CLINIC | Age: 61
End: 2021-07-20

## 2021-07-20 DIAGNOSIS — M17.0 PRIMARY OSTEOARTHRITIS OF BOTH KNEES: ICD-10-CM

## 2021-07-20 DIAGNOSIS — M25.612 SHOULDER STIFFNESS, LEFT: ICD-10-CM

## 2021-07-20 DIAGNOSIS — Z96.612 S/P REVERSE TOTAL SHOULDER ARTHROPLASTY, LEFT: Primary | ICD-10-CM

## 2021-07-20 PROCEDURE — 97530 THERAPEUTIC ACTIVITIES: CPT | Performed by: PHYSICAL THERAPIST

## 2021-07-20 PROCEDURE — 97110 THERAPEUTIC EXERCISES: CPT | Performed by: PHYSICAL THERAPIST

## 2021-07-20 PROCEDURE — 97140 MANUAL THERAPY 1/> REGIONS: CPT | Performed by: PHYSICAL THERAPIST

## 2021-07-20 NOTE — PROGRESS NOTES
Physical Therapy Daily Progress Note    Patient Name: Pedrito Powell         :  1960  Referring Physician: Sherie Meehan APRN      Subjective   Pedrito Powell reports: Saturday had a stitching retreat - 6 hrs of stitching and had sig pain that night - better by morning -     Objective   Very stiff / tight painful anterior shoulder, pec, biceps and limited ER (L) today -    See Exercise, Manual, and Modality Logs for complete treatment.     Functional / Therapeutic Activities:  23 min  · TAPING / BRACING: NA  · SEE EXERCISE FLOW SHEET -   · Jt protection, ADL modification; Posture and      Assessment/Plan  61 yo female - S/P (L) RTSA - 2021  Doing well w/ P/AA/AROM into FE, but more limited and painful ER and pec, etc after weekend of prolonged knitting -      Progress strengthening /stabilization /functional activity per guidelines -   _________________________________________________     Manual Therapy:            10     mins  05664;  Therapeutic Exercise:    25    mins  27589;     Neuromuscular Dion:        mins  12410;    Therapeutic Activity:     23      mins  96396;     Ultrasound:                          mins  51755;  Iontophoresis:                     mins  28403;    Electrical Stimulation:         mins  47495 ( );  Mechanical Traction:          mins  30806  Dry Needling                       mins self-pay  CP (L) Mitul             15     mins NC     Timed Treatment:   58    mins                  Total Treatment:     80    mins    Antonio Coley PT  Physical Therapist

## 2021-07-23 ENCOUNTER — TREATMENT (OUTPATIENT)
Dept: PHYSICAL THERAPY | Facility: CLINIC | Age: 61
End: 2021-07-23

## 2021-07-23 DIAGNOSIS — Z96.612 S/P REVERSE TOTAL SHOULDER ARTHROPLASTY, LEFT: Primary | ICD-10-CM

## 2021-07-23 DIAGNOSIS — M25.612 SHOULDER STIFFNESS, LEFT: ICD-10-CM

## 2021-07-23 PROCEDURE — 97530 THERAPEUTIC ACTIVITIES: CPT | Performed by: PHYSICAL THERAPIST

## 2021-07-23 PROCEDURE — 97110 THERAPEUTIC EXERCISES: CPT | Performed by: PHYSICAL THERAPIST

## 2021-07-23 PROCEDURE — 97140 MANUAL THERAPY 1/> REGIONS: CPT | Performed by: PHYSICAL THERAPIST

## 2021-07-23 NOTE — PROGRESS NOTES
Physical Therapy Daily Progress Note    Patient Name: Pedrito Powell         :  1960  Referring Physician: Sherie Meehan APRN      Subjective   Pedrito Powell reports: tender to touch, intermittent soreness, ache, itches - trying to do more normal things - unable to lift iron from floor past her waist -    Objective   Tender elto-pec, pec, bicep regions w/ TPs - Stiff into ER -     See Exercise, Manual, and Modality Logs for complete treatment.     Functional / Therapeutic Activities:  23 min  · TAPING / BRACING: NA  · SEE EXERCISE FLOW SHEET -   · Jt protection, ADL modification; Posture and       Assessment/Plan  61 yo female - S/P (L) RTSA - 2021  Doing well w/ P/AA/AROM into FE, but more limited and painful ER and pec, etc after weekend of prolonged knitting -      Progress strengthening /stabilization /functional activity per guidelines -      _________________________________________________    Manual Therapy:            10     mins  90160;  Therapeutic Exercise:    28    mins  49303;     Neuromuscular Dion:        mins  46430;    Therapeutic Activity:     23      mins  83225;     Ultrasound:                          mins  47434;  Iontophoresis:                     mins  20028;    Electrical Stimulation:         mins  07900 ( );  Mechanical Traction:          mins  92172  Dry Needling                       mins self-pay  CP (L) Mitul             15     mins NC     Timed Treatment:   61   mins                  Total Treatment:     80    mins    Antonio Coley PT  Physical Therapist

## 2021-07-26 ENCOUNTER — LAB (OUTPATIENT)
Dept: LAB | Facility: HOSPITAL | Age: 61
End: 2021-07-26

## 2021-07-26 ENCOUNTER — OFFICE VISIT (OUTPATIENT)
Dept: INTERNAL MEDICINE | Facility: CLINIC | Age: 61
End: 2021-07-26

## 2021-07-26 VITALS
WEIGHT: 229 LBS | DIASTOLIC BLOOD PRESSURE: 84 MMHG | HEART RATE: 91 BPM | RESPIRATION RATE: 16 BRPM | SYSTOLIC BLOOD PRESSURE: 136 MMHG | OXYGEN SATURATION: 99 % | BODY MASS INDEX: 44.72 KG/M2

## 2021-07-26 DIAGNOSIS — Z12.31 ENCOUNTER FOR SCREENING MAMMOGRAM FOR MALIGNANT NEOPLASM OF BREAST: ICD-10-CM

## 2021-07-26 DIAGNOSIS — Z00.00 HEALTHCARE MAINTENANCE: ICD-10-CM

## 2021-07-26 DIAGNOSIS — Z00.00 WELL WOMAN EXAM (NO GYNECOLOGICAL EXAM): Primary | ICD-10-CM

## 2021-07-26 LAB
25(OH)D3 SERPL-MCNC: 46.5 NG/ML (ref 30–100)
ALBUMIN SERPL-MCNC: 4.2 G/DL (ref 3.5–5.2)
ALBUMIN UR-MCNC: 2.1 MG/DL
ALBUMIN/GLOB SERPL: 1.5 G/DL
ALP SERPL-CCNC: 56 U/L (ref 39–117)
ALT SERPL W P-5'-P-CCNC: 14 U/L (ref 1–33)
ANION GAP SERPL CALCULATED.3IONS-SCNC: 12.9 MMOL/L (ref 5–15)
AST SERPL-CCNC: 16 U/L (ref 1–32)
BASOPHILS # BLD AUTO: 0.07 10*3/MM3 (ref 0–0.2)
BASOPHILS NFR BLD AUTO: 0.8 % (ref 0–1.5)
BILIRUB SERPL-MCNC: 0.4 MG/DL (ref 0–1.2)
BUN SERPL-MCNC: 31 MG/DL (ref 8–23)
BUN/CREAT SERPL: 18.5 (ref 7–25)
CALCIUM SPEC-SCNC: 9 MG/DL (ref 8.6–10.5)
CHLORIDE SERPL-SCNC: 100 MMOL/L (ref 98–107)
CHOLEST SERPL-MCNC: 168 MG/DL (ref 0–200)
CO2 SERPL-SCNC: 24.1 MMOL/L (ref 22–29)
CREAT SERPL-MCNC: 1.68 MG/DL (ref 0.57–1)
CREAT UR-MCNC: 150.3 MG/DL
DEPRECATED RDW RBC AUTO: 43.2 FL (ref 37–54)
EOSINOPHIL # BLD AUTO: 0.23 10*3/MM3 (ref 0–0.4)
EOSINOPHIL NFR BLD AUTO: 2.7 % (ref 0.3–6.2)
ERYTHROCYTE [DISTWIDTH] IN BLOOD BY AUTOMATED COUNT: 12.5 % (ref 12.3–15.4)
GFR SERPL CREATININE-BSD FRML MDRD: 31 ML/MIN/1.73
GLOBULIN UR ELPH-MCNC: 2.8 GM/DL
GLUCOSE SERPL-MCNC: 100 MG/DL (ref 65–99)
HBA1C MFR BLD: 5.65 % (ref 4.8–5.6)
HCT VFR BLD AUTO: 36 % (ref 34–46.6)
HCV AB SER DONR QL: NORMAL
HDLC SERPL-MCNC: 48 MG/DL (ref 40–60)
HGB BLD-MCNC: 11.6 G/DL (ref 12–15.9)
IMM GRANULOCYTES # BLD AUTO: 0.02 10*3/MM3 (ref 0–0.05)
IMM GRANULOCYTES NFR BLD AUTO: 0.2 % (ref 0–0.5)
LDLC SERPL CALC-MCNC: 103 MG/DL (ref 0–100)
LDLC/HDLC SERPL: 2.13 {RATIO}
LYMPHOCYTES # BLD AUTO: 1.6 10*3/MM3 (ref 0.7–3.1)
LYMPHOCYTES NFR BLD AUTO: 19 % (ref 19.6–45.3)
MCH RBC QN AUTO: 30.5 PG (ref 26.6–33)
MCHC RBC AUTO-ENTMCNC: 32.2 G/DL (ref 31.5–35.7)
MCV RBC AUTO: 94.7 FL (ref 79–97)
MICROALBUMIN/CREAT UR: 14 MG/G
MONOCYTES # BLD AUTO: 0.54 10*3/MM3 (ref 0.1–0.9)
MONOCYTES NFR BLD AUTO: 6.4 % (ref 5–12)
NEUTROPHILS NFR BLD AUTO: 5.96 10*3/MM3 (ref 1.7–7)
NEUTROPHILS NFR BLD AUTO: 70.9 % (ref 42.7–76)
NRBC BLD AUTO-RTO: 0 /100 WBC (ref 0–0.2)
PLATELET # BLD AUTO: 267 10*3/MM3 (ref 140–450)
PMV BLD AUTO: 10.5 FL (ref 6–12)
POTASSIUM SERPL-SCNC: 3.8 MMOL/L (ref 3.5–5.2)
PROT SERPL-MCNC: 7 G/DL (ref 6–8.5)
RBC # BLD AUTO: 3.8 10*6/MM3 (ref 3.77–5.28)
SODIUM SERPL-SCNC: 137 MMOL/L (ref 136–145)
T-UPTAKE NFR SERPL: 0.97 TBI (ref 0.8–1.3)
T4 SERPL-MCNC: 8.23 MCG/DL (ref 4.5–11.7)
TRIGL SERPL-MCNC: 89 MG/DL (ref 0–150)
TSH SERPL DL<=0.05 MIU/L-ACNC: 1.45 UIU/ML (ref 0.27–4.2)
VLDLC SERPL-MCNC: 17 MG/DL (ref 5–40)
WBC # BLD AUTO: 8.42 10*3/MM3 (ref 3.4–10.8)

## 2021-07-26 PROCEDURE — 84436 ASSAY OF TOTAL THYROXINE: CPT | Performed by: FAMILY MEDICINE

## 2021-07-26 PROCEDURE — 99396 PREV VISIT EST AGE 40-64: CPT | Performed by: FAMILY MEDICINE

## 2021-07-26 PROCEDURE — 85025 COMPLETE CBC W/AUTO DIFF WBC: CPT | Performed by: FAMILY MEDICINE

## 2021-07-26 PROCEDURE — 83036 HEMOGLOBIN GLYCOSYLATED A1C: CPT | Performed by: FAMILY MEDICINE

## 2021-07-26 PROCEDURE — 82043 UR ALBUMIN QUANTITATIVE: CPT | Performed by: FAMILY MEDICINE

## 2021-07-26 PROCEDURE — 86803 HEPATITIS C AB TEST: CPT | Performed by: FAMILY MEDICINE

## 2021-07-26 PROCEDURE — 84479 ASSAY OF THYROID (T3 OR T4): CPT | Performed by: FAMILY MEDICINE

## 2021-07-26 PROCEDURE — 80053 COMPREHEN METABOLIC PANEL: CPT | Performed by: FAMILY MEDICINE

## 2021-07-26 PROCEDURE — 80061 LIPID PANEL: CPT | Performed by: FAMILY MEDICINE

## 2021-07-26 PROCEDURE — 84443 ASSAY THYROID STIM HORMONE: CPT | Performed by: FAMILY MEDICINE

## 2021-07-26 PROCEDURE — 82306 VITAMIN D 25 HYDROXY: CPT | Performed by: FAMILY MEDICINE

## 2021-07-26 PROCEDURE — 82570 ASSAY OF URINE CREATININE: CPT | Performed by: FAMILY MEDICINE

## 2021-07-26 PROCEDURE — 36415 COLL VENOUS BLD VENIPUNCTURE: CPT | Performed by: FAMILY MEDICINE

## 2021-07-26 NOTE — PROGRESS NOTES
Subjective   Pedrito Powell is a 60 y.o. female and is here for a comprehensive physical exam. The patient reports no problems.    Pt is UTD with annual gyn exam and mammo           Social History:   Social History     Socioeconomic History   • Marital status:      Spouse name: Not on file   • Number of children: 3   • Years of education: College   • Highest education level: Not on file   Tobacco Use   • Smoking status: Never Smoker   • Smokeless tobacco: Never Used   Vaping Use   • Vaping Use: Never used   Substance and Sexual Activity   • Alcohol use: Yes     Alcohol/week: 1.0 standard drinks     Types: 1 Glasses of wine per week     Comment: WEEKLY   • Drug use: No   • Sexual activity: Defer       Family History:   Family History   Problem Relation Age of Onset   • Colon cancer Maternal Grandfather    • Asthma Mother    • Thyroid disease Father    • Diabetes Maternal Grandmother    • Malig Hyperthermia Neg Hx        Past Medical History:   Past Medical History:   Diagnosis Date   • Anemia     IRON INFUSION RECENTLY   • Arthritis    • Asthma    • Chronic kidney disease     stage 3   • Elevated cholesterol    • GERD (gastroesophageal reflux disease)    • History of carpal tunnel syndrome    • Hyperlipidemia    • Hypertension    • Left shoulder pain    • Limited mobility    • Vertigo    • Weakness     AT TIMES LEFT SHOULDER/LEFT ARM       The following portions of the patient's history were reviewed and updated as appropriate: allergies, current medications, past family history, past medical history, past social history, past surgical history and problem list.    Review of Systems    Review of Systems   Constitutional: Negative for chills and fever.   HENT: Negative for congestion, rhinorrhea, sinus pain and sore throat.    Eyes: Negative for photophobia and visual disturbance.   Respiratory: Negative for cough, chest tightness and shortness of breath.    Cardiovascular: Negative for chest pain and  palpitations.   Gastrointestinal: Negative for diarrhea, nausea and vomiting.   Genitourinary: Negative for dysuria, frequency and urgency.   Skin: Negative for rash and wound.   Neurological: Negative for dizziness and syncope.   Psychiatric/Behavioral: Negative for behavioral problems and confusion.       Objective   Physical Exam  Vitals and nursing note reviewed.   Constitutional:       Appearance: She is well-developed.   HENT:      Head: Normocephalic and atraumatic.      Right Ear: External ear normal.      Left Ear: External ear normal.   Cardiovascular:      Rate and Rhythm: Normal rate and regular rhythm.      Heart sounds: Normal heart sounds.   Pulmonary:      Effort: Pulmonary effort is normal. No respiratory distress.      Breath sounds: Normal breath sounds.   Abdominal:      Palpations: Abdomen is soft.      Tenderness: There is no abdominal tenderness. There is no guarding.   Musculoskeletal:         General: Normal range of motion.      Cervical back: Normal range of motion and neck supple.   Lymphadenopathy:      Cervical: No cervical adenopathy.   Skin:     General: Skin is warm.   Neurological:      Mental Status: She is alert and oriented to person, place, and time.   Psychiatric:         Behavior: Behavior normal.         Vitals:    07/26/21 1047   BP: 136/84   Pulse: 91   Resp: 16   SpO2: 99%     Body mass index is 44.72 kg/m².      Medications:   Current Outpatient Medications:   •  albuterol 108 (90 Base) MCG/ACT inhaler, Inhale 2 puffs Every 4 (Four) Hours As Needed for Wheezing., Disp: , Rfl:   •  aspirin 325 MG tablet, Take 325 mg by mouth Daily. HOLD ONE WEEK PRIOR TO SURGERY, Disp: , Rfl:   •  atorvastatin (LIPITOR) 80 MG tablet, TAKE 1 TABLET BY MOUTH DAILY, Disp: 90 tablet, Rfl: 1  •  azelastine (ASTELIN) 0.1 % nasal spray, 1 spray into the nostril(s) as directed by provider Daily., Disp: , Rfl: 11  •  azelastine (ASTELIN) 0.1 % nasal spray, 1 spray into the nostril(s) as directed by  provider 2 (Two) Times a Day. Use in each nostril as directed, Disp: , Rfl:   •  B Complex Vitamins (B COMPLEX PO), Take 1 capsule by mouth Daily. HOLD FOR SURGERY 1 WEEK, Disp: , Rfl:   •  Cholecalciferol (Vitamin D3) 50 MCG (2000 UT) capsule, TAKE 1 CAPSULE BY MOUTH DAILY, Disp: 90 capsule, Rfl: 1  •  clobetasol (TEMOVATE) 0.05 % cream, Apply 1 application topically to the appropriate area as directed As Needed., Disp: , Rfl:   •  docusate sodium 100 MG capsule, Take 1 capsule by mouth 2 (Two) Times a Day., Disp: 60 capsule, Rfl: 0  •  EPINEPHrine (AUVI-Q IJ), Inject  as directed As Needed., Disp: , Rfl:   •  ferrous gluconate (FERGON) 324 MG tablet, Take 1 tablet by mouth Daily With Breakfast., Disp: 90 tablet, Rfl: 1  •  hydroCHLOROthiazide (HYDRODIURIL) 25 MG tablet, Take 1 tablet by mouth Daily., Disp: 90 tablet, Rfl: 3  •  hydrocortisone 2.5 % cream, Apply  topically to the appropriate area as directed As Needed., Disp: , Rfl:   •  ipratropium (ATROVENT) 0.03 % nasal spray, 1 spray into the nostril(s) as directed by provider 3 (Three) Times a Day., Disp: , Rfl:   •  irbesartan (Avapro) 300 MG tablet, Take 1 tablet by mouth Daily., Disp: 90 tablet, Rfl: 3  •  loperamide (IMODIUM) 2 MG capsule, Take 1 capsule by mouth Every 2 (Two) Hours As Needed for Diarrhea (max 4 doses daily)., Disp: , Rfl:   •  meclizine (ANTIVERT) 25 MG tablet, Take 1 tablet by mouth 3 (Three) Times a Day As Needed for Dizziness., Disp: 270 tablet, Rfl: 0  •  omeprazole (priLOSEC) 40 MG capsule, Take 1 capsule by mouth Daily., Disp: 90 capsule, Rfl: 1  •  ondansetron (ZOFRAN) 4 MG tablet, Take 1 tablet by mouth Every 12 (Twelve) Hours As Needed for Nausea or Vomiting., Disp: 180 tablet, Rfl: 0  •  triamcinolone (KENALOG) 0.1 % ointment, Apply 1 application topically to the appropriate area as directed As Needed., Disp: , Rfl:   No current facility-administered medications for this visit.    Facility-Administered Medications Ordered in  Other Visits:   •  Chlorhexidine Gluconate 2 % pads 1 each, 1 each, Apply externally, Take As Directed, Bethel Devi MD       Assessment/Plan   Healthy female exam.      1. Healthcare Maintenance:  2. Patient Counseling:  --Nutrition: Stressed importance of moderation in sodium/caffeine intake, saturated fat and cholesterol, caloric balance, sufficient intake of fresh fruits, vegetables, fiber, calcium and vit D  --Exercise: Recommended 30 minutes of exercise daily.  --Immunizations reviewed.  --Discussed benefits of screening colonoscopy. Up to date.     Diagnoses and all orders for this visit:    Well woman exam (no gynecological exam)    Healthcare maintenance  -     CBC & Differential  -     Comprehensive Metabolic Panel  -     Hemoglobin A1c  -     Thyroid Panel With TSH  -     Lipid Panel With LDL / HDL Ratio  -     Microalbumin / Creatinine Urine Ratio - Urine, Clean Catch  -     Vitamin D 25 Hydroxy  -     Hepatitis C Antibody        No follow-ups on file.             Dictated utilizing Dragon Voice Recognition Software

## 2021-07-27 ENCOUNTER — TREATMENT (OUTPATIENT)
Dept: PHYSICAL THERAPY | Facility: CLINIC | Age: 61
End: 2021-07-27

## 2021-07-27 DIAGNOSIS — Z96.612 S/P REVERSE TOTAL SHOULDER ARTHROPLASTY, LEFT: Primary | ICD-10-CM

## 2021-07-27 DIAGNOSIS — M25.612 SHOULDER STIFFNESS, LEFT: ICD-10-CM

## 2021-07-27 PROCEDURE — 97140 MANUAL THERAPY 1/> REGIONS: CPT | Performed by: PHYSICAL THERAPIST

## 2021-07-27 PROCEDURE — 97110 THERAPEUTIC EXERCISES: CPT | Performed by: PHYSICAL THERAPIST

## 2021-07-27 PROCEDURE — 97530 THERAPEUTIC ACTIVITIES: CPT | Performed by: PHYSICAL THERAPIST

## 2021-07-28 ENCOUNTER — TRANSCRIBE ORDERS (OUTPATIENT)
Dept: ADMINISTRATIVE | Facility: HOSPITAL | Age: 61
End: 2021-07-28

## 2021-07-28 DIAGNOSIS — Z12.31 SCREENING MAMMOGRAM, ENCOUNTER FOR: Primary | ICD-10-CM

## 2021-07-30 ENCOUNTER — TREATMENT (OUTPATIENT)
Dept: PHYSICAL THERAPY | Facility: CLINIC | Age: 61
End: 2021-07-30

## 2021-07-30 DIAGNOSIS — Z96.612 S/P REVERSE TOTAL SHOULDER ARTHROPLASTY, LEFT: Primary | ICD-10-CM

## 2021-07-30 DIAGNOSIS — M25.612 SHOULDER STIFFNESS, LEFT: ICD-10-CM

## 2021-07-30 PROCEDURE — 97110 THERAPEUTIC EXERCISES: CPT | Performed by: PHYSICAL THERAPIST

## 2021-07-30 PROCEDURE — 97530 THERAPEUTIC ACTIVITIES: CPT | Performed by: PHYSICAL THERAPIST

## 2021-07-30 PROCEDURE — 97140 MANUAL THERAPY 1/> REGIONS: CPT | Performed by: PHYSICAL THERAPIST

## 2021-07-30 RX ORDER — ACETAMINOPHEN 160 MG
2000 TABLET,DISINTEGRATING ORAL DAILY
Qty: 90 CAPSULE | Refills: 1 | Status: SHIPPED | OUTPATIENT
Start: 2021-07-30 | End: 2022-02-02 | Stop reason: SDUPTHER

## 2021-08-03 ENCOUNTER — TREATMENT (OUTPATIENT)
Dept: PHYSICAL THERAPY | Facility: CLINIC | Age: 61
End: 2021-08-03

## 2021-08-03 DIAGNOSIS — R29.898 WEAKNESS OF SHOULDER: ICD-10-CM

## 2021-08-03 DIAGNOSIS — M25.612 SHOULDER STIFFNESS, LEFT: ICD-10-CM

## 2021-08-03 DIAGNOSIS — Z96.612 S/P REVERSE TOTAL SHOULDER ARTHROPLASTY, LEFT: Primary | ICD-10-CM

## 2021-08-03 DIAGNOSIS — M17.0 PRIMARY OSTEOARTHRITIS OF BOTH KNEES: ICD-10-CM

## 2021-08-03 PROCEDURE — 97140 MANUAL THERAPY 1/> REGIONS: CPT | Performed by: PHYSICAL THERAPIST

## 2021-08-03 PROCEDURE — 97530 THERAPEUTIC ACTIVITIES: CPT | Performed by: PHYSICAL THERAPIST

## 2021-08-03 PROCEDURE — 97110 THERAPEUTIC EXERCISES: CPT | Performed by: PHYSICAL THERAPIST

## 2021-08-03 RX ORDER — HYDROCODONE BITARTRATE AND ACETAMINOPHEN 7.5; 325 MG/1; MG/1
1 TABLET ORAL EVERY 8 HOURS PRN
Qty: 50 TABLET | Refills: 0 | Status: SHIPPED | OUTPATIENT
Start: 2021-08-03 | End: 2021-08-12

## 2021-08-05 ENCOUNTER — TREATMENT (OUTPATIENT)
Dept: PHYSICAL THERAPY | Facility: CLINIC | Age: 61
End: 2021-08-05

## 2021-08-05 DIAGNOSIS — Z96.612 S/P REVERSE TOTAL SHOULDER ARTHROPLASTY, LEFT: Primary | ICD-10-CM

## 2021-08-05 DIAGNOSIS — M25.612 SHOULDER STIFFNESS, LEFT: ICD-10-CM

## 2021-08-05 DIAGNOSIS — R29.898 WEAKNESS OF SHOULDER: ICD-10-CM

## 2021-08-05 PROCEDURE — 97530 THERAPEUTIC ACTIVITIES: CPT | Performed by: PHYSICAL THERAPIST

## 2021-08-05 PROCEDURE — 97110 THERAPEUTIC EXERCISES: CPT | Performed by: PHYSICAL THERAPIST

## 2021-08-05 PROCEDURE — 97140 MANUAL THERAPY 1/> REGIONS: CPT | Performed by: PHYSICAL THERAPIST

## 2021-08-10 ENCOUNTER — TREATMENT (OUTPATIENT)
Dept: PHYSICAL THERAPY | Facility: CLINIC | Age: 61
End: 2021-08-10

## 2021-08-10 DIAGNOSIS — Z96.612 S/P REVERSE TOTAL SHOULDER ARTHROPLASTY, LEFT: Primary | ICD-10-CM

## 2021-08-10 DIAGNOSIS — R29.898 WEAKNESS OF SHOULDER: ICD-10-CM

## 2021-08-10 DIAGNOSIS — M25.612 SHOULDER STIFFNESS, LEFT: ICD-10-CM

## 2021-08-10 PROCEDURE — 97140 MANUAL THERAPY 1/> REGIONS: CPT | Performed by: PHYSICAL THERAPIST

## 2021-08-10 PROCEDURE — 97110 THERAPEUTIC EXERCISES: CPT | Performed by: PHYSICAL THERAPIST

## 2021-08-10 PROCEDURE — 97530 THERAPEUTIC ACTIVITIES: CPT | Performed by: PHYSICAL THERAPIST

## 2021-08-10 NOTE — PROGRESS NOTES
Physical Therapy Daily Progress Note    Patient Name: Pedrito Powell         :  1960  Referring Physician: Sherie Meehan APRN      Subjective   Pedrito Powell reports: much less pain since flare-up - tape helpful -  Using arm more for crafts, etc. Close to baseline - not as fatigued, but still feels like she needs periods of rest - Knee pain limiting her ability to stand to do PT activities today -     Objective   Pt demonstrating improved functional reaching with LUE with PT activities -     See Exercise, Manual, and Modality Logs for complete treatment.     Functional / Therapeutic Activities:  30 min  · TAPING / BRACING: K-tape (B) knees to unload PF Jt  ·  SEE EXERCISE FLOW SHEET -   · Jt protection, ADL modification; Posture and      Assessment/Plan  61 yo female - S/P (L) RTSA - 2021  Doing well w/ P/AA/AROM into FE, but more limited and painful ER but improving  Increased pain after over-doing -it w/ wts at home, but improved and back to baseline -   Improved tolerance to PT today -  fatigue limited full completion -     Progress strengthening /stabilization /functional activity - resume full program next session       _________________________________________________    Manual Therapy:            10     mins  94091;  Therapeutic Exercise:    30   mins  33048;     Neuromuscular Dion:        mins  62198;    Therapeutic Activity:     35     mins  95426;     Ultrasound:                          mins  14634;  Iontophoresis:                     mins  10004;    Electrical Stimulation:         mins  73826 ( );  Mechanical Traction:          mins  63475  Dry Needling                       mins self-pay     Timed Treatment:   75    mins                  Total Treatment:     90    mins    Antonio Coley PT  Physical Therapist

## 2021-08-13 ENCOUNTER — TREATMENT (OUTPATIENT)
Dept: PHYSICAL THERAPY | Facility: CLINIC | Age: 61
End: 2021-08-13

## 2021-08-13 DIAGNOSIS — M25.612 SHOULDER STIFFNESS, LEFT: ICD-10-CM

## 2021-08-13 DIAGNOSIS — R29.898 WEAKNESS OF SHOULDER: ICD-10-CM

## 2021-08-13 DIAGNOSIS — M17.0 PRIMARY OSTEOARTHRITIS OF BOTH KNEES: ICD-10-CM

## 2021-08-13 DIAGNOSIS — Z96.612 S/P REVERSE TOTAL SHOULDER ARTHROPLASTY, LEFT: Primary | ICD-10-CM

## 2021-08-13 PROCEDURE — 97530 THERAPEUTIC ACTIVITIES: CPT | Performed by: PHYSICAL THERAPIST

## 2021-08-13 PROCEDURE — 97110 THERAPEUTIC EXERCISES: CPT | Performed by: PHYSICAL THERAPIST

## 2021-08-16 RX ORDER — CEPHALEXIN 500 MG/1
CAPSULE ORAL
Qty: 4 CAPSULE | Refills: 1 | Status: SHIPPED | OUTPATIENT
Start: 2021-08-16 | End: 2022-01-28 | Stop reason: SDUPTHER

## 2021-08-16 NOTE — TELEPHONE ENCOUNTER
Mrs. Pedrito Powell is scheduled for a dental appointment on 08/18/2021 and was told to call Dr. Devi to obtain antibiotic before receiving dental treatment.     P: [phone]    RX: [facility] on [address] [phone]

## 2021-08-17 ENCOUNTER — TREATMENT (OUTPATIENT)
Dept: PHYSICAL THERAPY | Facility: CLINIC | Age: 61
End: 2021-08-17

## 2021-08-17 ENCOUNTER — TELEPHONE (OUTPATIENT)
Dept: ORTHOPEDIC SURGERY | Facility: CLINIC | Age: 61
End: 2021-08-17

## 2021-08-17 DIAGNOSIS — M25.612 SHOULDER STIFFNESS, LEFT: ICD-10-CM

## 2021-08-17 DIAGNOSIS — R29.898 WEAKNESS OF SHOULDER: ICD-10-CM

## 2021-08-17 DIAGNOSIS — Z96.612 S/P REVERSE TOTAL SHOULDER ARTHROPLASTY, LEFT: Primary | ICD-10-CM

## 2021-08-17 PROCEDURE — 97110 THERAPEUTIC EXERCISES: CPT | Performed by: PHYSICAL THERAPIST

## 2021-08-17 PROCEDURE — 97530 THERAPEUTIC ACTIVITIES: CPT | Performed by: PHYSICAL THERAPIST

## 2021-08-17 NOTE — TELEPHONE ENCOUNTER
"Called patient back and told her your response.  She said that the med was Cephalexin.  I told her to call her pharmacy and make sure that it was the same as Keflex and call me back.      ----- Message from Bethel Devi MD sent at 8/17/2021 10:03 AM EDT -----  The allergy she had listed was \"thrush\".  If this is in fact what her reaction was, then it is safe for her to take the Keflex.  If she had a more severe reaction then let me know and I will call in something different.  Tell her that the Keflex is the best medicine to prevent infection after dental procedures and I would prefer to use that unless it would be dangerous to do so.  It is extremely unlikely that she would get a recurrent episode of thrush from the Keflex.  Thanks.  ----- Message -----  From: Caroline Sibley RegSched Rep  Sent: 8/17/2021   9:54 AM EDT  To: MD Dr. Shandra Andrews - patient called in regarding antibiotic you called in .  She is allergic to penicillin.  The pharmacist said that she would be allergic to this also.    Please call in something that is not penicillin in origin.    Thanks        "

## 2021-08-17 NOTE — PROGRESS NOTES
------------------------------------------------------------------------------------------------------   MD PROGRESS NOTE     Patient: Pedrito Powell        : 1960  Diagnosis/ICD-10 Code:  S/P reverse total shoulder arthroplasty, left [Z96.612]  Referring practitioner: Bethel Devi MD / KALYANI Cornelius  Date of Initial Visit: 2021                  Today's Date: 2021  _________________________________________________________________     Thank you for the referral of Ms. Powell to [facility].  Ms. Powell has attended 26 PT sessions and their treatment has consisted of: modalities prn, manual therapy, therapeutic exercise, patient education, and HEP.      Subjective   Pedrito Powell reports: improved mobility and function below shoulder level - Notes weakness - Notes fatigue / sore after the end of a busy day using her (L)  Including reaching, lifting light items -   ___________________________________________________________________  Objective              OBSERVATION: skin / scar look fine - scar healing well -               PALPATION: Significantly tender along scar and delto-pec/pec mm w/ painful trigger points               AROM: FE/-deg (better than before surgery)  ERB to Back of neck               PROM: ER (plane of scapula) up to 45-deg;  -150deg              STRENGTH: Grossly 3+/5 FF, ABD, Ext (L)               SENSATION: Grossly intact LUE to LT              SPECIAL TESTS: NA              ACTIVITY TOLERANCE: Improved tolerance to light ADLs  Up to shoulder level -               See Exercise, Manual, and Modality Logs for complete treatment.      Functional / Therapeutic Activities:  X 35 min  · TAPING / BRACING: NA  · SEE EXERCISE FLOW SHEET -   · FUNCTIONAL ASSESSMENT    · Jt protection, ADL modification; Posture and    ___________________________________________________________________   Assessment/Plan  61 yo female - S/P (L) RTSA  - 5/13/2021  Doing well w/ P/AA/AROM into FE, but more limited and painful ER but improving.   Ms. Powell would benefit from continued Physical Therapy.   Ms. Powell may be sore due to increasing intensity of some activities today in PT -       P: Recommend continued Physical Therapy to allow a full and safe return to ADL's and normal job duties 2 times/wk x 4 weeks progressing per treatment guidelines .      Thank you again for this referral of Ms. Powell to [facility].     PT Signature: ______________________________   Antonio Coley, PT  ______________________________________________________  Based upon review of the patient's progress and continued therapy plan, it is my medical opinion that Pedrito Powell should continue physical therapy treatment per the recommendation above.      Signature: ____________________________   Date:  ________   Bethel Devi MD / Sherie Meehan, KALYANI  ____________________________________________________________________  Manual Therapy:                 mins  75659;  Therapeutic Exercise:    30     mins  91165;     Neuromuscular Dion:        mins  38526;    Therapeutic Activity:      35     mins  96329;   Self Care:                          mins  61476  Ultrasound:                          mins  63959;  Iontophoresis:                     mins  84704;    Electrical Stimulation:         mins  63984 ( );  Mechanical Traction:          mins  63409  DIAN Bauman (L)             15     mins NC     Timed Treatment:   65   mins                  Total Treatment:     90   mins     ______________________________________________________________________  [address] [place] [address]  Phone: [phone]                 Fax: [phone]

## 2021-08-17 NOTE — PROGRESS NOTES
Physical Therapy Daily Progress Note    Patient Name: ePdrito Powell         :  1960  Referring Physician: Sherie Meehan APRN      Subjective   Pedrito Powell reports: 2# supine press too heavy / painful -    Objective   Pt demonstrating Active FE > 90-deg w/ activities in PT    See Exercise, Manual, and Modality Logs for complete treatment. BTE out of order today -      Functional / Therapeutic Activities:  20 min  · TAPING / BRACING: NA  · SEE EXERCISE FLOW SHEET -   · Jt protection, ADL modification; Posture and      Assessment/Plan  59 yo female - S/P (L) RTSA - 2021  Doing well w/ P/AA/AROM into FE, but more limited and painful ER but improving  Increased pain after over-doing -it w/ wts at home recently, but improved and back to baseline -   Improved tolerance to PT today -    Progress strengthening /stabilization /functional activity -        _________________________________________________    Manual Therapy:                 mins  29655;  Therapeutic Exercise:    30    mins  01178;     Neuromuscular Dion:        mins  39914;    Therapeutic Activity:     15      mins  62027;     Ultrasound:                          mins  84073;  Iontophoresis:                     mins  75145;    Electrical Stimulation:         mins  15867 ( );  Mechanical Traction:          mins  82472  CP                  15     mins NC     Timed Treatment:   45    mins                  Total Treatment:     60    mins    Antonio Coley PT  Physical Therapist

## 2021-08-17 NOTE — TELEPHONE ENCOUNTER
Keflex and cephalexin are the same medicine.  Can you please call her and confirm that she got the prescription.  If not, we can call in a different medicine.  Thanks.

## 2021-08-18 ENCOUNTER — OFFICE VISIT (OUTPATIENT)
Dept: ORTHOPEDIC SURGERY | Facility: CLINIC | Age: 61
End: 2021-08-18

## 2021-08-18 VITALS — WEIGHT: 232 LBS | BODY MASS INDEX: 45.55 KG/M2 | TEMPERATURE: 96.9 F | HEIGHT: 60 IN

## 2021-08-18 DIAGNOSIS — Z96.612 STATUS POST REVERSE TOTAL REPLACEMENT OF LEFT SHOULDER: Primary | ICD-10-CM

## 2021-08-18 PROCEDURE — 73030 X-RAY EXAM OF SHOULDER: CPT | Performed by: NURSE PRACTITIONER

## 2021-08-18 PROCEDURE — 99213 OFFICE O/P EST LOW 20 MIN: CPT | Performed by: NURSE PRACTITIONER

## 2021-08-18 NOTE — PROGRESS NOTES
Pedrito Powell : 1960 MRN: [id number] DATE: 2021    DIAGNOSIS: 3 month follow up left shoulder arthroplasty      SUBJECTIVE:  Patient returns today for 3 month follow up of left shoulder replacement.  Patient reports doing well with no unusual complaints. Still in PT and reports making good progress.    OBJECTIVE:    There were no vitals taken for this visit.    Review of Systems negative except as above    Exam:  The incision is well healed. No effusion.  Range of motion:  °.  ER 45°.  IR to side pocket. The arm is soft and nontender.  Good strength with elevation and abduction.  Sensation intact distally.  Brisk cap refill.    DIAGNOSTIC STUDIES    Xrays: AP, scapular Y and axillary views of the left shoulder are ordered and reviewed for evaluation of shoulder replacement. They demonstrate a well positioned, well aligned replacement without complicating factors noted.  In comparison with previous films there has been no change.    ASSESSMENT: 3 month follow up left shoulder replacement.    PLAN:   1.  Continue appropriate activity modifications and restrictions as discussed  2.  Continue PT per protocol.  3.  We discussed the need for prophylactic antibiotics prior to dental appointments.  She has an appointment later today.  Keflex has been prescribed for her by Dr. Devi.  4.  She will remain out of work until reevaluation with Dr. Devi in 6 weeks.    Sherie Meehan, APRN    2021

## 2021-08-18 NOTE — PROGRESS NOTES
I have reviewed the notes, assessments, and/or procedures performed by Antonio Coley PT, I concur with his documentation of Pedrito Powell.

## 2021-08-20 ENCOUNTER — TREATMENT (OUTPATIENT)
Dept: PHYSICAL THERAPY | Facility: CLINIC | Age: 61
End: 2021-08-20

## 2021-08-20 ENCOUNTER — TELEPHONE (OUTPATIENT)
Dept: ORTHOPEDIC SURGERY | Facility: CLINIC | Age: 61
End: 2021-08-20

## 2021-08-20 DIAGNOSIS — Z96.612 S/P REVERSE TOTAL SHOULDER ARTHROPLASTY, LEFT: Primary | ICD-10-CM

## 2021-08-20 DIAGNOSIS — M25.612 SHOULDER STIFFNESS, LEFT: ICD-10-CM

## 2021-08-20 DIAGNOSIS — R29.898 WEAKNESS OF SHOULDER: ICD-10-CM

## 2021-08-20 PROCEDURE — 97110 THERAPEUTIC EXERCISES: CPT | Performed by: PHYSICAL THERAPIST

## 2021-08-20 PROCEDURE — 97530 THERAPEUTIC ACTIVITIES: CPT | Performed by: PHYSICAL THERAPIST

## 2021-08-20 NOTE — PROGRESS NOTES
Physical Therapy Daily Progress Note    Patient Name: Pedrito Powell         :  1960  Referring Physician: Bethel Devi MD      Subjective   Pedrito Powell reports: appt w/ provider - no new problems -     Objective   Pt demonstrating improved tolerance to increased wts in PT - limited FE, but > 95+-deg (L) shoulder    See Exercise, Manual, and Modality Logs for complete treatment. (limited time due to lack of support staff )      Functional / Therapeutic Activities:  28 min  · TAPING / BRACING: NA  · SEE EXERCISE FLOW SHEET -   · Jt protection, ADL modification; Posture and      Assessment/Plan  61 yo female - S/P (L) RTSA - 2021  Doing well w/ P/AA/AROM into FE, but more limited and painful ER   Improving functional activities and increased wt within 5# limit -     Progress strengthening /stabilization /functional activity       _________________________________________________    Manual Therapy:                 mins  67008;  Therapeutic Exercise:    20    mins  02944;     Neuromuscular Dion:        mins  84336;    Therapeutic Activity:     25      mins  07306;     Ultrasound:                          mins  93549;  Iontophoresis:                     mins  27063;    Electrical Stimulation:         mins  67386 ( );  Mechanical Traction:          mins  28785  Dry Needling                       mins self-pay     Timed Treatment:   45    mins                  Total Treatment:     60    mins    Antonio Coley, PT  Physical Therapist

## 2021-08-20 NOTE — TELEPHONE ENCOUNTER
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 PLEASE ADVISE PATIENT AT ABOVE PHONE NUMBER.

## 2021-08-24 ENCOUNTER — TREATMENT (OUTPATIENT)
Dept: PHYSICAL THERAPY | Facility: CLINIC | Age: 61
End: 2021-08-24

## 2021-08-24 DIAGNOSIS — Z96.612 S/P REVERSE TOTAL SHOULDER ARTHROPLASTY, LEFT: Primary | ICD-10-CM

## 2021-08-24 DIAGNOSIS — M25.612 SHOULDER STIFFNESS, LEFT: ICD-10-CM

## 2021-08-24 DIAGNOSIS — R29.898 WEAKNESS OF SHOULDER: ICD-10-CM

## 2021-08-24 DIAGNOSIS — M17.0 PRIMARY OSTEOARTHRITIS OF BOTH KNEES: ICD-10-CM

## 2021-08-24 PROCEDURE — 97530 THERAPEUTIC ACTIVITIES: CPT | Performed by: PHYSICAL THERAPIST

## 2021-08-24 PROCEDURE — 97110 THERAPEUTIC EXERCISES: CPT | Performed by: PHYSICAL THERAPIST

## 2021-08-24 NOTE — PROGRESS NOTES
Physical Therapy Daily Progress Note    Patient Name: Pedrito Powell         :  1960  Referring Physician: Bethel Devi MD      Subjective   Pedrito Powell reports: no new problems - most difficult reaching OH and ER, but using arm more - no as much pain w/ increased activities in PT -     Objective   Pt demonstrates improved AFE w/ activities in PT -     See Exercise, Manual, and Modality Logs for complete treatment.     Functional / Therapeutic Activities:  25 min  · TAPING / BRACING: NA  · SEE EXERCISE FLOW SHEET -   · Jt protection, ADL modification; Posture and      Assessment/Plan  59 yo female - S/P (L) RTSA - 2021  Doing well w/ P/AA/AROM into FE, but more limited and painful ER   Improving functional activities and increased wt within 5# limit -     Progress strengthening /stabilization /functional activity       _________________________________________________    Manual Therapy:                 mins  10321;  Therapeutic Exercise:    23    mins  87820;     Neuromuscular Dion:        mins  33292;    Therapeutic Activity:     28      mins  95381;     Ultrasound:                          mins  11331;  Iontophoresis:                     mins  05340;    Electrical Stimulation:         mins  63447 ( );  Mechanical Traction:          mins  40299  Dry Needling                       mins self-pay     Timed Treatment:   51    mins                  Total Treatment:     65    mins    Antonio Coley, PT  Physical Therapist

## 2021-08-27 ENCOUNTER — TREATMENT (OUTPATIENT)
Dept: PHYSICAL THERAPY | Facility: CLINIC | Age: 61
End: 2021-08-27

## 2021-08-27 DIAGNOSIS — R29.898 WEAKNESS OF SHOULDER: ICD-10-CM

## 2021-08-27 DIAGNOSIS — Z96.612 S/P REVERSE TOTAL SHOULDER ARTHROPLASTY, LEFT: Primary | ICD-10-CM

## 2021-08-27 DIAGNOSIS — M17.0 PRIMARY OSTEOARTHRITIS OF BOTH KNEES: ICD-10-CM

## 2021-08-27 DIAGNOSIS — M25.612 SHOULDER STIFFNESS, LEFT: ICD-10-CM

## 2021-08-27 PROCEDURE — 97530 THERAPEUTIC ACTIVITIES: CPT | Performed by: PHYSICAL THERAPIST

## 2021-08-27 PROCEDURE — 97110 THERAPEUTIC EXERCISES: CPT | Performed by: PHYSICAL THERAPIST

## 2021-08-31 NOTE — PROGRESS NOTES
Physical Therapy Daily Progress Note    Patient Name: Pedrito Powell         :  1960  Referring Physician: Bethel Devi MD      Subjective   Pedrito Powell reports: taping helpful for knee pain -     Objective   Pt demonstrating improved AFE w/ PT activities -     See Exercise, Manual, and Modality Logs for complete treatment.     Functional / Therapeutic Activities:  35 min  · TAPING / BRACING: K-tape to Unload (B) PF Jt w/ extra lat component -   · SEE EXERCISE FLOW SHEET -   · Jt protection, ADL modification; Posture and      Assessment/Plan  61 yo female - S/P (L) RTSA - 2021  (B) Knee OA -   Doing well w/ P/AA/AROM into FE, but more limited and painful ER   Improving functional activities and increased wt within 5# limit -     Progress strengthening /stabilization /functional activity       _________________________________________________    Manual Therapy:                 mins  07537;  Therapeutic Exercise:    25    mins  32923;     Neuromuscular Dion:        mins  46530;    Therapeutic Activity:     35      mins  60492;     Ultrasound:                          mins  65926;  Iontophoresis:                     mins  18672;    Electrical Stimulation:         mins  86666 ( );  Mechanical Traction:          mins  67284  CP                            15      mins NA     Timed Treatment:   60    mins                  Total Treatment:     90    mins    Antonio Coley PT  Physical Therapist

## 2021-09-01 ENCOUNTER — TREATMENT (OUTPATIENT)
Dept: PHYSICAL THERAPY | Facility: CLINIC | Age: 61
End: 2021-09-01

## 2021-09-01 DIAGNOSIS — M25.612 SHOULDER STIFFNESS, LEFT: ICD-10-CM

## 2021-09-01 DIAGNOSIS — Z96.612 S/P REVERSE TOTAL SHOULDER ARTHROPLASTY, LEFT: Primary | ICD-10-CM

## 2021-09-01 DIAGNOSIS — R29.898 WEAKNESS OF SHOULDER: ICD-10-CM

## 2021-09-01 PROCEDURE — 97110 THERAPEUTIC EXERCISES: CPT | Performed by: PHYSICAL THERAPIST

## 2021-09-01 PROCEDURE — 97530 THERAPEUTIC ACTIVITIES: CPT | Performed by: PHYSICAL THERAPIST

## 2021-09-03 ENCOUNTER — TREATMENT (OUTPATIENT)
Dept: PHYSICAL THERAPY | Facility: CLINIC | Age: 61
End: 2021-09-03

## 2021-09-03 DIAGNOSIS — M25.612 SHOULDER STIFFNESS, LEFT: ICD-10-CM

## 2021-09-03 DIAGNOSIS — Z96.612 S/P REVERSE TOTAL SHOULDER ARTHROPLASTY, LEFT: Primary | ICD-10-CM

## 2021-09-03 DIAGNOSIS — R29.898 WEAKNESS OF SHOULDER: ICD-10-CM

## 2021-09-03 PROCEDURE — 97110 THERAPEUTIC EXERCISES: CPT | Performed by: PHYSICAL THERAPIST

## 2021-09-03 PROCEDURE — 97530 THERAPEUTIC ACTIVITIES: CPT | Performed by: PHYSICAL THERAPIST

## 2021-09-03 NOTE — PROGRESS NOTES
Physical Therapy Daily Progress Note    Patient Name: Pedrito Powell         :  1960  Referring Physician: Bethel Devi MD      Subjective   Pedrito Powell reports: no new problems - able to put seat belt on as a passenger -     Objective   Pt demonstrating good AROM w/ activities in PT -   Demonstrates improved endurance and tolerance     See Exercise, Manual, and Modality Logs for complete treatment.     Functional / Therapeutic Activities:  35 min  · TAPING / BRACING: NA  · SEE EXERCISE FLOW SHEET -   · Jt protection, ADL modification; Posture and      Assessment/Plan  S/P (L) RTSA - 2021  (B) Knee OA -   Doing well w/ P/AA/AROM into FE, but more limited and painful ER   Improving functional activities and increased wt within 5# limit -     Progress strengthening /stabilization /functional activity       _________________________________________________    Manual Therapy:                 mins  18852;  Therapeutic Exercise:    23    mins  18006;     Neuromuscular Dion:   05     mins  02913;    Therapeutic Activity:     35      mins  90600;     Ultrasound:                          mins  09126;  Iontophoresis:                     mins  01882;    Electrical Stimulation:         mins  04690 ( );  Mechanical Traction:          mins  65910  CP                            15      mins NA     Timed Treatment:   63    mins                  Total Treatment:     85    mins    Antonio Coley PT  Physical Therapist

## 2021-09-07 ENCOUNTER — TREATMENT (OUTPATIENT)
Dept: PHYSICAL THERAPY | Facility: CLINIC | Age: 61
End: 2021-09-07

## 2021-09-07 DIAGNOSIS — R29.898 WEAKNESS OF SHOULDER: ICD-10-CM

## 2021-09-07 DIAGNOSIS — M25.612 SHOULDER STIFFNESS, LEFT: ICD-10-CM

## 2021-09-07 DIAGNOSIS — Z96.612 S/P REVERSE TOTAL SHOULDER ARTHROPLASTY, LEFT: Primary | ICD-10-CM

## 2021-09-07 DIAGNOSIS — M17.0 PRIMARY OSTEOARTHRITIS OF BOTH KNEES: ICD-10-CM

## 2021-09-07 PROCEDURE — 97530 THERAPEUTIC ACTIVITIES: CPT | Performed by: PHYSICAL THERAPIST

## 2021-09-07 PROCEDURE — 97110 THERAPEUTIC EXERCISES: CPT | Performed by: PHYSICAL THERAPIST

## 2021-09-07 NOTE — PROGRESS NOTES
Physical Therapy Daily Progress Note    Patient Name: Pedrito Powell         :  1960  Referring Physician: Bethel Devi MD      Subjective   Pedrito Powell reports: no new problems - using arm more and more     Objective   Demonstrating improved AFE w/ activities in PT -     See Exercise, Manual, and Modality Logs for complete treatment.     Functional / Therapeutic Activities:  30 min  · TAPING / BRACING: NA  · SEE EXERCISE FLOW SHEET -   · Jt protection, ADL modification; Posture and      Assessment/Plan  59 yo female - S/P (L) RTSA - 2021  (B) Knee OA -   Doing well w/ P/AA/AROM into FE, but more limited and painful ER   Improving functional activities and increased wt within 5# limit -     Progress per Plan of Care       _________________________________________________    Manual Therapy:                 mins  18414;  Therapeutic Exercise:    23    mins  20088;     Neuromuscular Dion:        mins  02756;    Therapeutic Activity:     30     mins  38046;     Ultrasound:                          mins  98624;  Iontophoresis:                     mins  81280;    Electrical Stimulation:         mins  27540 ( );  Mechanical Traction:          mins  18844  CP                            15      mins NA     Timed Treatment:   53    mins                  Total Treatment:     80    mins    Antonio Coley, PT  Physical Therapist

## 2021-09-07 NOTE — PROGRESS NOTES
Physical Therapy Daily Progress Note    Patient Name: Pedrito Powell         :  1960  Referring Physician: Bethel Devi MD      Subjective   Pedrito Powell reports: (L) shoulder / arm achy all weekend - still did HEP    Objective   Discussed w/ Pt it was probably due to her over-doing-it in PT Friday ( very motivated    See Exercise, Manual, and Modality Logs for complete treatment.     Functional / Therapeutic Activities:  35 min  · TAPING / BRACING: NA  · SEE EXERCISE FLOW SHEET -   · Jt protection, ADL modification; Posture and      Assessment/Plan  S/P (L) RTSA - 2021  (B) Knee OA -   Doing well w/ P/AA/AROM into FE, but more limited and painful ER   Improving functional activities and increased wt within 5# limit -   Increased soreness from pushing herself too hard in PT -     Progress strengthening /stabilization /functional activity - take a break tomorrow to let arm rest -        _________________________________________________    Manual Therapy:                 mins  34151;  Therapeutic Exercise:    25    mins  30871;     Neuromuscular Dion:   05     mins  44857;    Therapeutic Activity:     35      mins  15968;     Ultrasound:                          mins  92219;  Iontophoresis:                     mins  65344;    Electrical Stimulation:         mins  92188 ( );  Mechanical Traction:          mins  28044  CP                            15      mins NA     Timed Treatment:   65    mins                  Total Treatment:     95    mins    Antonio Coley PT  Physical Therapist

## 2021-09-08 DIAGNOSIS — D64.9 ANEMIA, UNSPECIFIED TYPE: ICD-10-CM

## 2021-09-08 RX ORDER — DOXYCYCLINE HYCLATE 50 MG/1
324 CAPSULE, GELATIN COATED ORAL
Qty: 90 TABLET | Refills: 1 | Status: SHIPPED | OUTPATIENT
Start: 2021-09-08 | End: 2021-12-14 | Stop reason: SDUPTHER

## 2021-09-10 ENCOUNTER — TREATMENT (OUTPATIENT)
Dept: PHYSICAL THERAPY | Facility: CLINIC | Age: 61
End: 2021-09-10

## 2021-09-10 DIAGNOSIS — M25.612 SHOULDER STIFFNESS, LEFT: ICD-10-CM

## 2021-09-10 DIAGNOSIS — R29.898 WEAKNESS OF SHOULDER: ICD-10-CM

## 2021-09-10 DIAGNOSIS — Z96.612 S/P REVERSE TOTAL SHOULDER ARTHROPLASTY, LEFT: Primary | ICD-10-CM

## 2021-09-10 PROCEDURE — 97530 THERAPEUTIC ACTIVITIES: CPT | Performed by: PHYSICAL THERAPIST

## 2021-09-10 PROCEDURE — 97110 THERAPEUTIC EXERCISES: CPT | Performed by: PHYSICAL THERAPIST

## 2021-09-10 PROCEDURE — 97140 MANUAL THERAPY 1/> REGIONS: CPT | Performed by: PHYSICAL THERAPIST

## 2021-09-10 PROCEDURE — 97014 ELECTRIC STIMULATION THERAPY: CPT | Performed by: PHYSICAL THERAPIST

## 2021-09-16 ENCOUNTER — TREATMENT (OUTPATIENT)
Dept: PHYSICAL THERAPY | Facility: CLINIC | Age: 61
End: 2021-09-16

## 2021-09-16 DIAGNOSIS — Z96.612 S/P REVERSE TOTAL SHOULDER ARTHROPLASTY, LEFT: Primary | ICD-10-CM

## 2021-09-16 DIAGNOSIS — M25.612 SHOULDER STIFFNESS, LEFT: ICD-10-CM

## 2021-09-16 DIAGNOSIS — R29.898 WEAKNESS OF SHOULDER: ICD-10-CM

## 2021-09-16 PROCEDURE — 97110 THERAPEUTIC EXERCISES: CPT | Performed by: PHYSICAL THERAPIST

## 2021-09-16 PROCEDURE — 97530 THERAPEUTIC ACTIVITIES: CPT | Performed by: PHYSICAL THERAPIST

## 2021-09-16 NOTE — PROGRESS NOTES
Physical Therapy Daily Progress Note    Patient Name: Pedrito Powell         :  1960  Referring Physician: Bethel Devi MD      Subjective   Pedrito Powell reports: her arm felt great after last session - and continued to feel much better - rested arm and only did pulleys since last session per PT instructions -  Visited sister and slept in a recliner and her shoulder / arm did great - slept in her bed last night and woke up more sore - Not as bad as before, but more sore overall - probably turning more in bed, etc. -     Objective   Pt demonstrated good AROM with PT activities, but demonstrated more fatigue and needed more rest periords due to feeling hot in clinic and mask - (R) Shoulder / arm grossly less tender -     See Exercise, Manual, and Modality Logs for complete treatment.     Functional / Therapeutic Activities:  35 min  · TAPING / BRACING: NA  · SEE EXERCISE FLOW SHEET -   · Jt protection, ADL modification; Posture and      Assessment/Plan  S/P (L) RTSA - 2021  (B) Knee OA -   Doing well w/ P/AA/AROM into FE, but more limited and painful ER   Improving functional activities and increased wt within 5# limit -   Increased soreness from pushing herself too hard in PT -   Rest and MT last session helpful in decreasing pain in shoulder -     Progress strengthening /stabilization /functional activity - HEP 3-4x/wk for strengthening - no home strengthening if she has been to PT the same day -        _________________________________________________    Manual Therapy:                 mins  93592;  Therapeutic Exercise:    25    mins  60972;     Neuromuscular Dion:        mins  46200;    Therapeutic Activity:     35      mins  13651;     Ultrasound:                          mins  24003;  Iontophoresis:                     mins  87654;    Electrical Stimulation:         mins  51912 ( );  Mechanical Traction:          mins  05745  CP                            15      mins NA      Timed Treatment:   60    mins                  Total Treatment:     80    mins    Antonio Coley, PT  Physical Therapist

## 2021-09-21 ENCOUNTER — TREATMENT (OUTPATIENT)
Dept: PHYSICAL THERAPY | Facility: CLINIC | Age: 61
End: 2021-09-21

## 2021-09-21 DIAGNOSIS — M25.612 SHOULDER STIFFNESS, LEFT: ICD-10-CM

## 2021-09-21 DIAGNOSIS — Z96.612 S/P REVERSE TOTAL SHOULDER ARTHROPLASTY, LEFT: Primary | ICD-10-CM

## 2021-09-21 DIAGNOSIS — R29.898 WEAKNESS OF SHOULDER: ICD-10-CM

## 2021-09-21 PROCEDURE — 97110 THERAPEUTIC EXERCISES: CPT | Performed by: PHYSICAL THERAPIST

## 2021-09-21 PROCEDURE — 97530 THERAPEUTIC ACTIVITIES: CPT | Performed by: PHYSICAL THERAPIST

## 2021-09-21 NOTE — PROGRESS NOTES
Physical Therapy Daily Progress Note    Patient Name: Pedrito Powell         :  1960  Referring Physician: Bethel Devi MD      Subjective   Pedrito Powell reports: persistent pain/soreness in shoulder and arm - doing wts every day at home -     Objective   (L) Shoulder, pec, deltoid, biceps very tender w/ TPs  Good AROM w/ PT activities -   See Exercise, Manual, and Modality Logs for complete treatment.     Functional / Therapeutic Activities:  30 min  · TAPING / BRACING: K-tape to Unload (L) Shoulder  · SEE EXERCISE FLOW SHEET -   · Discussed only doing HEP w/ wts 3-4x/wk and not on PT days to allow shoulder / arm to rest adequately -     Assessment/Plan  S/P (L) RTSA - 2021  (B) Knee OA -   Doing well w/ P/AA/AROM into FE, but more limited and painful ER   Improving functional activities and increased wt within 5# limit -   Increased soreness from pushing herself too hard in PT -     Progress strengthening /stabilization /functional activity       _________________________________________________    Manual Therapy:            10     mins  77159;  Therapeutic Exercise:    23    mins  32419;     Neuromuscular Dion:        mins  45357;    Therapeutic Activity:     30      mins  48844;     Ultrasound:                          mins  57762;  Iontophoresis:                     mins  60725;    Electrical Stimulation:     20    mins  44097 ( );  Mechanical Traction:          mins  83689  Dry Needling                       mins self-pay     Timed Treatment:   63    mins                  Total Treatment:     95    mins    Antonio Coley, PT  Physical Therapist

## 2021-09-21 NOTE — PROGRESS NOTES
Physical Therapy Daily Progress Note    Patient Name: Pedrito Powell         :  1960  Referring Physician: Bethel Devi MD      Subjective   Pedrito Powell reports: doing much better by doing exercises every other day - Able to reach out and  a drink and drink it - she was not able to do that before surgery - Able to reach up and hold the top of the steering wheel w/ LUE now - Much less pain - notes incision soreness only when she wakes up in AM -   Able to reach top of head and behind head w/o having to move her head.   Able to reach up to 2nd shelf when putting dishes away -     Objective   AFE up to 115+-deg w/ some compensation -   Good AROM w/ PT activities -     See Exercise, Manual, and Modality Logs for complete treatment.     Functional / Therapeutic Activities:  30 min  · TAPING / BRACING: NA  · SEE EXERCISE FLOW SHEET -   · Jt protection, ADL modification; Posture and      Assessment/Plan  S/P (L) RTSA - 2021  (B) Knee OA -   Doing well w/ P/AA/AROM into FE, but more limited and painful ER   Improving functional activities and increased wt within 5# limit -   Increased soreness from pushing herself too hard in PT and HEP daily  -   Changing HEP strengthening to 3-4x/wk and none with PT days has greatly decreased her shoulder pain -     Progress strengthening /stabilization /functional activity HEP 3-4x/wk for strengthening - no home strengthening if she has been to PT the same day -        _________________________________________________    Manual Therapy:                 mins  26270;  Therapeutic Exercise:    25    mins  49423;     Neuromuscular Dion:        mins  87587;    Therapeutic Activity:     30      mins  41039;     Ultrasound:                          mins  10442;  Iontophoresis:                     mins  47874;    Electrical Stimulation:         mins  09211 ( );  Mechanical Traction:          mins   00121  CP                            15      mins NA     Timed Treatment:   55    mins                  Total Treatment:     80    mins    Antonio Coley, PT  Physical Therapist

## 2021-09-23 ENCOUNTER — TREATMENT (OUTPATIENT)
Dept: PHYSICAL THERAPY | Facility: CLINIC | Age: 61
End: 2021-09-23

## 2021-09-23 DIAGNOSIS — R29.898 WEAKNESS OF SHOULDER: ICD-10-CM

## 2021-09-23 DIAGNOSIS — Z96.612 S/P REVERSE TOTAL SHOULDER ARTHROPLASTY, LEFT: Primary | ICD-10-CM

## 2021-09-23 DIAGNOSIS — M25.612 SHOULDER STIFFNESS, LEFT: ICD-10-CM

## 2021-09-23 PROCEDURE — 97530 THERAPEUTIC ACTIVITIES: CPT | Performed by: PHYSICAL THERAPIST

## 2021-09-23 PROCEDURE — 97110 THERAPEUTIC EXERCISES: CPT | Performed by: PHYSICAL THERAPIST

## 2021-09-23 NOTE — PROGRESS NOTES
Physical Therapy Daily Progress Note    Patient Name: Pedrito Powell         :  1960  Referring Physician: Bethel Devi MD      Subjective   Pedrito Powell reports: continued improvement with decreasing pain and improved functional use of (L) UE -   Baked a lot and lifted pans, cookie sheets, etc w/o problems, etc.     Objective   Pt demonstrating improved functional AROM w/ PT activities today - Pt reached out, picked up her bottle of water and drank w/ LUE (which she could not do prior to surgery) -     See Exercise, Manual, and Modality Logs for complete treatment.     Functional / Therapeutic Activities:  35 min  · TAPING / BRACING: K-tape to Unload PF Jt w/ extra lateral component (B) knees -   ·  SEE EXERCISE FLOW SHEET -   · Jt protection, ADL modification; Posture and      Assessment/Plan  S/P (L) RTSA - 2021  (B) Knee OA -   Doing well w/ P/AA/AROM into FE, but more limited and painful ER   Improving functional activities and increased wt within 5# limit -   Increased soreness from pushing herself too hard in PT and HEP daily  -   Changing HEP strengthening to 3-4x/wk and none with PT days has greatly decreased her shoulder pain -      Progress strengthening /stabilization /functional activity HEP 3-4x/wk for strengthening - no home strengthening if she has been to PT the same day -      _________________________________________________    Manual Therapy:                 mins  51886;  Therapeutic Exercise:    28    mins  62907;     Neuromuscular Dion:        mins  19042;    Therapeutic Activity:     38      mins  97150;     Ultrasound:                          mins  92839;  Iontophoresis:                     mins  73551;    Electrical Stimulation:         mins  63051 ( );  Mechanical Traction:          mins  42131  ICE                             15     mins NC     Timed Treatment:   65    mins                  Total Treatment:     95    mins    Antonio Coley, PT  Physical  Therapist

## 2021-09-24 NOTE — PROGRESS NOTES
Physical Therapy Daily Progress Note    Patient Name: Pedrito Powell         :  1960  Referring Physician: Sherie Meehan APRN      Subjective   Pedrito Powell reports: sore from over-doing it with her weight [id number]/day -  -     Objective   Explained to only do weight exercises 1x/day -     See Exercise, Manual, and Modality Logs for complete treatment.     Functional / Therapeutic Activities:  23 min  · TAPING / BRACING: NA  · SEE EXERCISE FLOW SHEET -   · Jt protection, ADL modification; Posture and      Assessment/Plan  59 yo female - S/P (L) RTSA - 2021  Doing well w/ P/AA/AROM into FE, but more limited and painful ER but improving  Improving functional ability   Pt over-did wt exercises -     Progress strengthening /stabilization /functional activity - resume full program next session as abdelrahman'ed       _________________________________________________    Manual Therapy:            20     mins  86527;  Therapeutic Exercise:    23    mins  78217;     Neuromuscular Dion:        mins  94382;    Therapeutic Activity:     15      mins  14947;     Ultrasound:                          mins  91290;  Iontophoresis:                     mins  46046;    Electrical Stimulation:         mins  85422 ( );  Mechanical Traction:          mins  60719  Dry Needling                       mins self-pay  CP (L) Mitul             15     mins NC    Timed Treatment:   58    mins                  Total Treatment:     80    mins    Antonio Coley, PT  Physical Therapist  
76

## 2021-09-28 ENCOUNTER — TREATMENT (OUTPATIENT)
Dept: PHYSICAL THERAPY | Facility: CLINIC | Age: 61
End: 2021-09-28

## 2021-09-28 DIAGNOSIS — M25.612 SHOULDER STIFFNESS, LEFT: ICD-10-CM

## 2021-09-28 DIAGNOSIS — R29.898 WEAKNESS OF SHOULDER: ICD-10-CM

## 2021-09-28 DIAGNOSIS — Z96.612 S/P REVERSE TOTAL SHOULDER ARTHROPLASTY, LEFT: Primary | ICD-10-CM

## 2021-09-28 PROCEDURE — 97530 THERAPEUTIC ACTIVITIES: CPT | Performed by: PHYSICAL THERAPIST

## 2021-09-28 PROCEDURE — 97110 THERAPEUTIC EXERCISES: CPT | Performed by: PHYSICAL THERAPIST

## 2021-09-28 NOTE — PROGRESS NOTES
------------------------------------------------------------------------------------------------------   MD PROGRESS NOTE     Patient: Pedrito Powell        : 1960  Diagnosis/ICD-10 Code:  S/P reverse total shoulder arthroplasty, left [Z96.612]  Referring practitioner: Bethel Devi MD / KALYANI Cornelius  Date of Initial Visit: 2021                  Today's Date:   _________________________________________________________________     Thank you for the referral of Ms. Powell to [facility].  Ms. Powell has attended 37 PT sessions and their treatment has consisted of: modalities prn, manual therapy, therapeutic exercise, patient education, and HEP.      Subjective   Pedrito Powell reports: improved mobility and function up to and even above shoulder level - Able to reach up to second shelf in kitchen, can do hair w/o problems - Able to reach out,  a drink and drink w/ LUE which she could not do prior to surgery -  Improving endurance and function and much less pain since limiting HEP to 3-4x/wk.  ___________________________________________________________________  Objective              OBSERVATION: skin / scar look fine - scar healing well -               PALPATION: Much less tender along (L) Shoulder, etc.              AROM: FE/-deg (better than before surgery)  ERB to Back of neck     Able to reach top and back of head w/o moving neck - IRB very limted actively, but to LSS w/ assist -               TRENGTH: Grossly 4/5 FF, ABD, Ext (L)               SENSATION: Grossly intact LUE to LT              SPECIAL TESTS: NA              ACTIVITY TOLERANCE: Improved tolerance to light ADLs  Up to shoulder and even above level -               See Exercise, Manual, and Modality Logs for complete treatment.      Functional / Therapeutic Activities:  X 35 min  · TAPING / BRACING: NA  · SEE EXERCISE FLOW SHEET -   · FUNCTIONAL ASSESSMENT    · Jt protection, ADL  modification; Posture and    ___________________________________________________________________   Assessment/Plan  61 yo female - S/P (L) RTSA - 5/13/2021  Doing well improving AROM and functional abilty, endurance and minimal pain -Residual stiffness into IRB ,but improved w/ stretching - .   Ms. Powell would benefit from continued Physical Therapy then DC to HEP - .   Please advise after your assessment.      P: Recommend continued Physical Therapy to allow a full and safe return to ADL's and normal job duties 2 times/wk x 2 weeks progressing per treatment guidelines . DC to HEP at that time.     Thank you again for this referral of Ms. Powell to [facility].     PT Signature: ______________________________   Antonio Coley, PT  ______________________________________________________  Based upon review of the patient's progress and continued therapy plan, it is my medical opinion that Pedrito Powell should continue physical therapy treatment per the recommendation above.      Signature: ____________________________   Date:  ________   Bethel Devi MD   ____________________________________________________________________  Manual Therapy:                 mins  83613;  Therapeutic Exercise:    30     mins  04747;     Neuromuscular Dion:        mins  83403;    Therapeutic Activity:     35     mins  76787;   Self Care:                          mins  61117  Ultrasound:                          mins  36560;  Iontophoresis:                     mins  10794;    Electrical Stimulation:         mins  08204 ( );  Mechanical Traction:          mins  95379  DIAN Bauman (L)             15     mins NC     Timed Treatment:   65   mins                  Total Treatment:     100   mins     ______________________________________________________________________  [address] [place] [address]  Phone: [phone]                 Fax: [phone]

## 2021-09-28 NOTE — PROGRESS NOTES
Physical Therapy Daily Progress Note    Patient Name: Pedrito Powell         :  1960  Referring Physician: Bethel Devi MD      Subjective   Pedrito Sherry reports: -SEE MD PROGRESS NOTE-      Objective   -SEE MD PROGRESS NOTE-    Assessment / Plan:   -SEE MD PROGRESS NOTE-     _________________________________________________      Antonio Coley PT  Physical Therapist

## 2021-09-29 ENCOUNTER — OFFICE VISIT (OUTPATIENT)
Dept: ORTHOPEDIC SURGERY | Facility: CLINIC | Age: 61
End: 2021-09-29

## 2021-09-29 VITALS — BODY MASS INDEX: 45.55 KG/M2 | TEMPERATURE: 97.2 F | HEIGHT: 60 IN | WEIGHT: 232 LBS

## 2021-09-29 DIAGNOSIS — Z96.612 STATUS POST REVERSE TOTAL REPLACEMENT OF LEFT SHOULDER: Primary | ICD-10-CM

## 2021-09-29 PROCEDURE — 99212 OFFICE O/P EST SF 10 MIN: CPT | Performed by: ORTHOPAEDIC SURGERY

## 2021-09-29 PROCEDURE — 73030 X-RAY EXAM OF SHOULDER: CPT | Performed by: ORTHOPAEDIC SURGERY

## 2021-09-29 NOTE — PROGRESS NOTES
"Pedrito Powell : 1960 MRN: [id number] DATE: 2021    DIAGNOSIS: 3 month follow up left shoulder arthroplasty      SUBJECTIVE:  Patient returns today for 3 month follow up of left shoulder replacement.  Patient reports doing well with no unusual complaints. Still in PT and reports making good progress.    OBJECTIVE:    Temp 97.2 °F (36.2 °C)   Ht 152.4 cm (60\")   Wt 105 kg (232 lb)   BMI 45.31 kg/m²     Review of Systems negative except as above    Exam:  The incision is well healed. No effusion.  Range of motion:  .  ER 45.  IR to her back pocket. The arm is soft and nontender.  5-/5 strength with elevation and abduction.  Sensation intact distally.  Brisk cap refill.    DIAGNOSTIC STUDIES    Xrays: AP, scapular Y and axillary views of the left shoulder are ordered and reviewed for evaluation of shoulder replacement. They demonstrate a well positioned, well aligned replacement without complicating factors noted.  In comparison with previous films there has been no change.    ASSESSMENT: 3 month follow up left shoulder replacement.    PLAN:   1.  Continue appropriate activity modifications and restrictions as discussed.  I did give her a work note at her request  2.  Continue PT per protocol.  3.  I explained that one can expect continued improvement in motion, function, and any residual discomfort for up to 1 year after surgery.  4.  Follow up at 1 year unless experiencing any new or concerning symptoms.    Bethel Devi MD    2021     "

## 2021-10-01 ENCOUNTER — TREATMENT (OUTPATIENT)
Dept: PHYSICAL THERAPY | Facility: CLINIC | Age: 61
End: 2021-10-01

## 2021-10-01 DIAGNOSIS — M25.612 SHOULDER STIFFNESS, LEFT: ICD-10-CM

## 2021-10-01 DIAGNOSIS — Z96.612 S/P REVERSE TOTAL SHOULDER ARTHROPLASTY, LEFT: Primary | ICD-10-CM

## 2021-10-01 DIAGNOSIS — R29.898 WEAKNESS OF SHOULDER: ICD-10-CM

## 2021-10-01 PROCEDURE — 97530 THERAPEUTIC ACTIVITIES: CPT | Performed by: PHYSICAL THERAPIST

## 2021-10-01 PROCEDURE — 97110 THERAPEUTIC EXERCISES: CPT | Performed by: PHYSICAL THERAPIST

## 2021-10-01 NOTE — PROGRESS NOTES
Physical Therapy Daily Progress Note    Patient Name: Pedrito Powell         :  1960  Referring Physician: Bethel Devi MD      Subjective   Pedrito Powell reports: Seeing Dr. Devi who ordered 6 weeks of PT and Part time work 3days/wk and PT 2days/week then FT after 6 weeks -     Objective   Pt demonstrating improved AROM w/ PT activities - Most limited with IRB -     See Exercise, Manual, and Modality Logs for complete treatment.     Functional / Therapeutic Activities:  30 min  · TAPING / BRACING: K-tape to Unload (B) PF jt w/ extra lateral component (B) PF jt -  · SEE EXERCISE FLOW SHEET -   · Jt protection, ADL modification; Posture and      Assessment/Plan  59 yo female - S/P (L) RTSA - 2021  Doing well improving AROM and functional abilty, endurance and minimal pain -Residual stiffness into IRB ,but improved w/ stretching - .   Ms. Powell would benefit from continued Physical Therapy then DC to HEP - .     Progress strengthening /stabilization /functional activity       _________________________________________________    Manual Therapy:                 mins  86635;  Therapeutic Exercise:   30     mins  85287;     Neuromuscular Dion:   05     mins  87952;    Therapeutic Activity:     30      mins  96065;     Ultrasound:                          mins  60806;  Iontophoresis:                     mins  59028;    Electrical Stimulation:         mins  64094 ( );  Mechanical Traction:          mins  89777  CP                  15     mins NC     Timed Treatment:   65    mins                  Total Treatment:     95    mins    Antonio Coley PT  Physical Therapist

## 2021-10-05 ENCOUNTER — TREATMENT (OUTPATIENT)
Dept: PHYSICAL THERAPY | Facility: CLINIC | Age: 61
End: 2021-10-05

## 2021-10-05 DIAGNOSIS — R29.898 WEAKNESS OF SHOULDER: ICD-10-CM

## 2021-10-05 DIAGNOSIS — Z96.612 S/P REVERSE TOTAL SHOULDER ARTHROPLASTY, LEFT: Primary | ICD-10-CM

## 2021-10-05 DIAGNOSIS — M25.612 SHOULDER STIFFNESS, LEFT: ICD-10-CM

## 2021-10-05 PROCEDURE — 97530 THERAPEUTIC ACTIVITIES: CPT | Performed by: PHYSICAL THERAPIST

## 2021-10-05 PROCEDURE — 97110 THERAPEUTIC EXERCISES: CPT | Performed by: PHYSICAL THERAPIST

## 2021-10-05 NOTE — PROGRESS NOTES
Physical Therapy Daily Progress Note    Patient Name: Pedrito Powell         :  1960  Referring Physician: Bethel Devi MD      Subjective   Pedrito Powell reports: no new problems    Objective   Pt demonstrated more difficulty with LUE Shoulder wheel up -     See Exercise, Manual, and Modality Logs for complete treatment.     Functional / Therapeutic Activities:  33min  · TAPING / BRACING: K-tape to Unload (B) PF jt w/ extra lateral component (B) PF jt -  · SEE EXERCISE FLOW SHEET -   · Jt protection, ADL modification; Posture and       Assessment/Plan  61 yo female - S/P (L) RTSA - 2021  Doing well improving AROM and functional abilty, endurance and minimal pain -Residual stiffness into IRB ,but improved w/ stretching - .   Ms. Powell would benefit from continued Physical Therapy then DC to HEP -     Progress per Plan of Care       _________________________________________________    Manual Therapy:                 mins  49467;  Therapeutic Exercise:    28    mins  26060;     Neuromuscular Dion:   05     mins  60370;    Therapeutic Activity:     33     mins  14157;     Ultrasound:                          mins  57905;  Iontophoresis:                     mins  06009;    Electrical Stimulation:         mins  18316 ( );  Mechanical Traction:          mins  08145  CP                                   15     mins NC     Timed Treatment:   66    mins                  Total Treatment:     95    mins    Antonio Coley PT  Physical Therapist

## 2021-10-08 ENCOUNTER — TREATMENT (OUTPATIENT)
Dept: PHYSICAL THERAPY | Facility: CLINIC | Age: 61
End: 2021-10-08

## 2021-10-08 DIAGNOSIS — R29.898 WEAKNESS OF SHOULDER: ICD-10-CM

## 2021-10-08 DIAGNOSIS — M25.612 SHOULDER STIFFNESS, LEFT: ICD-10-CM

## 2021-10-08 DIAGNOSIS — Z96.612 S/P REVERSE TOTAL SHOULDER ARTHROPLASTY, LEFT: Primary | ICD-10-CM

## 2021-10-08 PROCEDURE — 97530 THERAPEUTIC ACTIVITIES: CPT | Performed by: PHYSICAL THERAPIST

## 2021-10-08 PROCEDURE — 97112 NEUROMUSCULAR REEDUCATION: CPT | Performed by: PHYSICAL THERAPIST

## 2021-10-08 PROCEDURE — 97110 THERAPEUTIC EXERCISES: CPT | Performed by: PHYSICAL THERAPIST

## 2021-10-08 NOTE — PROGRESS NOTES
"Physical Therapy Daily Progress Note    Visit # : 38  Pedrito Powell reports: I lost my balance and \"fell\" into my bed last night.  I felt a pop in my shoulder but it's not really more sore than usual but hope I didn't hurt it.     Subjective     Objective          Palpation   Left   Tenderness of the anterior deltoid and middle deltoid.     Tenderness     Left Shoulder   Tenderness in the acromion.     Active Range of Motion   Left Shoulder   Flexion: 122 degrees   Abduction: 112 degrees     Strength/Myotome Testing     Left Shoulder     Planes of Motion   External rotation at 0°: 4-       See Exercise, Manual, and Modality Logs for complete treatment.   · TAPING / BRACING: K-tape to Unload (B) PF - 2 \"I\" strips with 75% tension Laterally   ·   Assessment/Plan  Pt demonstrates ability to actively reach with good jt integrity with manual palpation.  Pt was able to complete full ex program without increased shoulder pain though requires frequent rest breaks secondary to knee pain.   Progress per Plan of Care       Timed:  Manual Therapy:    -     mins  15392;  Therapeutic Exercise:    32     mins  95677;     Neuromuscular Dion:    8    mins  99163;    Therapeutic Activity:     10     mins  19702;     Gait Training:      -     mins  50194;     Ultrasound:     -     mins  32064;    Iontophoresis                 -     mins 47803    Timed Treatment:   50   mins direct  Total Treatment:     105   mins      Yuly Leong PT  Physical Therapist  [place] License # [id number]      "

## 2021-10-11 ENCOUNTER — TREATMENT (OUTPATIENT)
Dept: PHYSICAL THERAPY | Facility: CLINIC | Age: 61
End: 2021-10-11

## 2021-10-11 DIAGNOSIS — M25.612 SHOULDER STIFFNESS, LEFT: ICD-10-CM

## 2021-10-11 DIAGNOSIS — Z96.612 S/P REVERSE TOTAL SHOULDER ARTHROPLASTY, LEFT: Primary | ICD-10-CM

## 2021-10-11 DIAGNOSIS — R29.898 WEAKNESS OF SHOULDER: ICD-10-CM

## 2021-10-11 PROCEDURE — 97530 THERAPEUTIC ACTIVITIES: CPT | Performed by: PHYSICAL THERAPIST

## 2021-10-11 PROCEDURE — 97112 NEUROMUSCULAR REEDUCATION: CPT | Performed by: PHYSICAL THERAPIST

## 2021-10-11 PROCEDURE — 97110 THERAPEUTIC EXERCISES: CPT | Performed by: PHYSICAL THERAPIST

## 2021-10-11 NOTE — PROGRESS NOTES
"Physical Therapy Daily Progress Note    Visit # : 39  Pedrito Powell reports: noticing some extra soreness today though no incidents over the weekend.     Subjective     Objective   See Exercise, Manual, and Modality Logs for complete treatment.      KT-tape to Unload (B) PF - 2 \"I\" strips with 75% tension med/lateral knees applied in slight knee flexion    Assessment/Plan  Pt was able to complete full exercise program though required frequent rest breaks secondary to B knee pain.  Pt demonstrates good form requiring few verbal cues.    Progress per Plan of Care  Per primary PT progress toward discharge with HEP     Timed:  Manual Therapy:    -     mins  55318;  Therapeutic Exercise:    35     mins  10345;     Neuromuscular Doin:    8    mins  81387;    Therapeutic Activity:     10     mins  28009;     Gait Training:      -     mins  03730;     Ultrasound:     -     mins  58134;    Iontophoresis                 -     mins 33299    Timed Treatment:   53   mins direct  Total Treatment:     125   mins      Yuly Leong PT  Physical Therapist  [place] License # [id number]      "

## 2021-10-13 ENCOUNTER — TREATMENT (OUTPATIENT)
Dept: PHYSICAL THERAPY | Facility: CLINIC | Age: 61
End: 2021-10-13

## 2021-10-13 DIAGNOSIS — R29.898 WEAKNESS OF SHOULDER: ICD-10-CM

## 2021-10-13 DIAGNOSIS — M25.612 SHOULDER STIFFNESS, LEFT: ICD-10-CM

## 2021-10-13 DIAGNOSIS — Z96.612 S/P REVERSE TOTAL SHOULDER ARTHROPLASTY, LEFT: Primary | ICD-10-CM

## 2021-10-13 PROCEDURE — 97110 THERAPEUTIC EXERCISES: CPT | Performed by: PHYSICAL THERAPIST

## 2021-10-13 PROCEDURE — 97112 NEUROMUSCULAR REEDUCATION: CPT | Performed by: PHYSICAL THERAPIST

## 2021-10-13 PROCEDURE — 97530 THERAPEUTIC ACTIVITIES: CPT | Performed by: PHYSICAL THERAPIST

## 2021-10-13 NOTE — PROGRESS NOTES
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  Shoulder pain stays about 3/10.      Subjective     Objective   See Exercise, Manual, and Modality Logs for complete treatment.     KT-tape to Unload (B) PF - 2 \"I\" strips with 75% tension med/lateral knees applied in slight knee flexion    Assessment/Plan  Pt continues to be challenged with behind the back activities.  Pt experienced some vertigo during BTE activities so required a rest break.  Overall pt continues to report overall fatigue with exercise program.    Progress per Plan of Care       Timed:  Manual Therapy:            -     mins  40811;  Therapeutic Exercise:    40     mins  94233;     Neuromuscular Dion:    8    mins  80337;    Therapeutic Activity:      10     mins  43810;     Gait Training:                 -     mins  85735;     Ultrasound:                     -     mins  59893;    Iontophoresis                 -     mins 03801     Timed Treatment:   58   mins direct  Total Treatment:     125   mins         Yuly Leong PT  Physical Therapist  [place] License # [id number]      "

## 2021-10-18 ENCOUNTER — TREATMENT (OUTPATIENT)
Dept: PHYSICAL THERAPY | Facility: CLINIC | Age: 61
End: 2021-10-18

## 2021-10-18 DIAGNOSIS — Z96.612 S/P REVERSE TOTAL SHOULDER ARTHROPLASTY, LEFT: Primary | ICD-10-CM

## 2021-10-18 PROCEDURE — 97110 THERAPEUTIC EXERCISES: CPT | Performed by: PHYSICAL THERAPIST

## 2021-10-18 NOTE — PROGRESS NOTES
Physical Therapy Daily Progress Note    Visit Diagnoses:    ICD-10-CM ICD-9-CM   1. S/P reverse total shoulder arthroplasty, left  Z96.612 V43.61       VISIT#: 41      Pedrito Powell reports: Her surgery was in May. She started out in a bad place. It is feeling pretty good today and had a good weekend too. It does still get sore - especially with lifting  Current Pain Level:    2/10; Worst:   2/10; Best:  1-2/10  Location Of Pain: L shoulder  Response to Previous Session: Good. No issues  Functional Deficits/Irritating Factors: Reaching behind, lifting, carrying  Progression: Improving  Compliance with HEP Reported: Yes    Objective   Presents: Forward head, rounded shoulders, protracted scapula  Increased sets/reps of:  none   Increased resistance on:  none  Added to Program: none    AROM L Shoulder ER = 31 deg; IR = 70 deg; Flexion = 143 deg      See Exercise, Manual, and Modality Logs for complete treatment.     Patient Education: Pt was educated on exercise biomechanical correctness, intensity, and speed.     Assessment:  Pt able to progress through her exercise interventions w/o issues with exception of fatigue and requiring a couple of short rest breaks with GALLEGOS at times. Pt has a lot of exercises to perform which should improve her overall tolerance to functional activities.  Pt will continue to benefit from skilled PT interventions to address current functional deficits and impairments.       Plan: Progress to/Continue with current program. Return to evaluating therapist.         Manual Therapy:    0     mins  06677;  Ultrasound:   0 mins 75915  Electrical Stimulation: __0____ mins 68968/  Iontophoresis: 0 mins 43885  Traction: 0 mins  24360  Work Conditionin mins 98692  Therapeutic Exercise:   35/90  mins  04303;     Neuromuscular Dion:    0    mins  26611;    Therapeutic Activity:     0     mins  76086;     Timed Treatment:   30   mins   Total Treatment:     105   mins    Sheila Galdamez, JULIETH  [place]  License # [id number]  Physical Therapist Assistant

## 2021-10-19 NOTE — PROGRESS NOTES
Subjective     REASON FOR follow-up: Multifactorial anemia, anemia of chronic kidney disease and rheumatoid arthritis                             History of Present Illness patient is a 60-year-old female who was referred to us for evaluation of chronic anemia.  She does have an autoimmune component with her history of rheumatoid arthritis.  She also has mild renal insufficiency.    Anemia work-up showed a B12 of 494,Ferritin 160, serum iron of 36 with percent saturation of 13, EPO level of 8.3, creatinine of 1.49, serum protein electrophoresis did not show monoclonal protein.  ESR is 35.  There are no signs of infection.  Currently her hemoglobin is 9.8 and she is asymptomatic.  Dr. Pratt did discuss about the possibility of Procrit but at the present time after discussing the side effects she wants to be observed.    Patient returns today for follow up of anemia.  She is scheduled to have a left shoulder replacement surgery on May 10, 2021 and would therefore like to get Retacrit if her hemoglobin is low enough to help improve her hemoglobin before surgery.  She was also seen by her nephrologist who scheduled her for two doses of IV Feraheme prior to shoulder surgery, first dose 4/8/2021.  She is feeling well today.  She denies shortness of breath, dizziness, lightheadedness, or fatigue outside of her baseline.    Interval history:  The patient returns today for lab review and follow up.  She underwent shoulder replacement surgery 5/13/2021.  She started back to work yesterday, currently working 3 times weekly.  She is continuing to work with PT.  She reports feeling reasonably well physically.  She is eating and drinking adequately.  Her bowels are moving regularly.  She denies signs or symptoms of bleeding.  She denies shortness of breath or worsening fatigue.      She did express concern emotionally about her job.  Since her orthopedist has only released her to work 3 times a week, she feels her boss is  not accommodating of the situation.  She expresses dealing with the emotional fear of possibly losing her job.  The patient is agreeable to meeting with our , Belem Rashid.    Hematology history:  Ms. Powell is seen today after being referred back to us by her primary care physician, Dr. Teo Fraser.  We saw the patient in consultation in March 2019 for chronic anemia.  Patient has had a long history of rheumatoid arthritis with chronic anemia and reported chronic difficulty with iron deficiency, taking oral iron without much benefit.  At that time of initial consultation we discussed her anemia was likely related to underlying CKD stage III, as well as possibly her rheumatoid arthritis.  We did evaluate for vitamin deficiencies and she was not found to be folate, B12, or iron deficient.  Sed rate was slightly elevated at 44 at that time.    Most recent lab work performed by Dr. Fraser 1/19/2021 showed a hemoglobin of 9.4, creatinine 1.3, BUN 32, GFR 39.  Upon review of the EMR it appears at least for the last roughly 2 years her creatinine has been anywhere from 1.2 to as high as 1.7, BUN ranging in the 20s to 30s.  So overall relatively stable.      Last colonoscopy and EGD were performed per Dr. Kapoor, negative.  Patient also had a small bowel follow-through which was within normal limits. Other pertinent imaging include a CT of the abdomen pelvis performed December 2018 which was negative.  She is up-to-date on her mammogram, last performed August 2020, negative.    As she is reviewed today, hemoglobin is at stable at 9.8.  Patient denies any significant fatigue and feels that she has fairly good performance status.  She does note a few times a week taking a 3 to 4-hour nap in the afternoon but does not require this on a regular basis.  She is still able to complete the activity she desires.  Patient is a  and lives alone.  She denies any obvious GI blood loss.      She reports normal  appetite.  Weight is stable.  She does have significant left shoulder pain and will undergo replacement surgery per Dr. Devi in May 2021.    Otherwise she denies further concerns at this time.    Past Medical History:   Diagnosis Date   • Anemia     IRON INFUSION RECENTLY   • Arthritis    • Asthma    • Chronic kidney disease     stage 3   • Elevated cholesterol    • GERD (gastroesophageal reflux disease)    • History of carpal tunnel syndrome    • Hyperlipidemia    • Hypertension    • Left shoulder pain    • Limited mobility    • Vertigo    • Weakness     AT TIMES LEFT SHOULDER/LEFT ARM        Past Surgical History:   Procedure Laterality Date   •  SECTION  , ,    • COLONOSCOPY N/A 12/10/2018    normal ileum, IH, hyperplastic polyp, otherwise normal exam   • ENDOSCOPY N/A 12/10/2018    no gross lesions in esophagus or examined duodenum, erythematous mucosa in stomach, biopsies benign   • HYSTERECTOMY     • OOPHORECTOMY Bilateral    • SHOULDER MANIPULATION Left    • TOTAL SHOULDER ARTHROPLASTY Left 2021    Procedure: REVERSE TOTAL SHOULDER ARTHROPLASTY,  BICEP TENDONDESIS;  Surgeon: Bethel Devi MD;  Location: [facility];  Service: Orthopedics;  Laterality: Left;        Current Outpatient Medications on File Prior to Visit   Medication Sig Dispense Refill   • albuterol 108 (90 Base) MCG/ACT inhaler Inhale 2 puffs Every 4 (Four) Hours As Needed for Wheezing.     • aspirin 325 MG tablet Take 325 mg by mouth Daily. HOLD ONE WEEK PRIOR TO SURGERY     • atorvastatin (LIPITOR) 80 MG tablet TAKE 1 TABLET BY MOUTH DAILY 90 tablet 1   • azelastine (ASTELIN) 0.1 % nasal spray 1 spray into the nostril(s) as directed by provider Daily.  11   • azelastine (ASTELIN) 0.1 % nasal spray 1 spray into the nostril(s) as directed by provider 2 (Two) Times a Day. Use in each nostril as directed     • B Complex Vitamins (B COMPLEX PO) Take 1 capsule by mouth Daily. HOLD FOR SURGERY 1 WEEK     •  cephalexin (KEFLEX) 500 MG capsule 4 PILLS ONE HOUR PRIOR TO PROCEDURE 4 capsule 1   • Cholecalciferol (Vitamin D3) 50 MCG (2000 UT) capsule TAKE 1 CAPSULE BY MOUTH DAILY 90 capsule 1   • clobetasol (TEMOVATE) 0.05 % cream Apply 1 application topically to the appropriate area as directed As Needed.     • docusate sodium 100 MG capsule Take 1 capsule by mouth 2 (Two) Times a Day. 60 capsule 0   • EPINEPHrine (AUVI-Q IJ) Inject  as directed As Needed.     • ferrous gluconate (FERGON) 324 MG tablet TAKE 1 TABLET BY MOUTH DAILY WITH BREAKFAST 90 tablet 1   • hydroCHLOROthiazide (HYDRODIURIL) 25 MG tablet Take 1 tablet by mouth Daily. 90 tablet 3   • hydrocortisone 2.5 % cream Apply  topically to the appropriate area as directed As Needed.     • ipratropium (ATROVENT) 0.03 % nasal spray 1 spray into the nostril(s) as directed by provider 3 (Three) Times a Day.     • irbesartan (Avapro) 300 MG tablet Take 1 tablet by mouth Daily. 90 tablet 3   • loperamide (IMODIUM) 2 MG capsule Take 1 capsule by mouth Every 2 (Two) Hours As Needed for Diarrhea (max 4 doses daily).     • meclizine (ANTIVERT) 25 MG tablet Take 1 tablet by mouth 3 (Three) Times a Day As Needed for Dizziness. 270 tablet 0   • omeprazole (priLOSEC) 40 MG capsule Take 1 capsule by mouth Daily. 90 capsule 1   • ondansetron (ZOFRAN) 4 MG tablet Take 1 tablet by mouth Every 12 (Twelve) Hours As Needed for Nausea or Vomiting. 180 tablet 0   • triamcinolone (KENALOG) 0.1 % ointment Apply 1 application topically to the appropriate area as directed As Needed.       Current Facility-Administered Medications on File Prior to Visit   Medication Dose Route Frequency Provider Last Rate Last Admin   • Chlorhexidine Gluconate 2 % pads 1 each  1 each Apply externally Take As Directed Bethel Devi MD            ALLERGIES:    Allergies   Allergen Reactions   • Chocolate Anaphylaxis   • Nickel Anaphylaxis   • Nuts Anaphylaxis   • Adhesive Tape Unknown - Low Severity   •  Chlorhexidine Unknown - Low Severity   • Ciprofloxacin Other (See Comments)     THRUSH PER PATIENT   • Citric Acid Unknown (See Comments)     Unsure     • Covid-19 (Mrna) Vaccine Other (See Comments)     INSTRUCTED PER ALLERGIST SHE CAN NOT TAKE D/T ONE OF THE PRESERVATIVES   • Eggs Or Egg-Derived Products Nausea And Vomiting   • Influenza Vaccines Nausea And Vomiting   • Methyldibromoglutaronitrile Unknown (See Comments)      PATIENT UNSURE OF REACTION.   • Neomycin Unknown (See Comments)      PATIENT UNSURE OF REACTION.   • Omnicef [Cefdinir] Other (See Comments)     THRUSH PER PATIENT   • Palladium Chloride Unknown (See Comments)      PATIENT UNSURE OF REACTION   • Penicillins Other (See Comments)     THRUSH PER PATIENT   • Sulfa Antibiotics Other (See Comments)     THRUSH PER PATIENT   • Tomato Hives   • Yeast-Related Products Hives   • Gold-Containing Drug Products Rash         • Latex Rash        Social History     Socioeconomic History   • Marital status:    • Number of children: 3   • Years of education: College   Tobacco Use   • Smoking status: Never Smoker   • Smokeless tobacco: Never Used   Vaping Use   • Vaping Use: Never used   Substance and Sexual Activity   • Alcohol use: Yes     Alcohol/week: 1.0 standard drink     Types: 1 Glasses of wine per week     Comment: WEEKLY   • Drug use: No   • Sexual activity: Defer        Family History   Problem Relation Age of Onset   • Colon cancer Maternal Grandfather    • Asthma Mother    • Thyroid disease Father    • Diabetes Maternal Grandmother    • Malig Hyperthermia Neg Hx       Review of Systems   Constitutional: Negative for appetite change, chills, diaphoresis, fatigue, fever and unexpected weight change.   HENT: Negative for hearing loss, sore throat and trouble swallowing.    Respiratory: Negative for cough, chest tightness, shortness of breath and wheezing.    Cardiovascular: Negative for chest pain, palpitations and leg swelling.  "  Gastrointestinal: Negative for abdominal distention, abdominal pain, blood in stool, constipation, diarrhea, nausea and vomiting.   Genitourinary: Negative for dysuria, frequency, hematuria and urgency.   Musculoskeletal: Positive for arthralgias. Negative for joint swelling.        No muscle weakness.   Skin: Negative for rash and wound.   Neurological: Negative for dizziness, seizures, syncope, speech difficulty, weakness, numbness and headaches.   Hematological: Negative for adenopathy. Does not bruise/bleed easily.   Psychiatric/Behavioral: Negative for behavioral problems, confusion and suicidal ideas.   All other systems reviewed and are negative.     I have reviewed and confirmed the accuracy of the ROS as documented 04/07/2021 KALYANI Brunson    Objective     Vitals:    10/20/21 1600   BP: 126/84   Pulse: 93   Resp: 18   Temp: 97.1 °F (36.2 °C)   TempSrc: Temporal   SpO2: 94%   Weight: 106 kg (233 lb 4.8 oz)   Height: 152.4 cm (60\")   PainSc: 0-No pain     Current Status 10/20/2021   ECOG score 0     Physical Exam    CONSTITUTIONAL:  Vital signs reviewed.    No distress, looks comfortable.  EYES:  Conjunctiva and lids unremarkable.  Extraocular eye movements intact.  EARS,NOSE,MOUTH,THROAT:  Ears and nose appear unremarkable.  Lips, teeth, gums appear unremarkable.  RESPIRATORY:  Normal respiratory effort.  , no rales  or wheezing, clear   CARDIOVASCULAR:  Regular rate and rhythm, no murmur  No significant lower extremity edema.  SKIN: No wounds.  No rashes.  MUSCULOSKELETAL/EXTREMITIES: No clubbing or cyanosis.  No apparent unilateral weakness.  NEURO: CN 2-12 appear intact. No focal neurological deficits noted.  PSYCHIATRIC:  Normal judgment and insight.  Normal mood and affect.      RECENT LABS:  Results from last 7 days   Lab Units 10/20/21  1549   WBC 10*3/mm3 9.66   NEUTROS ABS 10*3/mm3 6.78   HEMOGLOBIN g/dL 10.5*   HEMATOCRIT % 32.0*   PLATELETS 10*3/mm3 254     Results from last 7 days   Lab " Units 10/20/21  1549   SODIUM mmol/L 137   POTASSIUM mmol/L 3.2*   CHLORIDE mmol/L 99   CO2 mmol/L 24.0   BUN mg/dL 23   CREATININE mg/dL 1.54*   CALCIUM mg/dL 9.2   ALBUMIN g/dL 4.10   BILIRUBIN mg/dL 0.4   ALK PHOS U/L 66   ALT (SGPT) U/L 21   AST (SGOT) U/L 22   GLUCOSE mg/dL 117*           Two View Chest X-Ray 02/24/19  FINDINGS: The lungs are moderately expanded and clear and there is no  evidence of localized pneumonia. The heart is borderline enlarged  without change from 07/23/2012.    CT Abdomen Pelvis 12/06/18 scanned Image.    Assessment/Plan     1.  Multifactorial  anemia.  With anemia of chronic kidney disease and rheumatoid arthritis  · Seen in consultation March 2019.  Patient reporting longstanding anemia with hemoglobin running in the 10-11 range.  Patient has underlying CKD stage III.  Work-up at that time did not reveal any vitamin deficiency including no iron, B12 or folate deficiency.  No underlying monoclonal protein.  Anemia felt to be related to CKD with also possibly an element of chronic disease with underlying rheumatoid arthritis.  Sed rate was mildly elevated at 44.  Patient lost to follow-up since that time.  · Last colonoscopy and EGD were performed per Dr. Kapoor, negative.  Patient also had a small bowel follow-through which was within normal limits. Other pertinent imaging include a CT of the abdomen pelvis performed December 2018 which was negative.  She is up-to-date on her mammogram, last performed August 2020, negative.  · March 10, 2021: Patient's hemoglobin today is 9.8, discussed consideration of Procrit and will get approval.  Discussed side effects in length with patient today.  We could consider starting Procrit if her hemoglobin continues to drop.  · 4/7/2021, hemoglobin today 10.3, patient does not qualify for Retacrit.  She is receiving IV Feraheme x 2 doses per nephrology, first dose tomorrow 4/8/2021.  · May 5, 2021: Patient's hemoglobin is 11.  She is scheduled to  undergo left shoulder surgery by Dr. Devi and he preferred hemoglobin around 10.  Currently her hemoglobin after iron infusions has gone up to 11.  She is cleared to go for surgery.  · 10/20/2021, patient seen for lab review and follow up.  Hemoglobin today today is 10.5, the patient is still not a candidate for Retacrit.  · She did express concern emotionally about her job.  Since her orthopedist has only released her to work 3 times a week, she feels her boss is not accommodating of the situation.  She expresses dealing with the emotional fear of possibly losing her job.  The patient is agreeable to discussing this with our , Belem Rashid.  Belem has been notified of the situation.    2.  Rheumatoid arthritis for which she takes diclofenac.    3.  Chronic kidney disease stage III.  This is managed per Dr. Fraser.  Creatinine appears relatively stable range from 1.2-1.7 over the last 2 years.  BUN 20s to 30s.  GFR last measured around 39.  This could be the cause of her anemia.    · Creatinine today is 1.54.  Patient continues to follow with nephrology.  · Still not a candidate for Procrit given hemoglobin greater than 10    4.  Left shoulder replacement surgery to be done on May 10, 2021.  To be done by Dr. Anderson    5.  Hypertension stable.  BP today 126/84.    PLAN:  · Patient still does not qualify for Retacrit, hemoglobin 10.5 today.  · Return in 6 months for MD follow-up with labs.  · Patient will follow up with our .    KALYANI Brunson  10/20/2021      Dr. Teo Devi, orthopedic

## 2021-10-20 ENCOUNTER — LAB (OUTPATIENT)
Dept: OTHER | Facility: HOSPITAL | Age: 61
End: 2021-10-20

## 2021-10-20 ENCOUNTER — OFFICE VISIT (OUTPATIENT)
Dept: ONCOLOGY | Facility: CLINIC | Age: 61
End: 2021-10-20

## 2021-10-20 ENCOUNTER — TELEPHONE (OUTPATIENT)
Dept: ONCOLOGY | Facility: CLINIC | Age: 61
End: 2021-10-20

## 2021-10-20 VITALS
RESPIRATION RATE: 18 BRPM | OXYGEN SATURATION: 94 % | SYSTOLIC BLOOD PRESSURE: 126 MMHG | TEMPERATURE: 97.1 F | HEART RATE: 93 BPM | WEIGHT: 233.3 LBS | DIASTOLIC BLOOD PRESSURE: 84 MMHG | HEIGHT: 60 IN | BODY MASS INDEX: 45.8 KG/M2

## 2021-10-20 DIAGNOSIS — D63.1 ANEMIA DUE TO STAGE 3 CHRONIC KIDNEY DISEASE, UNSPECIFIED WHETHER STAGE 3A OR 3B CKD (HCC): Primary | ICD-10-CM

## 2021-10-20 DIAGNOSIS — N18.30 ANEMIA DUE TO STAGE 3 CHRONIC KIDNEY DISEASE, UNSPECIFIED WHETHER STAGE 3A OR 3B CKD (HCC): Primary | ICD-10-CM

## 2021-10-20 DIAGNOSIS — Z98.890 H/O SHOULDER SURGERY: ICD-10-CM

## 2021-10-20 DIAGNOSIS — D63.1 ANEMIA DUE TO STAGE 3 CHRONIC KIDNEY DISEASE, UNSPECIFIED WHETHER STAGE 3A OR 3B CKD (HCC): ICD-10-CM

## 2021-10-20 DIAGNOSIS — N18.30 ANEMIA DUE TO STAGE 3 CHRONIC KIDNEY DISEASE, UNSPECIFIED WHETHER STAGE 3A OR 3B CKD (HCC): ICD-10-CM

## 2021-10-20 LAB
ALBUMIN SERPL-MCNC: 4.1 G/DL (ref 3.5–5.2)
ALBUMIN/GLOB SERPL: 1.2 G/DL
ALP SERPL-CCNC: 66 U/L (ref 39–117)
ALT SERPL W P-5'-P-CCNC: 21 U/L (ref 1–33)
ANION GAP SERPL CALCULATED.3IONS-SCNC: 14 MMOL/L (ref 5–15)
AST SERPL-CCNC: 22 U/L (ref 1–32)
BASOPHILS # BLD AUTO: 0.06 10*3/MM3 (ref 0–0.2)
BASOPHILS NFR BLD AUTO: 0.6 % (ref 0–1.5)
BILIRUB SERPL-MCNC: 0.4 MG/DL (ref 0–1.2)
BUN SERPL-MCNC: 23 MG/DL (ref 8–23)
BUN/CREAT SERPL: 14.9 (ref 7–25)
CALCIUM SPEC-SCNC: 9.2 MG/DL (ref 8.6–10.5)
CHLORIDE SERPL-SCNC: 99 MMOL/L (ref 98–107)
CO2 SERPL-SCNC: 24 MMOL/L (ref 22–29)
CREAT SERPL-MCNC: 1.54 MG/DL (ref 0.57–1)
DEPRECATED RDW RBC AUTO: 41.7 FL (ref 37–54)
EOSINOPHIL # BLD AUTO: 0.2 10*3/MM3 (ref 0–0.4)
EOSINOPHIL NFR BLD AUTO: 2.1 % (ref 0.3–6.2)
ERYTHROCYTE [DISTWIDTH] IN BLOOD BY AUTOMATED COUNT: 12.1 % (ref 12.3–15.4)
GFR SERPL CREATININE-BSD FRML MDRD: 34 ML/MIN/1.73
GLOBULIN UR ELPH-MCNC: 3.3 GM/DL
GLUCOSE SERPL-MCNC: 117 MG/DL (ref 65–99)
HCT VFR BLD AUTO: 32 % (ref 34–46.6)
HGB BLD-MCNC: 10.5 G/DL (ref 12–15.9)
IMM GRANULOCYTES # BLD AUTO: 0.02 10*3/MM3 (ref 0–0.05)
IMM GRANULOCYTES NFR BLD AUTO: 0.2 % (ref 0–0.5)
LYMPHOCYTES # BLD AUTO: 2.03 10*3/MM3 (ref 0.7–3.1)
LYMPHOCYTES NFR BLD AUTO: 21 % (ref 19.6–45.3)
MCH RBC QN AUTO: 30.8 PG (ref 26.6–33)
MCHC RBC AUTO-ENTMCNC: 32.8 G/DL (ref 31.5–35.7)
MCV RBC AUTO: 93.8 FL (ref 79–97)
MONOCYTES # BLD AUTO: 0.57 10*3/MM3 (ref 0.1–0.9)
MONOCYTES NFR BLD AUTO: 5.9 % (ref 5–12)
NEUTROPHILS NFR BLD AUTO: 6.78 10*3/MM3 (ref 1.7–7)
NEUTROPHILS NFR BLD AUTO: 70.2 % (ref 42.7–76)
NRBC BLD AUTO-RTO: 0 /100 WBC (ref 0–0.2)
PLATELET # BLD AUTO: 254 10*3/MM3 (ref 140–450)
PMV BLD AUTO: 9.4 FL (ref 6–12)
POTASSIUM SERPL-SCNC: 3.2 MMOL/L (ref 3.5–5.2)
PROT SERPL-MCNC: 7.4 G/DL (ref 6–8.5)
RBC # BLD AUTO: 3.41 10*6/MM3 (ref 3.77–5.28)
SODIUM SERPL-SCNC: 137 MMOL/L (ref 136–145)
WBC # BLD AUTO: 9.66 10*3/MM3 (ref 3.4–10.8)

## 2021-10-20 PROCEDURE — 36415 COLL VENOUS BLD VENIPUNCTURE: CPT

## 2021-10-20 PROCEDURE — 99213 OFFICE O/P EST LOW 20 MIN: CPT | Performed by: NURSE PRACTITIONER

## 2021-10-20 PROCEDURE — 80053 COMPREHEN METABOLIC PANEL: CPT | Performed by: INTERNAL MEDICINE

## 2021-10-20 PROCEDURE — 85025 COMPLETE CBC W/AUTO DIFF WBC: CPT | Performed by: INTERNAL MEDICINE
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
## 2021-10-22 ENCOUNTER — TREATMENT (OUTPATIENT)
Dept: PHYSICAL THERAPY | Facility: CLINIC | Age: 61
End: 2021-10-22

## 2021-10-22 ENCOUNTER — TELEPHONE (OUTPATIENT)
Dept: ONCOLOGY | Facility: CLINIC | Age: 61
End: 2021-10-22

## 2021-10-22 DIAGNOSIS — Z96.612 S/P REVERSE TOTAL SHOULDER ARTHROPLASTY, LEFT: Primary | ICD-10-CM

## 2021-10-22 DIAGNOSIS — R29.898 WEAKNESS OF SHOULDER: ICD-10-CM

## 2021-10-22 DIAGNOSIS — M25.612 SHOULDER STIFFNESS, LEFT: ICD-10-CM

## 2021-10-22 PROCEDURE — 97110 THERAPEUTIC EXERCISES: CPT | Performed by: PHYSICAL THERAPIST

## 2021-10-22 PROCEDURE — 97530 THERAPEUTIC ACTIVITIES: CPT | Performed by: PHYSICAL THERAPIST
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
## 2021-10-26 ENCOUNTER — TELEPHONE (OUTPATIENT)
Dept: ONCOLOGY | Facility: CLINIC | Age: 61
End: 2021-10-26

## 2021-10-26 ENCOUNTER — TREATMENT (OUTPATIENT)
Dept: PHYSICAL THERAPY | Facility: CLINIC | Age: 61
End: 2021-10-26

## 2021-10-26 DIAGNOSIS — M25.612 SHOULDER STIFFNESS, LEFT: ICD-10-CM

## 2021-10-26 DIAGNOSIS — R29.898 WEAKNESS OF SHOULDER: ICD-10-CM

## 2021-10-26 DIAGNOSIS — Z96.612 S/P REVERSE TOTAL SHOULDER ARTHROPLASTY, LEFT: Primary | ICD-10-CM

## 2021-10-26 PROCEDURE — 97110 THERAPEUTIC EXERCISES: CPT | Performed by: PHYSICAL THERAPIST

## 2021-10-26 PROCEDURE — 97530 THERAPEUTIC ACTIVITIES: CPT | Performed by: PHYSICAL THERAPIST

## 2021-10-26 NOTE — PROGRESS NOTES
Physical Therapy Daily Progress Note  RE_assess    Patient Name: Pedrito Powell         :  1960  Referring Physician: Bethel Devi MD      Subjective   Pedrito Powell reports: being very fatigued after working all day - more difficult to do HEP -     Objective   AROM; (R) Shoulder: FE/FF 120-deg (better than before surgery)  ERB to Back of neck     Able to reach top and back of head w/o moving neck - IRB very limted actively, but to LSS w/ assist -    STRENGTH: Grossly 4-4+/5 FF, ABD, Ext (L  Improved tolerance to light ADLs  Up to shoulder and even above level -              See Exercise, Manual, and Modality Logs for complete treatment.     Functional / Therapeutic Activities:  30 min  · TAPING / BRACING: NA  · FUNCTIONAL ASSESSMENT  · SEE EXERCISE FLOW SHEET -   · Jt protection, ADL modification; Posture and      Assessment/Plan  61 yo female - S/P (L) RTSA - 2021  Doing well improving AROM and functional abilty, endurance and minimal pain -Residual stiffness into IRB ,but improved w/ stretching - .   Pts shoulder pre-op was severely limited / weak / stiff  GOAL STATUS -  STG - ALL MET  LTG: Progressing towards  Functional strength /mobility goals -     Progress strengthening /stabilization /functional activity -to achieve remaining LTGs -        _________________________________________________    Manual Therapy:                 mins  06133;  Therapeutic Exercise:    35    mins  61921;     Neuromuscular Dion:        mins  88679;    Therapeutic Activity:     35      mins  05985;     Ultrasound:                          mins  77452;  Iontophoresis:                     mins  01084;    Electrical Stimulation:         mins  67386 ( );  Mechanical Traction:          mins  24351  CP                 15     mins NC     Timed Treatment:   70    mins                  Total Treatment:     95    mins    Antonio Coley PT  Physical Therapist

## 2021-10-26 NOTE — PROGRESS NOTES
Physical Therapy Daily Progress Note    Patient Name: Pedrito Powell         :  1960  Referring Physician: Bethel Devi MD      Subjective   Pedrito Powell reports: No new problems - less pain, but soreness after using her arm a lot at work -     Objective   Functional active FE did well -   Limited most with IRB, but (B) sides limited - improves with stretching    See Exercise, Manual, and Modality Logs for complete treatment.     Functional / Therapeutic Activities:  32 min  · TAPING / BRACING: NA  · SEE EXERCISE FLOW SHEET -   · Jt protection, ADL modification; Posture and      Assessment/Plan  61 yo female - S/P (L) RTSA - 2021  Doing well improving AROM and functional abilty, endurance and minimal pain -Residual stiffness into IRB ,but improved w/ stretching - .   Ms. Powell would benefit from continued Physical Therapy then DC to HEP -     Progress strengthening /stabilization /functional activity       _________________________________________________    Manual Therapy:                 mins  90603;  Therapeutic Exercise:    30    mins  09287;     Neuromuscular Dion:        mins  95876;    Therapeutic Activity:     32     mins  73388;     Ultrasound:                          mins  94512;  Iontophoresis:                     mins  48115;    Electrical Stimulation:         mins  41713 ( );  Mechanical Traction:          mins  69151  CP                                   15     mins NC     Timed Treatment:   62    mins                  Total Treatment:     85    mins      Antonio Coley PT  Physical Therapist

## 2021-10-26 NOTE — TELEPHONE ENCOUNTER
"Clinical Case Management/Ty:    OSW returned patient's call from yesterday. 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 OSW explained that while the reports were made, this OSW was tole by the intake associate that it would likely not be considered for investigation in which case, no they will not come to the school. OSW also explained that if anything, someone from APS may call patient to further assess the situation. Patient verbalized understanding and said, \"okay because I think it would make matters worse if they came to the school. I feel like they would use that to retaliate against me.\"     OSW asked patient if she or anyone she knows has ever experienced retaliation in this specific workplace and patient answered no. OSW then asked patient if the school counselor is aware of this situation. Patient explained that their school counselor is new and works part-time, so she does not think she is aware. OSW encouraged patient to consider sharing this experience with both the school counselor and david schuster since she has not brought this to their attention and they may be able to assist. Patient is in agreement to do this.     OSW to remain available for support.     Belem Rashid, DARSHAN, CSW  Oncology Social Worker   Ty/Prince    "

## 2021-10-29 ENCOUNTER — APPOINTMENT (OUTPATIENT)
Dept: MAMMOGRAPHY | Facility: HOSPITAL | Age: 61
End: 2021-10-29

## 2021-10-29 ENCOUNTER — TREATMENT (OUTPATIENT)
Dept: PHYSICAL THERAPY | Facility: CLINIC | Age: 61
End: 2021-10-29

## 2021-10-29 DIAGNOSIS — R29.898 WEAKNESS OF SHOULDER: ICD-10-CM

## 2021-10-29 DIAGNOSIS — M25.612 SHOULDER STIFFNESS, LEFT: ICD-10-CM

## 2021-10-29 DIAGNOSIS — Z96.612 S/P REVERSE TOTAL SHOULDER ARTHROPLASTY, LEFT: Primary | ICD-10-CM

## 2021-10-29 PROCEDURE — 97530 THERAPEUTIC ACTIVITIES: CPT | Performed by: PHYSICAL THERAPIST

## 2021-10-29 PROCEDURE — 97110 THERAPEUTIC EXERCISES: CPT | Performed by: PHYSICAL THERAPIST

## 2021-11-02 ENCOUNTER — TREATMENT (OUTPATIENT)
Dept: PHYSICAL THERAPY | Facility: CLINIC | Age: 61
End: 2021-11-02

## 2021-11-02 DIAGNOSIS — Z96.612 S/P REVERSE TOTAL SHOULDER ARTHROPLASTY, LEFT: Primary | ICD-10-CM

## 2021-11-02 DIAGNOSIS — M25.612 SHOULDER STIFFNESS, LEFT: ICD-10-CM

## 2021-11-02 DIAGNOSIS — R29.898 WEAKNESS OF SHOULDER: ICD-10-CM

## 2021-11-02 PROCEDURE — 97110 THERAPEUTIC EXERCISES: CPT | Performed by: PHYSICAL THERAPIST

## 2021-11-02 PROCEDURE — 97530 THERAPEUTIC ACTIVITIES: CPT | Performed by: PHYSICAL THERAPIST

## 2021-11-02 NOTE — PROGRESS NOTES
Physical Therapy Daily Progress Note    Patient Name: Pedrito Powell         :  1960  Referring Physician: Bethel Devi MD      Subjective   Pedrito Powell reports: working long hours - noted her scar was sore this morning for about an hour or so this morning after her long day yesterday -     Objective   IRB to sacrum -     See Exercise, Manual, and Modality Logs for complete treatment.     Functional / Therapeutic Activities:  30 min  · TAPING / BRACING: NA  · SEE EXERCISE FLOW SHEET -   · Jt protection, ADL modification; Posture and      Assessment/Plan  61 yo female - S/P (L) RTSA - 2021  Doing well improving AROM and functional abilty, endurance and minimal pain -Residual stiffness into IRB ,but improved w/ stretching - .   Ms. Powell would benefit from continued Physical Therapy then DC to HEP -     Progress strengthening /stabilization /functional activity     _________________________________________________    Manual Therapy:                 mins  79210;  Therapeutic Exercise:    28    mins  83889;     Neuromuscular Dion:        mins  73804;    Therapeutic Activity:     30      mins  13983;     Ultrasound:                          mins  63488;  Iontophoresis:                     mins  27707;    Electrical Stimulation:         mins  54665 ( );  Mechanical Traction:          mins  03522  Dry Needling                       mins self-pay  CP (L) Mitul             15     mins NC     Timed Treatment:   58    mins                  Total Treatment:     85    mins    Antonio Coley PT  Physical Therapist

## 2021-11-02 NOTE — PROGRESS NOTES
------------------------------------------------------------------------------------------------------   MD PROGRESS NOTE     Patient: Pedrito Powell        : 1960  Diagnosis/ICD-10 Code:  S/P reverse total shoulder arthroplasty, left [Z96.612]  Referring practitioner: Bethel Devi MD / KALYANI Cornelius  Date of Initial Visit: 2021                  Today's Date: 10/29/2021  _________________________________________________________________     Thank you for the referral of Ms. Powell to [facility].  Ms. Powell has attended 46 PT sessions and their treatment has consisted of: modalities prn, manual therapy, therapeutic exercise, patient education, and HEP.      Subjective   Pedrito Powell reports: improved mobility and function up to and even above shoulder level - Able to reach up higher and lift items of wt to and above shoulder - Able to many things she could not do before surgery - Limited with reaching up behind her back, but  Improving endurance and function and much less pain since limiting HEP to 3-4x/wk.- has returned to work and is experiencing overall fatigue -   ___________________________________________________________________  Objective              OBSERVATION: skin / scar look fine - Well healed -                PALPATION: Much less tender along (L) Shoulder, etc.              AROM: FE/FF 120-deg (+) (better than before surgery)  ERB to Back of neck    Much improved functional mobility in PT and ADLs  - IRB very limted actively, but to LSS w/ assist -               STRENGTH: Grossly 4-4+/5 FF, ABD, Ext (L)               SENSATION: Grossly intact LUE to LT              SPECIAL TESTS: NA              ACTIVITY TOLERANCE: Improved tolerance to light ADLs  Up to shoulder and even above level -               See Exercise, Manual, and Modality Logs for complete treatment.      Functional / Therapeutic Activities:  X 35 min  · TAPING / BRACING: NA  · SEE EXERCISE  FLOW SHEET -   · FUNCTIONAL ASSESSMENT    · Jt protection, ADL modification; Posture and    ___________________________________________________________________   Assessment/Plan  59 yo female - S/P (L) RTSA - 5/13/2021  Doing well improving AROM and functional abilty, endurance and minimal pain -Residual stiffness into IRB ,but improved w/ stretching - .   Ms. Powell would benefit from continued Physical Therapy then DC to HEP - .   Please advise after your assessment.      P: Recommend continued Physical Therapy to allow a full and safe return to ADL's and normal job duties 2 times/wk x 4 weeks progressing per treatment guidelines . DC to HEP at that time.     Thank you again for this referral of Ms. Powell to [facility].     PT Signature: ______________________________   Antonio Coley, PT  ______________________________________________________  Based upon review of the patient's progress and continued therapy plan, it is my medical opinion that Pedrito Powell should continue physical therapy treatment per the recommendation above.      Signature: ____________________________   Date:  ________   Bethel Devi MD   ____________________________________________________________________  Manual Therapy:                 mins  30282;  Therapeutic Exercise:    30     mins  06363;     Neuromuscular Dion:        mins  68129;    Therapeutic Activity:     35     mins  46151;   Self Care:                          mins  74759  Ultrasound:                          mins  18659;  Iontophoresis:                     mins  82182;    Electrical Stimulation:         mins  41054 ( );  Mechanical Traction:          mins  61909  DIAN Bauman (L)             15     mins NC     Timed Treatment:   65   mins                  Total Treatment:     100   mins     ______________________________________________________________________  [address] [place] [address]  Phone: [phone]                 Fax: [phone]

## 2021-11-05 ENCOUNTER — TREATMENT (OUTPATIENT)
Dept: PHYSICAL THERAPY | Facility: CLINIC | Age: 61
End: 2021-11-05

## 2021-11-05 DIAGNOSIS — R29.898 WEAKNESS OF SHOULDER: ICD-10-CM

## 2021-11-05 DIAGNOSIS — M25.612 SHOULDER STIFFNESS, LEFT: ICD-10-CM

## 2021-11-05 DIAGNOSIS — Z96.612 S/P REVERSE TOTAL SHOULDER ARTHROPLASTY, LEFT: Primary | ICD-10-CM

## 2021-11-05 PROCEDURE — 97530 THERAPEUTIC ACTIVITIES: CPT | Performed by: PHYSICAL THERAPIST

## 2021-11-05 PROCEDURE — 97110 THERAPEUTIC EXERCISES: CPT | Performed by: PHYSICAL THERAPIST

## 2021-11-05 NOTE — PROGRESS NOTES
Physical Therapy Daily Progress Note    Patient Name: Pedrito Powell         :  1960  Referring Physician: Bethel Dvei MD      Subjective   Pedrito Powell reports: scar itchy - to see Shandra for her knees soon -     Objective   IRB to L3-4 w/ cane assist -     See Exercise, Manual, and Modality Logs for complete treatment.     Functional / Therapeutic Activities:  28 min  · TAPING / BRACING: NA  · SEE EXERCISE FLOW SHEET -   · Jt protection, ADL modification; Posture and      Assessment/Plan  61 yo female - S/P (L) RTSA - 2021  Doing well improving AROM and functional abilty, endurance and minimal pain -Residual stiffness into IRB ,but improved w/ stretching - .   Ms. Powell would benefit from continued Physical Therapy then DC to HEP -     Progress strengthening /stabilization /functional activity       _________________________________________________    Manual Therapy:                 mins  23278;  Therapeutic Exercise:    30    mins  47195;     Neuromuscular Dion:        mins  66194;    Therapeutic Activity:     28      mins  71973;     Ultrasound:                          mins  81999;  Iontophoresis:                     mins  28306;    Electrical Stimulation:         mins  39223 ( );  Mechanical Traction:          mins  85171  DIAN (SHILPI Bauman             15     mins NC     Timed Treatment:   58    mins                  Total Treatment:     85    mins    Antonio Coley, PT  Physical Therapist

## 2021-11-08 ENCOUNTER — OFFICE VISIT (OUTPATIENT)
Dept: INTERNAL MEDICINE | Facility: CLINIC | Age: 61
End: 2021-11-08

## 2021-11-08 ENCOUNTER — LAB (OUTPATIENT)
Dept: LAB | Facility: HOSPITAL | Age: 61
End: 2021-11-08

## 2021-11-08 VITALS
HEIGHT: 60 IN | WEIGHT: 233.8 LBS | HEART RATE: 99 BPM | SYSTOLIC BLOOD PRESSURE: 122 MMHG | RESPIRATION RATE: 18 BRPM | TEMPERATURE: 97.2 F | BODY MASS INDEX: 45.9 KG/M2 | DIASTOLIC BLOOD PRESSURE: 70 MMHG | OXYGEN SATURATION: 98 %

## 2021-11-08 DIAGNOSIS — F41.9 ANXIETY: ICD-10-CM

## 2021-11-08 DIAGNOSIS — F32.A DEPRESSION, UNSPECIFIED DEPRESSION TYPE: ICD-10-CM

## 2021-11-08 DIAGNOSIS — D64.9 ANEMIA, UNSPECIFIED TYPE: Primary | ICD-10-CM

## 2021-11-08 LAB
BASOPHILS # BLD AUTO: 0.06 10*3/MM3 (ref 0–0.2)
BASOPHILS NFR BLD AUTO: 0.7 % (ref 0–1.5)
DEPRECATED RDW RBC AUTO: 41.8 FL (ref 37–54)
EOSINOPHIL # BLD AUTO: 0.28 10*3/MM3 (ref 0–0.4)
EOSINOPHIL NFR BLD AUTO: 3.3 % (ref 0.3–6.2)
ERYTHROCYTE [DISTWIDTH] IN BLOOD BY AUTOMATED COUNT: 12.1 % (ref 12.3–15.4)
FERRITIN SERPL-MCNC: 500 NG/ML (ref 13–150)
FOLATE SERPL-MCNC: 14 NG/ML (ref 4.78–24.2)
HCT VFR BLD AUTO: 30.8 % (ref 34–46.6)
HGB BLD-MCNC: 10.4 G/DL (ref 12–15.9)
IMM GRANULOCYTES # BLD AUTO: 0.02 10*3/MM3 (ref 0–0.05)
IMM GRANULOCYTES NFR BLD AUTO: 0.2 % (ref 0–0.5)
IRON 24H UR-MRATE: 47 MCG/DL (ref 37–145)
IRON SATN MFR SERPL: 17 % (ref 20–50)
LYMPHOCYTES # BLD AUTO: 1.74 10*3/MM3 (ref 0.7–3.1)
LYMPHOCYTES NFR BLD AUTO: 20.6 % (ref 19.6–45.3)
MCH RBC QN AUTO: 31.7 PG (ref 26.6–33)
MCHC RBC AUTO-ENTMCNC: 33.8 G/DL (ref 31.5–35.7)
MCV RBC AUTO: 93.9 FL (ref 79–97)
MONOCYTES # BLD AUTO: 0.7 10*3/MM3 (ref 0.1–0.9)
MONOCYTES NFR BLD AUTO: 8.3 % (ref 5–12)
NEUTROPHILS NFR BLD AUTO: 5.63 10*3/MM3 (ref 1.7–7)
NEUTROPHILS NFR BLD AUTO: 66.9 % (ref 42.7–76)
NRBC BLD AUTO-RTO: 0 /100 WBC (ref 0–0.2)
PLATELET # BLD AUTO: 274 10*3/MM3 (ref 140–450)
PMV BLD AUTO: 10 FL (ref 6–12)
RBC # BLD AUTO: 3.28 10*6/MM3 (ref 3.77–5.28)
TIBC SERPL-MCNC: 273 MCG/DL (ref 298–536)
TRANSFERRIN SERPL-MCNC: 183 MG/DL (ref 200–360)
VIT B12 BLD-MCNC: 795 PG/ML (ref 211–946)
WBC # BLD AUTO: 8.43 10*3/MM3 (ref 3.4–10.8)

## 2021-11-08 PROCEDURE — 82746 ASSAY OF FOLIC ACID SERUM: CPT | Performed by: FAMILY MEDICINE

## 2021-11-08 PROCEDURE — 84466 ASSAY OF TRANSFERRIN: CPT | Performed by: FAMILY MEDICINE

## 2021-11-08 PROCEDURE — 83921 ORGANIC ACID SINGLE QUANT: CPT | Performed by: FAMILY MEDICINE

## 2021-11-08 PROCEDURE — 83540 ASSAY OF IRON: CPT | Performed by: FAMILY MEDICINE

## 2021-11-08 PROCEDURE — 99214 OFFICE O/P EST MOD 30 MIN: CPT | Performed by: FAMILY MEDICINE

## 2021-11-08 PROCEDURE — 36415 COLL VENOUS BLD VENIPUNCTURE: CPT | Performed by: FAMILY MEDICINE

## 2021-11-08 PROCEDURE — 85025 COMPLETE CBC W/AUTO DIFF WBC: CPT | Performed by: FAMILY MEDICINE

## 2021-11-08 PROCEDURE — 82728 ASSAY OF FERRITIN: CPT | Performed by: FAMILY MEDICINE

## 2021-11-08 PROCEDURE — 82607 VITAMIN B-12: CPT | Performed by: FAMILY MEDICINE

## 2021-11-08 RX ORDER — VORTIOXETINE 10 MG/1
1 TABLET, FILM COATED ORAL DAILY
Qty: 30 TABLET | Refills: 9 | Status: SHIPPED | OUTPATIENT
Start: 2021-11-08 | End: 2021-11-08

## 2021-11-08 RX ORDER — VORTIOXETINE 5 MG/1
5 TABLET, FILM COATED ORAL
Qty: 30 TABLET | Refills: 6 | Status: SHIPPED | OUTPATIENT
Start: 2021-11-08 | End: 2021-12-14 | Stop reason: SDUPTHER

## 2021-11-09 ENCOUNTER — TREATMENT (OUTPATIENT)
Dept: PHYSICAL THERAPY | Facility: CLINIC | Age: 61
End: 2021-11-09

## 2021-11-09 DIAGNOSIS — M25.612 SHOULDER STIFFNESS, LEFT: ICD-10-CM

## 2021-11-09 DIAGNOSIS — R29.898 WEAKNESS OF SHOULDER: ICD-10-CM

## 2021-11-09 DIAGNOSIS — Z96.612 S/P REVERSE TOTAL SHOULDER ARTHROPLASTY, LEFT: Primary | ICD-10-CM

## 2021-11-09 PROCEDURE — 97530 THERAPEUTIC ACTIVITIES: CPT | Performed by: PHYSICAL THERAPIST

## 2021-11-09 PROCEDURE — 97110 THERAPEUTIC EXERCISES: CPT | Performed by: PHYSICAL THERAPIST

## 2021-11-09 NOTE — PROGRESS NOTES
Physical Therapy Daily Progress Note    Patient Name: Pedrito Powell         :  1960  Referring Physician: Bethel Devi MD      Subjective   Pedrito Powell reports: no new problems     Objective   Good functional reaching w/ activities       See Exercise, Manual, and Modality Logs for complete treatment.     Functional / Therapeutic Activities:  30 min  · TAPING / BRACING: (B) knees to Unload (B) PF Jt w/ extra lat component and unload med knee to decrease pain to allow Pt to stand for the portions of her PT that require her to stand and walk in the clinic  · SEE EXERCISE FLOW SHEET -   · Jt protection, ADL modification; Posture and       Assessment/Plan  61 yo female - S/P (L) RTSA - 2021  Doing well improving AROM and functional abilty, endurance and minimal pain -Residual stiffness into IRB ,but improved w/ stretching - .   Ms. Powell would benefit from continued Physical Therapy then DC to HEP -     Progress strengthening /stabilization /functional activity       _________________________________________________    Manual Therapy:                 mins  59437;  Therapeutic Exercise:    25    mins  21200;     Neuromuscular Dion:        mins  45445;    Therapeutic Activity:     30      mins  22679;     Ultrasound:                          mins  54932;  Iontophoresis:                     mins  74996;    Electrical Stimulation:         mins  40843 ( );  Mechanical Traction:          mins  73724  DIAN (SHILPI Bauman             15     mins NC     Timed Treatment:   55    mins                  Total Treatment:     80    mins    Antonio Coley PT  Physical Therapist

## 2021-11-10 ENCOUNTER — OFFICE VISIT (OUTPATIENT)
Dept: ORTHOPEDIC SURGERY | Facility: CLINIC | Age: 61
End: 2021-11-10

## 2021-11-10 VITALS — WEIGHT: 233 LBS | BODY MASS INDEX: 45.75 KG/M2 | HEIGHT: 60 IN | TEMPERATURE: 97.4 F

## 2021-11-10 DIAGNOSIS — M17.10 ARTHRITIS OF KNEE: ICD-10-CM

## 2021-11-10 DIAGNOSIS — M25.561 PAIN IN BOTH KNEES, UNSPECIFIED CHRONICITY: Primary | ICD-10-CM

## 2021-11-10 DIAGNOSIS — M25.562 PAIN IN BOTH KNEES, UNSPECIFIED CHRONICITY: Primary | ICD-10-CM

## 2021-11-10 PROCEDURE — 99214 OFFICE O/P EST MOD 30 MIN: CPT | Performed by: ORTHOPAEDIC SURGERY

## 2021-11-10 PROCEDURE — 73562 X-RAY EXAM OF KNEE 3: CPT | Performed by: ORTHOPAEDIC SURGERY

## 2021-11-10 PROCEDURE — 20610 DRAIN/INJ JOINT/BURSA W/O US: CPT | Performed by: ORTHOPAEDIC SURGERY

## 2021-11-10 RX ORDER — METHYLPREDNISOLONE ACETATE 80 MG/ML
160 INJECTION, SUSPENSION INTRA-ARTICULAR; INTRALESIONAL; INTRAMUSCULAR; SOFT TISSUE
Status: COMPLETED | OUTPATIENT
Start: 2021-11-10 | End: 2021-11-10

## 2021-11-10 RX ADMIN — METHYLPREDNISOLONE ACETATE 160 MG: 80 INJECTION, SUSPENSION INTRA-ARTICULAR; INTRALESIONAL; INTRAMUSCULAR; SOFT TISSUE at 16:26

## 2021-11-10 RX ADMIN — METHYLPREDNISOLONE ACETATE 160 MG: 80 INJECTION, SUSPENSION INTRA-ARTICULAR; INTRALESIONAL; INTRAMUSCULAR; SOFT TISSUE at 16:27

## 2021-11-10 NOTE — PROGRESS NOTES
Patient:Pedrito Powell    YOB: 1960    Medical Record Number:[id number]    Chief Complaints: Bilateral knee pain    History of Present Illness:     61 y.o. female patient who presents for new complaint of bilateral knee pain.  She says this has been a longstanding issue dating back at least 10 to 15 years.  She previously saw Dr. Sheehan for this issue.  She says that he managed her for about 3 or 4 years.  She says that she had multiple steroid injections and gel injections over the years.  Those injections did help in the past.  She estimates it has been at least 5 or 6 years since her last injections.  She has been going to therapy but she feels like there has been very little benefit to that.  Her therapist does perform kinesiotaping and that seems to help.  She reports that she is still able to perform most of her activities of daily living without too much problem from her knees.  She has a cane that she uses on occasion.  [place]  and he had to push her around in a wheelchair.  For most of her daily activities she does not use any assist device.  She does have trouble with stairs.  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    Allergies:  Allergies   Allergen Reactions   • Chocolate Anaphylaxis   • Nickel Anaphylaxis   • Nuts Anaphylaxis   • Adhesive Tape Unknown - Low Severity   • Chlorhexidine Unknown - Low Severity   • Ciprofloxacin Other (See Comments)     THRUSH PER PATIENT   • Citric Acid Unknown (See Comments)     Unsure     • Covid-19 (Mrna) Vaccine Other (See Comments)     INSTRUCTED PER ALLERGIST SHE CAN NOT TAKE D/T ONE OF THE PRESERVATIVES   • Eggs Or Egg-Derived Products Nausea And Vomiting   • Influenza Vaccines Nausea And Vomiting   • Methyldibromoglutaronitrile Unknown (See Comments)      PATIENT UNSURE OF  REACTION.   • Neomycin Unknown (See Comments)      PATIENT UNSURE OF REACTION.   • Omnicef [Cefdinir] Other (See Comments)     THRUSH PER PATIENT   • Palladium Chloride Unknown (See Comments)      PATIENT UNSURE OF REACTION   • Penicillins Other (See Comments)     THRUSH PER PATIENT   • Sulfa Antibiotics Other (See Comments)     THRUSH PER PATIENT   • Tomato Hives   • Yeast-Related Products Hives   • Gold-Containing Drug Products Rash         • Latex Rash       Home Medications:    Current Outpatient Medications:   •  albuterol 108 (90 Base) MCG/ACT inhaler, Inhale 2 puffs Every 4 (Four) Hours As Needed for Wheezing., Disp: , Rfl:   •  aspirin 325 MG tablet, Take 325 mg by mouth Daily. HOLD ONE WEEK PRIOR TO SURGERY, Disp: , Rfl:   •  atorvastatin (LIPITOR) 80 MG tablet, TAKE 1 TABLET BY MOUTH DAILY, Disp: 90 tablet, Rfl: 1  •  azelastine (ASTELIN) 0.1 % nasal spray, 1 spray into the nostril(s) as directed by provider Daily., Disp: , Rfl: 11  •  azelastine (ASTELIN) 0.1 % nasal spray, 1 spray into the nostril(s) as directed by provider 2 (Two) Times a Day. Use in each nostril as directed, Disp: , Rfl:   •  B Complex Vitamins (B COMPLEX PO), Take 1 capsule by mouth Daily. HOLD FOR SURGERY 1 WEEK, Disp: , Rfl:   •  cephalexin (KEFLEX) 500 MG capsule, 4 PILLS ONE HOUR PRIOR TO PROCEDURE, Disp: 4 capsule, Rfl: 1  •  Cholecalciferol (Vitamin D3) 50 MCG (2000 UT) capsule, TAKE 1 CAPSULE BY MOUTH DAILY, Disp: 90 capsule, Rfl: 1  •  clobetasol (TEMOVATE) 0.05 % cream, Apply 1 application topically to the appropriate area as directed As Needed., Disp: , Rfl:   •  docusate sodium 100 MG capsule, Take 1 capsule by mouth 2 (Two) Times a Day., Disp: 60 capsule, Rfl: 0  •  EPINEPHrine (AUVI-Q IJ), Inject  as directed As Needed., Disp: , Rfl:   •  ferrous gluconate (FERGON) 324 MG tablet, TAKE 1 TABLET BY MOUTH DAILY WITH BREAKFAST, Disp: 90 tablet, Rfl: 1  •  hydroCHLOROthiazide (HYDRODIURIL) 25 MG tablet, Take 1 tablet by mouth  Daily., Disp: 90 tablet, Rfl: 3  •  hydrocortisone 2.5 % cream, Apply  topically to the appropriate area as directed As Needed., Disp: , Rfl:   •  ipratropium (ATROVENT) 0.03 % nasal spray, 1 spray into the nostril(s) as directed by provider 3 (Three) Times a Day., Disp: , Rfl:   •  irbesartan (Avapro) 300 MG tablet, Take 1 tablet by mouth Daily., Disp: 90 tablet, Rfl: 3  •  loperamide (IMODIUM) 2 MG capsule, Take 1 capsule by mouth Every 2 (Two) Hours As Needed for Diarrhea (max 4 doses daily)., Disp: , Rfl:   •  meclizine (ANTIVERT) 25 MG tablet, Take 1 tablet by mouth 3 (Three) Times a Day As Needed for Dizziness., Disp: 270 tablet, Rfl: 0  •  omeprazole (priLOSEC) 40 MG capsule, Take 1 capsule by mouth Daily., Disp: 90 capsule, Rfl: 1  •  triamcinolone (KENALOG) 0.1 % ointment, Apply 1 application topically to the appropriate area as directed As Needed., Disp: , Rfl:   •  Vortioxetine HBr (Trintellix) 5 MG tablet, Take 5 mg by mouth Daily With Breakfast., Disp: 30 tablet, Rfl: 6  •  ondansetron (ZOFRAN) 4 MG tablet, Take 1 tablet by mouth Every 12 (Twelve) Hours As Needed for Nausea or Vomiting., Disp: 180 tablet, Rfl: 0  No current facility-administered medications for this visit.    Facility-Administered Medications Ordered in Other Visits:   •  Chlorhexidine Gluconate 2 % pads 1 each, 1 each, Apply externally, Take As Directed, Bethel Devi MD    Past Medical History:   Diagnosis Date   • Anemia     IRON INFUSION RECENTLY   • Arthritis    • Asthma    • Chronic kidney disease     stage 3   • Elevated cholesterol    • GERD (gastroesophageal reflux disease)    • History of carpal tunnel syndrome    • Hyperlipidemia    • Hypertension    • Left shoulder pain    • Limited mobility    • Vertigo    • Weakness     AT TIMES LEFT SHOULDER/LEFT ARM       Past Surgical History:   Procedure Laterality Date   •  SECTION  , ,    • COLONOSCOPY N/A 12/10/2018    normal ileum, IH, hyperplastic polyp,  otherwise normal exam   • ENDOSCOPY N/A 12/10/2018    no gross lesions in esophagus or examined duodenum, erythematous mucosa in stomach, biopsies benign   • HYSTERECTOMY  2000   • OOPHORECTOMY Bilateral    • SHOULDER MANIPULATION Left    • TOTAL SHOULDER ARTHROPLASTY Left 5/13/2021    Procedure: REVERSE TOTAL SHOULDER ARTHROPLASTY,  BICEP TENDONDESIS;  Surgeon: Bethel Devi MD;  Location: [facility];  Service: Orthopedics;  Laterality: Left;       Social History     Occupational History   • Occupation: Teacher     Employer: [employer]   Tobacco Use   • Smoking status: Never Smoker   • Smokeless tobacco: Never Used   Vaping Use   • Vaping Use: Never used   Substance and Sexual Activity   • Alcohol use: Yes     Alcohol/week: 1.0 standard drink     Types: 1 Glasses of wine per week     Comment: WEEKLY   • Drug use: No   • Sexual activity: Defer      Social History     Social History Narrative   • Not on file       Family History   Problem Relation Age of Onset   • Colon cancer Maternal Grandfather    • Asthma Mother    • Thyroid disease Father    • Diabetes Maternal Grandmother    • Malig Hyperthermia Neg Hx        Review of Systems:      Constitutional: Denies fever, shaking or chills   Eyes: Denies change in visual acuity   HEENT: Denies nasal congestion or sore throat   Respiratory: Denies cough or shortness of breath   Cardiovascular: Denies chest pain or edema  Endocrine: Denies tremors, palpitations, intolerance of heat or cold, polyuria, polydipsia.  GI: Denies abdominal pain, nausea, vomiting, bloody stools or diarrhea  : Denies frequency, urgency, incontinence, retention, or nocturia.  Musculoskeletal: Denies numbness, tingling or loss of motor function except as above  Integument: Denies rash, lesion or ulceration   Neurologic: Denies headache or focal weakness, deficits  Heme: Denies spontaneous or excessive bleeding, epistaxis, hematuria, melena, fatigue, enlarged or tender lymph  "nodes.      All other pertinent positives and negatives as noted above in HPI.    Physical Exam:61 y.o. female  Vitals:    11/10/21 1544   Temp: 97.4 °F (36.3 °C)   Weight: 106 kg (233 lb)   Height: 152.4 cm (60\")     General:  Patient is awake and alert.  Appears in no acute distress or discomfort.    Psych:  Affect and demeanor are appropriate.    Extremities: Both knees are examined and her exam is fairly symmetric.  Skin is benign.  She had Kinesiotape in place bilaterally and this was removed.  She does not have any effusion.  She has surprisingly good motion for the degree of arthritis.  She has full extension bilaterally and flexion to almost 120 degrees.  She has pseudolaxity of the MCL bilaterally but her knees are otherwise stable.  She has normal motor and sensory function throughout both legs and feet.  Brisk capillary refill.  Good skin turgor.    Imaging: AP, merchant and lateral views of the knees are ordered and reviewed to evaluate her complaint.  No comparison films are immediately available.  She has end-stage medial compartment osteoarthritis bilaterally with bone-on-bone, varus malalignment, subchondral sclerosis and osteophyte formation.    Assessment/Plan: End-stage bilateral knee osteoarthritis    We discussed her options.  She had a good understanding of this information.  She is not in any hurry to pursue surgery at this time.  She stated that she would like to try another round of injections.  The risk, benefits and alternatives to bilateral corticosteroid injections were discussed, including her elevated risk status due to medical comorbidities.  She consented and the injections were performed as described below.  I honestly do not think there is a whole lot of benefit for sending her back to therapy at this point as her motion seems to be surprisingly good.  For now, she will follow-up with me as needed.      Large Joint Arthrocentesis: L knee  Date/Time: 11/10/2021 4:26 PM  Consent " given by: patient  Site marked: site marked  Timeout: Immediately prior to procedure a time out was called to verify the correct patient, procedure, equipment, support staff and site/side marked as required   Supporting Documentation  Indications: pain and joint swelling   Procedure Details  Location: knee - L knee  Preparation: Patient was prepped and draped in the usual sterile fashion  Needle size: 25 G  Approach: anterolateral  Medications administered: 160 mg methylPREDNISolone acetate 80 MG/ML; 2 mL lidocaine (cardiac)  Patient tolerance: patient tolerated the procedure well with no immediate complications    Large Joint Arthrocentesis: R knee  Date/Time: 11/10/2021 4:27 PM  Consent given by: patient  Site marked: site marked  Timeout: Immediately prior to procedure a time out was called to verify the correct patient, procedure, equipment, support staff and site/side marked as required   Supporting Documentation  Indications: pain and joint swelling   Procedure Details  Location: knee - R knee  Preparation: Patient was prepped and draped in the usual sterile fashion  Needle size: 25 G (21 G)  Approach: anterolateral  Medications administered: 160 mg methylPREDNISolone acetate 80 MG/ML; 2 mL lidocaine (cardiac)  Patient tolerance: patient tolerated the procedure well with no immediate complications            Bethel Devi MD    11/10/2021

## 2021-11-11 DIAGNOSIS — E61.1 IRON DEFICIENCY: Primary | ICD-10-CM

## 2021-11-11 NOTE — PROGRESS NOTES
Please inform the patient of the following abnormal results.   Ferritin is elevated, and iron is on low side of normal. But iron saturation is low. She may benefit from seeing a hematologist. Her iron supplement may not be absorbing as well.

## 2021-11-12 ENCOUNTER — TREATMENT (OUTPATIENT)
Dept: PHYSICAL THERAPY | Facility: CLINIC | Age: 61
End: 2021-11-12

## 2021-11-12 DIAGNOSIS — R29.898 WEAKNESS OF SHOULDER: ICD-10-CM

## 2021-11-12 DIAGNOSIS — Z96.612 S/P REVERSE TOTAL SHOULDER ARTHROPLASTY, LEFT: Primary | ICD-10-CM

## 2021-11-12 DIAGNOSIS — M25.612 SHOULDER STIFFNESS, LEFT: ICD-10-CM

## 2021-11-12 PROCEDURE — 97530 THERAPEUTIC ACTIVITIES: CPT | Performed by: PHYSICAL THERAPIST

## 2021-11-12 PROCEDURE — 97110 THERAPEUTIC EXERCISES: CPT | Performed by: PHYSICAL THERAPIST

## 2021-11-12 NOTE — PROGRESS NOTES
Physical Therapy Daily Progress Note    Patient Name: Pedrito Powell         :  1960  Referring Physician: Bethel Devi MD      Subjective   Pedrito Powell reports: seeing Dr. Devi for her knees - sig OA (B) - rec'ed injections -   Shoulder doing well     Objective   Pt demonstrates good [id number] elevation and much improved IRB passisw/ strap assist (equal to (R))     See Exercise, Manual, and Modality Logs for complete treatment.     Functional / Therapeutic Activities:  30 min  · TAPING / BRACING: (B) knees to Unload (B) PF Jt w/ extra lat component and unload med knee to decrease pain to allow Pt to stand for the portions of her PT that require her to stand and walk in the clinic  · SEE EXERCISE FLOW SHEET -   · Jt protection, ADL modification; Posture and       Assessment/Plan  59 yo female - S/P (L) RTSA - 2021  Doing well improving AROM and functional abilty, endurance and minimal pain -Residual stiffness into IRB ,but improved w/ stretching - .   Ms. Powell would benefit from continued Physical Therapy then DC to HEP -     Anticipate DC next Visit       _________________________________________________    Manual Therapy:                 mins  11443;  Therapeutic Exercise:    25    mins  90229;     Neuromuscular Dion:        mins  55318;    Therapeutic Activity:     30      mins  86743;     Ultrasound:                          mins  61164;  Iontophoresis:                     mins  35989;    Electrical Stimulation:         mins  19371 ( );  Mechanical Traction:          mins  02233  DIAN (SHILPI Bauman             15     mins NC     Timed Treatment:   55    mins                  Total Treatment:     80    mins    Antonio Coley PT  Physical Therapist

## 2021-11-13 LAB
Lab: NORMAL
METHYLMALONATE SERPL-SCNC: 310 NMOL/L (ref 0–378)

## 2021-11-13 NOTE — PROGRESS NOTES
"Chief Complaint  Anemia    Subjective          Pedrito Powell presents to [facility]  History of Present Illness    Patient has a history having anemia.  Her most recent levels do show that her ferritin is elevated.  Her iron levels are low normal.  It is unclear if her iron supplementation is being absorbed well.  She is taking ferrous gluconate.  I did discuss with her that she could benefit from seeing a hematologist.    She also has a history having depression as well as anxiety.  She has been taking Trintellix 10 mg daily.  Patient states that the medicine still continues to make her feel nauseated.  She feels as if she is doing well mentally on these medications.  She would like to continue to Trintellix.    Objective   Vital Signs:   /70   Pulse 99   Temp 97.2 °F (36.2 °C) (Infrared)   Resp 18   Ht 152.4 cm (60\")   Wt 106 kg (233 lb 12.8 oz)   SpO2 98%   BMI 45.66 kg/m²     Physical Exam  Vitals and nursing note reviewed.   Constitutional:       Appearance: She is well-developed.   HENT:      Head: Normocephalic and atraumatic.   Musculoskeletal:      Cervical back: Normal range of motion and neck supple.   Neurological:      Mental Status: She is alert and oriented to person, place, and time.   Psychiatric:         Behavior: Behavior normal.        Result Review :                 Assessment and Plan    Diagnoses and all orders for this visit:    1. Anemia, unspecified type (Primary)  -     CBC & Differential  -     Folate  -     Ferritin  -     Iron Profile  -     Methylmalonic Acid, Serum  -     Vitamin B12  -     Recommend patient follow-up with hematology.    2. Anxiety  -     Decrease the Trintellix to 5 mg daily.  -     Vortioxetine HBr (Trintellix) 5 MG tablet; Take 5 mg by mouth Daily With Breakfast.  Dispense: 30 tablet; Refill: 6    3. Depression, unspecified depression type  -     Decrease Trintellix to 5 mg daily.  -     Vortioxetine HBr (Trintellix) 5 " MG tablet; Take 5 mg by mouth Daily With Breakfast.  Dispense: 30 tablet; Refill: 6        Follow Up   No follow-ups on file.  Patient was given instructions and counseling regarding her condition or for health maintenance advice. Please see specific information pulled into the AVS if appropriate.

## 2021-11-15 ENCOUNTER — TELEPHONE (OUTPATIENT)
Dept: GENERAL RADIOLOGY | Facility: HOSPITAL | Age: 61
End: 2021-11-15

## 2021-11-15 NOTE — TELEPHONE ENCOUNTER
Caller: Pedrito Powell    Relationship: Self    Best call back number: [phone]    What is the best time to reach you: ANYTIME TOMORROW OR TODAY IS OKAY       WED AFTER 3PM     Who are you requesting to speak with (clinical staff, provider,  specific staff member):      Do you know the name of the person who called: PEDRITO     What was the call regarding: CALLING BACK REGARDING NEW APPT SCHEDULED AND HAD SOME QUESTIONS NEEDED TO SPEAK WITH  ABOUT     Do you require a callback: YES

## 2021-11-15 NOTE — TELEPHONE ENCOUNTER
----- Message from Cassandra Carter RN sent at 11/15/2021 12:25 PM EST -----  Regarding: RE: PLEASE REVIEW AND ADVISE  Please schedule patient for labs and NP visit in 2-3 weeks per Dr. Pratt.    Thank you,  Cassandra  ----- Message -----  From: Yaneth Hernandez  Sent: 11/15/2021   8:22 AM EST  To: Cassandra Pratt MD  Subject: PLEASE REVIEW AND ADVISE                         GOOD MORNING DR PRATT , THIS IS A PT OF YOURS AND SHE IS BEING REFERRED TO OUR OFFICE BY DR LYNN FOR IRON DEFICIENCY, WHAT SHOULD WE DO? DO YOU WANT ME TO BRING HER IN TO SEE A NP OR YOU? PLEASE REVIEW AS THERE WAS LABS DRAWN ON 11/8/21 AND LET ME KNOW AS I AM GOING TO CLOSE THIS REFERRAL AS THIS REFERRAL IS NOT NEEDED. PLEASE ADVISE    THANK YOU

## 2021-11-16 NOTE — TELEPHONE ENCOUNTER
(spoke with jacqueline in dr khan office - the referral should not have been placed as dr landa is following pt for her iron, ferritin etc), called pt and advised pt that the appt in December has been cancelled that this was sent to us in error .

## 2021-11-17 ENCOUNTER — TREATMENT (OUTPATIENT)
Dept: PHYSICAL THERAPY | Facility: CLINIC | Age: 61
End: 2021-11-17

## 2021-11-17 DIAGNOSIS — Z96.612 S/P REVERSE TOTAL SHOULDER ARTHROPLASTY, LEFT: Primary | ICD-10-CM

## 2021-11-17 DIAGNOSIS — M25.612 SHOULDER STIFFNESS, LEFT: ICD-10-CM

## 2021-11-17 DIAGNOSIS — R29.898 WEAKNESS OF SHOULDER: ICD-10-CM

## 2021-11-17 PROCEDURE — 97530 THERAPEUTIC ACTIVITIES: CPT | Performed by: PHYSICAL THERAPIST

## 2021-11-17 PROCEDURE — 97110 THERAPEUTIC EXERCISES: CPT | Performed by: PHYSICAL THERAPIST

## 2021-11-17 NOTE — PROGRESS NOTES
------------------------------------------------------------------------------------------------------   MD PROGRESS NOTE / DISCHARGE NOTE     Patient: Pedrito Powell        : 1960  Diagnosis/ICD-10 Code:  S/P reverse total shoulder arthroplasty, left [Z96.612]  Referring practitioner: Bethel Devi MD / KALYANI Cornelius  Date of Initial Visit: 2021                  Today's Date: 2021  _________________________________________________________________     Thank you for the referral of Ms. Powell to [facility].  Ms. Powell has attended 51 PT sessions and their treatment has consisted of: modalities prn, manual therapy, therapeutic exercise, patient education, and HEP.      Subjective   Pedrito Powell reports: improved mobility and function up to and even above shoulder level - Able to reach up higher and lift items of wt to and above shoulder - Able to many things she could not do before surgery - Limited with reaching up behind her back, but  Improving endurance and function and much less pain since limiting HEP to 3-4x/wk.- has returned to work and is experiencing overall fatigue, but not shoulder related -   ___________________________________________________________________  Objective              OBSERVATION: skin / scar look fine - Well healed -                PALPATION: Much less tender along (L) Shoulder, etc.              AROM: FE/FF 120-deg (+) (better than before surgery)  ERB to Back of neck    Much improved functional mobility in PT and ADLs  - IRB up to L4-5 w/ assist-               STRENGTH: Grossly 4-4+/5 FF, ABD, Ext (L)               SENSATION: Grossly intact LUE to LT              SPECIAL TESTS: NA              ACTIVITY TOLERANCE: Improved tolerance to light ADLs  Up to shoulder and even above level -               See Exercise, Manual, and Modality Logs for complete treatment.(reviewed and updated HEP extensively and discussed progression, how  often, etc)      Functional / Therapeutic Activities:  X 35 min  · TAPING / BRACING: NA  · SEE EXERCISE FLOW SHEET -   · FUNCTIONAL ASSESSMENT    · Jt protection, ADL modification; Posture and    ___________________________________________________________________   Assessment/Plan  61 yo female - S/P (L) RTSA - 5/13/2021  Doing well improving AROM and functional abilty, endurance and minimal pain -Residual stiffness into IRB ,but improved w/ stretching - . Feel Pedrito has reached a plateau and is very functional at this point and feel she will continue to improve over time and she is on a very good HEP and is compliant -   Ms. Powell would benefit continuing her HEP and DC to HEP - .   GOAL STATUS:   STG - ALL MET  LTG - Most met -   P: Recommend continuing her HEP and discontinuing formal Physical Therapy this time -    Thank you again for this referral of Ms. Powell to [facility].     PT Signature: ______________________________   Antonio Sabasaúl, PT  ____________________________________________________________________  Manual Therapy:                 mins  80973;  Therapeutic Exercise:    30     mins  49023;     Neuromuscular Dion:        mins  32805;    Therapeutic Activity:     35     mins  40819;   Self Care:                          mins  76418  Ultrasound:                          mins  85464;  Iontophoresis:                     mins  82456;    Electrical Stimulation:         mins  29312 ( );  Mechanical Traction:          mins  93094  DIAN Bauman (L)                  mins NC     Timed Treatment:   65   mins                  Total Treatment:     80  mins     ______________________________________________________________________  [address] [place] [address]  Phone: [phone]                 Fax: [phone]      Access Code: [email or web address]  URL: [email or web address]  Date: 11/17/2021  Prepared by: Deep Coley    Exercises  Seated Shoulder  Flexion AAROM with Pulley Behind - 2 x daily - 7 x weekly - 1 sets - 20 reps - 10 hold  Scaption Wall Slide with Towel - 3 x daily - 7 x weekly - 20 reps - 1 sets - 10s hold  Standing Shoulder Internal Rotation Stretch with Towel - 3 x daily - 7 x weekly - 1 sets - 10 reps - 30s hold  Standing Shoulder Posterior Capsule Stretch - 3 x daily - 7 x weekly - 10 reps - 1 sets - 30s hold  Standing Shoulder Row with Anchored Resistance - 1 x daily - 7 x weekly - 1-2 sets - 15 reps - 5 s hold  Standing Single Arm Shoulder Extension with Anchored Resistance - 1 x daily - 7 x weekly - 10-15 reps - 2-3 sets - 5s hold  Shoulder External Rotation with Anchored Resistance - 1 x daily - 7 x weekly - 1-2 sets - 10-15 reps - 3 sec hold  Shoulder Internal Rotation with Resistance - 1 x daily - 3 x weekly - 10 reps - 3 sets - 3sec hold  Wall Push Up - 1 x daily - 7 x weekly - 1-2 sets - 15 reps - 3 s hold  Supine Shoulder Circles with Weight - 1 x daily - 7 x weekly - 3 sets  Standing Shoulder Scaption - 1 x daily - 3 x weekly - 1-2 sets - 10-15 reps - 3-5 hold  Standing Wall Ball Circles in Scaption with Plyo Ball - 1 x daily - 3 x weekly - 1-2 sets - 20 reps  Supine Chest Press with Dumbbells - 1 x daily - 7 x weekly - 2-3 sets - 10-15 reps - 3-5 hold  Standing Bicep Curls Supinated with Dumbbells - 1 x daily - 7 x weekly - 2-3 sets - 10-15 reps - 3-5 hold  Bent Over Tricep Extension with Counter Support - 1 x daily - 3 x weekly - 1-2 sets - 10-15 reps - 3 hold

## 2021-12-01 ENCOUNTER — APPOINTMENT (OUTPATIENT)
Dept: OTHER | Facility: HOSPITAL | Age: 61
End: 2021-12-01

## 2021-12-03 ENCOUNTER — HOSPITAL ENCOUNTER (OUTPATIENT)
Dept: MAMMOGRAPHY | Facility: HOSPITAL | Age: 61
Discharge: HOME | End: 2021-12-03
Admitting: FAMILY MEDICINE

## 2021-12-03 DIAGNOSIS — Z12.31 SCREENING MAMMOGRAM, ENCOUNTER FOR: ICD-10-CM

## 2021-12-03 PROCEDURE — 77067 SCR MAMMO BI INCL CAD: CPT

## 2021-12-03 PROCEDURE — 77063 BREAST TOMOSYNTHESIS BI: CPT

## 2021-12-09 NOTE — PROGRESS NOTES
Mammogram is normal.    No mammographic evidence of malignancy. Recommend annual screening  mammogram in one year.    BI-RADS Category 1: Negative

## 2021-12-14 ENCOUNTER — OFFICE VISIT (OUTPATIENT)
Dept: INTERNAL MEDICINE | Facility: CLINIC | Age: 61
End: 2021-12-14

## 2021-12-14 VITALS
HEART RATE: 84 BPM | SYSTOLIC BLOOD PRESSURE: 121 MMHG | BODY MASS INDEX: 46.02 KG/M2 | WEIGHT: 234.4 LBS | DIASTOLIC BLOOD PRESSURE: 78 MMHG | HEIGHT: 60 IN | TEMPERATURE: 98 F | OXYGEN SATURATION: 99 % | RESPIRATION RATE: 16 BRPM

## 2021-12-14 DIAGNOSIS — F32.A DEPRESSION, UNSPECIFIED DEPRESSION TYPE: ICD-10-CM

## 2021-12-14 DIAGNOSIS — F41.9 ANXIETY: ICD-10-CM

## 2021-12-14 DIAGNOSIS — D64.9 ANEMIA, UNSPECIFIED TYPE: ICD-10-CM

## 2021-12-14 PROCEDURE — 99214 OFFICE O/P EST MOD 30 MIN: CPT | Performed by: FAMILY MEDICINE

## 2021-12-14 RX ORDER — VORTIOXETINE 5 MG/1
5 TABLET, FILM COATED ORAL
Qty: 30 TABLET | Refills: 6 | Status: SHIPPED | OUTPATIENT
Start: 2021-12-14 | End: 2022-03-18 | Stop reason: SDUPTHER

## 2021-12-14 RX ORDER — DOXYCYCLINE HYCLATE 50 MG/1
324 CAPSULE, GELATIN COATED ORAL
Qty: 90 TABLET | Refills: 1 | Status: SHIPPED | OUTPATIENT
Start: 2021-12-14 | End: 2022-03-15 | Stop reason: SDUPTHER

## 2021-12-14 RX ORDER — FLUTICASONE PROPIONATE 44 MCG
2 AEROSOL WITH ADAPTER (GRAM) INHALATION 2 TIMES DAILY
COMMUNITY
Start: 2021-11-30

## 2021-12-14 RX ORDER — IPRATROPIUM BROMIDE 42 UG/1
SPRAY, METERED NASAL
COMMUNITY
Start: 2021-11-30 | End: 2021-12-14

## 2021-12-20 DIAGNOSIS — K21.9 GASTROESOPHAGEAL REFLUX DISEASE: ICD-10-CM

## 2021-12-20 NOTE — TELEPHONE ENCOUNTER
Caller: Pedrito Powell    Relationship: Self    Best call back number: [phone] (M)    Requested Prescriptions:   Requested Prescriptions     Pending Prescriptions Disp Refills   • omeprazole (priLOSEC) 40 MG capsule 90 capsule 1     Sig: Take 1 capsule by mouth Daily.        Pharmacy where request should be sent:    [facility] #[id number] [id number] [place] [address] AT [facility] [phone] [facility] [phone]      Additional details provided by patient: PATIENT COMPLETELY OUT    Does the patient have less than a 3 day supply:  [x] Yes  [] No    Pravin Cid Rep   12/20/21 14:28 EST

## 2021-12-21 RX ORDER — OMEPRAZOLE 40 MG/1
40 CAPSULE, DELAYED RELEASE ORAL DAILY
Qty: 90 CAPSULE | Refills: 1 | Status: SHIPPED | OUTPATIENT
Start: 2021-12-21 | End: 2021-12-29 | Stop reason: SDUPTHER

## 2021-12-23 DIAGNOSIS — E78.5 HYPERLIPIDEMIA, UNSPECIFIED HYPERLIPIDEMIA TYPE: ICD-10-CM

## 2021-12-23 RX ORDER — ATORVASTATIN CALCIUM 80 MG/1
80 TABLET, FILM COATED ORAL DAILY
Qty: 90 TABLET | Refills: 1 | Status: SHIPPED | OUTPATIENT
Start: 2021-12-23 | End: 2022-03-18

## 2021-12-23 NOTE — TELEPHONE ENCOUNTER
Caller: Pedrito Powell    Relationship: Self    Best call back number: [phone]    Requested Prescriptions:   Requested Prescriptions     Pending Prescriptions Disp Refills   • atorvastatin (LIPITOR) 80 MG tablet 90 tablet 1     Sig: Take 1 tablet by mouth Daily.        Pharmacy where request should be sent: [facility] #[id number] [facility] [id number] [place] [address] AT [facility] [phone] [facility] [phone]      Additional details provided by patient: PATIENT IS OUT OF MEDS, PLEASE SEND IN TODAY    Does the patient have less than a 3 day supply:  [x] Yes  [] No    Pravin Sena Rep   12/23/21 12:50 EST

## 2021-12-29 DIAGNOSIS — K21.9 GASTROESOPHAGEAL REFLUX DISEASE: ICD-10-CM

## 2021-12-29 RX ORDER — OMEPRAZOLE 40 MG/1
40 CAPSULE, DELAYED RELEASE ORAL DAILY
Qty: 90 CAPSULE | Refills: 1 | Status: SHIPPED | OUTPATIENT
Start: 2021-12-29 | End: 2022-03-18

## 2021-12-31 NOTE — PROGRESS NOTES
"Chief Complaint  Follow up to anemia (Mammogram questions )    Subjective          Pedrito Powell presents to [facility]  History of Present Illness    Patient past medical history having anxiety as well as depression.  She is currently doing a lower dose of Trintellix at 5 mg, and she notes that the 5 mg dose seems to working better for her.  She denies any side effects of the medication.  She states anxiety as well as depression has been under good control.  She would like to continue this dose.    She does have a history having iron deficiency anemia.  She currently takes ferrous gluconate 324 mg daily.  She denies any side effects of the medication.    Objective   Vital Signs:   /78   Pulse 84   Temp 98 °F (36.7 °C)   Resp 16   Ht 152.4 cm (60\")   Wt 106 kg (234 lb 6.4 oz)   SpO2 99%   BMI 45.78 kg/m²     Physical Exam  Vitals and nursing note reviewed.   Constitutional:       Appearance: She is well-developed.   HENT:      Head: Normocephalic and atraumatic.   Musculoskeletal:      Cervical back: Normal range of motion and neck supple.   Neurological:      Mental Status: She is alert and oriented to person, place, and time.   Psychiatric:         Behavior: Behavior normal.        Result Review :     Common labs    Common Labsle 7/26/21 7/26/21 7/26/21 7/26/21 7/26/21 10/20/21 10/20/21 11/8/21    1129 1129 1129 1129 1129 1549 1549    Glucose  100 (A)     117 (A)    BUN  31 (A)     23    Creatinine  1.68 (A)     1.54 (A)    eGFR Non  Am  31 (A)     34 (A)    Sodium  137     137    Potassium  3.8     3.2 (A)    Chloride  100     99    Calcium  9.0     9.2    Albumin  4.20     4.10    Total Bilirubin  0.4     0.4    Alkaline Phosphatase  56     66    AST (SGOT)  16     22    ALT (SGPT)  14     21    WBC 8.42     9.66  8.43   Hemoglobin 11.6 (A)     10.5 (A)  10.4 (A)   Hematocrit 36.0     32.0 (A)  30.8 (A)   Platelets 267     254  274   Total Cholesterol    168  "      Triglycerides    89       HDL Cholesterol    48       LDL Cholesterol     103 (A)       Hemoglobin A1C     5.65 (A)      Microalbumin, Urine   2.1        (A) Abnormal value                      Assessment and Plan    Diagnoses and all orders for this visit:    1. Anxiety  -     Vortioxetine HBr (Trintellix) 5 MG tablet; Take 5 mg by mouth Daily With Breakfast.  Dispense: 30 tablet; Refill: 6    2. Depression, unspecified depression type  -     Vortioxetine HBr (Trintellix) 5 MG tablet; Take 5 mg by mouth Daily With Breakfast.  Dispense: 30 tablet; Refill: 6    3. Anemia, unspecified type  -     ferrous gluconate (FERGON) 324 MG tablet; Take 1 tablet by mouth Daily With Breakfast.  Dispense: 90 tablet; Refill: 1  -     CBC & Differential  -     Ferritin  -     Iron Profile    Anxiety depression, continue Trintellix 5 mg daily.  Further anemia, continue ferrous gluconate 324 mg daily.      Follow Up   No follow-ups on file.  Patient was given instructions and counseling regarding her condition or for health maintenance advice. Please see specific information pulled into the AVS if appropriate.

## 2022-01-27 ENCOUNTER — TELEPHONE (OUTPATIENT)
Dept: ORTHOPEDIC SURGERY | Facility: CLINIC | Age: 62
End: 2022-01-27

## 2022-01-28 RX ORDER — CEPHALEXIN 500 MG/1
CAPSULE ORAL
Qty: 4 CAPSULE | Refills: 1 | OUTPATIENT
Start: 2022-01-28 | End: 2022-04-28

## 2022-01-28 NOTE — TELEPHONE ENCOUNTER
I have sent a prescription for Keflex to her pharmacy.  This is the same medication Dr. Devi ordered for her previously.

## 2022-02-02 RX ORDER — ACETAMINOPHEN 160 MG
2000 TABLET,DISINTEGRATING ORAL DAILY
Qty: 90 CAPSULE | Refills: 1 | Status: SHIPPED | OUTPATIENT
Start: 2022-02-02 | End: 2022-07-28

## 2022-02-21 ENCOUNTER — CLINICAL SUPPORT (OUTPATIENT)
Dept: ORTHOPEDIC SURGERY | Facility: CLINIC | Age: 62
End: 2022-02-21

## 2022-02-21 VITALS — BODY MASS INDEX: 46.16 KG/M2 | HEIGHT: 60 IN | TEMPERATURE: 96.9 F | WEIGHT: 235.1 LBS

## 2022-02-21 DIAGNOSIS — M17.0 BILATERAL PRIMARY OSTEOARTHRITIS OF KNEE: Primary | ICD-10-CM

## 2022-02-21 PROCEDURE — 20610 DRAIN/INJ JOINT/BURSA W/O US: CPT | Performed by: NURSE PRACTITIONER

## 2022-02-21 RX ORDER — METHYLPREDNISOLONE ACETATE 80 MG/ML
80 INJECTION, SUSPENSION INTRA-ARTICULAR; INTRALESIONAL; INTRAMUSCULAR; SOFT TISSUE
Status: COMPLETED | OUTPATIENT
Start: 2022-02-21 | End: 2022-02-21

## 2022-02-21 RX ADMIN — METHYLPREDNISOLONE ACETATE 80 MG: 80 INJECTION, SUSPENSION INTRA-ARTICULAR; INTRALESIONAL; INTRAMUSCULAR; SOFT TISSUE at 09:41

## 2022-02-21 NOTE — PROGRESS NOTES
Ms. Powell comes in today for follow-up.  Injections have worked well in the past.  The patient would like to get repeat injections today.  The risks, benefits and alternatives were discussed and the patient consented.  Going forward, the patient will follow-up as needed.    KALYANI Cornelius    02/21/2022      Large Joint Arthrocentesis: R knee  Date/Time: 2/21/2022 9:41 AM  Consent given by: patient  Site marked: site marked  Timeout: Immediately prior to procedure a time out was called to verify the correct patient, procedure, equipment, support staff and site/side marked as required   Supporting Documentation  Indications: pain   Procedure Details  Location: knee - R knee  Preparation: Patient was prepped and draped in the usual sterile fashion  Needle gauge: 21.  Approach: anterolateral  Medications administered: 1 mL lidocaine (cardiac); 80 mg methylPREDNISolone acetate 80 MG/ML  Patient tolerance: patient tolerated the procedure well with no immediate complications    Large Joint Arthrocentesis: L knee  Date/Time: 2/21/2022 9:41 AM  Consent given by: patient  Site marked: site marked  Timeout: Immediately prior to procedure a time out was called to verify the correct patient, procedure, equipment, support staff and site/side marked as required   Supporting Documentation  Indications: pain   Procedure Details  Location: knee - L knee  Preparation: Patient was prepped and draped in the usual sterile fashion  Needle gauge: 21.  Approach: anterolateral  Medications administered: 1 mL lidocaine (cardiac); 80 mg methylPREDNISolone acetate 80 MG/ML  Patient tolerance: patient tolerated the procedure well with no immediate complications

## 2022-03-15 DIAGNOSIS — D64.9 ANEMIA, UNSPECIFIED TYPE: ICD-10-CM

## 2022-03-15 DIAGNOSIS — A08.4 VIRAL GASTROENTERITIS: ICD-10-CM

## 2022-03-15 RX ORDER — ONDANSETRON 4 MG/1
4 TABLET, FILM COATED ORAL EVERY 12 HOURS PRN
Qty: 180 TABLET | Refills: 0 | Status: SHIPPED | OUTPATIENT
Start: 2022-03-15 | End: 2022-10-10 | Stop reason: SDUPTHER

## 2022-03-15 RX ORDER — DOXYCYCLINE HYCLATE 50 MG/1
324 CAPSULE, GELATIN COATED ORAL
Qty: 90 TABLET | Refills: 1 | Status: SHIPPED | OUTPATIENT
Start: 2022-03-15 | End: 2023-01-10

## 2022-03-18 ENCOUNTER — OFFICE VISIT (OUTPATIENT)
Dept: INTERNAL MEDICINE | Facility: CLINIC | Age: 62
End: 2022-03-18

## 2022-03-18 VITALS
HEIGHT: 60 IN | HEART RATE: 74 BPM | WEIGHT: 247.9 LBS | OXYGEN SATURATION: 98 % | DIASTOLIC BLOOD PRESSURE: 81 MMHG | RESPIRATION RATE: 16 BRPM | SYSTOLIC BLOOD PRESSURE: 130 MMHG | TEMPERATURE: 98 F | BODY MASS INDEX: 48.67 KG/M2

## 2022-03-18 DIAGNOSIS — E78.5 HYPERLIPIDEMIA, UNSPECIFIED HYPERLIPIDEMIA TYPE: ICD-10-CM

## 2022-03-18 DIAGNOSIS — R73.03 PREDIABETES: ICD-10-CM

## 2022-03-18 DIAGNOSIS — F41.9 ANXIETY: Primary | ICD-10-CM

## 2022-03-18 DIAGNOSIS — F32.A DEPRESSION, UNSPECIFIED DEPRESSION TYPE: ICD-10-CM

## 2022-03-18 DIAGNOSIS — I10 HYPERTENSION, UNSPECIFIED TYPE: ICD-10-CM

## 2022-03-18 PROCEDURE — 99214 OFFICE O/P EST MOD 30 MIN: CPT | Performed by: FAMILY MEDICINE

## 2022-03-18 RX ORDER — VORTIOXETINE 5 MG/1
5 TABLET, FILM COATED ORAL
Qty: 30 TABLET | Refills: 6 | Status: SHIPPED | OUTPATIENT
Start: 2022-03-18 | End: 2022-11-04 | Stop reason: SDUPTHER

## 2022-03-18 RX ORDER — ASPIRIN 325 MG
1 TABLET ORAL DAILY
COMMUNITY
End: 2022-03-18

## 2022-03-18 RX ORDER — ATORVASTATIN CALCIUM 80 MG/1
1 TABLET, FILM COATED ORAL DAILY
COMMUNITY
End: 2022-03-18 | Stop reason: SDUPTHER

## 2022-03-18 RX ORDER — ATORVASTATIN CALCIUM 80 MG/1
80 TABLET, FILM COATED ORAL DAILY
Qty: 90 TABLET | Refills: 3 | Status: SHIPPED | OUTPATIENT
Start: 2022-03-18

## 2022-03-18 NOTE — PROGRESS NOTES
"Chief Complaint  Follow up to anxiety     Subjective          Pedrito Powell presents to [facility]  History of Present Illness    The patient consents to being recorded using [facility].    The patient presents today for a follow-up on anxiety.    Anxiety  The patient reports she is feeling better at this time. She reports she is doing well on Trintellix 5 mg for anxiety. She states she takes the medication [facility] between her dinner, and she reports she experienced nausea at the start of therapy that has since resolved. The patient confirms her family members notice a difference in her mood, and she confirms she notices a difference in her mood. She reports she is experiencing less \"melt-downs\" and bouts of crying, and she confirms she is sleeping better. The patient reports arthritis pain wakes her from sleeping.     Bilateral knee and foot pain  The patient states Dr. Alcocer and Dr. Bautista suggested she be seen by rheumatology. She states she was told by rheumatology to take Tylenol and to undergo a replacement when she experiences joint pain. She states Dr. Alcocer will not perform a replacement because he cannot do anything for her knees. The patient states she is referring to her knees and feet.    Prediabetes  The patient confirms she is not taking medication for prediabetes management. She confirms she is exercising and dieting for glucose control.     Hypertension  The patient states Dr. Bautista changed her hydrochlorothiazide dose from 25 mg to 12.5 mg. She denies splitting the tablets in half and states he wrote a new prescription for the smaller dose. She confirms she is taking irbesartan 300 mg daily.     Hyperlipidemia  The patient confirms she continues to take atorvastatin 80 mg daily for her cholesterol and denies any issues with the medication.    Postoperative infection risk   The patient reports she underwent a shoulder replacement with Dr. Devi, and she reports " "she has to take antibiotics for dental work. She states she is concerned about being more prone to infections post surgery.     Objective   Vital Signs:   /81   Pulse 74   Temp 98 °F (36.7 °C)   Resp 16   Ht 152.4 cm (60\")   Wt 112 kg (247 lb 14.4 oz)   SpO2 98%   BMI 48.41 kg/m²     Physical Exam  Vitals and nursing note reviewed.   Constitutional:       Appearance: She is well-developed.   HENT:      Head: Normocephalic and atraumatic.   Musculoskeletal:      Cervical back: Normal range of motion and neck supple.   Neurological:      Mental Status: She is alert and oriented to person, place, and time.   Psychiatric:         Behavior: Behavior normal.        Result Review :                 Assessment and Plan    Diagnoses and all orders for this visit:    1. Anxiety (Primary)  -     Vortioxetine HBr (Trintellix) 5 MG tablet; Take 5 mg by mouth Daily With Breakfast.  Dispense: 30 tablet; Refill: 6        -  The patient is doing well and will continue taking Trintellix 5 mg for anxiety. She is paying 59 dollars per fill, and I will see if I have samples or a coupon card to give.   2. Depression, unspecified depression type  -     Vortioxetine HBr (Trintellix) 5 MG tablet; Take 5 mg by mouth Daily With Breakfast.  Dispense: 30 tablet; Refill: 6        -  The patient is doing well and will continue taking Trintellix 5 mg for anxiety. She is paying 59 dollars per fill, and I will see if I have samples or a coupon card to give.  3. Prediabetes  -     Hemoglobin A1c        -  She is not taking medication for prediabetes management. She will continue to exercise and diet for glucose control.  4. Hypertension, unspecified type  -     Comprehensive Metabolic Panel  -     CBC & Differential        -  The patient will continue taking hydrochlorothiazide 12.5 mg and irbesartan 300 mg daily.  5. Hyperlipidemia, unspecified hyperlipidemia type  -     atorvastatin (LIPITOR) 80 MG tablet; Take 1 tablet by mouth Daily.  " Dispense: 90 tablet; Refill: 3  -     Lipid Panel With LDL / HDL Ratio        -  She will continue to take atorvastatin 80 mg daily.      Follow Up   No follow-ups on file.  Patient was given instructions and counseling regarding her condition or for health maintenance advice. Please see specific information pulled into the AVS if appropriate.     Transcribed from ambient dictation for Teo Fraser MD by Sugey Ward.  03/18/22   17:16 EDT    Patient verbalized consent to the visit recording.  I have personally performed the services described in this document as transcribed by the above individual, and it is both accurate and complete.  Teo Fraser MD  3/20/2022  18:41 EDT

## 2022-03-30 ENCOUNTER — OFFICE VISIT (OUTPATIENT)
Dept: ORTHOPEDIC SURGERY | Facility: CLINIC | Age: 62
End: 2022-03-30

## 2022-03-30 VITALS — TEMPERATURE: 97.7 F | BODY MASS INDEX: 45.17 KG/M2 | WEIGHT: 230.1 LBS | HEIGHT: 60 IN

## 2022-03-30 DIAGNOSIS — M19.019 ARTHRITIS OF SHOULDER: ICD-10-CM

## 2022-03-30 DIAGNOSIS — Z09 SURGERY FOLLOW-UP: Primary | ICD-10-CM

## 2022-03-30 PROCEDURE — 73030 X-RAY EXAM OF SHOULDER: CPT | Performed by: ORTHOPAEDIC SURGERY

## 2022-03-30 PROCEDURE — 99212 OFFICE O/P EST SF 10 MIN: CPT | Performed by: ORTHOPAEDIC SURGERY

## 2022-03-30 NOTE — PROGRESS NOTES
"Pedrito Powell : 1960 MRN: [id number] DATE: 3/30/2022    DIAGNOSIS:  Annual follow up for left shoulder arthroplasty      SUBJECTIVE:  Patient returns today for annual follow up of a left shoulder replacement. Patient reports doing well with no unusual complaints. Denies any limitations due to the shoulder.    OBJECTIVE:    Temp 97.7 °F (36.5 °C)   Ht 152.4 cm (60\")   Wt 104 kg (230 lb 1.6 oz)   BMI 44.94 kg/m²   Family History   Problem Relation Age of Onset   • Colon cancer Maternal Grandfather    • Asthma Mother    • Thyroid disease Father    • Diabetes Maternal Grandmother    • Malig Hyperthermia Neg Hx      Past Medical History:   Diagnosis Date   • Anemia     IRON INFUSION RECENTLY   • Arthritis    • Asthma    • Chronic kidney disease     stage 3   • Elevated cholesterol    • GERD (gastroesophageal reflux disease)    • History of carpal tunnel syndrome    • Hyperlipidemia    • Hypertension    • Left shoulder pain    • Limited mobility    • Vertigo    • Weakness     AT TIMES LEFT SHOULDER/LEFT ARM     Past Surgical History:   Procedure Laterality Date   •  SECTION  , ,    • COLONOSCOPY N/A 12/10/2018    normal ileum, IH, hyperplastic polyp, otherwise normal exam   • ENDOSCOPY N/A 12/10/2018    no gross lesions in esophagus or examined duodenum, erythematous mucosa in stomach, biopsies benign   • HYSTERECTOMY     • OOPHORECTOMY Bilateral    • SHOULDER MANIPULATION Left    • TOTAL SHOULDER ARTHROPLASTY Left 2021    Procedure: REVERSE TOTAL SHOULDER ARTHROPLASTY,  BICEP TENDONDESIS;  Surgeon: Bethel Devi MD;  Location: [facility];  Service: Orthopedics;  Laterality: Left;     Social History     Socioeconomic History   • Marital status:    • Number of children: 3   • Years of education: College   Tobacco Use   • Smoking status: Never Smoker   • Smokeless tobacco: Never Used   Vaping Use   • Vaping Use: Never used   Substance and Sexual Activity   • Alcohol " use: Yes     Alcohol/week: 1.0 standard drink     Types: 1 Glasses of wine per week     Comment: WEEKLY   • Drug use: No   • Sexual activity: Defer       14 point review of systems is reviewed with the patient.  Pertinent positives are listed above.  All others are negative.    Exam: The incision is well healed.  No effusion.  FE: 160.  ER 50.  IR L2. The arm is soft and nontender.  Good strength with elevation and abduction. Intact sensation to light touch.  Palpable radial pulse    DIAGNOSTIC STUDIES    Xrays: AP, scapular Y and axillary views of the left shoulder are ordered and reviewed for evaluation of shoulder replacement. The x-rays demonstrate a well positioned, well aligned replacement without complicating factors noted. In comparison with previous films there has been no change.    ASSESSMENT:  Annual follow up for left shoulder replacement.    PLAN:  Appropriate activity modifications and restrictions discussed.  Antibiotic prophylaxis recommendations discussed.  Continue annual follow-up.    Bethel Devi MD    03/30/2022

## 2022-04-01 ENCOUNTER — LAB (OUTPATIENT)
Dept: LAB | Facility: HOSPITAL | Age: 62
End: 2022-04-01

## 2022-04-01 LAB
ALBUMIN SERPL-MCNC: 4.2 G/DL (ref 3.5–5.2)
ALBUMIN/GLOB SERPL: 1.6 G/DL
ALP SERPL-CCNC: 50 U/L (ref 39–117)
ALT SERPL W P-5'-P-CCNC: 20 U/L (ref 1–33)
ANION GAP SERPL CALCULATED.3IONS-SCNC: 11 MMOL/L (ref 5–15)
AST SERPL-CCNC: 19 U/L (ref 1–32)
BASOPHILS # BLD AUTO: 0.05 10*3/MM3 (ref 0–0.2)
BASOPHILS NFR BLD AUTO: 0.7 % (ref 0–1.5)
BILIRUB SERPL-MCNC: 0.4 MG/DL (ref 0–1.2)
BUN SERPL-MCNC: 17 MG/DL (ref 8–23)
BUN/CREAT SERPL: 14.9 (ref 7–25)
CALCIUM SPEC-SCNC: 8.8 MG/DL (ref 8.6–10.5)
CHLORIDE SERPL-SCNC: 103 MMOL/L (ref 98–107)
CHOLEST SERPL-MCNC: 161 MG/DL (ref 0–200)
CO2 SERPL-SCNC: 26 MMOL/L (ref 22–29)
CREAT SERPL-MCNC: 1.14 MG/DL (ref 0.57–1)
DEPRECATED RDW RBC AUTO: 43.1 FL (ref 37–54)
EGFRCR SERPLBLD CKD-EPI 2021: 54.9 ML/MIN/1.73
EOSINOPHIL # BLD AUTO: 0.2 10*3/MM3 (ref 0–0.4)
EOSINOPHIL NFR BLD AUTO: 2.9 % (ref 0.3–6.2)
ERYTHROCYTE [DISTWIDTH] IN BLOOD BY AUTOMATED COUNT: 12.5 % (ref 12.3–15.4)
FERRITIN SERPL-MCNC: 401 NG/ML (ref 13–150)
GLOBULIN UR ELPH-MCNC: 2.6 GM/DL
GLUCOSE SERPL-MCNC: 97 MG/DL (ref 65–99)
HBA1C MFR BLD: 5.7 % (ref 4.8–5.6)
HCT VFR BLD AUTO: 33.1 % (ref 34–46.6)
HDLC SERPL-MCNC: 50 MG/DL (ref 40–60)
HGB BLD-MCNC: 11 G/DL (ref 12–15.9)
IMM GRANULOCYTES # BLD AUTO: 0.02 10*3/MM3 (ref 0–0.05)
IMM GRANULOCYTES NFR BLD AUTO: 0.3 % (ref 0–0.5)
IRON 24H UR-MRATE: 80 MCG/DL (ref 37–145)
IRON SATN MFR SERPL: 27 % (ref 20–50)
LDLC SERPL CALC-MCNC: 89 MG/DL (ref 0–100)
LDLC/HDLC SERPL: 1.72 {RATIO}
LYMPHOCYTES # BLD AUTO: 1.64 10*3/MM3 (ref 0.7–3.1)
LYMPHOCYTES NFR BLD AUTO: 23.8 % (ref 19.6–45.3)
MCH RBC QN AUTO: 31.7 PG (ref 26.6–33)
MCHC RBC AUTO-ENTMCNC: 33.2 G/DL (ref 31.5–35.7)
MCV RBC AUTO: 95.4 FL (ref 79–97)
MONOCYTES # BLD AUTO: 0.49 10*3/MM3 (ref 0.1–0.9)
MONOCYTES NFR BLD AUTO: 7.1 % (ref 5–12)
NEUTROPHILS NFR BLD AUTO: 4.5 10*3/MM3 (ref 1.7–7)
NEUTROPHILS NFR BLD AUTO: 65.2 % (ref 42.7–76)
NRBC BLD AUTO-RTO: 0 /100 WBC (ref 0–0.2)
PLATELET # BLD AUTO: 244 10*3/MM3 (ref 140–450)
PMV BLD AUTO: 10 FL (ref 6–12)
POTASSIUM SERPL-SCNC: 3.8 MMOL/L (ref 3.5–5.2)
PROT SERPL-MCNC: 6.8 G/DL (ref 6–8.5)
RBC # BLD AUTO: 3.47 10*6/MM3 (ref 3.77–5.28)
SODIUM SERPL-SCNC: 140 MMOL/L (ref 136–145)
TIBC SERPL-MCNC: 298 MCG/DL (ref 298–536)
TRANSFERRIN SERPL-MCNC: 200 MG/DL (ref 200–360)
TRIGL SERPL-MCNC: 124 MG/DL (ref 0–150)
VLDLC SERPL-MCNC: 22 MG/DL (ref 5–40)
WBC NRBC COR # BLD: 6.9 10*3/MM3 (ref 3.4–10.8)

## 2022-04-01 PROCEDURE — 83540 ASSAY OF IRON: CPT | Performed by: FAMILY MEDICINE

## 2022-04-01 PROCEDURE — 84466 ASSAY OF TRANSFERRIN: CPT | Performed by: FAMILY MEDICINE

## 2022-04-01 PROCEDURE — 83036 HEMOGLOBIN GLYCOSYLATED A1C: CPT | Performed by: FAMILY MEDICINE

## 2022-04-01 PROCEDURE — 85025 COMPLETE CBC W/AUTO DIFF WBC: CPT | Performed by: FAMILY MEDICINE

## 2022-04-01 PROCEDURE — 36415 COLL VENOUS BLD VENIPUNCTURE: CPT | Performed by: FAMILY MEDICINE

## 2022-04-01 PROCEDURE — 82728 ASSAY OF FERRITIN: CPT | Performed by: FAMILY MEDICINE

## 2022-04-01 PROCEDURE — 80061 LIPID PANEL: CPT | Performed by: FAMILY MEDICINE

## 2022-04-01 PROCEDURE — 80053 COMPREHEN METABOLIC PANEL: CPT | Performed by: FAMILY MEDICINE

## 2022-04-15 ENCOUNTER — APPOINTMENT (OUTPATIENT)
Dept: GENERAL RADIOLOGY | Facility: HOSPITAL | Age: 62
End: 2022-04-15

## 2022-04-15 ENCOUNTER — HOSPITAL ENCOUNTER (EMERGENCY)
Facility: HOSPITAL | Age: 62
Discharge: HOME | End: 2022-04-15
Attending: SURGERY | Admitting: EMERGENCY MEDICINE

## 2022-04-15 VITALS
RESPIRATION RATE: 16 BRPM | BODY MASS INDEX: 44.94 KG/M2 | HEART RATE: 89 BPM | OXYGEN SATURATION: 96 % | HEIGHT: 60 IN | SYSTOLIC BLOOD PRESSURE: 167 MMHG | DIASTOLIC BLOOD PRESSURE: 99 MMHG | TEMPERATURE: 96.3 F

## 2022-04-15 DIAGNOSIS — M79.671 RIGHT FOOT PAIN: Primary | ICD-10-CM

## 2022-04-15 PROCEDURE — 73630 X-RAY EXAM OF FOOT: CPT

## 2022-04-15 PROCEDURE — 99283 EMERGENCY DEPT VISIT LOW MDM: CPT

## 2022-04-15 RX ORDER — METHYLPREDNISOLONE 4 MG/1
TABLET ORAL
Qty: 1 EACH | Refills: 0 | Status: SHIPPED | OUTPATIENT
Start: 2022-04-15 | End: 2022-04-28

## 2022-04-16 NOTE — ED PROVIDER NOTES
EMERGENCY DEPARTMENT ENCOUNTER    Room Number:  B04/04  Date of encounter:  4/15/2022  PCP: Teo Fraser MD  Historian: Patient      HPI:  Chief Complaint: Right foot pain  A complete HPI/ROS/PMH/PSH/SH/FH are unobtainable due to: Nothing    Context: Pedrito Powell is a 61 y.o. female who presents to the ED c/o right foot pain is been ongoing for the past 2 weeks.  Patient denies injury associated with the pain.  She does inform me she has a past medical history of bone spurs and arthritis.  She has received steroid injections in the past with significant relief for the same symptoms.  However, she is not due to get another injection until the middle of May.  Patient states her pain is significantly worsened over the past few days and feels very similar to previous stress fracture.  Per the patient, she called her orthopedist office today and was told to report to the emergency department for further evaluation to ensure no underlying fracture.      PAST MEDICAL HISTORY  Active Ambulatory Problems     Diagnosis Date Noted   • Hx of anaphylaxis 01/19/2018   • Primary osteoarthritis of knee 01/19/2018   • High risk medication use 01/19/2018   • Hypertension 04/03/2018   • Hyperlipidemia 04/03/2018   • Vertigo 05/02/2018   • Infected blister of left foot 05/02/2018   • Ganglion of right wrist 11/27/2018   • Vomiting and diarrhea 12/07/2018   • Intractable vomiting with nausea 12/07/2018   • Fatigue 02/01/2019   • Acute gastritis without hemorrhage 02/01/2019   • Anemia 02/01/2019   • Elevated serum creatinine 03/06/2019   • Fungal rash of torso 10/28/2019   • Cellulitis of abdominal wall 10/28/2019   • Bronchitis 10/28/2019   • Arthritis of shoulder 02/26/2021   • Chronic renal failure in pediatric patient, stage 3 (moderate) ([facility]) 03/11/2021   • H/O shoulder surgery 05/06/2021   • Anxiety 11/08/2021   • Depression 11/08/2021     Resolved Ambulatory Problems     Diagnosis Date Noted   • No Resolved Ambulatory  Problems     Past Medical History:   Diagnosis Date   • Arthritis    • Asthma    • Chronic kidney disease    • Elevated cholesterol    • GERD (gastroesophageal reflux disease)    • History of carpal tunnel syndrome    • Left shoulder pain    • Limited mobility    • Weakness          PAST SURGICAL HISTORY  Past Surgical History:   Procedure Laterality Date   •  SECTION  , ,    • COLONOSCOPY N/A 12/10/2018    normal ileum, IH, hyperplastic polyp, otherwise normal exam   • ENDOSCOPY N/A 12/10/2018    no gross lesions in esophagus or examined duodenum, erythematous mucosa in stomach, biopsies benign   • HYSTERECTOMY     • OOPHORECTOMY Bilateral    • SHOULDER MANIPULATION Left    • TOTAL SHOULDER ARTHROPLASTY Left 2021    Procedure: REVERSE TOTAL SHOULDER ARTHROPLASTY,  BICEP TENDONDESIS;  Surgeon: Bethel Devi MD;  Location: [facility];  Service: Orthopedics;  Laterality: Left;         FAMILY HISTORY  Family History   Problem Relation Age of Onset   • Colon cancer Maternal Grandfather    • Asthma Mother    • Thyroid disease Father    • Diabetes Maternal Grandmother    • Malig Hyperthermia Neg Hx          SOCIAL HISTORY  Social History     Socioeconomic History   • Marital status:    • Number of children: 3   • Years of education: College   Tobacco Use   • Smoking status: Never Smoker   • Smokeless tobacco: Never Used   Vaping Use   • Vaping Use: Never used   Substance and Sexual Activity   • Alcohol use: Yes     Alcohol/week: 1.0 standard drink     Types: 1 Glasses of wine per week     Comment: WEEKLY   • Drug use: No   • Sexual activity: Defer         ALLERGIES  Chocolate, Nickel, Nuts, Adhesive tape, Chlorhexidine, Ciprofloxacin, Citric acid, Covid-19 (mrna) vaccine, Eggs or egg-derived products, Influenza vaccines, Methyldibromoglutaronitrile, Neomycin, Omnicef [cefdinir], Palladium chloride, Penicillins, Sulfa antibiotics, Tomato, Yeast-related products, Gold-containing  drug products, and Latex        REVIEW OF SYSTEMS  Review of Systems   Constitutional: Negative.    Eyes: Negative.    Respiratory: Negative.    Cardiovascular: Negative for chest pain, palpitations and leg swelling.   Genitourinary: Negative.    Musculoskeletal:        Right foot pain   Skin: Negative.    Neurological: Negative for numbness.   Psychiatric/Behavioral: Negative.         All systems reviewed and negative except for those discussed in HPI.       PHYSICAL EXAM    I have reviewed the triage vital signs and nursing notes.    ED Triage Vitals [04/15/22 2126]   Temp Heart Rate Resp BP SpO2   96.3 °F (35.7 °C) 89 16 167/99 96 %      Temp src Heart Rate Source Patient Position BP Location FiO2 (%)   Tympanic Monitor Standing Left arm --       Physical Exam  GENERAL: DW female no acute distress  HENT: nares patent  EYES: no scleral icterus  CV: regular rhythm, regular rate, DP and PT pulses +2 affected extremity  RESPIRATORY: normal effort  ABDOMEN: soft  MUSCULOSKELETAL: TTP over the dorsal surface of the right foot.  No obvious soft tissue swelling.  Increased pain with dorsiflexion plantarflexion of the foot.  NEURO: alert, moves all extremities, follows commands  SKIN: warm, dry        LAB RESULTS  No results found for this or any previous visit (from the past 24 hour(s)).    Ordered the above labs and independently reviewed the results.        RADIOLOGY  XR Foot 3+ View Right    Result Date: 4/15/2022  3 VIEWS RIGHT FOOT  HISTORY: Pain  COMPARISON: None available.  FINDINGS: No acute fractures or fixation of the right foot is identified. The patient does have degenerative changes, particularly involving the mid foot. The patient had similar degenerative changes of the midfoot on the left on prior imaging from 2015. Additional degenerative changes are noted involving the interphalangeal joints. No aggressive osseous abnormalities are seen.      No acute fracture or subluxation identified.  This report was  finalized on 4/15/2022 10:23 PM by Dr. Lilian Larson M.D.        I ordered the above noted radiological studies. Reviewed by me and discussed with radiologist.  See dictation for official radiology interpretation.      PROCEDURES    Procedures      MEDICATIONS GIVEN IN ER    Medications - No data to display      PROGRESS, DATA ANALYSIS, CONSULTS, AND MEDICAL DECISION MAKING    All labs have been independently reviewed by me.  All radiology studies have been reviewed by me and discussed with radiologist dictating the report.   EKG's independently viewed and interpreted by me.  Discussion below represents my analysis of pertinent findings related to patient's condition, differential diagnosis, treatment plan and final disposition.    DDx includes but is not limited to: DJD, tendinitis, stress fracture, other pathologic fracture.  Will obtain x-ray of the right foot to rule out fracture.    ED Course as of 04/15/22 2346   Fri Apr 15, 2022   2345 Patient states she has received significant improvement with steroid injections in the past.  She has been attempting diclofenac gel as a single motor therapy without much relief.  Will have her to continue with the diclofenac gel and we will add a short course of Medrol.  If she is not improved in the next 5 to 7 days letter to follow-up with her orthopedist for further evaluation.  We will have her return to the emergency department with any further concerns. [RC]      ED Course User Index  [RC] Danish Pritchard III, PA           PPE: The patient wore a surgical mask throughout the entire patient encounter. I wore an N95.    AS OF 23:46 EDT VITALS:    BP - 167/99  HR - 89  TEMP - 96.3 °F (35.7 °C) (Tympanic)  O2 SATS - 96%        DIAGNOSIS  Final diagnoses:   Right foot pain         DISPOSITION  DISCHARGE    Patient discharged in stable condition.    Reviewed implications of results, diagnosis, meds, responsibility to follow up, warning signs and symptoms of  possible worsening, potential complications and reasons to return to ER.    Patient/Family voiced understanding of above instructions.    Discussed plan for discharge, as there is no emergent indication for admission. Patient referred to primary care provider for BP management due to today's BP. Pt/family is agreeable and understands need for follow up and repeat testing.  Pt is aware that discharge does not mean that nothing is wrong but it indicates no emergency is present that requires admission and they must continue care with follow-up as given below or physician of their choice.     FOLLOW-UP  Ayden Alcocer MD  [address]  [place] [id number] [phone]    Schedule an appointment as soon as possible for a visit   For further evaluation and treatment         Medication List      New Prescriptions    methylPREDNISolone 4 MG dose pack  Commonly known as: MEDROL  Take as directed on package instructions.        Changed    hydroCHLOROthiazide 25 MG tablet  Commonly known as: HYDRODIURIL  Take 1 tablet by mouth Daily.  What changed: how much to take           Where to Get Your Medications      These medications were sent to [id number] - [place], [place] - [address] [address] AT [facility] - [phone]  - [phone] FX  [address] [address] [facility] [address]    Phone: [phone]   · methylPREDNISolone 4 MG dose pack                Danish Pritchard III, PA  04/15/22 2862

## 2022-04-16 NOTE — ED PROVIDER NOTES
"MD ATTESTATION NOTE    The LAITH and I have discussed this patient's history, physical exam, and treatment plan.  I have reviewed the documentation and personally had a face to face interaction with the patient. I affirm the documentation and agree with the treatment and plan.  The attached note describes my personal findings.      I provided a substantive portion of the care of the patient.  I personally performed the physical exam in its entirety, and below are my findings.  For this patient encounter, the patient wore surgical mask, I wore full protective PPE including N95 and eye protection.      Brief HPI: Patient presents with right foot pain off and on for the last 2 weeks.  No acute injury, history of \"bone spurs and arthritis\".  Patient called Dr. Alcocer's office today and was instructed to go to the emergency department.    PHYSICAL EXAM  ED Triage Vitals [04/15/22 2126]   Temp Heart Rate Resp BP SpO2   96.3 °F (35.7 °C) 89 16 167/99 96 %      Temp src Heart Rate Source Patient Position BP Location FiO2 (%)   Tympanic Monitor Standing Left arm --         GENERAL: no acute distress  HENT: nares patent  EYES: no scleral icterus  CV: regular rhythm, normal rate  RESPIRATORY: normal effort  ABDOMEN: soft  MUSCULOSKELETAL: no deformity.  There is some soft tissue swelling and tenderness on the dorsum of the right midfoot.  She also some tenderness and soft tissue swelling over the first MTP joint.  There is no erythema or warmth, no obvious acute deformity, and cap refill is intact with soft compartments.  NEURO: alert, moves all extremities, follows commands  PSYCH:  calm, cooperative  SKIN: warm, dry    Vital signs and nursing notes reviewed.        Plan: Plain film is negative for fracture.  I have encouraged conservative therapy and follow-up with Harshad Chery MD  04/15/22 0246    "

## 2022-04-16 NOTE — DISCHARGE INSTRUCTIONS
Wear the provided postoperative shoe for the next 7 days as needed.  Take the prescribed Medrol pack.  You may also apply your diclofenac gel 2-3 times a day.  Recommend following up with Dr. Alcocer for further evaluation if your symptoms not improving over the next 5 to 7 days.  Return the emergency department should your symptoms change, worsen, should you develop a fever, or should you have any further concerns.

## 2022-04-16 NOTE — ED NOTES
Pt ambulatory to triage from home with c/o right foot pain - pt denies injury or trauma. States swelling last weekend, which resolved, pain got worse last night.  Pt wearing mask in triage. Triage personnel wore appropriate PPE

## 2022-04-28 ENCOUNTER — APPOINTMENT (OUTPATIENT)
Dept: CT IMAGING | Facility: HOSPITAL | Age: 62
End: 2022-04-28

## 2022-04-28 ENCOUNTER — HOSPITAL ENCOUNTER (EMERGENCY)
Facility: HOSPITAL | Age: 62
Discharge: HOME | End: 2022-04-28
Attending: EMERGENCY MEDICINE | Admitting: EMERGENCY MEDICINE

## 2022-04-28 ENCOUNTER — APPOINTMENT (OUTPATIENT)
Dept: GENERAL RADIOLOGY | Facility: HOSPITAL | Age: 62
End: 2022-04-28

## 2022-04-28 VITALS
SYSTOLIC BLOOD PRESSURE: 136 MMHG | OXYGEN SATURATION: 98 % | TEMPERATURE: 98.5 F | DIASTOLIC BLOOD PRESSURE: 86 MMHG | RESPIRATION RATE: 16 BRPM | HEART RATE: 70 BPM

## 2022-04-28 DIAGNOSIS — R93.89 ABNORMAL CT SCAN, NECK: ICD-10-CM

## 2022-04-28 DIAGNOSIS — T78.40XA ALLERGIC REACTION, INITIAL ENCOUNTER: Primary | ICD-10-CM

## 2022-04-28 LAB
ALBUMIN SERPL-MCNC: 3.9 G/DL (ref 3.5–5.2)
ALBUMIN/GLOB SERPL: 1.5 G/DL
ALP SERPL-CCNC: 58 U/L (ref 39–117)
ALT SERPL W P-5'-P-CCNC: 23 U/L (ref 1–33)
ANION GAP SERPL CALCULATED.3IONS-SCNC: 13.3 MMOL/L (ref 5–15)
AST SERPL-CCNC: 17 U/L (ref 1–32)
BASOPHILS # BLD AUTO: 0.05 10*3/MM3 (ref 0–0.2)
BASOPHILS NFR BLD AUTO: 0.4 % (ref 0–1.5)
BILIRUB SERPL-MCNC: 0.5 MG/DL (ref 0–1.2)
BUN SERPL-MCNC: 27 MG/DL (ref 8–23)
BUN/CREAT SERPL: 22.5 (ref 7–25)
CALCIUM SPEC-SCNC: 8.8 MG/DL (ref 8.6–10.5)
CHLORIDE SERPL-SCNC: 98 MMOL/L (ref 98–107)
CO2 SERPL-SCNC: 23.7 MMOL/L (ref 22–29)
CREAT SERPL-MCNC: 1.2 MG/DL (ref 0.57–1)
DEPRECATED RDW RBC AUTO: 44.5 FL (ref 37–54)
EGFRCR SERPLBLD CKD-EPI 2021: 51.6 ML/MIN/1.73
EOSINOPHIL # BLD AUTO: 0.29 10*3/MM3 (ref 0–0.4)
EOSINOPHIL NFR BLD AUTO: 2.5 % (ref 0.3–6.2)
ERYTHROCYTE [DISTWIDTH] IN BLOOD BY AUTOMATED COUNT: 12.5 % (ref 12.3–15.4)
GLOBULIN UR ELPH-MCNC: 2.6 GM/DL
GLUCOSE SERPL-MCNC: 134 MG/DL (ref 65–99)
HCT VFR BLD AUTO: 34.8 % (ref 34–46.6)
HGB BLD-MCNC: 11.3 G/DL (ref 12–15.9)
IMM GRANULOCYTES # BLD AUTO: 0.03 10*3/MM3 (ref 0–0.05)
IMM GRANULOCYTES NFR BLD AUTO: 0.3 % (ref 0–0.5)
LYMPHOCYTES # BLD AUTO: 2.37 10*3/MM3 (ref 0.7–3.1)
LYMPHOCYTES NFR BLD AUTO: 20.7 % (ref 19.6–45.3)
MCH RBC QN AUTO: 31.5 PG (ref 26.6–33)
MCHC RBC AUTO-ENTMCNC: 32.5 G/DL (ref 31.5–35.7)
MCV RBC AUTO: 96.9 FL (ref 79–97)
MONOCYTES # BLD AUTO: 0.83 10*3/MM3 (ref 0.1–0.9)
MONOCYTES NFR BLD AUTO: 7.2 % (ref 5–12)
NEUTROPHILS NFR BLD AUTO: 68.9 % (ref 42.7–76)
NEUTROPHILS NFR BLD AUTO: 7.88 10*3/MM3 (ref 1.7–7)
NRBC BLD AUTO-RTO: 0 /100 WBC (ref 0–0.2)
PLATELET # BLD AUTO: 286 10*3/MM3 (ref 140–450)
PMV BLD AUTO: 9.6 FL (ref 6–12)
POTASSIUM SERPL-SCNC: 3.4 MMOL/L (ref 3.5–5.2)
PROT SERPL-MCNC: 6.5 G/DL (ref 6–8.5)
RBC # BLD AUTO: 3.59 10*6/MM3 (ref 3.77–5.28)
SODIUM SERPL-SCNC: 135 MMOL/L (ref 136–145)
WBC NRBC COR # BLD: 11.45 10*3/MM3 (ref 3.4–10.8)

## 2022-04-28 PROCEDURE — 96375 TX/PRO/DX INJ NEW DRUG ADDON: CPT

## 2022-04-28 PROCEDURE — 80053 COMPREHEN METABOLIC PANEL: CPT | Performed by: PHYSICIAN ASSISTANT

## 2022-04-28 PROCEDURE — 70360 X-RAY EXAM OF NECK: CPT

## 2022-04-28 PROCEDURE — 96374 THER/PROPH/DIAG INJ IV PUSH: CPT

## 2022-04-28 PROCEDURE — 25010000002 DIPHENHYDRAMINE PER 50 MG: Performed by: PHYSICIAN ASSISTANT

## 2022-04-28 PROCEDURE — 25010000002 METHYLPREDNISOLONE PER 125 MG: Performed by: PHYSICIAN ASSISTANT

## 2022-04-28 PROCEDURE — 99283 EMERGENCY DEPT VISIT LOW MDM: CPT

## 2022-04-28 PROCEDURE — 36415 COLL VENOUS BLD VENIPUNCTURE: CPT

## 2022-04-28 PROCEDURE — 99282 EMERGENCY DEPT VISIT SF MDM: CPT

## 2022-04-28 PROCEDURE — 25010000002 IOPAMIDOL 61 % SOLUTION: Performed by: EMERGENCY MEDICINE

## 2022-04-28 PROCEDURE — 70491 CT SOFT TISSUE NECK W/DYE: CPT

## 2022-04-28 PROCEDURE — 85025 COMPLETE CBC W/AUTO DIFF WBC: CPT | Performed by: PHYSICIAN ASSISTANT

## 2022-04-28 PROCEDURE — 71045 X-RAY EXAM CHEST 1 VIEW: CPT

## 2022-04-28 RX ORDER — CETIRIZINE HYDROCHLORIDE 10 MG/1
10 TABLET ORAL DAILY
Qty: 30 TABLET | Refills: 0 | Status: SHIPPED | OUTPATIENT
Start: 2022-04-28

## 2022-04-28 RX ORDER — SODIUM CHLORIDE 0.9 % (FLUSH) 0.9 %
10 SYRINGE (ML) INJECTION AS NEEDED
Status: DISCONTINUED | OUTPATIENT
Start: 2022-04-28 | End: 2022-04-28 | Stop reason: HOSPADM

## 2022-04-28 RX ORDER — DIPHENHYDRAMINE HYDROCHLORIDE 50 MG/ML
25 INJECTION INTRAMUSCULAR; INTRAVENOUS ONCE
Status: COMPLETED | OUTPATIENT
Start: 2022-04-28 | End: 2022-04-28

## 2022-04-28 RX ORDER — PREDNISONE 20 MG/1
20 TABLET ORAL 2 TIMES DAILY
Qty: 10 TABLET | Refills: 0 | Status: SHIPPED | OUTPATIENT
Start: 2022-04-28 | End: 2022-12-12

## 2022-04-28 RX ORDER — METHYLPREDNISOLONE SODIUM SUCCINATE 125 MG/2ML
125 INJECTION, POWDER, LYOPHILIZED, FOR SOLUTION INTRAMUSCULAR; INTRAVENOUS ONCE
Status: COMPLETED | OUTPATIENT
Start: 2022-04-28 | End: 2022-04-28

## 2022-04-28 RX ORDER — FAMOTIDINE 10 MG/ML
20 INJECTION, SOLUTION INTRAVENOUS ONCE
Status: COMPLETED | OUTPATIENT
Start: 2022-04-28 | End: 2022-04-28

## 2022-04-28 RX ADMIN — METHYLPREDNISOLONE SODIUM SUCCINATE 125 MG: 125 INJECTION, POWDER, FOR SOLUTION INTRAMUSCULAR; INTRAVENOUS at 13:56

## 2022-04-28 RX ADMIN — DIPHENHYDRAMINE HYDROCHLORIDE 25 MG: 50 INJECTION, SOLUTION INTRAMUSCULAR; INTRAVENOUS at 13:54

## 2022-04-28 RX ADMIN — IOPAMIDOL 85 ML: 612 INJECTION, SOLUTION INTRAVENOUS at 16:09

## 2022-04-28 RX ADMIN — FAMOTIDINE 20 MG: 10 INJECTION INTRAVENOUS at 13:56

## 2022-05-04 ENCOUNTER — LAB (OUTPATIENT)
Dept: OTHER | Facility: HOSPITAL | Age: 62
End: 2022-05-04

## 2022-05-04 ENCOUNTER — OFFICE VISIT (OUTPATIENT)
Dept: ONCOLOGY | Facility: CLINIC | Age: 62
End: 2022-05-04

## 2022-05-04 VITALS
SYSTOLIC BLOOD PRESSURE: 131 MMHG | DIASTOLIC BLOOD PRESSURE: 81 MMHG | OXYGEN SATURATION: 98 % | RESPIRATION RATE: 20 BRPM | WEIGHT: 236 LBS | TEMPERATURE: 97.3 F | BODY MASS INDEX: 46.33 KG/M2 | HEIGHT: 60 IN | HEART RATE: 72 BPM

## 2022-05-04 DIAGNOSIS — D63.1 ANEMIA DUE TO STAGE 3 CHRONIC KIDNEY DISEASE, UNSPECIFIED WHETHER STAGE 3A OR 3B CKD: Primary | ICD-10-CM

## 2022-05-04 DIAGNOSIS — M06.9: ICD-10-CM

## 2022-05-04 DIAGNOSIS — N18.30 ANEMIA DUE TO STAGE 3 CHRONIC KIDNEY DISEASE, UNSPECIFIED WHETHER STAGE 3A OR 3B CKD: ICD-10-CM

## 2022-05-04 DIAGNOSIS — R53.83 FATIGUE, UNSPECIFIED TYPE: ICD-10-CM

## 2022-05-04 DIAGNOSIS — N18.30 ANEMIA DUE TO STAGE 3 CHRONIC KIDNEY DISEASE, UNSPECIFIED WHETHER STAGE 3A OR 3B CKD: Primary | ICD-10-CM

## 2022-05-04 DIAGNOSIS — D64.9 ANEMIA, UNSPECIFIED TYPE: ICD-10-CM

## 2022-05-04 DIAGNOSIS — D63.1 ANEMIA DUE TO STAGE 3 CHRONIC KIDNEY DISEASE, UNSPECIFIED WHETHER STAGE 3A OR 3B CKD: ICD-10-CM

## 2022-05-04 LAB
ALBUMIN SERPL-MCNC: 3.8 G/DL (ref 3.5–5.2)
ALBUMIN/GLOB SERPL: 1.5 G/DL
ALP SERPL-CCNC: 60 U/L (ref 39–117)
ALT SERPL W P-5'-P-CCNC: 25 U/L (ref 1–33)
ANION GAP SERPL CALCULATED.3IONS-SCNC: 9.8 MMOL/L (ref 5–15)
AST SERPL-CCNC: 16 U/L (ref 1–32)
BASOPHILS # BLD AUTO: 0.01 10*3/MM3 (ref 0–0.2)
BASOPHILS NFR BLD AUTO: 0.1 % (ref 0–1.5)
BILIRUB SERPL-MCNC: 0.4 MG/DL (ref 0–1.2)
BUN SERPL-MCNC: 33 MG/DL (ref 8–23)
BUN/CREAT SERPL: 23.6 (ref 7–25)
CALCIUM SPEC-SCNC: 8.8 MG/DL (ref 8.6–10.5)
CHLORIDE SERPL-SCNC: 98 MMOL/L (ref 98–107)
CO2 SERPL-SCNC: 30.2 MMOL/L (ref 22–29)
CREAT SERPL-MCNC: 1.4 MG/DL (ref 0.57–1)
DEPRECATED RDW RBC AUTO: 40.7 FL (ref 37–54)
EGFRCR SERPLBLD CKD-EPI 2021: 42.9 ML/MIN/1.73
EOSINOPHIL # BLD AUTO: 0.12 10*3/MM3 (ref 0–0.4)
EOSINOPHIL NFR BLD AUTO: 0.9 % (ref 0.3–6.2)
ERYTHROCYTE [DISTWIDTH] IN BLOOD BY AUTOMATED COUNT: 11.9 % (ref 12.3–15.4)
FERRITIN SERPL-MCNC: 417.1 NG/ML (ref 13–150)
GLOBULIN UR ELPH-MCNC: 2.6 GM/DL
GLUCOSE SERPL-MCNC: 127 MG/DL (ref 65–99)
HCT VFR BLD AUTO: 32.9 % (ref 34–46.6)
HGB BLD-MCNC: 11 G/DL (ref 12–15.9)
HGB RETIC QN AUTO: 37 PG (ref 29.8–36.1)
IMM GRANULOCYTES # BLD AUTO: 0.06 10*3/MM3 (ref 0–0.05)
IMM GRANULOCYTES NFR BLD AUTO: 0.5 % (ref 0–0.5)
IMM RETICS NFR: 14.4 % (ref 3–15.8)
IRON 24H UR-MRATE: 50 MCG/DL (ref 37–145)
IRON SATN MFR SERPL: 20 % (ref 20–50)
LYMPHOCYTES # BLD AUTO: 2.54 10*3/MM3 (ref 0.7–3.1)
LYMPHOCYTES NFR BLD AUTO: 19.3 % (ref 19.6–45.3)
MCH RBC QN AUTO: 31.3 PG (ref 26.6–33)
MCHC RBC AUTO-ENTMCNC: 33.4 G/DL (ref 31.5–35.7)
MCV RBC AUTO: 93.7 FL (ref 79–97)
MONOCYTES # BLD AUTO: 1.04 10*3/MM3 (ref 0.1–0.9)
MONOCYTES NFR BLD AUTO: 7.9 % (ref 5–12)
NEUTROPHILS NFR BLD AUTO: 71.3 % (ref 42.7–76)
NEUTROPHILS NFR BLD AUTO: 9.42 10*3/MM3 (ref 1.7–7)
NRBC BLD AUTO-RTO: 0 /100 WBC (ref 0–0.2)
PLATELET # BLD AUTO: 267 10*3/MM3 (ref 140–450)
PMV BLD AUTO: 9.4 FL (ref 6–12)
POTASSIUM SERPL-SCNC: 3.4 MMOL/L (ref 3.5–5.2)
PROT SERPL-MCNC: 6.4 G/DL (ref 6–8.5)
RBC # BLD AUTO: 3.51 10*6/MM3 (ref 3.77–5.28)
RETICS # AUTO: 0.05 10*6/MM3 (ref 0.02–0.13)
RETICS/RBC NFR AUTO: 1.36 % (ref 0.7–1.9)
SODIUM SERPL-SCNC: 138 MMOL/L (ref 136–145)
TIBC SERPL-MCNC: 256 MCG/DL (ref 298–536)
TRANSFERRIN SERPL-MCNC: 172 MG/DL (ref 200–360)
WBC NRBC COR # BLD: 13.19 10*3/MM3 (ref 3.4–10.8)

## 2022-05-04 PROCEDURE — 85025 COMPLETE CBC W/AUTO DIFF WBC: CPT | Performed by: INTERNAL MEDICINE

## 2022-05-04 PROCEDURE — 85046 RETICYTE/HGB CONCENTRATE: CPT | Performed by: INTERNAL MEDICINE

## 2022-05-04 PROCEDURE — 82728 ASSAY OF FERRITIN: CPT | Performed by: INTERNAL MEDICINE

## 2022-05-04 PROCEDURE — 84466 ASSAY OF TRANSFERRIN: CPT | Performed by: INTERNAL MEDICINE

## 2022-05-04 PROCEDURE — 83540 ASSAY OF IRON: CPT | Performed by: INTERNAL MEDICINE

## 2022-05-04 PROCEDURE — 80053 COMPREHEN METABOLIC PANEL: CPT | Performed by: INTERNAL MEDICINE

## 2022-05-04 PROCEDURE — 99213 OFFICE O/P EST LOW 20 MIN: CPT | Performed by: INTERNAL MEDICINE

## 2022-05-04 PROCEDURE — 36415 COLL VENOUS BLD VENIPUNCTURE: CPT

## 2022-05-04 RX ORDER — HYDROCHLOROTHIAZIDE 12.5 MG/1
TABLET ORAL
COMMUNITY
Start: 2022-04-13

## 2022-05-04 NOTE — PROGRESS NOTES
Subjective     REASON FOR follow-up: Multifactorial anemia, anemia of chronic kidney disease and rheumatoid arthritis                             History of Present Illness patient is a 60-year-old female who was referred to us for evaluation of chronic anemia.  She does have an autoimmune component with her history of rheumatoid arthritis.  She also has mild renal insufficiency.    Anemia work-up showed a B12 of 494,Ferritin 160, serum iron of 36 with percent saturation of 13, EPO level of 8.3, creatinine of 1.49, serum protein electrophoresis did not show monoclonal protein.  ESR is 35.  There are no signs of infection.  Currently her hemoglobin is 9.8 and she is asymptomatic.  Dr. Pratt did discuss about the possibility of Procrit but at the present time after discussing the side effects she wants to be observed.    Patient returns today for follow up of anemia.  She is scheduled to have a left shoulder replacement surgery on May 10, 2021 and would therefore like to get Retacrit if her hemoglobin is low enough to help improve her hemoglobin before surgery.  She was also seen by her nephrologist who scheduled her for two doses of IV Feraheme prior to shoulder surgery, first dose 4/8/2021.  She is feeling well today.  She denies shortness of breath, dizziness, lightheadedness, or fatigue outside of her baseline.    Interval history:  Patient was referred here for evaluation for Procrit for anemia of chronic kidney disease with rheumatoid arthritis.  So far we have seen her hemoglobin is always above 10 and is not eligible for Retacrit.  Patient is not very symptomatic from the hemoglobin and we discussed with her to likely see her once a year instead of every 6 months.  Since we have not required to give her Procrit at all.  Denies much fatigue.  No nausea vomiting.  She has some gained some weight.    Patient had anaphylactic reaction to dianne  IN the school.  She ended up going to the ER and required to be  on steroids.  Her white count today is high because of the steroids otherwise no signs of infection.        Hematology history:  Ms. Powell is seen today after being referred back to us by her primary care physician, Dr. Teo Fraser.  We saw the patient in consultation in March 2019 for chronic anemia.  Patient has had a long history of rheumatoid arthritis with chronic anemia and reported chronic difficulty with iron deficiency, taking oral iron without much benefit.  At that time of initial consultation we discussed her anemia was likely related to underlying CKD stage III, as well as possibly her rheumatoid arthritis.  We did evaluate for vitamin deficiencies and she was not found to be folate, B12, or iron deficient.  Sed rate was slightly elevated at 44 at that time.    Most recent lab work performed by Dr. Fraser 1/19/2021 showed a hemoglobin of 9.4, creatinine 1.3, BUN 32, GFR 39.  Upon review of the EMR it appears at least for the last roughly 2 years her creatinine has been anywhere from 1.2 to as high as 1.7, BUN ranging in the 20s to 30s.  So overall relatively stable.      Last colonoscopy and EGD were performed per Dr. Kapoor, negative.  Patient also had a small bowel follow-through which was within normal limits. Other pertinent imaging include a CT of the abdomen pelvis performed December 2018 which was negative.  She is up-to-date on her mammogram, last performed August 2020, negative.    As she is reviewed today, hemoglobin is at stable at 9.8.  Patient denies any significant fatigue and feels that she has fairly good performance status.  She does note a few times a week taking a 3 to 4-hour nap in the afternoon but does not require this on a regular basis.  She is still able to complete the activity she desires.  Patient is a  and lives alone.  She denies any obvious GI blood loss.      She reports normal appetite.  Weight is stable.  She does have significant left shoulder pain and will  undergo replacement surgery per Dr. Devi in May 2021.    Otherwise she denies further concerns at this time.    Past Medical History:   Diagnosis Date   • Anemia     IRON INFUSION RECENTLY   • Arthritis    • Asthma    • Chronic kidney disease     stage 3   • Elevated cholesterol    • GERD (gastroesophageal reflux disease)    • History of carpal tunnel syndrome    • Hyperlipidemia    • Hypertension    • Left shoulder pain    • Limited mobility    • Vertigo    • Weakness     AT TIMES LEFT SHOULDER/LEFT ARM        Past Surgical History:   Procedure Laterality Date   •  SECTION  , ,    • COLONOSCOPY N/A 12/10/2018    normal ileum, IH, hyperplastic polyp, otherwise normal exam   • ENDOSCOPY N/A 12/10/2018    no gross lesions in esophagus or examined duodenum, erythematous mucosa in stomach, biopsies benign   • HYSTERECTOMY     • OOPHORECTOMY Bilateral    • SHOULDER MANIPULATION Left    • TOTAL SHOULDER ARTHROPLASTY Left 2021    Procedure: REVERSE TOTAL SHOULDER ARTHROPLASTY,  BICEP TENDONDESIS;  Surgeon: Bethel Devi MD;  Location: [facility];  Service: Orthopedics;  Laterality: Left;        Current Outpatient Medications on File Prior to Visit   Medication Sig Dispense Refill   • albuterol 108 (90 Base) MCG/ACT inhaler Inhale 2 puffs Every 4 (Four) Hours As Needed for Wheezing.     • aspirin 325 MG tablet Take 325 mg by mouth Daily. HOLD ONE WEEK PRIOR TO SURGERY     • atorvastatin (LIPITOR) 80 MG tablet Take 1 tablet by mouth Daily. 90 tablet 3   • azelastine (ASTELIN) 0.1 % nasal spray 1 spray into the nostril(s) as directed by provider Daily.  11   • B Complex Vitamins (B COMPLEX PO) Take 1 capsule by mouth Daily. HOLD FOR SURGERY 1 WEEK     • cetirizine (zyrTEC) 10 MG tablet Take 1 tablet by mouth Daily. 30 tablet 0   • Cholecalciferol (Vitamin D3) 50 MCG ( UT) capsule Take 1 capsule by mouth Daily. 90 capsule 1   • clobetasol (TEMOVATE) 0.05 % cream Apply 1 application  topically to the appropriate area as directed As Needed.     • EPINEPHrine (AUVI-Q IJ) Inject  as directed As Needed.     • ferrous gluconate (FERGON) 324 MG tablet Take 1 tablet by mouth Daily With Breakfast. 90 tablet 1   • Flovent HFA 44 MCG/ACT inhaler Inhale 2 puffs 2 (Two) Times a Day.     • hydroCHLOROthiazide (HYDRODIURIL) 12.5 MG tablet      • hydrocortisone 2.5 % cream Apply  topically to the appropriate area as directed As Needed.     • ipratropium (ATROVENT) 0.03 % nasal spray 1 spray into the nostril(s) as directed by provider 3 (Three) Times a Day.     • irbesartan (Avapro) 300 MG tablet Take 1 tablet by mouth Daily. 90 tablet 3   • loperamide (IMODIUM) 2 MG capsule Take 1 capsule by mouth Every 2 (Two) Hours As Needed for Diarrhea (max 4 doses daily).     • meclizine (ANTIVERT) 25 MG tablet Take 1 tablet by mouth 3 (Three) Times a Day As Needed for Dizziness. 270 tablet 0   • ondansetron (ZOFRAN) 4 MG tablet Take 1 tablet by mouth Every 12 (Twelve) Hours As Needed for Nausea or Vomiting. 180 tablet 0   • predniSONE (DELTASONE) 20 MG tablet Take 1 tablet by mouth 2 (Two) Times a Day. 10 tablet 0   • triamcinolone (KENALOG) 0.1 % ointment Apply 1 application topically to the appropriate area as directed As Needed.     • Vortioxetine HBr (Trintellix) 5 MG tablet Take 5 mg by mouth Daily With Breakfast. 30 tablet 6   • [DISCONTINUED] docusate sodium 100 MG capsule Take 1 capsule by mouth 2 (Two) Times a Day. 60 capsule 0   • [DISCONTINUED] hydroCHLOROthiazide (HYDRODIURIL) 25 MG tablet Take 1 tablet by mouth Daily. (Patient taking differently: Take 12.5 mg by mouth Daily.) 90 tablet 3     Current Facility-Administered Medications on File Prior to Visit   Medication Dose Route Frequency Provider Last Rate Last Admin   • Chlorhexidine Gluconate 2 % pads 1 each  1 each Apply externally Take As Directed Bethel Devi MD            ALLERGIES:    Allergies   Allergen Reactions   • Cashew Nut Oil Anaphylaxis    • Chocolate Anaphylaxis   • Nickel Anaphylaxis   • Nuts Anaphylaxis   • Adhesive Tape Unknown - Low Severity   • Chlorhexidine Unknown - Low Severity   • Ciprofloxacin Other (See Comments)     THRUSH PER PATIENT   • Citric Acid Unknown (See Comments)     Unsure     • Covid-19 (Mrna) Vaccine Other (See Comments)     INSTRUCTED PER ALLERGIST SHE CAN NOT TAKE D/T ONE OF THE PRESERVATIVES   • Eggs Or Egg-Derived Products Nausea And Vomiting   • Influenza Vaccines Nausea And Vomiting   • Methyldibromoglutaronitrile Unknown (See Comments)      PATIENT UNSURE OF REACTION.   • Neomycin Unknown (See Comments)      PATIENT UNSURE OF REACTION.   • Omnicef [Cefdinir] Other (See Comments)     THRUSH PER PATIENT   • Palladium Chloride Unknown (See Comments)      PATIENT UNSURE OF REACTION   • Penicillins Other (See Comments)     THRUSH PER PATIENT   • Sulfa Antibiotics Other (See Comments)     THRUSH PER PATIENT   • Tomato Hives   • Yeast-Related Products Hives   • Gold-Containing Drug Products Rash         • Latex Rash        Social History     Socioeconomic History   • Marital status:    • Number of children: 3   • Years of education: College   Tobacco Use   • Smoking status: Never Smoker   • Smokeless tobacco: Never Used   Vaping Use   • Vaping Use: Never used   Substance and Sexual Activity   • Alcohol use: Yes     Alcohol/week: 1.0 standard drink     Types: 1 Glasses of wine per week     Comment: WEEKLY   • Drug use: No   • Sexual activity: Defer        Family History   Problem Relation Age of Onset   • Colon cancer Maternal Grandfather    • Asthma Mother    • Thyroid disease Father    • Diabetes Maternal Grandmother    • Malig Hyperthermia Neg Hx       Review of Systems   Constitutional: Negative for appetite change, chills, diaphoresis, fatigue, fever and unexpected weight change.   HENT: Negative for hearing loss, sore throat and trouble swallowing.    Respiratory: Negative for cough, chest tightness, shortness  "of breath and wheezing.    Cardiovascular: Negative for chest pain, palpitations and leg swelling.   Gastrointestinal: Negative for abdominal distention, abdominal pain, blood in stool, constipation, diarrhea, nausea and vomiting.   Genitourinary: Negative for dysuria, frequency, hematuria and urgency.   Musculoskeletal: Positive for arthralgias. Negative for joint swelling.        No muscle weakness.   Skin: Negative for rash and wound.   Neurological: Negative for dizziness, seizures, syncope, speech difficulty, weakness, numbness and headaches.   Hematological: Negative for adenopathy. Does not bruise/bleed easily.   Psychiatric/Behavioral: Negative for behavioral problems, confusion and suicidal ideas.   All other systems reviewed and are negative.     I have reviewed and confirmed the accuracy of the ROS as documented 04/07/2021 Cassandra Pratt MD    Objective     Vitals:    05/04/22 1535   BP: 131/81   Pulse: 72   Resp: 20   Temp: 97.3 °F (36.3 °C)   TempSrc: Temporal   SpO2: 98%   Weight: 107 kg (236 lb)   Height: 152.4 cm (60\")   PainSc: 0-No pain     Current Status 5/4/2022   ECOG score 0     Physical Exam        CONSTITUTIONAL:  Vital signs reviewed.  No distress, looks comfortable.    RESPIRATORY:  Normal respiratory effort.  Lungs clear to auscultation bilaterally.  CARDIOVASCULAR:  Normal S1, S2.  No murmurs rubs or gallops.  No significant lower extremity edema.  GASTROINTESTINAL: Abdomen appears unremarkable.  Nontender.  No hepatomegaly.  No splenomegaly.  LYMPHATIC:  No cervical, supraclavicular, axillary lymphadenopathy.  SKIN:  Warm.  No rashes.  PSYCHIATRIC:  Normal judgment and insight.  Normal mood and affect.      RECENT LABS:  Results from last 7 days   Lab Units 05/04/22  1538 04/28/22  1507   WBC 10*3/mm3 13.19* 11.45*   NEUTROS ABS 10*3/mm3 9.42* 7.88*   HEMOGLOBIN g/dL 11.0* 11.3*   HEMATOCRIT % 32.9* 34.8   PLATELETS 10*3/mm3 267 286     Results from last 7 days   Lab Units " 05/04/22  1538 04/28/22  1507   SODIUM mmol/L 138 135*   POTASSIUM mmol/L 3.4* 3.4*   CHLORIDE mmol/L 98 98   CO2 mmol/L 30.2* 23.7   BUN mg/dL 33* 27*   CREATININE mg/dL 1.40* 1.20*   CALCIUM mg/dL 8.8 8.8   ALBUMIN g/dL 3.80 3.90   BILIRUBIN mg/dL 0.4 0.5   ALK PHOS U/L 60 58   ALT (SGPT) U/L 25 23   AST (SGOT) U/L 16 17   GLUCOSE mg/dL 127* 134*   FERRITIN ng/mL 417.10*  --    IRON mcg/dL 50  --    TIBC mcg/dL 256*  --            Two View Chest X-Ray 02/24/19  FINDINGS: The lungs are moderately expanded and clear and there is no  evidence of localized pneumonia. The heart is borderline enlarged  without change from 07/23/2012.    CT Abdomen Pelvis 12/06/18 scanned Image.    Assessment/Plan     1.  Multifactorial  anemia.  With anemia of chronic kidney disease and rheumatoid arthritis  · Seen in consultation March 2019.  Patient reporting longstanding anemia with hemoglobin running in the 10-11 range.  Patient has underlying CKD stage III.  Work-up at that time did not reveal any vitamin deficiency including no iron, B12 or folate deficiency.  No underlying monoclonal protein.  Anemia felt to be related to CKD with also possibly an element of chronic disease with underlying rheumatoid arthritis.  Sed rate was mildly elevated at 44.  Patient lost to follow-up since that time.  · Last colonoscopy and EGD were performed per Dr. Kapoor, negative.  Patient also had a small bowel follow-through which was within normal limits. Other pertinent imaging include a CT of the abdomen pelvis performed December 2018 which was negative.  She is up-to-date on her mammogram, last performed August 2020, negative.  · March 10, 2021: Patient's hemoglobin today is 9.8, discussed consideration of Procrit and will get approval.  Discussed side effects in length with patient today.  We could consider starting Procrit if her hemoglobin continues to drop.  · May 4/2022: Patient was sent here for anemia of chronic kidney disease to see if she  is eligible for Procrit.  Since the time she has been seen here she has not required Procrit as her hemoglobin is always about greater than 10.  Iron studies are normal.  She feels good otherwise    2.  Rheumatoid arthritis for which she takes diclofenac.    3.  Chronic kidney disease stage III.  This is managed per Dr. Fraser.  Creatinine appears relatively stable range from 1.2-1.7 over the last 2 years.  BUN 20s to 30s.  GFR last measured around 39.  This could be the cause of her anemia.    · Creatinine today is 1.54.  Patient continues to follow with nephrology.  · Still not a candidate for Procrit given hemoglobin greater than 10    4.  Left shoulder replacement surgery to be done on May 10, 2021.  To be done by Dr. Anderson    5.  Hypertension stable.  BP today 126/84.    6.  Anaphylactic reaction: Secondary to Jessica and hence had to be placed on prednisone recently    7.  Leukocytosis secondary to steroids    PLAN:  · Patient on steroids for anaphylactic reaction that happened recently  · Today she is lying without any problems  · She has not required any Procrit since that time she is seen as  · Needed we will see her infrequently once a year.  In future if her hemoglobin drops below 10 we could consider starting Retacrit and seeing her more often.  · Patient follows up with nephrology and primary care      Cassandra Pratt MD  10/20/2021      Dr. Teo Devi, orthopedic

## 2022-05-06 NOTE — ED PROVIDER NOTES
MD ATTESTATION NOTE    The LAITH and I have discussed this patient's history, physical exam, and treatment plan.    I provided a substantive portion of the care of this patient. I personally performed the physical exam, in its entirety. The attached note describes my personal findings.      Pedrito Powell is a 61 y.o. female who presents to the ED c/o allergic reaction. She states that someone at school brought in a plant. Within 30 minutes, she began having SOA, coughing, sneezing. She states that she was advised to come to ED by allergist. Symptoms are overall improved but she still has some tightness feeling.       On exam:  GENERAL: not distressed  HENT: nares patent, normal phonation, no stridor, no oropharyngeal edema noted  EYES: no scleral icterus  CV: regular rhythm, regular rate  RESPIRATORY: normal effort CTAB  ABDOMEN: soft  MUSCULOSKELETAL: no deformity  NEURO: alert, moves all extremities, follows commands  SKIN: warm, dry    Labs  No results found for this or any previous visit (from the past 24 hour(s)).    Radiology  No Radiology Exams Resulted Within Past 24 Hours    Medical Decision Making:  ED Course as of 05/05/22 2334   Thu Apr 28, 2022   1338 Patient presents with concern for possible allergic reaction.  The patient has severe history of allergies.  She complains of shortness of breath and throat tightness.  Patient has stable vitals without evidence of stridor.  Plan to treat with steroids, antihistamines. [EE]   1457 X-rays interpreted myself.  Chest x-ray shows no acute infiltrate or effusion.  Soft tissue neck films do show some degree of prevertebral neck swelling.  Plan obtain CT scan of neck for further evaluation. [EE]   1521 WBC(!): 11.45 [EE]   1521 Hemoglobin(!): 11.3 [EE]   1649 I discussed the CT soft tissue neck films with Dr. Chance.  Patient has no concerning prevertebral swelling.  No evidence of epiglottitis.  There is narrowing of the nasopharynx.  Unclear etiology. [EE]    1722 Recheck of patient.  She states she feels improved after IV steroids and antihistamines.  No evidence of angioedema or anaphylaxis.  Plan to discharge patient on prednisone and have her start Zyrtec at home.    She does have an allergist to follow-up with.  We will have her see an ENT for the narrowed nasopharynx. [EE]      ED Course User Index  [EE] Ferdinand Langston, PA           PPE: Both the patient and I wore a surgical mask throughout the entire patient encounter. I wore protective goggles.     Diagnosis  Final diagnoses:   Allergic reaction, initial encounter   Abnormal CT scan, neck        Rosendo Light II, MD  05/05/22 0489

## 2022-05-13 RX ORDER — CEPHALEXIN 500 MG/1
2000 CAPSULE ORAL ONCE
Qty: 4 CAPSULE | Refills: 1 | Status: SHIPPED | OUTPATIENT
Start: 2022-05-13 | End: 2022-05-13

## 2022-05-23 ENCOUNTER — CLINICAL SUPPORT (OUTPATIENT)
Dept: ORTHOPEDIC SURGERY | Facility: CLINIC | Age: 62
End: 2022-05-23

## 2022-05-23 VITALS — WEIGHT: 236 LBS | HEIGHT: 60 IN | BODY MASS INDEX: 46.33 KG/M2 | TEMPERATURE: 97.5 F

## 2022-05-23 DIAGNOSIS — M17.0 BILATERAL PRIMARY OSTEOARTHRITIS OF KNEE: Primary | ICD-10-CM

## 2022-05-23 PROCEDURE — 20610 DRAIN/INJ JOINT/BURSA W/O US: CPT | Performed by: NURSE PRACTITIONER

## 2022-05-23 RX ADMIN — LIDOCAINE HYDROCHLORIDE 2 ML: 20 INJECTION, SOLUTION EPIDURAL; INFILTRATION; INTRACAUDAL; PERINEURAL at 15:49

## 2022-05-23 RX ADMIN — LIDOCAINE HYDROCHLORIDE 2 ML: 20 INJECTION, SOLUTION EPIDURAL; INFILTRATION; INTRACAUDAL; PERINEURAL at 15:51

## 2022-05-23 RX ADMIN — METHYLPREDNISOLONE ACETATE 80 MG: 80 INJECTION, SUSPENSION INTRA-ARTICULAR; INTRALESIONAL; INTRAMUSCULAR; SOFT TISSUE at 15:51

## 2022-05-23 RX ADMIN — METHYLPREDNISOLONE ACETATE 80 MG: 80 INJECTION, SUSPENSION INTRA-ARTICULAR; INTRALESIONAL; INTRAMUSCULAR; SOFT TISSUE at 15:49

## 2022-05-23 NOTE — PROGRESS NOTES
Ms. Powell comes in today for follow-up.  Injections have worked well in the past.  The patient would like to get repeat injections today.  The risks, benefits and alternatives were discussed and the patient consented.  Going forward, the patient will follow-up as needed.    KALYANI Cornelius    05/23/2022    Large Joint Arthrocentesis: R knee  Date/Time: 5/23/2022 3:49 PM  Consent given by: patient  Site marked: site marked  Timeout: Immediately prior to procedure a time out was called to verify the correct patient, procedure, equipment, support staff and site/side marked as required   Supporting Documentation  Indications: pain   Procedure Details  Location: knee - R knee  Preparation: Patient was prepped and draped in the usual sterile fashion  Needle gauge: 21.  Approach: anterolateral  Medications administered: 80 mg methylPREDNISolone acetate 80 MG/ML; 2 mL lidocaine PF 2% 2 %  Patient tolerance: patient tolerated the procedure well with no immediate complications    Large Joint Arthrocentesis: L knee  Date/Time: 5/23/2022 3:51 PM  Consent given by: patient  Site marked: site marked  Timeout: Immediately prior to procedure a time out was called to verify the correct patient, procedure, equipment, support staff and site/side marked as required   Supporting Documentation  Indications: pain   Procedure Details  Location: knee - L knee  Preparation: Patient was prepped and draped in the usual sterile fashion  Needle gauge: 21.  Approach: anterolateral  Medications administered: 80 mg methylPREDNISolone acetate 80 MG/ML; 2 mL lidocaine PF 2% 2 %  Patient tolerance: patient tolerated the procedure well with no immediate complications

## 2022-05-24 RX ORDER — METHYLPREDNISOLONE ACETATE 80 MG/ML
80 INJECTION, SUSPENSION INTRA-ARTICULAR; INTRALESIONAL; INTRAMUSCULAR; SOFT TISSUE
Status: COMPLETED | OUTPATIENT
Start: 2022-05-23 | End: 2022-05-23

## 2022-05-24 RX ORDER — LIDOCAINE HYDROCHLORIDE 20 MG/ML
2 INJECTION, SOLUTION EPIDURAL; INFILTRATION; INTRACAUDAL; PERINEURAL
Status: COMPLETED | OUTPATIENT
Start: 2022-05-23 | End: 2022-05-23

## 2022-05-31 RX ORDER — MECLIZINE HYDROCHLORIDE 25 MG/1
TABLET ORAL
Qty: 270 TABLET | Refills: 0 | Status: SHIPPED | OUTPATIENT
Start: 2022-05-31

## 2022-06-13 DIAGNOSIS — I10 HYPERTENSION, UNSPECIFIED TYPE: ICD-10-CM

## 2022-06-13 RX ORDER — IRBESARTAN 300 MG/1
300 TABLET ORAL DAILY
Qty: 90 TABLET | Refills: 3 | Status: SHIPPED | OUTPATIENT
Start: 2022-06-13

## 2022-07-22 ENCOUNTER — OFFICE VISIT (OUTPATIENT)
Dept: INTERNAL MEDICINE | Facility: CLINIC | Age: 62
End: 2022-07-22

## 2022-07-22 ENCOUNTER — LAB (OUTPATIENT)
Dept: LAB | Facility: HOSPITAL | Age: 62
End: 2022-07-22

## 2022-07-22 VITALS
SYSTOLIC BLOOD PRESSURE: 132 MMHG | DIASTOLIC BLOOD PRESSURE: 78 MMHG | BODY MASS INDEX: 47.27 KG/M2 | WEIGHT: 240.8 LBS | OXYGEN SATURATION: 99 % | HEART RATE: 82 BPM | HEIGHT: 60 IN | RESPIRATION RATE: 18 BRPM

## 2022-07-22 DIAGNOSIS — R73.03 PREDIABETES: ICD-10-CM

## 2022-07-22 DIAGNOSIS — E78.5 HYPERLIPIDEMIA, UNSPECIFIED HYPERLIPIDEMIA TYPE: ICD-10-CM

## 2022-07-22 DIAGNOSIS — F41.9 ANXIETY: ICD-10-CM

## 2022-07-22 DIAGNOSIS — I10 HYPERTENSION, UNSPECIFIED TYPE: ICD-10-CM

## 2022-07-22 DIAGNOSIS — E61.1 IRON DEFICIENCY: ICD-10-CM

## 2022-07-22 DIAGNOSIS — F32.A DEPRESSION, UNSPECIFIED DEPRESSION TYPE: Primary | ICD-10-CM

## 2022-07-22 LAB
ALBUMIN SERPL-MCNC: 3.7 G/DL (ref 3.5–5.2)
ALBUMIN/GLOB SERPL: 1.1 G/DL
ALP SERPL-CCNC: 51 U/L (ref 39–117)
ALT SERPL W P-5'-P-CCNC: 15 U/L (ref 1–33)
ANION GAP SERPL CALCULATED.3IONS-SCNC: 13.5 MMOL/L (ref 5–15)
AST SERPL-CCNC: 20 U/L (ref 1–32)
BASOPHILS # BLD AUTO: 0.06 10*3/MM3 (ref 0–0.2)
BASOPHILS NFR BLD AUTO: 0.7 % (ref 0–1.5)
BILIRUB SERPL-MCNC: 0.4 MG/DL (ref 0–1.2)
BUN SERPL-MCNC: 26 MG/DL (ref 8–23)
BUN/CREAT SERPL: 21.7 (ref 7–25)
CALCIUM SPEC-SCNC: 9 MG/DL (ref 8.6–10.5)
CHLORIDE SERPL-SCNC: 103 MMOL/L (ref 98–107)
CHOLEST SERPL-MCNC: 150 MG/DL (ref 0–200)
CO2 SERPL-SCNC: 23.5 MMOL/L (ref 22–29)
CREAT SERPL-MCNC: 1.2 MG/DL (ref 0.57–1)
DEPRECATED RDW RBC AUTO: 42.3 FL (ref 37–54)
EGFRCR SERPLBLD CKD-EPI 2021: 51.6 ML/MIN/1.73
EOSINOPHIL # BLD AUTO: 0.32 10*3/MM3 (ref 0–0.4)
EOSINOPHIL NFR BLD AUTO: 3.9 % (ref 0.3–6.2)
ERYTHROCYTE [DISTWIDTH] IN BLOOD BY AUTOMATED COUNT: 12.3 % (ref 12.3–15.4)
FERRITIN SERPL-MCNC: 385 NG/ML (ref 13–150)
FOLATE SERPL-MCNC: >20 NG/ML (ref 4.78–24.2)
GLOBULIN UR ELPH-MCNC: 3.3 GM/DL
GLUCOSE SERPL-MCNC: 96 MG/DL (ref 65–99)
HBA1C MFR BLD: 5.9 % (ref 4.8–5.6)
HCT VFR BLD AUTO: 30.9 % (ref 34–46.6)
HDLC SERPL-MCNC: 49 MG/DL (ref 40–60)
HGB BLD-MCNC: 10 G/DL (ref 12–15.9)
IMM GRANULOCYTES # BLD AUTO: 0.02 10*3/MM3 (ref 0–0.05)
IMM GRANULOCYTES NFR BLD AUTO: 0.2 % (ref 0–0.5)
IRON 24H UR-MRATE: 70 MCG/DL (ref 37–145)
IRON SATN MFR SERPL: 24 % (ref 20–50)
LDLC SERPL CALC-MCNC: 80 MG/DL (ref 0–100)
LDLC/HDLC SERPL: 1.58 {RATIO}
LYMPHOCYTES # BLD AUTO: 1.79 10*3/MM3 (ref 0.7–3.1)
LYMPHOCYTES NFR BLD AUTO: 21.8 % (ref 19.6–45.3)
MCH RBC QN AUTO: 30.8 PG (ref 26.6–33)
MCHC RBC AUTO-ENTMCNC: 32.4 G/DL (ref 31.5–35.7)
MCV RBC AUTO: 95.1 FL (ref 79–97)
MONOCYTES # BLD AUTO: 0.64 10*3/MM3 (ref 0.1–0.9)
MONOCYTES NFR BLD AUTO: 7.8 % (ref 5–12)
NEUTROPHILS NFR BLD AUTO: 5.39 10*3/MM3 (ref 1.7–7)
NEUTROPHILS NFR BLD AUTO: 65.6 % (ref 42.7–76)
NRBC BLD AUTO-RTO: 0 /100 WBC (ref 0–0.2)
PLATELET # BLD AUTO: 256 10*3/MM3 (ref 140–450)
PMV BLD AUTO: 9.7 FL (ref 6–12)
POTASSIUM SERPL-SCNC: 3.5 MMOL/L (ref 3.5–5.2)
PROT SERPL-MCNC: 7 G/DL (ref 6–8.5)
RBC # BLD AUTO: 3.25 10*6/MM3 (ref 3.77–5.28)
SODIUM SERPL-SCNC: 140 MMOL/L (ref 136–145)
TIBC SERPL-MCNC: 291 MCG/DL (ref 298–536)
TRANSFERRIN SERPL-MCNC: 195 MG/DL (ref 200–360)
TRIGL SERPL-MCNC: 117 MG/DL (ref 0–150)
VIT B12 BLD-MCNC: 645 PG/ML (ref 211–946)
VLDLC SERPL-MCNC: 21 MG/DL (ref 5–40)
WBC NRBC COR # BLD: 8.22 10*3/MM3 (ref 3.4–10.8)

## 2022-07-22 PROCEDURE — 36415 COLL VENOUS BLD VENIPUNCTURE: CPT | Performed by: FAMILY MEDICINE

## 2022-07-22 PROCEDURE — 83540 ASSAY OF IRON: CPT

## 2022-07-22 PROCEDURE — 80053 COMPREHEN METABOLIC PANEL: CPT | Performed by: FAMILY MEDICINE

## 2022-07-22 PROCEDURE — 82607 VITAMIN B-12: CPT

## 2022-07-22 PROCEDURE — 80061 LIPID PANEL: CPT | Performed by: FAMILY MEDICINE

## 2022-07-22 PROCEDURE — 99214 OFFICE O/P EST MOD 30 MIN: CPT | Performed by: FAMILY MEDICINE

## 2022-07-22 PROCEDURE — 82728 ASSAY OF FERRITIN: CPT

## 2022-07-22 PROCEDURE — 84466 ASSAY OF TRANSFERRIN: CPT

## 2022-07-22 PROCEDURE — 82746 ASSAY OF FOLIC ACID SERUM: CPT

## 2022-07-22 PROCEDURE — 83036 HEMOGLOBIN GLYCOSYLATED A1C: CPT | Performed by: FAMILY MEDICINE

## 2022-07-22 PROCEDURE — 85025 COMPLETE CBC W/AUTO DIFF WBC: CPT | Performed by: FAMILY MEDICINE

## 2022-07-22 NOTE — PROGRESS NOTES
"Chief Complaint  Follow-up    Subjective        Pedrito Powell presents to [facility]  History of Present Illness    Patient presents at today's office visit with a history having anxiety and depression.  She is currently taking Trintellix 5 mg daily.  She denies any side effects of the medication.  She states that her anxiety and depression have been fairly well controlled while being on the medication.  She is overall very pleased with that.    Patient does have essential hypertension.  She is current taking hydrochlorothiazide 12.5 mg daily, irbesartan 300 mg daily.  Blood pressure today is 132/78.  She denies any side effects of the medication.  We did    Patient is also prediabetic, and she is currently monitoring with diet and exercise.    She also has hyperlipidemia, currently taking atorvastatin 80 mg daily.  She denies any side effects of the medicine.    Patient has a history having iron deficiency.  Patient is currently taking ferrous gluconate 324 mg daily.  Patient denies any side effects of the medication.    Objective   Vital Signs:  /78   Pulse 82   Resp 18   Ht 152.4 cm (60\")   Wt 109 kg (240 lb 12.8 oz)   SpO2 99%   BMI 47.03 kg/m²   Estimated body mass index is 47.03 kg/m² as calculated from the following:    Height as of this encounter: 152.4 cm (60\").    Weight as of this encounter: 109 kg (240 lb 12.8 oz).          Physical Exam  Vitals and nursing note reviewed.   Constitutional:       Appearance: She is well-developed.   HENT:      Head: Normocephalic and atraumatic.   Musculoskeletal:      Cervical back: Normal range of motion and neck supple.   Neurological:      Mental Status: She is alert and oriented to person, place, and time.   Psychiatric:         Behavior: Behavior normal.        Result Review :                Assessment and Plan   Diagnoses and all orders for this visit:    1. Depression, unspecified depression type (Primary)    2. " Anxiety    3. Hyperlipidemia, unspecified hyperlipidemia type  -     Lipid Panel With LDL / HDL Ratio  -     Comprehensive Metabolic Panel; Future  -     Lipid Panel With LDL / HDL Ratio; Future    4. Prediabetes  -     Hemoglobin A1c  -     Hemoglobin A1c; Future    5. Hypertension, unspecified type  -     CBC & Differential  -     Comprehensive Metabolic Panel  -     CBC & Differential; Future  -     Comprehensive Metabolic Panel; Future    6. Iron deficiency  -     Ferritin; Future  -     Folate; Future  -     CBC & Differential; Future  -     Vitamin B12; Future  -     Iron Profile; Future    For depression and anxiety, she is currently happy with the Trintellix 5 mg daily that she is currently taking, we will continue her on this medication.  For hyperlipidemia, continue atorvastatin 80 mg daily.  For the iron deficiency anemia, will continue on ferrous gluconate 324 mg daily.  For her essential hypertension, current continue current blood pressure medicines.  And for the prediabetes, we will have her continue diet and exercise.         Follow Up   No follow-ups on file.  Patient was given instructions and counseling regarding her condition or for health maintenance advice. Please see specific information pulled into the AVS if appropriate.

## 2022-07-23 NOTE — PROGRESS NOTES
Please inform the patient of the following abnormal results. Continues to still have this elevated serum creatnine levels. Does she see neprhologist? If not she may benefit from seeing them. We can put referral if she doesn't have one.

## 2022-07-23 NOTE — PROGRESS NOTES
Please inform the patient of the following abnormal results. Have patient decrease the iron tablet (ferrous gluconate) to every other day for now. I would like to see her in office in 3 mos.

## 2022-07-25 ENCOUNTER — TELEPHONE (OUTPATIENT)
Dept: INTERNAL MEDICINE | Facility: CLINIC | Age: 62
End: 2022-07-25

## 2022-07-25 NOTE — TELEPHONE ENCOUNTER
Caller: Pedrito Powell    Relationship: Self    Best call back number: [phone] (AMYTIME EXCEPT 9:00-11:00)    What is the best time to reach you: ANY    Who are you requesting to speak with (clinical staff, provider, specific staff member): CLINICAL     What was the call regarding: PT STATED THAT PCP SENT HER NEXT DOOR TO HAVE LABS DONE ON 7/22/22. PT STATED THAT SHE BLED FOR ABOUT 30-45 MINS. PT NOW HAS A BRUISE WITH A KNOT IN THE MIDDLE. WANTS TO KNOW IF SHE SHOULD BE CONCERNED.     Do you require a callback: YES

## 2022-07-28 RX ORDER — ACETAMINOPHEN 160 MG
2000 TABLET,DISINTEGRATING ORAL DAILY
Qty: 90 CAPSULE | Refills: 1 | Status: SHIPPED | OUTPATIENT
Start: 2022-07-28 | End: 2022-12-30 | Stop reason: SDUPTHER

## 2022-08-24 ENCOUNTER — CLINICAL SUPPORT (OUTPATIENT)
Dept: ORTHOPEDIC SURGERY | Facility: CLINIC | Age: 62
End: 2022-08-24

## 2022-08-24 VITALS — WEIGHT: 238.8 LBS | BODY MASS INDEX: 46.88 KG/M2 | HEIGHT: 60 IN | TEMPERATURE: 98.9 F

## 2022-08-24 DIAGNOSIS — M17.0 BILATERAL PRIMARY OSTEOARTHRITIS OF KNEE: Primary | ICD-10-CM

## 2022-08-24 PROCEDURE — 20610 DRAIN/INJ JOINT/BURSA W/O US: CPT | Performed by: NURSE PRACTITIONER

## 2022-08-24 RX ORDER — LIDOCAINE HYDROCHLORIDE 10 MG/ML
2 INJECTION, SOLUTION EPIDURAL; INFILTRATION; INTRACAUDAL; PERINEURAL
Status: COMPLETED | OUTPATIENT
Start: 2022-08-24 | End: 2022-08-24

## 2022-08-24 RX ORDER — METHYLPREDNISOLONE ACETATE 80 MG/ML
80 INJECTION, SUSPENSION INTRA-ARTICULAR; INTRALESIONAL; INTRAMUSCULAR; SOFT TISSUE
Status: COMPLETED | OUTPATIENT
Start: 2022-08-24 | End: 2022-08-24

## 2022-08-24 RX ADMIN — LIDOCAINE HYDROCHLORIDE 2 ML: 10 INJECTION, SOLUTION EPIDURAL; INFILTRATION; INTRACAUDAL; PERINEURAL at 16:18

## 2022-08-24 RX ADMIN — METHYLPREDNISOLONE ACETATE 80 MG: 80 INJECTION, SUSPENSION INTRA-ARTICULAR; INTRALESIONAL; INTRAMUSCULAR; SOFT TISSUE at 16:18

## 2022-08-24 NOTE — PROGRESS NOTES
Ms. Powell comes in today for follow-up.  Injections have worked well in the past.  The patient would like to get repeat injections today.      We briefly discussed a total knee replacement.  She is interested in surgery, but I explained her BMI is currently 46.  I explained in order to qualify for an elective joint replacement her BMI needs to be 45 or less.  I offered to refer her for the Shape Up program through [facility].  She is interested in learning more information about the program.  I have sent over the referral for her.  The risks, benefits and alternatives to the injections were discussed and the patient consented.  Going forward, the patient will follow-up as needed.    Sherie Meehan, KALYANI    08/24/2022      Large Joint Arthrocentesis: R knee  Date/Time: 8/24/2022 4:18 PM  Consent given by: patient  Site marked: site marked  Timeout: Immediately prior to procedure a time out was called to verify the correct patient, procedure, equipment, support staff and site/side marked as required   Supporting Documentation  Indications: pain   Procedure Details  Location: knee - R knee  Preparation: Patient was prepped and draped in the usual sterile fashion  Needle gauge: 21G.  Approach: anterolateral  Medications administered: 80 mg methylPREDNISolone acetate 80 MG/ML; 2 mL lidocaine PF 1% 1 %  Patient tolerance: patient tolerated the procedure well with no immediate complications    Large Joint Arthrocentesis: L knee  Date/Time: 8/24/2022 4:18 PM  Consent given by: patient  Site marked: site marked  Timeout: Immediately prior to procedure a time out was called to verify the correct patient, procedure, equipment, support staff and site/side marked as required   Supporting Documentation  Indications: pain   Procedure Details  Location: knee - L knee  Preparation: Patient was prepped and draped in the usual sterile fashion  Needle gauge: 21G.  Approach: anterolateral  Medications administered: 80 mg  methylPREDNISolone acetate 80 MG/ML; 2 mL lidocaine PF 1% 1 %  Patient tolerance: patient tolerated the procedure well with no immediate complications

## 2022-09-09 RX ORDER — CEPHALEXIN 500 MG/1
CAPSULE ORAL
Qty: 4 CAPSULE | Refills: 3 | Status: SHIPPED | OUTPATIENT
Start: 2022-09-09 | End: 2022-12-05 | Stop reason: SDUPTHER

## 2022-09-09 NOTE — TELEPHONE ENCOUNTER
Caller: PATIENT    Relationship: SELF     Best call back number: [phone]    What medication are you requesting: ANTIBIOTIC FOR UPCOMING DENTAL APPT DUE TO PATIENT HAVING SURGERY ON 05.13.21      If a prescription is needed, what is your preferred pharmacy and phone number:  [facility] ON [address] [facility] IN [place], [place]    - PHONE NUMBER: [phone]    Additional notes: PATIENT HAS AN UPCOMING DENTAL APPT ON Monday, 09.12.22 AND SHE WAS CALLING TO REQUEST AN ANTIBIOTIC DUE TO HAVING SURGERY ON 05.13.21. THANK YOU!

## 2022-10-10 DIAGNOSIS — A08.4 VIRAL GASTROENTERITIS: ICD-10-CM
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
## 2022-10-11 ENCOUNTER — HOSPITAL ENCOUNTER (EMERGENCY)
Facility: HOSPITAL | Age: 62
Discharge: HOME | End: 2022-10-11
Attending: EMERGENCY MEDICINE | Admitting: EMERGENCY MEDICINE

## 2022-10-11 VITALS
TEMPERATURE: 96 F | SYSTOLIC BLOOD PRESSURE: 141 MMHG | RESPIRATION RATE: 18 BRPM | HEIGHT: 60 IN | WEIGHT: 235 LBS | BODY MASS INDEX: 46.13 KG/M2 | DIASTOLIC BLOOD PRESSURE: 72 MMHG | HEART RATE: 79 BPM | OXYGEN SATURATION: 99 %
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
## 2022-10-11 DIAGNOSIS — S39.012A STRAIN OF LUMBAR PARASPINAL MUSCLE, INITIAL ENCOUNTER: ICD-10-CM

## 2022-10-11 DIAGNOSIS — S16.1XXA ACUTE STRAIN OF NECK MUSCLE, INITIAL ENCOUNTER: ICD-10-CM

## 2022-10-11 PROCEDURE — 99282 EMERGENCY DEPT VISIT SF MDM: CPT
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
## 2022-10-11 NOTE — ED TRIAGE NOTES
All triage performed with this RN wearing appropriate PPE.  Pt placed in mask upon arrival to ED.
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
## 2022-10-11 NOTE — DISCHARGE INSTRUCTIONS
Expect be stiff and sore for several days up to 1 to 2 weeks, stay well-hydrated, warm heating pads, light massage, light stretching after heat, may continue current medications, Tylenol for additional pain support, PCP follow-up as needed, ED return for worsening symptoms as needed.

## 2022-10-14 RX ORDER — ONDANSETRON 4 MG/1
4 TABLET, FILM COATED ORAL EVERY 12 HOURS PRN
Qty: 180 TABLET | Refills: 0 | Status: SHIPPED | OUTPATIENT
Start: 2022-10-14 | End: 2022-11-04

## 2022-10-28 ENCOUNTER — TRANSCRIBE ORDERS (OUTPATIENT)
Dept: ADMINISTRATIVE | Facility: HOSPITAL | Age: 62
End: 2022-10-28

## 2022-10-28 DIAGNOSIS — Z12.31 VISIT FOR SCREENING MAMMOGRAM: Primary | ICD-10-CM

## 2022-11-04 ENCOUNTER — OFFICE VISIT (OUTPATIENT)
Dept: INTERNAL MEDICINE | Facility: CLINIC | Age: 62
End: 2022-11-04

## 2022-11-04 VITALS
OXYGEN SATURATION: 98 % | DIASTOLIC BLOOD PRESSURE: 90 MMHG | RESPIRATION RATE: 16 BRPM | BODY MASS INDEX: 48.29 KG/M2 | SYSTOLIC BLOOD PRESSURE: 144 MMHG | HEIGHT: 60 IN | HEART RATE: 76 BPM | TEMPERATURE: 97.5 F | WEIGHT: 246 LBS

## 2022-11-04 DIAGNOSIS — F32.A DEPRESSION, UNSPECIFIED DEPRESSION TYPE: ICD-10-CM

## 2022-11-04 DIAGNOSIS — F41.9 ANXIETY: Primary | ICD-10-CM

## 2022-11-04 PROCEDURE — 99214 OFFICE O/P EST MOD 30 MIN: CPT | Performed by: FAMILY MEDICINE

## 2022-11-04 RX ORDER — ONDANSETRON 4 MG/1
2 TABLET, ORALLY DISINTEGRATING ORAL EVERY 12 HOURS
COMMUNITY
End: 2022-11-04

## 2022-11-04 RX ORDER — ASPIRIN 325 MG/325MG
CAPSULE ORAL AS NEEDED
COMMUNITY

## 2022-11-04 RX ORDER — AMITRIPTYLINE HYDROCHLORIDE 25 MG/1
1 TABLET, FILM COATED ORAL
COMMUNITY
End: 2022-11-04

## 2022-11-04 RX ORDER — VORTIOXETINE 5 MG/1
5 TABLET, FILM COATED ORAL
Qty: 30 TABLET | Refills: 6 | Status: SHIPPED | OUTPATIENT
Start: 2022-11-04 | End: 2023-01-18

## 2022-11-04 RX ORDER — ONDANSETRON 4 MG/1
4 TABLET, ORALLY DISINTEGRATING ORAL EVERY 8 HOURS PRN
Qty: 60 TABLET | Refills: 3 | Status: SHIPPED | OUTPATIENT
Start: 2022-11-04

## 2022-11-04 NOTE — PROGRESS NOTES
"Chief Complaint  Depression    Subjective          Pedrito Powell presents to [facility]  History of Present Illness      Mrs. Powell is a 62-year-old female who presents today for a follow-up on depression.    Depression  The patient reports that she is doing well on Trintellix. She states that her mood has been much better since she has been on it. She is no longer taking amitriptyline for sleep.    Rheumatoid arthritis  The patient notes that she saw [facility] on 05/2022 and they did not prescribe her any medications. She states there was something sent over from them in 10/2022, and she did not know what it was.    Nausea  The patient reports that she is still nauseated \"every so often.\"    Bruising  The patient states that she knocked her hand against a hamper 2 weeks ago. She notes that her hand was itching and she scratched it. She reports that she is still taking the baby aspirin 325 mg.    Objective   Vital Signs:   /90 (BP Location: Left arm, Patient Position: Sitting, Cuff Size: Adult)   Pulse 76   Temp 97.5 °F (36.4 °C) (Temporal)   Resp 16   Ht 152.4 cm (60\")   Wt 112 kg (246 lb)   SpO2 98%   BMI 48.04 kg/m²     Physical Exam  Vitals and nursing note reviewed.   Constitutional:       Appearance: She is well-developed.   HENT:      Head: Normocephalic and atraumatic.   Musculoskeletal:      Cervical back: Normal range of motion and neck supple.   Neurological:      Mental Status: She is alert and oriented to person, place, and time.   Psychiatric:         Behavior: Behavior normal.        Result Review :                 Assessment and Plan    Diagnoses and all orders for this visit:    1. Anxiety (Primary)  -     Vortioxetine HBr (Trintellix) 5 MG tablet tablet; Take 1 tablet by mouth Daily With Breakfast.  Dispense: 30 tablet; Refill: 6    2. Depression, unspecified depression type  -     Vortioxetine HBr (Trintellix) 5 MG tablet tablet; Take 1 " tablet by mouth Daily With Breakfast.  Dispense: 30 tablet; Refill: 6    Other orders  -     ondansetron ODT (Zofran ODT) 4 MG disintegrating tablet; Place 1 tablet on the tongue Every 8 (Eight) Hours As Needed for Nausea or Vomiting.  Dispense: 60 tablet; Refill: 3      Depression  The patient is doing well on Trintellix. She will continue on the current dose of Trintellix.    Nausea  I will prescribe Zofran for her nausea.    Bruising  She will continue on the full dose of aspirin 325 mg.    She will follow up in 01/2023 for a physical.      Follow Up   No follow-ups on file.  Patient was given instructions and counseling regarding her condition or for health maintenance advice. Please see specific information pulled into the AVS if appropriate.         Transcribed from ambient dictation for Teo Fraser MD by Jacinta Roy.  11/04/22   16:32 EDT    Patient or patient representative verbalized consent to the visit recording.  I have personally performed the services described in this document as transcribed by the above individual, and it is both accurate and complete.

## 2022-11-30 ENCOUNTER — CLINICAL SUPPORT (OUTPATIENT)
Dept: ORTHOPEDIC SURGERY | Facility: CLINIC | Age: 62
End: 2022-11-30

## 2022-11-30 VITALS — WEIGHT: 233 LBS | TEMPERATURE: 98.4 F | BODY MASS INDEX: 45.75 KG/M2 | HEIGHT: 60 IN

## 2022-11-30 DIAGNOSIS — M17.0 BILATERAL PRIMARY OSTEOARTHRITIS OF KNEE: Primary | ICD-10-CM

## 2022-11-30 PROCEDURE — 20610 DRAIN/INJ JOINT/BURSA W/O US: CPT | Performed by: NURSE PRACTITIONER

## 2022-11-30 PROCEDURE — 73562 X-RAY EXAM OF KNEE 3: CPT | Performed by: NURSE PRACTITIONER

## 2022-11-30 RX ORDER — METHYLPREDNISOLONE ACETATE 80 MG/ML
80 INJECTION, SUSPENSION INTRA-ARTICULAR; INTRALESIONAL; INTRAMUSCULAR; SOFT TISSUE
Status: COMPLETED | OUTPATIENT
Start: 2022-11-30 | End: 2022-11-30

## 2022-11-30 RX ADMIN — METHYLPREDNISOLONE ACETATE 80 MG: 80 INJECTION, SUSPENSION INTRA-ARTICULAR; INTRALESIONAL; INTRAMUSCULAR; SOFT TISSUE at 16:34

## 2022-11-30 NOTE — PROGRESS NOTES
Ms. Powell comes in today for follow-up.  Injections have worked well in the past.  The patient would like to get repeat injections today.      Imaging:  Bilateral standing AP views, bilateral merchants views and a lateral view of both knees are ordered by myself and reviewed to evaluate the patient's complaint.  These are compared to previous x-rays.  The x-rays show end stage degenerative arthritis including bone on bone degeneration, malalignment, osteophyte and subchondral sclerosis.  The majority of the degenerative changes appear to involve the medial and patellofemoral compartments.    We briefly discussed a total knee arthroplasty.  I explained she will need to attend physical therapy prior to surgery.  Her typical additionally, we discussed her current BMI is 45.  I explained if she gains any weight, she may not be a candidate for a knee replacement surgery.  She verbalized understanding.  The risks, benefits and alternatives to the injections were discussed and she consented.  Going forward, the patient will follow-up as needed.    KALYANI Cornelius    11/30/2022    Large Joint Arthrocentesis: R knee  Date/Time: 11/30/2022 4:34 PM  Consent given by: patient  Site marked: site marked  Timeout: Immediately prior to procedure a time out was called to verify the correct patient, procedure, equipment, support staff and site/side marked as required   Supporting Documentation  Indications: pain   Procedure Details  Location: knee - R knee  Preparation: Patient was prepped and draped in the usual sterile fashion  Needle gauge: 21 G.  Approach: anterolateral  Medications administered: 80 mg methylPREDNISolone acetate 80 MG/ML; 2 mL lidocaine (cardiac)  Patient tolerance: patient tolerated the procedure well with no immediate complications    Large Joint Arthrocentesis: L knee  Date/Time: 11/30/2022 4:34 PM  Consent given by: patient  Site marked: site marked  Timeout: Immediately prior to procedure a time out  was called to verify the correct patient, procedure, equipment, support staff and site/side marked as required   Supporting Documentation  Indications: pain   Procedure Details  Location: knee - L knee  Preparation: Patient was prepped and draped in the usual sterile fashion  Needle gauge: 21 G.  Approach: anterolateral  Medications administered: 80 mg methylPREDNISolone acetate 80 MG/ML; 2 mL lidocaine (cardiac)  Patient tolerance: patient tolerated the procedure well with no immediate complications

## 2022-12-01 ENCOUNTER — TELEPHONE (OUTPATIENT)
Dept: ORTHOPEDIC SURGERY | Facility: CLINIC | Age: 62
End: 2022-12-01

## 2022-12-01 NOTE — TELEPHONE ENCOUNTER
Provider: ESAU  Caller:DINORA COHEN  Relationship to Patient: SELF    Phone Number: [phone]  Reason for Call:   REQUEST TO R/S INJ FROM 3/1/23 TO DR CIFUENTES 12/01/2022 PATIENT REQUESTS TO HAVE INJ WITH DR CIFUENTES INSTEAD OF [facility]US - [facility] ADVISED WOULD NOTIFY OFFICE AND SOMEONE WOULD CALLHER BACK TO R/S

## 2022-12-05 ENCOUNTER — TELEPHONE (OUTPATIENT)
Dept: ORTHOPEDIC SURGERY | Facility: CLINIC | Age: 62
End: 2022-12-05

## 2022-12-05 RX ORDER — CEPHALEXIN 500 MG/1
CAPSULE ORAL
Qty: 4 CAPSULE | Refills: 3 | Status: SHIPPED | OUTPATIENT
Start: 2022-12-05 | End: 2022-12-12

## 2022-12-05 NOTE — TELEPHONE ENCOUNTER
Caller: DINORAPETROS SULLIVANTON    Relationship: SELF    Best call back number: [phone]    Requested Prescriptions:   ANTIBIOTIC FOR DENTAL APPT / 12-8-22    Pharmacy where request should be sent:   [facility] #[id number] [facility] [address] [address] AT [facility] [address] - [phone] [facility] [phone]        Would you like a call back once the refill request has been completed: [x] Yes [] No    If the office needs to give you a call back, can they leave a voicemail: [x] Yes [] No    Pravin Salmeron Rep   12/05/22 15:09 EST

## 2022-12-12 ENCOUNTER — OFFICE VISIT (OUTPATIENT)
Dept: INTERNAL MEDICINE | Facility: CLINIC | Age: 62
End: 2022-12-12

## 2022-12-12 ENCOUNTER — HOSPITAL ENCOUNTER (OUTPATIENT)
Dept: GENERAL RADIOLOGY | Facility: HOSPITAL | Age: 62
Discharge: HOME | End: 2022-12-12
Admitting: NURSE PRACTITIONER

## 2022-12-12 VITALS
WEIGHT: 224 LBS | SYSTOLIC BLOOD PRESSURE: 140 MMHG | TEMPERATURE: 96.9 F | RESPIRATION RATE: 18 BRPM | OXYGEN SATURATION: 98 % | HEART RATE: 98 BPM | HEIGHT: 60 IN | BODY MASS INDEX: 43.98 KG/M2 | DIASTOLIC BLOOD PRESSURE: 84 MMHG

## 2022-12-12 DIAGNOSIS — R05.1 ACUTE COUGH: Primary | ICD-10-CM

## 2022-12-12 LAB
EXPIRATION DATE: NORMAL
FLUAV AG UPPER RESP QL IA.RAPID: NOT DETECTED
FLUBV AG UPPER RESP QL IA.RAPID: NOT DETECTED
INTERNAL CONTROL: NORMAL
Lab: NORMAL
SARS-COV-2 AG UPPER RESP QL IA.RAPID: NOT DETECTED

## 2022-12-12 PROCEDURE — 99213 OFFICE O/P EST LOW 20 MIN: CPT | Performed by: NURSE PRACTITIONER

## 2022-12-12 PROCEDURE — 71046 X-RAY EXAM CHEST 2 VIEWS: CPT

## 2022-12-12 PROCEDURE — 87428 SARSCOV & INF VIR A&B AG IA: CPT | Performed by: NURSE PRACTITIONER

## 2022-12-12 RX ORDER — AZITHROMYCIN 250 MG/1
TABLET, FILM COATED ORAL
Qty: 6 TABLET | Refills: 0 | Status: SHIPPED | OUTPATIENT
Start: 2022-12-12 | End: 2023-03-24

## 2022-12-12 RX ORDER — BENZONATATE 200 MG/1
200 CAPSULE ORAL 3 TIMES DAILY PRN
Qty: 21 CAPSULE | Refills: 0 | Status: SHIPPED | OUTPATIENT
Start: 2022-12-12 | End: 2022-12-19

## 2022-12-12 RX ORDER — GUAIFENESIN 600 MG/1
1200 TABLET, EXTENDED RELEASE ORAL 2 TIMES DAILY
Qty: 28 TABLET | Refills: 0 | Status: SHIPPED | OUTPATIENT
Start: 2022-12-12 | End: 2022-12-19

## 2022-12-12 NOTE — PROGRESS NOTES
"Chief Complaint  Cough (X 3 weeks )    Subjective        Pedrito Powell presents to [facility]  History of Present Illness    Patient is a pleasant 62-year-old female who typically sees Dr. Fraser here in the office.  Patient is new to me and she is at the clinic today with complaints of cough over the last 3 weeks.  Patient has been taking Coricidin over-the-counter.  Denies shortness of breath, high fever, loss of taste or smell.    Objective   Vital Signs:  /84   Pulse 98   Temp 96.9 °F (36.1 °C)   Resp 18   Ht 152.4 cm (60\")   Wt 102 kg (224 lb)   SpO2 98%   BMI 43.75 kg/m²   Estimated body mass index is 43.75 kg/m² as calculated from the following:    Height as of this encounter: 152.4 cm (60\").    Weight as of this encounter: 102 kg (224 lb).          Physical Exam  Vitals and nursing note reviewed.   Constitutional:       Appearance: Normal appearance. She is obese.   HENT:      Head: Normocephalic.      Right Ear: Tympanic membrane, ear canal and external ear normal. There is no impacted cerumen.      Left Ear: Tympanic membrane, ear canal and external ear normal. There is no impacted cerumen.      Nose: Congestion present.      Comments: Cough x 3 weeks.      Mouth/Throat:      Mouth: Mucous membranes are moist.   Eyes:      Pupils: Pupils are equal, round, and reactive to light.   Cardiovascular:      Rate and Rhythm: Normal rate and regular rhythm.      Pulses: Normal pulses.      Heart sounds: Normal heart sounds.      Comments: No peripheral edema noted  Pulmonary:      Effort: Pulmonary effort is normal. No respiratory distress.      Breath sounds: Normal breath sounds. No stridor. No wheezing, rhonchi or rales.      Comments: Patient denies shortness of breath  Chest:      Chest wall: No tenderness.   Musculoskeletal:         General: Normal range of motion.   Skin:     General: Skin is warm.      Capillary Refill: Capillary refill takes less than 2 " seconds.   Neurological:      Mental Status: She is alert and oriented to person, place, and time.   Psychiatric:         Behavior: Behavior normal.        Result Review :    Common labs    Common Labs 4/28/22 4/28/22 5/4/22 5/4/22 7/22/22 7/22/22 7/22/22 7/22/22    1507 1507 1538 1538 1203 1203 1203 1203   Glucose  134 (A)  127 (A)    96   BUN  27 (A)  33 (A)    26 (A)   Creatinine  1.20 (A)  1.40 (A)    1.20 (A)   Sodium  135 (A)  138    140   Potassium  3.4 (A)  3.4 (A)    3.5   Chloride  98  98    103   Calcium  8.8  8.8    9.0   Albumin  3.90  3.80    3.70   Total Bilirubin  0.5  0.4    0.4   Alkaline Phosphatase  58  60    51   AST (SGOT)  17  16    20   ALT (SGPT)  23  25    15   WBC 11.45 (A)  13.19 (A)  8.22      Hemoglobin 11.3 (A)  11.0 (A)  10.0 (A)      Hematocrit 34.8  32.9 (A)  30.9 (A)      Platelets 286  267  256      Total Cholesterol      150     Triglycerides      117     HDL Cholesterol      49     LDL Cholesterol       80     Hemoglobin A1C       5.90 (A)    (A) Abnormal value                      Assessment and Plan   Diagnoses and all orders for this visit:    1. Acute cough (Primary)  -     POCT SARS-CoV-2 Antigen VI + Flu  -     XR Chest PA & Lateral    Other orders  -     azithromycin (Zithromax Z-Rogerio) 250 MG tablet; Take 2 tablets by mouth on day 1, then 1 tablet daily on days 2-5  Dispense: 6 tablet; Refill: 0  -     benzonatate (TESSALON) 200 MG capsule; Take 1 capsule by mouth 3 (Three) Times a Day As Needed for Cough for up to 7 days.  Dispense: 21 capsule; Refill: 0  -     guaiFENesin (Mucinex) 600 MG 12 hr tablet; Take 2 tablets by mouth 2 (Two) Times a Day for 7 days.  Dispense: 28 tablet; Refill: 0    Patient will stop at pharmacy and take prescriptions as directed.  I have also placed an order for chest x-ray.  COVID and flu test in the office were both negative.  I have given patient a work note for her to be off for the next 2 days.  Patient is aware if she develops any  worsening symptoms such as shortness of breath or high fever that does not come down with Tylenol or ibuprofen she will go to the emergency room.  Patient agrees with treatment plan at this time.         Follow Up   Return if symptoms worsen or fail to improve.  Patient was given instructions and counseling regarding her condition or for health maintenance advice. Please see specific information pulled into the AVS if appropriate.

## 2022-12-14 ENCOUNTER — APPOINTMENT (OUTPATIENT)
Dept: MAMMOGRAPHY | Facility: HOSPITAL | Age: 62
End: 2022-12-14

## 2022-12-30 RX ORDER — ACETAMINOPHEN 160 MG
2000 TABLET,DISINTEGRATING ORAL DAILY
Qty: 90 CAPSULE | Refills: 1 | Status: SHIPPED | OUTPATIENT
Start: 2022-12-30

## 2022-12-30 NOTE — TELEPHONE ENCOUNTER
Caller: SherryPedrito    Relationship: Self    Best call back number:     Requested Prescriptions:   Requested Prescriptions     Pending Prescriptions Disp Refills   • Cholecalciferol (Vitamin D3) 50 MCG (2000 UT) capsule 90 capsule 1     Sig: Take 1 capsule by mouth Daily.        Pharmacy where request should be sent:  [facility]  [address] [place] [address]  [phone]    Additional details provided by patient:     Does the patient have less than a 3 day supply:  [x] Yes  [] No    Would you like a call back once the refill request has been completed: [] Yes [x] No    If the office needs to give you a call back, can they leave a voicemail: [] Yes [] No    Pravin Sears Rep   12/30/22 10:50 EST

## 2023-01-10 ENCOUNTER — TRANSCRIBE ORDERS (OUTPATIENT)
Dept: ADMINISTRATIVE | Facility: HOSPITAL | Age: 63
End: 2023-01-10
Payer: COMMERCIAL

## 2023-01-10 DIAGNOSIS — D64.9 ANEMIA, UNSPECIFIED TYPE: ICD-10-CM

## 2023-01-10 DIAGNOSIS — M79.672 PAIN IN BOTH FEET: ICD-10-CM

## 2023-01-10 DIAGNOSIS — M79.671 PAIN IN BOTH FEET: ICD-10-CM

## 2023-01-10 DIAGNOSIS — R60.9 SWELLING: Primary | ICD-10-CM

## 2023-01-10 RX ORDER — DOXYCYCLINE HYCLATE 50 MG/1
324 CAPSULE, GELATIN COATED ORAL
Qty: 90 TABLET | Refills: 1 | Status: SHIPPED | OUTPATIENT
Start: 2023-01-10

## 2023-01-11 ENCOUNTER — HOSPITAL ENCOUNTER (OUTPATIENT)
Dept: MAMMOGRAPHY | Facility: HOSPITAL | Age: 63
Discharge: HOME | End: 2023-01-11
Admitting: FAMILY MEDICINE
Payer: COMMERCIAL

## 2023-01-11 DIAGNOSIS — Z12.31 VISIT FOR SCREENING MAMMOGRAM: ICD-10-CM

## 2023-01-11 PROCEDURE — 77067 SCR MAMMO BI INCL CAD: CPT

## 2023-01-11 PROCEDURE — 77063 BREAST TOMOSYNTHESIS BI: CPT

## 2023-01-13 NOTE — PROGRESS NOTES
IMPRESSION:  1. There is no evidence for malignancy or significant change in either  breast. Routine followup mammography is recommended.    BI-RADS category 1: Negative.

## 2023-01-18 ENCOUNTER — OFFICE VISIT (OUTPATIENT)
Dept: INTERNAL MEDICINE | Facility: CLINIC | Age: 63
End: 2023-01-18
Payer: COMMERCIAL

## 2023-01-18 VITALS
OXYGEN SATURATION: 99 % | SYSTOLIC BLOOD PRESSURE: 130 MMHG | BODY MASS INDEX: 43.79 KG/M2 | DIASTOLIC BLOOD PRESSURE: 84 MMHG | RESPIRATION RATE: 16 BRPM | WEIGHT: 224.2 LBS | HEART RATE: 64 BPM

## 2023-01-18 DIAGNOSIS — F41.9 ANXIETY: Primary | ICD-10-CM

## 2023-01-18 PROCEDURE — 99213 OFFICE O/P EST LOW 20 MIN: CPT | Performed by: FAMILY MEDICINE

## 2023-01-18 RX ORDER — METHYLPREDNISOLONE 4 MG/1
TABLET ORAL
COMMUNITY
Start: 2023-01-10

## 2023-01-18 NOTE — PROGRESS NOTES
"Chief Complaint  Anxiety    Subjective        Pedrito Powell presents to [facility]  History of Present Illness       Anxiety.  The patient was started on Trintellix at her last visit. She reports that she is doing okay on this medication, however, she noticed that she was crying over the holidays. Since the holidays, she has felt anxious intermittently. She does not feel that without the medication, she would be worse.    Objective   Vital Signs:  /84   Pulse 64   Resp 16   Wt 102 kg (224 lb 3.2 oz)   SpO2 99%   BMI 43.79 kg/m²   Estimated body mass index is 43.79 kg/m² as calculated from the following:    Height as of 12/12/22: 152.4 cm (60\").    Weight as of this encounter: 102 kg (224 lb 3.2 oz).             Physical Exam  Vitals and nursing note reviewed.   Constitutional:       Appearance: She is well-developed.   HENT:      Head: Normocephalic and atraumatic.   Musculoskeletal:      Cervical back: Normal range of motion and neck supple.   Neurological:      Mental Status: She is alert and oriented to person, place, and time.   Psychiatric:         Behavior: Behavior normal.        Result Review :                   Assessment and Plan   Diagnoses and all orders for this visit:    1. Anxiety (Primary)  -     Vortioxetine HBr (Trintellix) 10 MG tablet tablet; Take 1 tablet by mouth Daily.  Dispense: 30 tablet; Refill: 6      Anxiety  - We will increase her Trintellix to 10 mg daily to help her with her anxiety.  - She will return in 6 weeks for a follow up visit.         Follow Up   No follow-ups on file.  Patient was given instructions and counseling regarding her condition or for health maintenance advice. Please see specific information pulled into the AVS if appropriate.           Transcribed from ambient dictation for Teo Fraser MD by Analisa Richardson.  01/18/23   17:34 EST    Patient or patient representative verbalized consent to the visit recording.  I " have personally performed the services described in this document as transcribed by the above individual, and it is both accurate and complete.

## 2023-01-19 ENCOUNTER — TELEPHONE (OUTPATIENT)
Dept: INTERNAL MEDICINE | Facility: CLINIC | Age: 63
End: 2023-01-19

## 2023-01-19 NOTE — TELEPHONE ENCOUNTER
Caller: Pedrito Powell    Relationship: Self    Best call back number: [phone]    What is the best time to reach you: ANYTIME    Who are you requesting to speak with (clinical staff, provider,  specific staff member): CLINICAL STAFF    Do you know the name of the person who called: PATIENT     What was the call regarding: PATIENT CALLED AND SAID SHE MISSED A CALL FROM THE OFFICE TODAY AROUND 3:30 AND WAS ATTEMPTING TO RETURN IT    SHE SAID THE MESSAGE MENTIONED LAB RESULTS     Do you require a callback: YES

## 2023-01-26 ENCOUNTER — APPOINTMENT (OUTPATIENT)
Dept: GENERAL RADIOLOGY | Facility: HOSPITAL | Age: 63
End: 2023-01-26
Payer: COMMERCIAL

## 2023-01-26 ENCOUNTER — HOSPITAL ENCOUNTER (OUTPATIENT)
Dept: GENERAL RADIOLOGY | Facility: HOSPITAL | Age: 63
Discharge: HOME | End: 2023-01-26
Admitting: ORTHOPAEDIC SURGERY
Payer: COMMERCIAL

## 2023-01-26 DIAGNOSIS — M79.671 PAIN IN BOTH FEET: ICD-10-CM

## 2023-01-26 DIAGNOSIS — M79.672 PAIN IN BOTH FEET: ICD-10-CM

## 2023-01-26 DIAGNOSIS — R60.9 SWELLING: ICD-10-CM

## 2023-01-26 PROCEDURE — 0 LIDOCAINE 1 % SOLUTION: Performed by: ORTHOPAEDIC SURGERY

## 2023-01-26 PROCEDURE — 77002 NEEDLE LOCALIZATION BY XRAY: CPT

## 2023-01-26 PROCEDURE — 25010000002 METHYLPREDNISOLONE PER 125 MG: Performed by: ORTHOPAEDIC SURGERY

## 2023-01-26 PROCEDURE — 25010000002 IOPAMIDOL 61 % SOLUTION: Performed by: ORTHOPAEDIC SURGERY

## 2023-01-26 RX ORDER — METHYLPREDNISOLONE SODIUM SUCCINATE 125 MG/2ML
80 INJECTION, POWDER, LYOPHILIZED, FOR SOLUTION INTRAMUSCULAR; INTRAVENOUS 2 TIMES DAILY PRN
Status: DISCONTINUED | OUTPATIENT
Start: 2023-01-26 | End: 2023-01-28 | Stop reason: HOSPADM

## 2023-01-26 RX ORDER — LIDOCAINE HYDROCHLORIDE 10 MG/ML
10 INJECTION, SOLUTION INFILTRATION; PERINEURAL 2 TIMES DAILY PRN
Status: DISCONTINUED | OUTPATIENT
Start: 2023-01-26 | End: 2023-01-28 | Stop reason: HOSPADM

## 2023-01-26 RX ORDER — BUPIVACAINE HYDROCHLORIDE 2.5 MG/ML
10 INJECTION, SOLUTION EPIDURAL; INFILTRATION; INTRACAUDAL 2 TIMES DAILY PRN
Status: DISCONTINUED | OUTPATIENT
Start: 2023-01-26 | End: 2023-01-28 | Stop reason: HOSPADM

## 2023-01-26 RX ADMIN — IOPAMIDOL 1 ML: 612 INJECTION, SOLUTION INTRAVENOUS at 08:22

## 2023-01-26 RX ADMIN — LIDOCAINE HYDROCHLORIDE 1 ML: 10 INJECTION, SOLUTION INFILTRATION; PERINEURAL at 07:57

## 2023-01-26 RX ADMIN — METHYLPREDNISOLONE SODIUM SUCCINATE 80 MG: 125 INJECTION, POWDER, LYOPHILIZED, FOR SOLUTION INTRAMUSCULAR; INTRAVENOUS at 07:57

## 2023-01-26 RX ADMIN — IOPAMIDOL 0.5 ML: 612 INJECTION, SOLUTION INTRAVENOUS at 07:57

## 2023-01-26 RX ADMIN — LIDOCAINE HYDROCHLORIDE 1 ML: 10 INJECTION, SOLUTION INFILTRATION; PERINEURAL at 08:22

## 2023-01-26 RX ADMIN — BUPIVACAINE HYDROCHLORIDE 5 ML: 2.5 INJECTION, SOLUTION EPIDURAL; INFILTRATION; INTRACAUDAL; PERINEURAL at 08:22

## 2023-01-26 RX ADMIN — METHYLPREDNISOLONE SODIUM SUCCINATE 80 MG: 125 INJECTION, POWDER, LYOPHILIZED, FOR SOLUTION INTRAMUSCULAR; INTRAVENOUS at 08:22

## 2023-01-26 RX ADMIN — BUPIVACAINE HYDROCHLORIDE 5 ML: 2.5 INJECTION, SOLUTION EPIDURAL; INFILTRATION; INTRACAUDAL; PERINEURAL at 07:57

## 2023-03-01 ENCOUNTER — CLINICAL SUPPORT (OUTPATIENT)
Dept: ORTHOPEDIC SURGERY | Facility: CLINIC | Age: 63
End: 2023-03-01
Payer: COMMERCIAL

## 2023-03-01 VITALS — WEIGHT: 225.6 LBS | HEIGHT: 60 IN | BODY MASS INDEX: 44.29 KG/M2 | TEMPERATURE: 98.4 F

## 2023-03-01 DIAGNOSIS — M17.10 ARTHRITIS OF KNEE: Primary | ICD-10-CM

## 2023-03-01 PROCEDURE — 20610 DRAIN/INJ JOINT/BURSA W/O US: CPT | Performed by: ORTHOPAEDIC SURGERY

## 2023-03-01 RX ORDER — METHYLPREDNISOLONE ACETATE 80 MG/ML
80 INJECTION, SUSPENSION INTRA-ARTICULAR; INTRALESIONAL; INTRAMUSCULAR; SOFT TISSUE
Status: COMPLETED | OUTPATIENT
Start: 2023-03-01 | End: 2023-03-01

## 2023-03-01 RX ORDER — AMITRIPTYLINE HYDROCHLORIDE 25 MG/1
1 TABLET, FILM COATED ORAL EVERY 24 HOURS
COMMUNITY

## 2023-03-01 RX ADMIN — METHYLPREDNISOLONE ACETATE 80 MG: 80 INJECTION, SUSPENSION INTRA-ARTICULAR; INTRALESIONAL; INTRAMUSCULAR; SOFT TISSUE at 16:15

## 2023-03-01 NOTE — PROGRESS NOTES
Ms. Powell follows up for both knees today.  No new complaints.  The injections continue to work fairly well for her so she would like those repeated.  The risk, benefits and alternatives were discussed.  She consented and the bilateral knee injections were performed as described below.  She will follow-up as needed.    Large Joint Arthrocentesis: R knee  Date/Time: 3/1/2023 4:15 PM  Consent given by: patient  Site marked: site marked  Timeout: Immediately prior to procedure a time out was called to verify the correct patient, procedure, equipment, support staff and site/side marked as required   Supporting Documentation  Indications: pain   Procedure Details  Location: knee - R knee  Preparation: Patient was prepped and draped in the usual sterile fashion  Needle gauge: 21G.  Approach: anterolateral  Medications administered: 80 mg methylPREDNISolone acetate 80 MG/ML; 2 mL lidocaine (cardiac)  Patient tolerance: patient tolerated the procedure well with no immediate complications    Large Joint Arthrocentesis: L knee  Date/Time: 3/1/2023 4:15 PM  Consent given by: patient  Site marked: site marked  Timeout: Immediately prior to procedure a time out was called to verify the correct patient, procedure, equipment, support staff and site/side marked as required   Supporting Documentation  Indications: pain   Procedure Details  Location: knee - L knee  Preparation: Patient was prepped and draped in the usual sterile fashion  Needle gauge: 21G.  Approach: anterolateral  Medications administered: 80 mg methylPREDNISolone acetate 80 MG/ML; 2 mL lidocaine (cardiac)  Patient tolerance: patient tolerated the procedure well with no immediate complications

## 2023-03-24 ENCOUNTER — OFFICE VISIT (OUTPATIENT)
Dept: INTERNAL MEDICINE | Facility: CLINIC | Age: 63
End: 2023-03-24
Payer: COMMERCIAL

## 2023-03-24 VITALS
HEART RATE: 88 BPM | BODY MASS INDEX: 43.19 KG/M2 | DIASTOLIC BLOOD PRESSURE: 72 MMHG | TEMPERATURE: 95.9 F | SYSTOLIC BLOOD PRESSURE: 128 MMHG | RESPIRATION RATE: 16 BRPM | HEIGHT: 60 IN | OXYGEN SATURATION: 96 % | WEIGHT: 220 LBS

## 2023-03-24 DIAGNOSIS — Z00.00 HEALTHCARE MAINTENANCE: ICD-10-CM

## 2023-03-24 DIAGNOSIS — Z00.00 WELL WOMAN EXAM (NO GYNECOLOGICAL EXAM): Primary | ICD-10-CM

## 2023-03-24 DIAGNOSIS — B00.1 RECURRENT COLD SORES: ICD-10-CM

## 2023-03-24 RX ORDER — VALACYCLOVIR HYDROCHLORIDE 500 MG/1
500 TABLET, FILM COATED ORAL 2 TIMES DAILY
Qty: 14 TABLET | Refills: 0 | Status: SHIPPED | OUTPATIENT
Start: 2023-03-24 | End: 2023-03-31

## 2023-03-24 NOTE — PROGRESS NOTES
"Chief Complaint  No chief complaint on file.    Subjective        Pedrito Powell presents to [facility]  History of Present Illness    Patient has cold sores.  She notes that she is currently not doing anything for this.  Patient notes that it is painful.    Objective   Vital Signs:  /72   Pulse 88   Temp 95.9 °F (35.5 °C)   Resp 16   Ht 152.4 cm (60\")   Wt 99.8 kg (220 lb)   SpO2 96%   BMI 42.97 kg/m²   Estimated body mass index is 42.97 kg/m² as calculated from the following:    Height as of this encounter: 152.4 cm (60\").    Weight as of this encounter: 99.8 kg (220 lb).             Physical Exam  Vitals and nursing note reviewed.   Constitutional:       Appearance: She is well-developed.   HENT:      Head: Normocephalic and atraumatic.      Right Ear: External ear normal.      Left Ear: External ear normal.   Cardiovascular:      Rate and Rhythm: Normal rate and regular rhythm.      Heart sounds: Normal heart sounds.   Pulmonary:      Effort: Pulmonary effort is normal. No respiratory distress.      Breath sounds: Normal breath sounds.   Abdominal:      Palpations: Abdomen is soft.      Tenderness: There is no abdominal tenderness. There is no guarding.   Musculoskeletal:         General: Normal range of motion.      Cervical back: Normal range of motion and neck supple.   Lymphadenopathy:      Cervical: No cervical adenopathy.   Skin:     General: Skin is warm.      Comments: Cold sore above upper lip.    Neurological:      Mental Status: She is alert and oriented to person, place, and time.   Psychiatric:         Behavior: Behavior normal.        Result Review :                   Assessment and Plan   Diagnoses and all orders for this visit:        Healthcare maintenance  -     CBC & Differential  -     Comprehensive Metabolic Panel  -     Hemoglobin A1c  -     Thyroid Panel With TSH  -     Lipid Panel With LDL / HDL Ratio  -     Folate  -     Ferritin  -     Iron " Profile  -     Vitamin B12    Recurrent cold sores  -     valACYclovir (Valtrex) 500 MG tablet; Take 1 tablet by mouth 2 (Two) Times a Day for 7 days.  Dispense: 14 tablet; Refill: 0    will start patient on valtrex.          Follow Up   No follow-ups on file.  Patient was given instructions and counseling regarding her condition or for health maintenance advice. Please see specific information pulled into the AVS if appropriate.

## 2023-03-25 LAB
ALBUMIN SERPL-MCNC: 4.2 G/DL (ref 3.8–4.8)
ALBUMIN/GLOB SERPL: 1.8 {RATIO} (ref 1.2–2.2)
ALP SERPL-CCNC: 64 IU/L (ref 44–121)
ALT SERPL-CCNC: 21 IU/L (ref 0–32)
AST SERPL-CCNC: 19 IU/L (ref 0–40)
BASOPHILS # BLD AUTO: 0.1 X10E3/UL (ref 0–0.2)
BASOPHILS NFR BLD AUTO: 1 %
BILIRUB SERPL-MCNC: 0.4 MG/DL (ref 0–1.2)
BUN SERPL-MCNC: 17 MG/DL (ref 8–27)
BUN/CREAT SERPL: 18 (ref 12–28)
CALCIUM SERPL-MCNC: 9.4 MG/DL (ref 8.7–10.3)
CHLORIDE SERPL-SCNC: 104 MMOL/L (ref 96–106)
CHOLEST SERPL-MCNC: 144 MG/DL (ref 100–199)
CO2 SERPL-SCNC: 23 MMOL/L (ref 20–29)
CREAT SERPL-MCNC: 0.94 MG/DL (ref 0.57–1)
EGFRCR SERPLBLD CKD-EPI 2021: 69 ML/MIN/1.73
EOSINOPHIL # BLD AUTO: 0.2 X10E3/UL (ref 0–0.4)
EOSINOPHIL NFR BLD AUTO: 3 %
ERYTHROCYTE [DISTWIDTH] IN BLOOD BY AUTOMATED COUNT: 12.9 % (ref 11.7–15.4)
FERRITIN SERPL-MCNC: 440 NG/ML (ref 15–150)
FOLATE SERPL-MCNC: >20 NG/ML
FT4I SERPL CALC-MCNC: 1.9 (ref 1.2–4.9)
GLOBULIN SER CALC-MCNC: 2.4 G/DL (ref 1.5–4.5)
GLUCOSE SERPL-MCNC: 111 MG/DL (ref 70–99)
HBA1C MFR BLD: 5.9 % (ref 4.8–5.6)
HCT VFR BLD AUTO: 31.4 % (ref 34–46.6)
HDLC SERPL-MCNC: 53 MG/DL
HGB BLD-MCNC: 10.6 G/DL (ref 11.1–15.9)
IMM GRANULOCYTES # BLD AUTO: 0 X10E3/UL (ref 0–0.1)
IMM GRANULOCYTES NFR BLD AUTO: 1 %
IRON SATN MFR SERPL: 16 % (ref 15–55)
IRON SERPL-MCNC: 36 UG/DL (ref 27–139)
LDLC SERPL CALC-MCNC: 76 MG/DL (ref 0–99)
LDLC/HDLC SERPL: 1.4 RATIO (ref 0–3.2)
LYMPHOCYTES # BLD AUTO: 1.5 X10E3/UL (ref 0.7–3.1)
LYMPHOCYTES NFR BLD AUTO: 23 %
MCH RBC QN AUTO: 31.2 PG (ref 26.6–33)
MCHC RBC AUTO-ENTMCNC: 33.8 G/DL (ref 31.5–35.7)
MCV RBC AUTO: 92 FL (ref 79–97)
MONOCYTES # BLD AUTO: 0.5 X10E3/UL (ref 0.1–0.9)
MONOCYTES NFR BLD AUTO: 7 %
NEUTROPHILS # BLD AUTO: 4.3 X10E3/UL (ref 1.4–7)
NEUTROPHILS NFR BLD AUTO: 65 %
PLATELET # BLD AUTO: 232 X10E3/UL (ref 150–450)
POTASSIUM SERPL-SCNC: 4.1 MMOL/L (ref 3.5–5.2)
PROT SERPL-MCNC: 6.6 G/DL (ref 6–8.5)
RBC # BLD AUTO: 3.4 X10E6/UL (ref 3.77–5.28)
SODIUM SERPL-SCNC: 143 MMOL/L (ref 134–144)
T3RU NFR SERPL: 25 % (ref 24–39)
T4 SERPL-MCNC: 7.5 UG/DL (ref 4.5–12)
TIBC SERPL-MCNC: 221 UG/DL (ref 250–450)
TRIGL SERPL-MCNC: 77 MG/DL (ref 0–149)
TSH SERPL DL<=0.005 MIU/L-ACNC: 0.99 UIU/ML (ref 0.45–4.5)
UIBC SERPL-MCNC: 185 UG/DL (ref 118–369)
VIT B12 SERPL-MCNC: 880 PG/ML (ref 232–1245)
VLDLC SERPL CALC-MCNC: 15 MG/DL (ref 5–40)
WBC # BLD AUTO: 6.5 X10E3/UL (ref 3.4–10.8)

## 2023-03-25 NOTE — PROGRESS NOTES
Subjective   Pedrito Powell is a 62 y.o. female and is here for a comprehensive physical exam. The patient reports no problems.    Pt is UTD with annual gyn exam and mammo           Social History:   Social History     Socioeconomic History   • Marital status:    • Number of children: 3   • Years of education: College   Tobacco Use   • Smoking status: Never   • Smokeless tobacco: Never   Vaping Use   • Vaping Use: Never used   Substance and Sexual Activity   • Alcohol use: Yes     Alcohol/week: 1.0 standard drink     Types: 1 Glasses of wine per week     Comment: WEEKLY   • Drug use: No   • Sexual activity: Defer       Family History:   Family History   Problem Relation Age of Onset   • Asthma Mother    • Thyroid disease Father    • Diabetes Maternal Grandmother    • Breast cancer Maternal Aunt    • Colon cancer Maternal Grandfather    • Malig Hyperthermia Neg Hx        Past Medical History:   Past Medical History:   Diagnosis Date   • Anemia     IRON INFUSION RECENTLY   • Arthritis    • Asthma    • Chronic kidney disease     stage 3   • Elevated cholesterol    • GERD (gastroesophageal reflux disease)    • History of carpal tunnel syndrome    • Hyperlipidemia    • Hypertension    • Left shoulder pain    • Limited mobility    • Vertigo    • Weakness     AT TIMES LEFT SHOULDER/LEFT ARM       The following portions of the patient's history were reviewed and updated as appropriate: allergies, current medications, past family history, past medical history, past social history, past surgical history and problem list.    Review of Systems    Review of Systems   Constitutional: Negative for chills and fever.   HENT: Negative for congestion, rhinorrhea, sinus pain and sore throat.    Eyes: Negative for photophobia and visual disturbance.   Respiratory: Negative for cough, chest tightness and shortness of breath.    Cardiovascular: Negative for chest pain and palpitations.   Gastrointestinal: Negative for diarrhea,  nausea and vomiting.   Genitourinary: Negative for dysuria, frequency and urgency.   Skin: Negative for rash and wound.   Neurological: Negative for dizziness and syncope.   Psychiatric/Behavioral: Negative for behavioral problems and confusion.       Objective   Physical Exam  Vitals and nursing note reviewed.   Constitutional:       Appearance: She is well-developed.   HENT:      Head: Normocephalic and atraumatic.      Right Ear: External ear normal.      Left Ear: External ear normal.   Cardiovascular:      Rate and Rhythm: Normal rate and regular rhythm.      Heart sounds: Normal heart sounds.   Pulmonary:      Effort: Pulmonary effort is normal. No respiratory distress.      Breath sounds: Normal breath sounds.   Abdominal:      Palpations: Abdomen is soft.      Tenderness: There is no abdominal tenderness. There is no guarding.   Musculoskeletal:         General: Normal range of motion.      Cervical back: Normal range of motion and neck supple.   Lymphadenopathy:      Cervical: No cervical adenopathy.   Skin:     General: Skin is warm.   Neurological:      Mental Status: She is alert and oriented to person, place, and time.   Psychiatric:         Behavior: Behavior normal.         Vitals:    03/24/23 1226   BP: 128/72   Pulse: 88   Resp: 16   Temp: 95.9 °F (35.5 °C)   SpO2: 96%     Body mass index is 42.97 kg/m².      Medications:   Current Outpatient Medications:   •  albuterol 108 (90 Base) MCG/ACT inhaler, Inhale 2 puffs Every 4 (Four) Hours As Needed for Wheezing., Disp: , Rfl:   •  amitriptyline (ELAVIL) 25 MG tablet, Take 1 tablet by mouth Daily., Disp: , Rfl:   •  Aspirin ([place]) 325 MG capsule, As Needed., Disp: , Rfl:   •  aspirin 325 MG tablet, Take 1 tablet by mouth Daily. HOLD ONE WEEK PRIOR TO SURGERY, Disp: , Rfl:   •  atorvastatin (LIPITOR) 80 MG tablet, Take 1 tablet by mouth Daily., Disp: 90 tablet, Rfl: 3  •  azelastine (ASTELIN) 0.1 % nasal spray, 1 spray into the nostril(s) as directed  by provider Daily., Disp: , Rfl: 11  •  B Complex Vitamins (B COMPLEX PO), Take 1 capsule by mouth Daily. HOLD FOR SURGERY 1 WEEK, Disp: , Rfl:   •  cetirizine (zyrTEC) 10 MG tablet, Take 1 tablet by mouth Daily., Disp: 30 tablet, Rfl: 0  •  Cholecalciferol (Vitamin D3) 50 MCG (2000 UT) capsule, Take 1 capsule by mouth Daily., Disp: 90 capsule, Rfl: 1  •  clobetasol (TEMOVATE) 0.05 % cream, Apply 1 application topically to the appropriate area as directed As Needed., Disp: , Rfl:   •  EPINEPHrine (AUVI-Q IJ), Inject  as directed As Needed., Disp: , Rfl:   •  ferrous gluconate (FERGON) 324 MG tablet, TAKE 1 TABLET BY MOUTH DAILY WITH BREAKFAST, Disp: 90 tablet, Rfl: 1  •  Flovent HFA 44 MCG/ACT inhaler, Inhale 2 puffs 2 (Two) Times a Day., Disp: , Rfl:   •  hydroCHLOROthiazide (HYDRODIURIL) 12.5 MG tablet, , Disp: , Rfl:   •  hydrocortisone 2.5 % cream, Apply  topically to the appropriate area as directed As Needed., Disp: , Rfl:   •  ipratropium (ATROVENT) 0.03 % nasal spray, 1 spray into the nostril(s) as directed by provider 3 (Three) Times a Day., Disp: , Rfl:   •  irbesartan (AVAPRO) 300 MG tablet, TAKE 1 TABLET BY MOUTH DAILY, Disp: 90 tablet, Rfl: 3  •  loperamide (IMODIUM) 2 MG capsule, Take 1 capsule by mouth Every 2 (Two) Hours As Needed for Diarrhea (max 4 doses daily)., Disp: , Rfl:   •  meclizine (ANTIVERT) 25 MG tablet, TAKE 1 TABLET BY MOUTH THREE TIMES DAILY AS NEEDED FOR DIZZINESS, Disp: 270 tablet, Rfl: 0  •  methylPREDNISolone (MEDROL) 4 MG dose pack, follow package directions, Disp: , Rfl:   •  ondansetron ODT (Zofran ODT) 4 MG disintegrating tablet, Place 1 tablet on the tongue Every 8 (Eight) Hours As Needed for Nausea or Vomiting., Disp: 60 tablet, Rfl: 3  •  triamcinolone (KENALOG) 0.1 % ointment, Apply 1 application topically to the appropriate area as directed As Needed., Disp: , Rfl:   •  Vortioxetine HBr (Trintellix) 10 MG tablet tablet, Take 1 tablet by mouth Daily., Disp: 30 tablet, Rfl:  6  •  valACYclovir (Valtrex) 500 MG tablet, Take 1 tablet by mouth 2 (Two) Times a Day for 7 days., Disp: 14 tablet, Rfl: 0  No current facility-administered medications for this visit.    Facility-Administered Medications Ordered in Other Visits:   •  Chlorhexidine Gluconate 2 % pads 1 each, 1 each, Apply externally, Take As Directed, Bethel Devi MD       Assessment & Plan   Healthy female exam.      1. Healthcare Maintenance:  2. Patient Counseling:  --Nutrition: Stressed importance of moderation in sodium/caffeine intake, saturated fat and cholesterol, caloric balance, sufficient intake of fresh fruits, vegetables, fiber, calcium and vit D  --Exercise: Recommended 30 minutes of exercise daily.  --Immunizations reviewed.  --Discussed benefits of screening colonoscopy.    Diagnoses and all orders for this visit:    Well woman exam (no gynecological exam)    Healthcare maintenance  -     CBC & Differential  -     Comprehensive Metabolic Panel  -     Hemoglobin A1c  -     Thyroid Panel With TSH  -     Lipid Panel With LDL / HDL Ratio  -     Folate  -     Ferritin  -     Iron Profile  -     Vitamin B12    Recurrent cold sores  -     valACYclovir (Valtrex) 500 MG tablet; Take 1 tablet by mouth 2 (Two) Times a Day for 7 days.        No follow-ups on file.             Dictated utilizing Dragon Voice Recognition Software

## 2023-04-20 ENCOUNTER — TELEPHONE (OUTPATIENT)
Dept: ORTHOPEDIC SURGERY | Facility: CLINIC | Age: 63
End: 2023-04-20
Payer: COMMERCIAL

## 2023-04-20 NOTE — TELEPHONE ENCOUNTER
Caller: Pedrito Powell    Relationship to patient: Self    Best call back number:     Patient is needing: THE PATIENT HAD A REVERSE TOTAL SHOULDER ARTHROPLASTY,  BICEP TENDONDESIS ON 5/13/21. SHE IS HAVING DENTAL CLEANING ON Monday 4/24/23, SHE WOULD LIKE DR CIFUENTES TO PRESCRIBE HER ANTIBIOTICS FOR HER APPT

## 2023-04-21 RX ORDER — CEPHALEXIN 500 MG/1
CAPSULE ORAL
Qty: 4 CAPSULE | Refills: 5 | Status: SHIPPED | OUTPATIENT
Start: 2023-04-21

## 2023-04-21 NOTE — TELEPHONE ENCOUNTER
Spoke with the patient.  She has numerous allergies to antibiotics however she was given Keflex at the time of her last dental appointment in December and tolerated that well.  New prescription was sent to [facility] at [phone] for Keflex 500 mg, #4, directions are to take all 4 capsules 1 hour prior to her procedure.  Refill x5 per Dr. Devi

## 2023-05-04 NOTE — TELEPHONE ENCOUNTER
Caller: Pedrito Powell    Relationship to patient: Self    Best call back number: [phone]    Patient is needing: PATIENT WANTS TO KNOW IF SHE STILL HAS TO TAKE ANTIBIOTICS BEFORE DENTAL APPT. IF SO SHE NEEDS AN RX CALLED IN

## 2023-05-05 RX ORDER — CEPHALEXIN 500 MG/1
CAPSULE ORAL
Qty: 4 CAPSULE | Refills: 5 | Status: SHIPPED | OUTPATIENT
Start: 2023-05-05

## 2023-05-08 ENCOUNTER — TRANSCRIBE ORDERS (OUTPATIENT)
Dept: ADMINISTRATIVE | Facility: HOSPITAL | Age: 63
End: 2023-05-08
Payer: COMMERCIAL

## 2023-05-08 DIAGNOSIS — M79.671 BILATERAL FOOT PAIN: Primary | ICD-10-CM

## 2023-05-08 DIAGNOSIS — M79.672 BILATERAL FOOT PAIN: Primary | ICD-10-CM

## 2023-05-10 ENCOUNTER — OFFICE VISIT (OUTPATIENT)
Dept: ONCOLOGY | Facility: CLINIC | Age: 63
End: 2023-05-10
Payer: COMMERCIAL

## 2023-05-10 ENCOUNTER — LAB (OUTPATIENT)
Dept: OTHER | Facility: HOSPITAL | Age: 63
End: 2023-05-10
Payer: COMMERCIAL

## 2023-05-10 VITALS
HEART RATE: 72 BPM | OXYGEN SATURATION: 98 % | DIASTOLIC BLOOD PRESSURE: 77 MMHG | HEIGHT: 59 IN | BODY MASS INDEX: 44.88 KG/M2 | WEIGHT: 222.6 LBS | TEMPERATURE: 98.2 F | RESPIRATION RATE: 18 BRPM | SYSTOLIC BLOOD PRESSURE: 120 MMHG

## 2023-05-10 DIAGNOSIS — D63.1 ANEMIA DUE TO STAGE 3 CHRONIC KIDNEY DISEASE, UNSPECIFIED WHETHER STAGE 3A OR 3B CKD: Primary | ICD-10-CM

## 2023-05-10 DIAGNOSIS — D63.1 ANEMIA DUE TO STAGE 3 CHRONIC KIDNEY DISEASE, UNSPECIFIED WHETHER STAGE 3A OR 3B CKD: ICD-10-CM

## 2023-05-10 DIAGNOSIS — N18.30 ANEMIA DUE TO STAGE 3 CHRONIC KIDNEY DISEASE, UNSPECIFIED WHETHER STAGE 3A OR 3B CKD: ICD-10-CM

## 2023-05-10 DIAGNOSIS — N18.30 ANEMIA DUE TO STAGE 3 CHRONIC KIDNEY DISEASE, UNSPECIFIED WHETHER STAGE 3A OR 3B CKD: Primary | ICD-10-CM

## 2023-05-10 LAB
ALBUMIN SERPL-MCNC: 3.9 G/DL (ref 3.5–5.2)
ALBUMIN/GLOB SERPL: 1.2 G/DL
ALP SERPL-CCNC: 68 U/L (ref 39–117)
ALT SERPL W P-5'-P-CCNC: 17 U/L (ref 1–33)
ANION GAP SERPL CALCULATED.3IONS-SCNC: 8.1 MMOL/L (ref 5–15)
AST SERPL-CCNC: 20 U/L (ref 1–32)
BASOPHILS # BLD AUTO: 0.07 10*3/MM3 (ref 0–0.2)
BASOPHILS NFR BLD AUTO: 1 % (ref 0–1.5)
BILIRUB SERPL-MCNC: 0.3 MG/DL (ref 0–1.2)
BUN SERPL-MCNC: 25 MG/DL (ref 8–23)
BUN/CREAT SERPL: 23.4 (ref 7–25)
CALCIUM SPEC-SCNC: 9.4 MG/DL (ref 8.6–10.5)
CHLORIDE SERPL-SCNC: 106 MMOL/L (ref 98–107)
CHROMATIN AB SERPL-ACNC: <10 IU/ML (ref 0–14)
CO2 SERPL-SCNC: 27.9 MMOL/L (ref 22–29)
CREAT SERPL-MCNC: 1.07 MG/DL (ref 0.57–1)
CRP SERPL-MCNC: <0.3 MG/DL (ref 0–0.5)
DEPRECATED RDW RBC AUTO: 42.2 FL (ref 37–54)
EGFRCR SERPLBLD CKD-EPI 2021: 58.9 ML/MIN/1.73
EOSINOPHIL # BLD AUTO: 0.45 10*3/MM3 (ref 0–0.4)
EOSINOPHIL NFR BLD AUTO: 6.1 % (ref 0.3–6.2)
ERYTHROCYTE [DISTWIDTH] IN BLOOD BY AUTOMATED COUNT: 11.9 % (ref 12.3–15.4)
ERYTHROCYTE [SEDIMENTATION RATE] IN BLOOD: 30 MM/HR (ref 0–30)
FERRITIN SERPL-MCNC: 396 NG/ML (ref 13–150)
GLOBULIN UR ELPH-MCNC: 3.2 GM/DL
GLUCOSE SERPL-MCNC: 106 MG/DL (ref 65–99)
HCT VFR BLD AUTO: 31.6 % (ref 34–46.6)
HGB BLD-MCNC: 10.1 G/DL (ref 12–15.9)
IMM GRANULOCYTES # BLD AUTO: 0.02 10*3/MM3 (ref 0–0.05)
IMM GRANULOCYTES NFR BLD AUTO: 0.3 % (ref 0–0.5)
IRON 24H UR-MRATE: 37 MCG/DL (ref 37–145)
IRON SATN MFR SERPL: 14 % (ref 20–50)
LYMPHOCYTES # BLD AUTO: 1.82 10*3/MM3 (ref 0.7–3.1)
LYMPHOCYTES NFR BLD AUTO: 24.9 % (ref 19.6–45.3)
MCH RBC QN AUTO: 30.9 PG (ref 26.6–33)
MCHC RBC AUTO-ENTMCNC: 32 G/DL (ref 31.5–35.7)
MCV RBC AUTO: 96.6 FL (ref 79–97)
MONOCYTES # BLD AUTO: 0.6 10*3/MM3 (ref 0.1–0.9)
MONOCYTES NFR BLD AUTO: 8.2 % (ref 5–12)
NEUTROPHILS NFR BLD AUTO: 4.36 10*3/MM3 (ref 1.7–7)
NEUTROPHILS NFR BLD AUTO: 59.5 % (ref 42.7–76)
NRBC BLD AUTO-RTO: 0 /100 WBC (ref 0–0.2)
PLATELET # BLD AUTO: 244 10*3/MM3 (ref 140–450)
PMV BLD AUTO: 9.4 FL (ref 6–12)
POTASSIUM SERPL-SCNC: 4.4 MMOL/L (ref 3.5–5.2)
PROT SERPL-MCNC: 7.1 G/DL (ref 6–8.5)
RBC # BLD AUTO: 3.27 10*6/MM3 (ref 3.77–5.28)
SODIUM SERPL-SCNC: 142 MMOL/L (ref 136–145)
TIBC SERPL-MCNC: 262 MCG/DL (ref 298–536)
TRANSFERRIN SERPL-MCNC: 176 MG/DL (ref 200–360)
WBC NRBC COR # BLD: 7.32 10*3/MM3 (ref 3.4–10.8)

## 2023-05-10 PROCEDURE — 85652 RBC SED RATE AUTOMATED: CPT | Performed by: INTERNAL MEDICINE

## 2023-05-10 PROCEDURE — 86235 NUCLEAR ANTIGEN ANTIBODY: CPT | Performed by: INTERNAL MEDICINE

## 2023-05-10 PROCEDURE — 84165 PROTEIN E-PHORESIS SERUM: CPT | Performed by: INTERNAL MEDICINE

## 2023-05-10 PROCEDURE — 86225 DNA ANTIBODY NATIVE: CPT | Performed by: INTERNAL MEDICINE

## 2023-05-10 PROCEDURE — 83521 IG LIGHT CHAINS FREE EACH: CPT | Performed by: INTERNAL MEDICINE

## 2023-05-10 PROCEDURE — 82668 ASSAY OF ERYTHROPOIETIN: CPT | Performed by: INTERNAL MEDICINE

## 2023-05-10 PROCEDURE — 82728 ASSAY OF FERRITIN: CPT | Performed by: INTERNAL MEDICINE

## 2023-05-10 PROCEDURE — 82784 ASSAY IGA/IGD/IGG/IGM EACH: CPT | Performed by: INTERNAL MEDICINE

## 2023-05-10 PROCEDURE — 86334 IMMUNOFIX E-PHORESIS SERUM: CPT | Performed by: INTERNAL MEDICINE

## 2023-05-10 PROCEDURE — 86431 RHEUMATOID FACTOR QUANT: CPT | Performed by: INTERNAL MEDICINE

## 2023-05-10 PROCEDURE — 99213 OFFICE O/P EST LOW 20 MIN: CPT | Performed by: INTERNAL MEDICINE

## 2023-05-10 PROCEDURE — 85025 COMPLETE CBC W/AUTO DIFF WBC: CPT | Performed by: INTERNAL MEDICINE

## 2023-05-10 PROCEDURE — 86140 C-REACTIVE PROTEIN: CPT | Performed by: INTERNAL MEDICINE

## 2023-05-10 PROCEDURE — 84466 ASSAY OF TRANSFERRIN: CPT | Performed by: INTERNAL MEDICINE

## 2023-05-10 PROCEDURE — 83540 ASSAY OF IRON: CPT | Performed by: INTERNAL MEDICINE

## 2023-05-10 PROCEDURE — 80053 COMPREHEN METABOLIC PANEL: CPT | Performed by: INTERNAL MEDICINE

## 2023-05-10 PROCEDURE — 36415 COLL VENOUS BLD VENIPUNCTURE: CPT

## 2023-05-10 NOTE — PROGRESS NOTES
Subjective     REASON FOR follow-up: Multifactorial anemia, anemia of chronic kidney disease and rheumatoid arthritis                             History of Present Illness patient is a 60-year-old female who was referred to us for evaluation of chronic anemia.  She does have an autoimmune component with her history of rheumatoid arthritis.  She also has mild renal insufficiency.    Anemia work-up showed a B12 of 494,Ferritin 160, serum iron of 36 with percent saturation of 13, EPO level of 8.3, creatinine of 1.49, serum protein electrophoresis did not show monoclonal protein.  ESR is 35.  There are no signs of infection.  Currently her hemoglobin is 9.8 and she is asymptomatic.  Dr. Pratt did discuss about the possibility of Procrit but at the present time after discussing the side effects she wants to be observed.    Patient returns today for follow up of anemia.  She is scheduled to have a left shoulder replacement surgery on May 10, 2021 and would therefore like to get Retacrit if her hemoglobin is low enough to help improve her hemoglobin before surgery.  She was also seen by her nephrologist who scheduled her for two doses of IV Feraheme prior to shoulder surgery, first dose 4/8/2021.  She is feeling well today.  She denies shortness of breath, dizziness, lightheadedness, or fatigue outside of her baseline.    Interval history:  Patient is continues to be anemic.  Her hemoglobin is 10.1.  She had a tooth infection and likely her anemia secondary to that.  We had ordered a bunch of anemia work-up but did not get done.  We will go ahead and reorder it today.  She has chronic kidney disease and followed by her nephrologist.  Currently she is not a candidate for Procrit as her hemoglobin is greater than 10.        Hematology history:  Ms. Powell is seen today after being referred back to us by her primary care physician, Dr. Teo Fraser.  We saw the patient in consultation in March 2019 for chronic anemia.   Patient has had a long history of rheumatoid arthritis with chronic anemia and reported chronic difficulty with iron deficiency, taking oral iron without much benefit.  At that time of initial consultation we discussed her anemia was likely related to underlying CKD stage III, as well as possibly her rheumatoid arthritis.  We did evaluate for vitamin deficiencies and she was not found to be folate, B12, or iron deficient.  Sed rate was slightly elevated at 44 at that time.    Most recent lab work performed by Dr. Fraser 1/19/2021 showed a hemoglobin of 9.4, creatinine 1.3, BUN 32, GFR 39.  Upon review of the EMR it appears at least for the last roughly 2 years her creatinine has been anywhere from 1.2 to as high as 1.7, BUN ranging in the 20s to 30s.  So overall relatively stable.      Last colonoscopy and EGD were performed per Dr. Kapoor, negative.  Patient also had a small bowel follow-through which was within normal limits. Other pertinent imaging include a CT of the abdomen pelvis performed December 2018 which was negative.  She is up-to-date on her mammogram, last performed August 2020, negative.    As she is reviewed today, hemoglobin is at stable at 9.8.  Patient denies any significant fatigue and feels that she has fairly good performance status.  She does note a few times a week taking a 3 to 4-hour nap in the afternoon but does not require this on a regular basis.  She is still able to complete the activity she desires.  Patient is a  and lives alone.  She denies any obvious GI blood loss.      She reports normal appetite.  Weight is stable.  She does have significant left shoulder pain and will undergo replacement surgery per Dr. Devi in May 2021.    Otherwise she denies further concerns at this time.    Past Medical History:   Diagnosis Date   • Anemia     IRON INFUSION RECENTLY   • Arthritis    • Asthma    • Chronic kidney disease     stage 3   • Elevated cholesterol    • GERD (gastroesophageal  reflux disease)    • History of carpal tunnel syndrome    • Hyperlipidemia    • Hypertension    • Left shoulder pain    • Limited mobility    • Vertigo    • Weakness     AT TIMES LEFT SHOULDER/LEFT ARM        Past Surgical History:   Procedure Laterality Date   •  SECTION  , ,    • COLONOSCOPY N/A 12/10/2018    normal ileum, IH, hyperplastic polyp, otherwise normal exam   • ENDOSCOPY N/A 12/10/2018    no gross lesions in esophagus or examined duodenum, erythematous mucosa in stomach, biopsies benign   • HYSTERECTOMY     • OOPHORECTOMY Bilateral    • SHOULDER MANIPULATION Left    • TOTAL SHOULDER ARTHROPLASTY Left 2021    Procedure: REVERSE TOTAL SHOULDER ARTHROPLASTY,  BICEP TENDONDESIS;  Surgeon: Bethel Devi MD;  Location: [facility];  Service: Orthopedics;  Laterality: Left;        Current Outpatient Medications on File Prior to Visit   Medication Sig Dispense Refill   • albuterol 108 (90 Base) MCG/ACT inhaler Inhale 2 puffs Every 4 (Four) Hours As Needed for Wheezing.     • amitriptyline (ELAVIL) 25 MG tablet Take 1 tablet by mouth As Needed.     • Aspirin (Vazalore) 325 MG capsule As Needed.     • aspirin 325 MG tablet Take 1 tablet by mouth Daily. HOLD ONE WEEK PRIOR TO SURGERY     • atorvastatin (LIPITOR) 80 MG tablet Take 1 tablet by mouth Daily. 90 tablet 3   • azelastine (ASTELIN) 0.1 % nasal spray 1 spray into the nostril(s) as directed by provider Daily.  11   • B Complex Vitamins (B COMPLEX PO) Take 1 capsule by mouth Daily. HOLD FOR SURGERY 1 WEEK     • cephalexin (Keflex) 500 MG capsule Take all 4 caps 1 hour prior to procedure 4 capsule 5   • Cholecalciferol (Vitamin D3) 50 MCG (2000 UT) capsule Take 1 capsule by mouth Daily. 90 capsule 1   • clobetasol (TEMOVATE) 0.05 % cream Apply 1 application topically to the appropriate area as directed As Needed.     • EPINEPHrine (AUVI-Q IJ) Inject  as directed As Needed.     • ferrous gluconate (FERGON) 324 MG tablet TAKE  1 TABLET BY MOUTH DAILY WITH BREAKFAST 90 tablet 1   • Flovent HFA 44 MCG/ACT inhaler Inhale 2 puffs 2 (Two) Times a Day.     • hydrocortisone 2.5 % cream Apply  topically to the appropriate area as directed As Needed.     • ipratropium (ATROVENT) 0.03 % nasal spray 1 spray into the nostril(s) as directed by provider 3 (Three) Times a Day.     • irbesartan (AVAPRO) 300 MG tablet TAKE 1 TABLET BY MOUTH DAILY 90 tablet 3   • loperamide (IMODIUM) 2 MG capsule Take 1 capsule by mouth Every 2 (Two) Hours As Needed for Diarrhea (max 4 doses daily).     • meclizine (ANTIVERT) 25 MG tablet TAKE 1 TABLET BY MOUTH THREE TIMES DAILY AS NEEDED FOR DIZZINESS 270 tablet 0   • ondansetron ODT (Zofran ODT) 4 MG disintegrating tablet Place 1 tablet on the tongue Every 8 (Eight) Hours As Needed for Nausea or Vomiting. 60 tablet 3   • triamcinolone (KENALOG) 0.1 % ointment Apply 1 application topically to the appropriate area as directed As Needed.     • Vortioxetine HBr (Trintellix) 10 MG tablet tablet Take 1 tablet by mouth Daily. 30 tablet 6   • cetirizine (zyrTEC) 10 MG tablet Take 1 tablet by mouth Daily. (Patient not taking: Reported on 5/10/2023) 30 tablet 0   • [DISCONTINUED] hydroCHLOROthiazide (HYDRODIURIL) 12.5 MG tablet      • [DISCONTINUED] methylPREDNISolone (MEDROL) 4 MG dose pack follow package directions       Current Facility-Administered Medications on File Prior to Visit   Medication Dose Route Frequency Provider Last Rate Last Admin   • Chlorhexidine Gluconate 2 % pads 1 each  1 each Apply externally Take As Directed Bethel Devi MD            ALLERGIES:    Allergies   Allergen Reactions   • Cashew Nut Oil Anaphylaxis   • Chocolate Anaphylaxis   • Nickel Anaphylaxis   • Nuts Anaphylaxis   • Adhesive Tape Unknown - Low Severity   • Chlorhexidine Unknown - Low Severity   • Ciprofloxacin Other (See Comments)     THRUSH PER PATIENT   • Citric Acid Unknown (See Comments)     Unsure     • Covid-19 (Mrna) Vaccine  Other (See Comments)     INSTRUCTED PER ALLERGIST SHE CAN NOT TAKE D/T ONE OF THE PRESERVATIVES   • Eggs Or Egg-Derived Products Nausea And Vomiting   • Influenza Vaccines Nausea And Vomiting   • Methyldibromoglutaronitrile Unknown (See Comments)      PATIENT UNSURE OF REACTION.   • Naproxen Other (See Comments)   • Neomycin Unknown (See Comments)      PATIENT UNSURE OF REACTION.   • Omnicef [Cefdinir] Other (See Comments)     THRUSH PER PATIENT   • Palladium Chloride Unknown (See Comments)      PATIENT UNSURE OF REACTION   • Penicillins Other (See Comments)     THRUSH PER PATIENT   • Sulfa Antibiotics Other (See Comments)     THRUSH PER PATIENT   • Tomato Hives   • Yeast-Related Products Hives   • Gold-Containing Drug Products Rash         • Latex Rash        Social History     Socioeconomic History   • Marital status:    • Number of children: 3   • Years of education: College   Tobacco Use   • Smoking status: Never   • Smokeless tobacco: Never   Vaping Use   • Vaping Use: Never used   Substance and Sexual Activity   • Alcohol use: Yes     Alcohol/week: 1.0 standard drink     Types: 1 Glasses of wine per week     Comment: WEEKLY   • Drug use: No   • Sexual activity: Defer        Family History   Problem Relation Age of Onset   • Asthma Mother    • Thyroid disease Father    • Diabetes Maternal Grandmother    • Breast cancer Maternal Aunt    • Colon cancer Maternal Grandfather    • Malig Hyperthermia Neg Hx       Review of Systems   Constitutional: Negative for appetite change, chills, diaphoresis, fatigue, fever and unexpected weight change.   HENT: Negative for hearing loss, sore throat and trouble swallowing.    Respiratory: Negative for cough, chest tightness, shortness of breath and wheezing.    Cardiovascular: Negative for chest pain, palpitations and leg swelling.   Gastrointestinal: Negative for abdominal distention, abdominal pain, blood in stool, constipation, diarrhea, nausea and vomiting.  "  Genitourinary: Negative for dysuria, frequency, hematuria and urgency.   Musculoskeletal: Positive for arthralgias. Negative for joint swelling.        No muscle weakness.   Skin: Negative for rash and wound.   Neurological: Negative for dizziness, seizures, syncope, speech difficulty, weakness, numbness and headaches.   Hematological: Negative for adenopathy. Does not bruise/bleed easily.   Psychiatric/Behavioral: Negative for behavioral problems, confusion and suicidal ideas.   All other systems reviewed and are negative.     I have reviewed and confirmed the accuracy of the ROS as documented 04/07/2021 Cassandra Pratt MD    Objective     Vitals:    05/10/23 1546   BP: 120/77   Pulse: 72   Resp: 18   Temp: 98.2 °F (36.8 °C)   TempSrc: Temporal   SpO2: 98%   Weight: 101 kg (222 lb 9.6 oz)   Height: 151 cm (59.45\")  Comment: NEW    PainSc: 0-No pain         5/10/2023     3:46 PM   Current Status   ECOG score 0     Physical Exam        CONSTITUTIONAL:  Vital signs reviewed.  No distress, looks comfortable.    RESPIRATORY:  Normal respiratory effort.  Lungs clear to auscultation bilaterally.  CARDIOVASCULAR:  Normal S1, S2.  No murmurs rubs or gallops.  No significant lower extremity edema.  GASTROINTESTINAL: Abdomen appears unremarkable.  Nontender.  No hepatomegaly.  No splenomegaly.  LYMPHATIC:  No cervical, supraclavicular, axillary lymphadenopathy.  SKIN:  Warm.  No rashes.  PSYCHIATRIC:  Normal judgment and insight.  Normal mood and affect.    I have reexamined the patient and the results are consistent with the previously documented exam. Cassandra Pratt MD     RECENT LABS:  Results from last 7 days   Lab Units 05/10/23  1539   WBC 10*3/mm3 7.32   NEUTROS ABS 10*3/mm3 4.36   HEMOGLOBIN g/dL 10.1*   HEMATOCRIT % 31.6*   PLATELETS 10*3/mm3 244     Results from last 7 days   Lab Units 05/10/23  1539   SODIUM mmol/L 142   POTASSIUM mmol/L 4.4   CHLORIDE mmol/L 106   CO2 mmol/L 27.9   BUN mg/dL 25*   CREATININE " mg/dL 1.07*   CALCIUM mg/dL 9.4   ALBUMIN g/dL 3.9   BILIRUBIN mg/dL 0.3   ALK PHOS U/L 68   ALT (SGPT) U/L 17   AST (SGOT) U/L 20   GLUCOSE mg/dL 106*   FERRITIN ng/mL 396.00*   IRON mcg/dL 37   TIBC mcg/dL 262*           Two View Chest X-Ray 02/24/19  FINDINGS: The lungs are moderately expanded and clear and there is no  evidence of localized pneumonia. The heart is borderline enlarged  without change from 07/23/2012.    CT Abdomen Pelvis 12/06/18 scanned Image.    Assessment & Plan     1.  Multifactorial  anemia.  With anemia of chronic kidney disease and rheumatoid arthritis  · Seen in consultation March 2019.  Patient reporting longstanding anemia with hemoglobin running in the 10-11 range.  Patient has underlying CKD stage III.  Work-up at that time did not reveal any vitamin deficiency including no iron, B12 or folate deficiency.  No underlying monoclonal protein.  Anemia felt to be related to CKD with also possibly an element of chronic disease with underlying rheumatoid arthritis.  Sed rate was mildly elevated at 44.  Patient lost to follow-up since that time.  · Last colonoscopy and EGD were performed per Dr. Kapoor, negative.  Patient also had a small bowel follow-through which was within normal limits. Other pertinent imaging include a CT of the abdomen pelvis performed December 2018 which was negative.  She is up-to-date on her mammogram, last performed August 2020, negative.  · March 10, 2021: Patient's hemoglobin today is 9.8, discussed consideration of Procrit and will get approval.  Discussed side effects in length with patient today.  We could consider starting Procrit if her hemoglobin continues to drop.  · May 4/2022: Patient was sent here for anemia of chronic kidney disease to see if she is eligible for Procrit.  Since the time she has been seen here she has not required Procrit as her hemoglobin is always about greater than 10.  Iron studies are normal.  She feels good otherwise  · May 10,  2023: We had ordered anemia work-up but did not get done.  She does have anemia of chronic kidney disease but it could be multifactorial and hence will check iron studies B12 folate CAROLINE rheumatoid factor C-reactive protein and EPO level today.    2.  Rheumatoid arthritis for which she takes diclofenac.    3.  Chronic kidney disease stage III.  This is managed per Dr. Fraser.  Creatinine appears relatively stable range from 1.2-1.7 over the last 2 years.  BUN 20s to 30s.  GFR last measured around 39.  This could be the cause of her anemia.    · Creatinine today is 1.54.  Patient continues to follow with nephrology.  · Still not a candidate for Procrit given hemoglobin greater than 10    4.  Left shoulder replacement surgery to be done on May 10, 2021.  To be done by Dr. Anderson    5.  Hypertension stable.  BP today 126/84.    6.  Anaphylactic reaction: Secondary to Jessica and hence had to be placed on prednisone recently    7.  Leukocytosis secondary to steroids    PLAN:  · Anemia work-up was ordered but not done  · We will reorder the labs today with a CAROLINE rheumatoid factor ESR C-reactive protein serum protein electrophoresis iron studies  · B12 and folate were normal  · Follow-up with me in 6 months with labs    MD Rodney Pearce Dr., Dr., orthopedic

## 2023-05-11 LAB
ALBUMIN SERPL ELPH-MCNC: 3.4 G/DL (ref 2.9–4.4)
ALBUMIN/GLOB SERPL: 1.1 {RATIO} (ref 0.7–1.7)
ALPHA1 GLOB SERPL ELPH-MCNC: 0.2 G/DL (ref 0–0.4)
ALPHA2 GLOB SERPL ELPH-MCNC: 0.9 G/DL (ref 0.4–1)
B-GLOBULIN SERPL ELPH-MCNC: 0.9 G/DL (ref 0.7–1.3)
CENTROMERE B AB SER-ACNC: <0.2 AI (ref 0–0.9)
CHROMATIN AB SERPL-ACNC: <0.2 AI (ref 0–0.9)
DSDNA AB SER-ACNC: <1 IU/ML (ref 0–9)
ENA JO1 AB SER-ACNC: <0.2 AI (ref 0–0.9)
ENA RNP AB SER-ACNC: 0.5 AI (ref 0–0.9)
ENA SCL70 AB SER-ACNC: <0.2 AI (ref 0–0.9)
ENA SM AB SER-ACNC: <0.2 AI (ref 0–0.9)
ENA SS-A AB SER-ACNC: <0.2 AI (ref 0–0.9)
ENA SS-B AB SER-ACNC: <0.2 AI (ref 0–0.9)
EPO SERPL-ACNC: 9.7 MIU/ML (ref 2.6–18.5)
GAMMA GLOB SERPL ELPH-MCNC: 1.2 G/DL (ref 0.4–1.8)
GLOBULIN SER-MCNC: 3.3 G/DL (ref 2.2–3.9)
IGA SERPL-MCNC: 219 MG/DL (ref 87–352)
IGG SERPL-MCNC: 1272 MG/DL (ref 586–1602)
IGM SERPL-MCNC: 65 MG/DL (ref 26–217)
INTERPRETATION SERPL IEP-IMP: ABNORMAL
KAPPA LC FREE SER-MCNC: 40.5 MG/L (ref 3.3–19.4)
KAPPA LC FREE/LAMBDA FREE SER: 1.85 {RATIO} (ref 0.26–1.65)
LABORATORY COMMENT REPORT: ABNORMAL
LAMBDA LC FREE SERPL-MCNC: 21.9 MG/L (ref 5.7–26.3)
Lab: NORMAL
M PROTEIN SERPL ELPH-MCNC: ABNORMAL G/DL
PROT SERPL-MCNC: 6.7 G/DL (ref 6–8.5)

## 2023-05-30 ENCOUNTER — TRANSCRIBE ORDERS (OUTPATIENT)
Dept: ADMINISTRATIVE | Facility: HOSPITAL | Age: 63
End: 2023-05-30

## 2023-05-30 DIAGNOSIS — R60.0 LOWER EXTREMITY EDEMA: Primary | ICD-10-CM

## 2023-06-05 ENCOUNTER — CLINICAL SUPPORT (OUTPATIENT)
Dept: ORTHOPEDIC SURGERY | Facility: CLINIC | Age: 63
End: 2023-06-05
Payer: COMMERCIAL

## 2023-06-05 ENCOUNTER — HOSPITAL ENCOUNTER (OUTPATIENT)
Dept: CARDIOLOGY | Facility: HOSPITAL | Age: 63
Discharge: HOME | End: 2023-06-05
Admitting: INTERNAL MEDICINE
Payer: COMMERCIAL

## 2023-06-05 VITALS
HEIGHT: 60 IN | HEART RATE: 60 BPM | DIASTOLIC BLOOD PRESSURE: 77 MMHG | SYSTOLIC BLOOD PRESSURE: 120 MMHG | WEIGHT: 222 LBS | BODY MASS INDEX: 43.59 KG/M2

## 2023-06-05 VITALS — TEMPERATURE: 97.8 F | HEIGHT: 59 IN | WEIGHT: 221 LBS | BODY MASS INDEX: 44.55 KG/M2

## 2023-06-05 DIAGNOSIS — R60.0 LOWER EXTREMITY EDEMA: ICD-10-CM

## 2023-06-05 DIAGNOSIS — M17.11 PRIMARY OSTEOARTHRITIS OF RIGHT KNEE: Primary | ICD-10-CM

## 2023-06-05 DIAGNOSIS — M17.12 PRIMARY OSTEOARTHRITIS OF LEFT KNEE: ICD-10-CM

## 2023-06-05 LAB
ASCENDING AORTA: 3.3 CM
BH CV ECHO MEAS - ACS: 2 CM
BH CV ECHO MEAS - AO MAX PG: 10.5 MMHG
BH CV ECHO MEAS - AO MEAN PG: 5 MMHG
BH CV ECHO MEAS - AO ROOT DIAM: 2.9 CM
BH CV ECHO MEAS - AO V2 MAX: 162 CM/SEC
BH CV ECHO MEAS - AO V2 VTI: 38.8 CM
BH CV ECHO MEAS - AVA(I,D): 1.84 CM2
BH CV ECHO MEAS - EDV(CUBED): 117.6 ML
BH CV ECHO MEAS - EDV(MOD-SP2): 100 ML
BH CV ECHO MEAS - EDV(MOD-SP4): 136 ML
BH CV ECHO MEAS - EF(MOD-BP): 58.3 %
BH CV ECHO MEAS - EF(MOD-SP2): 53 %
BH CV ECHO MEAS - EF(MOD-SP4): 61.8 %
BH CV ECHO MEAS - ESV(CUBED): 27 ML
BH CV ECHO MEAS - ESV(MOD-SP2): 47 ML
BH CV ECHO MEAS - ESV(MOD-SP4): 52 ML
BH CV ECHO MEAS - FS: 38.8 %
BH CV ECHO MEAS - IVS/LVPW: 0.9 CM
BH CV ECHO MEAS - IVSD: 0.9 CM
BH CV ECHO MEAS - LAT PEAK E' VEL: 7.2 CM/SEC
BH CV ECHO MEAS - LV DIASTOLIC VOL/BSA (35-75): 69.7 CM2
BH CV ECHO MEAS - LV MASS(C)D: 164.3 GRAMS
BH CV ECHO MEAS - LV MAX PG: 5.4 MMHG
BH CV ECHO MEAS - LV MEAN PG: 3 MMHG
BH CV ECHO MEAS - LV SYSTOLIC VOL/BSA (12-30): 26.6 CM2
BH CV ECHO MEAS - LV V1 MAX: 116 CM/SEC
BH CV ECHO MEAS - LV V1 VTI: 28 CM
BH CV ECHO MEAS - LVIDD: 4.9 CM
BH CV ECHO MEAS - LVIDS: 3 CM
BH CV ECHO MEAS - LVOT AREA: 2.5 CM2
BH CV ECHO MEAS - LVOT DIAM: 1.8 CM
BH CV ECHO MEAS - LVPWD: 1 CM
BH CV ECHO MEAS - MED PEAK E' VEL: 8.8 CM/SEC
BH CV ECHO MEAS - MV A DUR: 0.15 SEC
BH CV ECHO MEAS - MV A MAX VEL: 123 CM/SEC
BH CV ECHO MEAS - MV DEC SLOPE: 408 CM/SEC2
BH CV ECHO MEAS - MV DEC TIME: 0.32 MSEC
BH CV ECHO MEAS - MV E MAX VEL: 96.8 CM/SEC
BH CV ECHO MEAS - MV E/A: 0.79
BH CV ECHO MEAS - MV MAX PG: 9 MMHG
BH CV ECHO MEAS - MV MEAN PG: 4 MMHG
BH CV ECHO MEAS - MV P1/2T: 83.3 MSEC
BH CV ECHO MEAS - MV V2 VTI: 42.3 CM
BH CV ECHO MEAS - MVA(P1/2T): 2.6 CM2
BH CV ECHO MEAS - MVA(VTI): 1.68 CM2
BH CV ECHO MEAS - PA ACC TIME: 0.17 SEC
BH CV ECHO MEAS - PA PR(ACCEL): 2.9 MMHG
BH CV ECHO MEAS - PA V2 MAX: 96 CM/SEC
BH CV ECHO MEAS - PULM A REVS DUR: 0.1 SEC
BH CV ECHO MEAS - PULM A REVS VEL: 32.3 CM/SEC
BH CV ECHO MEAS - PULM DIAS VEL: 31.8 CM/SEC
BH CV ECHO MEAS - PULM S/D: 1.35
BH CV ECHO MEAS - PULM SYS VEL: 43 CM/SEC
BH CV ECHO MEAS - RAP SYSTOLE: 3 MMHG
BH CV ECHO MEAS - RV MAX PG: 1.95 MMHG
BH CV ECHO MEAS - RV V1 MAX: 69.8 CM/SEC
BH CV ECHO MEAS - RV V1 VTI: 16.1 CM
BH CV ECHO MEAS - RVSP: 30 MMHG
BH CV ECHO MEAS - SI(MOD-SP2): 27.2 ML/M2
BH CV ECHO MEAS - SI(MOD-SP4): 43 ML/M2
BH CV ECHO MEAS - SV(LVOT): 71.3 ML
BH CV ECHO MEAS - SV(MOD-SP2): 53 ML
BH CV ECHO MEAS - SV(MOD-SP4): 84 ML
BH CV ECHO MEAS - TAPSE (>1.6): 2.03 CM
BH CV ECHO MEAS - TR MAX PG: 27.5 MMHG
BH CV ECHO MEAS - TR MAX VEL: 262 CM/SEC
BH CV ECHO MEASUREMENTS AVERAGE E/E' RATIO: 12.1
BH CV XLRA - RV BASE: 3.2 CM
BH CV XLRA - RV LENGTH: 6.5 CM
BH CV XLRA - RV MID: 2.19 CM
BH CV XLRA - TDI S': 11 CM/SEC
LEFT ATRIUM VOLUME INDEX: 17 ML/M2
SINUS: 2.6 CM
STJ: 2.7 CM

## 2023-06-05 PROCEDURE — 25510000001 PERFLUTREN (DEFINITY) 8.476 MG IN SODIUM CHLORIDE (PF) 0.9 % 10 ML INJECTION: Performed by: INTERNAL MEDICINE

## 2023-06-05 PROCEDURE — 93306 TTE W/DOPPLER COMPLETE: CPT

## 2023-06-05 PROCEDURE — 93306 TTE W/DOPPLER COMPLETE: CPT | Performed by: INTERNAL MEDICINE

## 2023-06-05 RX ORDER — LIDOCAINE HYDROCHLORIDE 10 MG/ML
2 INJECTION, SOLUTION EPIDURAL; INFILTRATION; INTRACAUDAL; PERINEURAL
Status: COMPLETED | OUTPATIENT
Start: 2023-06-05 | End: 2023-06-05

## 2023-06-05 RX ORDER — METHYLPREDNISOLONE ACETATE 80 MG/ML
80 INJECTION, SUSPENSION INTRA-ARTICULAR; INTRALESIONAL; INTRAMUSCULAR; SOFT TISSUE
Status: COMPLETED | OUTPATIENT
Start: 2023-06-05 | End: 2023-06-05

## 2023-06-05 RX ADMIN — SODIUM CHLORIDE 2 ML: 9 INJECTION INTRAMUSCULAR; INTRAVENOUS; SUBCUTANEOUS at 09:46

## 2023-06-05 RX ADMIN — LIDOCAINE HYDROCHLORIDE 2 ML: 10 INJECTION, SOLUTION EPIDURAL; INFILTRATION; INTRACAUDAL; PERINEURAL at 10:33

## 2023-06-05 RX ADMIN — METHYLPREDNISOLONE ACETATE 80 MG: 80 INJECTION, SUSPENSION INTRA-ARTICULAR; INTRALESIONAL; INTRAMUSCULAR; SOFT TISSUE at 10:33

## 2023-06-05 NOTE — PROGRESS NOTES
"Ms. Powell follows up today for both knees.  The last injections helped but they did not last quite as long as previous injections.  She does feel like her symptoms are slowly getting worse.  She asked about a \"pain cream\".  I agreed to give her a prescription for Rx alternatives.  The risk, benefits and alternatives to repeat knee injections were discussed.  She consented and the injections were performed as described below.  She will follow-up as needed.          Large Joint Arthrocentesis: R knee  Date/Time: 6/5/2023 10:33 AM  Consent given by: patient  Site marked: site marked  Timeout: Immediately prior to procedure a time out was called to verify the correct patient, procedure, equipment, support staff and site/side marked as required   Supporting Documentation  Indications: pain and joint swelling   Procedure Details  Location: knee - R knee  Preparation: Patient was prepped and draped in the usual sterile fashion  Needle size: 25 G (21 G)  Approach: anterolateral  Medications administered: 80 mg methylPREDNISolone acetate 80 MG/ML; 2 mL lidocaine PF 1% 1 %  Patient tolerance: patient tolerated the procedure well with no immediate complications      Large Joint Arthrocentesis: L knee  Date/Time: 6/5/2023 10:33 AM  Consent given by: patient  Site marked: site marked  Timeout: Immediately prior to procedure a time out was called to verify the correct patient, procedure, equipment, support staff and site/side marked as required   Supporting Documentation  Indications: pain   Procedure Details  Location: knee - L knee  Preparation: Patient was prepped and draped in the usual sterile fashion  Needle gauge: 21 G.  Approach: anterolateral  Medications administered: 80 mg methylPREDNISolone acetate 80 MG/ML; 2 mL lidocaine PF 1% 1 %  Patient tolerance: patient tolerated the procedure well with no immediate complications      "

## 2023-06-09 DIAGNOSIS — I10 HYPERTENSION, UNSPECIFIED TYPE: ICD-10-CM

## 2023-06-09 RX ORDER — IRBESARTAN 300 MG/1
300 TABLET ORAL DAILY
Qty: 90 TABLET | Refills: 1 | Status: SHIPPED | OUTPATIENT
Start: 2023-06-09

## 2023-06-09 NOTE — PROGRESS NOTES
New Shoulder      Patient: Pedrito Powell        YOB: 1960    Medical Record Number: [id number]        Chief Complaints:       History of Present Illness: This is a        Allergies:   Allergies   Allergen Reactions   • Cashew Nut Oil Anaphylaxis   • Chocolate Anaphylaxis   • Nickel Anaphylaxis   • Nuts Anaphylaxis   • Adhesive Tape Unknown - Low Severity   • Chlorhexidine Unknown - Low Severity   • Ciprofloxacin Other (See Comments)     THRUSH PER PATIENT   • Citric Acid Unknown (See Comments)     Unsure     • Covid-19 (Mrna) Vaccine Other (See Comments)     INSTRUCTED PER ALLERGIST SHE CAN NOT TAKE D/T ONE OF THE PRESERVATIVES    INSTRUCTED PER ALLERGIST SHE CAN NOT TAKE D/T ONE OF THE PRESERVATIVES      *is able to & has had the PFIZER vaccine*   • Eggs Or Egg-Derived Products Nausea And Vomiting   • Influenza Vaccines Nausea And Vomiting   • Methyldibromoglutaronitrile Unknown (See Comments)      PATIENT UNSURE OF REACTION.   • Naproxen Other (See Comments)   • Neomycin Unknown (See Comments)      PATIENT UNSURE OF REACTION.   • Omnicef [Cefdinir] Other (See Comments)     THRUSH PER PATIENT   • Palladium Chloride Unknown (See Comments)      PATIENT UNSURE OF REACTION   • Penicillins Other (See Comments)     THRUSH PER PATIENT   • Sulfa Antibiotics Other (See Comments)     THRUSH PER PATIENT   • Tomato Hives   • Yeast-Related Products Hives   • Gold-Containing Drug Products Rash         • Latex Rash       Medications:   Home Medications:  Current Outpatient Medications on File Prior to Visit   Medication Sig   • albuterol 108 (90 Base) MCG/ACT inhaler Inhale 2 puffs Every 4 (Four) Hours As Needed for Wheezing.   • amitriptyline (ELAVIL) 25 MG tablet Take 1 tablet by mouth As Needed.   • Aspirin (Vazalore) 325 MG capsule As Needed.   • aspirin 325 MG tablet Take 1 tablet by mouth Daily. HOLD ONE WEEK PRIOR TO SURGERY   • atorvastatin (LIPITOR) 80 MG tablet Take 1 tablet by mouth Daily.   •  azelastine (ASTELIN) 0.1 % nasal spray 1 spray into the nostril(s) as directed by provider Daily.   • B Complex Vitamins (B COMPLEX PO) Take 1 capsule by mouth Daily. HOLD FOR SURGERY 1 WEEK   • cephalexin (Keflex) 500 MG capsule Take all 4 caps 1 hour prior to procedure   • cetirizine (zyrTEC) 10 MG tablet Take 1 tablet by mouth Daily.   • Cholecalciferol (Vitamin D3) 50 MCG (2000 UT) capsule Take 1 capsule by mouth Daily.   • clobetasol (TEMOVATE) 0.05 % cream Apply 1 application topically to the appropriate area as directed As Needed.   • EPINEPHrine (AUVI-Q IJ) Inject  as directed As Needed.   • ferrous gluconate (FERGON) 324 MG tablet TAKE 1 TABLET BY MOUTH DAILY WITH BREAKFAST   • Flovent HFA 44 MCG/ACT inhaler Inhale 2 puffs 2 (Two) Times a Day.   • hydrocortisone 2.5 % cream Apply  topically to the appropriate area as directed As Needed.   • Ibuprofen 3 %, Gabapentin 10 %, Baclofen 2 %, lidocaine 4 % Apply 1-2 g topically to the appropriate area as directed 3 (Three) to 4 (Four) times daily.   • ipratropium (ATROVENT) 0.03 % nasal spray 1 spray into the nostril(s) as directed by provider 3 (Three) Times a Day.   • irbesartan (AVAPRO) 300 MG tablet TAKE 1 TABLET BY MOUTH DAILY   • loperamide (IMODIUM) 2 MG capsule Take 1 capsule by mouth Every 2 (Two) Hours As Needed for Diarrhea (max 4 doses daily).   • meclizine (ANTIVERT) 25 MG tablet TAKE 1 TABLET BY MOUTH THREE TIMES DAILY AS NEEDED FOR DIZZINESS   • ondansetron ODT (Zofran ODT) 4 MG disintegrating tablet Place 1 tablet on the tongue Every 8 (Eight) Hours As Needed for Nausea or Vomiting.   • triamcinolone (KENALOG) 0.1 % ointment Apply 1 application topically to the appropriate area as directed As Needed.   • Vortioxetine HBr (Trintellix) 10 MG tablet tablet Take 1 tablet by mouth Daily.   • [DISCONTINUED] irbesartan (AVAPRO) 300 MG tablet TAKE 1 TABLET BY MOUTH DAILY     Current Facility-Administered Medications on File Prior to Visit   Medication   •  Chlorhexidine Gluconate 2 % pads 1 each     Current Medications:  Scheduled Meds:  Continuous Infusions:No current facility-administered medications for this visit.    PRN Meds:.    Past Medical History:   Diagnosis Date   • Anemia     IRON INFUSION RECENTLY   • Arthritis    • Asthma    • Chronic kidney disease     stage 3   • Elevated cholesterol    • GERD (gastroesophageal reflux disease)    • History of carpal tunnel syndrome    • Hyperlipidemia    • Hypertension    • Left shoulder pain    • Limited mobility    • Vertigo    • Weakness     AT TIMES LEFT SHOULDER/LEFT ARM        Past Surgical History:   Procedure Laterality Date   •  SECTION  , ,    • COLONOSCOPY N/A 12/10/2018    normal ileum, IH, hyperplastic polyp, otherwise normal exam   • ENDOSCOPY N/A 12/10/2018    no gross lesions in esophagus or examined duodenum, erythematous mucosa in stomach, biopsies benign   • HYSTERECTOMY     • OOPHORECTOMY Bilateral    • SHOULDER MANIPULATION Left    • TOTAL SHOULDER ARTHROPLASTY Left 2021    Procedure: REVERSE TOTAL SHOULDER ARTHROPLASTY,  BICEP TENDONDESIS;  Surgeon: Bethel Devi MD;  Location: [facility];  Service: Orthopedics;  Laterality: Left;        Social History     Occupational History   • Occupation: Teacher     [employer]   Tobacco Use   • Smoking status: Never   • Smokeless tobacco: Never   Vaping Use   • Vaping Use: Never used   Substance and Sexual Activity   • Alcohol use: Yes     Alcohol/week: 1.0 standard drink     Types: 1 Glasses of wine per week     Comment: WEEKLY   • Drug use: No   • Sexual activity: Defer      Social History     Social History Narrative   • Not on file        Family History   Problem Relation Age of Onset   • Asthma Mother    • Thyroid disease Father    • Diabetes Maternal Grandmother    • Breast cancer Maternal Aunt    • Colon cancer Maternal Grandfather    • Malig Hyperthermia Neg Hx              Review of  Systems: ***    Review of Systems      Physical Exam: 62 y.o. female  General Appearance:    Alert, cooperative, in no acute distress                 There were no vitals filed for this visit.   Patient is alert and read ×3 no acute distress appears her above-listed at height weight and age.  Affect is normal respiratory rate is normal unlabored. Heart rate regular rate rhythm, sclera, dentition and hearing are normal for the purpose of this exam.    Ortho Exam    Procedures          Radiology:   AP, Scapular Y and Axillary Lateral of the *** shoulder were ordered/reviewed to evauate shoulder pain.  Imaging Results (Most Recent)     None        Assessment/Plan:  {[email or web address]}
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
## 2023-06-12 ENCOUNTER — OFFICE VISIT (OUTPATIENT)
Dept: ORTHOPEDIC SURGERY | Facility: CLINIC | Age: 63
End: 2023-06-12
Payer: COMMERCIAL

## 2023-06-12 VITALS — TEMPERATURE: 97.8 F | HEIGHT: 59 IN | WEIGHT: 220.4 LBS | BODY MASS INDEX: 44.43 KG/M2

## 2023-06-12 DIAGNOSIS — M25.521 RIGHT ELBOW PAIN: Primary | ICD-10-CM

## 2023-06-12 DIAGNOSIS — G56.21 CUBITAL TUNNEL SYNDROME ON RIGHT: ICD-10-CM

## 2023-06-12 PROCEDURE — 99213 OFFICE O/P EST LOW 20 MIN: CPT | Performed by: ORTHOPAEDIC SURGERY

## 2023-06-14 DIAGNOSIS — E78.5 HYPERLIPIDEMIA, UNSPECIFIED HYPERLIPIDEMIA TYPE: ICD-10-CM

## 2023-06-15 RX ORDER — ATORVASTATIN CALCIUM 80 MG/1
80 TABLET, FILM COATED ORAL DAILY
Qty: 90 TABLET | Refills: 0 | Status: SHIPPED | OUTPATIENT
Start: 2023-06-15

## 2023-08-14 ENCOUNTER — TELEPHONE (OUTPATIENT)
Dept: INTERNAL MEDICINE | Facility: CLINIC | Age: 63
End: 2023-08-14
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
## 2023-09-06 ENCOUNTER — CLINICAL SUPPORT (OUTPATIENT)
Dept: ORTHOPEDIC SURGERY | Facility: CLINIC | Age: 63
End: 2023-09-06
Payer: COMMERCIAL

## 2023-09-06 VITALS — TEMPERATURE: 98.7 F | BODY MASS INDEX: 45.76 KG/M2 | WEIGHT: 227 LBS | HEIGHT: 59 IN

## 2023-09-06 DIAGNOSIS — M17.10 ARTHRITIS OF KNEE: Primary | ICD-10-CM

## 2023-09-06 RX ORDER — LIDOCAINE HYDROCHLORIDE 20 MG/ML
2 INJECTION, SOLUTION INTRAVENOUS
Status: COMPLETED | OUTPATIENT
Start: 2023-09-06 | End: 2023-09-06

## 2023-09-06 RX ORDER — METHYLPREDNISOLONE ACETATE 80 MG/ML
1 INJECTION, SUSPENSION INTRA-ARTICULAR; INTRALESIONAL; INTRAMUSCULAR; SOFT TISSUE
Status: COMPLETED | OUTPATIENT
Start: 2023-09-06 | End: 2023-09-06

## 2023-09-06 RX ADMIN — METHYLPREDNISOLONE ACETATE 1 ML: 80 INJECTION, SUSPENSION INTRA-ARTICULAR; INTRALESIONAL; INTRAMUSCULAR; SOFT TISSUE at 16:29

## 2023-09-06 RX ADMIN — LIDOCAINE HYDROCHLORIDE 2 ML: 20 INJECTION, SOLUTION INTRAVENOUS at 16:29

## 2023-09-06 NOTE — PROGRESS NOTES
Ms. Powell follows up today for both knees.  She would like the injections repeated.  No new complaints or issues.  The risk, benefits and alternatives were discussed.  She consented and the injections were performed as described below.    Large Joint Arthrocentesis: R knee  Date/Time: 9/6/2023 4:29 PM  Consent given by: patient  Site marked: site marked  Timeout: Immediately prior to procedure a time out was called to verify the correct patient, procedure, equipment, support staff and site/side marked as required   Supporting Documentation  Indications: pain   Procedure Details  Location: knee - R knee  Preparation: Patient was prepped and draped in the usual sterile fashion  Needle gauge: 21G.  Approach: anterolateral  Medications administered: 1 mL methylPREDNISolone acetate 80 MG/ML; 2 mL Lidocaine HCl (Cardiac)  MG/5ML  Patient tolerance: patient tolerated the procedure well with no immediate complications      Large Joint Arthrocentesis: L knee  Date/Time: 9/6/2023 4:29 PM  Consent given by: patient  Site marked: site marked  Timeout: Immediately prior to procedure a time out was called to verify the correct patient, procedure, equipment, support staff and site/side marked as required   Supporting Documentation  Indications: pain   Procedure Details  Location: knee - L knee  Preparation: Patient was prepped and draped in the usual sterile fashion  Needle gauge: 21G.  Approach: anterolateral  Medications administered: 1 mL methylPREDNISolone acetate 80 MG/ML; 2 mL Lidocaine HCl (Cardiac)  MG/5ML  Patient tolerance: patient tolerated the procedure well with no immediate complications

## 2023-09-12 DIAGNOSIS — E78.5 HYPERLIPIDEMIA, UNSPECIFIED HYPERLIPIDEMIA TYPE: ICD-10-CM

## 2023-09-12 RX ORDER — ATORVASTATIN CALCIUM 80 MG/1
80 TABLET, FILM COATED ORAL DAILY
Qty: 90 TABLET | Refills: 0 | Status: SHIPPED | OUTPATIENT
Start: 2023-09-12

## 2023-09-17 DIAGNOSIS — I10 HYPERTENSION, UNSPECIFIED TYPE: ICD-10-CM

## 2023-09-18 ENCOUNTER — TELEPHONE (OUTPATIENT)
Dept: INTERNAL MEDICINE | Facility: CLINIC | Age: 63
End: 2023-09-18
Payer: COMMERCIAL

## 2023-09-18 DIAGNOSIS — I10 HYPERTENSION, UNSPECIFIED TYPE: ICD-10-CM

## 2023-09-18 RX ORDER — IRBESARTAN 300 MG/1
300 TABLET ORAL DAILY
Qty: 90 TABLET | Refills: 1 | Status: SHIPPED | OUTPATIENT
Start: 2023-09-18

## 2023-09-18 RX ORDER — IRBESARTAN 300 MG/1
300 TABLET ORAL DAILY
Qty: 90 TABLET | Refills: 1 | Status: SHIPPED | OUTPATIENT
Start: 2023-09-18 | End: 2023-09-18 | Stop reason: SDUPTHER

## 2023-09-18 NOTE — TELEPHONE ENCOUNTER
Caller: SherryLindsayPedrito R    Relationship: Self    Best call back number: [phone]    Requested Prescriptions:   Requested Prescriptions     Pending Prescriptions Disp Refills    irbesartan (AVAPRO) 300 MG tablet 90 tablet 1     Sig: Take 1 tablet by mouth Daily.        Pharmacy where request should be sent: [id number] [id number] [place] [address] AT [facility] [phone] [facility] [phone]      Last office visit with prescribing clinician: 6/26/2023   Last telemedicine visit with prescribing clinician: Visit date not found   Next office visit with prescribing clinician: 9/26/2023     Additional details provided by patient:     Does the patient have less than a 3 day supply:  [x] Yes  [] No    Would you like a call back once the refill request has been completed: [] Yes [] No    If the office needs to give you a call back, can they leave a voicemail: [] Yes [] No    Pravin Lutz Rep   09/18/23 11:20 EDT

## 2023-09-18 NOTE — TELEPHONE ENCOUNTER
Caller: Pedrito Powell    Relationship: Self    Best call back number: [phone]    What is the best time to reach you: ANY    Who are you requesting to speak with (clinical staff, provider,  specific staff member): CLINICAL        What was the call regarding: PATIENT STATED THAT SHE HAS A PIECE OF GLASS IN HER HAND AND WANTED TO KNOW IF YOU CAN GET IT OUT OR IF SHE NEEDS TO GO SOMEWHERE ELSE FOR THAT?

## 2023-11-15 ENCOUNTER — OFFICE VISIT (OUTPATIENT)
Dept: ONCOLOGY | Facility: CLINIC | Age: 63
End: 2023-11-15
Payer: COMMERCIAL

## 2023-11-15 ENCOUNTER — LAB (OUTPATIENT)
Dept: OTHER | Facility: HOSPITAL | Age: 63
End: 2023-11-15
Payer: COMMERCIAL

## 2023-11-15 VITALS
TEMPERATURE: 97.5 F | OXYGEN SATURATION: 99 % | BODY MASS INDEX: 44.76 KG/M2 | HEIGHT: 60 IN | DIASTOLIC BLOOD PRESSURE: 86 MMHG | SYSTOLIC BLOOD PRESSURE: 132 MMHG | RESPIRATION RATE: 16 BRPM | WEIGHT: 228 LBS | HEART RATE: 63 BPM

## 2023-11-15 DIAGNOSIS — N18.30 ANEMIA DUE TO STAGE 3 CHRONIC KIDNEY DISEASE, UNSPECIFIED WHETHER STAGE 3A OR 3B CKD: ICD-10-CM

## 2023-11-15 DIAGNOSIS — E61.1 IRON DEFICIENCY: Primary | ICD-10-CM

## 2023-11-15 DIAGNOSIS — D63.1 ANEMIA DUE TO STAGE 3 CHRONIC KIDNEY DISEASE, UNSPECIFIED WHETHER STAGE 3A OR 3B CKD: ICD-10-CM

## 2023-11-15 LAB
ALBUMIN SERPL-MCNC: 4 G/DL (ref 3.5–5.2)
ALBUMIN/GLOB SERPL: 1.3 G/DL
ALP SERPL-CCNC: 77 U/L (ref 39–117)
ALT SERPL W P-5'-P-CCNC: 14 U/L (ref 1–33)
ANION GAP SERPL CALCULATED.3IONS-SCNC: 8.7 MMOL/L (ref 5–15)
AST SERPL-CCNC: 16 U/L (ref 1–32)
BASOPHILS # BLD AUTO: 0.05 10*3/MM3 (ref 0–0.2)
BASOPHILS NFR BLD AUTO: 0.7 % (ref 0–1.5)
BILIRUB SERPL-MCNC: 0.2 MG/DL (ref 0–1.2)
BUN SERPL-MCNC: 18 MG/DL (ref 8–23)
BUN/CREAT SERPL: 21.7 (ref 7–25)
CALCIUM SPEC-SCNC: 8.9 MG/DL (ref 8.6–10.5)
CHLORIDE SERPL-SCNC: 103 MMOL/L (ref 98–107)
CO2 SERPL-SCNC: 26.3 MMOL/L (ref 22–29)
CREAT SERPL-MCNC: 0.83 MG/DL (ref 0.57–1)
DEPRECATED RDW RBC AUTO: 44.3 FL (ref 37–54)
EGFRCR SERPLBLD CKD-EPI 2021: 79.3 ML/MIN/1.73
EOSINOPHIL # BLD AUTO: 0.32 10*3/MM3 (ref 0–0.4)
EOSINOPHIL NFR BLD AUTO: 4.6 % (ref 0.3–6.2)
ERYTHROCYTE [DISTWIDTH] IN BLOOD BY AUTOMATED COUNT: 12.9 % (ref 12.3–15.4)
FERRITIN SERPL-MCNC: 297 NG/ML (ref 13–150)
GLOBULIN UR ELPH-MCNC: 3.1 GM/DL
GLUCOSE SERPL-MCNC: 84 MG/DL (ref 65–99)
HCT VFR BLD AUTO: 31.5 % (ref 34–46.6)
HGB BLD-MCNC: 9.9 G/DL (ref 12–15.9)
IMM GRANULOCYTES # BLD AUTO: 0.01 10*3/MM3 (ref 0–0.05)
IMM GRANULOCYTES NFR BLD AUTO: 0.1 % (ref 0–0.5)
IRON 24H UR-MRATE: 37 MCG/DL (ref 37–145)
IRON SATN MFR SERPL: 15 % (ref 20–50)
LYMPHOCYTES # BLD AUTO: 2.11 10*3/MM3 (ref 0.7–3.1)
LYMPHOCYTES NFR BLD AUTO: 30.4 % (ref 19.6–45.3)
MCH RBC QN AUTO: 29.6 PG (ref 26.6–33)
MCHC RBC AUTO-ENTMCNC: 31.4 G/DL (ref 31.5–35.7)
MCV RBC AUTO: 94 FL (ref 79–97)
MONOCYTES # BLD AUTO: 0.65 10*3/MM3 (ref 0.1–0.9)
MONOCYTES NFR BLD AUTO: 9.4 % (ref 5–12)
NEUTROPHILS NFR BLD AUTO: 3.8 10*3/MM3 (ref 1.7–7)
NEUTROPHILS NFR BLD AUTO: 54.8 % (ref 42.7–76)
NRBC BLD AUTO-RTO: 0 /100 WBC (ref 0–0.2)
PLATELET # BLD AUTO: 223 10*3/MM3 (ref 140–450)
PMV BLD AUTO: 9.1 FL (ref 6–12)
POTASSIUM SERPL-SCNC: 4.2 MMOL/L (ref 3.5–5.2)
PROT SERPL-MCNC: 7.1 G/DL (ref 6–8.5)
RBC # BLD AUTO: 3.35 10*6/MM3 (ref 3.77–5.28)
SODIUM SERPL-SCNC: 138 MMOL/L (ref 136–145)
TIBC SERPL-MCNC: 249 MCG/DL (ref 298–536)
TRANSFERRIN SERPL-MCNC: 167 MG/DL (ref 200–360)
WBC NRBC COR # BLD: 6.94 10*3/MM3 (ref 3.4–10.8)

## 2023-11-15 PROCEDURE — 84466 ASSAY OF TRANSFERRIN: CPT | Performed by: INTERNAL MEDICINE

## 2023-11-15 PROCEDURE — 80053 COMPREHEN METABOLIC PANEL: CPT | Performed by: INTERNAL MEDICINE

## 2023-11-15 PROCEDURE — 99213 OFFICE O/P EST LOW 20 MIN: CPT | Performed by: INTERNAL MEDICINE

## 2023-11-15 PROCEDURE — 83540 ASSAY OF IRON: CPT | Performed by: INTERNAL MEDICINE

## 2023-11-15 PROCEDURE — 85025 COMPLETE CBC W/AUTO DIFF WBC: CPT | Performed by: INTERNAL MEDICINE

## 2023-11-15 PROCEDURE — 36415 COLL VENOUS BLD VENIPUNCTURE: CPT

## 2023-11-15 PROCEDURE — 82728 ASSAY OF FERRITIN: CPT | Performed by: INTERNAL MEDICINE

## 2023-11-15 RX ORDER — FAMOTIDINE 20 MG/1
20 TABLET, FILM COATED ORAL
COMMUNITY

## 2023-11-15 NOTE — PROGRESS NOTES
Subjective     REASON FOR follow-up: Multifactorial anemia, anemia of chronic kidney disease and rheumatoid arthritis                             History of Present Illness patient is a 60-year-old female who was referred to us for evaluation of chronic anemia.  She does have an autoimmune component with her history of rheumatoid arthritis.  She also has mild renal insufficiency.    Anemia work-up showed a B12 of 494,Ferritin 160, serum iron of 36 with percent saturation of 13, EPO level of 8.3, creatinine of 1.49, serum protein electrophoresis did not show monoclonal protein.  ESR is 35.  There are no signs of infection.  Currently her hemoglobin is 9.8 and she is asymptomatic.  Dr. Pratt did discuss about the possibility of Procrit but at the present time after discussing the side effects she wants to be observed.    Patient returns today for follow up of anemia.  She is scheduled to have a left shoulder replacement surgery on May 10, 2021 and would therefore like to get Retacrit if her hemoglobin is low enough to help improve her hemoglobin before surgery.  She was also seen by her nephrologist who scheduled her for two doses of IV Feraheme prior to shoulder surgery, first dose 4/8/2021.  She is feeling well today.  She denies shortness of breath, dizziness, lightheadedness, or fatigue outside of her baseline.    Interval history:  Patient has chronic mild fatigue.  Hemoglobin today is 9.9.  She has not required Procrit so far for her chronic kidney disease as her hemoglobin has been above 10    .    Hematology history:  Ms. Powell is seen today after being referred back to us by her primary care physician, Dr. Teo Fraser.  We saw the patient in consultation in March 2019 for chronic anemia.  Patient has had a long history of rheumatoid arthritis with chronic anemia and reported chronic difficulty with iron deficiency, taking oral iron without much benefit.  At that time of initial consultation we  discussed her anemia was likely related to underlying CKD stage III, as well as possibly her rheumatoid arthritis.  We did evaluate for vitamin deficiencies and she was not found to be folate, B12, or iron deficient.  Sed rate was slightly elevated at 44 at that time.    Most recent lab work performed by Dr. Fraser 1/19/2021 showed a hemoglobin of 9.4, creatinine 1.3, BUN 32, GFR 39.  Upon review of the EMR it appears at least for the last roughly 2 years her creatinine has been anywhere from 1.2 to as high as 1.7, BUN ranging in the 20s to 30s.  So overall relatively stable.      Last colonoscopy and EGD were performed per Dr. Kapoor, negative.  Patient also had a small bowel follow-through which was within normal limits. Other pertinent imaging include a CT of the abdomen pelvis performed December 2018 which was negative.  She is up-to-date on her mammogram, last performed August 2020, negative.    As she is reviewed today, hemoglobin is at stable at 9.8.  Patient denies any significant fatigue and feels that she has fairly good performance status.  She does note a few times a week taking a 3 to 4-hour nap in the afternoon but does not require this on a regular basis.  She is still able to complete the activity she desires.  Patient is a  and lives alone.  She denies any obvious GI blood loss.      She reports normal appetite.  Weight is stable.  She does have significant left shoulder pain and will undergo replacement surgery per Dr. Devi in May 2021.    Otherwise she denies further concerns at this time.    Past Medical History:   Diagnosis Date    Anemia     IRON INFUSION RECENTLY    Arthritis     Asthma     Chronic kidney disease     stage 3    Elevated cholesterol     GERD (gastroesophageal reflux disease)     History of carpal tunnel syndrome     Hyperlipidemia     Hypertension     Left shoulder pain     Limited mobility     Vertigo     Weakness     AT TIMES LEFT SHOULDER/LEFT ARM        Past  Surgical History:   Procedure Laterality Date     SECTION  , ,     COLONOSCOPY N/A 12/10/2018    normal ileum, IH, hyperplastic polyp, otherwise normal exam    ENDOSCOPY N/A 12/10/2018    no gross lesions in esophagus or examined duodenum, erythematous mucosa in stomach, biopsies benign    HYSTERECTOMY  2000    OOPHORECTOMY Bilateral     SHOULDER MANIPULATION Left     TOTAL SHOULDER ARTHROPLASTY Left 2021    Procedure: REVERSE TOTAL SHOULDER ARTHROPLASTY,  BICEP TENDONDESIS;  Surgeon: Bethel Devi MD;  Location: [facility];  Service: Orthopedics;  Laterality: Left;        Current Outpatient Medications on File Prior to Visit   Medication Sig Dispense Refill    acetaminophen (TYLENOL) 500 MG tablet Take 1 tablet by mouth Every 6 (Six) Hours As Needed for Mild Pain. 30 tablet 0    albuterol 108 (90 Base) MCG/ACT inhaler Inhale 2 puffs Every 4 (Four) Hours As Needed for Wheezing.      ASPIRIN 81 PO Take  by mouth.      atorvastatin (LIPITOR) 80 MG tablet TAKE 1 TABLET BY MOUTH DAILY 90 tablet 0    azelastine (ASTELIN) 0.1 % nasal spray 1 spray into the nostril(s) as directed by provider Daily.  11    B Complex Vitamins (B COMPLEX PO) Take 1 capsule by mouth Daily. HOLD FOR SURGERY 1 WEEK      cephalexin (Keflex) 500 MG capsule Take all 4 caps 1 hour prior to procedure 4 capsule 5    cetirizine (zyrTEC) 10 MG tablet Take 1 tablet by mouth Daily. 30 tablet 0    cetirizine (zyrTEC) 10 MG tablet Take 1 tablet by mouth Daily. 24 tablet 0    Cholecalciferol (Vitamin D3) 50 MCG (2000 UT) capsule TAKE 1 CAPSULE BY MOUTH EVERY DAY 90 capsule 1    clobetasol (TEMOVATE) 0.05 % cream Apply 1 application  topically to the appropriate area as directed As Needed.      famotidine (PEPCID) 20 MG tablet Take 1 tablet by mouth.      ferrous gluconate (FERGON) 324 MG tablet TAKE 1 TABLET BY MOUTH DAILY WITH BREAKFAST 90 tablet 1    Flovent HFA 44 MCG/ACT inhaler Inhale 2 puffs 2 (Two) Times a Day.       hydrocortisone 2.5 % cream Apply  topically to the appropriate area as directed As Needed.      Ibuprofen 3 %, Gabapentin 10 %, Baclofen 2 %, lidocaine 4 % Apply 1-2 g topically to the appropriate area as directed 3 (Three) to 4 (Four) times daily. 90 g 1    Ibuprofen 3 %, Gabapentin 10 %, Baclofen 2 %, lidocaine 4 % Apply 1-2 g topically to the appropriate area as directed 3 (Three) to 4 (Four) times daily. 90 g 1    ipratropium (ATROVENT) 0.03 % nasal spray 1 spray into the nostril(s) as directed by provider 3 (Three) Times a Day.      irbesartan (AVAPRO) 300 MG tablet Take 1 tablet by mouth Daily. 90 tablet 1    loperamide (IMODIUM) 2 MG capsule Take 1 capsule by mouth Every 2 (Two) Hours As Needed for Diarrhea (max 4 doses daily).      meclizine (ANTIVERT) 25 MG tablet TAKE 1 TABLET BY MOUTH THREE TIMES DAILY AS NEEDED FOR DIZZINESS 270 tablet 0    ondansetron ODT (Zofran ODT) 4 MG disintegrating tablet Place 1 tablet on the tongue Every 8 (Eight) Hours As Needed for Nausea or Vomiting. 60 tablet 3    triamcinolone (KENALOG) 0.1 % ointment Apply 1 application  topically to the appropriate area as directed As Needed.      Vortioxetine HBr (Trintellix) 10 MG tablet tablet Take 1 tablet by mouth Daily. 30 tablet 6    EPINEPHrine (AUVI-Q IJ) Inject  as directed As Needed. (Patient not taking: Reported on 11/15/2023)      [DISCONTINUED] Aspirin (V[place]alore) 325 MG capsule As Needed.      [DISCONTINUED] aspirin 325 MG tablet Take 1 tablet by mouth Daily. HOLD ONE WEEK PRIOR TO SURGERY      [DISCONTINUED] azithromycin (ZITHROMAX) 500 MG tablet Take 1 tablet by mouth Daily. 5 tablet 0     Current Facility-Administered Medications on File Prior to Visit   Medication Dose Route Frequency Provider Last Rate Last Admin    Chlorhexidine Gluconate 2 % pads 1 each  1 each Apply externally Take As Directed Bethel Devi MD            ALLERGIES:    Allergies   Allergen Reactions    Cashew Nut Oil Anaphylaxis    Chocolate  Anaphylaxis    Nickel Anaphylaxis    Nuts Anaphylaxis    Adhesive Tape Unknown - Low Severity    Chlorhexidine Unknown - Low Severity    Ciprofloxacin Other (See Comments)     THRUSH PER PATIENT    Citric Acid Unknown (See Comments)     Unsure      Covid-19 (Mrna) Vaccine Other (See Comments)     INSTRUCTED PER ALLERGIST SHE CAN NOT TAKE D/T ONE OF THE PRESERVATIVES    INSTRUCTED PER ALLERGIST SHE CAN NOT TAKE D/T ONE OF THE PRESERVATIVES      *is able to & has had the PFIZER vaccine*    Eggs Or Egg-Derived Products Nausea And Vomiting    Influenza Vaccines Nausea And Vomiting    Methyldibromoglutaronitrile Unknown (See Comments)      PATIENT UNSURE OF REACTION.    Naproxen Other (See Comments)    Neomycin Unknown (See Comments)      PATIENT UNSURE OF REACTION.    Omnicef [Cefdinir] Other (See Comments)     THRUSH PER PATIENT    Palladium Chloride Unknown (See Comments)      PATIENT UNSURE OF REACTION    Penicillins Other (See Comments)     THRUSH PER PATIENT    Sulfa Antibiotics Other (See Comments)     THRUSH PER PATIENT    Tomato Hives    Yeast-Related Products Hives    Gold-Containing Drug Products Rash          Latex Rash        Social History     Socioeconomic History    Marital status:     Number of children: 3    Years of education: College   Tobacco Use    Smoking status: Never    Smokeless tobacco: Never   Vaping Use    Vaping Use: Never used   Substance and Sexual Activity    Alcohol use: Yes     Alcohol/week: 1.0 standard drink of alcohol     Types: 1 Glasses of wine per week     Comment: WEEKLY    Drug use: No    Sexual activity: Defer        Family History   Problem Relation Age of Onset    Asthma Mother     Thyroid disease Father     Diabetes Maternal Grandmother     Breast cancer Maternal Aunt     Colon cancer Maternal Grandfather     Malig Hyperthermia Neg Hx       Review of Systems   Constitutional:  Negative for appetite change, chills, diaphoresis, fatigue, fever and unexpected weight  "change.   HENT:  Negative for hearing loss, sore throat and trouble swallowing.    Respiratory:  Negative for cough, chest tightness, shortness of breath and wheezing.    Cardiovascular:  Negative for chest pain, palpitations and leg swelling.   Gastrointestinal:  Negative for abdominal distention, abdominal pain, blood in stool, constipation, diarrhea, nausea and vomiting.   Genitourinary:  Negative for dysuria, frequency, hematuria and urgency.   Musculoskeletal:  Positive for arthralgias. Negative for joint swelling.        No muscle weakness.   Skin:  Negative for rash and wound.   Neurological:  Negative for dizziness, seizures, syncope, speech difficulty, weakness, numbness and headaches.   Hematological:  Negative for adenopathy. Does not bruise/bleed easily.   Psychiatric/Behavioral:  Negative for behavioral problems, confusion and suicidal ideas.    All other systems reviewed and are negative.     I have reviewed and confirmed the accuracy of the ROS as documented 04/07/2021 Cassandra Pratt MD    Objective     Vitals:    11/15/23 1612   BP: 132/86   Pulse: 63   Resp: 16   Temp: 97.5 °F (36.4 °C)   TempSrc: Temporal   SpO2: 99%   Weight: 103 kg (228 lb)   Height: 152 cm (59.84\")   PainSc:   8   PainLoc: Generalized  Comment: joint pain         11/15/2023     4:15 PM   Current Status   ECOG score 1     Physical Exam        CONSTITUTIONAL:  Vital signs reviewed.  No distress, looks comfortable.    RESPIRATORY:  Normal respiratory effort.  Lungs clear to auscultation bilaterally.  CARDIOVASCULAR:  Normal S1, S2.  No murmurs rubs or gallops.  No significant lower extremity edema.  GASTROINTESTINAL: Abdomen appears unremarkable.  Nontender.  No hepatomegaly.  No splenomegaly.  LYMPHATIC:  No cervical, supraclavicular, axillary lymphadenopathy.  SKIN:  Warm.  No rashes.  PSYCHIATRIC:  Normal judgment and insight.  Normal mood and affect.    I have reexamined the patient and the results are consistent with the " previously documented exam. Cassandra Pratt MD     RECENT LABS:  Results from last 7 days   Lab Units 11/15/23  1606   WBC 10*3/mm3 6.94   NEUTROS ABS 10*3/mm3 3.80   HEMOGLOBIN g/dL 9.9*   HEMATOCRIT % 31.5*   PLATELETS 10*3/mm3 223     Results from last 7 days   Lab Units 11/15/23  1606   SODIUM mmol/L 138   POTASSIUM mmol/L 4.2   CHLORIDE mmol/L 103   CO2 mmol/L 26.3   BUN mg/dL 18   CREATININE mg/dL 0.83   CALCIUM mg/dL 8.9   ALBUMIN g/dL 4.0   BILIRUBIN mg/dL 0.2   ALK PHOS U/L 77   ALT (SGPT) U/L 14   AST (SGOT) U/L 16   GLUCOSE mg/dL 84             Two View Chest X-Ray 02/24/19  FINDINGS: The lungs are moderately expanded and clear and there is no  evidence of localized pneumonia. The heart is borderline enlarged  without change from 07/23/2012.    CT Abdomen Pelvis 12/06/18 scanned Image.    Assessment & Plan     1.  Multifactorial  anemia.  With anemia of chronic kidney disease and rheumatoid arthritis  Seen in consultation March 2019.  Patient reporting longstanding anemia with hemoglobin running in the 10-11 range.  Patient has underlying CKD stage III.  Work-up at that time did not reveal any vitamin deficiency including no iron, B12 or folate deficiency.  No underlying monoclonal protein.  Anemia felt to be related to CKD with also possibly an element of chronic disease with underlying rheumatoid arthritis.  Sed rate was mildly elevated at 44.  Patient lost to follow-up since that time.  Last colonoscopy and EGD were performed per Dr. Kapoor, negative.  Patient also had a small bowel follow-through which was within normal limits. Other pertinent imaging include a CT of the abdomen pelvis performed December 2018 which was negative.  She is up-to-date on her mammogram, last performed August 2020, negative.  March 10, 2021: Patient's hemoglobin today is 9.8, discussed consideration of Procrit and will get approval.  Discussed side effects in length with patient today.  We could consider starting Procrit if  her hemoglobin continues to drop.  May 4/2022: Patient was sent here for anemia of chronic kidney disease to see if she is eligible for Procrit.  Since the time she has been seen here she has not required Procrit as her hemoglobin is always about greater than 10.  Iron studies are normal.  She feels good otherwise  May 10, 2023: We had ordered anemia work-up but did not get done.  She does have anemia of chronic kidney disease but it could be multifactorial and hence will check iron studies B12 folate CAROLINE rheumatoid factor C-reactive protein and EPO level today.  November 15, 2023: Hemoglobin 9.9 serum protein electrophoresis had not shown any monoclonal protein.  ESR has been normal at 30.  CAROLINE negative, ANCA level 9.7, rheumatoid factor less than 10, C-reactive protein less than 1.3 and ferritin in the past has been 396.  We will recheck ferritin today    2.  Rheumatoid arthritis for which she takes diclofenac.    3.  Chronic kidney disease stage III.  This is managed per Dr. Fraser.  Creatinine appears relatively stable range from 1.2-1.7 over the last 2 years.  BUN 20s to 30s.  GFR last measured around 39.  This could be the cause of her anemia.    Creatinine today is 1.54.  Patient continues to follow with nephrology.  Still not a candidate for Procrit given hemoglobin greater than 10    4.  Left shoulder replacement surgery to be done on May 10, 2021.  To be done by Dr. Anderson    5.  Hypertension stable.  BP today 126/84.    6.  Anaphylactic reaction: Secondary to [place] and hence had to be placed on prednisone recently    7.  Leukocytosis secondary to steroids    PLAN:  Patient has anemia of chronic kidney disease  Reviewed all anemia work-up in the past with negative  We will check iron studies today  In future if her hemoglobin.  Then we can consider starting Procrit  Follow-up in 3 months with labs 6 months with nurse practitioner labs  Follow-up with me in 9 months with labs  MD Rodney Pearce       Dr. Teo Devi, orthopedic

## 2023-11-22 ENCOUNTER — TRANSCRIBE ORDERS (OUTPATIENT)
Dept: ADMINISTRATIVE | Facility: HOSPITAL | Age: 63
End: 2023-11-22
Payer: COMMERCIAL

## 2023-11-22 DIAGNOSIS — M79.671 BILATERAL FOOT PAIN: Primary | ICD-10-CM

## 2023-11-22 DIAGNOSIS — M79.672 BILATERAL FOOT PAIN: Primary | ICD-10-CM

## 2023-12-06 ENCOUNTER — CLINICAL SUPPORT (OUTPATIENT)
Dept: ORTHOPEDIC SURGERY | Facility: CLINIC | Age: 63
End: 2023-12-06
Payer: COMMERCIAL

## 2023-12-06 VITALS — WEIGHT: 231.2 LBS | TEMPERATURE: 97.1 F | HEIGHT: 59 IN | BODY MASS INDEX: 46.61 KG/M2

## 2023-12-06 DIAGNOSIS — M17.12 PRIMARY OSTEOARTHRITIS OF LEFT KNEE: ICD-10-CM

## 2023-12-06 DIAGNOSIS — M17.11 PRIMARY OSTEOARTHRITIS OF RIGHT KNEE: Primary | ICD-10-CM

## 2023-12-06 RX ORDER — LIDOCAINE HYDROCHLORIDE 10 MG/ML
2 INJECTION, SOLUTION EPIDURAL; INFILTRATION; INTRACAUDAL; PERINEURAL
Status: COMPLETED | OUTPATIENT
Start: 2023-12-06 | End: 2023-12-06

## 2023-12-06 RX ORDER — METHYLPREDNISOLONE ACETATE 80 MG/ML
80 INJECTION, SUSPENSION INTRA-ARTICULAR; INTRALESIONAL; INTRAMUSCULAR; SOFT TISSUE
Status: COMPLETED | OUTPATIENT
Start: 2023-12-06 | End: 2023-12-06

## 2023-12-06 RX ADMIN — LIDOCAINE HYDROCHLORIDE 2 ML: 10 INJECTION, SOLUTION EPIDURAL; INFILTRATION; INTRACAUDAL; PERINEURAL at 15:29

## 2023-12-06 RX ADMIN — METHYLPREDNISOLONE ACETATE 80 MG: 80 INJECTION, SUSPENSION INTRA-ARTICULAR; INTRALESIONAL; INTRAMUSCULAR; SOFT TISSUE at 15:29

## 2023-12-06 NOTE — PROGRESS NOTES
Ms. Powell comes in today for follow-up.  She says the injections provide relief for approximately 2 months.  Reports she recently had hand surgery and noticed her knee pain was improved when taking tramadol.  She would like to try repeat injections today.     I explained tramadol is not a medication we typically prescribe.  She may follow up with her PCP to request this medication or I am happy to refer her to pain management.  She will give this some consideration and let me know if she needs assistance.  The risks, benefits and alternatives were discussed and the patient consented.  Going forward, the patient will follow-up as needed.    Sherie Meehan, APRN    12/06/2023      Large Joint Arthrocentesis: R knee  Date/Time: 12/6/2023 3:29 PM  Consent given by: patient  Site marked: site marked  Timeout: Immediately prior to procedure a time out was called to verify the correct patient, procedure, equipment, support staff and site/side marked as required   Supporting Documentation  Indications: pain   Procedure Details  Location: knee - R knee  Preparation: Patient was prepped and draped in the usual sterile fashion  Needle gauge: 21 G.  Approach: anterolateral  Medications administered: 2 mL lidocaine PF 1% 1 %; 80 mg methylPREDNISolone acetate 80 MG/ML  Patient tolerance: patient tolerated the procedure well with no immediate complications      Large Joint Arthrocentesis: L knee  Date/Time: 12/6/2023 3:29 PM  Consent given by: patient  Site marked: site marked  Timeout: Immediately prior to procedure a time out was called to verify the correct patient, procedure, equipment, support staff and site/side marked as required   Supporting Documentation  Indications: pain   Procedure Details  Location: knee - L knee  Preparation: Patient was prepped and draped in the usual sterile fashion  Needle gauge: 21 G.  Approach: anterolateral  Medications administered: 2 mL lidocaine PF 1% 1 %; 80 mg methylPREDNISolone acetate  80 MG/ML  Patient tolerance: patient tolerated the procedure well with no immediate complications

## 2023-12-10 DIAGNOSIS — E78.5 HYPERLIPIDEMIA, UNSPECIFIED HYPERLIPIDEMIA TYPE: ICD-10-CM

## 2023-12-12 ENCOUNTER — TRANSCRIBE ORDERS (OUTPATIENT)
Dept: ADMINISTRATIVE | Facility: HOSPITAL | Age: 63
End: 2023-12-12
Payer: COMMERCIAL

## 2023-12-12 DIAGNOSIS — Z12.31 VISIT FOR SCREENING MAMMOGRAM: Primary | ICD-10-CM

## 2023-12-12 RX ORDER — ATORVASTATIN CALCIUM 80 MG/1
80 TABLET, FILM COATED ORAL DAILY
Qty: 90 TABLET | Refills: 1 | Status: SHIPPED | OUTPATIENT
Start: 2023-12-12

## 2023-12-18 ENCOUNTER — LAB (OUTPATIENT)
Dept: LAB | Facility: HOSPITAL | Age: 63
End: 2023-12-18
Payer: COMMERCIAL

## 2023-12-18 ENCOUNTER — OFFICE VISIT (OUTPATIENT)
Dept: INTERNAL MEDICINE | Facility: CLINIC | Age: 63
End: 2023-12-18
Payer: COMMERCIAL

## 2023-12-18 VITALS
BODY MASS INDEX: 45.38 KG/M2 | DIASTOLIC BLOOD PRESSURE: 84 MMHG | HEIGHT: 59 IN | WEIGHT: 225.1 LBS | RESPIRATION RATE: 16 BRPM | SYSTOLIC BLOOD PRESSURE: 126 MMHG | OXYGEN SATURATION: 99 % | HEART RATE: 65 BPM | TEMPERATURE: 96.5 F

## 2023-12-18 DIAGNOSIS — G89.29 CHRONIC PAIN OF BOTH KNEES: ICD-10-CM

## 2023-12-18 DIAGNOSIS — F41.9 ANXIETY: Primary | ICD-10-CM

## 2023-12-18 DIAGNOSIS — M25.561 CHRONIC PAIN OF BOTH KNEES: ICD-10-CM

## 2023-12-18 DIAGNOSIS — M25.562 CHRONIC PAIN OF BOTH KNEES: ICD-10-CM

## 2023-12-18 DIAGNOSIS — F32.A DEPRESSION, UNSPECIFIED DEPRESSION TYPE: ICD-10-CM

## 2023-12-18 DIAGNOSIS — E61.1 IRON DEFICIENCY: ICD-10-CM

## 2023-12-18 LAB
BASOPHILS # BLD AUTO: 0.06 10*3/MM3 (ref 0–0.2)
BASOPHILS NFR BLD AUTO: 0.6 % (ref 0–1.5)
DEPRECATED RDW RBC AUTO: 44.3 FL (ref 37–54)
EOSINOPHIL # BLD AUTO: 0.16 10*3/MM3 (ref 0–0.4)
EOSINOPHIL NFR BLD AUTO: 1.6 % (ref 0.3–6.2)
ERYTHROCYTE [DISTWIDTH] IN BLOOD BY AUTOMATED COUNT: 13.2 % (ref 12.3–15.4)
FERRITIN SERPL-MCNC: 283 NG/ML (ref 13–150)
HCT VFR BLD AUTO: 33.5 % (ref 34–46.6)
HGB BLD-MCNC: 11.3 G/DL (ref 12–15.9)
IMM GRANULOCYTES # BLD AUTO: 0.04 10*3/MM3 (ref 0–0.05)
IMM GRANULOCYTES NFR BLD AUTO: 0.4 % (ref 0–0.5)
IRON 24H UR-MRATE: 89 MCG/DL (ref 37–145)
IRON SATN MFR SERPL: 31 % (ref 20–50)
LYMPHOCYTES # BLD AUTO: 1.7 10*3/MM3 (ref 0.7–3.1)
LYMPHOCYTES NFR BLD AUTO: 17.3 % (ref 19.6–45.3)
MCH RBC QN AUTO: 31 PG (ref 26.6–33)
MCHC RBC AUTO-ENTMCNC: 33.7 G/DL (ref 31.5–35.7)
MCV RBC AUTO: 92 FL (ref 79–97)
MONOCYTES # BLD AUTO: 0.57 10*3/MM3 (ref 0.1–0.9)
MONOCYTES NFR BLD AUTO: 5.8 % (ref 5–12)
NEUTROPHILS NFR BLD AUTO: 7.32 10*3/MM3 (ref 1.7–7)
NEUTROPHILS NFR BLD AUTO: 74.3 % (ref 42.7–76)
NRBC BLD AUTO-RTO: 0 /100 WBC (ref 0–0.2)
PLATELET # BLD AUTO: 275 10*3/MM3 (ref 140–450)
PMV BLD AUTO: 9.5 FL (ref 6–12)
RBC # BLD AUTO: 3.64 10*6/MM3 (ref 3.77–5.28)
TIBC SERPL-MCNC: 289 MCG/DL (ref 298–536)
TRANSFERRIN SERPL-MCNC: 194 MG/DL (ref 200–360)
WBC NRBC COR # BLD AUTO: 9.85 10*3/MM3 (ref 3.4–10.8)

## 2023-12-18 PROCEDURE — 36415 COLL VENOUS BLD VENIPUNCTURE: CPT | Performed by: FAMILY MEDICINE

## 2023-12-18 PROCEDURE — 99214 OFFICE O/P EST MOD 30 MIN: CPT | Performed by: FAMILY MEDICINE

## 2023-12-18 PROCEDURE — 85025 COMPLETE CBC W/AUTO DIFF WBC: CPT | Performed by: FAMILY MEDICINE

## 2023-12-18 PROCEDURE — 82728 ASSAY OF FERRITIN: CPT | Performed by: FAMILY MEDICINE

## 2023-12-18 PROCEDURE — 84466 ASSAY OF TRANSFERRIN: CPT | Performed by: FAMILY MEDICINE

## 2023-12-18 PROCEDURE — 83540 ASSAY OF IRON: CPT | Performed by: FAMILY MEDICINE

## 2023-12-18 RX ORDER — TRAMADOL HYDROCHLORIDE 50 MG/1
50 TABLET ORAL 2 TIMES DAILY PRN
Qty: 180 TABLET | Refills: 1 | Status: SHIPPED | OUTPATIENT
Start: 2023-12-18

## 2023-12-18 RX ORDER — FLUTICASONE FUROATE 200 UG/1
POWDER RESPIRATORY (INHALATION)
COMMUNITY
Start: 2023-12-07

## 2023-12-18 RX ORDER — BECLOMETHASONE DIPROPIONATE HFA 40 UG/1
1 AEROSOL, METERED RESPIRATORY (INHALATION)
COMMUNITY
Start: 2023-12-12 | End: 2023-12-18

## 2023-12-18 NOTE — PROGRESS NOTES
"Chief Complaint  Anxiety    Subjective        Pedrito Powell presents to [facility]  History of Present Illness    Patient has a hx of anxiety and depression. She is currently taking trintellix 10mg daily. Patient denies any side effects of the medicaiton.     Patient does have some iron deficiency. Patient is currently taking ferrous gluconate 324mg every day. She denies any side effects of the medication.    Patient does have a hx of chronic pain in both of the knees. She does note in the past the tramadol was helping. She would like to try tramadol again as her mobility is getting effected.     Objective   Vital Signs:  /84   Pulse 65   Temp 96.5 °F (35.8 °C)   Resp 16   Ht 149.9 cm (59.02\")   Wt 102 kg (225 lb 1.6 oz)   SpO2 99%   BMI 45.44 kg/m²   Estimated body mass index is 45.44 kg/m² as calculated from the following:    Height as of this encounter: 149.9 cm (59.02\").    Weight as of this encounter: 102 kg (225 lb 1.6 oz).             Physical Exam  Vitals and nursing note reviewed.   Constitutional:       Appearance: She is well-developed.   HENT:      Head: Normocephalic and atraumatic.   Musculoskeletal:      Cervical back: Normal range of motion and neck supple.   Neurological:      Mental Status: She is alert and oriented to person, place, and time.   Psychiatric:         Behavior: Behavior normal.        Result Review :                   Assessment and Plan   Diagnoses and all orders for this visit:    1. Anxiety (Primary)  -     Vortioxetine HBr (Trintellix) 10 MG tablet tablet; Take 1 tablet by mouth Daily.  Dispense: 30 tablet; Refill: 6  -     continue trintellix.     2. Depression, unspecified depression typeb        -     continue trintellix.      3. Iron deficiency  -     Ferritin  -     Iron Profile  -     CBC & Differential  -     continue iron supplements.    4. Chronic pain of both knees  -     traMADol (ULTRAM) 50 MG tablet; Take 1 tablet by " mouth 2 (Two) Times a Day As Needed for Moderate Pain.  Dispense: 180 tablet; Refill: 1  -     start tramadol.              Follow Up   No follow-ups on file.  Patient was given instructions and counseling regarding her condition or for health maintenance advice. Please see specific information pulled into the AVS if appropriate.

## 2023-12-19 ENCOUNTER — HOSPITAL ENCOUNTER (OUTPATIENT)
Dept: GENERAL RADIOLOGY | Facility: HOSPITAL | Age: 63
Discharge: HOME | End: 2023-12-19
Payer: COMMERCIAL

## 2023-12-19 DIAGNOSIS — M79.672 BILATERAL FOOT PAIN: ICD-10-CM

## 2023-12-19 DIAGNOSIS — M79.671 BILATERAL FOOT PAIN: ICD-10-CM

## 2023-12-19 PROCEDURE — 77002 NEEDLE LOCALIZATION BY XRAY: CPT

## 2023-12-19 PROCEDURE — 25010000002 BUPIVACAINE (PF) 0.25 % SOLUTION: Performed by: ORTHOPAEDIC SURGERY

## 2023-12-19 PROCEDURE — 25510000001 IOPAMIDOL 61 % SOLUTION: Performed by: ORTHOPAEDIC SURGERY

## 2023-12-19 PROCEDURE — 25010000002 LIDOCAINE 1 % SOLUTION: Performed by: ORTHOPAEDIC SURGERY

## 2023-12-19 PROCEDURE — 25010000002 METHYLPREDNISOLONE PER 40 MG: Performed by: ORTHOPAEDIC SURGERY

## 2023-12-19 RX ORDER — BUPIVACAINE HYDROCHLORIDE 2.5 MG/ML
10 INJECTION, SOLUTION EPIDURAL; INFILTRATION; INTRACAUDAL 2 TIMES DAILY PRN
Status: DISCONTINUED | OUTPATIENT
Start: 2023-12-19 | End: 2023-12-20 | Stop reason: HOSPADM

## 2023-12-19 RX ORDER — LIDOCAINE HYDROCHLORIDE 10 MG/ML
20 INJECTION, SOLUTION INFILTRATION; PERINEURAL 2 TIMES DAILY PRN
Status: DISCONTINUED | OUTPATIENT
Start: 2023-12-19 | End: 2023-12-20 | Stop reason: HOSPADM

## 2023-12-19 RX ORDER — METHYLPREDNISOLONE SODIUM SUCCINATE 40 MG/ML
40 INJECTION, POWDER, LYOPHILIZED, FOR SOLUTION INTRAMUSCULAR; INTRAVENOUS 2 TIMES DAILY PRN
Status: DISCONTINUED | OUTPATIENT
Start: 2023-12-19 | End: 2023-12-20 | Stop reason: HOSPADM

## 2023-12-19 RX ADMIN — METHYLPREDNISOLONE SODIUM SUCCINATE 40 MG: 40 INJECTION, POWDER, LYOPHILIZED, FOR SOLUTION INTRAMUSCULAR; INTRAVENOUS at 07:55

## 2023-12-19 RX ADMIN — LIDOCAINE HYDROCHLORIDE 1 ML: 10 INJECTION, SOLUTION INFILTRATION; PERINEURAL at 07:55

## 2023-12-19 RX ADMIN — BUPIVACAINE HYDROCHLORIDE 5 ML: 2.5 INJECTION, SOLUTION EPIDURAL; INFILTRATION; INTRACAUDAL; PERINEURAL at 07:48

## 2023-12-19 RX ADMIN — METHYLPREDNISOLONE SODIUM SUCCINATE 40 MG: 40 INJECTION, POWDER, LYOPHILIZED, FOR SOLUTION INTRAMUSCULAR; INTRAVENOUS at 07:48

## 2023-12-19 RX ADMIN — IOPAMIDOL 0.5 ML: 612 INJECTION, SOLUTION INTRAVENOUS at 07:55

## 2023-12-19 RX ADMIN — IOPAMIDOL 0.5 ML: 612 INJECTION, SOLUTION INTRAVENOUS at 07:48

## 2023-12-19 RX ADMIN — BUPIVACAINE HYDROCHLORIDE 5 ML: 2.5 INJECTION, SOLUTION EPIDURAL; INFILTRATION; INTRACAUDAL; PERINEURAL at 07:55

## 2023-12-19 RX ADMIN — LIDOCAINE HYDROCHLORIDE 0.5 ML: 10 INJECTION, SOLUTION INFILTRATION; PERINEURAL at 07:48

## 2023-12-24 NOTE — PROGRESS NOTES
Please inform the patient of the following abnormal results. Continue to take current medications. Including the iron supplements for now.

## 2023-12-26 RX ORDER — ONDANSETRON 4 MG/1
TABLET, ORALLY DISINTEGRATING ORAL
Qty: 60 TABLET | Refills: 3 | Status: SHIPPED | OUTPATIENT
Start: 2023-12-26

## 2024-01-09 ENCOUNTER — TELEPHONE (OUTPATIENT)
Dept: INTERNAL MEDICINE | Facility: CLINIC | Age: 64
End: 2024-01-09

## 2024-01-09 NOTE — TELEPHONE ENCOUNTER
Caller: Pedrito Powell    Relationship: Self    Best call back number: [phone]    Which medication are you concerned about:     Vortioxetine HBr (Trintellix) 10 MG tablet tablet       What are your concerns: PATIENT STATES THAT SHE HAS BEEN UNABLE TO RECEIVE THIS MEDICATION AT [facility]. PATIENT STATES THAT HER INSURANCE IS NEEDING A PRIOR AUTHORIZATION. THERE HAS BEEN A FORM FAXED TO THE OFFICE FOR DR. LYNN TO FILL OUT. PATIENT STATES THAT SHE BOUGHT TWO DAYS WORTH OF MEDICATIONS FOR $60. PATIENT STATES THAT WHEN THIS WASN'T RESOLVED, SHE BOUGHT THREE BOXES. PATIENT WOULD LIKE TO KNOW IF THERE ARE ANY SAMPLES AT THE OFFICE SHE COULD HAVE. PLEASE ADVISE PATIENT ASAP.     PATIENT WILL NOT BE AVAILABLE UNTIL 1 OR 2. IF PATIENT IS NOT ABLE TO ANSWER PHONE, PLEASE LEAVE MESSAGE.     PATIENT USES   [id number] [place], [place] - [address] AT [facility] - [phone] [place] [phone] [facility] [phone]

## 2024-01-10 DIAGNOSIS — D64.9 ANEMIA, UNSPECIFIED TYPE: ICD-10-CM

## 2024-01-10 RX ORDER — FERROUS GLUCONATE 324(38)MG
324 TABLET ORAL
Qty: 90 TABLET | Refills: 1 | Status: SHIPPED | OUTPATIENT
Start: 2024-01-10

## 2024-01-11 ENCOUNTER — TELEPHONE (OUTPATIENT)
Dept: PEDIATRICS | Facility: OTHER | Age: 64
End: 2024-01-11

## 2024-01-11 NOTE — TELEPHONE ENCOUNTER
"Relay     \"See previous telephone encounter.  I do not have patient's current Rx insurance info.  I need pt's Rxbin# and member ID # to proceed with PA\"                 "

## 2024-01-11 NOTE — TELEPHONE ENCOUNTER
Caller: Pedrito Powell    Relationship: Self    Best call back number: [phone]  What is the best time to reach you: ANY     What was the call regarding: PATIENT IS CALLING IN TO FOLLOW UP  ON THE STATUS OF A PRIOR AUTHORIZATION ON THE MEDICATION Vortioxetine HBr (Trintellix) 10 MG tablet tablet .  PLEASE ADVISE ON THE STATUS OF THE PRIOR AUTHORIZATION

## 2024-01-17 ENCOUNTER — HOSPITAL ENCOUNTER (OUTPATIENT)
Dept: MAMMOGRAPHY | Facility: HOSPITAL | Age: 64
Discharge: HOME | End: 2024-01-17
Admitting: FAMILY MEDICINE
Payer: COMMERCIAL

## 2024-01-17 DIAGNOSIS — Z12.31 VISIT FOR SCREENING MAMMOGRAM: ICD-10-CM

## 2024-01-17 PROCEDURE — 77063 BREAST TOMOSYNTHESIS BI: CPT

## 2024-01-17 PROCEDURE — 77067 SCR MAMMO BI INCL CAD: CPT

## 2024-01-17 NOTE — TELEPHONE ENCOUNTER
Caller: Pedrito Powell    Relationship: Self    Best call back number: [phone]    What was the call regarding:   [employer]  RXBIN#: [id number]  MEMBER ID: [id number]

## 2024-01-17 NOTE — TELEPHONE ENCOUNTER
"Relay     \"CaseId:[id number];Status:Approved;Review Type:Prior Auth;Coverage Start Date:01/01/2024;Coverage End Date:01/30/2025; \"                 "

## 2024-01-19 NOTE — PROGRESS NOTES
The labs were reviewed. Please inform patient that labs were normal.  CONCLUSION: Negative mammogram showing no change from 01/11/2023 or  12/03/2021. BI-RADS 1. Negative.

## 2024-01-22 RX ORDER — ACETAMINOPHEN 160 MG
TABLET,DISINTEGRATING ORAL
Qty: 90 CAPSULE | Refills: 1 | Status: SHIPPED | OUTPATIENT
Start: 2024-01-22

## 2024-02-07 ENCOUNTER — LAB (OUTPATIENT)
Dept: OTHER | Facility: HOSPITAL | Age: 64
End: 2024-02-07
Payer: COMMERCIAL

## 2024-02-07 DIAGNOSIS — E61.1 IRON DEFICIENCY: ICD-10-CM

## 2024-02-07 LAB
BASOPHILS # BLD AUTO: 0.04 10*3/MM3 (ref 0–0.2)
BASOPHILS NFR BLD AUTO: 0.5 % (ref 0–1.5)
DEPRECATED RDW RBC AUTO: 44.8 FL (ref 37–54)
EOSINOPHIL # BLD AUTO: 0.06 10*3/MM3 (ref 0–0.4)
EOSINOPHIL NFR BLD AUTO: 0.8 % (ref 0.3–6.2)
ERYTHROCYTE [DISTWIDTH] IN BLOOD BY AUTOMATED COUNT: 13 % (ref 12.3–15.4)
FERRITIN SERPL-MCNC: 292.7 NG/ML (ref 13–150)
HCT VFR BLD AUTO: 30.6 % (ref 34–46.6)
HGB BLD-MCNC: 9.9 G/DL (ref 12–15.9)
IMM GRANULOCYTES # BLD AUTO: 0.02 10*3/MM3 (ref 0–0.05)
IMM GRANULOCYTES NFR BLD AUTO: 0.3 % (ref 0–0.5)
IRON 24H UR-MRATE: 39 MCG/DL (ref 37–145)
IRON SATN MFR SERPL: 14 % (ref 20–50)
LYMPHOCYTES # BLD AUTO: 1.28 10*3/MM3 (ref 0.7–3.1)
LYMPHOCYTES NFR BLD AUTO: 17.3 % (ref 19.6–45.3)
MCH RBC QN AUTO: 30.5 PG (ref 26.6–33)
MCHC RBC AUTO-ENTMCNC: 32.4 G/DL (ref 31.5–35.7)
MCV RBC AUTO: 94.2 FL (ref 79–97)
MONOCYTES # BLD AUTO: 0.44 10*3/MM3 (ref 0.1–0.9)
MONOCYTES NFR BLD AUTO: 5.9 % (ref 5–12)
NEUTROPHILS NFR BLD AUTO: 5.57 10*3/MM3 (ref 1.7–7)
NEUTROPHILS NFR BLD AUTO: 75.2 % (ref 42.7–76)
NRBC BLD AUTO-RTO: 0 /100 WBC (ref 0–0.2)
PLATELET # BLD AUTO: 292 10*3/MM3 (ref 140–450)
PMV BLD AUTO: 8.7 FL (ref 6–12)
RBC # BLD AUTO: 3.25 10*6/MM3 (ref 3.77–5.28)
TIBC SERPL-MCNC: 273 MCG/DL (ref 298–536)
TRANSFERRIN SERPL-MCNC: 183 MG/DL (ref 200–360)
WBC NRBC COR # BLD AUTO: 7.41 10*3/MM3 (ref 3.4–10.8)

## 2024-02-07 PROCEDURE — 36415 COLL VENOUS BLD VENIPUNCTURE: CPT

## 2024-02-07 PROCEDURE — 84466 ASSAY OF TRANSFERRIN: CPT | Performed by: INTERNAL MEDICINE

## 2024-02-07 PROCEDURE — 85025 COMPLETE CBC W/AUTO DIFF WBC: CPT | Performed by: INTERNAL MEDICINE

## 2024-02-07 PROCEDURE — 82728 ASSAY OF FERRITIN: CPT | Performed by: INTERNAL MEDICINE

## 2024-02-07 PROCEDURE — 83540 ASSAY OF IRON: CPT | Performed by: INTERNAL MEDICINE

## 2024-02-08 ENCOUNTER — TELEPHONE (OUTPATIENT)
Dept: ONCOLOGY | Facility: CLINIC | Age: 64
End: 2024-02-08
Payer: COMMERCIAL

## 2024-02-08 NOTE — TELEPHONE ENCOUNTER
Received call back from Ms. Powell and able to let her know that her labs from the previous day had been reviewed with NP in the office.  Let her know her hemoglobin was down to 9.9 and her iron saturation was a little low as well.  Let her know the NP wanted to let her know to continue to take the Ferrous Gluconate as she has been taking, and thought her labs should be checked in one and 2 months again, instead of waiting 3 months as scheduled.  Dates approved by patient to come in for labs only at [place] location at 3:30 PM on March 6, 2024 and April 10, 2024.  Message to scheduling to set up appointments for labs only.

## 2024-02-18 NOTE — PROGRESS NOTES
Physical Therapy Daily Progress Note     Patient Name: Pedrito Powell         :  1960  Referring Physician: Teo Fraser MD     Subjective   Pedrito Powell reports: taking tape off Saturday due to skin irritation and has noted decreased pain and popping in (R) knee since - she misses the support, but notes improved function w/ less pain and popping -      Objective   Antalgic gait with decreased gait RLE --    Very tender infrapatellar region (R) knee; Tender medial jt line (B) knees   Inferior tipped patella (R)  (+++) Step Up test (R) with severe pain at infrapatellar region and pain lasting     See Exercise, Manual, and Modality Logs for complete treatment.     Functional / Therapeutic Activities:  15 min  · TAPING / BRACIN strips to unload medial knee (R) at end of session  · SEE EXERCISE FLOW SHEET -   · Jt protection, ADL modification; Posture and       Assessment/Plan  (B) Knee pain R>L:  (B) knee OA; RA  Taping greatly reduced her pain and improved gait , but now no longer requires taping-  Pt may benefit from seeing Orthopedist for further assessment / intervention (I.e. Injection, etc) due to persistent pain (R) knee  Improved tolerance to TE/TA w/o tape , but severe pain with attempting step up RLE -      Progress strengthening /stabilization /functional activity     _________________________________________________  Manual Therapy:                 mins  43581;  Therapeutic Exercise:    30     mins  97178;     Neuromuscular Dion:        mins  88656;    Therapeutic Activity:      15    mins  69027;     Iontophoresis:                 20     mins  79514;     Ultrasound:                   10       mins  39352;    Electrical Stimulation:         mins  39118 ( );  Dry Needling                       mins self-pay     Timed Treatment:   75   mins                  Total Treatment:    75   mins     Antonio Coley, PT  Physical Therapist   2000

## 2024-03-06 ENCOUNTER — LAB (OUTPATIENT)
Dept: OTHER | Facility: HOSPITAL | Age: 64
End: 2024-03-06
Payer: COMMERCIAL

## 2024-03-06 ENCOUNTER — CLINICAL SUPPORT (OUTPATIENT)
Dept: ORTHOPEDIC SURGERY | Facility: CLINIC | Age: 64
End: 2024-03-06
Payer: COMMERCIAL

## 2024-03-06 VITALS — TEMPERATURE: 98.7 F | HEIGHT: 59 IN | WEIGHT: 229.3 LBS | BODY MASS INDEX: 46.23 KG/M2

## 2024-03-06 DIAGNOSIS — D63.1 ANEMIA DUE TO STAGE 3 CHRONIC KIDNEY DISEASE, UNSPECIFIED WHETHER STAGE 3A OR 3B CKD: ICD-10-CM

## 2024-03-06 DIAGNOSIS — E61.1 IRON DEFICIENCY: ICD-10-CM

## 2024-03-06 DIAGNOSIS — N18.30 ANEMIA DUE TO STAGE 3 CHRONIC KIDNEY DISEASE, UNSPECIFIED WHETHER STAGE 3A OR 3B CKD: ICD-10-CM

## 2024-03-06 DIAGNOSIS — M17.11 PRIMARY OSTEOARTHRITIS OF RIGHT KNEE: Primary | ICD-10-CM

## 2024-03-06 DIAGNOSIS — M17.12 PRIMARY OSTEOARTHRITIS OF LEFT KNEE: ICD-10-CM

## 2024-03-06 LAB
BASOPHILS # BLD AUTO: 0.06 10*3/MM3 (ref 0–0.2)
BASOPHILS NFR BLD AUTO: 0.8 % (ref 0–1.5)
DEPRECATED RDW RBC AUTO: 44 FL (ref 37–54)
EOSINOPHIL # BLD AUTO: 0.28 10*3/MM3 (ref 0–0.4)
EOSINOPHIL NFR BLD AUTO: 3.6 % (ref 0.3–6.2)
ERYTHROCYTE [DISTWIDTH] IN BLOOD BY AUTOMATED COUNT: 12.7 % (ref 12.3–15.4)
FERRITIN SERPL-MCNC: 275.7 NG/ML (ref 13–150)
HCT VFR BLD AUTO: 35.6 % (ref 34–46.6)
HGB BLD-MCNC: 11.5 G/DL (ref 12–15.9)
IMM GRANULOCYTES # BLD AUTO: 0.01 10*3/MM3 (ref 0–0.05)
IMM GRANULOCYTES NFR BLD AUTO: 0.1 % (ref 0–0.5)
IRON 24H UR-MRATE: 38 MCG/DL (ref 37–145)
IRON SATN MFR SERPL: 14 % (ref 20–50)
LYMPHOCYTES # BLD AUTO: 2.07 10*3/MM3 (ref 0.7–3.1)
LYMPHOCYTES NFR BLD AUTO: 26.3 % (ref 19.6–45.3)
MCH RBC QN AUTO: 30.7 PG (ref 26.6–33)
MCHC RBC AUTO-ENTMCNC: 32.3 G/DL (ref 31.5–35.7)
MCV RBC AUTO: 94.9 FL (ref 79–97)
MONOCYTES # BLD AUTO: 0.6 10*3/MM3 (ref 0.1–0.9)
MONOCYTES NFR BLD AUTO: 7.6 % (ref 5–12)
NEUTROPHILS NFR BLD AUTO: 4.86 10*3/MM3 (ref 1.7–7)
NEUTROPHILS NFR BLD AUTO: 61.6 % (ref 42.7–76)
NRBC BLD AUTO-RTO: 0 /100 WBC (ref 0–0.2)
PLATELET # BLD AUTO: 258 10*3/MM3 (ref 140–450)
PMV BLD AUTO: 9.3 FL (ref 6–12)
RBC # BLD AUTO: 3.75 10*6/MM3 (ref 3.77–5.28)
TIBC SERPL-MCNC: 274 MCG/DL (ref 298–536)
TRANSFERRIN SERPL-MCNC: 184 MG/DL (ref 200–360)
WBC NRBC COR # BLD AUTO: 7.88 10*3/MM3 (ref 3.4–10.8)

## 2024-03-06 PROCEDURE — 82728 ASSAY OF FERRITIN: CPT | Performed by: INTERNAL MEDICINE

## 2024-03-06 PROCEDURE — 83540 ASSAY OF IRON: CPT | Performed by: INTERNAL MEDICINE

## 2024-03-06 PROCEDURE — 85025 COMPLETE CBC W/AUTO DIFF WBC: CPT | Performed by: INTERNAL MEDICINE

## 2024-03-06 PROCEDURE — 36415 COLL VENOUS BLD VENIPUNCTURE: CPT

## 2024-03-06 PROCEDURE — 84466 ASSAY OF TRANSFERRIN: CPT | Performed by: INTERNAL MEDICINE

## 2024-03-06 RX ORDER — LIDOCAINE HYDROCHLORIDE 10 MG/ML
2 INJECTION, SOLUTION EPIDURAL; INFILTRATION; INTRACAUDAL; PERINEURAL
Status: COMPLETED | OUTPATIENT
Start: 2024-03-06 | End: 2024-03-06

## 2024-03-06 RX ORDER — METHYLPREDNISOLONE ACETATE 80 MG/ML
1 INJECTION, SUSPENSION INTRA-ARTICULAR; INTRALESIONAL; INTRAMUSCULAR; SOFT TISSUE
Status: COMPLETED | OUTPATIENT
Start: 2024-03-06 | End: 2024-03-06

## 2024-03-06 RX ADMIN — LIDOCAINE HYDROCHLORIDE 2 ML: 10 INJECTION, SOLUTION EPIDURAL; INFILTRATION; INTRACAUDAL; PERINEURAL at 16:28

## 2024-03-06 RX ADMIN — METHYLPREDNISOLONE ACETATE 1 ML: 80 INJECTION, SUSPENSION INTRA-ARTICULAR; INTRALESIONAL; INTRAMUSCULAR; SOFT TISSUE at 16:28

## 2024-03-06 NOTE — PROGRESS NOTES
Ms. Powell comes in today for follow-up.  Injections have worked well in the past.  The patient would like to get repeat injections today.      Imaging:  Bilateral standing AP views, bilateral merchants views and a lateral view of both knees are ordered by myself and reviewed to evaluate the patient's complaint.  These are compared to previous x-rays.  The x-rays show end stage degenerative arthritis including bone on bone degeneration, malalignment, osteophyte and subchondral sclerosis.  The majority of the degenerative changes appear to involve the medial and patellofemoral compartments.  No significant changes compared to previous x-rays.    The risks, benefits and alternatives were discussed and the patient consented.  Going forward, the patient will follow-up as needed.    KALYANI Cornelius    03/06/2024      Large Joint Arthrocentesis: R knee  Date/Time: 3/6/2024 4:28 PM  Consent given by: patient  Site marked: site marked  Timeout: Immediately prior to procedure a time out was called to verify the correct patient, procedure, equipment, support staff and site/side marked as required   Supporting Documentation  Indications: pain   Procedure Details  Location: knee - R knee  Preparation: Patient was prepped and draped in the usual sterile fashion  Needle gauge: 21G.  Approach: anterolateral  Medications administered: 1 mL methylPREDNISolone acetate 80 MG/ML; 2 mL lidocaine PF 1% 1 %  Patient tolerance: patient tolerated the procedure well with no immediate complications      Large Joint Arthrocentesis: L knee  Date/Time: 3/6/2024 4:28 PM  Consent given by: patient  Site marked: site marked  Timeout: Immediately prior to procedure a time out was called to verify the correct patient, procedure, equipment, support staff and site/side marked as required   Supporting Documentation  Indications: pain   Procedure Details  Location: knee - L knee  Preparation: Patient was prepped and draped in the usual sterile  fashion  Needle gauge: 21G.  Approach: anterolateral  Medications administered: 1 mL methylPREDNISolone acetate 80 MG/ML; 2 mL lidocaine PF 1% 1 %  Patient tolerance: patient tolerated the procedure well with no immediate complications

## 2024-03-07 ENCOUNTER — TELEPHONE (OUTPATIENT)
Dept: ONCOLOGY | Facility: CLINIC | Age: 64
End: 2024-03-07
Payer: COMMERCIAL

## 2024-03-07 NOTE — TELEPHONE ENCOUNTER
Call to Ms. Powell, as she had labs done on previous day.  Requested patient to call back to direct number for RN, [phone]

## 2024-03-07 NOTE — TELEPHONE ENCOUNTER
Call to Ms. Powell to let her know Dr. Pratt had reviewed her labs from yesterday, and they are all very stable.  Let her know to continue to take the iron pills as she has been taking, once daily.  Let her know that Dr. Pratt did not think it would be necessary to have the labs done again next month, and she can just keep appointment with the nurse practitioner in May, and see what they are then.  Patient verbalized understanding and appreciation for the call.

## 2024-03-18 ENCOUNTER — TRANSCRIBE ORDERS (OUTPATIENT)
Dept: ADMINISTRATIVE | Facility: HOSPITAL | Age: 64
End: 2024-03-18
Payer: COMMERCIAL

## 2024-03-18 DIAGNOSIS — M79.672 BILATERAL FOOT PAIN: Primary | ICD-10-CM

## 2024-03-18 DIAGNOSIS — M79.671 BILATERAL FOOT PAIN: Primary | ICD-10-CM

## 2024-03-20 ENCOUNTER — OFFICE VISIT (OUTPATIENT)
Dept: INTERNAL MEDICINE | Facility: CLINIC | Age: 64
End: 2024-03-20
Payer: COMMERCIAL

## 2024-03-20 ENCOUNTER — TELEPHONE (OUTPATIENT)
Dept: INTERNAL MEDICINE | Facility: CLINIC | Age: 64
End: 2024-03-20

## 2024-03-20 VITALS
OXYGEN SATURATION: 97 % | HEIGHT: 59 IN | BODY MASS INDEX: 46.97 KG/M2 | RESPIRATION RATE: 14 BRPM | HEART RATE: 106 BPM | DIASTOLIC BLOOD PRESSURE: 74 MMHG | WEIGHT: 233 LBS | SYSTOLIC BLOOD PRESSURE: 124 MMHG

## 2024-03-20 DIAGNOSIS — F41.9 ANXIETY: ICD-10-CM

## 2024-03-20 DIAGNOSIS — F32.A DEPRESSION, UNSPECIFIED DEPRESSION TYPE: Primary | ICD-10-CM

## 2024-03-20 PROCEDURE — 99214 OFFICE O/P EST MOD 30 MIN: CPT | Performed by: FAMILY MEDICINE

## 2024-03-20 NOTE — TELEPHONE ENCOUNTER
Caller: Pedrito Powell    Relationship: Self    Best call back number: [phone]     What medication are you requesting: ZOFRAN TABLETS (NON DISSOLVING)     If a prescription is needed, what is your preferred pharmacy and phone number: [id number] [id number] [address] AT [address] [address] - [phone] [facility] [phone]      Additional notes: PHARMACY TOLD PATIENT THAT THE PRESCRIPTION FOR THE ZOFRAN NON DISSOLVING TABLETS WAS  AND REQUESTED THAT DR. LYNN CALLED HER IN A NEW PRESCRIPTION     PLEASE ADVISE

## 2024-03-20 NOTE — TELEPHONE ENCOUNTER
Rx Refill Note  Requested Prescriptions     Pending Prescriptions Disp Refills    ondansetron ODT (ZOFRAN-ODT) 4 MG disintegrating tablet 60 tablet 1     Sig: Place 1 tablet on the tongue Every 8 (Eight) Hours As Needed for Nausea or Vomiting.      Last office visit with prescribing clinician: 3/20/2024   Last telemedicine visit with prescribing clinician: Visit date not found   Next office visit with prescribing clinician: 6/26/2024       Angelica Jefferson MA  03/20/24, 14:37 EDT
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
## 2024-03-22 RX ORDER — ONDANSETRON 4 MG/1
4 TABLET, FILM COATED ORAL EVERY 8 HOURS PRN
Qty: 42 TABLET | Refills: 4 | Status: SHIPPED | OUTPATIENT
Start: 2024-03-22

## 2024-03-26 RX ORDER — ONDANSETRON 4 MG/1
4 TABLET, ORALLY DISINTEGRATING ORAL EVERY 8 HOURS PRN
Qty: 60 TABLET | Refills: 1 | Status: SHIPPED | OUTPATIENT
Start: 2024-03-26

## 2024-03-26 NOTE — TELEPHONE ENCOUNTER
Caller: Pedrito Powell    Relationship: Self    PHONE: [phone]       What was the call regarding:PATIENT IS CHECKING STATUS. SHE IS GOING OUT OF TOWN FRIDAY. PLEASE CALL

## 2024-03-27 ENCOUNTER — HOSPITAL ENCOUNTER (OUTPATIENT)
Dept: GENERAL RADIOLOGY | Facility: HOSPITAL | Age: 64
Discharge: HOME | End: 2024-03-27
Payer: COMMERCIAL

## 2024-03-27 DIAGNOSIS — M79.671 BILATERAL FOOT PAIN: ICD-10-CM

## 2024-03-27 DIAGNOSIS — M79.672 BILATERAL FOOT PAIN: ICD-10-CM

## 2024-03-27 PROCEDURE — 25010000002 BUPIVACAINE (PF) 0.25 % SOLUTION: Performed by: ORTHOPAEDIC SURGERY

## 2024-03-27 PROCEDURE — 25510000001 IOPAMIDOL 61 % SOLUTION: Performed by: ORTHOPAEDIC SURGERY

## 2024-03-27 PROCEDURE — 25010000002 METHYLPREDNISOLONE PER 40 MG: Performed by: ORTHOPAEDIC SURGERY

## 2024-03-27 PROCEDURE — 25010000002 LIDOCAINE 1 % SOLUTION: Performed by: ORTHOPAEDIC SURGERY

## 2024-03-27 PROCEDURE — 77002 NEEDLE LOCALIZATION BY XRAY: CPT

## 2024-03-27 RX ORDER — BUPIVACAINE HYDROCHLORIDE 2.5 MG/ML
10 INJECTION, SOLUTION EPIDURAL; INFILTRATION; INTRACAUDAL 2 TIMES DAILY PRN
Status: DISCONTINUED | OUTPATIENT
Start: 2024-03-27 | End: 2024-03-28 | Stop reason: HOSPADM

## 2024-03-27 RX ORDER — METHYLPREDNISOLONE SODIUM SUCCINATE 40 MG/ML
40 INJECTION, POWDER, LYOPHILIZED, FOR SOLUTION INTRAMUSCULAR; INTRAVENOUS 2 TIMES DAILY PRN
Status: DISCONTINUED | OUTPATIENT
Start: 2024-03-27 | End: 2024-03-28 | Stop reason: HOSPADM

## 2024-03-27 RX ORDER — LIDOCAINE HYDROCHLORIDE 10 MG/ML
10 INJECTION, SOLUTION INFILTRATION; PERINEURAL 2 TIMES DAILY PRN
Status: DISCONTINUED | OUTPATIENT
Start: 2024-03-27 | End: 2024-03-28 | Stop reason: HOSPADM

## 2024-03-27 RX ADMIN — IOPAMIDOL 1 ML: 612 INJECTION, SOLUTION INTRAVENOUS at 08:06

## 2024-03-27 RX ADMIN — BUPIVACAINE HYDROCHLORIDE 5 ML: 2.5 INJECTION, SOLUTION EPIDURAL; INFILTRATION; INTRACAUDAL; PERINEURAL at 08:06

## 2024-03-27 RX ADMIN — IOPAMIDOL 0.5 ML: 612 INJECTION, SOLUTION INTRAVENOUS at 08:26

## 2024-03-27 RX ADMIN — BUPIVACAINE HYDROCHLORIDE 5 ML: 2.5 INJECTION, SOLUTION EPIDURAL; INFILTRATION; INTRACAUDAL; PERINEURAL at 08:26

## 2024-03-27 RX ADMIN — LIDOCAINE HYDROCHLORIDE 2 ML: 10 INJECTION, SOLUTION INFILTRATION; PERINEURAL at 08:06

## 2024-03-27 RX ADMIN — METHYLPREDNISOLONE SODIUM SUCCINATE 40 MG: 40 INJECTION, POWDER, LYOPHILIZED, FOR SOLUTION INTRAMUSCULAR; INTRAVENOUS at 08:06

## 2024-03-27 RX ADMIN — METHYLPREDNISOLONE SODIUM SUCCINATE 40 MG: 40 INJECTION, POWDER, LYOPHILIZED, FOR SOLUTION INTRAMUSCULAR; INTRAVENOUS at 08:26

## 2024-03-27 RX ADMIN — LIDOCAINE HYDROCHLORIDE 2 ML: 10 INJECTION, SOLUTION INFILTRATION; PERINEURAL at 08:26

## 2024-04-13 ENCOUNTER — APPOINTMENT (OUTPATIENT)
Dept: MRI IMAGING | Facility: HOSPITAL | Age: 64
End: 2024-04-13
Payer: COMMERCIAL

## 2024-04-13 ENCOUNTER — APPOINTMENT (OUTPATIENT)
Dept: CT IMAGING | Facility: HOSPITAL | Age: 64
End: 2024-04-13
Payer: COMMERCIAL

## 2024-04-13 ENCOUNTER — HOSPITAL ENCOUNTER (OUTPATIENT)
Facility: HOSPITAL | Age: 64
Setting detail: OBSERVATION
Discharge: HOME | End: 2024-04-14
Attending: EMERGENCY MEDICINE | Admitting: EMERGENCY MEDICINE
Payer: COMMERCIAL

## 2024-04-13 DIAGNOSIS — Z91.89 AT RISK FOR SLEEP APNEA: ICD-10-CM

## 2024-04-13 DIAGNOSIS — R42 VERTIGO: ICD-10-CM

## 2024-04-13 DIAGNOSIS — R42 DIZZINESS: Primary | ICD-10-CM

## 2024-04-13 LAB
ALBUMIN SERPL-MCNC: 3.8 G/DL (ref 3.5–5.2)
ALBUMIN/GLOB SERPL: 1.5 G/DL
ALP SERPL-CCNC: 64 U/L (ref 39–117)
ALT SERPL W P-5'-P-CCNC: 20 U/L (ref 1–33)
ANION GAP SERPL CALCULATED.3IONS-SCNC: 7.5 MMOL/L (ref 5–15)
AST SERPL-CCNC: 16 U/L (ref 1–32)
BASOPHILS # BLD AUTO: 0.03 10*3/MM3 (ref 0–0.2)
BASOPHILS NFR BLD AUTO: 0.3 % (ref 0–1.5)
BILIRUB SERPL-MCNC: 0.5 MG/DL (ref 0–1.2)
BILIRUB UR QL STRIP: NEGATIVE
BUN SERPL-MCNC: 17 MG/DL (ref 8–23)
BUN/CREAT SERPL: 19.1 (ref 7–25)
CALCIUM SPEC-SCNC: 9 MG/DL (ref 8.6–10.5)
CHLORIDE SERPL-SCNC: 106 MMOL/L (ref 98–107)
CHOLEST SERPL-MCNC: 166 MG/DL (ref 0–200)
CLARITY UR: CLEAR
CO2 SERPL-SCNC: 26.5 MMOL/L (ref 22–29)
COLOR UR: YELLOW
CREAT SERPL-MCNC: 0.89 MG/DL (ref 0.57–1)
DEPRECATED RDW RBC AUTO: 44.5 FL (ref 37–54)
EGFRCR SERPLBLD CKD-EPI 2021: 73 ML/MIN/1.73
EOSINOPHIL # BLD AUTO: 0.2 10*3/MM3 (ref 0–0.4)
EOSINOPHIL NFR BLD AUTO: 2.3 % (ref 0.3–6.2)
ERYTHROCYTE [DISTWIDTH] IN BLOOD BY AUTOMATED COUNT: 12.5 % (ref 12.3–15.4)
GLOBULIN UR ELPH-MCNC: 2.5 GM/DL
GLUCOSE SERPL-MCNC: 158 MG/DL (ref 65–99)
GLUCOSE UR STRIP-MCNC: NEGATIVE MG/DL
HBA1C MFR BLD: 6.1 % (ref 4.8–5.6)
HCT VFR BLD AUTO: 33.9 % (ref 34–46.6)
HDLC SERPL-MCNC: 54 MG/DL (ref 40–60)
HGB BLD-MCNC: 10.8 G/DL (ref 12–15.9)
HGB UR QL STRIP.AUTO: NEGATIVE
IMM GRANULOCYTES # BLD AUTO: 0.01 10*3/MM3 (ref 0–0.05)
IMM GRANULOCYTES NFR BLD AUTO: 0.1 % (ref 0–0.5)
KETONES UR QL STRIP: NEGATIVE
LDLC SERPL CALC-MCNC: 100 MG/DL (ref 0–100)
LDLC/HDLC SERPL: 1.85 {RATIO}
LEUKOCYTE ESTERASE UR QL STRIP.AUTO: NEGATIVE
LYMPHOCYTES # BLD AUTO: 1.65 10*3/MM3 (ref 0.7–3.1)
LYMPHOCYTES NFR BLD AUTO: 19 % (ref 19.6–45.3)
MCH RBC QN AUTO: 30.8 PG (ref 26.6–33)
MCHC RBC AUTO-ENTMCNC: 31.9 G/DL (ref 31.5–35.7)
MCV RBC AUTO: 96.6 FL (ref 79–97)
MONOCYTES # BLD AUTO: 0.52 10*3/MM3 (ref 0.1–0.9)
MONOCYTES NFR BLD AUTO: 6 % (ref 5–12)
NEUTROPHILS NFR BLD AUTO: 6.26 10*3/MM3 (ref 1.7–7)
NEUTROPHILS NFR BLD AUTO: 72.3 % (ref 42.7–76)
NITRITE UR QL STRIP: NEGATIVE
PH UR STRIP.AUTO: 5.5 [PH] (ref 5–8)
PLATELET # BLD AUTO: 231 10*3/MM3 (ref 140–450)
PMV BLD AUTO: 8.6 FL (ref 6–12)
POTASSIUM SERPL-SCNC: 3.7 MMOL/L (ref 3.5–5.2)
PROT SERPL-MCNC: 6.3 G/DL (ref 6–8.5)
PROT UR QL STRIP: NEGATIVE
QT INTERVAL: 409 MS
QTC INTERVAL: 442 MS
RBC # BLD AUTO: 3.51 10*6/MM3 (ref 3.77–5.28)
SODIUM SERPL-SCNC: 140 MMOL/L (ref 136–145)
SP GR UR STRIP: 1.01 (ref 1–1.03)
TRIGL SERPL-MCNC: 61 MG/DL (ref 0–150)
UROBILINOGEN UR QL STRIP: NORMAL
VLDLC SERPL-MCNC: 12 MG/DL (ref 5–40)
WBC NRBC COR # BLD AUTO: 8.67 10*3/MM3 (ref 3.4–10.8)

## 2024-04-13 PROCEDURE — 93005 ELECTROCARDIOGRAM TRACING: CPT | Performed by: NURSE PRACTITIONER

## 2024-04-13 PROCEDURE — 99285 EMERGENCY DEPT VISIT HI MDM: CPT

## 2024-04-13 PROCEDURE — 25010000002 METHYLPREDNISOLONE PER 125 MG: Performed by: NURSE PRACTITIONER

## 2024-04-13 PROCEDURE — 85025 COMPLETE CBC W/AUTO DIFF WBC: CPT | Performed by: NURSE PRACTITIONER

## 2024-04-13 PROCEDURE — 96375 TX/PRO/DX INJ NEW DRUG ADDON: CPT

## 2024-04-13 PROCEDURE — G0378 HOSPITAL OBSERVATION PER HR: HCPCS

## 2024-04-13 PROCEDURE — 99284 EMERGENCY DEPT VISIT MOD MDM: CPT | Performed by: NURSE PRACTITIONER

## 2024-04-13 PROCEDURE — 80061 LIPID PANEL: CPT | Performed by: PHYSICIAN ASSISTANT

## 2024-04-13 PROCEDURE — 80053 COMPREHEN METABOLIC PANEL: CPT | Performed by: NURSE PRACTITIONER

## 2024-04-13 PROCEDURE — 25810000003 SODIUM CHLORIDE 0.9 % SOLUTION: Performed by: NURSE PRACTITIONER

## 2024-04-13 PROCEDURE — 83036 HEMOGLOBIN GLYCOSYLATED A1C: CPT | Performed by: PHYSICIAN ASSISTANT

## 2024-04-13 PROCEDURE — 25010000002 LORAZEPAM PER 2 MG: Performed by: EMERGENCY MEDICINE

## 2024-04-13 PROCEDURE — 70450 CT HEAD/BRAIN W/O DYE: CPT

## 2024-04-13 PROCEDURE — 81003 URINALYSIS AUTO W/O SCOPE: CPT | Performed by: NURSE PRACTITIONER

## 2024-04-13 PROCEDURE — 96376 TX/PRO/DX INJ SAME DRUG ADON: CPT

## 2024-04-13 PROCEDURE — 70551 MRI BRAIN STEM W/O DYE: CPT

## 2024-04-13 PROCEDURE — 96374 THER/PROPH/DIAG INJ IV PUSH: CPT

## 2024-04-13 PROCEDURE — 96361 HYDRATE IV INFUSION ADD-ON: CPT

## 2024-04-13 PROCEDURE — 93010 ELECTROCARDIOGRAM REPORT: CPT | Performed by: INTERNAL MEDICINE

## 2024-04-13 PROCEDURE — 25010000002 LORAZEPAM PER 2 MG: Performed by: NURSE PRACTITIONER

## 2024-04-13 RX ORDER — LORAZEPAM 2 MG/ML
1 INJECTION INTRAMUSCULAR ONCE
Status: DISCONTINUED | OUTPATIENT
Start: 2024-04-13 | End: 2024-04-13

## 2024-04-13 RX ORDER — SODIUM CHLORIDE 0.9 % (FLUSH) 0.9 %
10 SYRINGE (ML) INJECTION AS NEEDED
Status: DISCONTINUED | OUTPATIENT
Start: 2024-04-13 | End: 2024-04-14 | Stop reason: HOSPADM

## 2024-04-13 RX ORDER — TRAMADOL HYDROCHLORIDE 50 MG/1
50 TABLET ORAL 2 TIMES DAILY PRN
Status: DISCONTINUED | OUTPATIENT
Start: 2024-04-13 | End: 2024-04-14 | Stop reason: HOSPADM

## 2024-04-13 RX ORDER — METHYLPREDNISOLONE SODIUM SUCCINATE 125 MG/2ML
80 INJECTION, POWDER, LYOPHILIZED, FOR SOLUTION INTRAMUSCULAR; INTRAVENOUS ONCE
Status: COMPLETED | OUTPATIENT
Start: 2024-04-13 | End: 2024-04-13

## 2024-04-13 RX ORDER — SCOLOPAMINE TRANSDERMAL SYSTEM 1 MG/1
1 PATCH, EXTENDED RELEASE TRANSDERMAL
Status: DISCONTINUED | OUTPATIENT
Start: 2024-04-13 | End: 2024-04-14 | Stop reason: HOSPADM

## 2024-04-13 RX ORDER — SODIUM CHLORIDE 9 MG/ML
40 INJECTION, SOLUTION INTRAVENOUS AS NEEDED
Status: DISCONTINUED | OUTPATIENT
Start: 2024-04-13 | End: 2024-04-14 | Stop reason: HOSPADM

## 2024-04-13 RX ORDER — LORAZEPAM 2 MG/ML
1 INJECTION INTRAMUSCULAR ONCE
Status: COMPLETED | OUTPATIENT
Start: 2024-04-13 | End: 2024-04-13

## 2024-04-13 RX ORDER — LORAZEPAM 2 MG/ML
1 INJECTION INTRAMUSCULAR ONCE
Status: COMPLETED | OUTPATIENT
Start: 2024-04-14 | End: 2024-04-14

## 2024-04-13 RX ORDER — FERROUS SULFATE 325(65) MG
325 TABLET ORAL
Status: DISCONTINUED | OUTPATIENT
Start: 2024-04-14 | End: 2024-04-14 | Stop reason: HOSPADM

## 2024-04-13 RX ORDER — FAMOTIDINE 20 MG/1
20 TABLET, FILM COATED ORAL DAILY
Status: DISCONTINUED | OUTPATIENT
Start: 2024-04-14 | End: 2024-04-14 | Stop reason: HOSPADM

## 2024-04-13 RX ORDER — LOSARTAN POTASSIUM 100 MG/1
100 TABLET ORAL
Status: DISCONTINUED | OUTPATIENT
Start: 2024-04-14 | End: 2024-04-14 | Stop reason: HOSPADM

## 2024-04-13 RX ORDER — NITROGLYCERIN 0.4 MG/1
0.4 TABLET SUBLINGUAL
Status: DISCONTINUED | OUTPATIENT
Start: 2024-04-13 | End: 2024-04-14 | Stop reason: HOSPADM

## 2024-04-13 RX ORDER — ACETAMINOPHEN 325 MG/1
650 TABLET ORAL EVERY 4 HOURS PRN
Status: DISCONTINUED | OUTPATIENT
Start: 2024-04-13 | End: 2024-04-14 | Stop reason: HOSPADM

## 2024-04-13 RX ORDER — ONDANSETRON 2 MG/ML
4 INJECTION INTRAMUSCULAR; INTRAVENOUS EVERY 6 HOURS PRN
Status: DISCONTINUED | OUTPATIENT
Start: 2024-04-13 | End: 2024-04-14 | Stop reason: HOSPADM

## 2024-04-13 RX ORDER — LORAZEPAM 2 MG/ML
1 INJECTION INTRAMUSCULAR EVERY 6 HOURS
Status: DISCONTINUED | OUTPATIENT
Start: 2024-04-13 | End: 2024-04-13

## 2024-04-13 RX ORDER — ONDANSETRON 4 MG/1
4 TABLET, ORALLY DISINTEGRATING ORAL EVERY 6 HOURS PRN
Status: DISCONTINUED | OUTPATIENT
Start: 2024-04-13 | End: 2024-04-14 | Stop reason: HOSPADM

## 2024-04-13 RX ORDER — ASPIRIN 81 MG/1
81 TABLET ORAL DAILY
Status: DISCONTINUED | OUTPATIENT
Start: 2024-04-14 | End: 2024-04-14 | Stop reason: HOSPADM

## 2024-04-13 RX ORDER — SODIUM CHLORIDE 0.9 % (FLUSH) 0.9 %
10 SYRINGE (ML) INJECTION EVERY 12 HOURS SCHEDULED
Status: DISCONTINUED | OUTPATIENT
Start: 2024-04-13 | End: 2024-04-14 | Stop reason: HOSPADM

## 2024-04-13 RX ORDER — ATORVASTATIN CALCIUM 80 MG/1
80 TABLET, FILM COATED ORAL DAILY
Status: DISCONTINUED | OUTPATIENT
Start: 2024-04-14 | End: 2024-04-14 | Stop reason: HOSPADM

## 2024-04-13 RX ADMIN — LORAZEPAM 1 MG: 2 INJECTION INTRAMUSCULAR; INTRAVENOUS at 18:58

## 2024-04-13 RX ADMIN — LORAZEPAM 1 MG: 2 INJECTION, SOLUTION INTRAMUSCULAR; INTRAVENOUS at 14:50

## 2024-04-13 RX ADMIN — SCOPALAMINE 1 PATCH: 1 PATCH, EXTENDED RELEASE TRANSDERMAL at 20:12

## 2024-04-13 RX ADMIN — TRAMADOL HYDROCHLORIDE 50 MG: 50 TABLET ORAL at 21:29

## 2024-04-13 RX ADMIN — METHYLPREDNISOLONE SODIUM SUCCINATE 80 MG: 125 INJECTION, POWDER, FOR SOLUTION INTRAMUSCULAR; INTRAVENOUS at 21:29

## 2024-04-13 RX ADMIN — SODIUM CHLORIDE 1000 ML: 9 INJECTION, SOLUTION INTRAVENOUS at 14:50

## 2024-04-13 RX ADMIN — Medication 10 ML: at 20:11

## 2024-04-13 NOTE — PLAN OF CARE
Problem: Adult Inpatient Plan of Care  Goal: Plan of Care Review  Outcome: Ongoing, Progressing     Problem: Adult Inpatient Plan of Care  Goal: Absence of Hospital-Acquired Illness or Injury  Intervention: Identify and Manage Fall Risk  Recent Flowsheet Documentation  Taken 4/13/2024 1821 by Thao Schmidt RN  Safety Promotion/Fall Prevention:   activity supervised   assistive device/personal items within reach   clutter free environment maintained   fall prevention program maintained   nonskid shoes/slippers when out of bed   room organization consistent   safety round/check completed  Taken 4/13/2024 1724 by Thao Schmidt RN  Safety Promotion/Fall Prevention:   activity supervised   assistive device/personal items within reach   clutter free environment maintained   fall prevention program maintained   nonskid shoes/slippers when out of bed   room organization consistent   safety round/check completed  Intervention: Prevent Skin Injury  Recent Flowsheet Documentation  Taken 4/13/2024 1821 by Thao Schmidt RN  Body Position: position changed independently  Taken 4/13/2024 1724 by Thao Schmidt RN  Body Position: position changed independently  Skin Protection: adhesive use limited  Intervention: Prevent and Manage VTE (Venous Thromboembolism) Risk  Recent Flowsheet Documentation  Taken 4/13/2024 1821 by Thao Schmidt RN  Activity Management: activity encouraged  Taken 4/13/2024 1724 by Thao Schmidt RN  Activity Management: activity encouraged  Range of Motion: active ROM (range of motion) encouraged  Intervention: Prevent Infection  Recent Flowsheet Documentation  Taken 4/13/2024 1821 by Thao Schmidt RN  Infection Prevention:   hand hygiene promoted   rest/sleep promoted  Taken 4/13/2024 1724 by Thao Schmidt RN  Infection Prevention:   hand hygiene promoted   rest/sleep promoted  Goal: Optimal Comfort and Wellbeing  Intervention: Provide  Person-Centered Care  Recent Flowsheet Documentation  Taken 4/13/2024 1724 by Thao Schmidt RN  Trust Relationship/Rapport:   care explained   choices provided   thoughts/feelings acknowledged  Goal: Readiness for Transition of Care  Intervention: Mutually Develop Transition Plan  Recent Flowsheet Documentation  Taken 4/13/2024 1739 by Thao Schmidt RN  Transportation Anticipated: family or friend will provide  Patient/Family Anticipated Services at Transition: none  Patient/Family Anticipates Transition to: home with family  Taken 4/13/2024 1737 by Thao Schmidt RN  Equipment Currently Used at Home: cane, straight     Problem: Hypertension Comorbidity  Goal: Blood Pressure in Desired Range  Intervention: Maintain Blood Pressure Management  Recent Flowsheet Documentation  Taken 4/13/2024 1821 by Thao Scmhidt RN  Medication Review/Management: medications reviewed  Taken 4/13/2024 1724 by Thao Schmidt RN  Medication Review/Management: medications reviewed   Goal Outcome Evaluation:

## 2024-04-13 NOTE — FSED PROVIDER NOTE
Subjective   History of Present Illness  Patient is a 63-year-old female who presents complaining of feeling dizzy and lightheaded since yesterday afternoon.  She did see her allergist who told her she had fluid behind her right ear.  She does have a history of vertigo.  States she has taken meclizine and Zofran but is still symptomatic and this is never happened to her before.  She reports nausea, denies vomiting.  Denies chest pain, shortness of breath.  Reports her symptoms are worse with turning her head or changing positions.  Denies focal weakness, numbness, tingling.      Review of Systems   Gastrointestinal:  Positive for nausea.   Neurological:  Positive for dizziness and light-headedness.   All other systems reviewed and are negative.      Past Medical History:   Diagnosis Date    Anemia     IRON INFUSION RECENTLY    Arthritis     Asthma     Chronic kidney disease     stage 3    Elevated cholesterol     GERD (gastroesophageal reflux disease)     High risk medication use 01/19/2018    History of carpal tunnel syndrome     Hyperlipidemia     Hypertension     Intractable vomiting with nausea 12/07/2018    Left shoulder pain     Limited mobility     Vertigo     Weakness     AT TIMES LEFT SHOULDER/LEFT ARM       Allergies   Allergen Reactions    Cashew Nut Oil Anaphylaxis    Chocolate Anaphylaxis    Nickel Anaphylaxis    Nuts Anaphylaxis    Adhesive Tape Unknown - Low Severity    Chlorhexidine Unknown - Low Severity    Ciprofloxacin Other (See Comments)     THRUSH PER PATIENT    Citric Acid Unknown (See Comments)     Unsure      Covid-19 (Mrna) Vaccine Other (See Comments)     INSTRUCTED PER ALLERGIST SHE CAN NOT TAKE D/T ONE OF THE PRESERVATIVES    INSTRUCTED PER ALLERGIST SHE CAN NOT TAKE D/T ONE OF THE PRESERVATIVES      *is able to & has had the PFIZER vaccine*    Egg-Derived Products Nausea And Vomiting    Influenza Vaccines Nausea And Vomiting    Methyldibromoglutaronitrile Unknown (See Comments)       PATIENT UNSURE OF REACTION.    Naproxen Other (See Comments)    Neomycin Unknown (See Comments)      PATIENT UNSURE OF REACTION.    Omnicef [Cefdinir] Other (See Comments)     THRUSH PER PATIENT    Palladium Chloride Unknown (See Comments)      PATIENT UNSURE OF REACTION    Penicillins Other (See Comments)     THRUSH PER PATIENT    Sulfa Antibiotics Other (See Comments)     THRUSH PER PATIENT    Tomato Hives    Yeast-Related Products Hives    Gold-Containing Drug Products Rash          Latex Rash       Past Surgical History:   Procedure Laterality Date    CARPAL TUNNEL RELEASE Left 10/02/2023     SECTION  , ,     COLONOSCOPY N/A 12/10/2018    normal ileum, IH, hyperplastic polyp, otherwise normal exam    ENDOSCOPY N/A 12/10/2018    no gross lesions in esophagus or examined duodenum, erythematous mucosa in stomach, biopsies benign    HYSTERECTOMY      OOPHORECTOMY Bilateral     SHOULDER MANIPULATION Left     TOTAL SHOULDER ARTHROPLASTY Left 2021    Procedure: REVERSE TOTAL SHOULDER ARTHROPLASTY,  BICEP TENDONDESIS;  Surgeon: Bethel Devi MD;  Location: [facility];  Service: Orthopedics;  Laterality: Left;    TRIGGER FINGER RELEASE Left     3 fingers       Family History   Problem Relation Age of Onset    Asthma Mother     Thyroid disease Father     Diabetes Maternal Grandmother     Breast cancer Maternal Aunt     Colon cancer Maternal Grandfather     Malig Hyperthermia Neg Hx        Social History     Socioeconomic History    Marital status:     Number of children: 3    Years of education: College   Tobacco Use    Smoking status: Never     Passive exposure: Never    Smokeless tobacco: Never   Vaping Use    Vaping status: Never Used   Substance and Sexual Activity    Alcohol use: Not Currently     Alcohol/week: 1.0 standard drink of alcohol     Types: 1 Glasses of wine per week     Comment: WEEKLY    Drug use: No    Sexual activity: Defer           Objective   Physical  Exam  Vitals and nursing note reviewed.   Constitutional:       Appearance: Normal appearance.   HENT:      Head: Normocephalic and atraumatic.   Cardiovascular:      Rate and Rhythm: Normal rate and regular rhythm.      Pulses: Normal pulses.      Heart sounds: Normal heart sounds.   Pulmonary:      Effort: Pulmonary effort is normal.      Breath sounds: Normal breath sounds.   Abdominal:      General: Abdomen is flat.      Palpations: Abdomen is soft.      Tenderness: There is no abdominal tenderness.   Musculoskeletal:      Cervical back: Normal range of motion and neck supple.   Skin:     General: Skin is warm and dry.      Capillary Refill: Capillary refill takes less than 2 seconds.   Neurological:      General: No focal deficit present.      Mental Status: She is alert and oriented to person, place, and time.         Procedures           ED Course                                           Medical Decision Making  EKG normal sinus with a rate of 70.  No acute ischemic change or STEMI.  CT head negative for acute findings, other laboratory findings unremarkable as well.  Patient does have an NIH of 0 with no cerebellar signs.  She is not ataxic.  She was given Ativan and continues to complain of dizziness.  High suspicion for vertigo but will admit to the observation unit for MRI to rule out possible posterior stroke.  Dr. Monae is excepting.  Patient will be transferred via POV by her daughter.    Amount and/or Complexity of Data Reviewed  Labs: ordered.  Radiology: ordered.  ECG/medicine tests: ordered.    Risk  Prescription drug management.        Final diagnoses:   Dizziness       ED Disposition  ED Disposition       ED Disposition   Decision to Admit    Condition   --    Comment   --               No follow-up provider specified.       Medication List      No changes were made to your prescriptions during this visit.

## 2024-04-13 NOTE — DISCHARGE SUMMARY
ED OBSERVATION PROGRESS/DISCHARGE SUMMARY    Date of Admission: 4/13/2024   LOS: 0 days   PCP: Teo Fraser MD    Subjective patient reports her dizziness has significantly improved this morning.  Patient was started on scopolamine patches every 72 hours as needed and is agreeable to the plan.  Patient is going to follow-up with vestibular physical therapy, ENT, and sleep medicine.    Hospital Outcome: Pedrito Powell is a 63 y.o. 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  Her symptoms did not resolve with her usual meclizine. Lab work in the ED at [facility] revealed a glucose of 158, hemoglobin of 10.8.  EKG showed normal sinus rhythm with some low voltage in the precordial leads.  CT head showed no evidence for acute intracranial pathology. MRI of the brain was negative.     4/14: Patient was seen by neurology, discussed with Dr. Martinez.  He would like to obtain CTA head to rule out AV fistula.  CTA results were obtained and came back normal. Patient was seen by physical therapy, they performed Epley maneuver and patient reports improvement in her symptoms following this.    Ambulatory referral to sleep medicine.  Patient also referred to ENT and is going to follow-up with vestibular physical therapy.    Started on scopolamine patches as needed.       ROS:  General: no fevers, chills  Respiratory: no cough, dyspnea  Cardiovascular: no chest pain, palpitations  Abdomen: No abdominal pain, nausea, vomiting, or diarrhea  Neurologic: No focal weakness    Objective   Physical Exam:  I have reviewed the vital signs.  Temp:  [98.1 °F (36.7 °C)-99.1 °F (37.3 °C)] 98.1 °F (36.7 °C)  Heart Rate:  [69-77] 69  Resp:  [16-20] 18  BP: (123-154)/(60-94) 149/94  General Appearance:    Alert, cooperative, no distress  Head:    Normocephalic, atraumatic  Eyes:    Sclerae  anicteric  Neck:   Supple, no mass  Lungs: Clear to auscultation bilaterally, respirations unlabored  Heart: Regular rate and rhythm, S1 and S2 normal, no murmur, rub or gallop  Abdomen:  Soft, non-tender, bowel sounds active, nondistended  Extremities: No clubbing, cyanosis, or edema to lower extremities  Pulses:  2+ and symmetric in distal lower extremities  Skin: No rashes   Neurologic: Oriented x3, Normal strength to extremities    Results Review:    I have reviewed the labs, radiology results and diagnostic studies.    Results from last 7 days   Lab Units 04/14/24  0452   WBC 10*3/mm3 7.59   HEMOGLOBIN g/dL 10.7*   HEMATOCRIT % 32.4*   PLATELETS 10*3/mm3 257     Results from last 7 days   Lab Units 04/14/24  0452 04/13/24  1434   SODIUM mmol/L 139 140   POTASSIUM mmol/L 4.3 3.7   CHLORIDE mmol/L 105 106   CO2 mmol/L 22.4 26.5   BUN mg/dL 21 17   CREATININE mg/dL 0.86 0.89   CALCIUM mg/dL 8.6 9.0   BILIRUBIN mg/dL  --  0.5   ALK PHOS U/L  --  64   ALT (SGPT) U/L  --  20   AST (SGOT) U/L  --  16   GLUCOSE mg/dL 185* 158*     Imaging Results (Last 24 Hours)       Procedure Component Value Units Date/Time    MRI Brain Without Contrast [[id number]] Collected: 04/13/24 1939     Updated: 04/13/24 1946    Narrative:      BRAIN MRI WITHOUT CONTRAST     HISTORY: dizziness; R42-Dizziness and giddiness     COMPARISON: None.     FINDINGS:  Multiplanar images of the head were obtained without  gadolinium. No areas of restricted diffusion are seen to suggest acute  infarct. Ventricles are normal in size. There is no midline shift or  mass effect. There is really no significant microangiopathic change. No  abnormality is seen on susceptibility weighted imaging. Intracranial  flow voids appear intact. Mucosal thickening is noted within the ethmoid  sinuses. Trace fluid is noted within the left mastoid air cells, with  more extensive fluid noted on the right.       Impression:      1. No acute intracranial abnormality.     This  report was finalized on 4/13/2024 7:43 PM by Dr. Lilian Larson M.D on Workstation: [id number]       CT Head Without Contrast [[id number]] Collected: 04/13/24 1611     Updated: 04/13/24 1619    Narrative:      CT HEAD WITHOUT CONTRAST     CLINICAL HISTORY: Dizziness.     TECHNIQUE: CT scan of the head was obtained with 3 mm axial soft tissue  algorithm images. No intravenous contrast was administered. Sagittal and  coronal reconstructions were obtained.     COMPARISON: No previous cranial imaging study is available for  comparison.     FINDINGS:       The ventricles, sulci, and cisterns are age-appropriate. The gray-white  matter differentiation is within normal limits. The basal ganglia and  thalami are unremarkable in appearance. The posterior fossa structures  are within normal limits. Incidental note is made of atherosclerotic  calcifications within the intracranial vasculature.     Incidental note is made of partial opacification of the left posterior  ethmoid air cells.       Impression:         No CT evidence for acute intracranial pathology. The etiology of the  patient's dizziness is not further elucidated on this examination; and  if further radiographic assessment is warranted, one could obtain an MRI  of the brain for follow-up.        Radiation dose reduction techniques were utilized, including automated  exposure control and exposure modulation based on body size.     This report was finalized on 4/13/2024 4:16 PM by Dr. Canelo Oseguera M.D  on Workstation: [id number]               I have reviewed the medications.  ---------------------------------------------------------------------------------------------  Assessment & Plan   Assessment/Problem List    Dizziness      Plan:  Dizziness  - Start Scopolamine patches as needed  - Antiemetics as needed  - MRI of brain: negative, CTA head: Negative  - Can eat as tolerated  - Q4 neurochecks  -Ativan 1 mg IV repeated at 0100 for dizziness  -Solumedrol 80 mg  IV X1 dose given  - Neurology consulted, Dr. Martinez  - PT/Vestibular therapy consulted and performed Epley maneuver, ambulatory referral to physical therapy at discharge  -Scopolamine patches  -ENT referral  -Sleep medicine referral     Hypertension  -Continue irbesartan 300 mg tablet     Hyperlipidemia  -Continue atorvastatin 80 mg tablet     DVT prophylaxis:  Mechanical DVT prophylaxis orders are present.       Disposition: Home    Follow-up after Discharge: PCP, physical therapy, ENT, sleep medicine    This note will serve as a discharge summary    POPEYE Brown 04/14/24 08:55 EDT    I have worn appropriate PPE during this patient encounter, sanitized my hands both with entering and exiting patient's room.      33 minutes has been spent by [facility] providers in the care of this patient while under observation status

## 2024-04-13 NOTE — H&P
[facility]   HISTORY AND PHYSICAL    Patient Name: Pedrito Powell  : 1960  MRN: [id number]  Primary Care Physician:  Teo Fraser MD  Date of admission: 2024    Subjective   Subjective     Chief Complaint:   Chief Complaint   Patient presents with    Dizziness    Earache         HPI:    Pedrito Powell is a 63 y.o. 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  Patient states she took her meclizine as usual when she gets dizzy without resolve of symptoms.  She then saw her allergist who said she had fluid behind her right ear and recommended Flonase.  She then tried Flonase which also did not relieve her symptoms.  Patient states that the room is spinning.  She is also experiencing double vision and nausea.  Patient denies vomiting bowel or bladder changes or loss of hearing.    ED evaluation reviewed: Patient was seen at [facility] with a chief complaint of dizziness.  Lab work revealed a glucose of 158, hemoglobin of 10.8.  EKG showed normal sinus rhythm with some low voltage in the precordial leads.  CT head without contrast showed no CT evidence for acute intracranial pathology.    Review of Systems   All systems were reviewed and negative except for: Those mentioned above in the HPI.    Personal History     Past Medical History:   Diagnosis Date    Anemia     IRON INFUSION RECENTLY    Arthritis     Asthma     Chronic kidney disease     stage 3    Elevated cholesterol     GERD (gastroesophageal reflux disease)     High risk medication use 2018    History of carpal tunnel syndrome     Hyperlipidemia     Hypertension     Intractable vomiting with nausea 2018    Left shoulder pain     Limited mobility     Vertigo     Weakness     AT TIMES LEFT SHOULDER/LEFT ARM       Past Surgical History:   Procedure Laterality Date    CARPAL  TUNNEL RELEASE Left 10/02/2023     SECTION  , ,     COLONOSCOPY N/A 12/10/2018    normal ileum, IH, hyperplastic polyp, otherwise normal exam    ENDOSCOPY N/A 12/10/2018    no gross lesions in esophagus or examined duodenum, erythematous mucosa in stomach, biopsies benign    HYSTERECTOMY      OOPHORECTOMY Bilateral     SHOULDER MANIPULATION Left     TOTAL SHOULDER ARTHROPLASTY Left 2021    Procedure: REVERSE TOTAL SHOULDER ARTHROPLASTY,  BICEP TENDONDESIS;  Surgeon: Bethel Devi MD;  Location: [facility];  Service: Orthopedics;  Laterality: Left;    TRIGGER FINGER RELEASE Left     3 fingers       Family History: family history includes Asthma in her mother; Breast cancer in her maternal aunt; Colon cancer in her maternal grandfather; Diabetes in her maternal grandmother; Thyroid disease in her father. Otherwise pertinent FHx was reviewed and not pertinent to current issue.    Social History:  reports that she has never smoked. She has never been exposed to tobacco smoke. She has never used smokeless tobacco. She reports that she does not currently use alcohol after a past usage of about 1.0 standard drink of alcohol per week. She reports that she does not use drugs.    Home Medications:  Aspirin, B Complex Vitamins, Cholecalciferol, EPINEPHrine, Fluticasone Furoate, (Ibuprofen 3 %, Gabapentin 10 %, Baclofen 2 %, lidocaine 4 %), Vortioxetine HBr, acetaminophen, albuterol sulfate HFA, atorvastatin, azelastine, cetirizine, clobetasol propionate, famotidine, ferrous gluconate, hydrocortisone, ipratropium, irbesartan, loperamide, meclizine, ondansetron, ondansetron ODT, traMADol, and triamcinolone    Allergies:  Allergies   Allergen Reactions    Cashew Nut Oil Anaphylaxis    Chocolate Anaphylaxis    Nickel Anaphylaxis    Nuts Anaphylaxis    Adhesive Tape Unknown - Low Severity    Chlorhexidine Unknown - Low Severity    Ciprofloxacin Other (See Comments)     THRUSH PER PATIENT     Citric Acid Unknown (See Comments)     Unsure      Covid-19 (Mrna) Vaccine Other (See Comments)     INSTRUCTED PER ALLERGIST SHE CAN NOT TAKE D/T ONE OF THE PRESERVATIVES    INSTRUCTED PER ALLERGIST SHE CAN NOT TAKE D/T ONE OF THE PRESERVATIVES      *is able to & has had the PFIZER vaccine*    Egg-Derived Products Nausea And Vomiting    Influenza Vaccines Nausea And Vomiting    Methyldibromoglutaronitrile Unknown (See Comments)      PATIENT UNSURE OF REACTION.    Naproxen Other (See Comments)    Neomycin Unknown (See Comments)      PATIENT UNSURE OF REACTION.    Omnicef [Cefdinir] Other (See Comments)     THRUSH PER PATIENT    Palladium Chloride Unknown (See Comments)      PATIENT UNSURE OF REACTION    Penicillins Other (See Comments)     THRUSH PER PATIENT    Sulfa Antibiotics Other (See Comments)     THRUSH PER PATIENT    Tomato Hives    Yeast-Related Products Hives    Gold-Containing Drug Products Rash          Latex Rash       Objective   Objective     Vitals:   Temp:  [98.3 °F (36.8 °C)-99.1 °F (37.3 °C)] 99.1 °F (37.3 °C)  Heart Rate:  [70-77] 71  Resp:  [16-18] 16  BP: (125-154)/(66-93) 151/80  Physical Exam     GENERAL: 63 y.o. YO female a/o x 4, NAD  SKIN: Warm pink and dry   HEENT:  PERRL, EOM intact, conjunctivae normal, sclerae clear. Notable nystagmus with left horizontal gaze.  NECK: supple, no JVD  LUNGS: Clear to auscultation bilaterally without wheezes, rales or rhonchi.  No accessory muscle use and no nasal flaring.  CARDIAC:  Regular rate and rhythm, S1-S2.  No murmurs, rubs or gallops.  No peripheral edema.  Equal pulses bilaterally.  ABDOMEN: Soft, nontender, nondistended.  No guarding or rebound tenderness.  Normal bowel sounds.  MUSCULOSKELETAL: Moves all extremities well.  No deformity.  NEURO: Cranial nerves II through XII grossly intact.  No gross focal deficits.  Alert.  Normal speech and motor.  PSYCH: Normal mood and affect      Result Review    Result Review:  I have personally reviewed  the results from the time of this admission to 4/13/2024 17:56 EDT and agree with these findings:  [x]  Laboratory list / accordion  []  Microbiology  [x]  Radiology  [x]  EKG/Telemetry   []  Cardiology/Vascular   []  Pathology  [x]  Old records  []  Other:    Most notable findings include: Lab work in the ED at [facility] revealed a glucose of 158, hemoglobin of 10.8.          CT Head Without Contrast    Result Date: 4/13/2024   No CT evidence for acute intracranial pathology. The etiology of the patient's dizziness is not further elucidated on this examination; and if further radiographic assessment is warranted, one could obtain an MRI of the brain for follow-up.   Radiation dose reduction techniques were utilized, including automated exposure control and exposure modulation based on body size.  This report was finalized on 4/13/2024 4:16 PM by Dr. Canelo Oseguera M.D on Workstation: [id number]       Assessment & Plan   Assessment / Plan     Brief Patient Summary:  Pedrito Powell is a 63 y.o. female who presented to the emergency department with dizziness that started while she was at school working yesterday.  Her symptoms did not resolve with her usual meclizine. Lab work in the ED at [facility] revealed a glucose of 158, hemoglobin of 10.8.  EKG showed normal sinus rhythm with some low voltage in the precordial leads.  CT head showed no evidence for acute intracranial pathology.    Active Hospital Problems:  Active Hospital Problems    Diagnosis     **Dizziness      Plan:   Dizziness  - Start Scopolamine patches as needed  - Antiemetics as needed  - MRI pending  - Can eat as tolerated  - Q4 neurochecks  - Neurology consulted  - PT consulted    Hypertension  -Continue irbesartan 300 mg tablet    Hyperlipidemia  -Continue atorvastatin 80 mg tablet    DVT prophylaxis:  Mechanical DVT prophylaxis orders are present.        CODE STATUS:    Level Of Support  Discussed With: Patient  Code Status (Patient has no pulse and is not breathing): CPR (Attempt to Resuscitate)  Medical Interventions (Patient has pulse or is breathing): Full Support    Admission Status:  I believe this patient meets observation status.     75 minutes has been spent by [facility] providers in the care of this patient while under observation status    I wore an appropriate PPE during this patient encounter.  Hand hygeine performed before and after seeing the patient.    This note was written by LISA Cruz. I have read, reviewed, and edited for any mistakes. Patient was seen by myself with student. I agree with assessment and plan.       Electronically signed by POPEYE Brown, 04/13/24, 5:56 PM EDT.

## 2024-04-14 ENCOUNTER — APPOINTMENT (OUTPATIENT)
Dept: CT IMAGING | Facility: HOSPITAL | Age: 64
End: 2024-04-14
Payer: COMMERCIAL

## 2024-04-14 ENCOUNTER — READMISSION MANAGEMENT (OUTPATIENT)
Dept: CALL CENTER | Facility: HOSPITAL | Age: 64
End: 2024-04-14
Payer: COMMERCIAL

## 2024-04-14 VITALS
HEART RATE: 71 BPM | SYSTOLIC BLOOD PRESSURE: 149 MMHG | BODY MASS INDEX: 46.37 KG/M2 | DIASTOLIC BLOOD PRESSURE: 85 MMHG | RESPIRATION RATE: 18 BRPM | WEIGHT: 230 LBS | HEIGHT: 59 IN | OXYGEN SATURATION: 96 % | TEMPERATURE: 99.3 F

## 2024-04-14 LAB
ANION GAP SERPL CALCULATED.3IONS-SCNC: 11.6 MMOL/L (ref 5–15)
BUN SERPL-MCNC: 21 MG/DL (ref 8–23)
BUN/CREAT SERPL: 24.4 (ref 7–25)
CALCIUM SPEC-SCNC: 8.6 MG/DL (ref 8.6–10.5)
CHLORIDE SERPL-SCNC: 105 MMOL/L (ref 98–107)
CO2 SERPL-SCNC: 22.4 MMOL/L (ref 22–29)
CREAT SERPL-MCNC: 0.86 MG/DL (ref 0.57–1)
DEPRECATED RDW RBC AUTO: 39.6 FL (ref 37–54)
EGFRCR SERPLBLD CKD-EPI 2021: 76 ML/MIN/1.73
ERYTHROCYTE [DISTWIDTH] IN BLOOD BY AUTOMATED COUNT: 12.1 % (ref 12.3–15.4)
GLUCOSE SERPL-MCNC: 185 MG/DL (ref 65–99)
HCT VFR BLD AUTO: 32.4 % (ref 34–46.6)
HGB BLD-MCNC: 10.7 G/DL (ref 12–15.9)
MCH RBC QN AUTO: 29.9 PG (ref 26.6–33)
MCHC RBC AUTO-ENTMCNC: 33 G/DL (ref 31.5–35.7)
MCV RBC AUTO: 90.5 FL (ref 79–97)
PLATELET # BLD AUTO: 257 10*3/MM3 (ref 140–450)
PMV BLD AUTO: 8.8 FL (ref 6–12)
POTASSIUM SERPL-SCNC: 4.3 MMOL/L (ref 3.5–5.2)
RBC # BLD AUTO: 3.58 10*6/MM3 (ref 3.77–5.28)
SODIUM SERPL-SCNC: 139 MMOL/L (ref 136–145)
WBC NRBC COR # BLD AUTO: 7.59 10*3/MM3 (ref 3.4–10.8)

## 2024-04-14 PROCEDURE — 99203 OFFICE O/P NEW LOW 30 MIN: CPT | Performed by: STUDENT IN AN ORGANIZED HEALTH CARE EDUCATION/TRAINING PROGRAM

## 2024-04-14 PROCEDURE — 70496 CT ANGIOGRAPHY HEAD: CPT

## 2024-04-14 PROCEDURE — 25510000001 IOPAMIDOL PER 1 ML: Performed by: EMERGENCY MEDICINE

## 2024-04-14 PROCEDURE — 25010000002 LORAZEPAM PER 2 MG: Performed by: NURSE PRACTITIONER

## 2024-04-14 PROCEDURE — 85027 COMPLETE CBC AUTOMATED: CPT | Performed by: PHYSICIAN ASSISTANT

## 2024-04-14 PROCEDURE — G0378 HOSPITAL OBSERVATION PER HR: HCPCS

## 2024-04-14 PROCEDURE — 80048 BASIC METABOLIC PNL TOTAL CA: CPT | Performed by: PHYSICIAN ASSISTANT

## 2024-04-14 PROCEDURE — 97162 PT EVAL MOD COMPLEX 30 MIN: CPT

## 2024-04-14 PROCEDURE — 96376 TX/PRO/DX INJ SAME DRUG ADON: CPT

## 2024-04-14 PROCEDURE — 95992 CANALITH REPOSITIONING PROC: CPT

## 2024-04-14 PROCEDURE — 97530 THERAPEUTIC ACTIVITIES: CPT

## 2024-04-14 RX ORDER — SCOLOPAMINE TRANSDERMAL SYSTEM 1 MG/1
1 PATCH, EXTENDED RELEASE TRANSDERMAL
Qty: 5 EACH | Refills: 0 | Status: SHIPPED | OUTPATIENT
Start: 2024-04-16 | End: 2024-04-23 | Stop reason: SDUPTHER

## 2024-04-14 RX ADMIN — ACETAMINOPHEN 650 MG: 325 TABLET ORAL at 08:51

## 2024-04-14 RX ADMIN — LORAZEPAM 1 MG: 2 INJECTION INTRAMUSCULAR; INTRAVENOUS at 01:02

## 2024-04-14 RX ADMIN — LOSARTAN POTASSIUM 100 MG: 100 TABLET, FILM COATED ORAL at 08:51

## 2024-04-14 RX ADMIN — ASPIRIN 81 MG: 81 TABLET, COATED ORAL at 08:51

## 2024-04-14 RX ADMIN — Medication 10 ML: at 08:52

## 2024-04-14 RX ADMIN — IOPAMIDOL 95 ML: 755 INJECTION, SOLUTION INTRAVENOUS at 12:14

## 2024-04-14 RX ADMIN — FERROUS SULFATE TAB 325 MG (65 MG ELEMENTAL FE) 325 MG: 325 (65 FE) TAB at 08:51

## 2024-04-14 RX ADMIN — FAMOTIDINE 20 MG: 20 TABLET, FILM COATED ORAL at 08:51

## 2024-04-14 RX ADMIN — ATORVASTATIN CALCIUM 80 MG: 80 TABLET, FILM COATED ORAL at 08:51

## 2024-04-14 NOTE — OUTREACH NOTE
Prep Survey      Flowsheet Row Responses   [facility] facility patient discharged from? [place]   Is LACE score < 7 ? Yes   Eligibility [facility]   Date of Admission 04/13/24   Date of Discharge 04/14/24   Discharge Disposition Home or Self Care   Discharge diagnosis Dizziness   Does the patient have one of the following disease processes/diagnoses(primary or secondary)? Other   Does the patient have Home health ordered? No   Is there a DME ordered? No   Prep survey completed? Yes            NICO HERNANDEZ - Registered Nurse

## 2024-04-14 NOTE — CONSULTS
Neurology Consult Note    Consult Date: 4/14/2024    Referring MD: Teo Fraser MD    Reason for Consult I have been asked to see the patient in neurological consultation to render advice and opinion regarding vertigo    Pedrito Powell is a 63 y.o. 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 She denied other focal neurological symptoms. Gait not affected. She was recently seen by her primary care doctor who told her she had fluid behind her right ear, she stated that when her dizziness happened she felt her blood flow/heart beating on the right side , she denied hearing loss or ringing in the ear. Daughter stated patient does snore during sleep, she will take naps but denied not feeling refreshed after sleep, she denied dry mouth or early morning headache.  She denies smoking, drinking or drug illicit.  Past Medical History:   Diagnosis Date    Anemia     IRON INFUSION RECENTLY    Arthritis     Asthma     Chronic kidney disease     stage 3    Elevated cholesterol     GERD (gastroesophageal reflux disease)     High risk medication use 01/19/2018    History of carpal tunnel syndrome     Hyperlipidemia     Hypertension     Intractable vomiting with nausea 12/07/2018    Left shoulder pain     Limited mobility     Vertigo     Weakness     AT TIMES  "LEFT SHOULDER/LEFT ARM       Exam  /94 (BP Location: Right arm, Patient Position: Lying)   Pulse 69   Temp 98.1 °F (36.7 °C) (Oral)   Resp 18   Ht 149.9 cm (59\")   Wt 104 kg (230 lb)   SpO2 94%   BMI 46.45 kg/m²   Gen: NAD, vitals reviewed  MS: oriented x3, recent/remote memory intact, normal attention/concentration, language intact, no neglect.  CN: visual acuity grossly normal, PERRL, EOMI, no facial droop, no dysarthria  Motor: 5/5 throughout upper and lower extremities, normal tone  Sensory exam: Normal to light touch throughout  Coordination: Normal finger-nose-finger bilaterally  Susan-Hallpike maneuver: Patient felt vertigo on the left side, no associated nystagmus.    DATA:    Lab Results   Component Value Date    GLUCOSE 185 (H) 04/14/2024    CALCIUM 8.6 04/14/2024     04/14/2024    K 4.3 04/14/2024    CO2 22.4 04/14/2024     04/14/2024    BUN 21 04/14/2024    CREATININE 0.86 04/14/2024    EGFRIFAFRI 40 (L) 02/10/2020    EGFRIFNONA 34 (L) 10/20/2021    BCR 24.4 04/14/2024    ANIONGAP 11.6 04/14/2024     Lab Results   Component Value Date    WBC 7.59 04/14/2024    HGB 10.7 (L) 04/14/2024    HCT 32.4 (L) 04/14/2024    MCV 90.5 04/14/2024     04/14/2024       Lab review: A1c 6.1, , urinalysis negative, sodium 139, BUN 21, creatinine 0.86    Imaging review: I personally reviewed MRI brain which showed no acute stroke    Diagnoses:  -Positional vertigo possible BPPV on the left, stroke less likely with a negative brain MRI  -Rule out obstructive sleep apnea  -Bifrontal headache description does not fit for migraine could be related to undiagnosed obstructive sleep apnea  -Rule out AV fistula she was feeling blood flow sound heart failure right ear      PLAN:   -Meclizine as needed  -Consult PT for Epley maneuver on the left side  -CTA head to rule out AV fistula  -Follow-up with ENT as an outpatient  -Recommend obstructive sleep apnea eval as an outpatient  -No further workup " needed from neurology at this time will sign off and see again as needed.  Will follow peripherally on the CTA head.    Discussed with observation unit provider Micaela Murphy.    Medical Decision Making for this neurology consultation consists of the following:  Review of previous chart, including H/P, provider and nursing notes as applicable.  Review of medications and vital signs.  Review of previous labs, neuroimaging, and additional relevant diagnostics.   Interpretation of laboratory, imaging, and other diagnostic results  Discussion with other providers and family   Total face-to-face/floor time: 34-75 minutes.

## 2024-04-14 NOTE — PLAN OF CARE
Goal Outcome Evaluation:            Patient and daughter present at discharge. They voice understanding of discharge, medication and follow up. Patient to  meds at [facility] pharmacy at discharge. They have all belongings.

## 2024-04-14 NOTE — DISCHARGE INSTRUCTIONS
Begin applying scopolamine patches as needed every 72 hours.  Do not take meclizine while you have on a scopolamine patch.  Follow-up with vestibular physical therapy.  Referral sent to ENT.  You have also been referred to sleep medicine for a sleep study. return to the emergency department with worsening symptoms, uncontrolled pain, inability to tolerate oral liquids, fever greater than 101°F not controlled by Tylenol or as needed with emergent concerns.

## 2024-04-14 NOTE — PROGRESS NOTES
MD ATTESTATION NOTE    SHARED VISIT: This visit was performed by BOTH a physician and an APC. The substantive portion of the medical decision making was performed by this attesting physician who made or approved the management plan and takes responsibility for patient management. All studies in the APC note (if performed) were independently interpreted by me.     I provided a substantive portion of the care of the patient.  I personally performed the physical exam in its entirety, and below are my findings.      Brief HPI: Patient presents with dizziness that began on Friday.  Is been constant.  It began when she leaned over at the end of the school day and became lightheaded.  However has been persistent ever since then.  She states that she is overall feeling better with the scopolamine patch.    PHYSICAL EXAM  ED Triage Vitals   Temp Heart Rate Resp BP SpO2   04/13/24 1415 04/13/24 1415 04/13/24 1415 04/13/24 1415 04/13/24 1415   98.9 °F (37.2 °C) 77 16 146/71 98 %      Temp src Heart Rate Source Patient Position BP Location FiO2 (%)   04/13/24 1639 04/13/24 1639 04/13/24 1639 04/13/24 1639 --   Oral Monitor Sitting Left arm          GENERAL: no acute distress  HENT: nares patent  EYES: no scleral icterus  CV: regular rhythm, normal rate  RESPIRATORY: normal effort  ABDOMEN: soft  MUSCULOSKELETAL: no deformity  NEURO:   Recent and remote memory functions are normal. The patient is attentive with normal concentration. Language is fluent. Speech is clear. The speech is non-dysarthric. Fund of knowledge is normal.   Symmetric smile with no facial droop.  Eyes close shut strongly bilaterally.  Symmetric eyebrow raise bilaterally.  EOMI, PERRL  CN II-XII grossly normal otherwise.  5/5 strength to extremities.  No pronator drift.  Intact FNF.  No meningismus.  PSYCH:  calm, cooperative  SKIN: warm, dry    Vital signs and nursing notes reviewed.        Plan:   MRI negative  Neurology has been seen the patient.  They  recommend CT angiogram of the head.  Vestibular therapy ordered

## 2024-04-14 NOTE — PLAN OF CARE
Goal Outcome Evaluation:  Plan of Care Reviewed With: patient      Patient admitted to observation unit with complaints of intermittent dizziness with no relief from meclizine. Patient also reports fluid on the rt ear. MRI completed. Neurology and PT consulted.  Scopolamine patch applied behind the rt ear. Patient c/o of persistent dizziness overnight. Ativan IV given with some relief. Vital signs stable. A&O X 4. Call light within reach. Will monitor.

## 2024-04-14 NOTE — PLAN OF CARE
Goal Outcome Evaluation:  Plan of Care Reviewed With: patient           Outcome Evaluation: Patient is a 63 y.o female who presented to [facility] with reports of dizziness. Patient seen for PT evaluation this AM to address mobility and vestibular conserns. Patient reports symptoms are much improved but somewhat still present. Patient describes symptoms of room spinning with mobility. Patient is independent at baseline and uses a cane for ambulation. Patient lives with her daughter who can assist as needed. Susan hallpike assessed bilaterally. Patient reported lightheadedness and double vision on R. Susan hallpike on L yielded slight nystagmus and reproduction of patient symptoms. Epley performed for L BPPV. Patient then ambulated 150ft around unit with straigt cane and SV. Gait steady with no overt LOB noted. Encouraged patient to remain upright for remainder of day. Vestibular handout provided. Recommend follow up with OP vestibular PT. Acute PT will sign off.      Anticipated Discharge Disposition (PT): home with assist, home with outpatient therapy services

## 2024-04-14 NOTE — PROGRESS NOTES
FINN BYRD Attestation Note    I supervised care provided by the midlevel provider.    The LAITH and I have discussed this patient's history, physical exam, and treatment plan. I have reviewed the documentation and personally had a face to face interaction with the patient  I affirm the documentation and agree with the treatment and plan. I provided a substantive portion of the care of this patient.  I personally performed the physical exam, in its entirety.  My personal findings are documented in below:    History:  63-year-old female with a history of intermittent vertigo had a prescription for meclizine.  She began to have dizziness yesterday but meclizine did not help and it was worse this morning.  She does report that it is related to movement especially rotation of the head.  No other focal neurologic deficits reported.  Some improvement with Ativan at [facility] ED prior to [facility] the [facility] unit.  Resting comfortably at this time with no complaint of chest pain or shortness of breath.    Physical Exam:  General: No acute distress.  HENT: NCAT, PERRL, Nares patent.  TMs clear bilaterally  Eyes: no scleral icterus.  PERRL  Neck: trachea midline, no ROM limitations.  CV: Pink warm and well-perfused throughout  Respiratory: No distress or increased work of breathing  Abdomen: soft, no focal tenderness or rigidity  Musculoskeletal: no deformity.  Neuro: alert, moves all extremities, follows commands.  No unilateral focal weakness  Skin: warm, dry.    Assessment and Plan:  MRI Brain Without Contrast    Result Date: 4/13/2024  Narrative: BRAIN MRI WITHOUT CONTRAST  HISTORY: dizziness; R42-Dizziness and giddiness  COMPARISON: None.  FINDINGS:  Multiplanar images of the head were obtained without gadolinium. No areas of restricted diffusion are seen to suggest acute infarct. Ventricles are normal in size. There is no midline shift or mass effect. There is really no significant microangiopathic change. No abnormality  is seen on susceptibility weighted imaging. Intracranial flow voids appear intact. Mucosal thickening is noted within the ethmoid sinuses. Trace fluid is noted within the left mastoid air cells, with more extensive fluid noted on the right.      Impression: 1. No acute intracranial abnormality.  This report was finalized on 4/13/2024 7:43 PM by Dr. Lilian Larson M.D on Workstation: [id number]      CT Head Without Contrast    Result Date: 4/13/2024  Narrative: CT HEAD WITHOUT CONTRAST  CLINICAL HISTORY: Dizziness.  TECHNIQUE: CT scan of the head was obtained with 3 mm axial soft tissue algorithm images. No intravenous contrast was administered. Sagittal and coronal reconstructions were obtained.  COMPARISON: No previous cranial imaging study is available for comparison.  FINDINGS:   The ventricles, sulci, and cisterns are age-appropriate. The gray-white matter differentiation is within normal limits. The basal ganglia and thalami are unremarkable in appearance. The posterior fossa structures are within normal limits. Incidental note is made of atherosclerotic calcifications within the intracranial vasculature.  Incidental note is made of partial opacification of the left posterior ethmoid air cells.      Impression:  No CT evidence for acute intracranial pathology. The etiology of the patient's dizziness is not further elucidated on this examination; and if further radiographic assessment is warranted, one could obtain an MRI of the brain for follow-up.   Radiation dose reduction techniques were utilized, including automated exposure control and exposure modulation based on body size.  This report was finalized on 4/13/2024 4:16 PM by Dr. Canelo Oseguera M.D on Workstation: [id number]      FL Guide For Pain Meds Inj    Result Date: 3/27/2024  Narrative: FLUOROSCOPICALLY GUIDED LEFT NAVICULAR CUNEIFORM AND SECOND AND THIRD TARSOMETATARSAL JOINT STEROID AND ANALGESIC INJECTION  HISTORY: Recurrent left foot pain. The  patient enjoyed favorable relief from her pain following the previous steroid injections.  TECHNIQUE: I discussed the risks, benefits, and alternatives of this procedure with the patient before starting. I answered their questions and they agreed to proceed.  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 I chose to try to access the joint via the navicular cuneiform space, which was the site of the previous injection 12/19/2023.  The dorsum of the left foot was prepped and draped in the usual sterile fashion. The soft tissue superficial to the navicular cuneiform joint was anesthetized using 1 mL of 1% lidocaine. The 25-gauge analgesic needle was advanced into the joint and intra-articular needle position was confirmed with the injection of less than 1 mL of Isovue-300 contrast. I then injected 7 mL of a mixture containing 40 mg Depo-Medrol and 0.25% Sensorcaine. The needle was removed.  60 seconds of fluoroscopy was used and 1 image was saved. The patient tolerated the procedure well and was discharged in stable condition.  Ugym 36.2 Pt felt 100% pain relief after injection today      Impression: Fluoroscopically guided left navicular cuneiform and third through fifth tarsometatarsal joint steroid and analgesic injection was well tolerated. 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  This report was finalized on 3/27/2024 2:02 PM by Dr. Evans Wise M.D on Workstation: [id number]      FL Guide For Pain Meds Inj    Result Date: 3/27/2024  Narrative: FLUOROSCOPICALLY GUIDED RIGHT SECOND AND THIRD TARSOMETATARSAL JOINT STEROID AND ANALGESIC INJECTION  HISTORY: Recurrent right foot pain. The patient enjoyed favorable relief from her pain following the previous steroid injections.  TECHNIQUE: I discussed the  risks, benefits, and alternatives of this procedure with the patient before starting. I answered their questions and they agreed to proceed.  The dorsum of the right foot was prepped and draped in the usual sterile fashion. The soft tissue superficial to the third tarsometatarsal joint was anesthetized using 1 mL of 1% lidocaine. The 25-gauge analgesic needle was advanced into the joint and intra-articular needle position was confirmed with the injection of less than 1 mL of Isovue-300 contrast. I then injected 7 mL of a mixture containing 40 mg Depo-Medrol and 0.25% Sensorcaine. The needle was removed.  4 seconds of fluoroscopy was used and 2 images were saved. The patient tolerated the procedure well and was discharged in stable condition.  Ugym 21.4 Pt felt 100% pain relief after injection today.      Impression: Fluoroscopically guided right midfoot steroid and analgesic injection was well tolerated.  This report was finalized on 3/27/2024 12:40 PM by Dr. Evans Wise M.D on Workstation: [id number]       Plan PT consult for vestibular therapy in a.m. as well as neurology consult.

## 2024-04-14 NOTE — THERAPY EVALUATION
Patient Name: Pedrito Powell  : 1960    MRN: [id number]                              Today's Date: 2024       Admit Date: 2024    Visit Dx:     ICD-10-CM ICD-9-CM   1. Dizziness  R42 780.4   2. At risk for sleep apnea  Z91.89 V49.89     Patient Active Problem List   Diagnosis    Hx of anaphylaxis    Primary osteoarthritis of knee    High risk medication use    Hypertension    Hyperlipidemia    Vertigo    Infected blister of left foot    Ganglion of right wrist    Vomiting and diarrhea    Intractable vomiting with nausea    Fatigue    Acute gastritis without hemorrhage    Anemia    Elevated serum creatinine    Fungal rash of torso    Cellulitis of abdominal wall    Bronchitis    Arthritis of shoulder    Chronic renal failure in pediatric patient, stage 3 (moderate)    H/O shoulder surgery    Anxiety    Depression    Iron deficiency    Dizziness     Past Medical History:   Diagnosis Date    Anemia     IRON INFUSION RECENTLY    Arthritis     Asthma     Chronic kidney disease     stage 3    Elevated cholesterol     GERD (gastroesophageal reflux disease)     High risk medication use 2018    History of carpal tunnel syndrome     Hyperlipidemia     Hypertension     Intractable vomiting with nausea 2018    Left shoulder pain     Limited mobility     Vertigo     Weakness     AT TIMES LEFT SHOULDER/LEFT ARM     Past Surgical History:   Procedure Laterality Date    CARPAL TUNNEL RELEASE Left 10/02/2023     SECTION  , ,     COLONOSCOPY N/A 12/10/2018    normal ileum, IH, hyperplastic polyp, otherwise normal exam    ENDOSCOPY N/A 12/10/2018    no gross lesions in esophagus or examined duodenum, erythematous mucosa in stomach, biopsies benign    HYSTERECTOMY      OOPHORECTOMY Bilateral     SHOULDER MANIPULATION Left     TOTAL SHOULDER ARTHROPLASTY Left 2021    Procedure: REVERSE TOTAL SHOULDER ARTHROPLASTY,  BICEP TENDONDESIS;  Surgeon: Bethel Devi MD;  Location:   [facility];  Service: Orthopedics;  Laterality: Left;    TRIGGER FINGER RELEASE Left     3 fingers      General Information       Row Name 04/14/24 1035          Physical Therapy Time and Intention    Document Type evaluation  -     Mode of Treatment individual therapy;physical therapy  -       Row Name 04/14/24 1035          General Information    Patient Profile Reviewed yes  -SM     Prior Level of Function independent:  -     Existing Precautions/Restrictions fall  -       Row Name 04/14/24 1035          Living Environment    People in Home child(faraz), adult  -       Row Name 04/14/24 1035          Home Main Entrance    Number of Stairs, Main Entrance three  -       Row Name 04/14/24 1035          Cognition    Orientation Status (Cognition) oriented x 4  -               User Key  (r) = Recorded By, (t) = Taken By, (c) = Cosigned By      Initials Name Provider Type     Dee Vargas PT Physical Therapist                   Mobility       Row Name 04/14/24 1036          Bed Mobility    Bed Mobility supine-sit;sit-supine  -     Supine-Sit [place] (Bed Mobility) modified independence  -     Sit-Supine [place] (Bed Mobility) modified independence  -       Row Name 04/14/24 1036          Sit-Stand Transfer    Sit-Stand [place] (Transfers) supervision  -     Assistive Device (Sit-Stand Transfers) cane, straight  -       Row Name 04/14/24 1036          Gait/Stairs (Locomotion)    [place] Level (Gait) supervision  -     Assistive Device (Gait) cane, straight  -     Distance in Feet (Gait) 150  -     Comment, (Gait/Stairs) Gait steady with no overt LOB noted.  -               User Key  (r) = Recorded By, (t) = Taken By, (c) = Cosigned By      Initials Name Provider Type     Dee Vargas PT Physical Therapist                   Obj/Interventions       Row Name 04/14/24 1036          Range of Motion Comprehensive    General Range of Motion bilateral lower  extremity ROM [facility]       Row Name 04/14/24 1036          Strength Comprehensive (MMT)    Comment, General Manual Muscle Testing (MMT) Assessment B LEs [facility] Name 04/14/24 1036          Balance    Balance Assessment sitting static balance;sitting dynamic balance;sit to stand dynamic balance;standing static balance;standing dynamic balance  -     Static Sitting Balance independent  -     Dynamic Sitting Balance modified independence  -     Position, Sitting Balance sitting edge of bed  -     Sit to Stand Dynamic Balance supervision  -     Static Standing Balance supervision  -     Dynamic Standing Balance supervision  -     Position/Device Used, Standing Balance supported;cane, straight  -SM     Balance Interventions sitting;standing;sit to stand;supported;static;dynamic  -               User Key  (r) = Recorded By, (t) = Taken By, (c) = Cosigned By      Initials Name Provider Type     Dee Vargas PT Physical Therapist                   Goals/Plan    No documentation.                  Clinical Impression       [facility] Name 04/14/24 1037          Pain    Pretreatment Pain Rating 0/10 - no pain  -SM     Posttreatment Pain Rating 0/10 - no pain  -[facility] Name 04/14/24 1037          Plan of Care Review    Plan of Care Reviewed With patient  -     Outcome Evaluation Patient is a 63 y.o female who presented to [facility] with reports of dizziness. Patient seen for PT evaluation this AM to address mobility and vestibular conserns. Patient reports symptoms are much improved but somewhat still present. Patient describes symptoms of room spinning with mobility. Patient is independent at baseline and uses a cane for ambulation. Patient lives with her daughter who can assist as needed. [place] hallpike assessed bilaterally. Patient reported lightheadedness and double vision on R. Susan hallpike on L yielded slight nystagmus and reproduction of patient symptoms. Epley performed for L BPPV. Patient  then ambulated 150ft around unit with straigt cane and SV. Gait steady with no overt LOB noted. Encouraged patient to remain upright for remainder of day. Vestibular handout provided. Recommend follow up with OP vestibular PT. Acute PT will sign off.  -       Row Name 04/14/24 1037          Therapy Assessment/Plan (PT)    Criteria for Skilled Interventions Met (PT) no;no problems identified which require skilled intervention  -     Therapy Frequency (PT) evaluation only  -       Row Name 04/14/24 1037          Vital Signs    Pre Patient Position Supine  -SM     Intra Patient Position Standing  -SM     Post Patient Position Supine  -       Row Name 04/14/24 1037          Positioning and Restraints    Pre-Treatment Position in bed  -SM     Post Treatment Position bed  -SM     In Bed sitting;call light within reach;encouraged to call for assist;with family/caregiver;notified [facility]               User Key  (r) = Recorded By, (t) = Taken By, (c) = Cosigned By      Initials Name Provider Type     Dee Vargas, PT Physical Therapist                   Outcome Measures       Row Name 04/14/24 1040 04/14/24 0813       How much help from another person do you currently need...    Turning from your back to your side while in flat bed without using bedrails? 4  -SM 4  -JA    Moving from lying on back to sitting on the side of a flat bed without bedrails? 4  -SM 4  -JA    Moving to and from a bed to a chair (including a wheelchair)? 4  -SM 3  -JA    Standing up from a chair using your arms (e.g., wheelchair, bedside chair)? 4  -SM 4  -JA    Climbing 3-5 steps with a railing? 4  -SM 3  -JA    To walk in hospital room? 4  -SM 3  -JA    AM-PAC 6 Clicks Score (PT) 24  - 21  -JA    Highest Level of Mobility Goal 8 --> Walked 250 feet or more  -SM 6 --> Walk 10 steps or more  -      Row Name 04/14/24 0008          How much help from another person do you currently need...    Turning from your back to your side  while in flat bed without using bedrails? 4  -MD     Moving from lying on back to sitting on the side of a flat bed without bedrails? 4  -MD     Moving to and from a bed to a chair (including a wheelchair)? 3  -MD     Standing up from a chair using your arms (e.g., wheelchair, bedside chair)? 3  -MD     Climbing 3-5 steps with a railing? 3  -MD     To walk in hospital room? 3  -MD     AM-PAC 6 Clicks Score (PT) 20  -MD     Highest Level of Mobility Goal 6 --> Walk 10 steps or more  -MD               User Key  (r) = Recorded By, (t) = Taken By, (c) = Cosigned By      Initials Name Provider Type    Yaneth Curran, RN Registered Nurse    Analisa Smith RN Registered Nurse    Dee Carroll PT Physical Therapist                                 Physical Therapy Education       Title: PT OT SLP Therapies (In Progress)       Topic: Physical Therapy (Done)       Point: Mobility training (Done)       Learning Progress Summary             Patient Acceptance, E, VU by  at 4/14/2024 1041                         Point: Home exercise program (Done)       Learning Progress Summary             Patient Acceptance, E, VU by  at 4/14/2024 1041                         Point: Body mechanics (Done)       Learning Progress Summary             Patient Acceptance, E, VU by  at 4/14/2024 1041                         Point: Precautions (Done)       Learning Progress Summary             Patient Acceptance, E, VU by  at 4/14/2024 1041                                         User Key       Initials Effective Dates Name Provider Type Discipline     05/02/22 -  Dee Vargas, LENNOX Physical Therapist PT                  PT Recommendation and Plan     Plan of Care Reviewed With: patient  Outcome Evaluation: Patient is a 63 y.o female who presented to [facility] with reports of dizziness. Patient seen for PT evaluation this AM to address mobility and vestibular conserns. Patient reports symptoms are much improved but somewhat  still present. Patient describes symptoms of room spinning with mobility. Patient is independent at baseline and uses a cane for ambulation. Patient lives with her daughter who can assist as needed. Susan hallpike assessed bilaterally. Patient reported lightheadedness and double vision on R. Susan hallpike on L yielded slight nystagmus and reproduction of patient symptoms. Epley performed for L BPPV. Patient then ambulated 150ft around unit with straigt cane and SV. Gait steady with no overt LOB noted. Encouraged patient to remain upright for remainder of day. Vestibular handout provided. Recommend follow up with OP vestibular PT. Acute PT will sign off.     Time Calculation:         PT Charges       Row Name 04/14/24 1041             Time Calculation    Start Time 0824  -SM      Stop Time 0852  -      Time Calculation (min) 28 min  -SM      PT Received On 04/14/24  -         Time Calculation- PT    Total Timed Code Minutes- PT 12 minute(s)  -SM         Timed Charges    81726 - PT Therapeutic Activity Minutes 12  -SM         Total Minutes    Timed Charges Total Minutes 12  -SM       Total Minutes 12  -SM                User Key  (r) = Recorded By, (t) = Taken By, (c) = Cosigned By      Initials Name Provider Type     Dee Vargas, LENNOX Physical Therapist                  Therapy Charges for Today       Code Description Service Date Service Provider Modifiers Qty    29353413850  PT THERAPEUTIC ACT EA 15 MIN 4/14/2024 Dee Vargas, PT GP 1    50904076129 HC PT EVAL MOD COMPLEXITY 3 4/14/2024 Dee Vargas, PT GP 1    73917947513 HC PT CANALITH REPOSITIONING PER DAY 4/14/2024 Dee Vargas, PT GP 1            PT G-Codes  AM-PAC 6 Clicks Score (PT): 24  PT Discharge Summary  Anticipated Discharge Disposition (PT): home with assist, home with outpatient therapy services    Dee Vargas PT  4/14/2024

## 2024-04-15 ENCOUNTER — TELEPHONE (OUTPATIENT)
Dept: INTERNAL MEDICINE | Facility: CLINIC | Age: 64
End: 2024-04-15

## 2024-04-15 ENCOUNTER — TRANSITIONAL CARE MANAGEMENT TELEPHONE ENCOUNTER (OUTPATIENT)
Dept: CALL CENTER | Facility: HOSPITAL | Age: 64
End: 2024-04-15
Payer: COMMERCIAL

## 2024-04-15 NOTE — OUTREACH NOTE
Call Center TCM Note      Flowsheet Row Responses   [facility] patient discharged from? [place]   Does the patient have one of the following disease processes/diagnoses(primary or secondary)? Other   TCM attempt successful? Yes   Call start time 1634   Call end time 1638   Discharge diagnosis Dizziness   Meds reviewed with patient/caregiver? Yes   Is the patient having any side effects they believe may be caused by any medication additions or changes? No   Does the patient have all medications ordered at discharge? Yes   Is the patient taking all medications as directed (includes completed medication regime)? Yes   Comments Appt made for 4/23 @ 2:30 with Dr. Fraser   Does the patient have an appointment with their PCP within 7-14 days of discharge? No   Nursing Interventions Assisted patient with making appointment per protocol   Psychosocial issues? No   Did the patient receive a copy of their discharge instructions? Yes   Nursing interventions Reviewed instructions with patient   What is the patient's perception of their health status since discharge? Improving   Is the patient/caregiver able to teach back signs and symptoms related to disease process for when to call PCP? Yes   Is the patient/caregiver able to teach back signs and symptoms related to disease process for when to call 911? Yes   Is the patient/caregiver able to teach back the hierarchy of who to call/visit for symptoms/problems? PCP, Specialist, Home health nurse, Urgent Care, ED, 911 Yes   Additional teach back comments States she is going to f/u with ENT dr. Frederick has improved but had questions if she can take meclizine with the scopolamine patches.  Will message PCP regarding this.   TCM call completed? Yes   Wrap up additional comments Will message PCP office with medications questions.   Call end time 1638            Christi Andrew LPN    4/15/2024, 16:39 EDT

## 2024-04-15 NOTE — OUTREACH NOTE
Call Center TCM Note      Flowsheet Row Responses   [facility] patient discharged from? [place]   Does the patient have one of the following disease processes/diagnoses(primary or secondary)? Other   TCM attempt successful? No   Unsuccessful attempts Attempt 1            Christi Andrew LPN    4/15/2024, 15:21 EDT

## 2024-04-15 NOTE — TELEPHONE ENCOUNTER
Caller: Pedrito Powell    Relationship: Self    Best call back number: [phone]    What is the best time to reach you: ANY    Who are you requesting to speak with (clinical staff, provider,  specific staff member): CLINICAL    Do you know the name of the person who called: PATIENT    What was the call regarding: PATIENT HAD A VERTIGO FLAIR UP.    HOSPITAL STATED THEY WERE UNSURE IF DR LYNN WOULD WANT TO SEE PATIENT OR NOT.    Is it okay if the provider responds through MyChart: NO PHONE CALL PLEASE.

## 2024-04-16 NOTE — PROGRESS NOTES
"LVM to return call.    Relay     \"Pt should not take these medications together.  She should come in to see Dr. Fraser for a follow up.\"                 "

## 2024-04-16 NOTE — TELEPHONE ENCOUNTER
See TCM note.  She has questions regarding Scopolomine and Meclizine.  She is aware she needs follow up.

## 2024-04-18 NOTE — CASE MANAGEMENT/SOCIAL WORK
Case Management Discharge Note      Final Note: Dc home         Selected Continued Care - Discharged on 4/14/2024 Admission date: 4/13/2024 - Discharge disposition: Home or Self Care      Destination    No services have been selected for the patient.                Durable Medical Equipment    No services have been selected for the patient.                Dialysis/Infusion    No services have been selected for the patient.                Home Medical Care    No services have been selected for the patient.                Therapy    No services have been selected for the patient.                Community Resources    No services have been selected for the patient.                Community & DME    No services have been selected for the patient.                         Final Discharge Disposition Code: 01 - home or self-care

## 2024-04-23 ENCOUNTER — OFFICE VISIT (OUTPATIENT)
Dept: INTERNAL MEDICINE | Facility: CLINIC | Age: 64
End: 2024-04-23
Payer: COMMERCIAL

## 2024-04-23 VITALS
SYSTOLIC BLOOD PRESSURE: 132 MMHG | HEIGHT: 59 IN | HEART RATE: 107 BPM | OXYGEN SATURATION: 99 % | WEIGHT: 230 LBS | BODY MASS INDEX: 46.37 KG/M2 | RESPIRATION RATE: 14 BRPM | DIASTOLIC BLOOD PRESSURE: 84 MMHG

## 2024-04-23 DIAGNOSIS — E78.5 HYPERLIPIDEMIA, UNSPECIFIED HYPERLIPIDEMIA TYPE: ICD-10-CM

## 2024-04-23 DIAGNOSIS — R42 DIZZINESS: Primary | ICD-10-CM

## 2024-04-23 RX ORDER — SCOLOPAMINE TRANSDERMAL SYSTEM 1 MG/1
1 PATCH, EXTENDED RELEASE TRANSDERMAL
Qty: 5 EACH | Refills: 0 | Status: SHIPPED | OUTPATIENT
Start: 2024-04-23

## 2024-04-23 NOTE — PROGRESS NOTES
"Transitional Care Follow Up Visit  Subjective     Pedrito Powell is a 63 y.o. female who presents for a transitional care management visit.    Within 48 business hours after discharge our office contacted her via telephone to coordinate her care and needs.      I reviewed and discussed the details of that call along with the discharge summary, hospital problems, inpatient lab results, inpatient diagnostic studies, and consultation reports with Pedrito.     Current outpatient and discharge medications have been reconciled for the patient.        5/14/2021     7:50 PM 4/14/2024     2:52 PM   Date of TCM Phone Call   [facility]   Date of Admission 5/13/2021 4/13/2024   Date of Discharge 5/14/2021 4/14/2024   Discharge Disposition Home or Self Care Home or Self Care     Risk for Readmission (LACE) Score: 6 (4/14/2024  6:00 AM)      Dizziness       Course During Hospital Stay:      \"Patient reports her dizziness has significantly improved this morning.  Patient was started on scopolamine patches every 72 hours as needed and is agreeable to the plan.  Patient is going to follow-up with vestibular physical therapy, ENT, and sleep medicine.     Hospital Outcome: Pedrito Powell is a 63 y.o. 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  Her symptoms did not resolve with her usual meclizine. Lab work in the ED at [facility] revealed a glucose of 158, hemoglobin of 10.8.  EKG showed normal sinus rhythm with some low voltage in the precordial leads.  CT head showed no evidence for acute intracranial pathology. MRI of the brain was negative.      4/14: Patient was seen by neurology, discussed with Dr. Martinez.  He would like to obtain CTA head to rule out AV fistula.  CTA results were obtained and came back normal. Patient was seen by physical therapy, " "they performed Epley maneuver and patient reports improvement in her symptoms following this.     Ambulatory referral to sleep medicine.  Patient also referred to ENT and is going to follow-up with vestibular physical therapy.     Started on scopolamine patches as needed.\"    \"Plan:  Dizziness  - Start Scopolamine patches as needed  - Antiemetics as needed  - MRI of brain: negative, CTA head: Negative  - Can eat as tolerated  - Q4 neurochecks  -Ativan 1 mg IV repeated at 0100 for dizziness  -Solumedrol 80 mg IV X1 dose given  - Neurology consulted, Dr. Martinez  - PT/Vestibular therapy consulted and performed Epley maneuver, ambulatory referral to physical therapy at discharge  -Scopolamine patches  -ENT referral  -Sleep medicine referral     Hypertension  -Continue irbesartan 300 mg tablet     Hyperlipidemia  -Continue atorvastatin 80 mg tablet     DVT prophylaxis:  Mechanical DVT prophylaxis orders are present.\"      Patient presents at today's office visit for concern of some dizziness.  Patient is currently using scopolamine patches states that it seems like it is helping.  She does not believe that the whole vertigo has completely dissipated, however it still present but far more mild than what it was when she had up in the emergency room.  She is going to go see a vertigo specialist who is an ENT in the first week of May.  She is almost out of the scopolamine patches, today's visit I discussed with her that I will give her some more for the meantime.  However it is recommended that she stop the scopolamine as well as meclizine at least 3 full days prior to being evaluated.  In the meantime besides taking the scopolamine, she is going to do some physical therapy that will help with the vestibular therapy.  She did get some vestibular therapy done while she was in the hospital.  So she will continue doing this.  Perhaps this will provide her with some more relief.  I would like her to eventually stop the " scopolamine when she feels as if she is ready.       The following portions of the patient's history were reviewed and updated as appropriate: allergies, current medications, past family history, past medical history, past social history, past surgical history, and problem list.    Current outpatient and discharge medications have been reconciled for the patient.  Reviewed by: Teo Fraser MD      Review of Systems   Neurological:  Positive for dizziness.       Objective   Physical Exam  Vitals and nursing note reviewed.   Constitutional:       Appearance: She is well-developed.   HENT:      Head: Normocephalic and atraumatic.   Musculoskeletal:      Cervical back: Normal range of motion and neck supple.   Neurological:      Mental Status: She is alert and oriented to person, place, and time.   Psychiatric:         Behavior: Behavior normal.         Assessment & Plan   Diagnoses and all orders for this visit:    1. Dizziness (Primary)  -     Scopolamine 1 MG/3DAYS patch; Place 1 patch on the skin as directed by provider Every 72 (Seventy-Two) Hours.  Dispense: 5 each; Refill: 0  -     CBC & Differential  -     Comprehensive Metabolic Panel    2. Hyperlipidemia, unspecified hyperlipidemia type  -     Comprehensive Metabolic Panel  -     Lipid Panel With LDL / HDL Ratio    For hyperlipidemia she will continue taking Lipitor.  For the dizziness I did advise her to continue scopolamine, that she can stop the scopolamine when she feels ready to stop this.  I did give her some more scopolamine.  She will know when some of the vertigo symptoms have essentially dissipated and then she can stop the scopolamine.  She will either do that first or stop it 3 days before seeing the ENT specialist.  In the meantime I want her to work on the vestibular therapy/physical therapy.  As of this will provide her with some relief.           Dictated utilizing Dragon Voice Recognition Software

## 2024-04-24 ENCOUNTER — TREATMENT (OUTPATIENT)
Dept: PHYSICAL THERAPY | Facility: CLINIC | Age: 64
End: 2024-04-24
Payer: COMMERCIAL

## 2024-04-24 DIAGNOSIS — H81.12 BPPV (BENIGN PAROXYSMAL POSITIONAL VERTIGO), LEFT: ICD-10-CM

## 2024-04-24 DIAGNOSIS — R42 DIZZINESS: Primary | ICD-10-CM

## 2024-04-24 PROCEDURE — 97161 PT EVAL LOW COMPLEX 20 MIN: CPT | Performed by: PHYSICAL THERAPIST

## 2024-04-24 PROCEDURE — 97535 SELF CARE MNGMENT TRAINING: CPT | Performed by: PHYSICAL THERAPIST

## 2024-04-24 PROCEDURE — 95992 CANALITH REPOSITIONING PROC: CPT | Performed by: PHYSICAL THERAPIST

## 2024-04-24 NOTE — PROGRESS NOTES
Physical Therapy Initial Evaluation and Plan of Care  [facility] [address], [address]  [place] [address]     Patient: Pedrito Powell   : 1960  Referring practitioner: Micaela Murphy PA  Date of Initial Visit: 2024  Today's Date: 2024  Patient seen for 1 sessions  PT Clinic location: [address], [address] [place] [address].  [id number]          Visit Diagnoses:    ICD-10-CM ICD-9-CM   1. Dizziness  R42 780.4   2. BPPV (benign paroxysmal positional vertigo), left  H81.12 386.11       Subjective Questionnaire: DHI: 56%    Subjective Evaluation    History of Present Illness  Mechanism of injury: Pt reports new onset of dizziness and R ear ache on 24. She has previous episodes of vertigo so self-treated with meclizine.   She went to ED where she was admitted overnight, with CT finding no acute changes. CTA ruled out vascular pathology. Treated by PT for BPPV via Epley maneuver with improvements in symptoms.   She has been able to function since this time but is wearing Scopolamine patch.    She reported lightheadedness and world spinning, with no provocative factors but constant dizziness. She reports no easing of symptoms with static positioning.      Medical history: HTN, vertigo, anxiety, depressions (trentellix), OA, anemia. See chart for further detail.   Therapy Precautions: N/A        Objective          Active Range of Motion     Additional Active Range of Motion Details  Ext 100% decreased lower cervical motion  Flex 70% decreased upper cervical motion  L rot 90%  R rot 90%  L %  R %    Functional Assessment     Comments  Visual fields - WNL  Smooth pursuit - WNL  Saccadic motion - WNL    Head shake and thrust - WNL  VOR cancellation - WNL    R Susan Hallpike negative  L [place] Hallpike positive symptom response ~25 seconds, no evident nystagmus (Scopolamine patch may negate symptoms somewhat)           Assessment & Plan       Assessment  Impairments: impaired balance, lacks appropriate home exercise program and safety issue   Functional limitations: walking, moving in bed, standing and stooping   Assessment details: The patient is a 63 y.o. female who presents to physical therapy today for complaints of vertigo. Upon initial evaluation, the patient demonstrates positive response to L [place]-Hallpike testing. Examination findings indicate BPPV of left posterior canal. Due to these impairments, the patient is unable to perform or has difficulty with the following functional tasks: standing, walking, driving. The patient would benefit from skilled PT services to address functional limitations and impairments and to improve patient quality of life.      Prognosis: good    Goals  Plan Goals: LT. Pt to demonstrate no vertigo response to Bath-Hallpike testing.  2. Pt to improve DHI score to less than 25% by discharge.  3. Pt to report no difficulty with balance or symptoms of dizziness during walking, moving in bed, or driving (with head turns).      Plan  Therapy options: will be seen for skilled therapy services  Planned modality interventions: cryotherapy and thermotherapy (hydrocollator packs)  Planned therapy interventions: abdominal trunk stabilization, ADL retraining, balance/weight-bearing training, body mechanics training, flexibility, functional ROM exercises, gait training, home exercise program, joint mobilization, manual therapy, motor coordination training, neuromuscular re-education, postural training, soft tissue mobilization, spinal/joint mobilization, strengthening, stretching, therapeutic activities and transfer training  Treatment plan discussed with: patient        See flowsheets for treatment detail.  Education: BPPV; POC, pathoanatomy    History # of Personal Factors and/or Comorbidities: HIGH (3+)  Examination of Body System(s): # of elements: LOW (1-2)  Clinical Presentation: STABLE    Clinical Decision Making: LOW       Timed:         Manual Therapy:         mins  18294;     Therapeutic Exercise:         mins  63546;     Neuromuscular Dion:        mins  85970;    Therapeutic Activity:          mins  58297;     Gait Training:           mins  57303;     Ultrasound:          mins  88488;    Ionto                                   mins   35763  Self Care                       15     mins   73005      Un-Timed:  Electrical Stimulation:         mins  78381 ( );  Canalith repos              15 mins 31625  Dry Needling          mins self-pay  Traction          mins 84307  Low Eval     20     Mins  28459  Mod Eval          Mins  31693  High Eval                            Mins  95418  Re-Eval                               mins  92998      Timed Treatment:   15   mins   Total Treatment:     50   mins    PT SIGNATURE: Catalino Sinha PT   [place] PT license #: [id number]  DATE TREATMENT INITIATED: 4/24/2024    Initial Certification  Certification Period: 7/22/2024  I certify that the therapy services are furnished while this patient is under my care.  The services outlined above are required by this patient, and will be reviewed every 90 days.    PHYSICIAN: Micaela Murphy PA  NPI: [id number]                                      DATE:     Please sign and return via fax to [place] - Fax #: [phone]. Thank you, [facility].

## 2024-04-25 ENCOUNTER — TREATMENT (OUTPATIENT)
Dept: PHYSICAL THERAPY | Facility: CLINIC | Age: 64
End: 2024-04-25
Payer: COMMERCIAL

## 2024-04-25 DIAGNOSIS — H81.12 BPPV (BENIGN PAROXYSMAL POSITIONAL VERTIGO), LEFT: ICD-10-CM

## 2024-04-25 DIAGNOSIS — R42 DIZZINESS: Primary | ICD-10-CM

## 2024-04-25 NOTE — PROGRESS NOTES
Physical Therapy Daily Treatment Note  [facility] [place]  [address], [address]  [place] [id number]     Patient: Pedrito Powell  : 1960  Referring practitioner: Micaela Murphy PA  Today's Date: 2024    VISIT#: 2    Visit Diagnoses:    ICD-10-CM ICD-9-CM   1. Dizziness  R42 780.4   2. BPPV (benign paroxysmal positional vertigo), left  H81.12 386.11       Subjective   Pedrito Powell reports: that she removed Scopolamine patch yesterday, she's still feeling dizzy but more lightheadedness than spinning. She notes that she drinks 1 bottle of water and 1 bottle of Pepsi daily.      Objective   Positive symptom response to L [place]-Hallpike, minimal to no nystagmus    See Exercise, Manual, and Modality Logs for complete treatment.     Patient Education: Hydration, plan for next few days - no patch unless necessary      Assessment/Plan  Pt demonstrates ongoing dizziness with Susan Hallpike testing. She tolerated Epley well with reports of less dizziness at rest, however had some lightheadedness during positional changes supine to sit and sit to stand. Educated on hydration.      Progress per Plan of Care          Timed:         Manual Therapy:         mins  56622;     Therapeutic Exercise:         mins  55433;     Neuromuscular Dion:        mins  61825;    Therapeutic Activity:          mins  59496;     Gait Training:           mins  99611;     Ultrasound:          mins  03132;    Ionto:                                   mins  13196  Self Care:                       10     mins  06536    Un-Timed:  Canalith repos   12 mins 06352   Electrical Stimulation:         mins  57292 ( );  Dry Needling          mins self-pay  Traction          mins 59231  Re-Eval                               mins  16597  Group Therapy           ___ mins 25637    Timed Treatment:   10   mins   Total Treatment:     22   mins    Catalino Sinha PT  Physical Therapist  [place] license #:  [id number]

## 2024-04-29 ENCOUNTER — TREATMENT (OUTPATIENT)
Dept: PHYSICAL THERAPY | Facility: CLINIC | Age: 64
End: 2024-04-29
Payer: COMMERCIAL

## 2024-04-29 DIAGNOSIS — R42 DIZZINESS: Primary | ICD-10-CM

## 2024-04-29 DIAGNOSIS — H81.12 BPPV (BENIGN PAROXYSMAL POSITIONAL VERTIGO), LEFT: ICD-10-CM

## 2024-05-01 ENCOUNTER — TREATMENT (OUTPATIENT)
Dept: PHYSICAL THERAPY | Facility: CLINIC | Age: 64
End: 2024-05-01
Payer: COMMERCIAL

## 2024-05-01 DIAGNOSIS — H81.12 BPPV (BENIGN PAROXYSMAL POSITIONAL VERTIGO), LEFT: ICD-10-CM

## 2024-05-01 DIAGNOSIS — R42 DIZZINESS: Primary | ICD-10-CM

## 2024-05-01 NOTE — PROGRESS NOTES
Physical Therapy Daily Treatment Note  [facility] [place]  [address], [address]  [place] [address]     Patient: Pedrito Powell  : 1960  Referring practitioner: Micaela Murphy PA  Today's Date: 2024    VISIT#: 4    Visit Diagnoses:    ICD-10-CM ICD-9-CM   1. Dizziness  R42 780.4   2. BPPV (benign paroxysmal positional vertigo), left  H81.12 386.11       Subjective   Pedrito Powell reports: that this morning her symptoms had returned quite severely. She noted feeling a little bit off last night late but woke this morning around 6am feeling very dizzy. She put a patch on at this time and has been feeling unwell since.      Objective   [place]-Hallpike left positive symptom response lasting ~30 seconds without evident nystagmus (likely due to anti vertigo patch)  Susan-Hallpike right negative    See Exercise, Manual, and Modality Logs for complete treatment.     Patient Education: lay on right side at night when sleeping      Assessment/Plan  Pt had significant symptom response during [place]-Hallpike and Epley for left BPPV. She reports that after session her dizziness at rest felt much reduced and moving was easier without feeling spinning.      Progress per Plan of Care          Timed:         Manual Therapy:         mins  74597;     Therapeutic Exercise:         mins  76737;     Neuromuscular Dion:        mins  39157;    Therapeutic Activity:          mins  13128;     Gait Training:           mins  91841;     Ultrasound:          mins  67727;    Ionto:                                   mins  94656  Self Care:                       10     mins  72362; education and assessment    Un-Timed:  Canalith repos              12 mins 02576   Electrical Stimulation:         mins  57188 ( );  Dry Needling          mins self-pay  Traction          mins 81699  Re-Eval                               mins  84766  Group Therapy           ___ mins 60287    Timed Treatment:   10    mins   Total Treatment:     22   mins    Catalino Sinha PT  Physical Therapist  [place] license #: [id number]

## 2024-05-03 ENCOUNTER — TREATMENT (OUTPATIENT)
Dept: PHYSICAL THERAPY | Facility: CLINIC | Age: 64
End: 2024-05-03
Payer: COMMERCIAL

## 2024-05-03 DIAGNOSIS — H81.12 BPPV (BENIGN PAROXYSMAL POSITIONAL VERTIGO), LEFT: ICD-10-CM

## 2024-05-03 DIAGNOSIS — R42 DIZZINESS: Primary | ICD-10-CM

## 2024-05-03 NOTE — PROGRESS NOTES
Physical Therapy Daily Treatment Note  [facility] [place]  [address], [address]  [place] [address]     Patient: Pedrito Powell  : 1960  Referring practitioner: Micaela Murphy PA  Today's Date: 5/3/2024    VISIT#: 5    Visit Diagnoses:    ICD-10-CM ICD-9-CM   1. Dizziness  R42 780.4   2. BPPV (benign paroxysmal positional vertigo), left  H81.12 386.11       Subjective   Pedrito Powell reports: no spinning recently but has felt a little lightheaded and dizzy.      Objective   Minimal dizziness during L [place]-Hallpike and Epley. No nystagmus  Mild lightheadedness/dizziness during head shake with visual fixation.    See Exercise, Manual, and Modality Logs for complete treatment.     Patient Education: HEP - VRT exercises      Assessment/Plan  Pt tolerated exercises well with only minimal dizziness reproduced with cervical rotation with gaze fixation exercise. Educated on new tasks and plan for follow-ups as necessary. BPPV largely seems improved with no evident nystagmus during L Susan-Hallpike      Progress per Plan of Care          Timed:         Manual Therapy:         mins  31319;     Therapeutic Exercise:         mins  12553;     Neuromuscular Dion:    10    mins  10599;    Therapeutic Activity:          mins  13458;     Gait Training:           mins  24341;     Ultrasound:          mins  25435;    Ionto:                                   mins  67385  Self Care:                            mins  72075    Un-Timed:  Canalith repos  5 mins 25099   Electrical Stimulation:         mins  47448 (MC );  Dry Needling          mins self-pay  Traction          mins 76219  Re-Eval                               mins  59351  Group Therapy           ___ mins 83631    Timed Treatment:   10   mins   Total Treatment:     15   mins    Catalino Sinha PT  Physical Therapist  [place] license #: [id number]

## 2024-05-08 ENCOUNTER — LAB (OUTPATIENT)
Dept: OTHER | Facility: HOSPITAL | Age: 64
End: 2024-05-08
Payer: COMMERCIAL

## 2024-05-08 ENCOUNTER — OFFICE VISIT (OUTPATIENT)
Dept: ONCOLOGY | Facility: CLINIC | Age: 64
End: 2024-05-08
Payer: COMMERCIAL

## 2024-05-08 VITALS
BODY MASS INDEX: 47.17 KG/M2 | OXYGEN SATURATION: 98 % | WEIGHT: 234 LBS | TEMPERATURE: 97.3 F | HEART RATE: 75 BPM | HEIGHT: 59 IN

## 2024-05-08 DIAGNOSIS — E61.1 IRON DEFICIENCY: ICD-10-CM

## 2024-05-08 DIAGNOSIS — E61.1 IRON DEFICIENCY: Primary | ICD-10-CM

## 2024-05-08 DIAGNOSIS — N18.30 ANEMIA DUE TO STAGE 3 CHRONIC KIDNEY DISEASE, UNSPECIFIED WHETHER STAGE 3A OR 3B CKD: ICD-10-CM

## 2024-05-08 DIAGNOSIS — D63.1 ANEMIA DUE TO STAGE 3 CHRONIC KIDNEY DISEASE, UNSPECIFIED WHETHER STAGE 3A OR 3B CKD: ICD-10-CM

## 2024-05-08 LAB
BASOPHILS # BLD AUTO: 0.06 10*3/MM3 (ref 0–0.2)
BASOPHILS NFR BLD AUTO: 0.6 % (ref 0–1.5)
DEPRECATED RDW RBC AUTO: 43.1 FL (ref 37–54)
EOSINOPHIL # BLD AUTO: 0.68 10*3/MM3 (ref 0–0.4)
EOSINOPHIL NFR BLD AUTO: 6.3 % (ref 0.3–6.2)
ERYTHROCYTE [DISTWIDTH] IN BLOOD BY AUTOMATED COUNT: 12.4 % (ref 12.3–15.4)
FERRITIN SERPL-MCNC: 270.8 NG/ML (ref 13–150)
HCT VFR BLD AUTO: 33.6 % (ref 34–46.6)
HGB BLD-MCNC: 10.9 G/DL (ref 12–15.9)
IMM GRANULOCYTES # BLD AUTO: 0.03 10*3/MM3 (ref 0–0.05)
IMM GRANULOCYTES NFR BLD AUTO: 0.3 % (ref 0–0.5)
IRON 24H UR-MRATE: 34 MCG/DL (ref 37–145)
IRON SATN MFR SERPL: 12 % (ref 20–50)
LYMPHOCYTES # BLD AUTO: 2.13 10*3/MM3 (ref 0.7–3.1)
LYMPHOCYTES NFR BLD AUTO: 19.8 % (ref 19.6–45.3)
MCH RBC QN AUTO: 30.5 PG (ref 26.6–33)
MCHC RBC AUTO-ENTMCNC: 32.4 G/DL (ref 31.5–35.7)
MCV RBC AUTO: 94.1 FL (ref 79–97)
MONOCYTES # BLD AUTO: 0.73 10*3/MM3 (ref 0.1–0.9)
MONOCYTES NFR BLD AUTO: 6.8 % (ref 5–12)
NEUTROPHILS NFR BLD AUTO: 66.2 % (ref 42.7–76)
NEUTROPHILS NFR BLD AUTO: 7.11 10*3/MM3 (ref 1.7–7)
NRBC BLD AUTO-RTO: 0 /100 WBC (ref 0–0.2)
PLATELET # BLD AUTO: 254 10*3/MM3 (ref 140–450)
PMV BLD AUTO: 8.7 FL (ref 6–12)
RBC # BLD AUTO: 3.57 10*6/MM3 (ref 3.77–5.28)
TIBC SERPL-MCNC: 282 MCG/DL (ref 298–536)
TRANSFERRIN SERPL-MCNC: 189 MG/DL (ref 200–360)
WBC NRBC COR # BLD AUTO: 10.74 10*3/MM3 (ref 3.4–10.8)

## 2024-05-08 PROCEDURE — 83540 ASSAY OF IRON: CPT | Performed by: INTERNAL MEDICINE

## 2024-05-08 PROCEDURE — 85025 COMPLETE CBC W/AUTO DIFF WBC: CPT | Performed by: INTERNAL MEDICINE

## 2024-05-08 PROCEDURE — 82728 ASSAY OF FERRITIN: CPT | Performed by: INTERNAL MEDICINE

## 2024-05-08 PROCEDURE — 84466 ASSAY OF TRANSFERRIN: CPT | Performed by: INTERNAL MEDICINE

## 2024-05-08 PROCEDURE — 36415 COLL VENOUS BLD VENIPUNCTURE: CPT

## 2024-05-14 DIAGNOSIS — G89.29 CHRONIC PAIN OF BOTH KNEES: ICD-10-CM

## 2024-05-14 DIAGNOSIS — M25.562 CHRONIC PAIN OF BOTH KNEES: ICD-10-CM

## 2024-05-14 DIAGNOSIS — M25.561 CHRONIC PAIN OF BOTH KNEES: ICD-10-CM

## 2024-05-16 RX ORDER — TRAMADOL HYDROCHLORIDE 50 MG/1
50 TABLET ORAL 2 TIMES DAILY PRN
Qty: 180 TABLET | Refills: 1 | Status: SHIPPED | OUTPATIENT
Start: 2024-05-16

## 2024-06-02 DIAGNOSIS — E78.5 HYPERLIPIDEMIA, UNSPECIFIED HYPERLIPIDEMIA TYPE: ICD-10-CM

## 2024-06-03 RX ORDER — ATORVASTATIN CALCIUM 80 MG/1
80 TABLET, FILM COATED ORAL DAILY
Qty: 90 TABLET | Refills: 1 | Status: SHIPPED | OUTPATIENT
Start: 2024-06-03

## 2024-06-05 ENCOUNTER — TRANSCRIBE ORDERS (OUTPATIENT)
Dept: ADMINISTRATIVE | Facility: HOSPITAL | Age: 64
End: 2024-06-05
Payer: COMMERCIAL

## 2024-06-05 DIAGNOSIS — M79.672 BILATERAL FOOT PAIN: Primary | ICD-10-CM

## 2024-06-05 DIAGNOSIS — M79.671 BILATERAL FOOT PAIN: Primary | ICD-10-CM

## 2024-06-18 DIAGNOSIS — D64.9 ANEMIA, UNSPECIFIED TYPE: ICD-10-CM

## 2024-06-18 RX ORDER — FERROUS GLUCONATE 324(38)MG
324 TABLET ORAL
Qty: 90 TABLET | Refills: 1 | Status: SHIPPED | OUTPATIENT
Start: 2024-06-18

## 2024-07-01 ENCOUNTER — OFFICE VISIT (OUTPATIENT)
Dept: ORTHOPEDIC SURGERY | Facility: CLINIC | Age: 64
End: 2024-07-01
Payer: COMMERCIAL

## 2024-07-01 VITALS — BODY MASS INDEX: 49.31 KG/M2 | TEMPERATURE: 98.4 F | HEIGHT: 59 IN | WEIGHT: 244.6 LBS

## 2024-07-01 DIAGNOSIS — M17.12 PRIMARY OSTEOARTHRITIS OF LEFT KNEE: ICD-10-CM

## 2024-07-01 DIAGNOSIS — M17.11 PRIMARY OSTEOARTHRITIS OF RIGHT KNEE: Primary | ICD-10-CM

## 2024-07-01 PROCEDURE — 20610 DRAIN/INJ JOINT/BURSA W/O US: CPT | Performed by: NURSE PRACTITIONER

## 2024-07-01 RX ORDER — LIDOCAINE HYDROCHLORIDE 10 MG/ML
2 INJECTION, SOLUTION EPIDURAL; INFILTRATION; INTRACAUDAL; PERINEURAL
Status: COMPLETED | OUTPATIENT
Start: 2024-07-01 | End: 2024-07-01

## 2024-07-01 RX ORDER — METHYLPREDNISOLONE ACETATE 80 MG/ML
1 INJECTION, SUSPENSION INTRA-ARTICULAR; INTRALESIONAL; INTRAMUSCULAR; SOFT TISSUE
Status: COMPLETED | OUTPATIENT
Start: 2024-07-01 | End: 2024-07-01

## 2024-07-01 RX ORDER — METHYLPREDNISOLONE ACETATE 80 MG/ML
80 INJECTION, SUSPENSION INTRA-ARTICULAR; INTRALESIONAL; INTRAMUSCULAR; SOFT TISSUE
Status: COMPLETED | OUTPATIENT
Start: 2024-07-01 | End: 2024-07-01

## 2024-07-01 RX ADMIN — METHYLPREDNISOLONE ACETATE 80 MG: 80 INJECTION, SUSPENSION INTRA-ARTICULAR; INTRALESIONAL; INTRAMUSCULAR; SOFT TISSUE at 15:19

## 2024-07-01 RX ADMIN — LIDOCAINE HYDROCHLORIDE 2 ML: 10 INJECTION, SOLUTION EPIDURAL; INFILTRATION; INTRACAUDAL; PERINEURAL at 15:19

## 2024-07-01 RX ADMIN — METHYLPREDNISOLONE ACETATE 1 ML: 80 INJECTION, SUSPENSION INTRA-ARTICULAR; INTRALESIONAL; INTRAMUSCULAR; SOFT TISSUE at 15:19

## 2024-07-01 NOTE — PROGRESS NOTES
Ms. Powell comes in today for follow-up.  Injections have worked well in the past.  The patient would like to get repeat injections today.  The risks, benefits and alternatives were discussed and the patient consented.  Going forward, the patient will follow-up as needed.    KALYANI Cornelius    07/01/2024    Large Joint Arthrocentesis: R knee  Date/Time: 7/1/2024 3:19 PM  Consent given by: patient  Site marked: site marked  Timeout: Immediately prior to procedure a time out was called to verify the correct patient, procedure, equipment, support staff and site/side marked as required   Supporting Documentation  Indications: pain   Procedure Details  Location: knee - R knee  Preparation: Patient was prepped and draped in the usual sterile fashion  Needle gauge: 21G.  Approach: anterolateral  Medications administered: 1 mL methylPREDNISolone acetate 80 MG/ML; 2 mL lidocaine PF 1% 1 %  Patient tolerance: patient tolerated the procedure well with no immediate complications      Large Joint Arthrocentesis: L knee  Date/Time: 7/1/2024 3:19 PM  Consent given by: patient  Site marked: site marked  Timeout: Immediately prior to procedure a time out was called to verify the correct patient, procedure, equipment, support staff and site/side marked as required   Supporting Documentation  Indications: pain   Procedure Details  Location: knee - L knee  Preparation: Patient was prepped and draped in the usual sterile fashion  Needle gauge: 21g.  Approach: anterolateral  Medications administered: 80 mg methylPREDNISolone acetate 80 MG/ML; 2 mL lidocaine PF 1% 1 %  Patient tolerance: patient tolerated the procedure well with no immediate complications

## 2024-07-03 ENCOUNTER — HOSPITAL ENCOUNTER (OUTPATIENT)
Dept: GENERAL RADIOLOGY | Facility: HOSPITAL | Age: 64
Discharge: HOME | End: 2024-07-03
Payer: COMMERCIAL

## 2024-07-03 DIAGNOSIS — M79.672 BILATERAL FOOT PAIN: ICD-10-CM

## 2024-07-03 DIAGNOSIS — M79.671 BILATERAL FOOT PAIN: ICD-10-CM

## 2024-07-03 PROCEDURE — 25010000002 LIDOCAINE 1 % SOLUTION: Performed by: ORTHOPAEDIC SURGERY

## 2024-07-03 PROCEDURE — 77002 NEEDLE LOCALIZATION BY XRAY: CPT

## 2024-07-03 PROCEDURE — 25510000001 IOPAMIDOL 61 % SOLUTION: Performed by: ORTHOPAEDIC SURGERY

## 2024-07-03 PROCEDURE — 25010000002 METHYLPREDNISOLONE PER 40 MG: Performed by: ORTHOPAEDIC SURGERY

## 2024-07-03 PROCEDURE — 25010000002 BUPIVACAINE (PF) 0.25 % SOLUTION: Performed by: ORTHOPAEDIC SURGERY

## 2024-07-03 RX ORDER — METHYLPREDNISOLONE SODIUM SUCCINATE 40 MG/ML
40 INJECTION, POWDER, LYOPHILIZED, FOR SOLUTION INTRAMUSCULAR; INTRAVENOUS 2 TIMES DAILY PRN
Status: DISCONTINUED | OUTPATIENT
Start: 2024-07-03 | End: 2024-07-04 | Stop reason: HOSPADM

## 2024-07-03 RX ORDER — LIDOCAINE HYDROCHLORIDE 10 MG/ML
10 INJECTION, SOLUTION INFILTRATION; PERINEURAL 2 TIMES DAILY PRN
Status: DISCONTINUED | OUTPATIENT
Start: 2024-07-03 | End: 2024-07-04 | Stop reason: HOSPADM

## 2024-07-03 RX ORDER — BUPIVACAINE HYDROCHLORIDE 2.5 MG/ML
10 INJECTION, SOLUTION EPIDURAL; INFILTRATION; INTRACAUDAL 2 TIMES DAILY PRN
Status: DISCONTINUED | OUTPATIENT
Start: 2024-07-03 | End: 2024-07-04 | Stop reason: HOSPADM

## 2024-07-03 RX ADMIN — METHYLPREDNISOLONE SODIUM SUCCINATE 40 MG: 40 INJECTION, POWDER, FOR SOLUTION INTRAMUSCULAR; INTRAVENOUS at 07:59

## 2024-07-03 RX ADMIN — BUPIVACAINE HYDROCHLORIDE 5 ML: 2.5 INJECTION, SOLUTION EPIDURAL; INFILTRATION; INTRACAUDAL; PERINEURAL at 07:54

## 2024-07-03 RX ADMIN — METHYLPREDNISOLONE SODIUM SUCCINATE 40 MG: 40 INJECTION, POWDER, FOR SOLUTION INTRAMUSCULAR; INTRAVENOUS at 07:54

## 2024-07-03 RX ADMIN — IOPAMIDOL 0.5 ML: 612 INJECTION, SOLUTION INTRAVENOUS at 07:59

## 2024-07-03 RX ADMIN — LIDOCAINE HYDROCHLORIDE 0.5 ML: 10 INJECTION, SOLUTION INFILTRATION; PERINEURAL at 07:59

## 2024-07-03 RX ADMIN — IOPAMIDOL 0.5 ML: 612 INJECTION, SOLUTION INTRAVENOUS at 07:54

## 2024-07-03 RX ADMIN — LIDOCAINE HYDROCHLORIDE 0.5 ML: 10 INJECTION, SOLUTION INFILTRATION; PERINEURAL at 07:54

## 2024-07-03 RX ADMIN — BUPIVACAINE HYDROCHLORIDE 5 ML: 2.5 INJECTION, SOLUTION EPIDURAL; INFILTRATION; INTRACAUDAL; PERINEURAL at 07:59

## 2024-07-11 ENCOUNTER — TELEPHONE (OUTPATIENT)
Dept: ONCOLOGY | Facility: CLINIC | Age: 64
End: 2024-07-11
Payer: COMMERCIAL

## 2024-07-11 NOTE — TELEPHONE ENCOUNTER
Caller: Pedrito Powell    Relationship to patient: Self    Best call back number:     [phone]     Chief complaint: PT NEEDS TO R/S    Type of visit: LAB AND FOLLOW UP    Requested date: PT NEEDS TO HAVE SOMEONE CALL BACK TO GO THROUGH AVAILABLE DATES WITH HER.      If rescheduling, when is the original appointment: 07/31/24

## 2024-07-22 RX ORDER — MECLIZINE HYDROCHLORIDE 25 MG/1
TABLET ORAL
Qty: 270 TABLET | Refills: 0 | Status: SHIPPED | OUTPATIENT
Start: 2024-07-22

## 2024-07-22 NOTE — TELEPHONE ENCOUNTER
Rx Refill Note  Requested Prescriptions     Pending Prescriptions Disp Refills    meclizine (ANTIVERT) 25 MG tablet [Pharmacy Med Name: MECLIZINE 25MG RX TABLETS] 270 tablet 0     Sig: TAKE 1 TABLET BY MOUTH THREE TIMES DAILY AS NEEDED FOR DIZZINESS      Last office visit with prescribing clinician: 4/23/2024   Last telemedicine visit with prescribing clinician: Visit date not found   Next office visit with prescribing clinician: 8/5/2024                         Would you like a call back once the refill request has been completed: [] Yes [] No    If the office needs to give you a call back, can they leave a voicemail: [] Yes [] No    Gilda Alvarenga MA  07/22/24, 14:33 EDT

## 2024-08-05 ENCOUNTER — OFFICE VISIT (OUTPATIENT)
Dept: INTERNAL MEDICINE | Facility: CLINIC | Age: 64
End: 2024-08-05
Payer: COMMERCIAL

## 2024-08-05 VITALS
BODY MASS INDEX: 49.19 KG/M2 | TEMPERATURE: 95.1 F | HEIGHT: 59 IN | WEIGHT: 244 LBS | SYSTOLIC BLOOD PRESSURE: 138 MMHG | HEART RATE: 78 BPM | DIASTOLIC BLOOD PRESSURE: 82 MMHG | RESPIRATION RATE: 16 BRPM | OXYGEN SATURATION: 98 %

## 2024-08-05 DIAGNOSIS — M25.561 CHRONIC PAIN OF BOTH KNEES: ICD-10-CM

## 2024-08-05 DIAGNOSIS — G89.29 CHRONIC PAIN OF BOTH KNEES: ICD-10-CM

## 2024-08-05 DIAGNOSIS — E78.5 HYPERLIPIDEMIA, UNSPECIFIED HYPERLIPIDEMIA TYPE: ICD-10-CM

## 2024-08-05 DIAGNOSIS — D64.9 ANEMIA, UNSPECIFIED TYPE: ICD-10-CM

## 2024-08-05 DIAGNOSIS — R42 VERTIGO: Primary | ICD-10-CM

## 2024-08-05 DIAGNOSIS — M25.562 CHRONIC PAIN OF BOTH KNEES: ICD-10-CM

## 2024-08-05 DIAGNOSIS — I10 HYPERTENSION, UNSPECIFIED TYPE: ICD-10-CM

## 2024-08-05 DIAGNOSIS — F41.9 ANXIETY: ICD-10-CM

## 2024-08-05 PROCEDURE — 99214 OFFICE O/P EST MOD 30 MIN: CPT | Performed by: FAMILY MEDICINE

## 2024-08-05 RX ORDER — FERROUS GLUCONATE 324(38)MG
324 TABLET ORAL
Qty: 90 TABLET | Refills: 1 | Status: SHIPPED | OUTPATIENT
Start: 2024-08-05

## 2024-08-05 RX ORDER — IRBESARTAN 300 MG/1
300 TABLET ORAL DAILY
Qty: 90 TABLET | Refills: 1 | Status: SHIPPED | OUTPATIENT
Start: 2024-08-05

## 2024-08-05 RX ORDER — TRAMADOL HYDROCHLORIDE 50 MG/1
50 TABLET ORAL 2 TIMES DAILY PRN
Qty: 180 TABLET | Refills: 1 | Status: SHIPPED | OUTPATIENT
Start: 2024-08-05

## 2024-08-05 RX ORDER — ATORVASTATIN CALCIUM 80 MG/1
80 TABLET, FILM COATED ORAL DAILY
Qty: 90 TABLET | Refills: 1 | Status: SHIPPED | OUTPATIENT
Start: 2024-08-05

## 2024-08-05 NOTE — PROGRESS NOTES
"Chief Complaint  Follow-up    Subjective          Pedrito Powell presents to [facility]  History of Present Illness  The patient presents for evaluation of multiple medical concerns.    The patient reports a positive response to Trintellix.  She does note that the medicine is expensive.  However she does state that she is feeling well while being on this medicine.  As the medicine is working well for anxiety and underlying depression.    Patient does have history of hyperlipidemia.  She currently Lipitor 80 mg daily.  Patient denies any side effects of the medicine.    She does have iron deficiency.  She currently takes ferrous gluconate 324 mg daily.  Patient denies any side effects of the medication.  She has a history of having    Hypertension.  Current taking irbesartan 300 mg daily.  Patient denies any side effects of the medicine.    She has a history having chronic pain.  She is currently taking tramadol 1 tablet twice a day when she has pain.    Objective   Vital Signs:   /82   Pulse 78   Temp 95.1 °F (35.1 °C) (Temporal)   Resp 16   Ht 149.9 cm (59.02\")   Wt 111 kg (244 lb)   SpO2 98%   BMI 49.26 kg/m²     Physical Exam  Vitals and nursing note reviewed.   Constitutional:       Appearance: She is well-developed.   HENT:      Head: Normocephalic and atraumatic.   Musculoskeletal:      Cervical back: Normal range of motion and neck supple.   Neurological:      Mental Status: She is alert and oriented to person, place, and time.   Psychiatric:         Behavior: Behavior normal.         Physical Exam       Result Review :                 Assessment and Plan    Diagnoses and all orders for this visit:    1. Vertigo (Primary)    2. Hyperlipidemia, unspecified hyperlipidemia type  -     atorvastatin (LIPITOR) 80 MG tablet; Take 1 tablet by mouth Daily.  Dispense: 90 tablet; Refill: 1  -     CBC & Differential  -     Comprehensive Metabolic Panel  -     Lipid Panel With " LDL / HDL Ratio    3. Anemia, unspecified type  -     ferrous gluconate (FERGON) 324 MG tablet; Take 1 tablet by mouth Daily With Breakfast.  Dispense: 90 tablet; Refill: 1  -     CBC & Differential  -     Folate  -     Ferritin  -     Iron Profile  -     Vitamin B12    4. Hypertension, unspecified type  -     irbesartan (AVAPRO) 300 MG tablet; Take 1 tablet by mouth Daily.  Dispense: 90 tablet; Refill: 1    5. Chronic pain of both knees  -     traMADol (ULTRAM) 50 MG tablet; Take 1 tablet by mouth 2 (Two) Times a Day As Needed for Moderate Pain.  Dispense: 180 tablet; Refill: 1    6. Anxiety  -     Vortioxetine HBr (Trintellix) 10 MG tablet tablet; Take 1 tablet by mouth Daily.  Dispense: 30 tablet; Refill: 6      Assessment & Plan  1. Hyperlipidemia.  The patient is advised to persist with Lipitor 80 mg daily.    2. Essential hypertension.  The current regimen of irbesartan 300 mg daily will be maintained.    3. Chronic pain.  Tramadol will be continued.    4. Anxiety and depression.  Will continue patient on Trintellix.    5. Anemia.  The patient is advised to continue with ferrous gluconate 324 daily. Iron and ferritin levels will be checked.    Follow Up   No follow-ups on file.  Patient was given instructions and counseling regarding her condition or for health maintenance advice. Please see specific information pulled into the AVS if appropriate.           Patient or patient representative verbalized consent for the use of Ambient Listening during the visit with  Teo Fraser MD for chart documentation. 8/5/2024  13:32 EDT

## 2024-08-06 LAB
ALBUMIN SERPL-MCNC: 4.1 G/DL (ref 3.9–4.9)
ALP SERPL-CCNC: 68 IU/L (ref 44–121)
ALT SERPL-CCNC: 18 IU/L (ref 0–32)
AST SERPL-CCNC: 17 IU/L (ref 0–40)
BASOPHILS # BLD AUTO: 0.1 X10E3/UL (ref 0–0.2)
BASOPHILS NFR BLD AUTO: 1 %
BILIRUB SERPL-MCNC: 0.3 MG/DL (ref 0–1.2)
BUN SERPL-MCNC: 22 MG/DL (ref 8–27)
BUN/CREAT SERPL: 25 (ref 12–28)
CALCIUM SERPL-MCNC: 8.8 MG/DL (ref 8.7–10.3)
CHLORIDE SERPL-SCNC: 104 MMOL/L (ref 96–106)
CHOLEST SERPL-MCNC: 173 MG/DL (ref 100–199)
CO2 SERPL-SCNC: 25 MMOL/L (ref 20–29)
CREAT SERPL-MCNC: 0.89 MG/DL (ref 0.57–1)
EGFRCR SERPLBLD CKD-EPI 2021: 73 ML/MIN/1.73
EOSINOPHIL # BLD AUTO: 0.3 X10E3/UL (ref 0–0.4)
EOSINOPHIL NFR BLD AUTO: 3 %
ERYTHROCYTE [DISTWIDTH] IN BLOOD BY AUTOMATED COUNT: 13.2 % (ref 11.7–15.4)
FERRITIN SERPL-MCNC: 231 NG/ML (ref 15–150)
FOLATE SERPL-MCNC: >20 NG/ML
GLOBULIN SER CALC-MCNC: 2.7 G/DL (ref 1.5–4.5)
GLUCOSE SERPL-MCNC: 85 MG/DL (ref 70–99)
HCT VFR BLD AUTO: 35.3 % (ref 34–46.6)
HDLC SERPL-MCNC: 68 MG/DL
HGB BLD-MCNC: 10.9 G/DL (ref 11.1–15.9)
IMM GRANULOCYTES # BLD AUTO: 0 X10E3/UL (ref 0–0.1)
IMM GRANULOCYTES NFR BLD AUTO: 0 %
IRON SATN MFR SERPL: 19 % (ref 15–55)
IRON SERPL-MCNC: 50 UG/DL (ref 27–139)
LDLC SERPL CALC-MCNC: 87 MG/DL (ref 0–99)
LDLC/HDLC SERPL: 1.3 RATIO (ref 0–3.2)
LYMPHOCYTES # BLD AUTO: 1.8 X10E3/UL (ref 0.7–3.1)
LYMPHOCYTES NFR BLD AUTO: 21 %
MCH RBC QN AUTO: 30.1 PG (ref 26.6–33)
MCHC RBC AUTO-ENTMCNC: 30.9 G/DL (ref 31.5–35.7)
MCV RBC AUTO: 98 FL (ref 79–97)
MONOCYTES # BLD AUTO: 0.5 X10E3/UL (ref 0.1–0.9)
MONOCYTES NFR BLD AUTO: 6 %
NEUTROPHILS # BLD AUTO: 5.9 X10E3/UL (ref 1.4–7)
NEUTROPHILS NFR BLD AUTO: 69 %
PLATELET # BLD AUTO: 274 X10E3/UL (ref 150–450)
POTASSIUM SERPL-SCNC: 3.9 MMOL/L (ref 3.5–5.2)
PROT SERPL-MCNC: 6.8 G/DL (ref 6–8.5)
RBC # BLD AUTO: 3.62 X10E6/UL (ref 3.77–5.28)
SODIUM SERPL-SCNC: 142 MMOL/L (ref 134–144)
TIBC SERPL-MCNC: 259 UG/DL (ref 250–450)
TRIGL SERPL-MCNC: 99 MG/DL (ref 0–149)
UIBC SERPL-MCNC: 209 UG/DL (ref 118–369)
VIT B12 SERPL-MCNC: 461 PG/ML (ref 232–1245)
VLDLC SERPL CALC-MCNC: 18 MG/DL (ref 5–40)
WBC # BLD AUTO: 8.6 X10E3/UL (ref 3.4–10.8)

## 2024-08-06 RX ORDER — ACETAMINOPHEN 160 MG
TABLET,DISINTEGRATING ORAL
Qty: 90 CAPSULE | Refills: 3 | Status: SHIPPED | OUTPATIENT
Start: 2024-08-06

## 2024-08-09 ENCOUNTER — LAB (OUTPATIENT)
Dept: OTHER | Facility: HOSPITAL | Age: 64
End: 2024-08-09
Payer: COMMERCIAL

## 2024-08-09 ENCOUNTER — TELEMEDICINE (OUTPATIENT)
Dept: ONCOLOGY | Facility: CLINIC | Age: 64
End: 2024-08-09
Payer: COMMERCIAL

## 2024-08-09 VITALS
OXYGEN SATURATION: 97 % | BODY MASS INDEX: 48.67 KG/M2 | WEIGHT: 241.4 LBS | HEIGHT: 59 IN | SYSTOLIC BLOOD PRESSURE: 163 MMHG | RESPIRATION RATE: 16 BRPM | TEMPERATURE: 98.1 F | HEART RATE: 63 BPM | DIASTOLIC BLOOD PRESSURE: 96 MMHG

## 2024-08-09 DIAGNOSIS — D63.1 ANEMIA DUE TO STAGE 3 CHRONIC KIDNEY DISEASE, UNSPECIFIED WHETHER STAGE 3A OR 3B CKD: Primary | ICD-10-CM

## 2024-08-09 DIAGNOSIS — D63.1 ANEMIA DUE TO STAGE 3 CHRONIC KIDNEY DISEASE, UNSPECIFIED WHETHER STAGE 3A OR 3B CKD: ICD-10-CM

## 2024-08-09 DIAGNOSIS — E61.1 IRON DEFICIENCY: ICD-10-CM

## 2024-08-09 DIAGNOSIS — N18.30 ANEMIA DUE TO STAGE 3 CHRONIC KIDNEY DISEASE, UNSPECIFIED WHETHER STAGE 3A OR 3B CKD: ICD-10-CM

## 2024-08-09 DIAGNOSIS — N18.30 ANEMIA DUE TO STAGE 3 CHRONIC KIDNEY DISEASE, UNSPECIFIED WHETHER STAGE 3A OR 3B CKD: Primary | ICD-10-CM

## 2024-08-09 LAB
ALBUMIN SERPL-MCNC: 3.8 G/DL (ref 3.5–5.2)
ALBUMIN/GLOB SERPL: 1.2 G/DL
ALP SERPL-CCNC: 72 U/L (ref 39–117)
ALT SERPL W P-5'-P-CCNC: 18 U/L (ref 1–33)
ANION GAP SERPL CALCULATED.3IONS-SCNC: 7.6 MMOL/L (ref 5–15)
AST SERPL-CCNC: 18 U/L (ref 1–32)
BASOPHILS # BLD AUTO: 0.05 10*3/MM3 (ref 0–0.2)
BASOPHILS NFR BLD AUTO: 0.6 % (ref 0–1.5)
BILIRUB SERPL-MCNC: 0.3 MG/DL (ref 0–1.2)
BUN SERPL-MCNC: 24 MG/DL (ref 8–23)
BUN/CREAT SERPL: 23.8 (ref 7–25)
CALCIUM SPEC-SCNC: 8.8 MG/DL (ref 8.6–10.5)
CHLORIDE SERPL-SCNC: 104 MMOL/L (ref 98–107)
CO2 SERPL-SCNC: 29.4 MMOL/L (ref 22–29)
CREAT SERPL-MCNC: 1.01 MG/DL (ref 0.57–1)
DEPRECATED RDW RBC AUTO: 43.8 FL (ref 37–54)
EGFRCR SERPLBLD CKD-EPI 2021: 62.7 ML/MIN/1.73
EOSINOPHIL # BLD AUTO: 0.26 10*3/MM3 (ref 0–0.4)
EOSINOPHIL NFR BLD AUTO: 3.3 % (ref 0.3–6.2)
ERYTHROCYTE [DISTWIDTH] IN BLOOD BY AUTOMATED COUNT: 12.7 % (ref 12.3–15.4)
FERRITIN SERPL-MCNC: 219.9 NG/ML (ref 13–150)
GLOBULIN UR ELPH-MCNC: 3.2 GM/DL
GLUCOSE SERPL-MCNC: 98 MG/DL (ref 65–99)
HCT VFR BLD AUTO: 32.6 % (ref 34–46.6)
HGB BLD-MCNC: 10.4 G/DL (ref 12–15.9)
IMM GRANULOCYTES # BLD AUTO: 0.12 10*3/MM3 (ref 0–0.05)
IMM GRANULOCYTES NFR BLD AUTO: 1.5 % (ref 0–0.5)
IRON 24H UR-MRATE: 65 MCG/DL (ref 37–145)
IRON SATN MFR SERPL: 22 % (ref 20–50)
LYMPHOCYTES # BLD AUTO: 1.9 10*3/MM3 (ref 0.7–3.1)
LYMPHOCYTES NFR BLD AUTO: 24.2 % (ref 19.6–45.3)
MCH RBC QN AUTO: 30.1 PG (ref 26.6–33)
MCHC RBC AUTO-ENTMCNC: 31.9 G/DL (ref 31.5–35.7)
MCV RBC AUTO: 94.2 FL (ref 79–97)
MONOCYTES # BLD AUTO: 0.54 10*3/MM3 (ref 0.1–0.9)
MONOCYTES NFR BLD AUTO: 6.9 % (ref 5–12)
NEUTROPHILS NFR BLD AUTO: 4.99 10*3/MM3 (ref 1.7–7)
NEUTROPHILS NFR BLD AUTO: 63.5 % (ref 42.7–76)
NRBC BLD AUTO-RTO: 0 /100 WBC (ref 0–0.2)
PLATELET # BLD AUTO: 241 10*3/MM3 (ref 140–450)
PMV BLD AUTO: 8.7 FL (ref 6–12)
POTASSIUM SERPL-SCNC: 4.9 MMOL/L (ref 3.5–5.2)
PROT SERPL-MCNC: 7 G/DL (ref 6–8.5)
RBC # BLD AUTO: 3.46 10*6/MM3 (ref 3.77–5.28)
SODIUM SERPL-SCNC: 141 MMOL/L (ref 136–145)
TIBC SERPL-MCNC: 291 MCG/DL (ref 298–536)
TRANSFERRIN SERPL-MCNC: 195 MG/DL (ref 200–360)
WBC NRBC COR # BLD AUTO: 7.86 10*3/MM3 (ref 3.4–10.8)

## 2024-08-09 PROCEDURE — 80053 COMPREHEN METABOLIC PANEL: CPT | Performed by: INTERNAL MEDICINE

## 2024-08-09 PROCEDURE — 99213 OFFICE O/P EST LOW 20 MIN: CPT | Performed by: INTERNAL MEDICINE

## 2024-08-09 PROCEDURE — 85025 COMPLETE CBC W/AUTO DIFF WBC: CPT | Performed by: INTERNAL MEDICINE

## 2024-08-09 PROCEDURE — 82728 ASSAY OF FERRITIN: CPT | Performed by: INTERNAL MEDICINE

## 2024-08-09 PROCEDURE — 84466 ASSAY OF TRANSFERRIN: CPT | Performed by: INTERNAL MEDICINE

## 2024-08-09 PROCEDURE — 36415 COLL VENOUS BLD VENIPUNCTURE: CPT

## 2024-08-09 PROCEDURE — 83540 ASSAY OF IRON: CPT | Performed by: INTERNAL MEDICINE

## 2024-08-09 NOTE — PROGRESS NOTES
Subjective   You have chosen to receive care through a telehealth visit.  Do you consent to use a video/audio connection for your medical care today? Yes       REASON FOR follow-up: Multifactorial anemia, anemia of chronic kidney disease and rheumatoid arthritis                             History of Present Illness   Patient is 63-year-old female with history of multifactorial anemia with anemia of chronic kidney disease, rheumatoid arthritis and questionable iron deficiency.  Patient tells me that she went to the dermatologist and they did not think she had rheumatoid arthritis.  And her rheumatoid factor here has been negative.  Her ferritin has been always normal but her percent saturation has been low in the past currently it is 22.  She had been taking ferrous sulfate 1 a day.  She has done upper GI and colonoscopy back in 2018.  Currently though she is not iron deficient.    She prefers to be followed here for anemia.  She does not have any chronic disease like diabetes to cause anemia.          Hematology history:  Ms. Powell is seen today after being referred back to us by her primary care physician, Dr. Teo Fraser.  We saw the patient in consultation in March 2019 for chronic anemia.  Patient has had a long history of rheumatoid arthritis with chronic anemia and reported chronic difficulty with iron deficiency, taking oral iron without much benefit.  At that time of initial consultation we discussed her anemia was likely related to underlying CKD stage III, as well as possibly her rheumatoid arthritis.  We did evaluate for vitamin deficiencies and she was not found to be folate, B12, or iron deficient.  Sed rate was slightly elevated at 44 at that time.    Most recent lab work performed by Dr. Fraser 1/19/2021 showed a hemoglobin of 9.4, creatinine 1.3, BUN 32, GFR 39.  Upon review of the EMR it appears at least for the last roughly 2 years her creatinine has been anywhere from 1.2 to as high as 1.7,  BUN ranging in the 20s to 30s.  So overall relatively stable.      Last colonoscopy and EGD were performed per Dr. Kapoor, negative.  Patient also had a small bowel follow-through which was within normal limits. Other pertinent imaging include a CT of the abdomen pelvis performed 2018 which was negative.  She is up-to-date on her mammogram, last performed 2020, negative.    As she is reviewed today, hemoglobin is at stable at 9.8.  Patient denies any significant fatigue and feels that she has fairly good performance status.  She does note a few times a week taking a 3 to 4-hour nap in the afternoon but does not require this on a regular basis.  She is still able to complete the activity she desires.  Patient is a  and lives alone.  She denies any obvious GI blood loss.      She reports normal appetite.  Weight is stable.  She does have significant left shoulder pain and will undergo replacement surgery per Dr. Devi in May 2021.    Otherwise she denies further concerns at this time.    Past Medical History:   Diagnosis Date    Anemia     IRON INFUSION RECENTLY    Arthritis     Asthma     Chronic kidney disease     stage 3    Elevated cholesterol     GERD (gastroesophageal reflux disease)     High risk medication use 2018    History of carpal tunnel syndrome     Hyperlipidemia     Hypertension     Intractable vomiting with nausea 2018    Left shoulder pain     Limited mobility     Vertigo     Weakness     AT TIMES LEFT SHOULDER/LEFT ARM        Past Surgical History:   Procedure Laterality Date    CARPAL TUNNEL RELEASE Left 10/02/2023     SECTION  , ,     COLONOSCOPY N/A 12/10/2018    normal ileum, IH, hyperplastic polyp, otherwise normal exam    ENDOSCOPY N/A 12/10/2018    no gross lesions in esophagus or examined duodenum, erythematous mucosa in stomach, biopsies benign    HYSTERECTOMY      OOPHORECTOMY Bilateral     SHOULDER MANIPULATION Left     TOTAL  SHOULDER ARTHROPLASTY Left 05/13/2021    Procedure: REVERSE TOTAL SHOULDER ARTHROPLASTY,  BICEP TENDONDESIS;  Surgeon: Bethel Devi MD;  Location: [facility] OR;  Service: Orthopedics;  Laterality: Left;    TRIGGER FINGER RELEASE Left     3 fingers        Current Outpatient Medications on File Prior to Visit   Medication Sig Dispense Refill    acetaminophen (TYLENOL) 500 MG tablet Take 1 tablet by mouth Every 6 (Six) Hours As Needed for Mild Pain. 30 tablet 0    albuterol 108 (90 Base) MCG/ACT inhaler Inhale 2 puffs Every 4 (Four) Hours As Needed for Wheezing.      Arnuity Ellipta 200 MCG/ACT Daily.      ASPIRIN 81 PO Take  by mouth.      atorvastatin (LIPITOR) 80 MG tablet Take 1 tablet by mouth Daily. 90 tablet 1    azelastine (ASTELIN) 0.1 % nasal spray 1 spray into the nostril(s) as directed by provider Daily.  11    B Complex Vitamins (B COMPLEX PO) Take 1 capsule by mouth Daily. HOLD FOR SURGERY 1 WEEK      cetirizine (zyrTEC) 10 MG tablet Take 1 tablet by mouth Daily. 30 tablet 0    Cholecalciferol (Vitamin D3) 50 MCG (2000 UT) capsule TAKE 1 CAPSULE BY MOUTH EVERY DAY 90 capsule 3    clobetasol (TEMOVATE) 0.05 % cream Apply 1 Application topically to the appropriate area as directed As Needed.      EPINEPHrine (AUVI-Q IJ) Inject  as directed As Needed.      famotidine (PEPCID) 20 MG tablet Take 1 tablet by mouth.      ferrous gluconate (FERGON) 324 MG tablet Take 1 tablet by mouth Daily With Breakfast. 90 tablet 1    hydrocortisone 2.5 % cream Apply  topically to the appropriate area as directed As Needed.      ipratropium (ATROVENT) 0.03 % nasal spray 1 spray into the nostril(s) as directed by provider 3 (Three) Times a Day.      irbesartan (AVAPRO) 300 MG tablet Take 1 tablet by mouth Daily. 90 tablet 1    loperamide (IMODIUM) 2 MG capsule Take 1 capsule by mouth Every 2 (Two) Hours As Needed for Diarrhea (max 4 doses daily).      meclizine (ANTIVERT) 25 MG tablet TAKE 1 TABLET BY MOUTH THREE TIMES  DAILY AS NEEDED FOR DIZZINESS 270 tablet 0    ondansetron (Zofran) 4 MG tablet Take 1 tablet by mouth Every 8 (Eight) Hours As Needed for Nausea or Vomiting. 42 tablet 4    ondansetron ODT (ZOFRAN-ODT) 4 MG disintegrating tablet Place 1 tablet on the tongue Every 8 (Eight) Hours As Needed for Nausea or Vomiting. 60 tablet 1    Scopolamine 1 MG/3DAYS patch Place 1 patch on the skin as directed by provider Every 72 (Seventy-Two) Hours. 5 each 0    traMADol (ULTRAM) 50 MG tablet Take 1 tablet by mouth 2 (Two) Times a Day As Needed for Moderate Pain. 180 tablet 1    triamcinolone (KENALOG) 0.1 % ointment Apply 1 Application topically to the appropriate area as directed As Needed.      Vortioxetine HBr (Trintellix) 10 MG tablet tablet Take 1 tablet by mouth Daily. 30 tablet 6     Current Facility-Administered Medications on File Prior to Visit   Medication Dose Route Frequency Provider Last Rate Last Admin    Chlorhexidine Gluconate 2 % pads 1 each  1 each Apply externally Take As Directed Bethel Devi MD            ALLERGIES:    Allergies   Allergen Reactions    Cashew Nut Oil Anaphylaxis    Chocolate Anaphylaxis    Nickel Anaphylaxis    Nuts Anaphylaxis    Adhesive Tape Unknown - Low Severity    Walnut Oil Other (See Comments)    Baclofen Other (See Comments)    Chlorhexidine Unknown - Low Severity    Ciprofloxacin Other (See Comments)     THRUSH PER PATIENT    Citric Acid Unknown (See Comments)     Unsure      Covid-19 (Mrna) Vaccine Other (See Comments)     INSTRUCTED PER ALLERGIST SHE CAN NOT TAKE D/T ONE OF THE PRESERVATIVES    INSTRUCTED PER ALLERGIST SHE CAN NOT TAKE D/T ONE OF THE PRESERVATIVES      *is able to & has had the PFIZER vaccine*    Egg-Derived Products Nausea And Vomiting    Influenza Vaccines Nausea And Vomiting    Methyldibromoglutaronitrile Unknown (See Comments)      PATIENT UNSURE OF REACTION.    Naproxen Other (See Comments)    Neomycin Unknown (See Comments)      PATIENT UNSURE OF  "REACTION.    Omnicef [Cefdinir] Other (See Comments)     THRUSH PER PATIENT    Palladium Chloride Unknown (See Comments)      PATIENT UNSURE OF REACTION    Penicillins Other (See Comments)     THRUSH PER PATIENT    Pineapple Extract Hives    Sulfa Antibiotics Other (See Comments)     THRUSH PER PATIENT    Yeast-Related Products Hives    Gold-Containing Drug Products Rash          Latex Rash        Social History     Socioeconomic History    Marital status:     Number of children: 3    Years of education: College   Tobacco Use    Smoking status: Never     Passive exposure: Never    Smokeless tobacco: Never   Vaping Use    Vaping status: Never Used   Substance and Sexual Activity    Alcohol use: Yes     Alcohol/week: 1.0 standard drink of alcohol     Types: 1 Glasses of wine per week     Comment: WEEKLY    Drug use: No    Sexual activity: Defer        Family History   Problem Relation Age of Onset    Asthma Mother     Thyroid disease Father     Diabetes Maternal Grandmother     Breast cancer Maternal Aunt     Colon cancer Maternal Grandfather     Malig Hyperthermia Neg Hx       Review of Systems    Objective     Vitals:    08/09/24 1048   BP: 163/96   Pulse: 63   Resp: 16   Temp: 98.1 °F (36.7 °C)   TempSrc: Oral   SpO2: 97%   Weight: 109 kg (241 lb 6.4 oz)   Height: 149.9 cm (59.02\")   PainSc: 0-No pain           8/9/2024    10:50 AM   Current Status   ECOG score 0     Physical Exam   Constitutional: She is oriented to person, place, and time. She appears well-developed. No distress.   Pulmonary/Chest: Effort normal. No respiratory distress.   Neurological: She is alert and oriented to person, place, and time.   Skin: Skin is warm and dry.         RECENT LABS:  Results from last 7 days   Lab Units 08/09/24  1021 08/05/24  1308   WBC 10*3/mm3 7.86 8.6   NEUTROS ABS 10*3/mm3 4.99 5.9   HEMOGLOBIN g/dL 10.4* 10.9*   HEMATOCRIT % 32.6* 35.3   PLATELETS 10*3/mm3 241 274       Results from last 7 days   Lab Units " 08/09/24  1021 08/05/24  1308   SODIUM mmol/L 141 142   POTASSIUM mmol/L 4.9 3.9   CHLORIDE mmol/L 104 104   CO2 mmol/L 29.4* 25   BUN mg/dL 24* 22   CREATININE mg/dL 1.01* 0.89   CALCIUM mg/dL 8.8 8.8   ALBUMIN g/dL 3.8 4.1   BILIRUBIN mg/dL 0.3 0.3   ALK PHOS U/L 72 68   ALT (SGPT) U/L 18 18   AST (SGOT) U/L 18 17   GLUCOSE mg/dL 98 85   FERRITIN ng/mL 219.90* 231*   IRON mcg/dL 65  --    TIBC mcg/dL 291* 259               Two View Chest X-Ray 02/24/19  FINDINGS: The lungs are moderately expanded and clear and there is no  evidence of localized pneumonia. The heart is borderline enlarged  without change from 07/23/2012.    CT Abdomen Pelvis 12/06/18 scanned Image.    Assessment & Plan     1.  Multifactorial  anemia.  With anemia of chronic kidney disease and rheumatoid arthritis  Seen in consultation March 2019.  Patient reporting longstanding anemia with hemoglobin running in the 10-11 range.  Patient has underlying CKD stage III.  Work-up at that time did not reveal any vitamin deficiency including no iron, B12 or folate deficiency.  No underlying monoclonal protein.  Anemia felt to be related to CKD with also possibly an element of chronic disease with underlying rheumatoid arthritis.  Sed rate was mildly elevated at 44.  Patient lost to follow-up since that time.  Last colonoscopy and EGD were performed per Dr. Kapoor, negative.  Patient also had a small bowel follow-through which was within normal limits. Other pertinent imaging include a CT of the abdomen pelvis performed December 2018 which was negative.  She is up-to-date on her mammogram, last performed August 2020, negative.  March 10, 2021: Patient's hemoglobin today is 9.8, discussed consideration of Procrit and will get approval.  Discussed side effects in length with patient today.  We could consider starting Procrit if her hemoglobin continues to drop.  May 4/2022: Patient was sent here for anemia of chronic kidney disease to see if she is eligible  for Procrit.  Since the time she has been seen here she has not required Procrit as her hemoglobin is always about greater than 10.  Iron studies are normal.  She feels good otherwise  May 10, 2023: We had ordered anemia work-up but did not get done.  She does have anemia of chronic kidney disease but it could be multifactorial and hence will check iron studies B12 folate CAROLINE rheumatoid factor C-reactive protein and EPO level today.  November 15, 2023: Hemoglobin 9.9 serum protein electrophoresis had not shown any monoclonal protein.  ESR has been normal at 30.  CAROLINE negative, ANCA level 9.7, rheumatoid factor less than 10, C-reactive protein less than 1.3 and ferritin in the past has been 396.  We will recheck ferritin today  5/8/2024 hemoglobin today 10.9, ferritin 270, iron saturation 12%.  Does not qualify for Procrit, no need for iron.  Will continue to closely monitor.  Currently all the anemia workup has been negative.    2.  Rheumatoid arthritis for which she takes diclofenac.  Pedrito Powell reports a pain score of 0.  Given her pain assessment as noted, treatment options were discussed and the following options were decided upon as a follow-up plan to address the patient's pain:  Pain is being managed by another provider. .      3.  Chronic kidney disease stage III.  This is managed per Dr. Fraser.  Creatinine appears relatively stable range from 1.2-1.7 over the last 2 years.  BUN 20s to 30s.  GFR last measured around 39.  This could be the cause of her anemia.    Creatinine today is 1.54.  Patient continues to follow with nephrology.  Still not a candidate for Procrit given hemoglobin greater than 10  Creatinine in the past has been high since 2021 but more recently her creatinine has normalized    4.  Left shoulder replacement surgery  May 10, 2021 done by Dr. Anderson    5.  Hypertension stable.      6.  Anaphylactic reaction:   Multiple allergies      PLAN:  Patient is continuing ferrous sulfate 1 a  day as it seems to have helped her improve her percent saturation though her ferritin has been normal  Rheumatoid arthritis apparently is not a diagnosis she has as her rheumatologist said she does not have it  Could be anemia of chronic disease but if it is persistent and worsening a bone marrow can be considered  Follow-up with nurse practitioner in 6 months with labs  Follow-up with MD in 1 year with labs      I spent 20 total minutes, face-to-face, caring for Pedrito today. Greater than 50% of this time involved counseling and/or coordination of care as documented within this note.    MD Dr. Teo Pearce Dr., orthopedic

## 2024-09-10 ENCOUNTER — TRANSCRIBE ORDERS (OUTPATIENT)
Dept: ADMINISTRATIVE | Facility: HOSPITAL | Age: 64
End: 2024-09-10
Payer: COMMERCIAL

## 2024-09-10 DIAGNOSIS — M79.671 PAIN IN BOTH FEET: Primary | ICD-10-CM

## 2024-09-10 DIAGNOSIS — R42 DIZZINESS: ICD-10-CM

## 2024-09-10 DIAGNOSIS — M79.672 PAIN IN BOTH FEET: Primary | ICD-10-CM

## 2024-09-11 RX ORDER — SCOLOPAMINE TRANSDERMAL SYSTEM 1 MG/1
PATCH, EXTENDED RELEASE TRANSDERMAL
Qty: 5 PATCH | Refills: 1 | Status: SHIPPED | OUTPATIENT
Start: 2024-09-11

## 2024-10-02 ENCOUNTER — OFFICE VISIT (OUTPATIENT)
Dept: ORTHOPEDIC SURGERY | Facility: CLINIC | Age: 64
End: 2024-10-02
Payer: COMMERCIAL

## 2024-10-02 VITALS — BODY MASS INDEX: 48.81 KG/M2 | TEMPERATURE: 98.7 F | WEIGHT: 248.6 LBS | HEIGHT: 60 IN

## 2024-10-02 DIAGNOSIS — M17.12 PRIMARY OSTEOARTHRITIS OF LEFT KNEE: ICD-10-CM

## 2024-10-02 DIAGNOSIS — M17.11 PRIMARY OSTEOARTHRITIS OF RIGHT KNEE: Primary | ICD-10-CM

## 2024-10-02 RX ORDER — METHYLPREDNISOLONE ACETATE 80 MG/ML
80 INJECTION, SUSPENSION INTRA-ARTICULAR; INTRALESIONAL; INTRAMUSCULAR; SOFT TISSUE
Status: COMPLETED | OUTPATIENT
Start: 2024-10-02 | End: 2024-10-02

## 2024-10-02 RX ORDER — LIDOCAINE HYDROCHLORIDE 10 MG/ML
2 INJECTION, SOLUTION EPIDURAL; INFILTRATION; INTRACAUDAL; PERINEURAL
Status: COMPLETED | OUTPATIENT
Start: 2024-10-02 | End: 2024-10-02

## 2024-10-02 RX ADMIN — METHYLPREDNISOLONE ACETATE 80 MG: 80 INJECTION, SUSPENSION INTRA-ARTICULAR; INTRALESIONAL; INTRAMUSCULAR; SOFT TISSUE at 15:50

## 2024-10-02 RX ADMIN — LIDOCAINE HYDROCHLORIDE 2 ML: 10 INJECTION, SOLUTION EPIDURAL; INFILTRATION; INTRACAUDAL; PERINEURAL at 15:50

## 2024-10-02 NOTE — PROGRESS NOTES
Ms. Last comes in today for follow-up.  Injections have worked well in the past.  The patient would like to get repeat injections today.  The risks, benefits and alternatives were discussed and the patient consented.  Going forward, the patient will follow-up as needed.    KALYANI Cornelius    10/02/2024      Large Joint Arthrocentesis: R knee  Date/Time: 10/2/2024 3:50 PM  Consent given by: patient  Site marked: site marked  Timeout: Immediately prior to procedure a time out was called to verify the correct patient, procedure, equipment, support staff and site/side marked as required   Supporting Documentation  Indications: pain   Procedure Details  Location: knee - R knee  Preparation: Patient was prepped and draped in the usual sterile fashion  Needle gauge: 21 G.  Approach: anterolateral  Medications administered: 2 mL lidocaine PF 1% 1 %; 80 mg methylPREDNISolone acetate 80 MG/ML  Patient tolerance: patient tolerated the procedure well with no immediate complications      Large Joint Arthrocentesis: L knee  Date/Time: 10/2/2024 3:50 PM  Consent given by: patient  Site marked: site marked  Timeout: Immediately prior to procedure a time out was called to verify the correct patient, procedure, equipment, support staff and site/side marked as required   Supporting Documentation  Indications: pain   Procedure Details  Location: knee - L knee  Preparation: Patient was prepped and draped in the usual sterile fashion  Needle gauge: 21 G.  Approach: anterolateral  Medications administered: 2 mL lidocaine PF 1% 1 %; 80 mg methylPREDNISolone acetate 80 MG/ML  Patient tolerance: patient tolerated the procedure well with no immediate complications

## 2024-10-08 ENCOUNTER — HOSPITAL ENCOUNTER (OUTPATIENT)
Dept: GENERAL RADIOLOGY | Facility: HOSPITAL | Age: 64
Discharge: HOME | End: 2024-10-08
Admitting: ORTHOPAEDIC SURGERY
Payer: COMMERCIAL

## 2024-10-08 DIAGNOSIS — M79.671 PAIN IN BOTH FEET: ICD-10-CM

## 2024-10-08 DIAGNOSIS — M79.672 PAIN IN BOTH FEET: ICD-10-CM

## 2024-10-08 PROCEDURE — 25010000002 METHYLPREDNISOLONE PER 40 MG: Performed by: ORTHOPAEDIC SURGERY

## 2024-10-08 PROCEDURE — 25510000001 IOPAMIDOL 61 % SOLUTION: Performed by: ORTHOPAEDIC SURGERY

## 2024-10-08 PROCEDURE — 25010000002 LIDOCAINE 1 % SOLUTION: Performed by: ORTHOPAEDIC SURGERY

## 2024-10-08 PROCEDURE — 77002 NEEDLE LOCALIZATION BY XRAY: CPT

## 2024-10-08 PROCEDURE — 25010000002 BUPIVACAINE (PF) 0.25 % SOLUTION: Performed by: ORTHOPAEDIC SURGERY

## 2024-10-08 RX ORDER — BUPIVACAINE HYDROCHLORIDE 2.5 MG/ML
10 INJECTION, SOLUTION EPIDURAL; INFILTRATION; INTRACAUDAL 2 TIMES DAILY PRN
Status: DISCONTINUED | OUTPATIENT
Start: 2024-10-08 | End: 2024-10-09 | Stop reason: HOSPADM

## 2024-10-08 RX ORDER — IOPAMIDOL 612 MG/ML
30 INJECTION, SOLUTION INTRAVASCULAR 2 TIMES DAILY PRN
Status: DISCONTINUED | OUTPATIENT
Start: 2024-10-08 | End: 2024-10-09 | Stop reason: HOSPADM

## 2024-10-08 RX ORDER — METHYLPREDNISOLONE SODIUM SUCCINATE 40 MG/ML
40 INJECTION, POWDER, LYOPHILIZED, FOR SOLUTION INTRAMUSCULAR; INTRAVENOUS 2 TIMES DAILY PRN
Status: DISCONTINUED | OUTPATIENT
Start: 2024-10-08 | End: 2024-10-09 | Stop reason: HOSPADM

## 2024-10-08 RX ORDER — LIDOCAINE HYDROCHLORIDE 10 MG/ML
10 INJECTION, SOLUTION INFILTRATION; PERINEURAL 2 TIMES DAILY PRN
Status: DISCONTINUED | OUTPATIENT
Start: 2024-10-08 | End: 2024-10-09 | Stop reason: HOSPADM

## 2024-10-08 RX ADMIN — METHYLPREDNISOLONE SODIUM SUCCINATE 40 MG: 40 INJECTION, POWDER, FOR SOLUTION INTRAMUSCULAR; INTRAVENOUS at 13:47

## 2024-10-08 RX ADMIN — LIDOCAINE HYDROCHLORIDE 0.5 ML: 10 INJECTION, SOLUTION INFILTRATION; PERINEURAL at 13:41

## 2024-10-08 RX ADMIN — IOPAMIDOL 0.5 ML: 612 INJECTION, SOLUTION INTRAVENOUS at 13:41

## 2024-10-08 RX ADMIN — LIDOCAINE HYDROCHLORIDE 0.5 ML: 10 INJECTION, SOLUTION INFILTRATION; PERINEURAL at 13:47

## 2024-10-08 RX ADMIN — BUPIVACAINE HYDROCHLORIDE 5 ML: 2.5 INJECTION, SOLUTION EPIDURAL; INFILTRATION; INTRACAUDAL; PERINEURAL at 13:41

## 2024-10-08 RX ADMIN — IOPAMIDOL 1 ML: 612 INJECTION, SOLUTION INTRAVENOUS at 13:47

## 2024-10-08 RX ADMIN — METHYLPREDNISOLONE SODIUM SUCCINATE 40 MG: 40 INJECTION, POWDER, FOR SOLUTION INTRAMUSCULAR; INTRAVENOUS at 13:41

## 2024-10-08 RX ADMIN — BUPIVACAINE HYDROCHLORIDE 5 ML: 2.5 INJECTION, SOLUTION EPIDURAL; INFILTRATION; INTRACAUDAL; PERINEURAL at 13:47

## 2024-11-06 ENCOUNTER — OFFICE VISIT (OUTPATIENT)
Dept: INTERNAL MEDICINE | Facility: CLINIC | Age: 64
End: 2024-11-06
Payer: COMMERCIAL

## 2024-11-06 VITALS
BODY MASS INDEX: 49.08 KG/M2 | WEIGHT: 250 LBS | HEIGHT: 60 IN | TEMPERATURE: 96.5 F | SYSTOLIC BLOOD PRESSURE: 118 MMHG | DIASTOLIC BLOOD PRESSURE: 76 MMHG | OXYGEN SATURATION: 100 % | HEART RATE: 69 BPM | RESPIRATION RATE: 18 BRPM

## 2024-11-06 DIAGNOSIS — F32.A DEPRESSION, UNSPECIFIED DEPRESSION TYPE: ICD-10-CM

## 2024-11-06 DIAGNOSIS — Z00.00 WELL WOMAN EXAM (NO GYNECOLOGICAL EXAM): Primary | ICD-10-CM

## 2024-11-06 DIAGNOSIS — Z00.00 HEALTHCARE MAINTENANCE: ICD-10-CM

## 2024-11-06 DIAGNOSIS — J30.9 ALLERGIC RHINITIS, UNSPECIFIED SEASONALITY, UNSPECIFIED TRIGGER: ICD-10-CM

## 2024-11-06 RX ORDER — ESCITALOPRAM OXALATE 10 MG/1
10 TABLET ORAL DAILY
Qty: 90 TABLET | Refills: 3 | Status: SHIPPED | OUTPATIENT
Start: 2024-11-06

## 2024-11-07 LAB
ALBUMIN SERPL-MCNC: 3.9 G/DL (ref 3.9–4.9)
ALP SERPL-CCNC: 74 IU/L (ref 44–121)
ALT SERPL-CCNC: 22 IU/L (ref 0–32)
AST SERPL-CCNC: 31 IU/L (ref 0–40)
BASOPHILS # BLD AUTO: 0.1 X10E3/UL (ref 0–0.2)
BASOPHILS NFR BLD AUTO: 1 %
BILIRUB SERPL-MCNC: 0.3 MG/DL (ref 0–1.2)
BUN SERPL-MCNC: 16 MG/DL (ref 8–27)
BUN/CREAT SERPL: 17 (ref 12–28)
CALCIUM SERPL-MCNC: 8.8 MG/DL (ref 8.7–10.3)
CHLORIDE SERPL-SCNC: 103 MMOL/L (ref 96–106)
CHOLEST SERPL-MCNC: 164 MG/DL (ref 100–199)
CO2 SERPL-SCNC: 20 MMOL/L (ref 20–29)
CREAT SERPL-MCNC: 0.94 MG/DL (ref 0.57–1)
EGFRCR SERPLBLD CKD-EPI 2021: 68 ML/MIN/1.73
EOSINOPHIL # BLD AUTO: 0.3 X10E3/UL (ref 0–0.4)
EOSINOPHIL NFR BLD AUTO: 4 %
ERYTHROCYTE [DISTWIDTH] IN BLOOD BY AUTOMATED COUNT: 12.5 % (ref 11.7–15.4)
FT4I SERPL CALC-MCNC: 2.2 (ref 1.2–4.9)
GLOBULIN SER CALC-MCNC: 3.1 G/DL (ref 1.5–4.5)
GLUCOSE SERPL-MCNC: 96 MG/DL (ref 70–99)
HBA1C MFR BLD: 6.2 % (ref 4.8–5.6)
HCT VFR BLD AUTO: 34.5 % (ref 34–46.6)
HDLC SERPL-MCNC: 51 MG/DL
HGB BLD-MCNC: 11 G/DL (ref 11.1–15.9)
IMM GRANULOCYTES # BLD AUTO: 0 X10E3/UL (ref 0–0.1)
IMM GRANULOCYTES NFR BLD AUTO: 1 %
LDLC SERPL CALC-MCNC: 90 MG/DL (ref 0–99)
LDLC/HDLC SERPL: 1.8 RATIO (ref 0–3.2)
LYMPHOCYTES # BLD AUTO: 2 X10E3/UL (ref 0.7–3.1)
LYMPHOCYTES NFR BLD AUTO: 23 %
MCH RBC QN AUTO: 30.2 PG (ref 26.6–33)
MCHC RBC AUTO-ENTMCNC: 31.9 G/DL (ref 31.5–35.7)
MCV RBC AUTO: 95 FL (ref 79–97)
MONOCYTES # BLD AUTO: 0.7 X10E3/UL (ref 0.1–0.9)
MONOCYTES NFR BLD AUTO: 8 %
NEUTROPHILS # BLD AUTO: 5.6 X10E3/UL (ref 1.4–7)
NEUTROPHILS NFR BLD AUTO: 63 %
PLATELET # BLD AUTO: 287 X10E3/UL (ref 150–450)
POTASSIUM SERPL-SCNC: 4.6 MMOL/L (ref 3.5–5.2)
PROT SERPL-MCNC: 7 G/DL (ref 6–8.5)
RBC # BLD AUTO: 3.64 X10E6/UL (ref 3.77–5.28)
SODIUM SERPL-SCNC: 139 MMOL/L (ref 134–144)
T3RU NFR SERPL: 26 % (ref 24–39)
T4 SERPL-MCNC: 8.6 UG/DL (ref 4.5–12)
TRIGL SERPL-MCNC: 133 MG/DL (ref 0–149)
TSH SERPL DL<=0.005 MIU/L-ACNC: 1.31 UIU/ML (ref 0.45–4.5)
VLDLC SERPL CALC-MCNC: 23 MG/DL (ref 5–40)
WBC # BLD AUTO: 8.7 X10E3/UL (ref 3.4–10.8)

## 2024-11-09 NOTE — PROGRESS NOTES
The labs were reviewed. Please inform patient that labs were abnormal. Slightly anemic, but improving.

## 2024-11-09 NOTE — PROGRESS NOTES
Subjective   Pedrito Powell is a 64 y.o. female and is here for a comprehensive physical exam. The patient reports no problems.    Pt is UTD with annual gyn exam and mammo           Social History:   Social History     Socioeconomic History    Marital status:     Number of children: 3    Years of education: College   Tobacco Use    Smoking status: Never     Passive exposure: Never    Smokeless tobacco: Never   Vaping Use    Vaping status: Never Used   Substance and Sexual Activity    Alcohol use: Yes     Alcohol/week: 1.0 standard drink of alcohol     Types: 1 Glasses of wine per week     Comment: WEEKLY    Drug use: No    Sexual activity: Defer       Family History:   Family History   Problem Relation Age of Onset    Asthma Mother     Thyroid disease Father     Diabetes Maternal Grandmother     Breast cancer Maternal Aunt     Colon cancer Maternal Grandfather     Malig Hyperthermia Neg Hx        Past Medical History:   Past Medical History:   Diagnosis Date    Anemia     IRON INFUSION RECENTLY    Arthritis     Asthma     Chronic kidney disease     stage 3    Elevated cholesterol     GERD (gastroesophageal reflux disease)     High risk medication use 01/19/2018    History of carpal tunnel syndrome     Hyperlipidemia     Hypertension     Intractable vomiting with nausea 12/07/2018    Left shoulder pain     Limited mobility     Vertigo     Weakness     AT TIMES LEFT SHOULDER/LEFT ARM       The following portions of the patient's history were reviewed and updated as appropriate: allergies, current medications, past family history, past medical history, past social history, past surgical history and problem list.    Review of Systems    Review of Systems   Constitutional:  Negative for chills and fever.   HENT:  Negative for congestion, rhinorrhea, sinus pain and sore throat.    Eyes:  Negative for photophobia and visual disturbance.   Respiratory:  Negative for cough, chest tightness and shortness of breath.     Cardiovascular:  Negative for chest pain and palpitations.   Gastrointestinal:  Negative for diarrhea, nausea and vomiting.   Genitourinary:  Negative for dysuria, frequency and urgency.   Skin:  Negative for rash and wound.   Neurological:  Negative for dizziness and syncope.   Psychiatric/Behavioral:  Negative for behavioral problems and confusion.        Objective   Physical Exam  Vitals and nursing note reviewed.   Constitutional:       Appearance: She is well-developed.   HENT:      Head: Normocephalic and atraumatic.      Right Ear: External ear normal.      Left Ear: External ear normal.   Cardiovascular:      Rate and Rhythm: Normal rate and regular rhythm.      Heart sounds: Normal heart sounds.   Pulmonary:      Effort: Pulmonary effort is normal. No respiratory distress.      Breath sounds: Normal breath sounds.   Abdominal:      Palpations: Abdomen is soft.      Tenderness: There is no abdominal tenderness. There is no guarding.   Musculoskeletal:         General: Normal range of motion.      Cervical back: Normal range of motion and neck supple.   Lymphadenopathy:      Cervical: No cervical adenopathy.   Skin:     General: Skin is warm.   Neurological:      Mental Status: She is alert and oriented to person, place, and time.   Psychiatric:         Behavior: Behavior normal.         Vitals:    11/06/24 1341   BP: 118/76   Pulse: 69   Resp: 18   Temp: 96.5 °F (35.8 °C)   SpO2: 100%     Body mass index is 48.82 kg/m².      Medications:   Current Outpatient Medications:     acetaminophen (TYLENOL) 500 MG tablet, Take 1 tablet by mouth Every 6 (Six) Hours As Needed for Mild Pain., Disp: 30 tablet, Rfl: 0    albuterol 108 (90 Base) MCG/ACT inhaler, Inhale 2 puffs Every 4 (Four) Hours As Needed for Wheezing., Disp: , Rfl:     Arnuity Ellipta 200 MCG/ACT, Daily., Disp: , Rfl:     ASPIRIN 81 PO, Take  by mouth., Disp: , Rfl:     atorvastatin (LIPITOR) 80 MG tablet, Take 1 tablet by mouth Daily., Disp: 90  tablet, Rfl: 1    azelastine (ASTELIN) 0.1 % nasal spray, Administer 1 spray into the nostril(s) as directed by provider Daily., Disp: , Rfl: 11    B Complex Vitamins (B COMPLEX PO), Take 1 capsule by mouth Daily. HOLD FOR SURGERY 1 WEEK, Disp: , Rfl:     Cholecalciferol (Vitamin D3) 50 MCG (2000 UT) capsule, TAKE 1 CAPSULE BY MOUTH EVERY DAY, Disp: 90 capsule, Rfl: 3    clobetasol (TEMOVATE) 0.05 % cream, Apply 1 Application topically to the appropriate area as directed As Needed., Disp: , Rfl:     famotidine (PEPCID) 20 MG tablet, Take 1 tablet by mouth., Disp: , Rfl:     hydrocortisone 2.5 % cream, Apply  topically to the appropriate area as directed As Needed., Disp: , Rfl:     ipratropium (ATROVENT) 0.03 % nasal spray, Administer 1 spray into the nostril(s) as directed by provider 3 (Three) Times a Day., Disp: , Rfl:     irbesartan (AVAPRO) 300 MG tablet, Take 1 tablet by mouth Daily., Disp: 90 tablet, Rfl: 1    loperamide (IMODIUM) 2 MG capsule, Take 1 capsule by mouth Every 2 (Two) Hours As Needed for Diarrhea (max 4 doses daily)., Disp: , Rfl:     meclizine (ANTIVERT) 25 MG tablet, TAKE 1 TABLET BY MOUTH THREE TIMES DAILY AS NEEDED FOR DIZZINESS, Disp: 270 tablet, Rfl: 0    ondansetron (Zofran) 4 MG tablet, Take 1 tablet by mouth Every 8 (Eight) Hours As Needed for Nausea or Vomiting., Disp: 42 tablet, Rfl: 4    traMADol (ULTRAM) 50 MG tablet, Take 1 tablet by mouth 2 (Two) Times a Day As Needed for Moderate Pain., Disp: 180 tablet, Rfl: 1    triamcinolone (KENALOG) 0.1 % ointment, Apply 1 Application topically to the appropriate area as directed As Needed., Disp: , Rfl:     EPINEPHrine (AUVI-Q IJ), Inject  as directed As Needed., Disp: , Rfl:     escitalopram (Lexapro) 10 MG tablet, Take 1 tablet by mouth Daily., Disp: 90 tablet, Rfl: 3  No current facility-administered medications for this visit.    Facility-Administered Medications Ordered in Other Visits:     Chlorhexidine Gluconate 2 % pads 1 each, 1  each, Apply externally, Take As Directed, Bethel Devi MD       Assessment & Plan   Healthy female exam.      1. Healthcare Maintenance:  2. Patient Counseling:  --Nutrition: Stressed importance of moderation in sodium/caffeine intake, saturated fat and cholesterol, caloric balance, sufficient intake of fresh fruits, vegetables, fiber, calcium and vit D  --Exercise: Recommended 30 minutes of exercise daily.  --Immunizations reviewed.  --Discussed benefits of screening colonoscopy.    Diagnoses and all orders for this visit:    Well woman exam (no gynecological exam)    Healthcare maintenance  -     CBC & Differential  -     Comprehensive Metabolic Panel  -     Hemoglobin A1c  -     Thyroid Panel With TSH  -     Lipid Panel With LDL / HDL Ratio    Depression, unspecified depression type  -     escitalopram (Lexapro) 10 MG tablet; Take 1 tablet by mouth Daily.    Allergic rhinitis, unspecified seasonality, unspecified trigger  -     Ambulatory Referral to Allergy        No follow-ups on file.             Dictated utilizing Dragon Voice Recognition Software

## 2024-11-09 NOTE — PROGRESS NOTES
"Chief Complaint  Follow-up (3 months) and Annual Exam    Subjective          Pedrito Powell presents to [facility]  History of Present Illness  The patient is a 64-year-old female who presents for evaluation of multiple medical concerns.    She is currently on Trintellix, which costs her $100 per month. She has previously tried the 5 mg and 10 mg doses of this medication. She has also been on Effexor but experienced severe brain zaps upon discontinuation. She has about a week's supply of Trintellix remaining.    She is seeking a referral to an allergist due to persistent hives. Her previous allergist, Dr. Gustafson, has left the practice.    She frequently injures her hands, resulting in large bruises. She was advised by Dr. Spicer not to be overly concerned about these bruises. The most recent incident occurred approximately 2 to 3 weeks ago.    Objective   Vital Signs:   /76   Pulse 69   Temp 96.5 °F (35.8 °C)   Resp 18   Ht 152.4 cm (60\")   Wt 113 kg (250 lb)   SpO2 100%   BMI 48.82 kg/m²     Physical Exam  Vitals and nursing note reviewed.   Constitutional:       Appearance: She is well-developed.   HENT:      Head: Normocephalic and atraumatic.   Musculoskeletal:      Cervical back: Normal range of motion and neck supple.   Neurological:      Mental Status: She is alert and oriented to person, place, and time.   Psychiatric:         Behavior: Behavior normal.         Physical Exam       Result Review :                 Assessment and Plan    Diagnoses and all orders for this visit:    1. Well woman exam (no gynecological exam) (Primary)    2. Healthcare maintenance  -     CBC & Differential  -     Comprehensive Metabolic Panel  -     Hemoglobin A1c  -     Thyroid Panel With TSH  -     Lipid Panel With LDL / HDL Ratio    3. Depression, unspecified depression type  -     escitalopram (Lexapro) 10 MG tablet; Take 1 tablet by mouth Daily.  Dispense: 90 tablet; Refill: " 3    4. Allergic rhinitis, unspecified seasonality, unspecified trigger  -     Ambulatory Referral to Allergy      Assessment & Plan  1. Depression.  She reports that Trintellix is too expensive at $100 per month. Alternatives such as Lexapro and Prozac were discussed. She expressed concerns about Prozac and Effexor due to past experiences and news reports. A prescription for Lexapro has been provided. She is advised to discontinue Trintellix, wait for a few days, and then start Lexapro. It was communicated that the effects of Lexapro may take a few weeks to manifest.    2. Hives.  She reports frequent hives and dissatisfaction with her previous allergist. A referral to an allergist at the [facility] has been made.    3. Bruising.  She reports frequent bruising, attributed to thinner skin with age. Her platelet count was within normal range as of August 2024. Blood work will be conducted today to reassess her platelet levels.    Follow-up  Return in 4 weeks for follow up.    Follow Up   No follow-ups on file.  Patient was given instructions and counseling regarding her condition or for health maintenance advice. Please see specific information pulled into the AVS if appropriate.           Patient or patient representative verbalized consent for the use of Ambient Listening during the visit with  Teo Fraser MD for chart documentation. 11/9/2024  10:23 EST

## 2024-11-13 DIAGNOSIS — F41.9 ANXIETY: ICD-10-CM

## 2024-11-13 DIAGNOSIS — F32.A DEPRESSION, UNSPECIFIED DEPRESSION TYPE: Primary | ICD-10-CM

## 2024-11-20 ENCOUNTER — TRANSCRIBE ORDERS (OUTPATIENT)
Dept: INTERNAL MEDICINE | Facility: CLINIC | Age: 64
End: 2024-11-20
Payer: COMMERCIAL

## 2024-11-20 DIAGNOSIS — Z12.9 CANCER SCREENING: Primary | ICD-10-CM

## 2024-12-03 ENCOUNTER — OFFICE VISIT (OUTPATIENT)
Dept: INTERNAL MEDICINE | Facility: CLINIC | Age: 64
End: 2024-12-03
Payer: COMMERCIAL

## 2024-12-03 VITALS
OXYGEN SATURATION: 100 % | HEART RATE: 69 BPM | BODY MASS INDEX: 46.37 KG/M2 | DIASTOLIC BLOOD PRESSURE: 82 MMHG | RESPIRATION RATE: 18 BRPM | WEIGHT: 230 LBS | TEMPERATURE: 97.8 F | SYSTOLIC BLOOD PRESSURE: 130 MMHG | HEIGHT: 59 IN

## 2024-12-03 DIAGNOSIS — F32.A DEPRESSION, UNSPECIFIED DEPRESSION TYPE: ICD-10-CM

## 2024-12-03 DIAGNOSIS — F41.9 ANXIETY: ICD-10-CM

## 2024-12-03 DIAGNOSIS — E78.5 HYPERLIPIDEMIA, UNSPECIFIED HYPERLIPIDEMIA TYPE: ICD-10-CM

## 2024-12-03 DIAGNOSIS — I10 HYPERTENSION, UNSPECIFIED TYPE: ICD-10-CM

## 2024-12-03 PROCEDURE — 99214 OFFICE O/P EST MOD 30 MIN: CPT | Performed by: FAMILY MEDICINE

## 2024-12-03 RX ORDER — IRBESARTAN 300 MG/1
300 TABLET ORAL DAILY
Qty: 90 TABLET | Refills: 1 | Status: SHIPPED | OUTPATIENT
Start: 2024-12-03

## 2024-12-03 RX ORDER — ATORVASTATIN CALCIUM 80 MG/1
80 TABLET, FILM COATED ORAL DAILY
Qty: 90 TABLET | Refills: 1 | Status: SHIPPED | OUTPATIENT
Start: 2024-12-03

## 2024-12-03 NOTE — PROGRESS NOTES
"Chief Complaint  Follow-up    Subjective          Pedrito Powell presents to [facility]  History of Present Illness  The patient is a 64-year-old female who presents for follow-up.    She is currently managing her essential hypertension with a daily dose of irbesartan 300 mg, which she reports tolerating well. For her cholesterol, she is on Lipitor 80 mg daily. She has been taking Trintellix 10 mg without any issues. She has discontinued the use of Lexapro. She reports no other health concerns at this time. She is scheduled to see an orthopedic specialist for a stress fracture in her foot.    Objective   Vital Signs:   /82 (BP Location: Left arm, Patient Position: Sitting)   Pulse 69   Temp 97.8 °F (36.6 °C) (Oral)   Resp 18   Ht 149.9 cm (59.02\")   Wt 104 kg (230 lb)   SpO2 100%   BMI 46.43 kg/m²     Physical Exam  Vitals and nursing note reviewed.   Constitutional:       Appearance: She is well-developed.   HENT:      Head: Normocephalic and atraumatic.   Musculoskeletal:      Cervical back: Normal range of motion and neck supple.   Neurological:      Mental Status: She is alert and oriented to person, place, and time.   Psychiatric:         Behavior: Behavior normal.         Physical Exam       Result Review :                 Assessment and Plan    Diagnoses and all orders for this visit:    1. Hypertension, unspecified type  -     irbesartan (AVAPRO) 300 MG tablet; Take 1 tablet by mouth Daily.  Dispense: 90 tablet; Refill: 1    2. Hyperlipidemia, unspecified hyperlipidemia type  -     atorvastatin (LIPITOR) 80 MG tablet; Take 1 tablet by mouth Daily.  Dispense: 90 tablet; Refill: 1    3. Depression, unspecified depression type  -     Vortioxetine HBr (Trintellix) 10 MG tablet tablet; Take 1 tablet by mouth Daily With Breakfast.  Dispense: 90 tablet; Refill: 3    4. Anxiety  -     Vortioxetine HBr (Trintellix) 10 MG tablet tablet; Take 1 tablet by mouth Daily With " Breakfast.  Dispense: 90 tablet; Refill: 3      Assessment & Plan  1. Essential Hypertension.  The patient is currently taking irbesartan 300 mg daily and reports doing well with this medication. She should continue her current regimen.    2. Hyperlipidemia.  She is on Lipitor 80 mg daily and is managing well. Continuation of the current dosage is advised.    3. Depression.  The patient is taking Trintellix 10 mg and has resolved the cost issue by switching to a 90-day supply, which is now free. She reports doing well on this medication. Continuation of Trintellix 10 mg is recommended.    4. Foot pain.  She is scheduled to see an orthopedic specialist for her foot, which feels similar to a previous stress fracture. Further evaluation and management will be handled by the orthopedic specialist.        Follow Up   No follow-ups on file.  Patient was given instructions and counseling regarding her condition or for health maintenance advice. Please see specific information pulled into the AVS if appropriate.           Patient or patient representative verbalized consent for the use of Ambient Listening during the visit with  Teo Fraser MD for chart documentation. 12/3/2024  14:05 EST

## 2024-12-09 ENCOUNTER — TRANSCRIBE ORDERS (OUTPATIENT)
Dept: ADMINISTRATIVE | Facility: HOSPITAL | Age: 64
End: 2024-12-09
Payer: COMMERCIAL

## 2024-12-09 DIAGNOSIS — M79.672 LEFT FOOT PAIN: ICD-10-CM

## 2024-12-09 DIAGNOSIS — M79.671 RIGHT FOOT PAIN: ICD-10-CM

## 2024-12-09 DIAGNOSIS — M79.672 BILATERAL FOOT PAIN: Primary | ICD-10-CM

## 2024-12-09 DIAGNOSIS — M79.672 LEFT FOOT PAIN: Primary | ICD-10-CM

## 2024-12-09 DIAGNOSIS — M79.671 BILATERAL FOOT PAIN: Primary | ICD-10-CM

## 2024-12-21 ENCOUNTER — APPOINTMENT (OUTPATIENT)
Dept: GENERAL RADIOLOGY | Facility: HOSPITAL | Age: 64
End: 2024-12-21
Payer: COMMERCIAL

## 2024-12-21 ENCOUNTER — HOSPITAL ENCOUNTER (OUTPATIENT)
Facility: HOSPITAL | Age: 64
Setting detail: OBSERVATION
Discharge: HOME | End: 2024-12-23
Attending: STUDENT IN AN ORGANIZED HEALTH CARE EDUCATION/TRAINING PROGRAM | Admitting: INTERNAL MEDICINE
Payer: COMMERCIAL

## 2024-12-21 DIAGNOSIS — R79.89 ELEVATED TROPONIN: ICD-10-CM

## 2024-12-21 DIAGNOSIS — U07.1 COVID-19: Primary | ICD-10-CM

## 2024-12-21 DIAGNOSIS — R09.02 HYPOXIA: ICD-10-CM

## 2024-12-21 LAB
ALBUMIN SERPL-MCNC: 3.6 G/DL (ref 3.5–5.2)
ALBUMIN/GLOB SERPL: 1.2 G/DL
ALP SERPL-CCNC: 67 U/L (ref 39–117)
ALT SERPL W P-5'-P-CCNC: 22 U/L (ref 1–33)
ANION GAP SERPL CALCULATED.3IONS-SCNC: 10 MMOL/L (ref 5–15)
AST SERPL-CCNC: 19 U/L (ref 1–32)
B PARAPERT DNA SPEC QL NAA+PROBE: NOT DETECTED
B PERT DNA SPEC QL NAA+PROBE: NOT DETECTED
BASOPHILS # BLD AUTO: 0.04 10*3/MM3 (ref 0–0.2)
BASOPHILS NFR BLD AUTO: 0.5 % (ref 0–1.5)
BILIRUB SERPL-MCNC: 0.4 MG/DL (ref 0–1.2)
BUN SERPL-MCNC: 13 MG/DL (ref 8–23)
BUN/CREAT SERPL: 14.9 (ref 7–25)
C PNEUM DNA NPH QL NAA+NON-PROBE: NOT DETECTED
CALCIUM SPEC-SCNC: 8.4 MG/DL (ref 8.6–10.5)
CHLORIDE SERPL-SCNC: 104 MMOL/L (ref 98–107)
CO2 SERPL-SCNC: 22 MMOL/L (ref 22–29)
CREAT SERPL-MCNC: 0.87 MG/DL (ref 0.57–1)
CRP SERPL-MCNC: 2.31 MG/DL (ref 0–0.5)
DEPRECATED RDW RBC AUTO: 41.1 FL (ref 37–54)
EGFRCR SERPLBLD CKD-EPI 2021: 74.5 ML/MIN/1.73
EOSINOPHIL # BLD AUTO: 0.06 10*3/MM3 (ref 0–0.4)
EOSINOPHIL NFR BLD AUTO: 0.8 % (ref 0.3–6.2)
ERYTHROCYTE [DISTWIDTH] IN BLOOD BY AUTOMATED COUNT: 12 % (ref 12.3–15.4)
FLUAV SUBTYP SPEC NAA+PROBE: NOT DETECTED
FLUBV RNA ISLT QL NAA+PROBE: NOT DETECTED
GEN 5 1HR TROPONIN T REFLEX: 20 NG/L
GLOBULIN UR ELPH-MCNC: 2.9 GM/DL
GLUCOSE BLDC GLUCOMTR-MCNC: 101 MG/DL (ref 70–130)
GLUCOSE BLDC GLUCOMTR-MCNC: 126 MG/DL (ref 70–130)
GLUCOSE SERPL-MCNC: 103 MG/DL (ref 65–99)
HADV DNA SPEC NAA+PROBE: NOT DETECTED
HCOV 229E RNA SPEC QL NAA+PROBE: NOT DETECTED
HCOV HKU1 RNA SPEC QL NAA+PROBE: NOT DETECTED
HCOV NL63 RNA SPEC QL NAA+PROBE: NOT DETECTED
HCOV OC43 RNA SPEC QL NAA+PROBE: NOT DETECTED
HCT VFR BLD AUTO: 31.4 % (ref 34–46.6)
HGB BLD-MCNC: 10.4 G/DL (ref 12–15.9)
HMPV RNA NPH QL NAA+NON-PROBE: NOT DETECTED
HPIV1 RNA ISLT QL NAA+PROBE: NOT DETECTED
HPIV2 RNA SPEC QL NAA+PROBE: NOT DETECTED
HPIV3 RNA NPH QL NAA+PROBE: NOT DETECTED
HPIV4 P GENE NPH QL NAA+PROBE: NOT DETECTED
IMM GRANULOCYTES # BLD AUTO: 0.03 10*3/MM3 (ref 0–0.05)
IMM GRANULOCYTES NFR BLD AUTO: 0.4 % (ref 0–0.5)
LYMPHOCYTES # BLD AUTO: 0.5 10*3/MM3 (ref 0.7–3.1)
LYMPHOCYTES NFR BLD AUTO: 6.7 % (ref 19.6–45.3)
M PNEUMO IGG SER IA-ACNC: NOT DETECTED
MAGNESIUM SERPL-MCNC: 1.7 MG/DL (ref 1.6–2.4)
MCH RBC QN AUTO: 30.8 PG (ref 26.6–33)
MCHC RBC AUTO-ENTMCNC: 33.1 G/DL (ref 31.5–35.7)
MCV RBC AUTO: 92.9 FL (ref 79–97)
MONOCYTES # BLD AUTO: 0.75 10*3/MM3 (ref 0.1–0.9)
MONOCYTES NFR BLD AUTO: 10.1 % (ref 5–12)
NEUTROPHILS NFR BLD AUTO: 6.07 10*3/MM3 (ref 1.7–7)
NEUTROPHILS NFR BLD AUTO: 81.5 % (ref 42.7–76)
NRBC BLD AUTO-RTO: 0 /100 WBC (ref 0–0.2)
PLATELET # BLD AUTO: 204 10*3/MM3 (ref 140–450)
PMV BLD AUTO: 9.1 FL (ref 6–12)
POTASSIUM SERPL-SCNC: 3.7 MMOL/L (ref 3.5–5.2)
PROCALCITONIN SERPL-MCNC: 0.04 NG/ML (ref 0–0.25)
PROT SERPL-MCNC: 6.5 G/DL (ref 6–8.5)
QT INTERVAL: 397 MS
QTC INTERVAL: 435 MS
RBC # BLD AUTO: 3.38 10*6/MM3 (ref 3.77–5.28)
RHINOVIRUS RNA SPEC NAA+PROBE: NOT DETECTED
RSV RNA NPH QL NAA+NON-PROBE: NOT DETECTED
SARS-COV-2 RNA NPH QL NAA+NON-PROBE: DETECTED
SODIUM SERPL-SCNC: 136 MMOL/L (ref 136–145)
TROPONIN T % DELTA: 25 %
TROPONIN T NUMERIC DELTA: 4 NG/L
TROPONIN T SERPL HS-MCNC: 16 NG/L
WBC NRBC COR # BLD AUTO: 7.45 10*3/MM3 (ref 3.4–10.8)

## 2024-12-21 PROCEDURE — 96372 THER/PROPH/DIAG INJ SC/IM: CPT

## 2024-12-21 PROCEDURE — 80053 COMPREHEN METABOLIC PANEL: CPT | Performed by: STUDENT IN AN ORGANIZED HEALTH CARE EDUCATION/TRAINING PROGRAM

## 2024-12-21 PROCEDURE — G0378 HOSPITAL OBSERVATION PER HR: HCPCS

## 2024-12-21 PROCEDURE — 94664 DEMO&/EVAL PT USE INHALER: CPT

## 2024-12-21 PROCEDURE — 93005 ELECTROCARDIOGRAM TRACING: CPT | Performed by: STUDENT IN AN ORGANIZED HEALTH CARE EDUCATION/TRAINING PROGRAM

## 2024-12-21 PROCEDURE — 84484 ASSAY OF TROPONIN QUANT: CPT | Performed by: STUDENT IN AN ORGANIZED HEALTH CARE EDUCATION/TRAINING PROGRAM

## 2024-12-21 PROCEDURE — 94640 AIRWAY INHALATION TREATMENT: CPT

## 2024-12-21 PROCEDURE — 36415 COLL VENOUS BLD VENIPUNCTURE: CPT

## 2024-12-21 PROCEDURE — 85025 COMPLETE CBC W/AUTO DIFF WBC: CPT | Performed by: STUDENT IN AN ORGANIZED HEALTH CARE EDUCATION/TRAINING PROGRAM

## 2024-12-21 PROCEDURE — 94799 UNLISTED PULMONARY SVC/PX: CPT

## 2024-12-21 PROCEDURE — 25810000003 SODIUM CHLORIDE 0.9 % SOLUTION: Performed by: STUDENT IN AN ORGANIZED HEALTH CARE EDUCATION/TRAINING PROGRAM

## 2024-12-21 PROCEDURE — 0202U NFCT DS 22 TRGT SARS-COV-2: CPT | Performed by: HOSPITALIST

## 2024-12-21 PROCEDURE — 84145 PROCALCITONIN (PCT): CPT | Performed by: HOSPITALIST

## 2024-12-21 PROCEDURE — 25010000002 KETOROLAC TROMETHAMINE PER 15 MG: Performed by: STUDENT IN AN ORGANIZED HEALTH CARE EDUCATION/TRAINING PROGRAM

## 2024-12-21 PROCEDURE — 71045 X-RAY EXAM CHEST 1 VIEW: CPT

## 2024-12-21 PROCEDURE — 25010000002 ENOXAPARIN PER 10 MG: Performed by: HOSPITALIST

## 2024-12-21 PROCEDURE — 86140 C-REACTIVE PROTEIN: CPT | Performed by: HOSPITALIST

## 2024-12-21 PROCEDURE — 25810000003 SODIUM CHLORIDE 0.9 % SOLUTION 250 ML FLEX CONT: Performed by: HOSPITALIST

## 2024-12-21 PROCEDURE — 25010000002 REMDESIVIR 100 MG/20ML SOLUTION 1 EACH VIAL: Performed by: HOSPITALIST

## 2024-12-21 PROCEDURE — 94760 N-INVAS EAR/PLS OXIMETRY 1: CPT

## 2024-12-21 PROCEDURE — 82948 REAGENT STRIP/BLOOD GLUCOSE: CPT

## 2024-12-21 PROCEDURE — 99285 EMERGENCY DEPT VISIT HI MDM: CPT

## 2024-12-21 PROCEDURE — 94761 N-INVAS EAR/PLS OXIMETRY MLT: CPT

## 2024-12-21 PROCEDURE — 83735 ASSAY OF MAGNESIUM: CPT | Performed by: STUDENT IN AN ORGANIZED HEALTH CARE EDUCATION/TRAINING PROGRAM

## 2024-12-21 PROCEDURE — 63710000001 DEXAMETHASONE PER 0.25 MG: Performed by: HOSPITALIST

## 2024-12-21 RX ORDER — ACETAMINOPHEN 650 MG/1
650 SUPPOSITORY RECTAL EVERY 4 HOURS PRN
Status: DISCONTINUED | OUTPATIENT
Start: 2024-12-21 | End: 2024-12-23 | Stop reason: HOSPADM

## 2024-12-21 RX ORDER — IPRATROPIUM BROMIDE AND ALBUTEROL SULFATE 2.5; .5 MG/3ML; MG/3ML
3 SOLUTION RESPIRATORY (INHALATION)
Status: DISCONTINUED | OUTPATIENT
Start: 2024-12-21 | End: 2024-12-23 | Stop reason: HOSPADM

## 2024-12-21 RX ORDER — KETOROLAC TROMETHAMINE 30 MG/ML
30 INJECTION, SOLUTION INTRAMUSCULAR; INTRAVENOUS ONCE
Status: COMPLETED | OUTPATIENT
Start: 2024-12-21 | End: 2024-12-21

## 2024-12-21 RX ORDER — ACETAMINOPHEN 325 MG/1
650 TABLET ORAL EVERY 4 HOURS PRN
Status: DISCONTINUED | OUTPATIENT
Start: 2024-12-21 | End: 2024-12-23 | Stop reason: HOSPADM

## 2024-12-21 RX ORDER — INSULIN LISPRO 100 [IU]/ML
2-9 INJECTION, SOLUTION INTRAVENOUS; SUBCUTANEOUS
Status: DISCONTINUED | OUTPATIENT
Start: 2024-12-21 | End: 2024-12-23 | Stop reason: HOSPADM

## 2024-12-21 RX ORDER — SODIUM CHLORIDE 0.9 % (FLUSH) 0.9 %
10 SYRINGE (ML) INJECTION EVERY 12 HOURS SCHEDULED
Status: DISCONTINUED | OUTPATIENT
Start: 2024-12-21 | End: 2024-12-23 | Stop reason: HOSPADM

## 2024-12-21 RX ORDER — FAMOTIDINE 20 MG/1
20 TABLET, FILM COATED ORAL
Status: DISCONTINUED | OUTPATIENT
Start: 2024-12-21 | End: 2024-12-23 | Stop reason: HOSPADM

## 2024-12-21 RX ORDER — NITROGLYCERIN 0.4 MG/1
0.4 TABLET SUBLINGUAL
Status: DISCONTINUED | OUTPATIENT
Start: 2024-12-21 | End: 2024-12-23 | Stop reason: HOSPADM

## 2024-12-21 RX ORDER — ACETAMINOPHEN 160 MG
2000 TABLET,DISINTEGRATING ORAL DAILY
Status: DISCONTINUED | OUTPATIENT
Start: 2024-12-21 | End: 2024-12-21

## 2024-12-21 RX ORDER — SODIUM CHLORIDE 9 MG/ML
40 INJECTION, SOLUTION INTRAVENOUS AS NEEDED
Status: DISCONTINUED | OUTPATIENT
Start: 2024-12-21 | End: 2024-12-23 | Stop reason: HOSPADM

## 2024-12-21 RX ORDER — LOSARTAN POTASSIUM 100 MG/1
100 TABLET ORAL
Status: DISCONTINUED | OUTPATIENT
Start: 2024-12-21 | End: 2024-12-23 | Stop reason: HOSPADM

## 2024-12-21 RX ORDER — ENOXAPARIN SODIUM 100 MG/ML
40 INJECTION SUBCUTANEOUS EVERY 12 HOURS
Status: DISCONTINUED | OUTPATIENT
Start: 2024-12-21 | End: 2024-12-23 | Stop reason: HOSPADM

## 2024-12-21 RX ORDER — SODIUM CHLORIDE 0.9 % (FLUSH) 0.9 %
10 SYRINGE (ML) INJECTION AS NEEDED
Status: DISCONTINUED | OUTPATIENT
Start: 2024-12-21 | End: 2024-12-23 | Stop reason: HOSPADM

## 2024-12-21 RX ORDER — BUDESONIDE 0.5 MG/2ML
0.5 INHALANT ORAL
Status: DISCONTINUED | OUTPATIENT
Start: 2024-12-21 | End: 2024-12-23 | Stop reason: HOSPADM

## 2024-12-21 RX ORDER — LOPERAMIDE HYDROCHLORIDE 2 MG/1
2 CAPSULE ORAL
Status: DISCONTINUED | OUTPATIENT
Start: 2024-12-21 | End: 2024-12-23 | Stop reason: HOSPADM

## 2024-12-21 RX ORDER — DEXAMETHASONE SODIUM PHOSPHATE 10 MG/ML
6 INJECTION INTRAMUSCULAR; INTRAVENOUS DAILY
Status: DISCONTINUED | OUTPATIENT
Start: 2024-12-21 | End: 2024-12-23 | Stop reason: HOSPADM

## 2024-12-21 RX ORDER — TRAMADOL HYDROCHLORIDE 50 MG/1
50 TABLET ORAL 2 TIMES DAILY PRN
Status: DISCONTINUED | OUTPATIENT
Start: 2024-12-21 | End: 2024-12-23 | Stop reason: HOSPADM

## 2024-12-21 RX ORDER — ATORVASTATIN CALCIUM 80 MG/1
80 TABLET, FILM COATED ORAL NIGHTLY
Status: DISCONTINUED | OUTPATIENT
Start: 2024-12-21 | End: 2024-12-23 | Stop reason: HOSPADM

## 2024-12-21 RX ORDER — ONDANSETRON 4 MG/1
4 TABLET, ORALLY DISINTEGRATING ORAL EVERY 6 HOURS PRN
Status: DISCONTINUED | OUTPATIENT
Start: 2024-12-21 | End: 2024-12-23 | Stop reason: HOSPADM

## 2024-12-21 RX ORDER — ACETAMINOPHEN 500 MG
1000 TABLET ORAL ONCE
Status: COMPLETED | OUTPATIENT
Start: 2024-12-21 | End: 2024-12-21

## 2024-12-21 RX ORDER — DEXTROSE MONOHYDRATE 25 G/50ML
25 INJECTION, SOLUTION INTRAVENOUS
Status: DISCONTINUED | OUTPATIENT
Start: 2024-12-21 | End: 2024-12-23 | Stop reason: HOSPADM

## 2024-12-21 RX ORDER — CHOLECALCIFEROL (VITAMIN D3) 25 MCG
2000 TABLET ORAL DAILY
Status: DISCONTINUED | OUTPATIENT
Start: 2024-12-22 | End: 2024-12-23 | Stop reason: HOSPADM

## 2024-12-21 RX ORDER — ACETAMINOPHEN 160 MG/5ML
650 SOLUTION ORAL EVERY 4 HOURS PRN
Status: DISCONTINUED | OUTPATIENT
Start: 2024-12-21 | End: 2024-12-23 | Stop reason: HOSPADM

## 2024-12-21 RX ORDER — ONDANSETRON 2 MG/ML
4 INJECTION INTRAMUSCULAR; INTRAVENOUS EVERY 6 HOURS PRN
Status: DISCONTINUED | OUTPATIENT
Start: 2024-12-21 | End: 2024-12-23 | Stop reason: HOSPADM

## 2024-12-21 RX ORDER — ASPIRIN 81 MG/1
81 TABLET ORAL DAILY
Status: DISCONTINUED | OUTPATIENT
Start: 2024-12-22 | End: 2024-12-23 | Stop reason: HOSPADM

## 2024-12-21 RX ORDER — ALBUTEROL SULFATE 0.63 MG/3ML
0.63 SOLUTION RESPIRATORY (INHALATION) EVERY 6 HOURS PRN
Status: DISCONTINUED | OUTPATIENT
Start: 2024-12-21 | End: 2024-12-23 | Stop reason: HOSPADM

## 2024-12-21 RX ORDER — NICOTINE POLACRILEX 4 MG
15 LOZENGE BUCCAL
Status: DISCONTINUED | OUTPATIENT
Start: 2024-12-21 | End: 2024-12-23 | Stop reason: HOSPADM

## 2024-12-21 RX ADMIN — ACETAMINOPHEN 1000 MG: 500 TABLET, FILM COATED ORAL at 12:01

## 2024-12-21 RX ADMIN — IPRATROPIUM BROMIDE AND ALBUTEROL SULFATE 3 ML: 2.5; .5 SOLUTION RESPIRATORY (INHALATION) at 20:28

## 2024-12-21 RX ADMIN — DEXAMETHASONE 6 MG: 4 TABLET ORAL at 17:28

## 2024-12-21 RX ADMIN — FAMOTIDINE 20 MG: 20 TABLET, FILM COATED ORAL at 17:29

## 2024-12-21 RX ADMIN — Medication 10 ML: at 20:47

## 2024-12-21 RX ADMIN — REMDESIVIR 200 MG: 100 INJECTION, POWDER, LYOPHILIZED, FOR SOLUTION INTRAVENOUS at 20:47

## 2024-12-21 RX ADMIN — SODIUM CHLORIDE 500 ML: 9 INJECTION, SOLUTION INTRAVENOUS at 11:55

## 2024-12-21 RX ADMIN — ENOXAPARIN SODIUM 40 MG: 100 INJECTION SUBCUTANEOUS at 17:28

## 2024-12-21 RX ADMIN — LOSARTAN POTASSIUM 100 MG: 100 TABLET, FILM COATED ORAL at 17:29

## 2024-12-21 RX ADMIN — IPRATROPIUM BROMIDE AND ALBUTEROL SULFATE 3 ML: 2.5; .5 SOLUTION RESPIRATORY (INHALATION) at 16:12

## 2024-12-21 RX ADMIN — KETOROLAC TROMETHAMINE 30 MG: 30 INJECTION, SOLUTION INTRAMUSCULAR at 11:57

## 2024-12-21 RX ADMIN — ATORVASTATIN CALCIUM 80 MG: 80 TABLET, FILM COATED ORAL at 20:46

## 2024-12-21 RX ADMIN — BUDESONIDE 0.5 MG: 0.5 INHALANT RESPIRATORY (INHALATION) at 20:28

## 2024-12-21 NOTE — PLAN OF CARE
Goal Outcome Evaluation:              Outcome Evaluation: Pt A&Ox4 on 1LNC. Pt resting in bed with call light within reach. Bed alarm on. Vitals obtained and pt placed on tele.

## 2024-12-21 NOTE — H&P
HISTORY AND PHYSICAL   [facility]        Patient Identification:  Name: Pedrito Powell  Age: 64 y.o.  Sex: female  :  1960  MRN: [id number]                     Primary Care Physician: Teo Fraser MD    Chief Complaint: Shortness of air, malaise.    History of Present Illness:   Pleasant 64-year-old female with multiple comorbidities including asthma, super morbid obesity complains of severe generalized malaise yesterday associated with a dull global headache, runny nose and bouts of shortness of air.  She notes generalized weakness and at 1 point feeling dizzy.  She did a home test for COVID-19 and was positive.  At present her major issue is still with significant generalized malaise, fatigue.  States she feels better since presentation to the emergency room.  She was reported hypoxic shortly after presentation and has been on 2 L nasal cannula since.    Past Medical History:  Past Medical History:   Diagnosis Date    Anemia     IRON INFUSION RECENTLY    Arthritis     Asthma     Chronic kidney disease     stage 3    Elevated cholesterol     GERD (gastroesophageal reflux disease)     High risk medication use 2018    History of carpal tunnel syndrome     Hyperlipidemia     Hypertension     Intractable vomiting with nausea 2018    Left shoulder pain     Limited mobility     Vertigo     Weakness     AT TIMES LEFT SHOULDER/LEFT ARM     Past Surgical History:  Past Surgical History:   Procedure Laterality Date    CARPAL TUNNEL RELEASE Left 10/02/2023     SECTION  , ,     COLONOSCOPY N/A 12/10/2018    normal ileum, IH, hyperplastic polyp, otherwise normal exam    ENDOSCOPY N/A 12/10/2018    no gross lesions in esophagus or examined duodenum, erythematous mucosa in stomach, biopsies benign    HYSTERECTOMY      OOPHORECTOMY Bilateral     SHOULDER MANIPULATION Left     TOTAL SHOULDER ARTHROPLASTY Left 2021    Procedure: REVERSE TOTAL SHOULDER  ARTHROPLASTY,  BICEP TENDONDESIS;  Surgeon: Bethel Devi MD;  Location: [facility] OR;  Service: Orthopedics;  Laterality: Left;    TRIGGER FINGER RELEASE Left     3 fingers      Home Meds:  (Not in a hospital admission)      Allergies:  Allergies   Allergen Reactions    Cashew Nut Oil Anaphylaxis    Chocolate Anaphylaxis    Nickel Anaphylaxis    Nuts Anaphylaxis    Adhesive Tape Unknown - Low Severity    [place] Oil Other (See Comments)    Baclofen Other (See Comments)    Chlorhexidine Unknown - Low Severity    Ciprofloxacin Other (See Comments)     THRUSH PER PATIENT    Citric Acid Unknown (See Comments)     Unsure      Covid-19 (Mrna) Vaccine Other (See Comments)     INSTRUCTED PER ALLERGIST SHE CAN NOT TAKE D/T ONE OF THE PRESERVATIVES    INSTRUCTED PER ALLERGIST SHE CAN NOT TAKE D/T ONE OF THE PRESERVATIVES      *is able to & has had the PFIZER vaccine*    Egg-Derived Products Nausea And Vomiting    Influenza Vaccines Nausea And Vomiting    Methyldibromoglutaronitrile Unknown (See Comments)      PATIENT UNSURE OF REACTION.    Naproxen Other (See Comments)    Neomycin Unknown (See Comments)      PATIENT UNSURE OF REACTION.    Omnicef [Cefdinir] Other (See Comments)     THRUSH PER PATIENT    Palladium Chloride Unknown (See Comments)      PATIENT UNSURE OF REACTION    Penicillins Other (See Comments)     THRUSH PER PATIENT    Pineapple Extract Hives    Sulfa Antibiotics Other (See Comments)     THRUSH PER PATIENT    Yeast-Derived Drug Products Hives    Gold-Containing Drug Products Rash          Latex Rash     Immunizations:  Immunization History   Administered Date(s) Administered    COVID-19 (PFIZER) Purple Cap Monovalent 07/14/2021, 07/28/2021, 08/21/2021, 01/01/2022, 02/11/2022, 02/16/2022    Covid-19 (Pfizer) Gray Cap Monovalent 02/11/2022    Hepatitis A 04/25/2018    Tdap 04/03/2018, 09/18/2023     Social History:   Social History     Social History Narrative    Not on file     Social History      Tobacco Use    Smoking status: Never     Passive exposure: Never    Smokeless tobacco: Never   Substance Use Topics    Alcohol use: Yes     Alcohol/week: 1.0 standard drink of alcohol     Types: 1 Glasses of wine per week     Comment: WEEKLY     Family History:  Family History   Problem Relation Age of Onset    Asthma Mother     Thyroid disease Father     Diabetes Maternal Grandmother     Breast cancer Maternal Aunt     Colon cancer Maternal Grandfather     Malig Hyperthermia Neg Hx         Review of Systems  Review of Systems   Constitutional:         As per history of present illness.   HENT:          As per history present illness.   Eyes: Negative.    Respiratory:          As per history of present illness.   Cardiovascular: Negative.    Gastrointestinal: Negative.    Endocrine: Negative.    Genitourinary: Negative.    Musculoskeletal: Negative.    Skin: Negative.    Allergic/Immunologic: Negative.    Neurological:         As per history of present illness.   Hematological: Negative.    Psychiatric/Behavioral: Negative.         Objective:  T Max 24 hrs: Temp (24hrs), Av.3 °F (36.3 °C), Min:97.3 °F (36.3 °C), Max:97.3 °F (36.3 °C)    Vitals Ranges:   Temp:  [97.3 °F (36.3 °C)] 97.3 °F (36.3 °C)  Heart Rate:  [76-88] 88  Resp:  [16] 16  BP: (126-184)/() 171/94      Exam:  Physical Exam  Constitutional:       General: She is not in acute distress.     Appearance: Normal appearance. She is obese. She is not ill-appearing, toxic-appearing or diaphoretic.   HENT:      Head: Normocephalic and atraumatic.      Right Ear: External ear normal.      Left Ear: External ear normal.      Nose: Nose normal.      Mouth/Throat:      Mouth: Mucous membranes are moist.   Eyes:      General: No scleral icterus.        Right eye: No discharge.         Left eye: No discharge.      Extraocular Movements: Extraocular movements intact.      Conjunctiva/sclera: Conjunctivae normal.   Cardiovascular:      Rate and Rhythm:  Normal rate and regular rhythm.      Heart sounds:      No friction rub. No gallop.   Pulmonary:      Effort: Pulmonary effort is normal. No respiratory distress.      Breath sounds: No stridor. Rhonchi present. No wheezing or rales.      Comments: Occasional rhonchi.  Inspiratory and expiratory ratio appears normal.  Chest:      Chest wall: No tenderness.   Abdominal:      General: Abdomen is flat. Bowel sounds are normal. There is no distension.      Palpations: Abdomen is soft. There is no mass.      Tenderness: There is no abdominal tenderness. There is no guarding or rebound.   Musculoskeletal:      Cervical back: Neck supple.      Right lower leg: No edema.      Left lower leg: No edema.   Skin:     General: Skin is warm and dry.   Neurological:      General: No focal deficit present.      Mental Status: She is alert and oriented to person, place, and time.   Psychiatric:         Mood and Affect: Mood normal.         Behavior: Behavior normal.         Thought Content: Thought content normal.         Judgment: Judgment normal.         Data Review:  All labs and radiology reviewed.    Assessment:  COVID-19: Noting her significant risk factors and reported hypoxia discussed remdesivir.  She desires to proceed with this treatment.  Further evaluation with respiratory panel.  Asthma: Presently does not appear to be in any form exacerbation.  Continue home aerosol treatments.  Decadron.  Hypoxia: See above.  Hypertension: Continue home meds.  Monitor.  Hyperglycemia: Monitor closely.  Exacerbation with steroids.  Anemia: Stable.  Recent evaluation consistent with anemia of chronic disease.  Severe morbid obesity: Significant impact from present acute illness.  Encourage weight loss.  Mild elevation troponin: No chest pain.  EKG nonacute.  Monitor.  Allergies: Extensive list of reported allergies.  Monitor.      Plan:  Please see above.  Discussed with patient and daughter at bedside.  Discussed with ER  provider.    Berny Allen MD  12/21/2024  15:24 EST    EMR Dragon/Transcription disclaimer:   Much of this encounter note is an electronic transcription/translation of spoken language to printed text. The electronic translation of spoken language may permit erroneous, or at times, nonsensical words or phrases to be inadvertently transcribed; Although I have reviewed the note for such errors, some may still exist.

## 2024-12-21 NOTE — PROGRESS NOTES
[facility]  Clinical Pharmacy Department     Remdesivir Review Note  Does patient qualify for nirmatrelvir/ritonavir (Paxlovid)?: no   If no, please check one of the following rationale(s):   [] Patient is outside 5 days from symptom onset window   [] Patient has a drug-drug interaction that cannot be managed while admitted (I.e. can't dose adjust or hold during Paxlovid therapy)  [x] Patient qualifies for a 5-day treatment course of Remdesivir (requires supplemental oxygen or SpO2 <= 94% on room air)      Pedrito Powell is a 64 y.o. female with confirmed COVID-19 infection on day 1 of hospitalization.      Consulting Provider:  Dr. Allen  Date of Confirmed SARS-CoV-2: 12/21/24  Date of Symptom Onset: 12/20/24  Other Antimicrobials: none        Allergies  Allergies as of 12/21/2024 - Reviewed 12/21/2024   Allergen Reaction Noted    Cashew nut oil Anaphylaxis 12/07/2018    Chocolate Anaphylaxis 12/07/2018    Nickel Anaphylaxis 12/07/2018    Nuts Anaphylaxis 12/07/2018    Adhesive tape Unknown - Low Severity 05/13/2021    [place] oil Other (See Comments) 10/14/2020    Baclofen Other (See Comments) 11/22/2016    Chlorhexidine Unknown - Low Severity 05/13/2021    Ciprofloxacin Other (See Comments) 09/06/2016    Citric acid Unknown (See Comments) 12/07/2018    Covid-19 (mrna) vaccine Other (See Comments) 05/03/2021    Egg-derived products Nausea And Vomiting 07/15/2020    Influenza vaccines Nausea And Vomiting 12/07/2018    Methyldibromoglutaronitrile Unknown (See Comments) 12/07/2018    Naproxen Other (See Comments) 03/04/2022    Neomycin Unknown (See Comments) 12/07/2018    Omnicef [cefdinir] Other (See Comments) 12/07/2018    Palladium chloride Unknown (See Comments) 12/07/2018    Penicillins Other (See Comments) 09/06/2016    Pineapple extract Hives 10/14/2020    Sulfa antibiotics Other (See Comments) 09/06/2016    Yeast-derived drug products Hives 12/07/2018    Gold-containing drug products Rash 12/07/2018  "   Latex Rash 08/02/2016       Microbiology:  Microbiology Results (last 10 days)       ** No results found for the last 240 hours. **            Vitals/Labs/I&O  Visit Vitals  /85 (BP Location: Right arm, Patient Position: Lying)   Pulse 83   Temp 98.1 °F (36.7 °C) (Oral)   Resp 16   Ht 149.9 cm (59\")   Wt 104 kg (230 lb)   SpO2 98%   BMI 46.45 kg/m²       Results from last 7 days   Lab Units 12/21/24  1131   WBC 10*3/mm3 7.45         Results from last 7 days   Lab Units 12/21/24  1131   AST (SGOT) U/L 19      Results from last 7 days   Lab Units 12/21/24  1131   ALT (SGPT) U/L 22       Estimated Creatinine Clearance: 73.1 mL/min (by C-G formula based on SCr of 0.87 mg/dL).  Results from last 7 days   Lab Units 12/21/24  1131   BUN mg/dL 13   CREATININE mg/dL 0.87     Intake & Output (last 3 days)         12/18 0701  12/19 0700 12/19 0701  12/20 0700 12/20 0701  12/21 0700 12/21 0701  12/22 0700    IV Piggyback    500    Total Intake(mL/kg)    500 (4.8)    Net    +500                    Assessment/Plan:    Patient meets the following inclusion/exclusion criteria:  Patient is hospitalized with confirmed COVID-19 infection (and accompanying symptoms)  Patient meets one of the two below criteria:  Patient is requiring an increase in baseline supplemental oxygen requirements OR SpO2 </= 94% on room air secondary to COVID-19 infection OR  Patient is symptomatic but not requiring supplemental oxygen or an increase in baseline oxygen AND has at least one of the below high risk criteria for progression to severe illness. High risk criteria:  Age >/= 65  Cancer  Cardiovascular diseases (HF, CAD, or cardiomyopathies)  CKD  Chronic lung disease (COPD, CF, interstitial lung disease, PE, pulmonary hypertension, bronchopulmonary dysplasia, bronchiectasis)  Diabetes mellitis (insulin dependent or poorly controlled)  Immunocompromising conditions or receipt of immunosuppressive medications  Obesity (BMI > 35 " kg/m2)  Pregnancy and recent pregnancy (within 7 days of delivery)  Sickle cell disease  Baseline and daily LFTs and Scr have been ordered prior to remdesivir initiation  ALT is not >= 10 times the upper limit of normal  Patient is not on concomitant hydroxychloroquine or chloroquine  Patient does not require invasive mechanical ventilation (IMV) or ECMO  Patient is within 10 days from symptom onset (for criteria 2a) or within 7 days of symptom onset (for criteria 2b)    Remdesivir 200 mg IV x 1 dose, followed by 100 mg IV q24h for 5 days or until hospital discharge (whichever comes first) has been ordered.    Thank you for involving pharmacy in this patient's care. Please contact pharmacy with any questions or concerns.                           Sejal Dewey, PharmD  Clinical Pharmacist

## 2024-12-21 NOTE — PROGRESS NOTES
"[facility] Clinical Pharmacy Services: Enoxaparin Consult    Pedrito Powell has a pharmacy consult to dose prophylactic enoxaparin per Dr. Allen's request.     Indication: VTE Prophylaxis  Home Anticoagulation: none     Relevant clinical data and objective history reviewed:  64 y.o. female 149.9 cm (59\") 104 kg (230 lb)   Body mass index is 46.45 kg/m².   Results from last 7 days   Lab Units 12/21/24  1131   PLATELETS 10*3/mm3 204     Estimated Creatinine Clearance: 73.1 mL/min (by C-G formula based on SCr of 0.87 mg/dL).    Assessment/Plan    Will start patient on 40mg subcutaneous every 12 hours, adjusted for renal function and weight. Consult order will be discontinued but pharmacy will continue to follow.     Sejal Dewey, PharmD  Clinical Pharmacist    "

## 2024-12-21 NOTE — ED NOTES
"Nursing report ED to floor  Pedrito Powell  64 y.o.  female    HPI :  HPI  Stated Reason for Visit: c/o covid positive yesterday and now feels weak    Chief Complaint  Chief Complaint   Patient presents with    Weakness - Generalized       Admitting doctor:   Berny Allen MD    Admitting diagnosis:   The primary encounter diagnosis was COVID-19. Diagnoses of Hypoxia and Elevated troponin were also pertinent to this visit.    Code status:   Current Code Status       Date Active Code Status Order ID Comments User Context       Prior            Allergies:   Cashew nut oil, Chocolate, Nickel, Nuts, Adhesive tape, [place] oil, Baclofen, Chlorhexidine, Ciprofloxacin, Citric acid, Covid-19 (mrna) vaccine, Egg-derived products, Influenza vaccines, Methyldibromoglutaronitrile, Naproxen, Neomycin, Omnicef [cefdinir], Palladium chloride, Penicillins, Pineapple extract, Sulfa antibiotics, Yeast-derived drug products, Gold-containing drug products, and Latex    Isolation:   Enhanced Droplet/Contact     Intake and Output  No intake or output data in the 24 hours ending 12/21/24 1412    Weight:       12/21/24  1100   Weight: 104 kg (230 lb)       Most recent vitals:   Vitals:    12/21/24 1100 12/21/24 1201 12/21/24 1301 12/21/24 1337   BP:  (!) 184/113 171/94    BP Location:       Patient Position:       Pulse:  76 84 88   Resp:       Temp:       TempSrc:       SpO2:  91% 90% (!) 89%   Weight: 104 kg (230 lb)      Height: 149.9 cm (59\")          Active LDAs/IV Access:   Lines, Drains & Airways       Active LDAs       Name Placement date Placement time Site Days    Peripheral IV 12/21/24 1154 Left Antecubital 12/21/24  1154  Antecubital  less than 1                    Labs (abnormal labs have a star):   Labs Reviewed   COMPREHENSIVE METABOLIC PANEL - Abnormal; Notable for the following components:       Result Value    Glucose 103 (*)     Calcium 8.4 (*)     All other components within normal limits    Narrative:     GFR " Categories in Chronic Kidney Disease (CKD)      GFR Category          GFR (mL/min/1.73)    Interpretation  G1                     90 or greater         Normal or high (1)  G2                      60-89                Mild decrease (1)  G3a                   45-59                Mild to moderate decrease  G3b                   30-44                Moderate to severe decrease  G4                    15-29                Severe decrease  G5                    14 or less           Kidney failure          (1)In the absence of evidence of kidney disease, neither GFR category G1 or G2 fulfill the criteria for CKD.    eGFR calculation 2021 CKD-EPI creatinine equation, which does not include race as a factor   TROPONIN - Abnormal; Notable for the following components:    HS Troponin T 16 (*)     All other components within normal limits    Narrative:     High Sensitive Troponin T Reference Range:  <14.0 ng/L- Negative Female for AMI  <22.0 ng/L- Negative Male for AMI  >=14 - Abnormal Female indicating possible myocardial injury.  >=22 - Abnormal Male indicating possible myocardial injury.   Clinicians would have to utilize clinical acumen, EKG, Troponin, and serial changes to determine if it is an Acute Myocardial Infarction or myocardial injury due to an underlying chronic condition.        CBC WITH AUTO DIFFERENTIAL - Abnormal; Notable for the following components:    RBC 3.38 (*)     Hemoglobin 10.4 (*)     Hematocrit 31.4 (*)     RDW 12.0 (*)     Neutrophil % 81.5 (*)     Lymphocyte % 6.7 (*)     Lymphocytes, Absolute 0.50 (*)     All other components within normal limits   HIGH SENSITIVITIY TROPONIN T 1HR - Abnormal; Notable for the following components:    HS Troponin T 20 (*)     Troponin T Numeric Delta 4 (*)     Troponin T % Delta 25 (*)     All other components within normal limits    Narrative:     High Sensitive Troponin T Reference Range:  <14.0 ng/L- Negative Female for AMI  <22.0 ng/L- Negative Male for  AMI  >=14 - Abnormal Female indicating possible myocardial injury.  >=22 - Abnormal Male indicating possible myocardial injury.   Clinicians would have to utilize clinical acumen, EKG, Troponin, and serial changes to determine if it is an Acute Myocardial Infarction or myocardial injury due to an underlying chronic condition.        MAGNESIUM - Normal   CBC AND DIFFERENTIAL    Narrative:     The following orders were created for panel order CBC & Differential.  Procedure                               Abnormality         Status                     ---------                               -----------         ------                     CBC Auto Differential[[id number]]        Abnormal            Final result                 Please view results for these tests on the individual orders.       EKG:   ECG 12 Lead Electrolyte Imbalance   Preliminary Result   HEART RATE=72  bpm   RR Kylwpchw=268  ms   ID Bfsknbcb=224  ms   P Horizontal Axis=5  deg   P Front Axis=58  deg   QRSD Interval=88  ms   QT Wfjvpybk=567  ms   FYpJ=147  ms   QRS Axis=13  deg   T Wave Axis=16  deg   - BORDERLINE ECG -   Sinus rhythm   Probable lateral infarct, old   Baseline wander in lead(s) V1   Date and Time of Study:2024-12-21 11:33:44          Meds given in ED:   Medications   ketorolac (TORADOL) injection 30 mg (30 mg Intramuscular Given 12/21/24 1157)   acetaminophen (TYLENOL) tablet 1,000 mg (1,000 mg Oral Given 12/21/24 1201)   sodium chloride 0.9 % bolus 500 mL (500 mL Intravenous New Bag 12/21/24 1155)       Imaging results:  XR Chest 1 View    Result Date: 12/21/2024  Vascular crowding at the bases with low lung volumes. Deep inspiration would better evaluate the lung bases. No definite acute findings.  This report was finalized on 12/21/2024 11:38 AM by Dr. Aron Germain M.D on Workstation: [id number]       Ambulatory status:   - assist x1     Social issues:   Social History     Socioeconomic History    Marital status:     Number  of children: 3    Years of education: College   Tobacco Use    Smoking status: Never     Passive exposure: Never    Smokeless tobacco: Never   Vaping Use    Vaping status: Never Used   Substance and Sexual Activity    Alcohol use: Yes     Alcohol/week: 1.0 standard drink of alcohol     Types: 1 Glasses of wine per week     Comment: WEEKLY    Drug use: No    Sexual activity: Defer       Peripheral Neurovascular  Peripheral Neurovascular (Adult)  Peripheral Neurovascular WDL: .WDL except, neurovascular assessment upper, neurovascular assessment lower  LUE Neurovascular Assessment  Temperature LUE: warm  Color LUE: no discoloration  Sensation LUE: numbness present, tingling present  RUE Neurovascular Assessment  Temperature RUE: warm  Color RUE: no discoloration  Sensation RUE: numbness present, tingling present  LLE Neurovascular Assessment  Temperature LLE: warm  Color LLE: no discoloration  Sensation LLE: numbness present, tingling present  RLE Neurovascular Assessment  Temperature RLE: warm  Color RLE: no discoloration  Sensation RLE: numbness present, tingling present    Neuro Cognitive  Neuro Cognitive (Adult)  Cognitive/Neuro/Behavioral WDL: WDL    Learning  Learning Assessment  Learning Readiness and Ability: no barriers identified    Respiratory  Respiratory WDL  Respiratory WDL: .WDL except, all  Rhythm/Pattern, Respiratory: shortness of breath    Abdominal Pain       Pain Assessments  Pain (Adult)  (0-10) Pain Rating: Rest: 8  (0-10) Pain Rating: Activity: 8  Pain Location: other (see comments) (BODYWIDE PAIN)  Pain Description: aching    NIH Stroke Scale       Omer Hall RN  12/21/24 14:12 EST

## 2024-12-21 NOTE — ED PROVIDER NOTES
EMERGENCY DEPARTMENT ENCOUNTER  Room Number:  22/22  PCP: Teo Fraser MD  Independent Historians: Patient      HPI:  Chief Complaint: had concerns including Weakness - Generalized.       Context: Pedrito Powell is a 64 y.o. female with a medical history of HTN, HLD, anxiety, asthma who presents to the ED c/o acute fatigue, shortness of breath, headache, generalized weakness.  Patient began feeling unwell yesterday and took a home COVID test that was positive.  Patient states since that time she is developed dizziness, weakness, headache.  Patient has not taken any over-the-counter medications to help with symptoms.  Patient states she did get the vaccines but still feels very unwell.      Review of prior external notes (non-ED) -and- Review of prior external test results outside of this encounter: Office visit with family medicine from 12/3/2024 reviewed and notable for history of HTN, HLD, depression, anxiety.  Plan was to continue on current medication regimen and follow-up as needed.    Prescription drug monitoring program review:         PAST MEDICAL HISTORY  Active Ambulatory Problems     Diagnosis Date Noted    Hx of anaphylaxis 01/19/2018    Primary osteoarthritis of knee 01/19/2018    High risk medication use 01/19/2018    Hypertension 04/03/2018    Hyperlipidemia 04/03/2018    Vertigo 05/02/2018    Infected blister of left foot 05/02/2018    Ganglion of right wrist 11/27/2018    Vomiting and diarrhea 12/07/2018    Intractable vomiting with nausea 12/07/2018    Fatigue 02/01/2019    Acute gastritis without hemorrhage 02/01/2019    Anemia 02/01/2019    Elevated serum creatinine 03/06/2019    Fungal rash of torso 10/28/2019    Cellulitis of abdominal wall 10/28/2019    Bronchitis 10/28/2019    Arthritis of shoulder 02/26/2021    Chronic renal failure in pediatric patient, stage 3 (moderate) 03/11/2021    H/O shoulder surgery 05/06/2021    Anxiety 11/08/2021    Depression 11/08/2021    Iron deficiency  Patient discussed in OFTs, has has not had any fever and is improving. Plan is to d/c Friday. MD to contact ID.    11/15/2023    Dizziness 2024     Resolved Ambulatory Problems     Diagnosis Date Noted    No Resolved Ambulatory Problems     Past Medical History:   Diagnosis Date    Arthritis     Asthma     Chronic kidney disease     Elevated cholesterol     GERD (gastroesophageal reflux disease)     History of carpal tunnel syndrome     Left shoulder pain     Limited mobility     Weakness          PAST SURGICAL HISTORY  Past Surgical History:   Procedure Laterality Date    CARPAL TUNNEL RELEASE Left 10/02/2023     SECTION  , ,     COLONOSCOPY N/A 12/10/2018    normal ileum, IH, hyperplastic polyp, otherwise normal exam    ENDOSCOPY N/A 12/10/2018    no gross lesions in esophagus or examined duodenum, erythematous mucosa in stomach, biopsies benign    HYSTERECTOMY      OOPHORECTOMY Bilateral     SHOULDER MANIPULATION Left     TOTAL SHOULDER ARTHROPLASTY Left 2021    Procedure: REVERSE TOTAL SHOULDER ARTHROPLASTY,  BICEP TENDONDESIS;  Surgeon: Bethel Devi MD;  Location: [facility];  Service: Orthopedics;  Laterality: Left;    TRIGGER FINGER RELEASE Left     3 fingers         FAMILY HISTORY  Family History   Problem Relation Age of Onset    Asthma Mother     Thyroid disease Father     Diabetes Maternal Grandmother     Breast cancer Maternal Aunt     Colon cancer Maternal Grandfather     Malig Hyperthermia Neg Hx          SOCIAL HISTORY  Social History     Socioeconomic History    Marital status:     Number of children: 3    Years of education: College   Tobacco Use    Smoking status: Never     Passive exposure: Never    Smokeless tobacco: Never   Vaping Use    Vaping status: Never Used   Substance and Sexual Activity    Alcohol use: Yes     Alcohol/week: 1.0 standard drink of alcohol     Types: 1 Glasses of wine per week     Comment: WEEKLY    Drug use: No    Sexual activity: Defer         ALLERGIES  Cashew nut oil, Chocolate, Nickel, Nuts, Adhesive tape, [place] oil, Baclofen,  Chlorhexidine, Ciprofloxacin, Citric acid, Covid-19 (mrna) vaccine, Egg-derived products, Influenza vaccines, Methyldibromoglutaronitrile, Naproxen, Neomycin, Omnicef [cefdinir], Palladium chloride, Penicillins, Pineapple extract, Sulfa antibiotics, Yeast-derived drug products, Gold-containing drug products, and Latex      REVIEW OF SYSTEMS  Review of Systems  Included in HPI  All systems reviewed and negative except for those discussed in HPI.      PHYSICAL EXAM    I have reviewed the triage vital signs and nursing notes.    ED Triage Vitals [12/21/24 1035]   Temp Heart Rate Resp BP SpO2   97.3 °F (36.3 °C) 76 16 126/56 92 %      Temp src Heart Rate Source Patient Position BP Location FiO2 (%)   Tympanic -- Sitting Left arm --       Physical Exam  GENERAL: alert, no acute distress  SKIN: Warm, dry  HENT: Normocephalic, atraumatic  EYES: no scleral icterus  CV: regular rhythm, regular rate  RESPIRATORY: normal effort, lungs clear  ABDOMEN: soft, nontender, nondistended  MUSCULOSKELETAL: no deformity  NEURO: alert, moves all extremities, follows commands            LAB RESULTS  Recent Results (from the past 24 hours)   Comprehensive Metabolic Panel    Collection Time: 12/21/24 11:31 AM    Specimen: Arm, Right; Blood   Result Value Ref Range    Glucose 103 (H) 65 - 99 mg/dL    BUN 13 8 - 23 mg/dL    Creatinine 0.87 0.57 - 1.00 mg/dL    Sodium 136 136 - 145 mmol/L    Potassium 3.7 3.5 - 5.2 mmol/L    Chloride 104 98 - 107 mmol/L    CO2 22.0 22.0 - 29.0 mmol/L    Calcium 8.4 (L) 8.6 - 10.5 mg/dL    Total Protein 6.5 6.0 - 8.5 g/dL    Albumin 3.6 3.5 - 5.2 g/dL    ALT (SGPT) 22 1 - 33 U/L    AST (SGOT) 19 1 - 32 U/L    Alkaline Phosphatase 67 39 - 117 U/L    Total Bilirubin 0.4 0.0 - 1.2 mg/dL    Globulin 2.9 gm/dL    A/G Ratio 1.2 g/dL    BUN/Creatinine Ratio 14.9 7.0 - 25.0    Anion Gap 10.0 5.0 - 15.0 mmol/L    eGFR 74.5 >60.0 mL/min/1.73   Magnesium    Collection Time: 12/21/24 11:31 AM    Specimen: Arm, Right; Blood    Result Value Ref Range    Magnesium 1.7 1.6 - 2.4 mg/dL   High Sensitivity Troponin T    Collection Time: 12/21/24 11:31 AM    Specimen: Arm, Right; Blood   Result Value Ref Range    HS Troponin T 16 (H) <14 ng/L   CBC Auto Differential    Collection Time: 12/21/24 11:31 AM    Specimen: Arm, Right; Blood   Result Value Ref Range    WBC 7.45 3.40 - 10.80 10*3/mm3    RBC 3.38 (L) 3.77 - 5.28 10*6/mm3    Hemoglobin 10.4 (L) 12.0 - 15.9 g/dL    Hematocrit 31.4 (L) 34.0 - 46.6 %    MCV 92.9 79.0 - 97.0 fL    MCH 30.8 26.6 - 33.0 pg    MCHC 33.1 31.5 - 35.7 g/dL    RDW 12.0 (L) 12.3 - 15.4 %    RDW-SD 41.1 37.0 - 54.0 fl    MPV 9.1 6.0 - 12.0 fL    Platelets 204 140 - 450 10*3/mm3    Neutrophil % 81.5 (H) 42.7 - 76.0 %    Lymphocyte % 6.7 (L) 19.6 - 45.3 %    Monocyte % 10.1 5.0 - 12.0 %    Eosinophil % 0.8 0.3 - 6.2 %    Basophil % 0.5 0.0 - 1.5 %    Immature Grans % 0.4 0.0 - 0.5 %    Neutrophils, Absolute 6.07 1.70 - 7.00 10*3/mm3    Lymphocytes, Absolute 0.50 (L) 0.70 - 3.10 10*3/mm3    Monocytes, Absolute 0.75 0.10 - 0.90 10*3/mm3    Eosinophils, Absolute 0.06 0.00 - 0.40 10*3/mm3    Basophils, Absolute 0.04 0.00 - 0.20 10*3/mm3    Immature Grans, Absolute 0.03 0.00 - 0.05 10*3/mm3    nRBC 0.0 0.0 - 0.2 /100 WBC   ECG 12 Lead Electrolyte Imbalance    Collection Time: 12/21/24 11:33 AM   Result Value Ref Range    QT Interval 397 ms    QTC Interval 435 ms   High Sensitivity Troponin T 1Hr    Collection Time: 12/21/24 12:52 PM    Specimen: Arm, Left; Blood   Result Value Ref Range    HS Troponin T 20 (H) <14 ng/L    Troponin T Numeric Delta 4 (C) Abnormal if >/=3 ng/L    Troponin T % Delta 25 (C) Abnormal if >/= 20% %         RADIOLOGY  XR Chest 1 View    Result Date: 12/21/2024  XR CHEST 1 VW-   INDICATION: Weakness  COMPARISON: Chest radiograph December 12, 2022  TECHNIQUE: 1 view chest  FINDINGS:  Vascular congestion and crowding. No effusions. Low volumes. Stable mediastinum. Total left shoulder arthroplasty.  Right glenohumeral osteoarthritis.      Vascular crowding at the bases with low lung volumes. Deep inspiration would better evaluate the lung bases. No definite acute findings.  This report was finalized on 12/21/2024 11:38 AM by Dr. Aron Germain M.D on Workstation: [id number]         MEDICATIONS GIVEN IN ER  Medications   ketorolac (TORADOL) injection 30 mg (30 mg Intramuscular Given 12/21/24 1157)   acetaminophen (TYLENOL) tablet 1,000 mg (1,000 mg Oral Given 12/21/24 1201)   sodium chloride 0.9 % bolus 500 mL (500 mL Intravenous New Bag 12/21/24 1155)         ORDERS PLACED DURING THIS VISIT:  Orders Placed This Encounter   Procedures    XR Chest 1 View    Comprehensive Metabolic Panel    Magnesium    High Sensitivity Troponin T    CBC Auto Differential    High Sensitivity Troponin T 1Hr    [facility]HA (on-call MD unless specified) Details    ECG 12 Lead Electrolyte Imbalance    Initiate Observation Status    CBC & Differential         OUTPATIENT MEDICATION MANAGEMENT:  Current Facility-Administered Medications Ordered in Epic   Medication Dose Route Frequency Provider Last Rate Last Admin    Chlorhexidine Gluconate 2 % pads 1 each  1 each Apply externally Take As Directed Bethel Devi MD         Current Outpatient Medications Ordered in Epic   Medication Sig Dispense Refill    acetaminophen (TYLENOL) 500 MG tablet Take 1 tablet by mouth Every 6 (Six) Hours As Needed for Mild Pain. 30 tablet 0    albuterol 108 (90 Base) MCG/ACT inhaler Inhale 2 puffs Every 4 (Four) Hours As Needed for Wheezing.      Arnuity Ellipta 200 MCG/ACT Daily.      ASPIRIN 81 PO Take  by mouth.      atorvastatin (LIPITOR) 80 MG tablet Take 1 tablet by mouth Daily. 90 tablet 1    azelastine (ASTELIN) 0.1 % nasal spray Administer 1 spray into the nostril(s) as directed by provider Daily.  11    B Complex Vitamins (B COMPLEX PO) Take 1 capsule by mouth Daily. HOLD FOR SURGERY 1 WEEK      Cholecalciferol (Vitamin D3) 50 MCG (2000 UT) capsule  TAKE 1 CAPSULE BY MOUTH EVERY DAY 90 capsule 3    clobetasol (TEMOVATE) 0.05 % cream Apply 1 Application topically to the appropriate area as directed As Needed.      Diclofenac Sodium (VOLTAREN) 1 % gel gel Apply 4 g topically to the appropriate area as directed 4 (Four) Times a Day As Needed (foot pain). 50 g 0    EPINEPHrine (AUVI-Q IJ) Inject  as directed As Needed.      famotidine (PEPCID) 20 MG tablet Take 1 tablet by mouth.      hydrocortisone 2.5 % cream Apply  topically to the appropriate area as directed As Needed.      ipratropium (ATROVENT) 0.03 % nasal spray Administer 1 spray into the nostril(s) as directed by provider 3 (Three) Times a Day.      irbesartan (AVAPRO) 300 MG tablet Take 1 tablet by mouth Daily. 90 tablet 1    loperamide (IMODIUM) 2 MG capsule Take 1 capsule by mouth Every 2 (Two) Hours As Needed for Diarrhea (max 4 doses daily).      meclizine (ANTIVERT) 25 MG tablet TAKE 1 TABLET BY MOUTH THREE TIMES DAILY AS NEEDED FOR DIZZINESS 270 tablet 0    ondansetron (Zofran) 4 MG tablet Take 1 tablet by mouth Every 8 (Eight) Hours As Needed for Nausea or Vomiting. 42 tablet 4    traMADol (ULTRAM) 50 MG tablet Take 1 tablet by mouth 2 (Two) Times a Day As Needed for Moderate Pain. 180 tablet 1    triamcinolone (KENALOG) 0.1 % ointment Apply 1 Application topically to the appropriate area as directed As Needed.      Vortioxetine HBr (Trintellix) 10 MG tablet tablet Take 1 tablet by mouth Daily With Breakfast. 90 tablet 3         PROCEDURES  Procedures            PROGRESS, DATA ANALYSIS, CONSULTS, AND MEDICAL DECISION MAKING  All labs have been independently interpreted by me.  All radiology studies have been reviewed by me. All EKG's have been independently viewed and interpreted by me.  Discussion below represents my analysis of pertinent findings related to patient's condition, differential diagnosis, treatment plan and final disposition.    Differential diagnosis includes but is not limited to  pneumonia, electrolyte abnormality, viral URI, dehydration, KHUSHBU.    Clinical Scores:                                       ED Course as of 12/21/24 1402   Sat Dec 21, 2024   1135 Chest x-ray interpreted by me and demonstrates diffuse infiltrates [MW]   1136 EKG interpreted by me demonstrates sinus rhythm, rate of 72, no MN/QT prolongation, no ST elevation [MW]   1347 Patient noted to become hypoxic with SpO2 in the 80s.  Placed on 2 L O2 supplementation.  Patient additionally noted to have significant increase of high-sensitivity troponin from 16-20 within delta percentage of greater than 20%.  Will admit for further evaluation and management. [MW]   1401 Discussed with Dr. Allen of [facility] who agrees to admit [MW]      ED Course User Index  [MW] Cisco Dickerson MD             AS OF 14:02 EST VITALS:    BP - 171/94  HR - 88  TEMP - 97.3 °F (36.3 °C) (Tympanic)  O2 SATS - (!) 89%    COMPLEXITY OF CARE  The patient requires admission.      DIAGNOSIS  Final diagnoses:   COVID-19   Hypoxia   Elevated troponin         DISPOSITION  ED Disposition       ED Disposition   Decision to Admit    Condition   --    Comment   Level of Care: Telemetry [5]   Diagnosis: Hypoxia [313433]   Admitting Physician: EMANI ALLEN [[id number]]   Attending Physician: EMANI ALLEN [[id number]]   Is patient appropriate for Inpatient Observation Unit?: No [0]                  Please note that portions of this document were completed with a voice recognition program.    Note Disclaimer: At [facility], we believe that sharing information builds trust and better relationships. You are receiving this note because you recently visited [facility]. It is possible you will see health information before a provider has talked with you about it. This kind of information can be easy to misunderstand. To help you fully understand what it means for your health, we urge you to discuss this note with your provider.         Cisco Dickerson,  MD  12/21/24 8400

## 2024-12-22 LAB
ALBUMIN SERPL-MCNC: 3.4 G/DL (ref 3.5–5.2)
ALBUMIN/GLOB SERPL: 1.2 G/DL
ALP SERPL-CCNC: 61 U/L (ref 39–117)
ALT SERPL W P-5'-P-CCNC: 19 U/L (ref 1–33)
ANION GAP SERPL CALCULATED.3IONS-SCNC: 12 MMOL/L (ref 5–15)
AST SERPL-CCNC: 21 U/L (ref 1–32)
BILIRUB SERPL-MCNC: 0.3 MG/DL (ref 0–1.2)
BUN SERPL-MCNC: 17 MG/DL (ref 8–23)
BUN/CREAT SERPL: 19.3 (ref 7–25)
CALCIUM SPEC-SCNC: 8.2 MG/DL (ref 8.6–10.5)
CHLORIDE SERPL-SCNC: 105 MMOL/L (ref 98–107)
CO2 SERPL-SCNC: 21 MMOL/L (ref 22–29)
CREAT SERPL-MCNC: 0.88 MG/DL (ref 0.57–1)
EGFRCR SERPLBLD CKD-EPI 2021: 73.5 ML/MIN/1.73
GLOBULIN UR ELPH-MCNC: 2.9 GM/DL
GLUCOSE BLDC GLUCOMTR-MCNC: 138 MG/DL (ref 70–130)
GLUCOSE BLDC GLUCOMTR-MCNC: 167 MG/DL (ref 70–130)
GLUCOSE BLDC GLUCOMTR-MCNC: 187 MG/DL (ref 70–130)
GLUCOSE BLDC GLUCOMTR-MCNC: 190 MG/DL (ref 70–130)
GLUCOSE SERPL-MCNC: 157 MG/DL (ref 65–99)
HBA1C MFR BLD: 5.9 % (ref 4.8–5.6)
POTASSIUM SERPL-SCNC: 3.7 MMOL/L (ref 3.5–5.2)
PROT SERPL-MCNC: 6.3 G/DL (ref 6–8.5)
SODIUM SERPL-SCNC: 138 MMOL/L (ref 136–145)

## 2024-12-22 PROCEDURE — 25010000002 ENOXAPARIN PER 10 MG: Performed by: HOSPITALIST

## 2024-12-22 PROCEDURE — 82948 REAGENT STRIP/BLOOD GLUCOSE: CPT

## 2024-12-22 PROCEDURE — 94799 UNLISTED PULMONARY SVC/PX: CPT

## 2024-12-22 PROCEDURE — 94760 N-INVAS EAR/PLS OXIMETRY 1: CPT

## 2024-12-22 PROCEDURE — 83036 HEMOGLOBIN GLYCOSYLATED A1C: CPT | Performed by: HOSPITALIST

## 2024-12-22 PROCEDURE — 80053 COMPREHEN METABOLIC PANEL: CPT | Performed by: HOSPITALIST

## 2024-12-22 PROCEDURE — 94761 N-INVAS EAR/PLS OXIMETRY MLT: CPT

## 2024-12-22 PROCEDURE — 94664 DEMO&/EVAL PT USE INHALER: CPT

## 2024-12-22 PROCEDURE — 96372 THER/PROPH/DIAG INJ SC/IM: CPT

## 2024-12-22 PROCEDURE — 63710000001 DEXAMETHASONE PER 0.25 MG: Performed by: HOSPITALIST

## 2024-12-22 PROCEDURE — 63710000001 INSULIN LISPRO (HUMAN) PER 5 UNITS: Performed by: HOSPITALIST

## 2024-12-22 PROCEDURE — 25010000002 REMDESIVIR 100 MG/20ML SOLUTION 1 EACH VIAL: Performed by: HOSPITALIST

## 2024-12-22 PROCEDURE — G0378 HOSPITAL OBSERVATION PER HR: HCPCS

## 2024-12-22 PROCEDURE — 63710000001 ONDANSETRON ODT 4 MG TABLET DISPERSIBLE: Performed by: HOSPITALIST

## 2024-12-22 PROCEDURE — 25810000003 SODIUM CHLORIDE 0.9 % SOLUTION 250 ML FLEX CONT: Performed by: HOSPITALIST

## 2024-12-22 RX ORDER — BENZONATATE 100 MG/1
100 CAPSULE ORAL 3 TIMES DAILY PRN
Status: DISCONTINUED | OUTPATIENT
Start: 2024-12-22 | End: 2024-12-23 | Stop reason: HOSPADM

## 2024-12-22 RX ADMIN — DEXAMETHASONE 6 MG: 4 TABLET ORAL at 08:07

## 2024-12-22 RX ADMIN — IPRATROPIUM BROMIDE AND ALBUTEROL SULFATE 3 ML: 2.5; .5 SOLUTION RESPIRATORY (INHALATION) at 20:19

## 2024-12-22 RX ADMIN — ENOXAPARIN SODIUM 40 MG: 100 INJECTION SUBCUTANEOUS at 06:26

## 2024-12-22 RX ADMIN — Medication 2000 UNITS: at 08:07

## 2024-12-22 RX ADMIN — BUDESONIDE 0.5 MG: 0.5 INHALANT RESPIRATORY (INHALATION) at 06:47

## 2024-12-22 RX ADMIN — REMDESIVIR 100 MG: 100 INJECTION, POWDER, LYOPHILIZED, FOR SOLUTION INTRAVENOUS at 17:29

## 2024-12-22 RX ADMIN — ENOXAPARIN SODIUM 40 MG: 100 INJECTION SUBCUTANEOUS at 17:29

## 2024-12-22 RX ADMIN — LOSARTAN POTASSIUM 100 MG: 100 TABLET, FILM COATED ORAL at 08:08

## 2024-12-22 RX ADMIN — Medication 10 ML: at 20:13

## 2024-12-22 RX ADMIN — FAMOTIDINE 20 MG: 20 TABLET, FILM COATED ORAL at 17:30

## 2024-12-22 RX ADMIN — LOPERAMIDE HYDROCHLORIDE 2 MG: 2 CAPSULE ORAL at 08:07

## 2024-12-22 RX ADMIN — VORTIOXETINE 10 MG: 5 TABLET, FILM COATED ORAL at 08:07

## 2024-12-22 RX ADMIN — IPRATROPIUM BROMIDE AND ALBUTEROL SULFATE 3 ML: 2.5; .5 SOLUTION RESPIRATORY (INHALATION) at 14:45

## 2024-12-22 RX ADMIN — BENZONATATE 100 MG: 100 CAPSULE ORAL at 17:37

## 2024-12-22 RX ADMIN — FAMOTIDINE 20 MG: 20 TABLET, FILM COATED ORAL at 06:26

## 2024-12-22 RX ADMIN — Medication 10 ML: at 08:10

## 2024-12-22 RX ADMIN — ATORVASTATIN CALCIUM 80 MG: 80 TABLET, FILM COATED ORAL at 20:07

## 2024-12-22 RX ADMIN — ASPIRIN 81 MG: 81 TABLET, COATED ORAL at 08:07

## 2024-12-22 RX ADMIN — ONDANSETRON 4 MG: 4 TABLET, ORALLY DISINTEGRATING ORAL at 08:07

## 2024-12-22 RX ADMIN — BUDESONIDE 0.5 MG: 0.5 INHALANT RESPIRATORY (INHALATION) at 20:19

## 2024-12-22 RX ADMIN — IPRATROPIUM BROMIDE AND ALBUTEROL SULFATE 3 ML: 2.5; .5 SOLUTION RESPIRATORY (INHALATION) at 10:39

## 2024-12-22 RX ADMIN — INSULIN LISPRO 2 UNITS: 100 INJECTION, SOLUTION INTRAVENOUS; SUBCUTANEOUS at 20:13

## 2024-12-22 RX ADMIN — IPRATROPIUM BROMIDE AND ALBUTEROL SULFATE 3 ML: 2.5; .5 SOLUTION RESPIRATORY (INHALATION) at 06:44

## 2024-12-22 RX ADMIN — INSULIN LISPRO 2 UNITS: 100 INJECTION, SOLUTION INTRAVENOUS; SUBCUTANEOUS at 17:29

## 2024-12-22 NOTE — PLAN OF CARE
Goal Outcome Evaluation:              Outcome Evaluation: Pt A&Ox4 on RA at this time. Pt ad chante. Diarrhea and nausea in am. Resolved after medication administered. Resting in bed with call light within reach.

## 2024-12-22 NOTE — PROGRESS NOTES
Name: Pedrito Powell ADMIT: 2024   : 1960  PCP: Teo Fraser MD    MRN: [id number] LOS: 0 days   AGE/SEX: 64 y.o. female  ROOM: [facility]     Subjective   Subjective   Patient is lying on the bed and does not appear to be major distress.  She is complaining of cough today.  Respiratory's appears to be better today and she is feeling better as well.  Denies nausea, vomiting, headache, abdominal pain, diarrhea.       Objective   Objective   Vital Signs  Temp:  [98.1 °F (36.7 °C)-99.3 °F (37.4 °C)] 99.1 °F (37.3 °C)  Heart Rate:  [66-87] 85  Resp:  [14-18] 17  BP: (121-164)/(63-85) 135/76  SpO2:  [94 %-99 %] 94 %  on  Flow (L/min) (Oxygen Therapy):  [1-2] 1;   Device (Oxygen Therapy): room air  Body mass index is 46.45 kg/m².  Physical Exam  HEENT: PERRLA, extract movements intact, Scleras no icterus  Neck: Supple, no JVD  Cardiovascular: Regular rate and rhythm with normal S1 and S2  Respiratory: Fairly clear to auscultation bilaterally with no wheezes  GI: Soft, nontender, bowel sounds.  Extremities: No edema, palpable pedal pulses      Results Review     I reviewed the patient's new clinical results.  Results from last 7 days   Lab Units 24  1131   WBC 10*3/mm3 7.45   HEMOGLOBIN g/dL 10.4*   PLATELETS 10*3/mm3 204     Results from last 7 days   Lab Units 24  0751 24  1131   SODIUM mmol/L 138 136   POTASSIUM mmol/L 3.7 3.7   CHLORIDE mmol/L 105 104   CO2 mmol/L 21.0* 22.0   BUN mg/dL 17 13   CREATININE mg/dL 0.88 0.87   GLUCOSE mg/dL 157* 103*   EGFR mL/min/1.73 73.5 74.5     Results from last 7 days   Lab Units 24  0751 24  1131   ALBUMIN g/dL 3.4* 3.6   BILIRUBIN mg/dL 0.3 0.4   ALK PHOS U/L 61 67   AST (SGOT) U/L 21 19   ALT (SGPT) U/L 19 22     Results from last 7 days   Lab Units 24  0751 24  1131   CALCIUM mg/dL 8.2* 8.4*   ALBUMIN g/dL 3.4* 3.6   MAGNESIUM mg/dL  --  1.7     Results from last 7 days   Lab Units 24  1252   PROCALCITONIN ng/mL  0.04     Hemoglobin A1C   Date/Time Value Ref Range Status   12/22/2024 0751 5.90 (H) 4.80 - 5.60 % Final     Glucose   Date/Time Value Ref Range Status   12/22/2024 1125 167 (H) 70 - 130 mg/dL Final   12/22/2024 0610 138 (H) 70 - 130 mg/dL Final   12/21/2024 2024 126 70 - 130 mg/dL Final   12/21/2024 1646 101 70 - 130 mg/dL Final       XR Chest 1 View    Result Date: 12/21/2024  Vascular crowding at the bases with low lung volumes. Deep inspiration would better evaluate the lung bases. No definite acute findings.  This report was finalized on 12/21/2024 11:38 AM by Dr. Aron Germain M.D on Workstation: [id number]       I have personally reviewed all medications:  Scheduled Medications  aspirin, 81 mg, Oral, Daily  atorvastatin, 80 mg, Oral, Nightly  budesonide, 0.5 mg, Nebulization, BID - RT  cholecalciferol, 2,000 Units, Oral, Daily  dexAMETHasone, 6 mg, Oral, Daily   Or  dexAMETHasone, 6 mg, Intravenous, Daily  enoxaparin, 40 mg, Subcutaneous, Q12H  famotidine, 20 mg, Oral, BID AC  insulin lispro, 2-9 Units, Subcutaneous, 4x Daily AC & at Bedtime  ipratropium-albuterol, 3 mL, Nebulization, 4x Daily - RT  losartan, 100 mg, Oral, Q24H  remdesivir, 100 mg, Intravenous, Q24H  sodium chloride, 10 mL, Intravenous, Q12H  Vortioxetine HBr, 10 mg, Oral, Daily With Breakfast    Infusions  Pharmacy Consult - Remdesivir,     Diet  Diet: Regular/House, Cardiac, Diabetic; Healthy Heart (2-3 Na+); Consistent Carbohydrate; Fluid Consistency: Thin (IDDSI 0)    I have personally reviewed:  [x]  Laboratory   [x]  Microbiology   [x]  Radiology   [x]  EKG/Telemetry  [x]  Cardiology/Vascular   []  Pathology    []  Records       Assessment/Plan     Active Hospital Problems    Diagnosis  POA    **Hypoxia [R09.02]  Yes      Resolved Hospital Problems   No resolved problems to display.       1. Acute hypoxic respiratory failure secondary to COVID-19, patient's respiratory appears to be better today and she was able to be weaned off  oxygen.  On remdesivir and dexamethasone which will be continued.  If she continues to do well home in AM.  2.  Asthma, will continue with DuoNebs and budesonide nebs.  3.  Hypertension, on Cozaar which will be continued.  4.  Hyperglycemia, likely secondary to steroid that she is on.  Hemoglobin A1c is 5.9.  5.  Elevated troponin, initial 16 and subsequent one was 20.  No chest pain and elevated troponin likely secondary to hypoxia.  6.  Morbid obesity, counseled to lose weight.  7.  Anemia, check iron panel and appears to be chronic.  Advised patient to follow-up with primary care regarding the same.  8.  On Lovenox for DVT prophylaxis.  9.  CODE STATUS is full code.        Ken High MD  [facility]  12/22/24  14:24 EST

## 2024-12-23 ENCOUNTER — READMISSION MANAGEMENT (OUTPATIENT)
Dept: CALL CENTER | Facility: HOSPITAL | Age: 64
End: 2024-12-23
Payer: COMMERCIAL

## 2024-12-23 VITALS
SYSTOLIC BLOOD PRESSURE: 142 MMHG | RESPIRATION RATE: 16 BRPM | DIASTOLIC BLOOD PRESSURE: 97 MMHG | HEART RATE: 103 BPM | OXYGEN SATURATION: 97 % | TEMPERATURE: 98.6 F | HEIGHT: 59 IN | BODY MASS INDEX: 46.37 KG/M2 | WEIGHT: 230 LBS

## 2024-12-23 PROBLEM — U07.1 COVID-19 VIRUS INFECTION: Status: ACTIVE | Noted: 2024-12-23

## 2024-12-23 LAB
ALBUMIN SERPL-MCNC: 3.4 G/DL (ref 3.5–5.2)
ALBUMIN/GLOB SERPL: 1.2 G/DL
ALP SERPL-CCNC: 55 U/L (ref 39–117)
ALT SERPL W P-5'-P-CCNC: 17 U/L (ref 1–33)
ANION GAP SERPL CALCULATED.3IONS-SCNC: 8.5 MMOL/L (ref 5–15)
AST SERPL-CCNC: 22 U/L (ref 1–32)
BASOPHILS # BLD AUTO: 0 10*3/MM3 (ref 0–0.2)
BASOPHILS NFR BLD AUTO: 0 % (ref 0–1.5)
BILIRUB SERPL-MCNC: 0.2 MG/DL (ref 0–1.2)
BUN SERPL-MCNC: 24 MG/DL (ref 8–23)
BUN/CREAT SERPL: 25.8 (ref 7–25)
CALCIUM SPEC-SCNC: 8.4 MG/DL (ref 8.6–10.5)
CHLORIDE SERPL-SCNC: 106 MMOL/L (ref 98–107)
CO2 SERPL-SCNC: 22.5 MMOL/L (ref 22–29)
CREAT SERPL-MCNC: 0.93 MG/DL (ref 0.57–1)
DEPRECATED RDW RBC AUTO: 40.2 FL (ref 37–54)
EGFRCR SERPLBLD CKD-EPI 2021: 68.8 ML/MIN/1.73
EOSINOPHIL # BLD AUTO: 0 10*3/MM3 (ref 0–0.4)
EOSINOPHIL NFR BLD AUTO: 0 % (ref 0.3–6.2)
ERYTHROCYTE [DISTWIDTH] IN BLOOD BY AUTOMATED COUNT: 12.3 % (ref 12.3–15.4)
GLOBULIN UR ELPH-MCNC: 2.9 GM/DL
GLUCOSE BLDC GLUCOMTR-MCNC: 139 MG/DL (ref 70–130)
GLUCOSE BLDC GLUCOMTR-MCNC: 164 MG/DL (ref 70–130)
GLUCOSE SERPL-MCNC: 134 MG/DL (ref 65–99)
HCT VFR BLD AUTO: 30.9 % (ref 34–46.6)
HGB BLD-MCNC: 10.6 G/DL (ref 12–15.9)
IMM GRANULOCYTES # BLD AUTO: 0.02 10*3/MM3 (ref 0–0.05)
IMM GRANULOCYTES NFR BLD AUTO: 0.3 % (ref 0–0.5)
LYMPHOCYTES # BLD AUTO: 1.05 10*3/MM3 (ref 0.7–3.1)
LYMPHOCYTES NFR BLD AUTO: 14 % (ref 19.6–45.3)
MCH RBC QN AUTO: 31.2 PG (ref 26.6–33)
MCHC RBC AUTO-ENTMCNC: 34.3 G/DL (ref 31.5–35.7)
MCV RBC AUTO: 90.9 FL (ref 79–97)
MONOCYTES # BLD AUTO: 0.55 10*3/MM3 (ref 0.1–0.9)
MONOCYTES NFR BLD AUTO: 7.4 % (ref 5–12)
NEUTROPHILS NFR BLD AUTO: 5.86 10*3/MM3 (ref 1.7–7)
NEUTROPHILS NFR BLD AUTO: 78.3 % (ref 42.7–76)
NRBC BLD AUTO-RTO: 0 /100 WBC (ref 0–0.2)
PLATELET # BLD AUTO: 205 10*3/MM3 (ref 140–450)
PMV BLD AUTO: 9.8 FL (ref 6–12)
POTASSIUM SERPL-SCNC: 3.8 MMOL/L (ref 3.5–5.2)
PROT SERPL-MCNC: 6.3 G/DL (ref 6–8.5)
RBC # BLD AUTO: 3.4 10*6/MM3 (ref 3.77–5.28)
SODIUM SERPL-SCNC: 137 MMOL/L (ref 136–145)
WBC NRBC COR # BLD AUTO: 7.48 10*3/MM3 (ref 3.4–10.8)

## 2024-12-23 PROCEDURE — 63710000001 INSULIN LISPRO (HUMAN) PER 5 UNITS: Performed by: HOSPITALIST

## 2024-12-23 PROCEDURE — 94761 N-INVAS EAR/PLS OXIMETRY MLT: CPT

## 2024-12-23 PROCEDURE — 25010000002 ENOXAPARIN PER 10 MG: Performed by: HOSPITALIST

## 2024-12-23 PROCEDURE — 82948 REAGENT STRIP/BLOOD GLUCOSE: CPT

## 2024-12-23 PROCEDURE — 94664 DEMO&/EVAL PT USE INHALER: CPT

## 2024-12-23 PROCEDURE — 80053 COMPREHEN METABOLIC PANEL: CPT | Performed by: HOSPITALIST

## 2024-12-23 PROCEDURE — 96372 THER/PROPH/DIAG INJ SC/IM: CPT

## 2024-12-23 PROCEDURE — 97161 PT EVAL LOW COMPLEX 20 MIN: CPT

## 2024-12-23 PROCEDURE — 94799 UNLISTED PULMONARY SVC/PX: CPT

## 2024-12-23 PROCEDURE — G0378 HOSPITAL OBSERVATION PER HR: HCPCS

## 2024-12-23 PROCEDURE — 94762 N-INVAS EAR/PLS OXIMTRY CONT: CPT

## 2024-12-23 PROCEDURE — 85025 COMPLETE CBC W/AUTO DIFF WBC: CPT | Performed by: INTERNAL MEDICINE

## 2024-12-23 PROCEDURE — 63710000001 DEXAMETHASONE PER 0.25 MG: Performed by: HOSPITALIST

## 2024-12-23 RX ORDER — BENZONATATE 100 MG/1
100 CAPSULE ORAL 3 TIMES DAILY PRN
Qty: 10 CAPSULE | Refills: 0 | Status: SHIPPED | OUTPATIENT
Start: 2024-12-23

## 2024-12-23 RX ADMIN — IPRATROPIUM BROMIDE AND ALBUTEROL SULFATE 3 ML: 2.5; .5 SOLUTION RESPIRATORY (INHALATION) at 11:39

## 2024-12-23 RX ADMIN — ASPIRIN 81 MG: 81 TABLET, COATED ORAL at 08:38

## 2024-12-23 RX ADMIN — BUDESONIDE 0.5 MG: 0.5 INHALANT RESPIRATORY (INHALATION) at 07:20

## 2024-12-23 RX ADMIN — ENOXAPARIN SODIUM 40 MG: 100 INJECTION SUBCUTANEOUS at 06:41

## 2024-12-23 RX ADMIN — FAMOTIDINE 20 MG: 20 TABLET, FILM COATED ORAL at 06:41

## 2024-12-23 RX ADMIN — LOSARTAN POTASSIUM 100 MG: 100 TABLET, FILM COATED ORAL at 08:38

## 2024-12-23 RX ADMIN — Medication 2000 UNITS: at 08:38

## 2024-12-23 RX ADMIN — Medication 10 ML: at 08:39

## 2024-12-23 RX ADMIN — DEXAMETHASONE 6 MG: 4 TABLET ORAL at 08:38

## 2024-12-23 RX ADMIN — BENZONATATE 100 MG: 100 CAPSULE ORAL at 11:51

## 2024-12-23 RX ADMIN — LOPERAMIDE HYDROCHLORIDE 2 MG: 2 CAPSULE ORAL at 11:52

## 2024-12-23 RX ADMIN — IPRATROPIUM BROMIDE AND ALBUTEROL SULFATE 3 ML: 2.5; .5 SOLUTION RESPIRATORY (INHALATION) at 07:17

## 2024-12-23 RX ADMIN — VORTIOXETINE 10 MG: 5 TABLET, FILM COATED ORAL at 08:38

## 2024-12-23 RX ADMIN — INSULIN LISPRO 2 UNITS: 100 INJECTION, SOLUTION INTRAVENOUS; SUBCUTANEOUS at 13:01

## 2024-12-23 RX ADMIN — TRAMADOL HYDROCHLORIDE 50 MG: 50 TABLET, COATED ORAL at 11:52

## 2024-12-23 NOTE — OUTREACH NOTE
Prep Survey      Flowsheet Row Responses   [facility] facility patient discharged from? [facility]   Is LACE score < 7 ? Yes   Eligibility [facility]   Date of Admission 12/21/24   Date of Discharge 12/23/24   Discharge Disposition Home or Self Care   Discharge diagnosis Hypoxia/Covid 19   Does the patient have one of the following disease processes/diagnoses(primary or secondary)? Other   Does the patient have Home health ordered? No   Is there a DME ordered? No   Prep survey completed? Yes            NICO A - Registered Nurse

## 2024-12-23 NOTE — CASE MANAGEMENT/SOCIAL WORK
Case Management Discharge Note      Final Note: home no needs         Selected Continued Care - Admitted Since 12/21/2024       Destination    No services have been selected for the patient.                Durable Medical Equipment    No services have been selected for the patient.                Dialysis/Infusion    No services have been selected for the patient.                Home Medical Care    No services have been selected for the patient.                Therapy    No services have been selected for the patient.                Community Resources    No services have been selected for the patient.                Community & DME    No services have been selected for the patient.                    Transportation Services  Private: Car    Final Discharge Disposition Code: 01 - home or self-care

## 2024-12-23 NOTE — PLAN OF CARE
Goal Outcome Evaluation:  Plan of Care Reviewed With: patient        Progress: improving  Outcome Evaluation: Possible DC today.  Sats have been WNL on RA this shift.  Up ad chante in room.  Mostly NP cough.

## 2024-12-23 NOTE — PLAN OF CARE
Goal Outcome Evaluation:           Progress: improving  Outcome Evaluation: Pt is a 64 y.o. female admitted to [facility] with c/o acute fatigue, SOA, HA, and weakness on 12/21/2024. Work up reveals (+) COVID test. Prior to hospital admission, pt reports residing in a house with her daughter and 5 MANISHA. Prior to hospital stay, pt was Ind ADLs and utilized STC AD at baseline. Pt required SBA for bed mobility and STS transfers, and for ambulation with RWx for 30 ft. And no AD for 15ft. Pt presents today at her baseline function and reports she has been up and ambulating regularly since yesterday. Provided pt w/ education to continue to ambulate and perform the exercises provided during the evaluation 2x/day. Pt w/out need for skilled PT services at this time.Anticipate pt will D/C to home w/ assist pending progress.    Anticipated Discharge Disposition (PT): home, home with assist

## 2024-12-23 NOTE — DISCHARGE SUMMARY
Patient Name: Pedrito Powell  : 1960  MRN: [id number]    Date of Admission: 2024  Date of Discharge:  2024  Primary Care Physician: Teo Fraser MD      Chief Complaint:   Weakness - Generalized      Discharge Diagnoses     Active Hospital Problems    Diagnosis  POA    **Hypoxia [R09.02]  Yes    COVID-19 virus infection [U07.1]  Unknown    Chronic renal failure in pediatric patient, stage 3 (moderate) [N18.30]  Yes    HTN (hypertension) [I10]  Yes      Resolved Hospital Problems   No resolved problems to display.        Hospital Course     Pleasant 64-year-old female with multiple comorbidities including asthma, super morbid obesity complains of severe generalized malaise yesterday associated with a dull global headache, runny nose and bouts of shortness of air.  She notes generalized weakness and at 1 point feeling dizzy.  She did a home test for COVID-19 and was positive.  At present her major issue is still with significant generalized malaise, fatigue.  States she feels better since presentation to the emergency room.  She was reported hypoxic shortly after presentation and has been on 2 L nasal cannula since.     1. Acute hypoxic respiratory failure secondary to COVID-19, patient's respiratory status has significantly improved on 2024 and was able to be weaned off of oxygen completely.  She is relatively asymptomatic except mild cough and therefore will discontinue remdesivir and dexamethasone upon discharge.  Appears quite well clinically.    2.  Asthma, not in any exacerbation and was on DuoNebs and budesonide nebs during this hospital stay.    3.  Hypertension, on Cozaar which will be continued.    4.  Hyperglycemia, likely secondary to steroid that she is on.  Hemoglobin A1c is 5.9.    5.  Elevated troponin, initial 16 and subsequent one was 20.  No chest pain and elevated troponin likely secondary to hypoxia.    6.  Morbid obesity, counseled to lose weight.    7.   Anemia, chronic.  Advised patient to follow-up with primary care regarding the same.      Copy text on this note has been reviewed by me on 12/23/2024    Day of Discharge         Physical Exam:  Temp:  [97.9 °F (36.6 °C)-98.8 °F (37.1 °C)] 98.6 °F (37 °C)  Heart Rate:  [] 103  Resp:  [16-18] 16  BP: (127-159)/(68-97) 142/97  Body mass index is 46.45 kg/m².  Physical Exam  HEENT: PERRLA, extract movements intact, Scleras no icterus  Neck: Supple, no JVD  Cardiovascular: Regular rate and rhythm with normal S1 and S2  Respiratory: Fairly clear to auscultation bilaterally with no wheezes  GI: Soft, nontender, bowel sounds.  Extremities: No edema, palpable pedal pulses    Consultants     Consult Orders (all) (From admission, onward)       Start     Ordered    12/21/24 1348  [facility] (on-call MD unless specified) Details  Once        Specialty:  Hospitalist  Provider:  (Not yet assigned)    12/21/24 1347                  Procedures     * Surgery not found *    Imaging Results (All)       Procedure Component Value Units Date/Time    XR Chest 1 View [[id number]] Collected: 12/21/24 1135     Updated: 12/21/24 1141    Narrative:      XR CHEST 1 VW-        INDICATION: Weakness     COMPARISON: Chest radiograph December 12, 2022     TECHNIQUE: 1 view chest     FINDINGS:      Vascular congestion and crowding. No effusions. Low volumes. Stable  mediastinum. Total left shoulder arthroplasty. Right glenohumeral  osteoarthritis.       Impression:      Vascular crowding at the bases with low lung volumes. Deep inspiration  would better evaluate the lung bases. No definite acute findings.     This report was finalized on 12/21/2024 11:38 AM by Dr. Aron Germain M.D on Workstation: [id number]             Results for orders placed during the hospital encounter of 02/04/21    Duplex Venous Lower Extremity - Right CAR    Interpretation Summary  · Normal right lower extremity venous duplex scan.  · 3 x 1.5 cm right lateral thigh fluid  "collection.    Results for orders placed during the hospital encounter of 06/05/23    Adult Transthoracic Echo Complete W/ Cont if Necessary Per Protocol    Interpretation Summary    Left ventricular systolic function is normal. Left ventricular ejection fraction appears to be 56 - 60%.    Left ventricular diastolic function was normal.    Mild mitral valve stenosis is present.  Transvalvular gradient 4 mmHg with mitral valve area 1.7 cm² by continuity equation.    Pertinent Labs     Results from last 7 days   Lab Units 12/23/24  0549 12/21/24  1131   WBC 10*3/mm3 7.48 7.45   HEMOGLOBIN g/dL 10.6* 10.4*   PLATELETS 10*3/mm3 205 204     Results from last 7 days   Lab Units 12/23/24  0549 12/22/24  0751 12/21/24  1131   SODIUM mmol/L 137 138 136   POTASSIUM mmol/L 3.8 3.7 3.7   CHLORIDE mmol/L 106 105 104   CO2 mmol/L 22.5 21.0* 22.0   BUN mg/dL 24* 17 13   CREATININE mg/dL 0.93 0.88 0.87   GLUCOSE mg/dL 134* 157* 103*   EGFR mL/min/1.73 68.8 73.5 74.5     Results from last 7 days   Lab Units 12/23/24  0549 12/22/24  0751 12/21/24  1131   ALBUMIN g/dL 3.4* 3.4* 3.6   BILIRUBIN mg/dL 0.2 0.3 0.4   ALK PHOS U/L 55 61 67   AST (SGOT) U/L 22 21 19   ALT (SGPT) U/L 17 19 22     Results from last 7 days   Lab Units 12/23/24  0549 12/22/24  0751 12/21/24  1131   CALCIUM mg/dL 8.4* 8.2* 8.4*   ALBUMIN g/dL 3.4* 3.4* 3.6   MAGNESIUM mg/dL  --   --  1.7       Results from last 7 days   Lab Units 12/21/24  1252 12/21/24  1131   HSTROP T ng/L 20* 16*           Invalid input(s): \"LDLCALC\"      Results from last 7 days   Lab Units 12/21/24  1601   COVID19  Detected*       Test Results Pending at Discharge     Pending Results       None              Discharge Details        Discharge Medications        New Medications        Instructions Start Date   benzonatate 100 MG capsule  Commonly known as: TESSALON   100 mg, Oral, 3 Times Daily PRN             Continue These Medications        Instructions Start Date   acetaminophen 500 MG " tablet  Commonly known as: TYLENOL   500 mg, Oral, Every 6 Hours PRN      albuterol sulfate  (90 Base) MCG/ACT inhaler  Commonly known as: PROVENTIL HFA;VENTOLIN HFA;PROAIR HFA   2 puffs, Every 4 Hours PRN      amitriptyline 25 MG tablet  Commonly known as: ELAVIL   25 mg, Oral, Nightly PRN      Arnuity Ellipta 200 MCG/ACT  Generic drug: Fluticasone Furoate   Daily - RT      ASPIRIN 81 PO   Take  by mouth.      atorvastatin 80 MG tablet  Commonly known as: LIPITOR   80 mg, Oral, Daily      AUVI-Q IJ   As Needed      azelastine 0.1 % nasal spray  Commonly known as: ASTELIN   1 spray, Daily      B COMPLEX PO   1 capsule, Daily      clobetasol propionate 0.05 % cream  Commonly known as: TEMOVATE   As Needed      Diclofenac Sodium 1 % gel gel  Commonly known as: VOLTAREN   4 g, Topical, 4 Times Daily PRN      famotidine 20 MG tablet  Commonly known as: PEPCID   20 mg      hydrocortisone 2.5 % cream   As Needed      ipratropium 0.03 % nasal spray  Commonly known as: ATROVENT   1 spray, 3 Times Daily      irbesartan 300 MG tablet  Commonly known as: AVAPRO   300 mg, Oral, Daily      loperamide 2 MG capsule  Commonly known as: IMODIUM   2 mg, Oral, Every 2 Hours PRN      meclizine 25 MG tablet  Commonly known as: ANTIVERT   TAKE 1 TABLET BY MOUTH THREE TIMES DAILY AS NEEDED FOR DIZZINESS      ondansetron 4 MG tablet  Commonly known as: Zofran   4 mg, Oral, Every 8 Hours PRN      traMADol 50 MG tablet  Commonly known as: ULTRAM   50 mg, Oral, 2 Times Daily PRN      triamcinolone 0.1 % ointment  Commonly known as: KENALOG   As Needed      Vitamin D3 50 MCG (2000 UT) capsule   TAKE 1 CAPSULE BY MOUTH EVERY DAY      Vortioxetine HBr 10 MG tablet tablet  Commonly known as: Trintellix   10 mg, Oral, Daily With Breakfast               Allergies   Allergen Reactions    Cashew Nut Oil Anaphylaxis    Chocolate Anaphylaxis    Nickel Anaphylaxis    Nuts Anaphylaxis    Adhesive Tape Unknown - Low Severity    [place] Oil Other  (See Comments)    Baclofen Other (See Comments)    Chlorhexidine Unknown - Low Severity    Ciprofloxacin Other (See Comments)     THRUSH PER PATIENT    Citric Acid Unknown (See Comments)     Unsure      Covid-19 (Mrna) Vaccine Other (See Comments)     INSTRUCTED PER ALLERGIST SHE CAN NOT TAKE D/T ONE OF THE PRESERVATIVES    INSTRUCTED PER ALLERGIST SHE CAN NOT TAKE D/T ONE OF THE PRESERVATIVES      *is able to & has had the PFIZER vaccine*    Egg-Derived Products Nausea And Vomiting    Influenza Vaccines Nausea And Vomiting    Methyldibromoglutaronitrile Unknown (See Comments)      PATIENT UNSURE OF REACTION.    Naproxen Other (See Comments)    Neomycin Unknown (See Comments)      PATIENT UNSURE OF REACTION.    Omnicef [Cefdinir] Other (See Comments)     THRUSH PER PATIENT    Palladium Chloride Unknown (See Comments)      PATIENT UNSURE OF REACTION    Penicillins Other (See Comments)     THRUSH PER PATIENT    Pineapple Extract Hives    Sulfa Antibiotics Other (See Comments)     THRUSH PER PATIENT    Yeast-Derived Drug Products Hives    Gold-Containing Drug Products Rash          Latex Rash       Discharge Disposition:  Home or Self Care      Discharge Diet:  Diet Order   Procedures    Diet: Regular/House, Cardiac, Diabetic; Healthy Heart (2-3 Na+); Consistent Carbohydrate; Fluid Consistency: Thin (IDDSI 0)       Discharge Activity:   Activity Instructions       Activity as Tolerated              CODE STATUS:    Code Status and Medical Interventions: CPR (Attempt to Resuscitate); Full Support   Ordered at: 12/21/24 0460     Code Status (Patient has no pulse and is not breathing):    CPR (Attempt to Resuscitate)     Medical Interventions (Patient has pulse or is breathing):    Full Support       Future Appointments   Date Time Provider Department Center   1/8/2025  3:50 PM Sherie Meehan APRN [facility]   1/23/2025  8:30 AM [facility] XR [facility] 2 [facility]   1/23/2025  9:00 AM [facility] XR [facility] 2 [facility] X[facility] E [facility]    1/27/2025  3:00 PM [facility] 1 [facility] [place]   2/7/2025  3:50 PM LAB CHAIR 1 [facility] [place]   2/7/2025  4:00 PM Tatiana Solorzano APRN [facility] [place]   3/3/2025  1:45 PM Teo Fraser MD [facility]   8/8/2025  3:50 PM LAB CHAIR 1 [facility]  [facility]   8/8/2025  4:20 PM Sukhjinder Reinoso II, MD [facility] [place]     Additional Instructions for the Follow-ups that You Need to Schedule       Discharge Follow-up with PCP   As directed       Currently Documented PCP:    Teo Fraser MD    PCP Phone Number:    [phone]     Follow Up Details: 1 week               Follow-up Information       Teo Fraser MD .    Specialties: Family Medicine, Urgent Care, Emergency Medicine  Why: 1 week  Contact information:  [id number] [address]  [phone]                             Additional Instructions for the Follow-ups that You Need to Schedule       Discharge Follow-up with PCP   As directed       Currently Documented PCP:    Teo Fraser MD    PCP Phone Number:    [phone]     Follow Up Details: 1 week            Time Spent on Discharge:  Greater than 30 minutes      Ken High MD  [facility]  12/23/24  15:14 EST

## 2024-12-23 NOTE — PLAN OF CARE
Goal Outcome Evaluation:  Plan of Care Reviewed With: patient        Progress: improving  Outcome Evaluation: Pt A&Ox4, up ad chante with education for falls prevention, discharge instructions and education completed, room air, cough meds given, imodium given BM x4, covid isolation continued,discharge home with  transport by family, accu checks, [facility] HH diet with thins, all belongins taken

## 2024-12-23 NOTE — THERAPY DISCHARGE NOTE
Patient Name: Pedrito Powell  : 1960    MRN: [id number]                              Today's Date: 2024       Admit Date: 2024    Visit Dx:     ICD-10-CM ICD-9-CM   1. COVID-19  U07.1 079.89   2. Hypoxia  R09.02 799.02   3. Elevated troponin  R79.89 790.6     Patient Active Problem List   Diagnosis    Hx of anaphylaxis    Primary osteoarthritis of knee    High risk medication use    Hypertension    Hyperlipidemia    Vertigo    Infected blister of left foot    Ganglion of right wrist    Vomiting and diarrhea    Intractable vomiting with nausea    Fatigue    Acute gastritis without hemorrhage    Anemia    Elevated serum creatinine    Fungal rash of torso    Cellulitis of abdominal wall    Bronchitis    Arthritis of shoulder    Chronic renal failure in pediatric patient, stage 3 (moderate)    H/O shoulder surgery    Anxiety    Depression    Iron deficiency    Dizziness    Hypoxia     Past Medical History:   Diagnosis Date    Anemia     IRON INFUSION RECENTLY    Arthritis     Asthma     Chronic kidney disease     stage 3    Elevated cholesterol     GERD (gastroesophageal reflux disease)     High risk medication use 2018    History of carpal tunnel syndrome     Hyperlipidemia     Hypertension     Intractable vomiting with nausea 2018    Left shoulder pain     Limited mobility     Vertigo     Weakness     AT TIMES LEFT SHOULDER/LEFT ARM     Past Surgical History:   Procedure Laterality Date    CARPAL TUNNEL RELEASE Left 10/02/2023     SECTION  , ,     COLONOSCOPY N/A 12/10/2018    normal ileum, IH, hyperplastic polyp, otherwise normal exam    ENDOSCOPY N/A 12/10/2018    no gross lesions in esophagus or examined duodenum, erythematous mucosa in stomach, biopsies benign    HYSTERECTOMY      OOPHORECTOMY Bilateral     SHOULDER MANIPULATION Left     TOTAL SHOULDER ARTHROPLASTY Left 2021    Procedure: REVERSE TOTAL SHOULDER ARTHROPLASTY,  BICEP TENDONDESIS;   Surgeon: Bethel Devi MD;  Location: [facility] MAIN OR;  Service: Orthopedics;  Laterality: Left;    TRIGGER FINGER RELEASE Left     3 fingers      General Information       Row Name 12/23/24 0957          Physical Therapy Time and Intention    Document Type evaluation  -CS     Mode of Treatment individual therapy;physical therapy  -CS       Row Name 12/23/24 0957          General Information    Patient Profile Reviewed yes  -CS     Prior Level of Function independent:;all household mobility;community mobility;ADL's  -CS     Existing Precautions/Restrictions no known precautions/restrictions  -CS     Barriers to Rehab none identified  -CS       Row Name 12/23/24 0957          Living Environment    People in Home child(faraz), adult  -CS       Row Name 12/23/24 0957          Home Main Entrance    Number of Stairs, Main Entrance five  -CS     Stair Railings, Main Entrance railings safe and in good condition  -CS       Row Name 12/23/24 0957          Cognition    Orientation Status (Cognition) oriented x 3  -CS               User Key  (r) = Recorded By, (t) = Taken By, (c) = Cosigned By      Initials Name Provider Type    CS William Monae PT Physical Therapist                   Mobility       Row Name 12/23/24 1000          Bed Mobility    Bed Mobility supine-sit;sit-supine  -CS     Supine-Sit [place] (Bed Mobility) standby assist  -CS     Sit-Supine [place] (Bed Mobility) standby assist  -CS       Row Name 12/23/24 1000          Sit-Stand Transfer    Sit-Stand [place]rick (Transfers) standby assist;1 person assist  -CS     Assistive Device (Sit-Stand Transfers) walker, front-wheeled  -CS       Row Name 12/23/24 1000          Gait/Stairs (Locomotion)    Warrick Level (Gait) standby assist;1 person assist  -CS     Assistive Device (Gait) walker, front-wheeled  -CS     Patient was able to Ambulate yes  -CS     Distance in Feet (Gait) 30  30ft w/ Rwx and 15ft w/ no AD  -CS     Deviations/Abnormal Patterns  (Gait) frank decreased  -CS     Bilateral Gait Deviations forward flexed posture  -CS               User Key  (r) = Recorded By, (t) = Taken By, (c) = Cosigned By      Initials Name Provider Type    William Brennan PT Physical Therapist                   Obj/Interventions       Row Name 12/23/24 1001          Range of Motion Comprehensive    General Range of Motion bilateral lower extremity ROM WNL;bilateral upper extremity ROM WNL  -CS       Row Name 12/23/24 1001          Strength Comprehensive (MMT)    General Manual Muscle Testing (MMT) Assessment no strength deficits identified  -CS     Comment, General Manual Muscle Testing (MMT) Assessment BLE 4/5  -CS       Row Name 12/23/24 1001          Motor Skills    Therapeutic Exercise --  Marches, LAQs, GS, APs, HRs, 1 set x 20reps  -CS               User Key  (r) = Recorded By, (t) = Taken By, (c) = Cosigned By      Initials Name Provider Type    William Brennan, PT Physical Therapist                   Goals/Plan    No documentation.                  Clinical Impression       Row Name 12/23/24 1001          Pain    Pretreatment Pain Rating 0/10 - no pain  -CS     Posttreatment Pain Rating 0/10 - no pain  -CS       Row Name 12/23/24 1001          Plan of Care Review    Progress improving  -     Outcome Evaluation Pt is a 64 y.o. female admitted to [facility] with c/o acute fatigue, SOA, HA, and weakness on 12/21/2024. Work up reveals (+) COVID test. Prior to hospital admission, pt reports residing in a house with her daughter and 5 MANISHA. Prior to hospital stay, pt was Ind ADLs and utilized STC AD at baseline. Pt required SBA for bed mobility and STS transfers, and for ambulation with RWx for 30 ft. And no AD for 15ft. Pt presents today at her baseline function and reports she has been up and ambulating regularly since yesterday. Provided pt w/ education to continue to ambulate and perform the exercises provided during the evaluation 2x/day. Pt w/out need for skilled PT  services at this time.Anticipate pt will D/C to home w/ assist pending progress.  -CS       Row Name 12/23/24 1001          Therapy Assessment/Plan (PT)    Criteria for Skilled Interventions Met (PT) no  -CS       Row Name 12/23/24 1001          Positioning and Restraints    Pre-Treatment Position in bed  -CS     Post Treatment Position chair  -CS     In Chair sitting;call light within reach;with nsg  -CS               User Key  (r) = Recorded By, (t) = Taken By, (c) = Cosigned By      Initials Name Provider Type    William Brennan PT Physical Therapist                   Outcome Measures       Row Name 12/23/24 1002          How much help from another person do you currently need...    Turning from your back to your side while in flat bed without using bedrails? 4  -CS     Moving from lying on back to sitting on the side of a flat bed without bedrails? 4  -CS     Moving to and from a bed to a chair (including a wheelchair)? 4  -CS     Standing up from a chair using your arms (e.g., wheelchair, bedside chair)? 4  -CS     Climbing 3-5 steps with a railing? 3  -CS     To walk in hospital room? 3  -CS     AM-PAC 6 Clicks Score (PT) 22  -CS               User Key  (r) = Recorded By, (t) = Taken By, (c) = Cosigned By      Initials Name Provider Type    William Brennan PT Physical Therapist                  Physical Therapy Education       Title: PT OT SLP Therapies (In Progress)       Topic: Physical Therapy (Done)       Point: Mobility training (Done)       Learning Progress Summary            Patient Acceptance, E, VU by CS at 12/23/2024 1003                      Point: Home exercise program (Done)       Learning Progress Summary            Patient Acceptance, E, VU by CS at 12/23/2024 1003                      Point: Body mechanics (Done)       Learning Progress Summary            Patient Acceptance, E, VU by CS at 12/23/2024 1003                      Point: Precautions (Done)       Learning Progress Summary             Patient Acceptance, E, VU by  at 12/23/2024 1003                                      User Key       Initials Effective Dates Name Provider Type Discipline     09/06/24 -  William Monae PT Physical Therapist PT                  PT Recommendation and Plan     Progress: improving  Outcome Evaluation: Pt is a 64 y.o. female admitted to [facility] with c/o acute fatigue, SOA, HA, and weakness on 12/21/2024. Work up reveals (+) COVID test. Prior to hospital admission, pt reports residing in a house with her daughter and 5 MANISHA. Prior to hospital stay, pt was Ind ADLs and utilized STC AD at baseline. Pt required SBA for bed mobility and STS transfers, and for ambulation with RWx for 30 ft. And no AD for 15ft. Pt presents today at her baseline function and reports she has been up and ambulating regularly since yesterday. Provided pt w/ education to continue to ambulate and perform the exercises provided during the evaluation 2x/day. Pt w/out need for skilled PT services at this time.Anticipate pt will D/C to home w/ assist pending progress.     Time Calculation:         PT Charges       Row Name 12/23/24 1003             Time Calculation    Start Time 0827  -CS      Stop Time 0836  -CS      Time Calculation (min) 9 min  -CS      PT Received On 12/23/24  -CS         Untimed Charges    PT Eval/Re-eval Minutes 9  -CS         Total Minutes    Untimed Charges Total Minutes 9  -CS       Total Minutes 9  -CS                User Key  (r) = Recorded By, (t) = Taken By, (c) = Cosigned By      Initials Name Provider Type     William Moane, LENNOX Physical Therapist                  Therapy Charges for Today       Code Description Service Date Service Provider Modifiers Qty    10077002479 HC PT EVAL LOW COMPLEXITY 1 12/23/2024 William Monae, PT GP 1            PT G-Codes  AM-PAC 6 Clicks Score (PT): 22    PT Discharge Summary  Anticipated Discharge Disposition (PT): home, home with assist  Reason for Discharge: Independent, At baseline  function  Discharge Destination: Home, Home with assist    William Monae, PT  12/23/2024

## 2024-12-24 ENCOUNTER — TRANSITIONAL CARE MANAGEMENT TELEPHONE ENCOUNTER (OUTPATIENT)
Dept: CALL CENTER | Facility: HOSPITAL | Age: 64
End: 2024-12-24
Payer: COMMERCIAL

## 2024-12-24 NOTE — OUTREACH NOTE
Call Center TCM Note      Flowsheet Row Responses   [facility] [place] patient discharged from? [place]   Does the patient have one of the following disease processes/diagnoses(primary or secondary)? Other   TCM attempt successful? Yes   Call start time 1055   Call end time 1100   Discharge diagnosis Hypoxia/Covid 19   Is patient permission given to speak with other caregiver? No   Person spoke with today (if not patient) and relationship Patient   Medication alerts for this patient Tessalon   Meds reviewed with patient/caregiver? Yes   Is the patient having any side effects they believe may be caused by any medication additions or changes? No   Prescription comments Patient is on her way now to  [facility] from [facility].   Comments Assisted patient in scheduling a HOSPITAL PCP f/u appt. Appt scheduled on 1/6/25 at 12:45 PM with Dr. Teo Fraser. Patient would like a sooner appt if there are any cancellations. Advised patient that message would be sent to PCP and added to waitlist.   Does the patient have an appointment with their PCP within 7-14 days of discharge? No   Nursing Interventions Assisted patient with making appointment per protocol   Has home health visited the patient within 72 hours of discharge? N/A   Psychosocial issues? No   Did the patient receive a copy of their discharge instructions? Yes   Nursing interventions Reviewed instructions with patient   What is the patient's perception of their health status since discharge? Improving   Is the patient/caregiver able to teach back signs and symptoms related to disease process for when to call PCP? Yes   Is the patient/caregiver able to teach back signs and symptoms related to disease process for when to call 911? Yes   Is the patient/caregiver able to teach back the hierarchy of who to call/visit for symptoms/problems? PCP, Specialist, Home health nurse, Urgent Care, ED, 911 Yes   If the patient is a current smoker, are they able to teach  back resources for cessation? Not a smoker   Additional teach back comments Reviewed COVID return to work guidelines with patient. Patient has a Amanda party in January.   TCM call completed? Yes   Wrap up additional comments Assisted patient in scheduling a HOSPITAL PCP f/u appt. Appt scheduled on 1/6/25 at 12:45 PM with Dr. Teo Fraser. Patient would like a sooner appt if there are any cancellations. Advised patient that message would be sent to PCP and added to waitlist.   Call end time 1100   Would this patient benefit from a Referral to [facility] Social Work? No   Is the patient interested in additional calls from an ambulatory ? No            Lala Fenton RN    12/24/2024, 11:04 EST

## 2025-01-08 ENCOUNTER — OFFICE VISIT (OUTPATIENT)
Dept: ORTHOPEDIC SURGERY | Facility: CLINIC | Age: 65
End: 2025-01-08
Payer: COMMERCIAL

## 2025-01-08 VITALS — TEMPERATURE: 97.4 F | BODY MASS INDEX: 50.52 KG/M2 | WEIGHT: 250.6 LBS | HEIGHT: 59 IN

## 2025-01-08 DIAGNOSIS — M17.11 PRIMARY OSTEOARTHRITIS OF RIGHT KNEE: Primary | ICD-10-CM

## 2025-01-08 DIAGNOSIS — M17.12 PRIMARY OSTEOARTHRITIS OF LEFT KNEE: ICD-10-CM

## 2025-01-08 PROCEDURE — 20610 DRAIN/INJ JOINT/BURSA W/O US: CPT | Performed by: NURSE PRACTITIONER

## 2025-01-08 RX ORDER — LIDOCAINE HYDROCHLORIDE 10 MG/ML
2 INJECTION, SOLUTION EPIDURAL; INFILTRATION; INTRACAUDAL; PERINEURAL
Status: COMPLETED | OUTPATIENT
Start: 2025-01-08 | End: 2025-01-08

## 2025-01-08 RX ORDER — METHYLPREDNISOLONE ACETATE 80 MG/ML
80 INJECTION, SUSPENSION INTRA-ARTICULAR; INTRALESIONAL; INTRAMUSCULAR; SOFT TISSUE
Status: COMPLETED | OUTPATIENT
Start: 2025-01-08 | End: 2025-01-08

## 2025-01-08 RX ADMIN — METHYLPREDNISOLONE ACETATE 80 MG: 80 INJECTION, SUSPENSION INTRA-ARTICULAR; INTRALESIONAL; INTRAMUSCULAR; SOFT TISSUE at 15:50

## 2025-01-08 RX ADMIN — LIDOCAINE HYDROCHLORIDE 2 ML: 10 INJECTION, SOLUTION EPIDURAL; INFILTRATION; INTRACAUDAL; PERINEURAL at 15:50

## 2025-01-08 NOTE — PROGRESS NOTES
Ms. Powell comes in today for follow-up.  Injections have worked well in the past.  The patient would like to get repeat injections today.  The risks, benefits and alternatives were discussed and the patient consented.  Going forward, the patient will follow-up as needed.    KALYANI Cornelius    01/08/2025      Large Joint Arthrocentesis: R knee  Date/Time: 1/8/2025 3:50 PM  Consent given by: patient  Site marked: site marked  Timeout: Immediately prior to procedure a time out was called to verify the correct patient, procedure, equipment, support staff and site/side marked as required   Supporting Documentation  Indications: pain   Procedure Details  Location: knee - R knee  Preparation: Patient was prepped and draped in the usual sterile fashion  Needle gauge: 21 G.  Approach: anterolateral  Medications administered: 2 mL lidocaine PF 1% 1 %; 80 mg methylPREDNISolone acetate 80 MG/ML  Patient tolerance: patient tolerated the procedure well with no immediate complications      Large Joint Arthrocentesis: L knee  Date/Time: 1/8/2025 3:50 PM  Consent given by: patient  Site marked: site marked  Timeout: Immediately prior to procedure a time out was called to verify the correct patient, procedure, equipment, support staff and site/side marked as required   Supporting Documentation  Indications: pain   Procedure Details  Location: knee - L knee  Preparation: Patient was prepped and draped in the usual sterile fashion  Needle gauge: 21 G.  Approach: anterolateral  Medications administered: 2 mL lidocaine PF 1% 1 %; 80 mg methylPREDNISolone acetate 80 MG/ML  Patient tolerance: patient tolerated the procedure well with no immediate complications

## 2025-01-23 ENCOUNTER — HOSPITAL ENCOUNTER (OUTPATIENT)
Dept: GENERAL RADIOLOGY | Facility: HOSPITAL | Age: 65
Discharge: HOME | End: 2025-01-23
Payer: COMMERCIAL

## 2025-01-23 ENCOUNTER — APPOINTMENT (OUTPATIENT)
Dept: GENERAL RADIOLOGY | Facility: HOSPITAL | Age: 65
End: 2025-01-23
Payer: COMMERCIAL

## 2025-01-23 DIAGNOSIS — M79.672 BILATERAL FOOT PAIN: ICD-10-CM

## 2025-01-23 DIAGNOSIS — M79.672 LEFT FOOT PAIN: ICD-10-CM

## 2025-01-23 DIAGNOSIS — M79.671 BILATERAL FOOT PAIN: ICD-10-CM

## 2025-01-23 DIAGNOSIS — M79.671 RIGHT FOOT PAIN: ICD-10-CM

## 2025-01-23 PROCEDURE — 25010000002 BUPIVACAINE (PF) 0.25 % SOLUTION: Performed by: ORTHOPAEDIC SURGERY

## 2025-01-23 PROCEDURE — 25510000001 IOPAMIDOL 61 % SOLUTION: Performed by: ORTHOPAEDIC SURGERY

## 2025-01-23 PROCEDURE — 25010000002 LIDOCAINE 1 % SOLUTION: Performed by: ORTHOPAEDIC SURGERY

## 2025-01-23 PROCEDURE — 77002 NEEDLE LOCALIZATION BY XRAY: CPT

## 2025-01-23 PROCEDURE — 25010000002 METHYLPREDNISOLONE PER 40 MG: Performed by: ORTHOPAEDIC SURGERY

## 2025-01-23 RX ORDER — LIDOCAINE HYDROCHLORIDE 10 MG/ML
10 INJECTION, SOLUTION INFILTRATION; PERINEURAL 2 TIMES DAILY PRN
Status: DISCONTINUED | OUTPATIENT
Start: 2025-01-23 | End: 2025-01-24 | Stop reason: HOSPADM

## 2025-01-23 RX ORDER — IOPAMIDOL 612 MG/ML
30 INJECTION, SOLUTION INTRAVASCULAR 2 TIMES DAILY PRN
Status: DISCONTINUED | OUTPATIENT
Start: 2025-01-23 | End: 2025-01-24 | Stop reason: HOSPADM

## 2025-01-23 RX ORDER — BUPIVACAINE HYDROCHLORIDE 2.5 MG/ML
10 INJECTION, SOLUTION EPIDURAL; INFILTRATION; INTRACAUDAL 2 TIMES DAILY PRN
Status: DISCONTINUED | OUTPATIENT
Start: 2025-01-23 | End: 2025-01-24 | Stop reason: HOSPADM

## 2025-01-23 RX ORDER — METHYLPREDNISOLONE SODIUM SUCCINATE 40 MG/ML
40 INJECTION, POWDER, LYOPHILIZED, FOR SOLUTION INTRAMUSCULAR; INTRAVENOUS 2 TIMES DAILY PRN
Status: DISCONTINUED | OUTPATIENT
Start: 2025-01-23 | End: 2025-01-24 | Stop reason: HOSPADM

## 2025-01-23 RX ADMIN — BUPIVACAINE HYDROCHLORIDE 4 ML: 2.5 INJECTION, SOLUTION EPIDURAL; INFILTRATION; INTRACAUDAL; PERINEURAL at 08:49

## 2025-01-23 RX ADMIN — METHYLPREDNISOLONE SODIUM SUCCINATE 40 MG: 40 INJECTION, POWDER, FOR SOLUTION INTRAMUSCULAR; INTRAVENOUS at 08:38

## 2025-01-23 RX ADMIN — IOPAMIDOL 0.5 ML: 612 INJECTION, SOLUTION INTRAVENOUS at 08:38

## 2025-01-23 RX ADMIN — METHYLPREDNISOLONE SODIUM SUCCINATE 40 MG: 40 INJECTION, POWDER, FOR SOLUTION INTRAMUSCULAR; INTRAVENOUS at 08:49

## 2025-01-23 RX ADMIN — LIDOCAINE HYDROCHLORIDE 0.5 ML: 10 INJECTION, SOLUTION INFILTRATION; PERINEURAL at 08:38

## 2025-01-23 RX ADMIN — LIDOCAINE HYDROCHLORIDE 1 ML: 10 INJECTION, SOLUTION INFILTRATION; PERINEURAL at 08:49

## 2025-01-23 RX ADMIN — BUPIVACAINE HYDROCHLORIDE 4 ML: 2.5 INJECTION, SOLUTION EPIDURAL; INFILTRATION; INTRACAUDAL; PERINEURAL at 08:38

## 2025-01-23 RX ADMIN — IOPAMIDOL 0.5 ML: 612 INJECTION, SOLUTION INTRAVENOUS at 08:49

## 2025-01-27 ENCOUNTER — HOSPITAL ENCOUNTER (OUTPATIENT)
Dept: MAMMOGRAPHY | Facility: HOSPITAL | Age: 65
Discharge: HOME | End: 2025-01-27
Admitting: FAMILY MEDICINE
Payer: COMMERCIAL

## 2025-01-27 DIAGNOSIS — Z12.9 CANCER SCREENING: ICD-10-CM

## 2025-01-27 PROCEDURE — 77067 SCR MAMMO BI INCL CAD: CPT

## 2025-01-27 PROCEDURE — 77063 BREAST TOMOSYNTHESIS BI: CPT

## 2025-01-30 NOTE — PROGRESS NOTES
Negative mammogram. Repeat in one year.    IMPRESSION/RECOMMENDATION(S):  No mammographic evidence of malignancy. Recommend annual screening  mammogram in one year.      Note: In patients with heterogeneously dense or extremely dense tissue,  supplemental screening breast MRI or whole breast ultrasound should be  considered. Please refer to the findings section above to identify the  breast density category for this patient, which could be almost entirely  fatty, scattered, heterogeneously dense, or extremely dense.    BI-RADS Category 1: Negative

## 2025-02-07 ENCOUNTER — LAB (OUTPATIENT)
Dept: OTHER | Facility: HOSPITAL | Age: 65
End: 2025-02-07
Payer: COMMERCIAL

## 2025-02-07 ENCOUNTER — OFFICE VISIT (OUTPATIENT)
Dept: ONCOLOGY | Facility: CLINIC | Age: 65
End: 2025-02-07
Payer: COMMERCIAL

## 2025-02-07 VITALS
WEIGHT: 246 LBS | HEART RATE: 71 BPM | SYSTOLIC BLOOD PRESSURE: 111 MMHG | TEMPERATURE: 98.7 F | RESPIRATION RATE: 16 BRPM | BODY MASS INDEX: 49.59 KG/M2 | DIASTOLIC BLOOD PRESSURE: 65 MMHG | HEIGHT: 59 IN | OXYGEN SATURATION: 98 %

## 2025-02-07 DIAGNOSIS — D63.1 ANEMIA DUE TO STAGE 3 CHRONIC KIDNEY DISEASE, UNSPECIFIED WHETHER STAGE 3A OR 3B CKD: ICD-10-CM

## 2025-02-07 DIAGNOSIS — D63.1 ANEMIA DUE TO STAGE 3 CHRONIC KIDNEY DISEASE, UNSPECIFIED WHETHER STAGE 3A OR 3B CKD: Primary | ICD-10-CM

## 2025-02-07 DIAGNOSIS — N18.30 ANEMIA DUE TO STAGE 3 CHRONIC KIDNEY DISEASE, UNSPECIFIED WHETHER STAGE 3A OR 3B CKD: ICD-10-CM

## 2025-02-07 DIAGNOSIS — N18.30 ANEMIA DUE TO STAGE 3 CHRONIC KIDNEY DISEASE, UNSPECIFIED WHETHER STAGE 3A OR 3B CKD: Primary | ICD-10-CM

## 2025-02-07 LAB
ALBUMIN SERPL-MCNC: 3.9 G/DL (ref 3.5–5.2)
ALBUMIN/GLOB SERPL: 1.1 G/DL
ALP SERPL-CCNC: 79 U/L (ref 39–117)
ALT SERPL W P-5'-P-CCNC: 25 U/L (ref 1–33)
ANION GAP SERPL CALCULATED.3IONS-SCNC: 7.6 MMOL/L (ref 5–15)
AST SERPL-CCNC: 23 U/L (ref 1–32)
BASOPHILS # BLD AUTO: 0.08 10*3/MM3 (ref 0–0.2)
BASOPHILS NFR BLD AUTO: 0.8 % (ref 0–1.5)
BILIRUB SERPL-MCNC: 0.3 MG/DL (ref 0–1.2)
BUN SERPL-MCNC: 20 MG/DL (ref 8–23)
BUN/CREAT SERPL: 22 (ref 7–25)
CALCIUM SPEC-SCNC: 9.3 MG/DL (ref 8.6–10.5)
CHLORIDE SERPL-SCNC: 104 MMOL/L (ref 98–107)
CO2 SERPL-SCNC: 27.4 MMOL/L (ref 22–29)
CREAT SERPL-MCNC: 0.91 MG/DL (ref 0.57–1)
DEPRECATED RDW RBC AUTO: 43.5 FL (ref 37–54)
EGFRCR SERPLBLD CKD-EPI 2021: 70.6 ML/MIN/1.73
EOSINOPHIL # BLD AUTO: 0.49 10*3/MM3 (ref 0–0.4)
EOSINOPHIL NFR BLD AUTO: 4.7 % (ref 0.3–6.2)
ERYTHROCYTE [DISTWIDTH] IN BLOOD BY AUTOMATED COUNT: 12.5 % (ref 12.3–15.4)
FERRITIN SERPL-MCNC: 220.1 NG/ML (ref 13–150)
GLOBULIN UR ELPH-MCNC: 3.4 GM/DL
GLUCOSE SERPL-MCNC: 116 MG/DL (ref 65–99)
HCT VFR BLD AUTO: 34.5 % (ref 34–46.6)
HGB BLD-MCNC: 11.1 G/DL (ref 12–15.9)
IMM GRANULOCYTES # BLD AUTO: 0.05 10*3/MM3 (ref 0–0.05)
IMM GRANULOCYTES NFR BLD AUTO: 0.5 % (ref 0–0.5)
IRON 24H UR-MRATE: 42 MCG/DL (ref 37–145)
IRON SATN MFR SERPL: 14 % (ref 20–50)
LYMPHOCYTES # BLD AUTO: 2.22 10*3/MM3 (ref 0.7–3.1)
LYMPHOCYTES NFR BLD AUTO: 21.4 % (ref 19.6–45.3)
MCH RBC QN AUTO: 30.6 PG (ref 26.6–33)
MCHC RBC AUTO-ENTMCNC: 32.2 G/DL (ref 31.5–35.7)
MCV RBC AUTO: 95 FL (ref 79–97)
MONOCYTES # BLD AUTO: 0.83 10*3/MM3 (ref 0.1–0.9)
MONOCYTES NFR BLD AUTO: 8 % (ref 5–12)
NEUTROPHILS NFR BLD AUTO: 6.71 10*3/MM3 (ref 1.7–7)
NEUTROPHILS NFR BLD AUTO: 64.6 % (ref 42.7–76)
NRBC BLD AUTO-RTO: 0 /100 WBC (ref 0–0.2)
PLATELET # BLD AUTO: 253 10*3/MM3 (ref 140–450)
PMV BLD AUTO: 9.2 FL (ref 6–12)
POTASSIUM SERPL-SCNC: 4 MMOL/L (ref 3.5–5.2)
PROT SERPL-MCNC: 7.3 G/DL (ref 6–8.5)
RBC # BLD AUTO: 3.63 10*6/MM3 (ref 3.77–5.28)
SODIUM SERPL-SCNC: 139 MMOL/L (ref 136–145)
TIBC SERPL-MCNC: 294 MCG/DL (ref 298–536)
TRANSFERRIN SERPL-MCNC: 197 MG/DL (ref 200–360)
WBC NRBC COR # BLD AUTO: 10.38 10*3/MM3 (ref 3.4–10.8)

## 2025-02-07 PROCEDURE — 83540 ASSAY OF IRON: CPT | Performed by: INTERNAL MEDICINE

## 2025-02-07 PROCEDURE — 85025 COMPLETE CBC W/AUTO DIFF WBC: CPT | Performed by: INTERNAL MEDICINE

## 2025-02-07 PROCEDURE — 82728 ASSAY OF FERRITIN: CPT | Performed by: INTERNAL MEDICINE

## 2025-02-07 PROCEDURE — 84466 ASSAY OF TRANSFERRIN: CPT | Performed by: INTERNAL MEDICINE

## 2025-02-07 PROCEDURE — 36415 COLL VENOUS BLD VENIPUNCTURE: CPT

## 2025-02-07 PROCEDURE — 80053 COMPREHEN METABOLIC PANEL: CPT | Performed by: INTERNAL MEDICINE

## 2025-02-07 NOTE — PROGRESS NOTES
Subjective      REASON FOR FOLLOW UP:   Multifactorial anemia, anemia of chronic kidney disease                             History of Present Illness    The patient is a 64 y.o. female with the above mentioned history who returns to the office today for 6-month follow-up and review.  The patient remains off oral iron.  She reports she is feeling very well.  Her energy is adequate.  She denies signs or symptoms of bleeding.      Hematology history:  Ms. Powell is seen today after being referred back to us by her primary care physician, Dr. Teo Fraser.  We saw the patient in consultation in March 2019 for chronic anemia.  Patient has had a long history of rheumatoid arthritis with chronic anemia and reported chronic difficulty with iron deficiency, taking oral iron without much benefit.  At that time of initial consultation we discussed her anemia was likely related to underlying CKD stage III, as well as possibly her rheumatoid arthritis.  We did evaluate for vitamin deficiencies and she was not found to be folate, B12, or iron deficient.  Sed rate was slightly elevated at 44 at that time.    Most recent lab work performed by Dr. Fraser 1/19/2021 showed a hemoglobin of 9.4, creatinine 1.3, BUN 32, GFR 39.  Upon review of the EMR it appears at least for the last roughly 2 years her creatinine has been anywhere from 1.2 to as high as 1.7, BUN ranging in the 20s to 30s.  So overall relatively stable.      Last colonoscopy and EGD were performed per Dr. Kapoor, negative.  Patient also had a small bowel follow-through which was within normal limits. Other pertinent imaging include a CT of the abdomen pelvis performed December 2018 which was negative.  She is up-to-date on her mammogram, last performed August 2020, negative.    As she is reviewed today, hemoglobin is at stable at 9.8.  Patient denies any significant fatigue and feels that she has fairly good performance status.  She does note a few times a week  taking a 3 to 4-hour nap in the afternoon but does not require this on a regular basis.  She is still able to complete the activity she desires.  Patient is a  and lives alone.  She denies any obvious GI blood loss.      She reports normal appetite.  Weight is stable.  She does have significant left shoulder pain and will undergo replacement surgery per Dr. Devi in May 2021.    Otherwise she denies further concerns at this time.    Past Medical History:   Diagnosis Date    Anemia     IRON INFUSION RECENTLY    Arthritis     Asthma     Chronic kidney disease     stage 3    Elevated cholesterol     GERD (gastroesophageal reflux disease)     High risk medication use 2018    History of carpal tunnel syndrome     Hyperlipidemia     Hypertension     Intractable vomiting with nausea 2018    Left shoulder pain     Limited mobility     Vertigo     Weakness     AT TIMES LEFT SHOULDER/LEFT ARM        Past Surgical History:   Procedure Laterality Date    CARPAL TUNNEL RELEASE Left 10/02/2023     SECTION  , ,     COLONOSCOPY N/A 12/10/2018    normal ileum, IH, hyperplastic polyp, otherwise normal exam    ENDOSCOPY N/A 12/10/2018    no gross lesions in esophagus or examined duodenum, erythematous mucosa in stomach, biopsies benign    HYSTERECTOMY      OOPHORECTOMY Bilateral     SHOULDER MANIPULATION Left     TOTAL SHOULDER ARTHROPLASTY Left 2021    Procedure: REVERSE TOTAL SHOULDER ARTHROPLASTY,  BICEP TENDONDESIS;  Surgeon: Bethel Devi MD;  Location: [facility];  Service: Orthopedics;  Laterality: Left;    TRIGGER FINGER RELEASE Left     3 fingers        Current Outpatient Medications on File Prior to Visit   Medication Sig Dispense Refill    acetaminophen (TYLENOL) 500 MG tablet Take 1 tablet by mouth Every 6 (Six) Hours As Needed for Mild Pain. 30 tablet 0    albuterol 108 (90 Base) MCG/ACT inhaler Inhale 2 puffs Every 4 (Four) Hours As Needed for Wheezing.       amitriptyline (ELAVIL) 25 MG tablet Take 1 tablet by mouth At Night As Needed for Sleep.      Arnuity Ellipta 200 MCG/ACT Daily.      ASPIRIN 81 PO Take  by mouth.      atorvastatin (LIPITOR) 80 MG tablet Take 1 tablet by mouth Daily. 90 tablet 1    azelastine (ASTELIN) 0.1 % nasal spray Administer 1 spray into the nostril(s) as directed by provider Daily.  11    B Complex Vitamins (B COMPLEX PO) Take 1 capsule by mouth Daily. HOLD FOR SURGERY 1 WEEK      Cholecalciferol (Vitamin D3) 50 MCG (2000 UT) capsule TAKE 1 CAPSULE BY MOUTH EVERY DAY 90 capsule 3    clobetasol (TEMOVATE) 0.05 % cream Apply 1 Application topically to the appropriate area as directed As Needed.      Diclofenac Sodium (VOLTAREN) 1 % gel gel Apply 4 g topically to the appropriate area as directed 4 (Four) Times a Day As Needed (foot pain). 50 g 0    EPINEPHrine (AUVI-Q IJ) Inject  as directed As Needed.      famotidine (PEPCID) 20 MG tablet Take 1 tablet by mouth.      hydrocortisone 2.5 % cream Apply  topically to the appropriate area as directed As Needed.      ipratropium (ATROVENT) 0.03 % nasal spray Administer 1 spray into the nostril(s) as directed by provider 3 (Three) Times a Day.      irbesartan (AVAPRO) 300 MG tablet Take 1 tablet by mouth Daily. 90 tablet 1    loperamide (IMODIUM) 2 MG capsule Take 1 capsule by mouth Every 2 (Two) Hours As Needed for Diarrhea (max 4 doses daily).      meclizine (ANTIVERT) 25 MG tablet TAKE 1 TABLET BY MOUTH THREE TIMES DAILY AS NEEDED FOR DIZZINESS 270 tablet 0    ondansetron (Zofran) 4 MG tablet Take 1 tablet by mouth Every 8 (Eight) Hours As Needed for Nausea or Vomiting. 42 tablet 4    traMADol (ULTRAM) 50 MG tablet Take 1 tablet by mouth 2 (Two) Times a Day As Needed for Moderate Pain. 180 tablet 1    triamcinolone (KENALOG) 0.1 % ointment Apply 1 Application topically to the appropriate area as directed As Needed.      Vortioxetine HBr (Trintellix) 10 MG tablet tablet Take 1 tablet by mouth Daily  With Breakfast. 90 tablet 3    benzonatate (TESSALON) 100 MG capsule Take 1 capsule by mouth 3 (Three) Times a Day As Needed for Cough. (Patient not taking: Reported on 2/7/2025) 10 capsule 0     Current Facility-Administered Medications on File Prior to Visit   Medication Dose Route Frequency Provider Last Rate Last Admin    Chlorhexidine Gluconate 2 % pads 1 each  1 each Apply externally Take As Directed Bethel Devi MD            ALLERGIES:    Allergies   Allergen Reactions    Cashew Nut Oil Anaphylaxis    Chocolate Anaphylaxis    Nickel Anaphylaxis    Nuts Anaphylaxis    Tree Nut Anaphylaxis    Patten Itching    Patten Oil Other (See Comments)    Bacitracin Hives     Cannot remember, identified through testing    Baclofen Other (See Comments)    Chlorhexidine Unknown - Low Severity    Ciprofloxacin Other (See Comments)     THRUSH PER PATIENT    Citric Acid Unknown (See Comments)     Unsure      Covid-19 (Mrna) Vaccine Other (See Comments)     INSTRUCTED PER ALLERGIST SHE CAN NOT TAKE D/T ONE OF THE PRESERVATIVES    INSTRUCTED PER ALLERGIST SHE CAN NOT TAKE D/T ONE OF THE PRESERVATIVES      *is able to & has had the PFIZER vaccine*    Egg-Derived Products Nausea And Vomiting    Influenza Vaccines Nausea And Vomiting    Methyldibromoglutaronitrile Unknown (See Comments)      PATIENT UNSURE OF REACTION.    Naproxen Other (See Comments)    Neomycin Unknown (See Comments)      PATIENT UNSURE OF REACTION.    Omnicef [Cefdinir] Other (See Comments)     THRUSH PER PATIENT    Palladium Chloride Unknown (See Comments)      PATIENT UNSURE OF REACTION    Penicillins Other (See Comments)     THRUSH PER PATIENT    Pineapple Extract Hives    Sulfa Antibiotics Other (See Comments)     THRUSH PER PATIENT    Yeast-Derived Drug Products Hives    Adhesive Tape Unknown - Low Severity and Rash    Gold-Containing Drug Products Rash          Latex Rash        Social History     Socioeconomic History    Marital status:      "Number of children: 3    Years of education: College   Tobacco Use    Smoking status: Never     Passive exposure: Never    Smokeless tobacco: Never   Vaping Use    Vaping status: Never Used   Substance and Sexual Activity    Alcohol use: Yes     Alcohol/week: 1.0 standard drink of alcohol     Types: 1 Glasses of wine per week     Comment: WEEKLY    Drug use: No    Sexual activity: Defer        Family History   Problem Relation Age of Onset    Asthma Mother     Thyroid disease Father     Diabetes Maternal Grandmother     Breast cancer Maternal Aunt     Colon cancer Maternal Grandfather     Malig Hyperthermia Neg Hx       Review of Systems    Objective     Vitals:    02/07/25 1525   BP: 111/65   Pulse: 71   Resp: 16   Temp: 98.7 °F (37.1 °C)   TempSrc: Oral   SpO2: 98%   Weight: 112 kg (246 lb)   Height: 149 cm (58.66\")   PainSc:   2   PainLoc: Generalized  Comment: arthritis             2/7/2025     3:27 PM   Current Status   ECOG score 1     Physical Exam   Constitutional: She is oriented to person, place, and time. She appears well-developed. No distress.   HENT:   Head: Normocephalic.   Eyes: Pupils are equal, round, and reactive to light.   Cardiovascular: Normal rate, regular rhythm, normal heart sounds and normal pulses.   Pulmonary/Chest: Effort normal and breath sounds normal.   Musculoskeletal: Normal range of motion. No deformity.   Lymphadenopathy:        Right: No supraclavicular adenopathy present.        Left: No supraclavicular adenopathy present.   Neurological: She is alert and oriented to person, place, and time. No cranial nerve deficit.   Skin: Skin is warm and dry. No rash noted. No pallor.   Psychiatric: Her behavior is normal.         RECENT LABS:  Results from last 7 days   Lab Units 02/07/25  1516   WBC 10*3/mm3 10.38   NEUTROS ABS 10*3/mm3 6.71   HEMOGLOBIN g/dL 11.1*   HEMATOCRIT % 34.5   PLATELETS 10*3/mm3 253         Results from last 7 days   Lab Units 02/07/25  1516   SODIUM mmol/L 139 "   POTASSIUM mmol/L 4.0   CHLORIDE mmol/L 104   CO2 mmol/L 27.4   BUN mg/dL 20   CREATININE mg/dL 0.91   CALCIUM mg/dL 9.3   ALBUMIN g/dL 3.9   BILIRUBIN mg/dL 0.3   ALK PHOS U/L 79   ALT (SGPT) U/L 25   AST (SGOT) U/L 23   GLUCOSE mg/dL 116*   FERRITIN ng/mL 220.10*   IRON mcg/dL 42   TIBC mcg/dL 294*                 Two View Chest X-Ray 02/24/19  FINDINGS: The lungs are moderately expanded and clear and there is no  evidence of localized pneumonia. The heart is borderline enlarged  without change from 07/23/2012.    CT Abdomen Pelvis 12/06/18 scanned Image.    Assessment & Plan     1.  Multifactorial  anemia.    Seen in consultation March 2019.  Patient reporting longstanding anemia with hemoglobin running in the 10-11 range.  Patient has underlying CKD stage III.  Work-up at that time did not reveal any vitamin deficiency including no iron, B12 or folate deficiency.  No underlying monoclonal protein.  Anemia felt to be related to CKD with also possibly an element of chronic disease with underlying rheumatoid arthritis.  Sed rate was mildly elevated at 44.  Patient lost to follow-up since that time.  Last colonoscopy and EGD were performed per Dr. Kapoor, negative.  Patient also had a small bowel follow-through which was within normal limits. Other pertinent imaging include a CT of the abdomen pelvis performed December 2018 which was negative.  She is up-to-date on her mammogram, last performed August 2020, negative.  March 10, 2021: Patient's hemoglobin today is 9.8, discussed consideration of Procrit and will get approval.  Discussed side effects in length with patient today.  We could consider starting Procrit if her hemoglobin continues to drop.  May 4/2022: Patient was sent here for anemia of chronic kidney disease to see if she is eligible for Procrit.  Since the time she has been seen here she has not required Procrit as her hemoglobin is always about greater than 10.  Iron studies are normal.   May 10, 2023:  We had ordered anemia work-up but did not get done.  She does have anemia of chronic kidney disease but it could be multifactorial and hence will check iron studies B12 folate CAROLINE rheumatoid factor C-reactive protein and EPO level today.  November 15, 2023: Hemoglobin 9.9 serum protein electrophoresis had not shown any monoclonal protein.  ESR has been normal at 30.  CAROLINE negative, ANCA level 9.7, rheumatoid factor less than 10, C-reactive protein less than 1.3 and ferritin in the past has been 396.  We will recheck ferritin today  5/8/2024 hemoglobin today 10.9, ferritin 270, iron saturation 12%.  Does not qualify for Procrit, no need for iron.  Will continue to closely monitor.  Currently all the anemia workup has been negative.  6-month evaluation 2/7/2025 with improved hemoglobin at 11.1, ferritin 220, iron saturation 14%, labs most consistent with anemia of chronic disease    2.  Rheumatoid arthritis for which she takes diclofenac.    3.  Chronic kidney disease stage III.    This is managed per Dr. Fraser.    Creatinine appears relatively stable range from 1.2-1.7 over the last 2 years.  BUN 20s to 30s.  GFR last measured around 39.  This could be the cause of her anemia.    Patient continues to follow with nephrology.  Still not a candidate for Procrit given hemoglobin greater than 10  Creatinine is actually normal today at 0.9.    4.  Left shoulder replacement surgery  May 10, 2021 done by Dr. Anderson    5.  Hypertension stable.      6.  Anaphylactic reaction:   Multiple allergies      PLAN:  Hemoglobin is currently improved, the patient will remain off oral iron. She is also not a candidate for procrit.  6-month follow-up with Dr. Reinoso with CBC, ferritin, iron profile, CMP      Tatiana Solorzano, APRN  02/07/2025        Dr. Teo Devi, orthopedic

## 2025-02-25 RX ORDER — ALBUTEROL SULFATE 90 UG/1
2 INHALANT RESPIRATORY (INHALATION) EVERY 4 HOURS PRN
Qty: 18 G | Refills: 0 | Status: SHIPPED | OUTPATIENT
Start: 2025-02-25

## 2025-02-28 DIAGNOSIS — M25.561 CHRONIC PAIN OF BOTH KNEES: ICD-10-CM

## 2025-02-28 DIAGNOSIS — G89.29 CHRONIC PAIN OF BOTH KNEES: ICD-10-CM

## 2025-02-28 DIAGNOSIS — M25.562 CHRONIC PAIN OF BOTH KNEES: ICD-10-CM

## 2025-03-01 RX ORDER — TRAMADOL HYDROCHLORIDE 50 MG/1
50 TABLET ORAL 2 TIMES DAILY PRN
Qty: 180 TABLET | Refills: 1 | Status: SHIPPED | OUTPATIENT
Start: 2025-03-01

## 2025-03-15 ENCOUNTER — APPOINTMENT (OUTPATIENT)
Dept: GENERAL RADIOLOGY | Facility: HOSPITAL | Age: 65
End: 2025-03-15
Payer: COMMERCIAL

## 2025-03-15 ENCOUNTER — APPOINTMENT (OUTPATIENT)
Dept: ULTRASOUND IMAGING | Facility: HOSPITAL | Age: 65
End: 2025-03-15
Payer: COMMERCIAL

## 2025-03-15 ENCOUNTER — HOSPITAL ENCOUNTER (EMERGENCY)
Facility: HOSPITAL | Age: 65
Discharge: HOME | End: 2025-03-15
Attending: EMERGENCY MEDICINE
Payer: COMMERCIAL

## 2025-03-15 VITALS
SYSTOLIC BLOOD PRESSURE: 160 MMHG | RESPIRATION RATE: 16 BRPM | WEIGHT: 230 LBS | OXYGEN SATURATION: 98 % | TEMPERATURE: 96.9 F | HEART RATE: 66 BPM | BODY MASS INDEX: 46.37 KG/M2 | HEIGHT: 59 IN | DIASTOLIC BLOOD PRESSURE: 74 MMHG

## 2025-03-15 DIAGNOSIS — L03.119 CELLULITIS OF LOWER EXTREMITY, UNSPECIFIED LATERALITY: Primary | ICD-10-CM

## 2025-03-15 LAB
ALBUMIN SERPL-MCNC: 3.9 G/DL (ref 3.5–5.2)
ALBUMIN/GLOB SERPL: 1.3 G/DL
ALP SERPL-CCNC: 72 U/L (ref 39–117)
ALT SERPL W P-5'-P-CCNC: 19 U/L (ref 1–33)
ANION GAP SERPL CALCULATED.3IONS-SCNC: 9.5 MMOL/L (ref 5–15)
AST SERPL-CCNC: 22 U/L (ref 1–32)
BASOPHILS # BLD AUTO: 0.04 10*3/MM3 (ref 0–0.2)
BASOPHILS NFR BLD AUTO: 0.5 % (ref 0–1.5)
BILIRUB SERPL-MCNC: 0.4 MG/DL (ref 0–1.2)
BUN SERPL-MCNC: 12 MG/DL (ref 8–23)
BUN/CREAT SERPL: 14.1 (ref 7–25)
CALCIUM SPEC-SCNC: 8.7 MG/DL (ref 8.6–10.5)
CHLORIDE SERPL-SCNC: 105 MMOL/L (ref 98–107)
CO2 SERPL-SCNC: 25.5 MMOL/L (ref 22–29)
CREAT SERPL-MCNC: 0.85 MG/DL (ref 0.57–1)
D-LACTATE SERPL-SCNC: 1.1 MMOL/L (ref 0.5–2)
DEPRECATED RDW RBC AUTO: 44.5 FL (ref 37–54)
EGFRCR SERPLBLD CKD-EPI 2021: 76.6 ML/MIN/1.73
EOSINOPHIL # BLD AUTO: 0.45 10*3/MM3 (ref 0–0.4)
EOSINOPHIL NFR BLD AUTO: 5.7 % (ref 0.3–6.2)
ERYTHROCYTE [DISTWIDTH] IN BLOOD BY AUTOMATED COUNT: 12.9 % (ref 12.3–15.4)
GLOBULIN UR ELPH-MCNC: 3 GM/DL
GLUCOSE SERPL-MCNC: 120 MG/DL (ref 65–99)
HCT VFR BLD AUTO: 33.2 % (ref 34–46.6)
HGB BLD-MCNC: 10.6 G/DL (ref 12–15.9)
IMM GRANULOCYTES # BLD AUTO: 0.01 10*3/MM3 (ref 0–0.05)
IMM GRANULOCYTES NFR BLD AUTO: 0.1 % (ref 0–0.5)
LYMPHOCYTES # BLD AUTO: 1.35 10*3/MM3 (ref 0.7–3.1)
LYMPHOCYTES NFR BLD AUTO: 17 % (ref 19.6–45.3)
MCH RBC QN AUTO: 30.1 PG (ref 26.6–33)
MCHC RBC AUTO-ENTMCNC: 31.9 G/DL (ref 31.5–35.7)
MCV RBC AUTO: 94.3 FL (ref 79–97)
MONOCYTES # BLD AUTO: 0.58 10*3/MM3 (ref 0.1–0.9)
MONOCYTES NFR BLD AUTO: 7.3 % (ref 5–12)
NEUTROPHILS NFR BLD AUTO: 5.53 10*3/MM3 (ref 1.7–7)
NEUTROPHILS NFR BLD AUTO: 69.4 % (ref 42.7–76)
NT-PROBNP SERPL-MCNC: 645.8 PG/ML (ref 0–900)
PLATELET # BLD AUTO: 238 10*3/MM3 (ref 140–450)
PMV BLD AUTO: 9.3 FL (ref 6–12)
POTASSIUM SERPL-SCNC: 3.8 MMOL/L (ref 3.5–5.2)
PROT SERPL-MCNC: 6.9 G/DL (ref 6–8.5)
RBC # BLD AUTO: 3.52 10*6/MM3 (ref 3.77–5.28)
SODIUM SERPL-SCNC: 140 MMOL/L (ref 136–145)
WBC NRBC COR # BLD AUTO: 7.96 10*3/MM3 (ref 3.4–10.8)

## 2025-03-15 PROCEDURE — 85025 COMPLETE CBC W/AUTO DIFF WBC: CPT

## 2025-03-15 PROCEDURE — 83880 ASSAY OF NATRIURETIC PEPTIDE: CPT

## 2025-03-15 PROCEDURE — 99284 EMERGENCY DEPT VISIT MOD MDM: CPT

## 2025-03-15 PROCEDURE — 93005 ELECTROCARDIOGRAM TRACING: CPT | Performed by: EMERGENCY MEDICINE

## 2025-03-15 PROCEDURE — 80053 COMPREHEN METABOLIC PANEL: CPT

## 2025-03-15 PROCEDURE — 71045 X-RAY EXAM CHEST 1 VIEW: CPT

## 2025-03-15 PROCEDURE — 83605 ASSAY OF LACTIC ACID: CPT | Performed by: NURSE PRACTITIONER

## 2025-03-15 PROCEDURE — 36415 COLL VENOUS BLD VENIPUNCTURE: CPT

## 2025-03-15 PROCEDURE — 93970 EXTREMITY STUDY: CPT

## 2025-03-15 RX ORDER — CLINDAMYCIN HYDROCHLORIDE 300 MG/1
300 CAPSULE ORAL 3 TIMES DAILY
Qty: 21 CAPSULE | Refills: 0 | Status: SHIPPED | OUTPATIENT
Start: 2025-03-15 | End: 2025-03-21 | Stop reason: SDUPTHER

## 2025-03-15 RX ORDER — SODIUM CHLORIDE 0.9 % (FLUSH) 0.9 %
10 SYRINGE (ML) INJECTION AS NEEDED
Status: DISCONTINUED | OUTPATIENT
Start: 2025-03-15 | End: 2025-03-15 | Stop reason: HOSPADM

## 2025-03-15 RX ORDER — CLINDAMYCIN HYDROCHLORIDE 150 MG/1
450 CAPSULE ORAL ONCE
Status: COMPLETED | OUTPATIENT
Start: 2025-03-15 | End: 2025-03-15

## 2025-03-15 RX ADMIN — Medication 10 ML: at 12:02

## 2025-03-15 RX ADMIN — CLINDAMYCIN HYDROCHLORIDE 450 MG: 150 CAPSULE ORAL at 15:40

## 2025-03-15 NOTE — FSED PROVIDER NOTE
EMERGENCY DEPARTMENT ENCOUNTER    Room Number:  05/05  Date seen:  3/15/2025  Time seen: 13:22 EDT  PCP: Teo Fraser MD  Historian: patient    Discussed/obtained information from independent historians: n/a    HPI:  Chief complaint:BLE edema  A complete HPI/ROS/PMH/PSH/SH/FH are unobtainable due to: n/a  Context:Pedrito Powell is a 64 y.o. female with history of knee arthritis, hypertension, hyperlipidemia, asthma, CKD who presents to the ED with c/o days of acute moderate firmness, swelling and mild erythema to her bilateral lower legs.  It is not made better or worse by anything.  She did just recently take a car trip with her sister to [place] and she reports that the sister does not stop as frequently as she prefers and that she did a lot more ambulating while there.  She denies any history of CHF and has not had this problem before.    External (non-ED) record review: I reviewed recent oncology office visit notes from [facility] 7/20/2025.  Patient was seen in follow-up for chronic anemia    Chronic or social conditions impacting care: Not applicable    ALLERGIES  Cashew nut oil, Chocolate, Nickel, Nuts, Tree nut, [place], [place] oil, Bacitracin, Baclofen, Chlorhexidine, Ciprofloxacin, Citric acid, Covid-19 (mrna) vaccine, Egg-derived products, Influenza vaccines, Methyldibromoglutaronitrile, Naproxen, Neomycin, Omnicef [cefdinir], Palladium chloride, Penicillins, Pineapple extract, Sulfa antibiotics, Yeast-derived drug products, Adhesive tape, Gold-containing drug products, and Latex    PAST MEDICAL HISTORY  Active Ambulatory Problems     Diagnosis Date Noted    Hx of anaphylaxis 01/19/2018    Primary osteoarthritis of knee 01/19/2018    High risk medication use 01/19/2018    HTN (hypertension) 04/03/2018    Hyperlipidemia 04/03/2018    Vertigo 05/02/2018    Infected blister of left foot 05/02/2018    Ganglion of right wrist 11/27/2018    Vomiting and diarrhea 12/07/2018    Intractable vomiting  with nausea 2018    Fatigue 2019    Acute gastritis without hemorrhage 2019    Anemia 2019    Elevated serum creatinine 2019    Fungal rash of torso 10/28/2019    Cellulitis of abdominal wall 10/28/2019    Bronchitis 10/28/2019    Arthritis of shoulder 2021    Chronic renal failure in pediatric patient, stage 3 (moderate) 2021    H/O shoulder surgery 2021    Anxiety 2021    Depression 2021    Iron deficiency 11/15/2023    Dizziness 2024    Hypoxia 2024    COVID-19 virus infection 2024     Resolved Ambulatory Problems     Diagnosis Date Noted    No Resolved Ambulatory Problems     Past Medical History:   Diagnosis Date    Arthritis     Asthma     Chronic kidney disease     Elevated cholesterol     GERD (gastroesophageal reflux disease)     History of carpal tunnel syndrome     Hypertension     Left shoulder pain     Limited mobility     Weakness        PAST SURGICAL HISTORY  Past Surgical History:   Procedure Laterality Date    CARPAL TUNNEL RELEASE Left 10/02/2023     SECTION  , ,     COLONOSCOPY N/A 12/10/2018    normal ileum, IH, hyperplastic polyp, otherwise normal exam    ENDOSCOPY N/A 12/10/2018    no gross lesions in esophagus or examined duodenum, erythematous mucosa in stomach, biopsies benign    HYSTERECTOMY      OOPHORECTOMY Bilateral     SHOULDER MANIPULATION Left     TOTAL SHOULDER ARTHROPLASTY Left 2021    Procedure: REVERSE TOTAL SHOULDER ARTHROPLASTY,  BICEP TENDONDESIS;  Surgeon: Bethel Devi MD;  Location: [facility];  Service: Orthopedics;  Laterality: Left;    TRIGGER FINGER RELEASE Left     3 fingers       FAMILY HISTORY  Family History   Problem Relation Age of Onset    Asthma Mother     Thyroid disease Father     Diabetes Maternal Grandmother     Breast cancer Maternal Aunt     Colon cancer Maternal Grandfather     Malig Hyperthermia Neg Hx        SOCIAL HISTORY  Social History      Socioeconomic History    Marital status:     Number of children: 3    Years of education: College   Tobacco Use    Smoking status: Never     Passive exposure: Never    Smokeless tobacco: Never   Vaping Use    Vaping status: Never Used   Substance and Sexual Activity    Alcohol use: Yes     Alcohol/week: 1.0 standard drink of alcohol     Types: 1 Glasses of wine per week     Comment: WEEKLY    Drug use: No    Sexual activity: Defer       REVIEW OF SYSTEMS  Review of Systems    All systems reviewed and negative except for those discussed in HPI.     PHYSICAL EXAM    I have reviewed the triage vital signs and nursing notes.  Vitals:    03/15/25 1500   BP: 160/74   Pulse: 66   Resp:    Temp:    SpO2: 98%     Physical Exam    GENERAL: not distressed, pleasant and cooperative  HENT: nares patent  EYES: no scleral icterus  NECK: no ROM limitations  CV: regular rhythm, regular rate, no murmur  RESPIRATORY: normal effort. CTAB  ABDOMEN: soft  : deferred  MUSCULOSKELETAL: no deformity or bony tenderness but BLE's below the knees is moderately edematous, pitting 3+ and tissue feels dense. The DP/PT 2+ bilaterally.   NEURO: alert, moves all extremities, follows commands  SKIN: warm, dry    LAB RESULTS  Recent Results (from the past 24 hours)   Comprehensive Metabolic Panel    Collection Time: 03/15/25 12:01 PM    Specimen: Arm, Right; Blood   Result Value Ref Range    Glucose 120 (H) 65 - 99 mg/dL    BUN 12 8 - 23 mg/dL    Creatinine 0.85 0.57 - 1.00 mg/dL    Sodium 140 136 - 145 mmol/L    Potassium 3.8 3.5 - 5.2 mmol/L    Chloride 105 98 - 107 mmol/L    CO2 25.5 22.0 - 29.0 mmol/L    Calcium 8.7 8.6 - 10.5 mg/dL    Total Protein 6.9 6.0 - 8.5 g/dL    Albumin 3.9 3.5 - 5.2 g/dL    ALT (SGPT) 19 1 - 33 U/L    AST (SGOT) 22 1 - 32 U/L    Alkaline Phosphatase 72 39 - 117 U/L    Total Bilirubin 0.4 0.0 - 1.2 mg/dL    Globulin 3.0 gm/dL    A/G Ratio 1.3 g/dL    BUN/Creatinine Ratio 14.1 7.0 - 25.0    Anion Gap 9.5 5.0 -  15.0 mmol/L    eGFR 76.6 >60.0 mL/min/1.73   BNP    Collection Time: 03/15/25 12:01 PM    Specimen: Arm, Right; Blood   Result Value Ref Range    proBNP 645.8 0.0 - 900.0 pg/mL   CBC Auto Differential    Collection Time: 03/15/25 12:01 PM    Specimen: Arm, Right; Blood   Result Value Ref Range    WBC 7.96 3.40 - 10.80 10*3/mm3    RBC 3.52 (L) 3.77 - 5.28 10*6/mm3    Hemoglobin 10.6 (L) 12.0 - 15.9 g/dL    Hematocrit 33.2 (L) 34.0 - 46.6 %    MCV 94.3 79.0 - 97.0 fL    MCH 30.1 26.6 - 33.0 pg    MCHC 31.9 31.5 - 35.7 g/dL    RDW 12.9 12.3 - 15.4 %    RDW-SD 44.5 37.0 - 54.0 fl    MPV 9.3 6.0 - 12.0 fL    Platelets 238 140 - 450 10*3/mm3    Neutrophil % 69.4 42.7 - 76.0 %    Lymphocyte % 17.0 (L) 19.6 - 45.3 %    Monocyte % 7.3 5.0 - 12.0 %    Eosinophil % 5.7 0.3 - 6.2 %    Basophil % 0.5 0.0 - 1.5 %    Immature Grans % 0.1 0.0 - 0.5 %    Neutrophils, Absolute 5.53 1.70 - 7.00 10*3/mm3    Lymphocytes, Absolute 1.35 0.70 - 3.10 10*3/mm3    Monocytes, Absolute 0.58 0.10 - 0.90 10*3/mm3    Eosinophils, Absolute 0.45 (H) 0.00 - 0.40 10*3/mm3    Basophils, Absolute 0.04 0.00 - 0.20 10*3/mm3    Immature Grans, Absolute 0.01 0.00 - 0.05 10*3/mm3   ECG 12 Lead Other; bilateral leg swelling    Collection Time: 03/15/25 12:10 PM   Result Value Ref Range    QT Interval 426 ms    QTC Interval 466 ms   Lactic Acid, Plasma    Collection Time: 03/15/25  1:43 PM    Specimen: Blood   Result Value Ref Range    Lactate 1.1 0.5 - 2.0 mmol/L       Ordered the above labs and independently interpreted results.  My findings will be discussed in the ED course or medical decision making section below    RADIOLOGY RESULTS  XR Chest 1 View  Result Date: 3/15/2025  XR CHEST 1 VW-   INDICATION: Leg swelling.  COMPARISON: Chest radiograph December 21, 2024  TECHNIQUE: 1 view chest  FINDINGS:  No focal opacity. Low volumes. No effusions. Stable mediastinum. Heart is normal in size for AP technique. Total left shoulder arthroplasty. Right  glenohumeral osteoarthritis.      No acute cardiopulmonary process  This report was finalized on 3/15/2025 2:59 PM by Dr. Aron Germain M.D on Workstation: [id number]         Ordered the above noted radiological studies.  Independently interpreted by me.  My findings will be discussed in the medical decision section below.     PROGRESS, DATA ANALYSIS, CONSULTS AND MEDICAL DECISION MAKING    Please note that this section constitutes my independent interpretation of clinical data including lab results, radiology, EKG's.  This constitutes my independent professional opinion regarding differential diagnosis and management of this patient.  It may include any factors such as history from outside sources, review of external records, social determinants of health, management of medications, response to those treatments, and discussions with other providers.    ED Course as of 03/15/25 1523   Sat Mar 15, 2025   1400 EKG          EKG time: 1210  Rhythm/Rate: 72, sinus rhythm  P waves and NY: Normal NY, normal LEVON  QRS, axis: Normal QRS and axis  ST and T waves: No acute ST-T wave abnormalities    Interpreted Contemporaneously by me, independently viewed  Compared to prior dated 12/21/2024.  Similar   [EW]   1514 Preliminary ultrasound report negative for DVT bilateral lower extremities.  [EW]      ED Course User Index  [EW] Inga Hernandez APRN     Orders placed during this visit:  Orders Placed This Encounter   Procedures    XR Chest 1 View    US Venous Doppler Lower Extremity Bilateral (duplex)    Comprehensive Metabolic Panel    BNP    CBC Auto Differential    Lactic Acid, Plasma    Continuous Pulse Oximetry    Undress and Gown    ECG 12 Lead Other; bilateral leg swelling    Insert peripheral IV    CBC & Differential    ED Acknowledgement Form Needed;            Medical Decision Making  Problems Addressed:  Cellulitis of lower extremity, unspecified laterality, BILATERAL: complicated acute illness or  injury    Amount and/or Complexity of Data Reviewed  Labs: ordered.  Radiology: ordered.  ECG/medicine tests: ordered.    Risk  Prescription drug management.    Patient presents with significant pitting edema and mild redness to bilateral lower anterior legs.  I considered cellulitis, erysipelas, DVT due to recent travel and CHF.  Labs reassuring and lactic acid normal.  Ultrasound negative for DVT.  The compartments are able to be compressed but are quite edematous.  The redness has increased since she has been here and I did sujey it with a sharpie.  Patient was given first dose of clindamycin here and this was chosen based on multiple allergies.  Her pulses are intact and sensation is normal.  Did give patient return to ER precautions.  I did explain that if she worsens in the next 48 hours and has to come back that we would probably admit her.        DIAGNOSIS  Final diagnoses:   Cellulitis of lower extremity, unspecified laterality, BILATERAL          Medication List        New Prescriptions      clindamycin 300 MG capsule  Commonly known as: CLEOCIN  Take 1 capsule by mouth 3 (Three) Times a Day for 7 days.               Where to Get Your Medications        These medications were sent to [facility] [id number] - [place], [place] - [address] [facility] [address] - [phone]  - [phone]   [id number] [facility], [place] [id number]      Phone: [phone]   clindamycin 300 MG capsule         FOLLOW-UP  Teo Fraser MD  [address] [address] [facility] [address]  [facility] [id number] [phone]    Schedule an appointment as soon as possible for a visit   TO BE SEEN TUESDAY OR WEDNESDAY FOR RECHECK        Latest Documented Vital Signs:  As of 15:23 EDT  BP- 160/74 HR- 66 Temp- 96.9 °F (36.1 °C) (Oral) O2 sat- 98%    Appropriate PPE utilized throughout this patient encounter to include mask, if indicated, per current protocol. Hand hygiene was performed before donning PPE and after removal  when leaving the room.    Please note that portions of this were completed with a voice recognition program.     Note Disclaimer: At [facility], we believe that sharing information builds trust and better relationships. You are receiving this note because you are receiving care at [facility] or recently visited. It is possible you will see health information before a provider has talked with you about it. This kind of information can be easy to misunderstand. To help you fully understand what it means for your health, we urge you to discuss this note with your provider.

## 2025-03-15 NOTE — DISCHARGE INSTRUCTIONS
Clindamycin dose tonight and then starting tomorrow it will be three times a day.    Although you are being discharged from the ED today, I encourage you to return for worsening symptoms. Things can, and do, change such that treatment at home with medication may not be adequate. Specifically I recommend returning for chest pain or discomfort, difficulty breathing, persistent vomiting or difficulty holding down liquids or medications, fever > 102.0 F, sudden increase in redness, significant worsening of redness, pain that is not controlled with Tylenol or any other worsening or alarming symptoms.     Rest. Drink plenty of fluids.  Follow up with PCP or provider listed for further evaluation and management.  Follow up with primary care provider for further management and to have blood pressure rechecked.  Take all medications as prescribed.

## 2025-03-17 LAB
QT INTERVAL: 426 MS
QTC INTERVAL: 466 MS

## 2025-03-18 ENCOUNTER — TELEPHONE (OUTPATIENT)
Dept: INTERNAL MEDICINE | Facility: CLINIC | Age: 65
End: 2025-03-18
Payer: COMMERCIAL

## 2025-03-21 ENCOUNTER — OFFICE VISIT (OUTPATIENT)
Dept: INTERNAL MEDICINE | Facility: CLINIC | Age: 65
End: 2025-03-21
Payer: COMMERCIAL

## 2025-03-21 VITALS
DIASTOLIC BLOOD PRESSURE: 74 MMHG | WEIGHT: 228 LBS | RESPIRATION RATE: 14 BRPM | HEIGHT: 59 IN | SYSTOLIC BLOOD PRESSURE: 132 MMHG | TEMPERATURE: 99 F | BODY MASS INDEX: 45.96 KG/M2 | OXYGEN SATURATION: 96 % | HEART RATE: 91 BPM

## 2025-03-21 DIAGNOSIS — L03.119 CELLULITIS OF LOWER EXTREMITY, UNSPECIFIED LATERALITY: Primary | ICD-10-CM

## 2025-03-21 DIAGNOSIS — R60.0 EDEMA OF BOTH LOWER LEGS: ICD-10-CM

## 2025-03-21 PROCEDURE — 99214 OFFICE O/P EST MOD 30 MIN: CPT | Performed by: FAMILY MEDICINE

## 2025-03-21 RX ORDER — FUROSEMIDE 20 MG/1
20 TABLET ORAL DAILY
Qty: 4 TABLET | Refills: 0 | Status: ON HOLD | OUTPATIENT
Start: 2025-03-21 | End: 2025-03-25

## 2025-03-21 RX ORDER — CLINDAMYCIN HYDROCHLORIDE 300 MG/1
300 CAPSULE ORAL 3 TIMES DAILY
Qty: 12 CAPSULE | Refills: 0 | Status: SHIPPED | OUTPATIENT
Start: 2025-03-21 | End: 2025-03-21

## 2025-03-21 RX ORDER — POTASSIUM CHLORIDE 1500 MG/1
20 TABLET, EXTENDED RELEASE ORAL DAILY
Qty: 4 TABLET | Refills: 0 | Status: ON HOLD | OUTPATIENT
Start: 2025-03-21

## 2025-03-21 RX ORDER — DOXYCYCLINE 100 MG/1
100 CAPSULE ORAL 2 TIMES DAILY
Qty: 10 CAPSULE | Refills: 0 | Status: ON HOLD | OUTPATIENT
Start: 2025-03-21 | End: 2025-03-26

## 2025-03-22 LAB
ALBUMIN SERPL-MCNC: 3.7 G/DL (ref 3.5–5.2)
ALBUMIN/GLOB SERPL: 1.3 G/DL
ALP SERPL-CCNC: 74 U/L (ref 39–117)
ALT SERPL-CCNC: 17 U/L (ref 1–33)
AST SERPL-CCNC: 21 U/L (ref 1–32)
BASOPHILS # BLD AUTO: 0.06 10*3/MM3 (ref 0–0.2)
BASOPHILS NFR BLD AUTO: 0.9 % (ref 0–1.5)
BILIRUB SERPL-MCNC: 0.5 MG/DL (ref 0–1.2)
BUN SERPL-MCNC: 12 MG/DL (ref 8–23)
BUN/CREAT SERPL: 14 (ref 7–25)
CALCIUM SERPL-MCNC: 8.9 MG/DL (ref 8.6–10.5)
CHLORIDE SERPL-SCNC: 106 MMOL/L (ref 98–107)
CO2 SERPL-SCNC: 26.4 MMOL/L (ref 22–29)
CREAT SERPL-MCNC: 0.86 MG/DL (ref 0.57–1)
EGFRCR SERPLBLD CKD-EPI 2021: 75.5 ML/MIN/1.73
EOSINOPHIL # BLD AUTO: 0.48 10*3/MM3 (ref 0–0.4)
EOSINOPHIL NFR BLD AUTO: 7.2 % (ref 0.3–6.2)
ERYTHROCYTE [DISTWIDTH] IN BLOOD BY AUTOMATED COUNT: 12.7 % (ref 12.3–15.4)
GLOBULIN SER CALC-MCNC: 2.8 GM/DL
GLUCOSE SERPL-MCNC: 110 MG/DL (ref 65–99)
HCT VFR BLD AUTO: 30.9 % (ref 34–46.6)
HGB BLD-MCNC: 10.3 G/DL (ref 12–15.9)
IMM GRANULOCYTES # BLD AUTO: 0.02 10*3/MM3 (ref 0–0.05)
IMM GRANULOCYTES NFR BLD AUTO: 0.3 % (ref 0–0.5)
LYMPHOCYTES # BLD AUTO: 1.29 10*3/MM3 (ref 0.7–3.1)
LYMPHOCYTES NFR BLD AUTO: 19.3 % (ref 19.6–45.3)
MCH RBC QN AUTO: 30.2 PG (ref 26.6–33)
MCHC RBC AUTO-ENTMCNC: 33.3 G/DL (ref 31.5–35.7)
MCV RBC AUTO: 90.6 FL (ref 79–97)
MONOCYTES # BLD AUTO: 0.55 10*3/MM3 (ref 0.1–0.9)
MONOCYTES NFR BLD AUTO: 8.2 % (ref 5–12)
NEUTROPHILS # BLD AUTO: 4.3 10*3/MM3 (ref 1.7–7)
NEUTROPHILS NFR BLD AUTO: 64.1 % (ref 42.7–76)
NRBC BLD AUTO-RTO: 0 /100 WBC (ref 0–0.2)
PLATELET # BLD AUTO: 267 10*3/MM3 (ref 140–450)
POTASSIUM SERPL-SCNC: 3.6 MMOL/L (ref 3.5–5.2)
PROT SERPL-MCNC: 6.5 G/DL (ref 6–8.5)
RBC # BLD AUTO: 3.41 10*6/MM3 (ref 3.77–5.28)
SODIUM SERPL-SCNC: 139 MMOL/L (ref 136–145)
WBC # BLD AUTO: 6.7 10*3/MM3 (ref 3.4–10.8)

## 2025-03-23 ENCOUNTER — HOSPITAL ENCOUNTER (INPATIENT)
Facility: HOSPITAL | Age: 65
LOS: 10 days | Discharge: NURSING, REHAB OR LONG-TERM CARE | End: 2025-04-02
Attending: INTERNAL MEDICINE | Admitting: INTERNAL MEDICINE
Payer: COMMERCIAL

## 2025-03-23 ENCOUNTER — APPOINTMENT (OUTPATIENT)
Dept: MRI IMAGING | Facility: HOSPITAL | Age: 65
End: 2025-03-23
Payer: COMMERCIAL

## 2025-03-23 DIAGNOSIS — R50.9 ACUTE FEBRILE ILLNESS: ICD-10-CM

## 2025-03-23 DIAGNOSIS — G89.29 CHRONIC PAIN OF BOTH KNEES: ICD-10-CM

## 2025-03-23 DIAGNOSIS — M48.061 SPINAL STENOSIS, LUMBAR REGION, WITHOUT NEUROGENIC CLAUDICATION: ICD-10-CM

## 2025-03-23 DIAGNOSIS — M25.562 CHRONIC PAIN OF BOTH KNEES: ICD-10-CM

## 2025-03-23 DIAGNOSIS — R60.0 BILATERAL LOWER EXTREMITY EDEMA: ICD-10-CM

## 2025-03-23 DIAGNOSIS — M25.561 CHRONIC PAIN OF BOTH KNEES: ICD-10-CM

## 2025-03-23 DIAGNOSIS — M54.59 INTRACTABLE LOW BACK PAIN: Primary | ICD-10-CM

## 2025-03-23 PROBLEM — M54.50 ACUTE LOW BACK PAIN: Status: ACTIVE | Noted: 2025-03-23

## 2025-03-23 PROBLEM — M54.9 ACUTE BACK PAIN: Status: ACTIVE | Noted: 2025-03-23

## 2025-03-23 PROBLEM — L03.119 CELLULITIS OF LEG: Status: ACTIVE | Noted: 2025-03-23

## 2025-03-23 LAB
ALBUMIN SERPL-MCNC: 4 G/DL (ref 3.5–5.2)
ALBUMIN/GLOB SERPL: 1.4 G/DL
ALP SERPL-CCNC: 74 U/L (ref 39–117)
ALT SERPL W P-5'-P-CCNC: 15 U/L (ref 1–33)
ANION GAP SERPL CALCULATED.3IONS-SCNC: 8.7 MMOL/L (ref 5–15)
AST SERPL-CCNC: 20 U/L (ref 1–32)
B PARAPERT DNA SPEC QL NAA+PROBE: NOT DETECTED
B PERT DNA SPEC QL NAA+PROBE: NOT DETECTED
BACTERIA UR QL AUTO: ABNORMAL /HPF
BASOPHILS # BLD AUTO: 0.06 10*3/MM3 (ref 0–0.2)
BASOPHILS NFR BLD AUTO: 0.6 % (ref 0–1.5)
BILIRUB SERPL-MCNC: 0.7 MG/DL (ref 0–1.2)
BILIRUB UR QL STRIP: NEGATIVE
BUN SERPL-MCNC: 14 MG/DL (ref 8–23)
BUN/CREAT SERPL: 13.3 (ref 7–25)
C PNEUM DNA NPH QL NAA+NON-PROBE: NOT DETECTED
CALCIUM SPEC-SCNC: 8.6 MG/DL (ref 8.6–10.5)
CHLORIDE SERPL-SCNC: 100 MMOL/L (ref 98–107)
CLARITY UR: CLEAR
CO2 SERPL-SCNC: 25.3 MMOL/L (ref 22–29)
COLOR UR: YELLOW
CREAT SERPL-MCNC: 1.05 MG/DL (ref 0.57–1)
CRP SERPL-MCNC: 3.17 MG/DL (ref 0–0.5)
D-LACTATE SERPL-SCNC: 1 MMOL/L (ref 0.5–2)
DEPRECATED RDW RBC AUTO: 42.4 FL (ref 37–54)
EGFRCR SERPLBLD CKD-EPI 2021: 59.5 ML/MIN/1.73
EOSINOPHIL # BLD AUTO: 0.37 10*3/MM3 (ref 0–0.4)
EOSINOPHIL NFR BLD AUTO: 3.8 % (ref 0.3–6.2)
ERYTHROCYTE [DISTWIDTH] IN BLOOD BY AUTOMATED COUNT: 12.8 % (ref 12.3–15.4)
ERYTHROCYTE [SEDIMENTATION RATE] IN BLOOD: 26 MM/HR (ref 0–30)
FLUAV SUBTYP SPEC NAA+PROBE: NOT DETECTED
FLUBV RNA ISLT QL NAA+PROBE: NOT DETECTED
GLOBULIN UR ELPH-MCNC: 2.9 GM/DL
GLUCOSE SERPL-MCNC: 107 MG/DL (ref 65–99)
GLUCOSE UR STRIP-MCNC: NEGATIVE MG/DL
HADV DNA SPEC NAA+PROBE: NOT DETECTED
HCOV 229E RNA SPEC QL NAA+PROBE: NOT DETECTED
HCOV HKU1 RNA SPEC QL NAA+PROBE: NOT DETECTED
HCOV NL63 RNA SPEC QL NAA+PROBE: NOT DETECTED
HCOV OC43 RNA SPEC QL NAA+PROBE: NOT DETECTED
HCT VFR BLD AUTO: 30 % (ref 34–46.6)
HGB BLD-MCNC: 9.9 G/DL (ref 12–15.9)
HGB UR QL STRIP.AUTO: NEGATIVE
HMPV RNA NPH QL NAA+NON-PROBE: NOT DETECTED
HOLD SPECIMEN: NORMAL
HOLD SPECIMEN: NORMAL
HPIV1 RNA ISLT QL NAA+PROBE: NOT DETECTED
HPIV2 RNA SPEC QL NAA+PROBE: NOT DETECTED
HPIV3 RNA NPH QL NAA+PROBE: NOT DETECTED
HPIV4 P GENE NPH QL NAA+PROBE: NOT DETECTED
HYALINE CASTS UR QL AUTO: ABNORMAL /LPF
IMM GRANULOCYTES # BLD AUTO: 0.03 10*3/MM3 (ref 0–0.05)
IMM GRANULOCYTES NFR BLD AUTO: 0.3 % (ref 0–0.5)
KETONES UR QL STRIP: NEGATIVE
LEUKOCYTE ESTERASE UR QL STRIP.AUTO: ABNORMAL
LYMPHOCYTES # BLD AUTO: 1.26 10*3/MM3 (ref 0.7–3.1)
LYMPHOCYTES NFR BLD AUTO: 12.9 % (ref 19.6–45.3)
M PNEUMO IGG SER IA-ACNC: NOT DETECTED
MCH RBC QN AUTO: 30.2 PG (ref 26.6–33)
MCHC RBC AUTO-ENTMCNC: 33 G/DL (ref 31.5–35.7)
MCV RBC AUTO: 91.5 FL (ref 79–97)
MONOCYTES # BLD AUTO: 0.9 10*3/MM3 (ref 0.1–0.9)
MONOCYTES NFR BLD AUTO: 9.2 % (ref 5–12)
NEUTROPHILS NFR BLD AUTO: 7.14 10*3/MM3 (ref 1.7–7)
NEUTROPHILS NFR BLD AUTO: 73.2 % (ref 42.7–76)
NITRITE UR QL STRIP: NEGATIVE
NRBC BLD AUTO-RTO: 0 /100 WBC (ref 0–0.2)
PH UR STRIP.AUTO: 5.5 [PH] (ref 5–8)
PLATELET # BLD AUTO: 223 10*3/MM3 (ref 140–450)
PMV BLD AUTO: 8.8 FL (ref 6–12)
POTASSIUM SERPL-SCNC: 3.7 MMOL/L (ref 3.5–5.2)
PROCALCITONIN SERPL-MCNC: 0.03 NG/ML (ref 0–0.25)
PROT SERPL-MCNC: 6.9 G/DL (ref 6–8.5)
PROT UR QL STRIP: NEGATIVE
RBC # BLD AUTO: 3.28 10*6/MM3 (ref 3.77–5.28)
RBC # UR STRIP: ABNORMAL /HPF
REF LAB TEST METHOD: ABNORMAL
RHINOVIRUS RNA SPEC NAA+PROBE: NOT DETECTED
RSV RNA NPH QL NAA+NON-PROBE: NOT DETECTED
SARS-COV-2 RNA NPH QL NAA+NON-PROBE: NOT DETECTED
SODIUM SERPL-SCNC: 134 MMOL/L (ref 136–145)
SP GR UR STRIP: 1.01 (ref 1–1.03)
SQUAMOUS #/AREA URNS HPF: ABNORMAL /HPF
UROBILINOGEN UR QL STRIP: ABNORMAL
WBC # UR STRIP: ABNORMAL /HPF
WBC NRBC COR # BLD AUTO: 9.76 10*3/MM3 (ref 3.4–10.8)
WHOLE BLOOD HOLD COAG: NORMAL
WHOLE BLOOD HOLD SPECIMEN: NORMAL

## 2025-03-23 PROCEDURE — 25810000003 SODIUM CHLORIDE 0.9 % SOLUTION: Performed by: INTERNAL MEDICINE

## 2025-03-23 PROCEDURE — 25510000002 GADOBENATE DIMEGLUMINE 529 MG/ML SOLUTION: Performed by: INTERNAL MEDICINE

## 2025-03-23 PROCEDURE — 87040 BLOOD CULTURE FOR BACTERIA: CPT | Performed by: EMERGENCY MEDICINE

## 2025-03-23 PROCEDURE — 72158 MRI LUMBAR SPINE W/O & W/DYE: CPT

## 2025-03-23 PROCEDURE — 83605 ASSAY OF LACTIC ACID: CPT | Performed by: EMERGENCY MEDICINE

## 2025-03-23 PROCEDURE — 25010000002 HYDROMORPHONE 1 MG/ML SOLUTION: Performed by: EMERGENCY MEDICINE

## 2025-03-23 PROCEDURE — 84145 PROCALCITONIN (PCT): CPT | Performed by: EMERGENCY MEDICINE

## 2025-03-23 PROCEDURE — 94799 UNLISTED PULMONARY SVC/PX: CPT

## 2025-03-23 PROCEDURE — 0202U NFCT DS 22 TRGT SARS-COV-2: CPT | Performed by: EMERGENCY MEDICINE

## 2025-03-23 PROCEDURE — 99285 EMERGENCY DEPT VISIT HI MDM: CPT

## 2025-03-23 PROCEDURE — 25010000002 ONDANSETRON PER 1 MG: Performed by: EMERGENCY MEDICINE

## 2025-03-23 PROCEDURE — A9577 INJ MULTIHANCE: HCPCS | Performed by: INTERNAL MEDICINE

## 2025-03-23 PROCEDURE — 25010000002 HYDROMORPHONE PER 4 MG: Performed by: INTERNAL MEDICINE

## 2025-03-23 PROCEDURE — 81001 URINALYSIS AUTO W/SCOPE: CPT | Performed by: INTERNAL MEDICINE

## 2025-03-23 PROCEDURE — 87086 URINE CULTURE/COLONY COUNT: CPT | Performed by: INTERNAL MEDICINE

## 2025-03-23 PROCEDURE — 36415 COLL VENOUS BLD VENIPUNCTURE: CPT

## 2025-03-23 PROCEDURE — 85025 COMPLETE CBC W/AUTO DIFF WBC: CPT

## 2025-03-23 PROCEDURE — 86140 C-REACTIVE PROTEIN: CPT | Performed by: EMERGENCY MEDICINE

## 2025-03-23 PROCEDURE — 94640 AIRWAY INHALATION TREATMENT: CPT

## 2025-03-23 PROCEDURE — 85652 RBC SED RATE AUTOMATED: CPT | Performed by: EMERGENCY MEDICINE

## 2025-03-23 PROCEDURE — 80053 COMPREHEN METABOLIC PANEL: CPT

## 2025-03-23 RX ORDER — AZELASTINE 1 MG/ML
1 SPRAY, METERED NASAL DAILY
Status: DISCONTINUED | OUTPATIENT
Start: 2025-03-24 | End: 2025-04-02 | Stop reason: HOSPADM

## 2025-03-23 RX ORDER — DOXYCYCLINE 100 MG/1
100 CAPSULE ORAL EVERY 12 HOURS SCHEDULED
Status: COMPLETED | OUTPATIENT
Start: 2025-03-23 | End: 2025-03-26

## 2025-03-23 RX ORDER — LORAZEPAM 0.5 MG/1
0.5 TABLET ORAL ONCE
Status: COMPLETED | OUTPATIENT
Start: 2025-03-23 | End: 2025-03-23

## 2025-03-23 RX ORDER — ACETAMINOPHEN 500 MG
500 TABLET ORAL EVERY 6 HOURS PRN
Status: DISCONTINUED | OUTPATIENT
Start: 2025-03-23 | End: 2025-03-23 | Stop reason: SDUPTHER

## 2025-03-23 RX ORDER — SODIUM CHLORIDE 0.9 % (FLUSH) 0.9 %
10 SYRINGE (ML) INJECTION EVERY 12 HOURS SCHEDULED
Status: DISCONTINUED | OUTPATIENT
Start: 2025-03-23 | End: 2025-04-02 | Stop reason: HOSPADM

## 2025-03-23 RX ORDER — ACETAMINOPHEN 325 MG/1
650 TABLET ORAL EVERY 4 HOURS PRN
Status: DISCONTINUED | OUTPATIENT
Start: 2025-03-23 | End: 2025-04-02 | Stop reason: HOSPADM

## 2025-03-23 RX ORDER — POTASSIUM CHLORIDE 1500 MG/1
20 TABLET, EXTENDED RELEASE ORAL DAILY
Status: DISCONTINUED | OUTPATIENT
Start: 2025-03-23 | End: 2025-03-24

## 2025-03-23 RX ORDER — LOPERAMIDE HYDROCHLORIDE 2 MG/1
2 CAPSULE ORAL
Status: DISCONTINUED | OUTPATIENT
Start: 2025-03-23 | End: 2025-04-02 | Stop reason: HOSPADM

## 2025-03-23 RX ORDER — MECLIZINE HYDROCHLORIDE 25 MG/1
25 TABLET ORAL 3 TIMES DAILY PRN
Status: DISCONTINUED | OUTPATIENT
Start: 2025-03-23 | End: 2025-04-02 | Stop reason: HOSPADM

## 2025-03-23 RX ORDER — FUROSEMIDE 20 MG/1
20 TABLET ORAL DAILY
Status: DISCONTINUED | OUTPATIENT
Start: 2025-03-23 | End: 2025-03-24

## 2025-03-23 RX ORDER — BUDESONIDE 0.5 MG/2ML
0.5 INHALANT ORAL
Status: DISCONTINUED | OUTPATIENT
Start: 2025-03-23 | End: 2025-04-02 | Stop reason: HOSPADM

## 2025-03-23 RX ORDER — SODIUM CHLORIDE 0.9 % (FLUSH) 0.9 %
10 SYRINGE (ML) INJECTION AS NEEDED
Status: DISCONTINUED | OUTPATIENT
Start: 2025-03-23 | End: 2025-04-02 | Stop reason: HOSPADM

## 2025-03-23 RX ORDER — HYDROMORPHONE HYDROCHLORIDE 1 MG/ML
0.5 INJECTION, SOLUTION INTRAMUSCULAR; INTRAVENOUS; SUBCUTANEOUS
Status: DISCONTINUED | OUTPATIENT
Start: 2025-03-23 | End: 2025-04-02 | Stop reason: HOSPADM

## 2025-03-23 RX ORDER — FAMOTIDINE 20 MG/1
20 TABLET, FILM COATED ORAL
Status: DISCONTINUED | OUTPATIENT
Start: 2025-03-23 | End: 2025-04-02 | Stop reason: HOSPADM

## 2025-03-23 RX ORDER — ONDANSETRON 2 MG/ML
4 INJECTION INTRAMUSCULAR; INTRAVENOUS ONCE
Status: COMPLETED | OUTPATIENT
Start: 2025-03-23 | End: 2025-03-23

## 2025-03-23 RX ORDER — SODIUM CHLORIDE 9 MG/ML
100 INJECTION, SOLUTION INTRAVENOUS CONTINUOUS
Status: ACTIVE | OUTPATIENT
Start: 2025-03-23 | End: 2025-03-24

## 2025-03-23 RX ORDER — SODIUM CHLORIDE 9 MG/ML
40 INJECTION, SOLUTION INTRAVENOUS AS NEEDED
Status: DISCONTINUED | OUTPATIENT
Start: 2025-03-23 | End: 2025-04-02 | Stop reason: HOSPADM

## 2025-03-23 RX ORDER — ACETAMINOPHEN 650 MG/1
650 SUPPOSITORY RECTAL EVERY 4 HOURS PRN
Status: DISCONTINUED | OUTPATIENT
Start: 2025-03-23 | End: 2025-04-02 | Stop reason: HOSPADM

## 2025-03-23 RX ORDER — LOSARTAN POTASSIUM 100 MG/1
100 TABLET ORAL
Status: DISCONTINUED | OUTPATIENT
Start: 2025-03-24 | End: 2025-04-02 | Stop reason: HOSPADM

## 2025-03-23 RX ORDER — TRAMADOL HYDROCHLORIDE 50 MG/1
50 TABLET ORAL 2 TIMES DAILY PRN
Status: DISCONTINUED | OUTPATIENT
Start: 2025-03-23 | End: 2025-04-02 | Stop reason: HOSPADM

## 2025-03-23 RX ORDER — MULTIVIT-MIN/IRON/FOLIC ACID/K 18-600-40
2000 CAPSULE ORAL DAILY
Status: DISCONTINUED | OUTPATIENT
Start: 2025-03-24 | End: 2025-03-24 | Stop reason: CLARIF

## 2025-03-23 RX ORDER — ACETAMINOPHEN 160 MG/5ML
650 SOLUTION ORAL EVERY 4 HOURS PRN
Status: DISCONTINUED | OUTPATIENT
Start: 2025-03-23 | End: 2025-04-02 | Stop reason: HOSPADM

## 2025-03-23 RX ORDER — ATORVASTATIN CALCIUM 20 MG/1
80 TABLET, FILM COATED ORAL DAILY
Status: DISCONTINUED | OUTPATIENT
Start: 2025-03-24 | End: 2025-04-02 | Stop reason: HOSPADM

## 2025-03-23 RX ORDER — ONDANSETRON 2 MG/ML
4 INJECTION INTRAMUSCULAR; INTRAVENOUS EVERY 6 HOURS PRN
Status: DISCONTINUED | OUTPATIENT
Start: 2025-03-23 | End: 2025-04-02 | Stop reason: HOSPADM

## 2025-03-23 RX ORDER — ALBUTEROL SULFATE 90 UG/1
2 INHALANT RESPIRATORY (INHALATION) EVERY 4 HOURS PRN
Status: DISCONTINUED | OUTPATIENT
Start: 2025-03-23 | End: 2025-04-02 | Stop reason: HOSPADM

## 2025-03-23 RX ORDER — BENZONATATE 100 MG/1
100 CAPSULE ORAL 3 TIMES DAILY PRN
Status: DISCONTINUED | OUTPATIENT
Start: 2025-03-23 | End: 2025-04-02 | Stop reason: HOSPADM

## 2025-03-23 RX ORDER — IPRATROPIUM BROMIDE 21 UG/1
1 SPRAY, METERED NASAL 3 TIMES DAILY
Status: DISCONTINUED | OUTPATIENT
Start: 2025-03-23 | End: 2025-04-02 | Stop reason: HOSPADM

## 2025-03-23 RX ADMIN — POTASSIUM CHLORIDE 20 MEQ: 1500 TABLET, EXTENDED RELEASE ORAL at 20:24

## 2025-03-23 RX ADMIN — ONDANSETRON 4 MG: 2 INJECTION, SOLUTION INTRAMUSCULAR; INTRAVENOUS at 15:08

## 2025-03-23 RX ADMIN — Medication 10 ML: at 20:25

## 2025-03-23 RX ADMIN — GADOBENATE DIMEGLUMINE 20 ML: 529 INJECTION, SOLUTION INTRAVENOUS at 22:03

## 2025-03-23 RX ADMIN — IPRATROPIUM BROMIDE 1 SPRAY: 21 SPRAY, METERED NASAL at 20:26

## 2025-03-23 RX ADMIN — DOXYCYCLINE 100 MG: 100 CAPSULE ORAL at 20:23

## 2025-03-23 RX ADMIN — FUROSEMIDE 20 MG: 20 TABLET ORAL at 20:23

## 2025-03-23 RX ADMIN — FAMOTIDINE 20 MG: 20 TABLET, FILM COATED ORAL at 20:24

## 2025-03-23 RX ADMIN — SODIUM CHLORIDE 100 ML/HR: 900 INJECTION, SOLUTION INTRAVENOUS at 20:24

## 2025-03-23 RX ADMIN — LORAZEPAM 0.5 MG: 0.5 TABLET ORAL at 21:13

## 2025-03-23 RX ADMIN — HYDROMORPHONE HYDROCHLORIDE 0.5 MG: 1 INJECTION, SOLUTION INTRAMUSCULAR; INTRAVENOUS; SUBCUTANEOUS at 20:39

## 2025-03-23 RX ADMIN — HYDROMORPHONE HYDROCHLORIDE 0.5 MG: 1 INJECTION, SOLUTION INTRAMUSCULAR; INTRAVENOUS; SUBCUTANEOUS at 18:28

## 2025-03-23 RX ADMIN — BUDESONIDE 0.5 MG: 0.5 INHALANT RESPIRATORY (INHALATION) at 23:22

## 2025-03-23 RX ADMIN — HYDROMORPHONE HYDROCHLORIDE 1 MG: 1 INJECTION, SOLUTION INTRAMUSCULAR; INTRAVENOUS; SUBCUTANEOUS at 15:08

## 2025-03-23 NOTE — H&P
Internal medicine history and physical  INTERNAL MEDICINE   [facility]       Patient Identification:  Name: Pedrito Powell  Age: 64 y.o.  Sex: female  :  1960  MRN: [id number]                   Primary Care Physician: Teo Fraser MD                               Date of admission:3/23/2025    Chief Complaint: Acute pain in the lower back with inability to ambulate and walk starting at around 8:00 last night.    History of Present Illness:   Patient is a 64-year-old female with morbid obesity, chronic lower extremity edema chronic kidney disease, hypertension, history of gastroesophageal reflux disease as well as asthma and anemia developed significant bilateral lower extremity redness and swelling which was initially treated with a course of antibiotics without any improvement and subsequently changed to doxycycline which resulted in significant decrease in the redness.  Because of persistent swelling patient was started on Lasix which she took first dose on Friday night.  Subsequently patient developed severe pain and discomfort last night in her lower back while she was sitting at around 8:00 last night.  According to her she noticed the pain if she attempted to get up.  The pain does not go into her lower extremities and she does not have any associated incontinence.  Patient does not recall having a busy day in which she did house chores and laundry and other staff.  She says that because of the swelling in her leg she had to take a time to do these things but does require repetitive bending over picking up laundry and folding it.  In the emergency room patient told ER physician that she had some chills and low-grade fever but never more than 100.  Workup in the emergency room did show temperature of 100.  Patient does not have any urinary symptoms or flank pain.  A lactate level was normal and procalcitonin was 0.03.  According to her her both lower extremity redness is much  improved on current treatment with doxycycline and wants to continue her antibiotics.  In the emergency room MRI of the lower back is ordered and patient is being admitted for further care.      Past Medical History:  Past Medical History:   Diagnosis Date    Anemia     IRON INFUSION RECENTLY    Arthritis     Asthma     Chronic kidney disease     stage 3    Elevated cholesterol     GERD (gastroesophageal reflux disease)     High risk medication use 2018    History of carpal tunnel syndrome     Hyperlipidemia     Hypertension     Intractable vomiting with nausea 2018    Left shoulder pain     Limited mobility     Vertigo     Weakness     AT TIMES LEFT SHOULDER/LEFT ARM     Past Surgical History:  Past Surgical History:   Procedure Laterality Date    CARPAL TUNNEL RELEASE Left 10/02/2023     SECTION  , ,     COLONOSCOPY N/A 12/10/2018    normal ileum, IH, hyperplastic polyp, otherwise normal exam    ENDOSCOPY N/A 12/10/2018    no gross lesions in esophagus or examined duodenum, erythematous mucosa in stomach, biopsies benign    HYSTERECTOMY      OOPHORECTOMY Bilateral     SHOULDER MANIPULATION Left     TOTAL SHOULDER ARTHROPLASTY Left 2021    Procedure: REVERSE TOTAL SHOULDER ARTHROPLASTY,  BICEP TENDONDESIS;  Surgeon: Bethel Devi MD;  Location: [facility];  Service: Orthopedics;  Laterality: Left;    TRIGGER FINGER RELEASE Left     3 fingers      Home Meds:  (Not in a hospital admission)    Current Meds:   No current facility-administered medications for this encounter.    Current Outpatient Medications:     acetaminophen (TYLENOL) 500 MG tablet, Take 1 tablet by mouth Every 6 (Six) Hours As Needed for Mild Pain., Disp: 30 tablet, Rfl: 0    albuterol sulfate  (90 Base) MCG/ACT inhaler, Inhale 2 puffs Every 4 (Four) Hours As Needed for Wheezing., Disp: 18 g, Rfl: 0    amitriptyline (ELAVIL) 25 MG tablet, Take 1 tablet by mouth At Night As Needed for Sleep.,  Disp: , Rfl:     Arnuity Ellipta 200 MCG/ACT, Daily., Disp: , Rfl:     ASPIRIN 81 PO, Take  by mouth., Disp: , Rfl:     atorvastatin (LIPITOR) 80 MG tablet, Take 1 tablet by mouth Daily., Disp: 90 tablet, Rfl: 1    azelastine (ASTELIN) 0.1 % nasal spray, Administer 1 spray into the nostril(s) as directed by provider Daily., Disp: , Rfl: 11    B Complex Vitamins (B COMPLEX PO), Take 1 capsule by mouth Daily. HOLD FOR SURGERY 1 WEEK, Disp: , Rfl:     benzonatate (TESSALON) 100 MG capsule, Take 1 capsule by mouth 3 (Three) Times a Day As Needed for Cough., Disp: 10 capsule, Rfl: 0    Cholecalciferol (Vitamin D3) 50 MCG (2000 UT) capsule, TAKE 1 CAPSULE BY MOUTH EVERY DAY, Disp: 90 capsule, Rfl: 3    clobetasol (TEMOVATE) 0.05 % cream, Apply 1 Application topically to the appropriate area as directed As Needed., Disp: , Rfl:     Diclofenac Sodium (VOLTAREN) 1 % gel gel, Apply 4 g topically to the appropriate area as directed 4 (Four) Times a Day As Needed (foot pain)., Disp: 50 g, Rfl: 0    doxycycline (VIBRAMYCIN) 100 MG capsule, Take 1 capsule by mouth 2 (Two) Times a Day for 5 days., Disp: 10 capsule, Rfl: 0    EPINEPHrine (AUVI-Q IJ), Inject  as directed As Needed., Disp: , Rfl:     famotidine (PEPCID) 20 MG tablet, Take 1 tablet by mouth., Disp: , Rfl:     furosemide (Lasix) 20 MG tablet, Take 1 tablet by mouth Daily for 4 days., Disp: 4 tablet, Rfl: 0    hydrocortisone 2.5 % cream, Apply  topically to the appropriate area as directed As Needed., Disp: , Rfl:     ipratropium (ATROVENT) 0.03 % nasal spray, Administer 1 spray into the nostril(s) as directed by provider 3 (Three) Times a Day., Disp: , Rfl:     irbesartan (AVAPRO) 300 MG tablet, Take 1 tablet by mouth Daily., Disp: 90 tablet, Rfl: 1    loperamide (IMODIUM) 2 MG capsule, Take 1 capsule by mouth Every 2 (Two) Hours As Needed for Diarrhea (max 4 doses daily)., Disp: , Rfl:     meclizine (ANTIVERT) 25 MG tablet, TAKE 1 TABLET BY MOUTH THREE TIMES DAILY AS  NEEDED FOR DIZZINESS, Disp: 270 tablet, Rfl: 0    ondansetron (Zofran) 4 MG tablet, Take 1 tablet by mouth Every 8 (Eight) Hours As Needed for Nausea or Vomiting., Disp: 42 tablet, Rfl: 4    potassium chloride (KLOR-CON M20) 20 MEQ CR tablet, Take 1 tablet by mouth Daily., Disp: 4 tablet, Rfl: 0    traMADol (ULTRAM) 50 MG tablet, TAKE 1 TABLET BY MOUTH TWICE DAILY AS NEEDED FOR MODERATE PAIN, Disp: 180 tablet, Rfl: 1    triamcinolone (KENALOG) 0.1 % ointment, Apply 1 Application topically to the appropriate area as directed As Needed., Disp: , Rfl:     Vortioxetine HBr (Trintellix) 10 MG tablet tablet, Take 1 tablet by mouth Daily With Breakfast., Disp: 90 tablet, Rfl: 3    Facility-Administered Medications Ordered in Other Encounters:     Chlorhexidine Gluconate 2 % pads 1 each, 1 each, Apply externally, Take As Directed, Bethel Devi MD  Allergies:  Allergies   Allergen Reactions    Cashew Nut Oil Anaphylaxis    Chocolate Anaphylaxis    Nickel Anaphylaxis    Nuts Anaphylaxis    Tree Nut Anaphylaxis    Belcher Itching    Belcher Oil Other (See Comments)    Bacitracin Hives     Cannot remember, identified through testing    Baclofen Other (See Comments)    Chlorhexidine Unknown - Low Severity    Ciprofloxacin Other (See Comments)     THRUSH PER PATIENT    Citric Acid Unknown (See Comments)     Unsure      Covid-19 (Mrna) Vaccine Other (See Comments)     INSTRUCTED PER ALLERGIST SHE CAN NOT TAKE D/T ONE OF THE PRESERVATIVES    INSTRUCTED PER ALLERGIST SHE CAN NOT TAKE D/T ONE OF THE PRESERVATIVES      *is able to & has had the PFIZER vaccine*    Egg-Derived Products Nausea And Vomiting    Influenza Vaccines Nausea And Vomiting    Methyldibromoglutaronitrile Unknown (See Comments)      PATIENT UNSURE OF REACTION.    Naproxen Other (See Comments)    Neomycin Unknown (See Comments)      PATIENT UNSURE OF REACTION.    Omnicef [Cefdinir] Other (See Comments)     THRUSH PER PATIENT    Palladium Chloride Unknown (See  Comments)      PATIENT UNSURE OF REACTION    Penicillins Other (See Comments)     THRUSH PER PATIENT    Pineapple Extract Hives    Sulfa Antibiotics Other (See Comments)     THRUSH PER PATIENT    Yeast-Derived Drug Products Hives    Adhesive Tape Unknown - Low Severity and Rash    Gold-Containing Drug Products Rash          Latex Rash     Social History:   Social History     Tobacco Use    Smoking status: Never     Passive exposure: Never    Smokeless tobacco: Never   Substance Use Topics    Alcohol use: Yes     Alcohol/week: 1.0 standard drink of alcohol     Types: 1 Glasses of wine per week     Comment: WEEKLY      Family History:  Family History   Problem Relation Age of Onset    Asthma Mother     Thyroid disease Father     Diabetes Maternal Grandmother     Breast cancer Maternal Aunt     Colon cancer Maternal Grandfather     Malig Hyperthermia Neg Hx           Review of Systems  See history of present illness and past medical history.   Constitutional:  Positive for fever (Temperature 100 at triage).   Respiratory:  Negative for shortness of breath.    Cardiovascular:  Negative for chest pain.   Musculoskeletal:  Positive for back pain.   Skin:         Recent skin rash with erythema and redness to both lower extremities.  Symptoms markedly improved on antibiotics, initially clindamycin now doxycycline   All other systems reviewed and are negative.    Vitals:   /66   Pulse 72   Temp 100 °F (37.8 °C) (Tympanic)   Resp 16   SpO2 96%   I/O: No intake or output data in the 24 hours ending 03/23/25 1540  Exam:  Patient is examined using the personal protective equipment as per guidelines from infection control for this particular patient as enacted.  Hand washing was performed before and after patient interaction.  General Appearance:  Alert cooperative morbidly obese nontoxic-appearing female who is with her family members at the bedside.   Head:    Normocephalic, without obvious abnormality, atraumatic    Eyes:    PERRL, conjunctiva/corneas clear, EOM's intact, both eyes   Ears:    Normal external ear canals, both ears   Nose:   Nares normal, septum midline, mucosa normal, no drainage    or sinus tenderness   Throat:   Lips, tongue, gums normal; oral mucosa pink and moist   Neck:   Supple, symmetrical, trachea midline, no adenopathy;     thyroid:  no enlargement/tenderness/nodules; no carotid    bruit or JVD   Back:   Lumbar paraspinal muscle spasm but no midline tenderness.     Lungs:     Clear to auscultation bilaterally, respirations unlabored   Chest Wall:    No tenderness or deformity    Heart:  S1-S2 regular   Abdomen:   Obese soft nontender   Extremities: Mild erythema of bilateral lower extremity with no warmth or tenderness.  Bilateral lower extremity trace edema noted.   Pulses:   Pulses palpable in all extremities; symmetric all extremities   Skin: No obvious cellulitic changes involving the lower extremity noted   Neurologic: Alert and oriented x 3 gait not tested.       Data Review:      I reviewed the patient's new clinical results.  Results from last 7 days   Lab Units 03/23/25  1325 03/21/25  0952   WBC 10*3/mm3 9.76 6.70   HEMOGLOBIN g/dL 9.9* 10.3*   PLATELETS 10*3/mm3 223 267     Results from last 7 days   Lab Units 03/23/25  1325 03/21/25  0952   SODIUM mmol/L 134* 139   POTASSIUM mmol/L 3.7 3.6   CHLORIDE mmol/L 100 106   CO2 mmol/L 25.3 26.4   BUN mg/dL 14 12   CREATININE mg/dL 1.05* 0.86   CALCIUM mg/dL 8.6 8.9   GLUCOSE mg/dL 107* 110*     US Venous Doppler Lower Extremity Bilateral (duplex)  Result Date: 3/15/2025  Electronically signed by Gama Guevara M.D. on 03-15-25 at 2033    XR Chest 1 View  Result Date: 3/15/2025  No acute cardiopulmonary process  This report was finalized on 3/15/2025 2:59 PM by Dr. Aron Germain M.D on Workstation: [id number]            Assessment:  Active Hospital Problems    Diagnosis  POA    Acute low back pain [M54.50]  Unknown    Cellulitis of leg  [L03.119]  Unknown    Anxiety [F41.9]  Yes    Anemia [D64.9]  Yes    HTN (hypertension) [I10]  Yes       Medical decision making/care plan: See admitting orders  Acute low back pain-this likely represent acute paraspinal muscle spasm due to physical activity and subsequent pain in the setting of likely chronic degenerative disease and morbid obesity further exacerbated by hampered movement due to issues with cellulitis of the lower extremity and edema causing her put further strain in her back.  Her lack of radiation and sensory symptoms involving the perineal area argues against significant disc herniation or radicular process.  There is a chance given her low-grade fever and recent cellulitis of the lower extremity that she may have emetogenic seeding in her causing this presentation that she does not appear to be toxic or septic and does not recall high fever and chills and shakes in the past.  Plan is to continue with pain control, physical therapy, MRI of her back and muscle relaxants.  Provided with continuous pulse ox monitoring while she is receiving pain medication given her body habitus and increased risk of sleep apnea and CO2 retention.  Bilateral lower extremity cellulitis-according to her it is all improved-continue her remaining doses of doxycycline.  Chronic anemia-plan is to monitor.  Hypertension-continue her home regimen consisting of losartan, hold Lasix   Anxiety and depression-continue her Trintellix.  Gastroesophageal reflux disease-continue her Pepcid.  Renal insufficiency-marked multifactorial including recent introduction of diuretics-provided with cautious hydration and avoid nephrotoxic agents and hypotensive episodes.  Hima Trejo MD   3/23/2025  15:40 EDT    Parts of this note may be an electronic transcription/translation of spoken language to printed text using the Dragon dictation system.

## 2025-03-23 NOTE — ED PROVIDER NOTES
EMERGENCY DEPARTMENT ENCOUNTER  Room Number:  31/31  PCP: Teo Fraser MD  Independent Historians: Patient, daughter at bedside      HPI:  Chief Complaint: had concerns including Back Pain.     A complete HPI/ROS/PMH/PSH/SH/FH are unobtainable due to:   Chronic or social conditions impacting patient care (Social Determinants of Health):       Context: Pedrito Powell is a 64 y.o. female with a medical history of morbid obesity, hypertension, asthma, chronic anemia who presents to the ED c/o acute lower back pain, cannot walk.  Patient had abrupt onset of lower back pain last night.  Pain is in the lower back and does not really radiate to the legs that much.  Pain is markedly increased when standing or walking.  Pain is mild when laying flat.  Patient states pain started shortly after initiating 20 mg of Lasix which was prescribed for increasing lower extremity edema.  She denies any injury to the back.  Denies history of prior back problems.  She has had low-grade fever off and on over the last week or so.  Has not measured recent fever other than 100 at triage.  Denies history of IV drug abuse.  She has a  at a local [employer].      Review of prior external notes (non-ED) -and- Review of prior external test results outside of this encounter:   I reviewed prior medical records including admission from December of this year or patient was admitted with hypoxia and COVID infection.  Chronic medical problems include hypertension and chronic renal disease.  She was recently seen by primary care provider, Dr. Morgan and he and started on doxycycline and some Lasix for some cellulitis and lower extremity edema.    Prescription drug monitoring program review:         PAST MEDICAL HISTORY  Active Ambulatory Problems     Diagnosis Date Noted    Hx of anaphylaxis 01/19/2018    Primary osteoarthritis of knee 01/19/2018    High risk medication use 01/19/2018    HTN (hypertension) 04/03/2018     Hyperlipidemia 2018    Vertigo 2018    Infected blister of left foot 2018    Ganglion of right wrist 2018    Vomiting and diarrhea 2018    Intractable vomiting with nausea 2018    Fatigue 2019    Acute gastritis without hemorrhage 2019    Anemia 2019    Elevated serum creatinine 2019    Fungal rash of torso 10/28/2019    Cellulitis of abdominal wall 10/28/2019    Bronchitis 10/28/2019    Arthritis of shoulder 2021    Chronic renal failure in pediatric patient, stage 3 (moderate) 2021    H/O shoulder surgery 2021    Anxiety 2021    Depression 2021    Iron deficiency 11/15/2023    Dizziness 2024    Hypoxia 2024    COVID-19 virus infection 2024     Resolved Ambulatory Problems     Diagnosis Date Noted    No Resolved Ambulatory Problems     Past Medical History:   Diagnosis Date    Arthritis     Asthma     Chronic kidney disease     Elevated cholesterol     GERD (gastroesophageal reflux disease)     History of carpal tunnel syndrome     Hypertension     Left shoulder pain     Limited mobility     Weakness          PAST SURGICAL HISTORY  Past Surgical History:   Procedure Laterality Date    CARPAL TUNNEL RELEASE Left 10/02/2023     SECTION  , ,     COLONOSCOPY N/A 12/10/2018    normal ileum, IH, hyperplastic polyp, otherwise normal exam    ENDOSCOPY N/A 12/10/2018    no gross lesions in esophagus or examined duodenum, erythematous mucosa in stomach, biopsies benign    HYSTERECTOMY      OOPHORECTOMY Bilateral     SHOULDER MANIPULATION Left     TOTAL SHOULDER ARTHROPLASTY Left 2021    Procedure: REVERSE TOTAL SHOULDER ARTHROPLASTY,  BICEP TENDONDESIS;  Surgeon: Bethel Devi MD;  Location: [facility];  Service: Orthopedics;  Laterality: Left;    TRIGGER FINGER RELEASE Left     3 fingers         FAMILY HISTORY  Family History   Problem Relation Age of Onset    Asthma Mother      Thyroid disease Father     Diabetes Maternal Grandmother     Breast cancer Maternal Aunt     Colon cancer Maternal Grandfather     Malig Hyperthermia Neg Hx          SOCIAL HISTORY  Social History     Socioeconomic History    Marital status:     Number of children: 3    Years of education: College   Tobacco Use    Smoking status: Never     Passive exposure: Never    Smokeless tobacco: Never   Vaping Use    Vaping status: Never Used   Substance and Sexual Activity    Alcohol use: Yes     Alcohol/week: 1.0 standard drink of alcohol     Types: 1 Glasses of wine per week     Comment: WEEKLY    Drug use: No    Sexual activity: Defer         ALLERGIES  Cashew nut oil, Chocolate, Nickel, Nuts, Tree nut, [place], [place] oil, Bacitracin, Baclofen, Chlorhexidine, Ciprofloxacin, Citric acid, Covid-19 (mrna) vaccine, Egg-derived products, Influenza vaccines, Methyldibromoglutaronitrile, Naproxen, Neomycin, Omnicef [cefdinir], Palladium chloride, Penicillins, Pineapple extract, Sulfa antibiotics, Yeast-derived drug products, Adhesive tape, Gold-containing drug products, and Latex      REVIEW OF SYSTEMS  Review of Systems   Constitutional:  Positive for fever (Temperature 100 at triage).   Respiratory:  Negative for shortness of breath.    Cardiovascular:  Negative for chest pain.   Musculoskeletal:  Positive for back pain.   Skin:         Recent skin rash with erythema and redness to both lower extremities.  Symptoms markedly improved on antibiotics, initially clindamycin now doxycycline   All other systems reviewed and are negative.    Included in HPI  All systems reviewed and negative except for those discussed in HPI.      PHYSICAL EXAM    I have reviewed the triage vital signs and nursing notes.    ED Triage Vitals   Temp Heart Rate Resp BP SpO2   03/23/25 1309 03/23/25 1309 03/23/25 1309 03/23/25 1312 03/23/25 1309   100 °F (37.8 °C) 85 16 159/95 97 %      Temp src Heart Rate Source Patient Position BP Location FiO2  (%)   03/23/25 1309 -- 03/23/25 1312 03/23/25 1312 --   Tympanic  Sitting Left arm        Physical Exam  GENERAL: Alert well-appearing female no obvious distress.  Triage vitals reviewed notable for temperature 100.  Heart rate 85, blood pressure 159/95.  O2 sats 97% on room air  SKIN: Warm, dry  HENT: Normocephalic, atraumatic  EYES: no scleral icterus  CV: regular rhythm, regular rate-no murmur  RESPIRATORY: normal effort, lungs clear-O2 sats upper 90s room air  ABDOMEN: soft, nontender, morbidly obese   MUSCULOSKELETAL: spine-mild diffuse lumbar spine tenderness to palpation, no noted bony deformity  Lower extremities-good range of motion, no noted bony deformity  NEURO: alert, moves all extremities, follows commands  Strength and sensation are intact throughout bilateral upper and lower extremities    LAB RESULTS  Recent Results (from the past 24 hours)   Comprehensive Metabolic Panel    Collection Time: 03/23/25  1:25 PM    Specimen: Arm, Left; Blood   Result Value Ref Range    Glucose 107 (H) 65 - 99 mg/dL    BUN 14 8 - 23 mg/dL    Creatinine 1.05 (H) 0.57 - 1.00 mg/dL    Sodium 134 (L) 136 - 145 mmol/L    Potassium 3.7 3.5 - 5.2 mmol/L    Chloride 100 98 - 107 mmol/L    CO2 25.3 22.0 - 29.0 mmol/L    Calcium 8.6 8.6 - 10.5 mg/dL    Total Protein 6.9 6.0 - 8.5 g/dL    Albumin 4.0 3.5 - 5.2 g/dL    ALT (SGPT) 15 1 - 33 U/L    AST (SGOT) 20 1 - 32 U/L    Alkaline Phosphatase 74 39 - 117 U/L    Total Bilirubin 0.7 0.0 - 1.2 mg/dL    Globulin 2.9 gm/dL    A/G Ratio 1.4 g/dL    BUN/Creatinine Ratio 13.3 7.0 - 25.0    Anion Gap 8.7 5.0 - 15.0 mmol/L    eGFR 59.5 (L) >60.0 mL/min/1.73   Green Top (Gel)    Collection Time: 03/23/25  1:25 PM   Result Value Ref Range    Extra Tube Hold for add-ons.    Lavender Top    Collection Time: 03/23/25  1:25 PM   Result Value Ref Range    Extra Tube hold for add-on    Gold Top - SST    Collection Time: 03/23/25  1:25 PM   Result Value Ref Range    Extra Tube Hold for add-ons.     Light Blue Top    Collection Time: 03/23/25  1:25 PM   Result Value Ref Range    Extra Tube Hold for add-ons.    CBC Auto Differential    Collection Time: 03/23/25  1:25 PM    Specimen: Arm, Left; Blood   Result Value Ref Range    WBC 9.76 3.40 - 10.80 10*3/mm3    RBC 3.28 (L) 3.77 - 5.28 10*6/mm3    Hemoglobin 9.9 (L) 12.0 - 15.9 g/dL    Hematocrit 30.0 (L) 34.0 - 46.6 %    MCV 91.5 79.0 - 97.0 fL    MCH 30.2 26.6 - 33.0 pg    MCHC 33.0 31.5 - 35.7 g/dL    RDW 12.8 12.3 - 15.4 %    RDW-SD 42.4 37.0 - 54.0 fl    MPV 8.8 6.0 - 12.0 fL    Platelets 223 140 - 450 10*3/mm3    Neutrophil % 73.2 42.7 - 76.0 %    Lymphocyte % 12.9 (L) 19.6 - 45.3 %    Monocyte % 9.2 5.0 - 12.0 %    Eosinophil % 3.8 0.3 - 6.2 %    Basophil % 0.6 0.0 - 1.5 %    Immature Grans % 0.3 0.0 - 0.5 %    Neutrophils, Absolute 7.14 (H) 1.70 - 7.00 10*3/mm3    Lymphocytes, Absolute 1.26 0.70 - 3.10 10*3/mm3    Monocytes, Absolute 0.90 0.10 - 0.90 10*3/mm3    Eosinophils, Absolute 0.37 0.00 - 0.40 10*3/mm3    Basophils, Absolute 0.06 0.00 - 0.20 10*3/mm3    Immature Grans, Absolute 0.03 0.00 - 0.05 10*3/mm3    nRBC 0.0 0.0 - 0.2 /100 WBC   Procalcitonin    Collection Time: 03/23/25  1:25 PM    Specimen: Arm, Left; Blood   Result Value Ref Range    Procalcitonin 0.03 0.00 - 0.25 ng/mL   Sedimentation Rate    Collection Time: 03/23/25  1:25 PM    Specimen: Arm, Left; Blood   Result Value Ref Range    Sed Rate 26 0 - 30 mm/hr   C-reactive Protein    Collection Time: 03/23/25  1:25 PM    Specimen: Arm, Left; Blood   Result Value Ref Range    C-Reactive Protein 3.17 (H) 0.00 - 0.50 mg/dL   Lactic Acid, Plasma    Collection Time: 03/23/25  3:07 PM    Specimen: Blood   Result Value Ref Range    Lactate 1.0 0.5 - 2.0 mmol/L         RADIOLOGY  No Radiology Exams Resulted Within Past 24 Hours      MEDICATIONS GIVEN IN ER  Medications   HYDROmorphone (DILAUDID) injection 1 mg (1 mg Intravenous Given 3/23/25 1502)   ondansetron (ZOFRAN) injection 4 mg (4 mg  Intravenous Given 3/23/25 3242)         ORDERS PLACED DURING THIS VISIT:  Orders Placed This Encounter   Procedures    Blood Culture - Blood,    Blood Culture - Blood,    Respiratory Panel PCR w/COVID-19(SARS-CoV-2) KAYLEY/TROITO/EBONIE/PAD/COR/MITZY In-House, NP Swab in UTM/VTM, 2 HR TAT - Swab, Nasopharynx    MRI Lumbar Spine With & Without Contrast    [place] Draw    Comprehensive Metabolic Panel    CBC Auto Differential    Procalcitonin    Lactic Acid, Plasma    Sedimentation Rate    C-reactive Protein    LHA (on-call MD unless specified) Details    Initiate Observation Status    Inpatient Admission    CBC & Differential    Green Top (Gel)    Lavender Top    Gold Top - SST    Light Blue Top         OUTPATIENT MEDICATION MANAGEMENT:  Current Facility-Administered Medications Ordered in Epic   Medication Dose Route Frequency Provider Last Rate Last Admin    Chlorhexidine Gluconate 2 % pads 1 each  1 each Apply externally Take As Directed Bethel Devi MD         Current Outpatient Medications Ordered in Epic   Medication Sig Dispense Refill    acetaminophen (TYLENOL) 500 MG tablet Take 1 tablet by mouth Every 6 (Six) Hours As Needed for Mild Pain. 30 tablet 0    albuterol sulfate  (90 Base) MCG/ACT inhaler Inhale 2 puffs Every 4 (Four) Hours As Needed for Wheezing. 18 g 0    amitriptyline (ELAVIL) 25 MG tablet Take 1 tablet by mouth At Night As Needed for Sleep.      Arnuity Ellipta 200 MCG/ACT Daily.      ASPIRIN 81 PO Take  by mouth.      atorvastatin (LIPITOR) 80 MG tablet Take 1 tablet by mouth Daily. 90 tablet 1    azelastine (ASTELIN) 0.1 % nasal spray Administer 1 spray into the nostril(s) as directed by provider Daily.  11    B Complex Vitamins (B COMPLEX PO) Take 1 capsule by mouth Daily. HOLD FOR SURGERY 1 WEEK      benzonatate (TESSALON) 100 MG capsule Take 1 capsule by mouth 3 (Three) Times a Day As Needed for Cough. 10 capsule 0    Cholecalciferol (Vitamin D3) 50 MCG (2000 UT) capsule TAKE 1 CAPSULE  BY MOUTH EVERY DAY 90 capsule 3    clobetasol (TEMOVATE) 0.05 % cream Apply 1 Application topically to the appropriate area as directed As Needed.      Diclofenac Sodium (VOLTAREN) 1 % gel gel Apply 4 g topically to the appropriate area as directed 4 (Four) Times a Day As Needed (foot pain). 50 g 0    doxycycline (VIBRAMYCIN) 100 MG capsule Take 1 capsule by mouth 2 (Two) Times a Day for 5 days. 10 capsule 0    EPINEPHrine (AUVI-Q IJ) Inject  as directed As Needed.      famotidine (PEPCID) 20 MG tablet Take 1 tablet by mouth.      furosemide (Lasix) 20 MG tablet Take 1 tablet by mouth Daily for 4 days. 4 tablet 0    hydrocortisone 2.5 % cream Apply  topically to the appropriate area as directed As Needed.      ipratropium (ATROVENT) 0.03 % nasal spray Administer 1 spray into the nostril(s) as directed by provider 3 (Three) Times a Day.      irbesartan (AVAPRO) 300 MG tablet Take 1 tablet by mouth Daily. 90 tablet 1    loperamide (IMODIUM) 2 MG capsule Take 1 capsule by mouth Every 2 (Two) Hours As Needed for Diarrhea (max 4 doses daily).      meclizine (ANTIVERT) 25 MG tablet TAKE 1 TABLET BY MOUTH THREE TIMES DAILY AS NEEDED FOR DIZZINESS 270 tablet 0    ondansetron (Zofran) 4 MG tablet Take 1 tablet by mouth Every 8 (Eight) Hours As Needed for Nausea or Vomiting. 42 tablet 4    potassium chloride (KLOR-CON M20) 20 MEQ CR tablet Take 1 tablet by mouth Daily. 4 tablet 0    traMADol (ULTRAM) 50 MG tablet TAKE 1 TABLET BY MOUTH TWICE DAILY AS NEEDED FOR MODERATE PAIN 180 tablet 1    triamcinolone (KENALOG) 0.1 % ointment Apply 1 Application topically to the appropriate area as directed As Needed.      Vortioxetine HBr (Trintellix) 10 MG tablet tablet Take 1 tablet by mouth Daily With Breakfast. 90 tablet 3         PROCEDURES  Procedures            PROGRESS, DATA ANALYSIS, CONSULTS, AND MEDICAL DECISION MAKING  All labs have been independently interpreted by me.  All radiology studies have been reviewed by me. All  EKG's have been independently viewed and interpreted by me.  Discussion below represents my analysis of pertinent findings related to patient's condition, differential diagnosis, treatment plan and final disposition.    Differential diagnosis includes but is not limited to lumbar sprain, lumbar disc disease, epidural spinal abscess.      ED Course as of 03/23/25 1546   Sun Mar 23, 2025   1543 Care of this patient discussed with Dr. Trejo who admit on behalf of [facility] [DB]   1544 Patient was given IV Dilaudid and Zofran to help with pain and nausea.  She did have significantly if after these medications [DB]      ED Course User Index  [DB] Aditya Niño MD             AS OF 15:46 EDT VITALS:    BP - 135/66  HR - 72  TEMP - 100 °F (37.8 °C) (Tympanic)  O2 SATS - 96%    COMPLEXITY OF CARE  64-year-old female with history of obesity, hypertension, sleep apnea presents with lower back pain to the point that she has difficulty standing and walking secondary to pain.  Patient was febrile at triage at 100.0.    I did independently evaluate and interpret all ED testing notable for the following:    CBC without significant leukocytosis, chronic anemia unchanged from baseline  Chemistries benign without significant hepatic or renal failure.  Sed rate and procalcitonin normal which would go against serious underlying bacterial infection.  There was elevated CRP of 3.17 unclear clinical significance    Patient was treated with IV Dilaudid and Zofran which helped her pain significantly.  Given her fever and significant pain she will be admitted for MRI of the spine with and without contrast as well as further evaluations.      DIAGNOSIS  Final diagnoses:   Intractable low back pain   Acute febrile illness   Bilateral lower extremity edema         DISPOSITION  ED Disposition       ED Disposition   Decision to Admit    Condition   --    Comment   Level of Care: Med/Surg [1]   Diagnosis: Acute back pain [[id number]]   Certification: I  Certify That Inpatient Hospital Services Are Medically Necessary For Greater Than 2 Midnights                  Please note that portions of this document were completed with a voice recognition program.    Note Disclaimer: At [facility], we believe that sharing information builds trust and better relationships. You are receiving this note because you recently visited [facility]. It is possible you will see health information before a provider has talked with you about it. This kind of information can be easy to misunderstand. To help you fully understand what it means for your health, we urge you to discuss this note with your provider.         Aditya Niño MD  03/23/25 1548

## 2025-03-23 NOTE — ED NOTES
Nursing report ED to floor  Pedrito Powell  64 y.o.  female    HPI :  HPI  Stated Reason for Visit: cellulitis last saturday bilat legs, PCP Lasix, took one dose of that and now having extreme back pain, unable to walk.    Chief Complaint  Chief Complaint   Patient presents with    Back Pain       Admitting doctor:   Hima Trejo MD    Admitting diagnosis:   The primary encounter diagnosis was Intractable low back pain. Diagnoses of Acute febrile illness and Bilateral lower extremity edema were also pertinent to this visit.    Code status:   Current Code Status       Date Active Code Status Order ID Comments User Context       Prior            Allergies:   Cashew nut oil, Chocolate, Nickel, Nuts, Tree nut, [place], [place] oil, Bacitracin, Baclofen, Chlorhexidine, Ciprofloxacin, Citric acid, Covid-19 (mrna) vaccine, Egg-derived products, Influenza vaccines, Methyldibromoglutaronitrile, Naproxen, Neomycin, Omnicef [cefdinir], Palladium chloride, Penicillins, Pineapple extract, Sulfa antibiotics, Yeast-derived drug products, Adhesive tape, Gold-containing drug products, and Latex    Isolation:   No active isolations    Intake and Output  No intake or output data in the 24 hours ending 03/23/25 1612    Weight:   There were no vitals filed for this visit.    Most recent vitals:   Vitals:    03/23/25 1309 03/23/25 1312 03/23/25 1501   BP:  159/95 135/66   BP Location:  Left arm    Patient Position:  Sitting    Pulse: 85  72   Resp: 16     Temp: 100 °F (37.8 °C)     TempSrc: Tympanic     SpO2: 97%  96%       Active LDAs/IV Access:   Lines, Drains & Airways       Active LDAs       Name Placement date Placement time Site Days    Peripheral IV 03/23/25 1508 Right Antecubital 03/23/25  1508  Antecubital  less than 1                    Labs (abnormal labs have a star):   Labs Reviewed   COMPREHENSIVE METABOLIC PANEL - Abnormal; Notable for the following components:       Result Value    Glucose 107 (*)     Creatinine 1.05 (*)   "   Sodium 134 (*)     eGFR 59.5 (*)     All other components within normal limits    Narrative:     GFR Categories in Chronic Kidney Disease (CKD)      GFR Category          GFR (mL/min/1.73)    Interpretation  G1                     90 or greater         Normal or high (1)  G2                      60-89                Mild decrease (1)  G3a                   45-59                Mild to moderate decrease  G3b                   30-44                Moderate to severe decrease  G4                    15-29                Severe decrease  G5                    14 or less           Kidney failure          (1)In the absence of evidence of kidney disease, neither GFR category G1 or G2 fulfill the criteria for CKD.    eGFR calculation 2021 CKD-EPI creatinine equation, which does not include race as a factor   CBC WITH AUTO DIFFERENTIAL - Abnormal; Notable for the following components:    RBC 3.28 (*)     Hemoglobin 9.9 (*)     Hematocrit 30.0 (*)     Lymphocyte % 12.9 (*)     Neutrophils, Absolute 7.14 (*)     All other components within normal limits   C-REACTIVE PROTEIN - Abnormal; Notable for the following components:    C-Reactive Protein 3.17 (*)     All other components within normal limits   PROCALCITONIN - Normal    Narrative:     As a Marker for Sepsis (Non-Neonates):    1. <0.5 ng/mL represents a low risk of severe sepsis and/or septic shock.  2. >2 ng/mL represents a high risk of severe sepsis and/or septic shock.    As a Marker for Lower Respiratory Tract Infections that require antibiotic therapy:    PCT on Admission    Antibiotic Therapy       6-12 Hrs later    >0.5                Strongly Recommended  >0.25 - <0.5        Recommended   0.1 - 0.25          Discouraged              Remeasure/reassess PCT  <0.1                Strongly Discouraged     Remeasure/reassess PCT    As 28 day mortality risk marker: \"Change in Procalcitonin Result\" (>80% or <=80%) if Day 0 (or Day 1) and Day 4 values are available. " Refer to http://www.[facility]-pct-calculator.com    Change in PCT <=80%  A decrease of PCT levels below or equal to 80% defines a positive change in PCT test result representing a higher risk for 28-day all-cause mortality of patients diagnosed with severe sepsis for septic shock.    Change in PCT >80%  A decrease of PCT levels of more than 80% defines a negative change in PCT result representing a lower risk for 28-day all-cause mortality of patients diagnosed with severe sepsis or septic shock.      LACTIC ACID, PLASMA - Normal   SEDIMENTATION RATE - Normal   BLOOD CULTURE   BLOOD CULTURE   RESPIRATORY PANEL PCR W/ COVID-19 (SARS-COV-2), NP SWAB IN UTM/VTP, 2 HR TAT   RAINBOW DRAW    Narrative:     The following orders were created for panel order [place] Draw.  Procedure                               Abnormality         Status                     ---------                               -----------         ------                     Green Top (Gel)[[id number]]                                  Final result               Lavender Top[[id number]]                                     Final result               Gold Top - SST[[id number]]                                   Final result               Light Blue Top[[id number]]                                   Final result                 Please view results for these tests on the individual orders.   CBC AND DIFFERENTIAL    Narrative:     The following orders were created for panel order CBC & Differential.  Procedure                               Abnormality         Status                     ---------                               -----------         ------                     CBC Auto Differential[[id number]]        Abnormal            Final result                 Please view results for these tests on the individual orders.   GREEN TOP   LAVENDER TOP   GOLD TOP - SST   LIGHT BLUE TOP       EKG:   No orders to display       Meds given in ED:   Medications   HYDROmorphone  (DILAUDID) injection 1 mg (1 mg Intravenous Given 3/23/25 1508)   ondansetron (ZOFRAN) injection 4 mg (4 mg Intravenous Given 3/23/25 1508)       Imaging results:  No radiology results for the last day    Ambulatory status:   - standby    Social issues:   Social History     Socioeconomic History    Marital status:     Number of children: 3    Years of education: College   Tobacco Use    Smoking status: Never     Passive exposure: Never    Smokeless tobacco: Never   Vaping Use    Vaping status: Never Used   Substance and Sexual Activity    Alcohol use: Yes     Alcohol/week: 1.0 standard drink of alcohol     Types: 1 Glasses of wine per week     Comment: WEEKLY    Drug use: No    Sexual activity: Defer       Peripheral Neurovascular  Peripheral Neurovascular (Adult)  Peripheral Neurovascular WDL: .WDL except  Additional Documentation: Edema (Group)  Edema  Edema: leg, left, leg, right, ankle, left, ankle, right, foot, right, foot, left  Leg, Left Edema: 3+ (Moderate)  Leg, Right Edema: 3+ (Moderate)  Ankle, Left Edema: 3+ (Moderate)  Ankle, Right Edema: 3+ (Moderate)  Foot, Left Edema: 2+ (Mild)  Foot, Right Edema: 2+ (Mild)    Neuro Cognitive  Neuro Cognitive (Adult)  Cognitive/Neuro/Behavioral WDL: WDL    Learning  Learning Assessment  Learning Readiness and Ability: no barriers identified  Education Provided  Person Taught: patient    Respiratory  Respiratory WDL  Respiratory WDL: WDL    Abdominal Pain       Pain Assessments  Pain (Adult)  (0-10) Pain Rating: Rest: 10  Pain Location: back    NIH Stroke Scale       Shiloh Sanchez RN  03/23/25 16:12 EDT

## 2025-03-23 NOTE — ED TRIAGE NOTES
Pt to ED via PCP with c/o cellulitis last saturday bilat legs- took antibx, PCP on Friday and was given Lasix, took one dose of that and now having extreme back pain, unable to walk.

## 2025-03-23 NOTE — Clinical Note
Level of Care: Med/Surg [1]   Diagnosis: Intractable low back pain [053877]   Admitting Physician: THOMAS CARR [[id number]]   Attending Physician: THOMAS CARR [[id number]]   Is patient appropriate for Inpatient Observation Unit?: Yes [1]

## 2025-03-24 PROBLEM — M48.061 SPINAL STENOSIS, LUMBAR REGION, WITHOUT NEUROGENIC CLAUDICATION: Status: ACTIVE | Noted: 2025-03-24

## 2025-03-24 PROBLEM — M47.816 FACET ARTHRITIS OF LUMBAR REGION: Status: ACTIVE | Noted: 2025-03-24

## 2025-03-24 PROBLEM — M43.16 SPONDYLOLISTHESIS, LUMBAR REGION: Status: ACTIVE | Noted: 2025-03-24

## 2025-03-24 LAB
ALBUMIN SERPL-MCNC: 3.1 G/DL (ref 3.5–5.2)
ALBUMIN/GLOB SERPL: 0.9 G/DL
ALP SERPL-CCNC: 65 U/L (ref 39–117)
ALT SERPL W P-5'-P-CCNC: 10 U/L (ref 1–33)
ANION GAP SERPL CALCULATED.3IONS-SCNC: 10.8 MMOL/L (ref 5–15)
AST SERPL-CCNC: 16 U/L (ref 1–32)
BASOPHILS # BLD AUTO: 0.05 10*3/MM3 (ref 0–0.2)
BASOPHILS NFR BLD AUTO: 0.6 % (ref 0–1.5)
BILIRUB SERPL-MCNC: 0.8 MG/DL (ref 0–1.2)
BUN SERPL-MCNC: 9 MG/DL (ref 8–23)
BUN/CREAT SERPL: 9.8 (ref 7–25)
CALCIUM SPEC-SCNC: 8.4 MG/DL (ref 8.6–10.5)
CHLORIDE SERPL-SCNC: 104 MMOL/L (ref 98–107)
CO2 SERPL-SCNC: 24.2 MMOL/L (ref 22–29)
CREAT SERPL-MCNC: 0.92 MG/DL (ref 0.57–1)
DEPRECATED RDW RBC AUTO: 42.8 FL (ref 37–54)
EGFRCR SERPLBLD CKD-EPI 2021: 69.7 ML/MIN/1.73
EOSINOPHIL # BLD AUTO: 0.21 10*3/MM3 (ref 0–0.4)
EOSINOPHIL NFR BLD AUTO: 2.4 % (ref 0.3–6.2)
ERYTHROCYTE [DISTWIDTH] IN BLOOD BY AUTOMATED COUNT: 12.7 % (ref 12.3–15.4)
GLOBULIN UR ELPH-MCNC: 3.3 GM/DL
GLUCOSE SERPL-MCNC: 95 MG/DL (ref 65–99)
HCT VFR BLD AUTO: 28.9 % (ref 34–46.6)
HGB BLD-MCNC: 9.2 G/DL (ref 12–15.9)
IMM GRANULOCYTES # BLD AUTO: 0.03 10*3/MM3 (ref 0–0.05)
IMM GRANULOCYTES NFR BLD AUTO: 0.3 % (ref 0–0.5)
LYMPHOCYTES # BLD AUTO: 1.51 10*3/MM3 (ref 0.7–3.1)
LYMPHOCYTES NFR BLD AUTO: 17.5 % (ref 19.6–45.3)
MCH RBC QN AUTO: 29.7 PG (ref 26.6–33)
MCHC RBC AUTO-ENTMCNC: 31.8 G/DL (ref 31.5–35.7)
MCV RBC AUTO: 93.2 FL (ref 79–97)
MONOCYTES # BLD AUTO: 0.95 10*3/MM3 (ref 0.1–0.9)
MONOCYTES NFR BLD AUTO: 11 % (ref 5–12)
NEUTROPHILS NFR BLD AUTO: 5.89 10*3/MM3 (ref 1.7–7)
NEUTROPHILS NFR BLD AUTO: 68.2 % (ref 42.7–76)
NRBC BLD AUTO-RTO: 0 /100 WBC (ref 0–0.2)
PLATELET # BLD AUTO: 211 10*3/MM3 (ref 140–450)
PMV BLD AUTO: 9.5 FL (ref 6–12)
POTASSIUM SERPL-SCNC: 3.8 MMOL/L (ref 3.5–5.2)
PROT SERPL-MCNC: 6.4 G/DL (ref 6–8.5)
RBC # BLD AUTO: 3.1 10*6/MM3 (ref 3.77–5.28)
SODIUM SERPL-SCNC: 139 MMOL/L (ref 136–145)
WBC NRBC COR # BLD AUTO: 8.64 10*3/MM3 (ref 3.4–10.8)

## 2025-03-24 PROCEDURE — 94664 DEMO&/EVAL PT USE INHALER: CPT

## 2025-03-24 PROCEDURE — 85025 COMPLETE CBC W/AUTO DIFF WBC: CPT | Performed by: INTERNAL MEDICINE

## 2025-03-24 PROCEDURE — 94799 UNLISTED PULMONARY SVC/PX: CPT

## 2025-03-24 PROCEDURE — 25010000002 HYDROMORPHONE PER 4 MG: Performed by: INTERNAL MEDICINE

## 2025-03-24 PROCEDURE — 80053 COMPREHEN METABOLIC PANEL: CPT | Performed by: INTERNAL MEDICINE

## 2025-03-24 PROCEDURE — G0378 HOSPITAL OBSERVATION PER HR: HCPCS

## 2025-03-24 PROCEDURE — 94761 N-INVAS EAR/PLS OXIMETRY MLT: CPT

## 2025-03-24 PROCEDURE — 99214 OFFICE O/P EST MOD 30 MIN: CPT | Performed by: NURSE PRACTITIONER

## 2025-03-24 RX ORDER — METHOCARBAMOL 500 MG/1
500 TABLET, FILM COATED ORAL EVERY 8 HOURS SCHEDULED
Status: DISCONTINUED | OUTPATIENT
Start: 2025-03-24 | End: 2025-04-02 | Stop reason: HOSPADM

## 2025-03-24 RX ORDER — LIDOCAINE 4 G/G
3 PATCH TOPICAL
Status: DISCONTINUED | OUTPATIENT
Start: 2025-03-24 | End: 2025-04-02 | Stop reason: HOSPADM

## 2025-03-24 RX ORDER — HYDROCODONE BITARTRATE AND ACETAMINOPHEN 5; 325 MG/1; MG/1
1 TABLET ORAL EVERY 4 HOURS PRN
Refills: 0 | Status: DISCONTINUED | OUTPATIENT
Start: 2025-03-24 | End: 2025-03-25

## 2025-03-24 RX ORDER — CHOLECALCIFEROL (VITAMIN D3) 25 MCG
2000 TABLET ORAL DAILY
Status: DISCONTINUED | OUTPATIENT
Start: 2025-03-24 | End: 2025-04-02 | Stop reason: HOSPADM

## 2025-03-24 RX ADMIN — HYDROMORPHONE HYDROCHLORIDE 0.5 MG: 1 INJECTION, SOLUTION INTRAMUSCULAR; INTRAVENOUS; SUBCUTANEOUS at 20:29

## 2025-03-24 RX ADMIN — BUDESONIDE 0.5 MG: 0.5 INHALANT RESPIRATORY (INHALATION) at 20:24

## 2025-03-24 RX ADMIN — BUDESONIDE 0.5 MG: 0.5 INHALANT RESPIRATORY (INHALATION) at 07:02

## 2025-03-24 RX ADMIN — LIDOCAINE 3 PATCH: 4 PATCH TOPICAL at 13:54

## 2025-03-24 RX ADMIN — IPRATROPIUM BROMIDE 1 SPRAY: 21 SPRAY, METERED NASAL at 20:29

## 2025-03-24 RX ADMIN — HYDROMORPHONE HYDROCHLORIDE 0.5 MG: 1 INJECTION, SOLUTION INTRAMUSCULAR; INTRAVENOUS; SUBCUTANEOUS at 22:47

## 2025-03-24 RX ADMIN — HYDROMORPHONE HYDROCHLORIDE 0.5 MG: 1 INJECTION, SOLUTION INTRAMUSCULAR; INTRAVENOUS; SUBCUTANEOUS at 08:17

## 2025-03-24 RX ADMIN — TRAMADOL HYDROCHLORIDE 50 MG: 50 TABLET, COATED ORAL at 12:05

## 2025-03-24 RX ADMIN — FAMOTIDINE 20 MG: 20 TABLET, FILM COATED ORAL at 16:58

## 2025-03-24 RX ADMIN — AZELASTINE HYDROCHLORIDE 1 SPRAY: 137 SPRAY, METERED NASAL at 08:20

## 2025-03-24 RX ADMIN — DOXYCYCLINE 100 MG: 100 CAPSULE ORAL at 08:17

## 2025-03-24 RX ADMIN — HYDROMORPHONE HYDROCHLORIDE 0.5 MG: 1 INJECTION, SOLUTION INTRAMUSCULAR; INTRAVENOUS; SUBCUTANEOUS at 00:50

## 2025-03-24 RX ADMIN — VORTIOXETINE 10 MG: 5 TABLET, FILM COATED ORAL at 08:17

## 2025-03-24 RX ADMIN — METHOCARBAMOL TABLETS 500 MG: 500 TABLET, COATED ORAL at 13:54

## 2025-03-24 RX ADMIN — ATORVASTATIN CALCIUM 80 MG: 20 TABLET, FILM COATED ORAL at 08:17

## 2025-03-24 RX ADMIN — Medication 10 ML: at 08:17

## 2025-03-24 RX ADMIN — Medication 2000 UNITS: at 13:54

## 2025-03-24 RX ADMIN — FAMOTIDINE 20 MG: 20 TABLET, FILM COATED ORAL at 08:17

## 2025-03-24 RX ADMIN — METHOCARBAMOL TABLETS 500 MG: 500 TABLET, COATED ORAL at 20:29

## 2025-03-24 RX ADMIN — DOXYCYCLINE 100 MG: 100 CAPSULE ORAL at 20:29

## 2025-03-24 RX ADMIN — LOSARTAN POTASSIUM 100 MG: 100 TABLET, FILM COATED ORAL at 08:17

## 2025-03-24 RX ADMIN — Medication 10 ML: at 20:29

## 2025-03-24 RX ADMIN — IPRATROPIUM BROMIDE 1 SPRAY: 21 SPRAY, METERED NASAL at 08:20

## 2025-03-24 NOTE — CONSULTS
[facility] NEUROSURGERY CONSULT NOTE    Patient name: Pedrito Powell  Referring Provider: Dr. Akbar  Reason for Consultation: LBP w/radiculopathy    Patient Care Team:  Teo Fraser MD as PCP - General (Family Medicine)  Cassandra Pratt MD as Consulting Physician (Hematology and Oncology)  Zbigniew Kapoor MD as Consulting Physician (Gastroenterology)  Evgeny Bautista MD (Inactive) as Consulting Physician (Nephrology)  Teo Fraser MD as Referring Physician (Family Medicine)    Chief complaint: LBP    Subjective .     History of present illness:    Patient is a 64 y.o.  female presented to ER with complaints of acute low back pain bilaterally limiting her mobility.  States raising her legs causes back pain.  No injury.  She denies any excessive labor or heavy lifting prior to onset of symptoms.  Pain is aching and started insidiously and progressively worsened.  No radiating pain in legs, numbness, weakness, or falls. No incontinence. She had temp 100 in ER.  Evaluated in ED 3/15 for bilateral lower extremity swelling and erythema that began after long car ride and increased activity levels from baseline. She had neg LE venous doppler and was initiated on clindamycin but changed to doxycycline and Lasix by PCP 3/21 with improvement.  Patient receives both foot and knee injections on a routine basis for arthritis.  Last injection of her feet was late January and 3 months ago for her knees.  Thus far she has only taken IV Dilaudid for pain since admission.    Non-smoker.  No prior spine surgery.  No history of cancer.  On 81 mg aspirin.  She is unable to take NSAIDs orally due to CKD.    Review of Systems  Review of Systems   Constitutional:  Positive for fever. Negative for chills.   Cardiovascular:  Positive for leg swelling.   Genitourinary:  Negative for enuresis.   Musculoskeletal:  Positive for back pain and gait problem.   Neurological:  Positive for weakness. Negative for numbness.        History  PAST MEDICAL HISTORY  Past Medical History:   Diagnosis Date    Anemia     IRON INFUSION RECENTLY    Arthritis     Asthma     Chronic kidney disease     stage 3    Elevated cholesterol     GERD (gastroesophageal reflux disease)     High risk medication use 2018    History of carpal tunnel syndrome     Hyperlipidemia     Hypertension     Intractable vomiting with nausea 2018    Left shoulder pain     Limited mobility     Rheumatoid arthritis     Vertigo     Weakness     AT TIMES LEFT SHOULDER/LEFT ARM       PAST SURGICAL HISTORY  Past Surgical History:   Procedure Laterality Date    CARPAL TUNNEL RELEASE Left 10/02/2023     SECTION  , ,     COLONOSCOPY N/A 12/10/2018    normal ileum, IH, hyperplastic polyp, otherwise normal exam    ENDOSCOPY N/A 12/10/2018    no gross lesions in esophagus or examined duodenum, erythematous mucosa in stomach, biopsies benign    HYSTERECTOMY      OOPHORECTOMY Bilateral     SHOULDER MANIPULATION Left     TOTAL SHOULDER ARTHROPLASTY Left 2021    Procedure: REVERSE TOTAL SHOULDER ARTHROPLASTY,  BICEP TENDONDESIS;  Surgeon: Bethel Devi MD;  Location: [facility];  Service: Orthopedics;  Laterality: Left;    TRIGGER FINGER RELEASE Left     3 fingers       FAMILY HISTORY  Family History   Problem Relation Age of Onset    Asthma Mother     Thyroid disease Father     Diabetes Maternal Grandmother     Breast cancer Maternal Aunt     Colon cancer Maternal Grandfather     Malig Hyperthermia Neg Hx        SOCIAL HISTORY  Social History     Tobacco Use    Smoking status: Never     Passive exposure: Never    Smokeless tobacco: Never   Vaping Use    Vaping status: Never Used   Substance Use Topics    Alcohol use: Yes     Alcohol/week: 1.0 standard drink of alcohol     Types: 1 Glasses of wine per week     Comment: WEEKLY    Drug use: No       full time job doing /teacher   Lives with daughter    Allergies:  Cashew nut oil,  Chocolate, Nickel, Nuts, Tree nut, Inez, Inez oil, Bacitracin, Baclofen, Chlorhexidine, Ciprofloxacin, Citric acid, Covid-19 (mrna) vaccine, Egg-derived products, Influenza vaccines, Methyldibromoglutaronitrile, Naproxen, Neomycin, Omnicef [cefdinir], Palladium chloride, Penicillins, Pineapple extract, Sulfa antibiotics, Yeast-derived drug products, Adhesive tape, Gold-containing drug products, and Latex    MEDICATIONS:  Medications Prior to Admission   Medication Sig Dispense Refill Last Dose/Taking    acetaminophen (TYLENOL) 500 MG tablet Take 1 tablet by mouth Every 6 (Six) Hours As Needed for Mild Pain. 30 tablet 0 3/23/2025    albuterol sulfate  (90 Base) MCG/ACT inhaler Inhale 2 puffs Every 4 (Four) Hours As Needed for Wheezing. 18 g 0 3/23/2025    Arnuity Ellipta 200 MCG/ACT Daily.   Past Month    ASPIRIN 81 PO Take  by mouth.   3/23/2025    atorvastatin (LIPITOR) 80 MG tablet Take 1 tablet by mouth Daily. 90 tablet 1 3/23/2025    azelastine (ASTELIN) 0.1 % nasal spray Administer 1 spray into the nostril(s) as directed by provider Daily.  11 3/23/2025    B Complex Vitamins (B COMPLEX PO) Take 1 capsule by mouth Daily. HOLD FOR SURGERY 1 WEEK   3/23/2025    benzonatate (TESSALON) 100 MG capsule Take 1 capsule by mouth 3 (Three) Times a Day As Needed for Cough. 10 capsule 0 Past Month    Cholecalciferol (Vitamin D3) 50 MCG (2000 UT) capsule TAKE 1 CAPSULE BY MOUTH EVERY DAY 90 capsule 3 3/23/2025    clobetasol (TEMOVATE) 0.05 % cream Apply 1 Application topically to the appropriate area as directed As Needed.   Past Week    Diclofenac Sodium (VOLTAREN) 1 % gel gel Apply 4 g topically to the appropriate area as directed 4 (Four) Times a Day As Needed (foot pain). 50 g 0 Past Week    doxycycline (VIBRAMYCIN) 100 MG capsule Take 1 capsule by mouth 2 (Two) Times a Day for 5 days. 10 capsule 0 3/23/2025    EPINEPHrine (AUVI-Q IJ) Inject  as directed As Needed.   Past Month    famotidine (PEPCID) 20 MG  tablet Take 1 tablet by mouth.   3/23/2025    furosemide (Lasix) 20 MG tablet Take 1 tablet by mouth Daily for 4 days. 4 tablet 0 3/22/2025    ipratropium (ATROVENT) 0.03 % nasal spray Administer 1 spray into the nostril(s) as directed by provider 3 (Three) Times a Day.   Past Week    irbesartan (AVAPRO) 300 MG tablet Take 1 tablet by mouth Daily. 90 tablet 1 3/23/2025    loperamide (IMODIUM) 2 MG capsule Take 1 capsule by mouth Every 2 (Two) Hours As Needed for Diarrhea (max 4 doses daily).   Past Week    meclizine (ANTIVERT) 25 MG tablet TAKE 1 TABLET BY MOUTH THREE TIMES DAILY AS NEEDED FOR DIZZINESS 270 tablet 0 3/22/2025    ondansetron (Zofran) 4 MG tablet Take 1 tablet by mouth Every 8 (Eight) Hours As Needed for Nausea or Vomiting. 42 tablet 4 3/23/2025    potassium chloride (KLOR-CON M20) 20 MEQ CR tablet Take 1 tablet by mouth Daily. 4 tablet 0 3/22/2025    traMADol (ULTRAM) 50 MG tablet TAKE 1 TABLET BY MOUTH TWICE DAILY AS NEEDED FOR MODERATE PAIN 180 tablet 1 3/23/2025    triamcinolone (KENALOG) 0.1 % ointment Apply 1 Application topically to the appropriate area as directed As Needed.   Past Month    Vortioxetine HBr (Trintellix) 10 MG tablet tablet Take 1 tablet by mouth Daily With Breakfast. 90 tablet 3 3/22/2025    amitriptyline (ELAVIL) 25 MG tablet Take 1 tablet by mouth At Night As Needed for Sleep.   More than a month    hydrocortisone 2.5 % cream Apply  topically to the appropriate area as directed As Needed.            Current Facility-Administered Medications:     acetaminophen (TYLENOL) tablet 650 mg, 650 mg, Oral, Q4H PRN **OR** acetaminophen (TYLENOL) 160 MG/5ML oral solution 650 mg, 650 mg, Oral, Q4H PRN **OR** acetaminophen (TYLENOL) suppository 650 mg, 650 mg, Rectal, Q4H PRN, Hima Trejo MD    albuterol sulfate HFA (PROVENTIL HFA;VENTOLIN HFA;PROAIR HFA) inhaler 2 puff, 2 puff, Inhalation, Q4H PRN, Neil, Jawraya, MD    amitriptyline (ELAVIL) tablet 25 mg, 25 mg, Oral, Nightly PRN,  Hima Trejo MD    atorvastatin (LIPITOR) tablet 80 mg, 80 mg, Oral, Daily, Hima Trejo MD, 80 mg at 03/24/25 0817    azelastine (ASTELIN) nasal spray 1 spray, 1 spray, Each Nare, Daily, Hima Trejo MD, 1 spray at 03/24/25 0820    benzonatate (TESSALON) capsule 100 mg, 100 mg, Oral, TID PRN, Hima Trejo MD    budesonide (PULMICORT) nebulizer solution 0.5 mg, 0.5 mg, Nebulization, BID - RT, Hima Trejo MD, 0.5 mg at 03/24/25 0702    Calcium Replacement - Follow Nurse / BPA Driven Protocol, , Not Applicable, PRN, Hima Trejo MD    Calcium Replacement - Follow Nurse / BPA Driven Protocol, , Not Applicable, PRN, Yobani Akbar,     cholecalciferol (VITAMIN D3) tablet 2,000 Units, 2,000 Units, Oral, Daily, Hima Trejo MD    Diclofenac Sodium (VOLTAREN) 1 % gel 4 g, 4 g, Topical, 4x Daily PRN, Hima Trejo MD    doxycycline (MONODOX) capsule 100 mg, 100 mg, Oral, Q12H, Hima Trejo MD, 100 mg at 03/24/25 0817    famotidine (PEPCID) tablet 20 mg, 20 mg, Oral, BID AC, Hima Trejo MD, 20 mg at 03/24/25 0817    HYDROmorphone (DILAUDID) injection 0.5 mg, 0.5 mg, Intravenous, Q2H PRN, Hima Trejo MD, 0.5 mg at 03/24/25 0817    ipratropium (ATROVENT) nasal spray 0.03%, 1 spray, Each Nare, TID, Hima Trejo MD, 1 spray at 03/24/25 0820    Lidocaine 4 % 3 patch, 3 patch, Transdermal, Q24H, Vivian Montero APRN    loperamide (IMODIUM) capsule 2 mg, 2 mg, Oral, Q2H PRN, Hima Trejo MD    losartan (COZAAR) tablet 100 mg, 100 mg, Oral, Q24H, Hima Trejo MD, 100 mg at 03/24/25 0817    Magnesium Standard Dose Replacement - Follow Nurse / BPA Driven Protocol, , Not Applicable, PRN, Hima Trejo MD    Magnesium Standard Dose Replacement - Follow Nurse / BPA Driven Protocol, , Not Applicable, PRN, Yobani Akbar DO    meclizine (ANTIVERT) tablet 25 mg, 25 mg, Oral, TID PRN, Hima Trejo MD    methocarbamol (ROBAXIN) tablet 500 mg, 500 mg, Oral, Q8H, Vivian Montero, KALYANI    ondansetron (ZOFRAN) injection 4 mg,  4 mg, Intravenous, Q6H PRNNeil Jawed, MD    Phosphorus Replacement - Follow Nurse / BPA Driven Protocol, , Not Applicable, Neil RUIZ Jawed, MD    Phosphorus Replacement - Follow Nurse / BPA Driven Protocol, , Not Applicable, Raffy RUIZ Jimmy, DO    Potassium Replacement - Follow Nurse / BPA Driven Protocol, , Not Applicable, Neil RUIZ Jawed, MD    Potassium Replacement - Follow Nurse / BPA Driven Protocol, , Not Applicable, Raffy RUIZ Jimmy, DO    sodium chloride 0.9 % flush 10 mL, 10 mL, Intravenous, Q12H, Hima Trejo MD, 10 mL at 03/24/25 0817    sodium chloride 0.9 % flush 10 mL, 10 mL, Intravenous, PRNNeil Jawed, MD    sodium chloride 0.9 % infusion 40 mL, 40 mL, Intravenous, PRNNeil Jawed, MD    sodium chloride 0.9 % infusion, 100 mL/hr, Intravenous, Continuous, Hima Trejo MD, Last Rate: 100 mL/hr at 03/23/25 2024, 100 mL/hr at 03/23/25 2024    traMADol (ULTRAM) tablet 50 mg, 50 mg, Oral, BID PRN, Hima Trejo MD, 50 mg at 03/24/25 1205    Vortioxetine HBr (TRINTELLIX) tablet 10 mg, 10 mg, Oral, Daily With Breakfast, Hima Trejo MD, 10 mg at 03/24/25 0817    Facility-Administered Medications Ordered in Other Encounters:     Chlorhexidine Gluconate 2 % pads 1 each, 1 each, Apply externally, Take As Directed, Bethel Devi MD      Objective     Results Review:  LABS:  Results from last 7 days   Lab Units 03/24/25  0533 03/23/25  1325 03/21/25  0952   WBC 10*3/mm3 8.64 9.76 6.70   HEMOGLOBIN g/dL 9.2* 9.9* 10.3*   HEMATOCRIT % 28.9* 30.0* 30.9*   PLATELETS 10*3/mm3 211 223 267     Results from last 7 days   Lab Units 03/24/25  0533 03/23/25  1325 03/21/25  0952   SODIUM mmol/L 139 134* 139   POTASSIUM mmol/L 3.8 3.7 3.6   CHLORIDE mmol/L 104 100 106   CO2 mmol/L 24.2 25.3 26.4   BUN mg/dL 9 14 12   CREATININE mg/dL 0.92 1.05* 0.86   CALCIUM mg/dL 8.4* 8.6 8.9   BILIRUBIN mg/dL 0.8 0.7 0.5   ALK PHOS U/L 65 74 74   ALT (SGPT) U/L 10 15 17   AST (SGOT) U/L 16 20 21   GLUCOSE mg/dL 95  107* 110*     Urinalysis trace leukocyte esterase, 6-10 WBC otherwise unremarkable  Lactate 1.0  Procalcitonin 0.03  CRP 3.17  Sed rate 26  Blood culture 3/23-pending    DIAGNOSTICS:  MRI lumbar spine reveals anterolisthesis L4 on 5 with unroofing of disc.  There is fairly advanced facet disease with facet joint fluid present and ligamentum flavum hypertrophy which results in moderate-severe canal and moderate to severe right and moderate left neuroforaminal narrowing.  There is broad-based disc bulging at L5/S1 resulting in minimal canal and mild bilateral neuroforaminal narrowing.  There is moderate facet hypertrophy at L2/3 with broad-based disc bulging resulting in moderate canal stenosis.  Moderate facet and ligamentum flavum hypertrophy at L3/4 resulting in mild to moderate canal stenosis.  At T11/12 there is a left paracentral disc protrusion resulting in compression of the left thecal sac but no cord compression.  No evidence of cord signal changes or abnormal enhancement postcontrast    Bilateral lower extremity venous Doppler study-negative DVT    Results Review:   I reviewed the patient's new clinical results.  I personally viewed and interpreted the patient's MRI lumbar spine    Vital Signs   Temp:  [98.1 °F (36.7 °C)-100 °F (37.8 °C)] 98.5 °F (36.9 °C)  Heart Rate:  [72-89] 88  Resp:  [16-20] 18  BP: (119-159)/() 139/78    Physical Exam:  Physical Exam  Vitals reviewed.   Constitutional:       Appearance: Normal appearance.      Comments: Body mass index is 51.07 kg/m².     Pulmonary:      Effort: Pulmonary effort is normal.   Musculoskeletal:         General: Tenderness (Bilateral calves) present.      Lumbar back: Tenderness (Generalized lower lumbar bilaterally and SI joints) present. No bony tenderness.      Right lower leg: Edema present.      Left lower leg: Edema present.      Comments: Straight leg raise causes low back pain   Skin:     General: Skin is warm and dry.      Findings: No  erythema.   Neurological:      General: No focal deficit present.      Deep Tendon Reflexes:      Reflex Scores:       Patellar reflexes are 1+ on the left side.  Psychiatric:         Mood and Affect: Mood normal.         Thought Content: Thought content normal.       Neurological Exam  Mental Status  Awake, alert and oriented to person, place and time.    Motor  Normal muscle bulk throughout. No fasciculations present. Normal muscle tone. No abnormal involuntary movements.  5/5 bilateral lower extremities.  Slightly limited and iliopsoas due to back pain.    Sensory  Light touch is normal in upper and lower extremities.     Reflexes                                            Right                      Left  Patellar                                Tr                         1+    Gait    Not tested due to pain.      Assessment & Plan       Intractable low back pain    HTN (hypertension)    Anemia    Anxiety    Acute low back pain    Cellulitis of leg    Acute back pain    Spinal stenosis, lumbar region, without neurogenic claudication    Spondylolisthesis, lumbar region    Facet arthritis of lumbar region      Problem List Items Addressed This Visit          Unprioritized    * (Principal) Intractable low back pain - Primary     Other Visit Diagnoses         Acute febrile illness          Bilateral lower extremity edema                 COMORBID CONDITIONS:  Anemia, chronic kidney disease including stage, Hypertension, and RA, recent cellulitis    Patient presents for acute low back pain.  She denies any injury.  She is currently on doxycycline and diuretic for cellulitis and lower extremity edema.  MRI lumbar spine shows no evidence of infection and lab work outside of isolated CRP is unremarkable.  She does have fairly significant degenerative changes particularly at L4/5 with spondylolisthesis, facet arthropathy, disc bulging, canal and foraminal stenosis.  I do not see any acute pathology.  I suspect she is having  "either sacroiliitis flare or just general musculoskeletal discomfort.  Her daughter at the bedside states she is ambulating somewhat differently due to her recent leg issues.  She denies any radicular leg pain and has no symptoms consistent with neurogenic claudication at this time.  Due to her active infection and current antibiotics, I would not recommend any type of steroid injection or oral steroids at this time.  I would recommend muscle relaxant, Lidoderm patch and narcotics at the discretion of the primary team versus over-the-counter pain relievers.  I encouraged her to try some ice versus heat depending on which gives her more relief.  There are no acute neurosurgical issues and I recommend outpatient follow-up for her chronic findings and to consider injection therapy following completion of her antibiotic regimen if her symptoms are persisting.    PLAN:   Muscle relaxants, lidoderm patch  PT eval  F/u Dr. Jones/Daysi Holt 3-4 weeks    I discussed the patient's findings and my recommendations with patient, family, and nursing staff    During patient visit, I utilized appropriate personal protective equipment including gloves. Appropriate PPE was worn during the entire visit.  Hand hygiene was completed before and after.     Vivian Montero, APRN  03/24/25  12:43 EDT    \"Dictated utilizing Dragon dictation\".      "

## 2025-03-24 NOTE — PLAN OF CARE
Problem: Adult Inpatient Plan of Care  Goal: Optimal Comfort and Wellbeing  Outcome: Not Progressing  Intervention: Monitor Pain and Promote Comfort  Description: Assess pain level, treatment efficacy and patient response at regular intervals using a consistent pain scale.Consider the presence and impact of preexisting chronic pain.Encourage patient and caregiver involvement in pain assessment, interventions and safety measures.Promote activity; balance with sleep and rest to enhance healing.  Recent Flowsheet Documentation  Taken 3/24/2025 0050 by Janice Hernandez RN  Pain Management Interventions: pain medication given  Taken 3/23/2025 2039 by Janice Hernandez RN  Pain Management Interventions: pain medication given  Taken 3/23/2025 2000 by Janice Hernandez RN  Pain Management Interventions: position adjusted  Goal: Readiness for Transition of Care  Outcome: Not Progressing   Goal Outcome Evaluation:

## 2025-03-24 NOTE — PROGRESS NOTES
Name: Pedrito Powell ADMIT: 3/23/2025   : 1960  PCP: Teo Fraser MD    MRN: [id number] LOS: 1 days   AGE/SEX: 64 y.o. female  ROOM: [facility]     Subjective   Subjective   Patient seen and examined this morning.  Hospital day 1.  She is awake and resting in bed, she is having ongoing lower back pain, worse with any type of movement, intermittent radiation into her right lower extremity.       Objective   Objective   Vital Signs  Temp:  [98.1 °F (36.7 °C)-100 °F (37.8 °C)] 98.9 °F (37.2 °C)  Heart Rate:  [72-89] 83  Resp:  [16-20] 16  BP: (119-159)/() 159/79  SpO2:  [90 %-97 %] 96 %  on  Flow (L/min) (Oxygen Therapy):  [1.5] 1.5;   Device (Oxygen Therapy): nasal cannula  Body mass index is 51.07 kg/m².  Physical Exam  Vitals and nursing note reviewed.   Constitutional:       General: She is not in acute distress.     Appearance: She is overweight.   Cardiovascular:      Rate and Rhythm: Normal rate and regular rhythm.      Pulses: Normal pulses.      Heart sounds: Normal heart sounds.   Pulmonary:      Effort: Pulmonary effort is normal.      Breath sounds: Normal breath sounds. No wheezing.   Abdominal:      General: Bowel sounds are normal.      Palpations: Abdomen is soft.      Tenderness: There is no abdominal tenderness.   Musculoskeletal:         General: Tenderness present.      Right lower leg: No edema.      Left lower leg: No edema.      Comments: Decreased mobility, decreased ROM secondary to pain, + straight leg raise test on right   Skin:     General: Skin is warm and dry.      Comments: Erythema of lower extremities   Neurological:      Mental Status: She is alert.       Results Review     I reviewed the patient's new clinical results.  Results from last 7 days   Lab Units 25  0533 25  1325 25  0952   WBC 10*3/mm3 8.64 9.76 6.70   HEMOGLOBIN g/dL 9.2* 9.9* 10.3*   PLATELETS 10*3/mm3 211 223 267     Results from last 7 days   Lab Units 25  0533 25  1325  "03/21/25  0952   SODIUM mmol/L 139 134* 139   POTASSIUM mmol/L 3.8 3.7 3.6   CHLORIDE mmol/L 104 100 106   CO2 mmol/L 24.2 25.3 26.4   BUN mg/dL 9 14 12   CREATININE mg/dL 0.92 1.05* 0.86   GLUCOSE mg/dL 95 107* 110*   EGFR mL/min/1.73 69.7 59.5*  --    EGFR RESULT mL/min/1.73  --   --  75.5     Results from last 7 days   Lab Units 03/24/25  0533 03/23/25  1325 03/21/25  0952   ALBUMIN g/dL 3.1* 4.0 3.7   BILIRUBIN mg/dL 0.8 0.7 0.5   ALK PHOS U/L 65 74 74   AST (SGOT) U/L 16 20 21   ALT (SGPT) U/L 10 15 17     Results from last 7 days   Lab Units 03/24/25  0533 03/23/25  1325 03/21/25  0952   CALCIUM mg/dL 8.4* 8.6 8.9   ALBUMIN g/dL 3.1* 4.0 3.7     Results from last 7 days   Lab Units 03/23/25  1507 03/23/25  1325   PROCALCITONIN ng/mL  --  0.03   LACTATE mmol/L 1.0  --      No results found for: \"HGBA1C\", \"POCGLU\"    MRI Lumbar Spine With & Without Contrast  Result Date: 3/24/2025   Multilevel degenerative change, see level by level details regarding canal and foraminal narrowing above.  No acute appearing abnormality at any level.   This report was finalized on 3/24/2025 12:02 AM by Dr. Rosendo Donnelly M.D on Workstation: [id number]        I have personally reviewed all medications:  Scheduled Medications  atorvastatin, 80 mg, Oral, Daily  azelastine, 1 spray, Each Nare, Daily  budesonide, 0.5 mg, Nebulization, BID - RT  doxycycline, 100 mg, Oral, Q12H  famotidine, 20 mg, Oral, BID AC  ipratropium, 1 spray, Each Nare, TID  losartan, 100 mg, Oral, Q24H  sodium chloride, 10 mL, Intravenous, Q12H  Vitamin D, 2,000 Units, Oral, Daily  Vortioxetine HBr, 10 mg, Oral, Daily With Breakfast    Infusions  sodium chloride, 100 mL/hr, Last Rate: 100 mL/hr (03/23/25 2024)    Diet  Diet: Cardiac; Healthy Heart (2-3 Na+); Fluid Consistency: Thin (IDDSI 0)    I have personally reviewed:  [x]  Laboratory   []  Microbiology   [x]  Radiology   []  EKG/Telemetry  []  Cardiology/Vascular   []  Pathology    []  Records     "   Assessment/Plan     Active Hospital Problems    Diagnosis  POA    **Intractable low back pain [M54.59]  Yes    Spinal stenosis, lumbar region, without neurogenic claudication [M48.061]  Unknown    Spondylolisthesis, lumbar region [M43.16]  Unknown    Facet arthritis of lumbar region [M47.816]  Unknown    GERD (gastroesophageal reflux disease) [K21.9]  Yes    Acute low back pain [M54.50]  Unknown    Cellulitis of leg [L03.119]  Unknown    Acute back pain [M54.9]  Yes    Anxiety [F41.9]  Yes    Anemia [D64.9]  Yes    HTN (hypertension) [I10]  Yes      Resolved Hospital Problems   No resolved problems to display.       64 y.o. female admitted with Intractable low back pain.    Patient with worsening lower back pain with radiculopathy symptoms. MRI showing evidence of lumbar stenosis. Pain persistent at present, will ask ANDRES to evaluate. Continue medications for pain. PT/OT, fall precautions. Further management based on clinical course. BP appears acceptable, continue treatments as ordered. Continue treatment for LE cellulitis as previously prescribed. Hemoglobin low but no evidence of bleeding, monitor for now, repeat Cbc in AM, transfuse for hemoglobin <7. Appears to have CKD stage 2, baseline creatinine ~0.8 to 0.9, renal function stable, monitor, repeat BMP in AM. UA with + LE, 6-10 WBC, but she denies urinary symptoms to suggest UTI, follow up urine culture. Blood pressure acceptable, continue regimen as prescribed. Continue other medications for chronic conditions as prescribed. Further management TBD based on clinical course.      SCDs for DVT prophylaxis.  Full code.  Discussed with patient and nursing staff.  Anticipate discharge  TBD   pending PT evaluation, consultant clearance  Expected discharge date/ time has not been documented.      Yobani Akbar DO  [facility]  03/24/25

## 2025-03-25 ENCOUNTER — APPOINTMENT (OUTPATIENT)
Dept: GENERAL RADIOLOGY | Facility: HOSPITAL | Age: 65
End: 2025-03-25
Payer: COMMERCIAL

## 2025-03-25 LAB
ANION GAP SERPL CALCULATED.3IONS-SCNC: 13 MMOL/L (ref 5–15)
BACTERIA SPEC AEROBE CULT: NO GROWTH
BASOPHILS # BLD AUTO: 0.06 10*3/MM3 (ref 0–0.2)
BASOPHILS NFR BLD AUTO: 0.6 % (ref 0–1.5)
BUN SERPL-MCNC: 11 MG/DL (ref 8–23)
BUN/CREAT SERPL: 13.9 (ref 7–25)
CALCIUM SPEC-SCNC: 8.2 MG/DL (ref 8.6–10.5)
CHLORIDE SERPL-SCNC: 100 MMOL/L (ref 98–107)
CO2 SERPL-SCNC: 24 MMOL/L (ref 22–29)
CREAT SERPL-MCNC: 0.79 MG/DL (ref 0.57–1)
DEPRECATED RDW RBC AUTO: 41.9 FL (ref 37–54)
EGFRCR SERPLBLD CKD-EPI 2021: 83.6 ML/MIN/1.73
EOSINOPHIL # BLD AUTO: 0.1 10*3/MM3 (ref 0–0.4)
EOSINOPHIL NFR BLD AUTO: 0.9 % (ref 0.3–6.2)
ERYTHROCYTE [DISTWIDTH] IN BLOOD BY AUTOMATED COUNT: 12.6 % (ref 12.3–15.4)
GLUCOSE SERPL-MCNC: 120 MG/DL (ref 65–99)
HCT VFR BLD AUTO: 29.8 % (ref 34–46.6)
HGB BLD-MCNC: 9.5 G/DL (ref 12–15.9)
IMM GRANULOCYTES # BLD AUTO: 0.04 10*3/MM3 (ref 0–0.05)
IMM GRANULOCYTES NFR BLD AUTO: 0.4 % (ref 0–0.5)
LYMPHOCYTES # BLD AUTO: 1.26 10*3/MM3 (ref 0.7–3.1)
LYMPHOCYTES NFR BLD AUTO: 11.6 % (ref 19.6–45.3)
MCH RBC QN AUTO: 29.1 PG (ref 26.6–33)
MCHC RBC AUTO-ENTMCNC: 31.9 G/DL (ref 31.5–35.7)
MCV RBC AUTO: 91.4 FL (ref 79–97)
MONOCYTES # BLD AUTO: 1.27 10*3/MM3 (ref 0.1–0.9)
MONOCYTES NFR BLD AUTO: 11.7 % (ref 5–12)
NEUTROPHILS NFR BLD AUTO: 74.8 % (ref 42.7–76)
NEUTROPHILS NFR BLD AUTO: 8.15 10*3/MM3 (ref 1.7–7)
NRBC BLD AUTO-RTO: 0 /100 WBC (ref 0–0.2)
PLATELET # BLD AUTO: 223 10*3/MM3 (ref 140–450)
PMV BLD AUTO: 9.4 FL (ref 6–12)
POTASSIUM SERPL-SCNC: 3.9 MMOL/L (ref 3.5–5.2)
RBC # BLD AUTO: 3.26 10*6/MM3 (ref 3.77–5.28)
SODIUM SERPL-SCNC: 137 MMOL/L (ref 136–145)
WBC NRBC COR # BLD AUTO: 10.88 10*3/MM3 (ref 3.4–10.8)

## 2025-03-25 PROCEDURE — 97162 PT EVAL MOD COMPLEX 30 MIN: CPT

## 2025-03-25 PROCEDURE — 25010000002 ONDANSETRON PER 1 MG: Performed by: INTERNAL MEDICINE

## 2025-03-25 PROCEDURE — 94761 N-INVAS EAR/PLS OXIMETRY MLT: CPT

## 2025-03-25 PROCEDURE — 94664 DEMO&/EVAL PT USE INHALER: CPT

## 2025-03-25 PROCEDURE — G0378 HOSPITAL OBSERVATION PER HR: HCPCS

## 2025-03-25 PROCEDURE — 97530 THERAPEUTIC ACTIVITIES: CPT

## 2025-03-25 PROCEDURE — 25010000002 HYDROMORPHONE PER 4 MG: Performed by: INTERNAL MEDICINE

## 2025-03-25 PROCEDURE — 73560 X-RAY EXAM OF KNEE 1 OR 2: CPT

## 2025-03-25 PROCEDURE — 94799 UNLISTED PULMONARY SVC/PX: CPT

## 2025-03-25 PROCEDURE — 71045 X-RAY EXAM CHEST 1 VIEW: CPT

## 2025-03-25 PROCEDURE — 85025 COMPLETE CBC W/AUTO DIFF WBC: CPT | Performed by: STUDENT IN AN ORGANIZED HEALTH CARE EDUCATION/TRAINING PROGRAM

## 2025-03-25 PROCEDURE — 94760 N-INVAS EAR/PLS OXIMETRY 1: CPT

## 2025-03-25 PROCEDURE — 73110 X-RAY EXAM OF WRIST: CPT

## 2025-03-25 PROCEDURE — 80048 BASIC METABOLIC PNL TOTAL CA: CPT | Performed by: STUDENT IN AN ORGANIZED HEALTH CARE EDUCATION/TRAINING PROGRAM

## 2025-03-25 PROCEDURE — 97166 OT EVAL MOD COMPLEX 45 MIN: CPT

## 2025-03-25 RX ORDER — HYDROCODONE BITARTRATE AND ACETAMINOPHEN 7.5; 325 MG/1; MG/1
1 TABLET ORAL EVERY 4 HOURS PRN
Refills: 0 | Status: DISCONTINUED | OUTPATIENT
Start: 2025-03-25 | End: 2025-04-02 | Stop reason: HOSPADM

## 2025-03-25 RX ADMIN — HYDROCODONE BITARTRATE AND ACETAMINOPHEN 1 TABLET: 5; 325 TABLET ORAL at 08:34

## 2025-03-25 RX ADMIN — ACETAMINOPHEN 650 MG: 325 TABLET, FILM COATED ORAL at 17:51

## 2025-03-25 RX ADMIN — LIDOCAINE 3 PATCH: 4 PATCH TOPICAL at 08:34

## 2025-03-25 RX ADMIN — Medication 2000 UNITS: at 08:34

## 2025-03-25 RX ADMIN — HYDROMORPHONE HYDROCHLORIDE 0.5 MG: 1 INJECTION, SOLUTION INTRAMUSCULAR; INTRAVENOUS; SUBCUTANEOUS at 15:11

## 2025-03-25 RX ADMIN — VORTIOXETINE 10 MG: 5 TABLET, FILM COATED ORAL at 08:33

## 2025-03-25 RX ADMIN — ONDANSETRON 4 MG: 2 INJECTION, SOLUTION INTRAMUSCULAR; INTRAVENOUS at 13:20

## 2025-03-25 RX ADMIN — BUDESONIDE 0.5 MG: 0.5 INHALANT RESPIRATORY (INHALATION) at 08:23

## 2025-03-25 RX ADMIN — ATORVASTATIN CALCIUM 80 MG: 20 TABLET, FILM COATED ORAL at 08:33

## 2025-03-25 RX ADMIN — FAMOTIDINE 20 MG: 20 TABLET, FILM COATED ORAL at 17:27

## 2025-03-25 RX ADMIN — FAMOTIDINE 20 MG: 20 TABLET, FILM COATED ORAL at 08:34

## 2025-03-25 RX ADMIN — HYDROCODONE BITARTRATE AND ACETAMINOPHEN 1 TABLET: 7.5; 325 TABLET ORAL at 19:46

## 2025-03-25 RX ADMIN — Medication 10 ML: at 20:41

## 2025-03-25 RX ADMIN — DOXYCYCLINE 100 MG: 100 CAPSULE ORAL at 08:33

## 2025-03-25 RX ADMIN — HYDROMORPHONE HYDROCHLORIDE 0.5 MG: 1 INJECTION, SOLUTION INTRAMUSCULAR; INTRAVENOUS; SUBCUTANEOUS at 04:42

## 2025-03-25 RX ADMIN — METHOCARBAMOL TABLETS 500 MG: 500 TABLET, COATED ORAL at 05:33

## 2025-03-25 RX ADMIN — DOXYCYCLINE 100 MG: 100 CAPSULE ORAL at 20:41

## 2025-03-25 RX ADMIN — BUDESONIDE 0.5 MG: 0.5 INHALANT RESPIRATORY (INHALATION) at 20:23

## 2025-03-25 RX ADMIN — METHOCARBAMOL TABLETS 500 MG: 500 TABLET, COATED ORAL at 14:13

## 2025-03-25 RX ADMIN — METHOCARBAMOL TABLETS 500 MG: 500 TABLET, COATED ORAL at 20:40

## 2025-03-25 RX ADMIN — HYDROCODONE BITARTRATE AND ACETAMINOPHEN 1 TABLET: 5; 325 TABLET ORAL at 13:20

## 2025-03-25 RX ADMIN — IPRATROPIUM BROMIDE 1 SPRAY: 21 SPRAY, METERED NASAL at 20:42

## 2025-03-25 RX ADMIN — HYDROMORPHONE HYDROCHLORIDE 0.5 MG: 1 INJECTION, SOLUTION INTRAMUSCULAR; INTRAVENOUS; SUBCUTANEOUS at 01:07

## 2025-03-25 RX ADMIN — HYDROMORPHONE HYDROCHLORIDE 0.5 MG: 1 INJECTION, SOLUTION INTRAMUSCULAR; INTRAVENOUS; SUBCUTANEOUS at 17:27

## 2025-03-25 RX ADMIN — Medication 10 ML: at 08:34

## 2025-03-25 RX ADMIN — LOSARTAN POTASSIUM 100 MG: 100 TABLET, FILM COATED ORAL at 08:34

## 2025-03-25 NOTE — PROGRESS NOTES
Nutrition Services    Patient Name:  Pedrito Powell  YOB: 1960  MRN: [id number]  Admit Date:  3/23/2025    RD spoke with pt's dtr re: food allergies. Pt with anaphylactic reaction to citrus, nuts, tree nuts. Pt can eat peanut butter. Pt is okay to eat eggs, cannot take flu shot. Pt's daughter okay with removing yeast and egg allergies to open up menu options this admission. RD provided pt with two uncrustables per dtr's request and dinner ordered. RD notified kitchen that pt okay to have uncrustables despite nut allergy.     Electronically signed by:  Keli Del Rosario RD  03/25/25 16:27 EDT

## 2025-03-25 NOTE — PLAN OF CARE
Goal Outcome Evaluation:              Outcome Evaluation: Low back pain, A&O, 2L, worked w/PT only able to sit EOB, daughter at bedside most of shift, pt c/o left hand/wrist pain, c/o Left knee pain, dopplers & xrays ordered, IS encouraged, educated on bp monitoring, cTM

## 2025-03-25 NOTE — THERAPY EVALUATION
Patient Name: Pedrito Powell  : 1960    MRN: [id number]                              Today's Date: 3/25/2025       Admit Date: 3/23/2025    Visit Dx:     ICD-10-CM ICD-9-CM   1. Intractable low back pain  M54.59 724.2   2. Acute febrile illness  R50.9 780.60   3. Bilateral lower extremity edema  R60.0 782.3     Patient Active Problem List   Diagnosis    Hx of anaphylaxis    Primary osteoarthritis of knee    High risk medication use    HTN (hypertension)    Hyperlipidemia    Vertigo    Infected blister of left foot    Ganglion of right wrist    Vomiting and diarrhea    Intractable vomiting with nausea    Fatigue    Acute gastritis without hemorrhage    Anemia    Elevated serum creatinine    Fungal rash of torso    Cellulitis of abdominal wall    Bronchitis    Arthritis of shoulder    Chronic renal failure in pediatric patient, stage 3 (moderate)    H/O shoulder surgery    Anxiety    Depression    Iron deficiency    Dizziness    Hypoxia    COVID-19 virus infection    Acute low back pain    Cellulitis of leg    Intractable low back pain    Acute back pain    Spinal stenosis, lumbar region, without neurogenic claudication    Spondylolisthesis, lumbar region    Facet arthritis of lumbar region    GERD (gastroesophageal reflux disease)     Past Medical History:   Diagnosis Date    Anemia     IRON INFUSION RECENTLY    Arthritis     Asthma     Chronic kidney disease     stage 3    Elevated cholesterol     GERD (gastroesophageal reflux disease)     High risk medication use 2018    History of carpal tunnel syndrome     Hyperlipidemia     Hypertension     Intractable vomiting with nausea 2018    Left shoulder pain     Limited mobility     Rheumatoid arthritis     Vertigo     Weakness     AT TIMES LEFT SHOULDER/LEFT ARM     Past Surgical History:   Procedure Laterality Date    CARPAL TUNNEL RELEASE Left 10/02/2023     SECTION  , ,     COLONOSCOPY N/A 12/10/2018    normal ileum, IH,  hyperplastic polyp, otherwise normal exam    ENDOSCOPY N/A 12/10/2018    no gross lesions in esophagus or examined duodenum, erythematous mucosa in stomach, biopsies benign    HYSTERECTOMY  2000    OOPHORECTOMY Bilateral     SHOULDER MANIPULATION Left     TOTAL SHOULDER ARTHROPLASTY Left 05/13/2021    Procedure: REVERSE TOTAL SHOULDER ARTHROPLASTY,  BICEP TENDONDESIS;  Surgeon: Bethel Devi MD;  Location: [facility];  Service: Orthopedics;  Laterality: Left;    TRIGGER FINGER RELEASE Left     3 fingers      General Information       Row Name 03/25/25 1557          OT Time and Intention    Document Type evaluation  -JR     Mode of Treatment co-treatment;physical therapy;occupational therapy  -JR       Row Name 03/25/25 1557          General Information    Patient Profile Reviewed yes  -JR     Existing Precautions/Restrictions fall  -JR       Row Name 03/25/25 1557          Living Environment    Current Living Arrangements home  -JR     People in Home child(faraz), adult  -JR       Row Name 03/25/25 1557          Home Main Entrance    Number of Stairs, Main Entrance three  -JR     Stair Railings, Main Entrance railings safe and in good condition  -JR       Row Name 03/25/25 1557          Stairs Within Home, Primary    Number of Stairs, Within Home, Primary none  -JR     Stair Railings, Within Home, Primary none  -JR       Row Name 03/25/25 1557          Cognition    Orientation Status (Cognition) oriented x 4  -JR       Row Name 03/25/25 1557          Safety Issues/Impairments Affecting Functional Mobility    Impairments Affecting Function (Mobility) balance;endurance/activity tolerance;strength;pain;range of motion (ROM)  -JR     Comment, Safety Issues/Impairments (Mobility) PT/OT cotreatment medically appropriate and necessary due to patient acuity level, to maximize therapeutic benefit due to impaired act tolerance, and for safety of patient and staff. Treatment focused on progression of care and goals  established in POC.  -               User Key  (r) = Recorded By, (t) = Taken By, (c) = Cosigned By      Initials Name Provider Type    Evans Harp OT Occupational Therapist                     Mobility/ADL's       Row Name 03/25/25 1558          Bed Mobility    Bed Mobility supine-sit;sit-supine  -JR     Supine-Sit [place] (Bed Mobility) 2 person assist;verbal cues;moderate assist (50% patient effort);nonverbal cues (demo/gesture)  -     Sit-Supine [place] (Bed Mobility) maximum assist (25% patient effort);2 person assist;verbal cues;nonverbal cues (demo/gesture)  -     Assistive Device (Bed Mobility) bed rails;head of bed elevated;repositioning sheet  -               User Key  (r) = Recorded By, (t) = Taken By, (c) = Cosigned By      Initials Name Provider Type    Evans Harp OT Occupational Therapist                   Obj/Interventions       Row Name 03/25/25 1558          Sensory Assessment (Somatosensory)    Sensory Assessment (Somatosensory) sensation intact  -[facility] Name 03/25/25 1558          Vision Assessment/Intervention    Visual Impairment/Limitations WFL  -[facility] Name 03/25/25 1558          Range of Motion Comprehensive    General Range of Motion bilateral upper extremity ROM WFL  -     Comment, General Range of Motion BUE WFL, L wrist limited due to pain  -[facility] Name 03/25/25 1558          Strength Comprehensive (MMT)    General Manual Muscle Testing (MMT) Assessment upper extremity strength deficits identified  -     Comment, General Manual Muscle Testing (MMT) Assessment BUE 3+/5  -[facility] Name 03/25/25 1558          Balance    Balance Assessment sitting static balance  -JR     Static Sitting Balance contact guard;verbal cues  -JR     Position, Sitting Balance sitting edge of bed  -               User Key  (r) = Recorded By, (t) = Taken By, (c) = Cosigned By      Initials Name Provider Type    Evans Harp OT Occupational Therapist                    Goals/Plan       Row Name 03/25/25 1604          Bed Mobility Goal 1 (OT)    Activity/Assistive Device (Bed Mobility Goal 1, OT) bed mobility activities, all  -JR     [place] Level/Cues Needed (Bed Mobility Goal 1, OT) modified independence  -JR     Time Frame (Bed Mobility Goal 1, OT) 2 weeks  -JR     Progress/Outcomes (Bed Mobility Goal 1, OT) new goal  -JR       Row Name 03/25/25 1604          Transfer Goal 1 (OT)    Activity/Assistive Device (Transfer Goal 1, OT) transfers, all  -JR     [place] Level/Cues Needed (Transfer Goal 1, OT) modified independence  -JR     Time Frame (Transfer Goal 1, OT) 2 weeks  -JR     Progress/Outcome (Transfer Goal 1, OT) new goal  -JR       Row Name 03/25/25 1604          Bathing Goal 1 (OT)    Activity/Device (Bathing Goal 1, OT) bathing skills, all  -JR     [place] Level/Cues Needed (Bathing Goal 1, OT) modified independence  -JR     Time Frame (Bathing Goal 1, OT) 2 weeks  -JR     Progress/Outcomes (Bathing Goal 1, OT) new goal  -JR       Row Name 03/25/25 1604          Dressing Goal 1 (OT)    Activity/Device (Dressing Goal 1, OT) dressing skills, all  -JR     [place]/Cues Needed (Dressing Goal 1, OT) modified independence  -JR     Time Frame (Dressing Goal 1, OT) 2 weeks  -JR     Progress/Outcome (Dressing Goal 1, OT) new goal  -JR       Row Name 03/25/25 1604          Toileting Goal 1 (OT)    Activity/Device (Toileting Goal 1, OT) toileting skills, all  -JR     [place] Level/Cues Needed (Toileting Goal 1, OT) modified independence  -JR     Time Frame (Toileting Goal 1, OT) 2 weeks  -JR     Progress/Outcome (Toileting Goal 1, OT) new goal  -JR       Row Name 03/25/25 1604          Grooming Goal 1 (OT)    Activity/Device (Grooming Goal 1, OT) grooming skills, all  -JR     [place] (Grooming Goal 1, OT) modified independence  -JR     Time Frame (Grooming Goal 1, OT) 2 weeks  -JR     Progress/Outcome (Grooming Goal 1, OT) new goal  -       Row  Name 03/25/25 1604          Therapy Assessment/Plan (OT)    Planned Therapy Interventions (OT) activity tolerance training;adaptive equipment training;BADL retraining;neuromuscular control/coordination retraining;functional balance retraining;occupation/activity based interventions;passive ROM/stretching;transfer/mobility retraining;ROM/therapeutic exercise;strengthening exercise  -JR               User Key  (r) = Recorded By, (t) = Taken By, (c) = Cosigned By      Initials Name Provider Type    Evans Harp, [place] Occupational Therapist                   Clinical Impression       Row Name 03/25/25 1553          Pain Assessment    Pretreatment Pain Rating 10/10  -JR     Posttreatment Pain Rating 10/10  -JR     Pain Location wrist;knee  -JR     Pain Side/Orientation lower;left  -JR     Pain Management Interventions exercise or physical activity utilized  -JR     Response to Pain Interventions activity participation with increased pain  -JR       Row Name 03/25/25 4434          Plan of Care Review    Plan of Care Reviewed With patient;family  -JR     Progress no change  -JR     Outcome Evaluation 64 y.o. female admitted to [facility] with intractable back pain.  At baseline, griselda lives with her adult daughter and is Independent and functional mobility.  Patient is a full-time teacher.  Patient has has a functional decline with ambulation and general mobility since admission.  Patient reports 12/10 left wrist pain and 10/10 left posterior knee pain.  Patient presents to OT with decreased strength, activity tolerance, coordination and balance.  Patient able to transition to EOB with Mod A x2.  Patient tolerated sitting ~1-2 mins and requested to return to bed due to 10/10 left posterior knee pain.  Patient returned to supine in bed with Max A x2.  OT would be beneficial to address underlying physical deficits and increase ADLs.  Anticipate patient d/c to SNF for continued progress.  -       Row Name 03/25/25 1550           Therapy Assessment/Plan (OT)    Rehab Potential (OT) good  -JR     Criteria for Skilled Therapeutic Interventions Met (OT) yes;skilled treatment is necessary  -JR     Therapy Frequency (OT) 5 times/wk  -JR       Row Name 03/25/25 1558          Therapy Plan Review/Discharge Plan (OT)    Anticipated Discharge Disposition (OT) [facility] nursing facility  -JR       Row Name 03/25/25 1558          Vital Signs    O2 Delivery Pre Treatment supplemental O2  -JR     O2 Delivery Intra Treatment supplemental O2  -JR     O2 Delivery Post Treatment supplemental O2  -JR     Pre Patient Position Supine  -JR     Intra Patient Position Sitting  -JR     Post Patient Position Supine  -JR       Row Name 03/25/25 1558          Positioning and Restraints    Pre-Treatment Position in bed  -JR     Post Treatment Position bed  -JR     In Bed notified nsg;call light within reach;encouraged to call for assist;exit alarm on;with family/caregiver  -JR               User Key  (r) = Recorded By, (t) = Taken By, (c) = Cosigned By      Initials Name Provider Type    JR Evans Moon, OT Occupational Therapist                   Outcome Measures       Row Name 03/25/25 1604          How much help from another is currently needed...    Putting on and taking off regular lower body clothing? 2  -JR     Bathing (including washing, rinsing, and drying) 2  -JR     Toileting (which includes using toilet bed pan or urinal) 2  -JR     Putting on and taking off regular upper body clothing 2  -JR     Taking care of personal grooming (such as brushing teeth) 2  -JR     Eating meals 2  -JR     AM-PAC 6 Clicks Score (OT) 12  -JR       Row Name 03/25/25 1547 03/25/25 0600       How much help from another person do you currently need...    Turning from your back to your side while in flat bed without using bedrails? 2  -BH 3  -AP    Moving from lying on back to sitting on the side of a flat bed without bedrails? 2  -BH 3  -AP    Moving to and from a bed to a chair  (including a wheelchair)? 1  - 3  -AP    Standing up from a chair using your arms (e.g., wheelchair, bedside chair)? 1  - 3  -AP    Climbing 3-5 steps with a railing? 1  - 2  -AP    To walk in hospital room? 1  - 3  -AP    AM-PAC 6 Clicks Score (PT) 8  - 17  -AP    Highest Level of Mobility Goal 3 --> Sit at edge of bed  - 5 --> Static standing  -AP      Row Name 03/25/25 1604          Modified [place] Scale    Modified [place] Scale 4 - Moderately severe disability.  Unable to walk without assistance, and unable to attend to own bodily needs without assistance.  -       Row Name 03/25/25 1604 03/25/25 1547       Functional Assessment    Outcome Measure Options AM-PAC 6 Clicks Daily Activity (OT);Modified [place]  - AM-PAC 6 Clicks Basic Mobility (PT)  -              User Key  (r) = Recorded By, (t) = Taken By, (c) = Cosigned By      Initials Name Provider Type     Rosa Szyamnski, PT Physical Therapist    Evans Harp OT Occupational Therapist    Janice Pérez, RN Registered Nurse                    Occupational Therapy Education       Title: PT OT SLP Therapies (In Progress)       Topic: Occupational Therapy (Done)       Point: ADL training (Done)       Learning Progress Summary            Patient GAURI Richey, VU by  at 3/25/2025 1605    Comment: Role of OT                      Point: Home exercise program (Done)       Learning Progress Summary            Patient Angeler, E, VU by  at 3/25/2025 1605    Comment: Role of OT                      Point: Precautions (Done)       Learning Progress Summary            Patient Kaiden E, VU by  at 3/25/2025 1605    Comment: Role of OT                      Point: Body mechanics (Done)       Learning Progress Summary            Patient Kaiden, E, VU by  at 3/25/2025 1605    Comment: Role of OT                                      User Key       Initials Effective Dates Name Provider Type [facility] 07/24/24 -  Evans Moon OT Occupational  Therapist OT                  OT Recommendation and Plan  Planned Therapy Interventions (OT): activity tolerance training, adaptive equipment training, BADL retraining, neuromuscular control/coordination retraining, functional balance retraining, occupation/activity based interventions, passive ROM/stretching, transfer/mobility retraining, ROM/therapeutic exercise, strengthening exercise  Therapy Frequency (OT): 5 times/wk  Plan of Care Review  Plan of Care Reviewed With: patient, family  Progress: no change  Outcome Evaluation: 64 y.o. female admitted to [facility] with intractable back pain.  At baseline, griselda lives with her adult daughter and is Independent and functional mobility.  Patient is a full-time teacher.  Patient has has a functional decline with ambulation and general mobility since admission.  Patient reports 12/10 left wrist pain and 10/10 left posterior knee pain.  Patient presents to OT with decreased strength, activity tolerance, coordination and balance.  Patient able to transition to EOB with Mod A x2.  Patient tolerated sitting ~1-2 mins and requested to return to bed due to 10/10 left posterior knee pain.  Patient returned to supine in bed with Max A x2.  OT would be beneficial to address underlying physical deficits and increase ADLs.  Anticipate patient d/c to SNF for continued progress.     Time Calculation:   Evaluation Complexity (OT)  Review Occupational Profile/Medical/Therapy History Complexity: expanded/moderate complexity  Assessment, Occupational Performance/Identification of Deficit Complexity: 3-5 performance deficits  Clinical Decision Making Complexity (OT): detailed assessment/moderate complexity  Overall Complexity of Evaluation (OT): moderate complexity     Time Calculation- OT       Row Name 03/25/25 1605             Time Calculation- OT    OT Start Time 1438  -JR      OT Stop Time 1504  -JR      OT Time Calculation (min) 26 min  -JR      Total Timed Code Minutes- OT 16  minute(s)  -JR      OT Received On 03/25/25  -JR      OT - Next Appointment 03/26/25  -JR      OT Goal Re-Cert Due Date 04/08/25  -JR         Timed Charges    76787 - OT Therapeutic Activity Minutes 16  -JR         Untimed Charges    OT Eval/Re-eval Minutes 10  -JR         Total Minutes    Timed Charges Total Minutes 16  -JR      Untimed Charges Total Minutes 10  -JR       Total Minutes 26  -JR                User Key  (r) = Recorded By, (t) = Taken By, (c) = Cosigned By      Initials Name Provider Type    Evans Harp OT Occupational Therapist                  Therapy Charges for Today       Code Description Service Date Service Provider Modifiers Qty    86001564126 HC OT THERAPEUTIC ACT EA 15 MIN 3/25/2025 Evans Moon OT GO 1    16442766186 HC OT EVAL MOD COMPLEXITY 3 3/25/2025 Evans Moon OT GO 1                 Evans Moon OT  3/25/2025

## 2025-03-25 NOTE — THERAPY EVALUATION
Patient Name: Pedrito Powell  : 1960    MRN: [id number]                              Today's Date: 3/25/2025       Admit Date: 3/23/2025    Visit Dx:     ICD-10-CM ICD-9-CM   1. Intractable low back pain  M54.59 724.2   2. Acute febrile illness  R50.9 780.60   3. Bilateral lower extremity edema  R60.0 782.3     Patient Active Problem List   Diagnosis    Hx of anaphylaxis    Primary osteoarthritis of knee    High risk medication use    HTN (hypertension)    Hyperlipidemia    Vertigo    Infected blister of left foot    Ganglion of right wrist    Vomiting and diarrhea    Intractable vomiting with nausea    Fatigue    Acute gastritis without hemorrhage    Anemia    Elevated serum creatinine    Fungal rash of torso    Cellulitis of abdominal wall    Bronchitis    Arthritis of shoulder    Chronic renal failure in pediatric patient, stage 3 (moderate)    H/O shoulder surgery    Anxiety    Depression    Iron deficiency    Dizziness    Hypoxia    COVID-19 virus infection    Acute low back pain    Cellulitis of leg    Intractable low back pain    Acute back pain    Spinal stenosis, lumbar region, without neurogenic claudication    Spondylolisthesis, lumbar region    Facet arthritis of lumbar region    GERD (gastroesophageal reflux disease)     Past Medical History:   Diagnosis Date    Anemia     IRON INFUSION RECENTLY    Arthritis     Asthma     Chronic kidney disease     stage 3    Elevated cholesterol     GERD (gastroesophageal reflux disease)     High risk medication use 2018    History of carpal tunnel syndrome     Hyperlipidemia     Hypertension     Intractable vomiting with nausea 2018    Left shoulder pain     Limited mobility     Rheumatoid arthritis     Vertigo     Weakness     AT TIMES LEFT SHOULDER/LEFT ARM     Past Surgical History:   Procedure Laterality Date    CARPAL TUNNEL RELEASE Left 10/02/2023     SECTION  , ,     COLONOSCOPY N/A 12/10/2018    normal ileum, IH,  hyperplastic polyp, otherwise normal exam    ENDOSCOPY N/A 12/10/2018    no gross lesions in esophagus or examined duodenum, erythematous mucosa in stomach, biopsies benign    HYSTERECTOMY  2000    OOPHORECTOMY Bilateral     SHOULDER MANIPULATION Left     TOTAL SHOULDER ARTHROPLASTY Left 05/13/2021    Procedure: REVERSE TOTAL SHOULDER ARTHROPLASTY,  BICEP TENDONDESIS;  Surgeon: Bethel Devi MD;  Location: [facility] OR;  Service: Orthopedics;  Laterality: Left;    TRIGGER FINGER RELEASE Left     3 fingers      General Information       [facility] Name 03/25/25 1535          Physical Therapy Time and Intention    Document Type evaluation  -     Mode of Treatment co-treatment;physical therapy;occupational therapy  -       Row Name 03/25/25 1535          General Information    Patient Profile Reviewed yes  -     Prior Level of Function independent:;transfer;gait;bed mobility  Works FT as a teacher  -     Existing Precautions/Restrictions fall  -     Barriers to Rehab none identified  -       Row Name 03/25/25 1535          Living Environment    Current Living Arrangements home  -     People in Home child(faraz), adult  -       Row Name 03/25/25 1535          Home Main Entrance    Number of Stairs, Main Entrance three  -       Row Name 03/25/25 1535          Stairs Within Home, Primary    Number of Stairs, Within Home, Primary none  -       Row Name 03/25/25 1535          Cognition    Orientation Status (Cognition) oriented x 4  -       Row Name 03/25/25 1531          Safety Issues/Impairments Affecting Functional Mobility    Impairments Affecting Function (Mobility) balance;endurance/activity tolerance;strength;pain;range of motion (ROM)  -     Comment, Safety Issues/Impairments (Mobility) Co treatment medically appropriate and necessary due to patient acuity level, activity tolerance and safety of patient and staff. Evaluation established to achieve all goals in POC.  -               User Key   (r) = Recorded By, (t) = Taken By, (c) = Cosigned By      Initials Name Provider Type     Rosa Szmyanski PT Physical Therapist                   Mobility       Row Name 03/25/25 1537          Bed Mobility    Bed Mobility supine-sit;sit-supine  -     Supine-Sit [place] (Bed Mobility) 2 person assist;verbal cues;moderate assist (50% patient effort);nonverbal cues (demo/gesture)  -     Sit-Supine [place] (Bed Mobility) maximum assist (25% patient effort);2 person assist;verbal cues;nonverbal cues (demo/gesture)  -     Assistive Device (Bed Mobility) bed rails;head of bed elevated;repositioning sheet  -       Row Name 03/25/25 1537          Transfers    Comment, (Transfers) Sat EOB for ~1 minute, significant LLE pain behind her knee, unable to tolerate sitting longer  -               User Key  (r) = Recorded By, (t) = Taken By, (c) = Cosigned By      Initials Name Provider Type     Rosa Szymanski PT Physical Therapist                   Obj/Interventions       [facility] Name 03/25/25 1541          Range of Motion Comprehensive    General Range of Motion lower extremity range of motion deficits identified  -BH       Row Name 03/25/25 1541          Strength Comprehensive (MMT)    General Manual Muscle Testing (MMT) Assessment lower extremity strength deficits identified  -     Comment, General Manual Muscle Testing (MMT) Assessment Generalized weakness, BLE grossly 4-/5  -[facility] Name 03/25/25 1541          Balance    Balance Assessment sitting static balance  -     Static Sitting Balance contact guard;verbal cues  -     Position, Sitting Balance sitting edge of bed  -     Balance Interventions sitting  -[facility] Name 03/25/25 1541          Sensory Assessment (Somatosensory)    Sensory Assessment (Somatosensory) LE sensation intact  -               User Key  (r) = Recorded By, (t) = Taken By, (c) = Cosigned By      Initials Name Provider Type     Rosa Szymanski, PT Physical  Therapist                   Goals/Plan       Row Name 03/25/25 1547          Bed Mobility Goal 1 (PT)    Activity/Assistive Device (Bed Mobility Goal 1, PT) bed mobility activities, all  -     [place] Level/Cues Needed (Bed Mobility Goal 1, PT) minimum assist (75% or more patient effort)  -     Time Frame (Bed Mobility Goal 1, PT) 1 week  -[facility] Name 03/25/25 1547          Transfer Goal 1 (PT)    Activity/Assistive Device (Transfer Goal 1, PT) transfers, all  -     [place] Level/Cues Needed (Transfer Goal 1, PT) minimum assist (75% or more patient effort)  -     Time Frame (Transfer Goal 1, PT) 1 week  -[facility] Name 03/25/25 1547          Gait Training Goal 1 (PT)    Activity/Assistive Device (Gait Training Goal 1, PT) gait (walking locomotion)  -     [place] Level (Gait Training Goal 1, PT) minimum assist (75% or more patient effort)  -     Distance (Gait Training Goal 1, PT) 30ft  -     Time Frame (Gait Training Goal 1, PT) 1 week  -[facility] Name 03/25/25 1547          Therapy Assessment/Plan (PT)    Planned Therapy Interventions (PT) balance training;bed mobility training;gait training;home exercise program;patient/family education;ROM (range of motion);stair training;strengthening;stretching;transfer training  -               User Key  (r) = Recorded By, (t) = Taken By, (c) = Cosigned By      Initials Name Provider Type     Rosa Szymanski, PT Physical Therapist                   Clinical Impression       Row Name 03/25/25 1541          Pain    Pretreatment Pain Rating 10/10  -     Posttreatment Pain Rating 10/10  -     Pre/Posttreatment Pain Comment c/o L hand pain 12/10, 10/10 L knee pain once sitting EOB  -       Row Name 03/25/25 1541          Plan of Care Review    Plan of Care Reviewed With patient;child  -     Outcome Evaluation Pt is a 65 yo F admitted from home with intractable back pain. Pt lives with her daughter and is independent at  with  now AD, works fulltime as a teacher. Pt initially was ambulatory with a rwx on admission, but today with significant L hand/wrist pain, reporting 12/10 on arrival. Also with significant L knee pain limiting any OOB mobility today. Pt presents to PT with impaired strength, endurance, and pain. Pt transferred to EOB with mod A x2 and tolerated sitting for ~1 minute but d/t 10/10 L posterior knee pain, pt unable to tolerate sitting any longer and unable to safely attempt transfers. Pt returned to supine max A x2 and repositioned in bed. Pt tearful following activity, daughter present throughout and very involved, concerned about significant change in mobility overnight. PT will continue to follow, anticipate DC to SNF - pt is far below her BL and unable to care for herself at this time.  -       Row Name 03/25/25 1541          Therapy Assessment/Plan (PT)    Patient/Family Therapy Goals Statement (PT) Return to PLOF  -     Rehab Potential (PT) good  -     Criteria for Skilled Interventions Met (PT) yes  -     Therapy Frequency (PT) 5 times/wk  -       Row Name 03/25/25 1541          Vital Signs    O2 Delivery Pre Treatment supplemental O2  -     O2 Delivery Intra Treatment supplemental O2  -     O2 Delivery Post Treatment supplemental O2  -       Row Name 03/25/25 1541          Positioning and Restraints    Pre-Treatment Position in bed  -     Post Treatment Position bed  -BH     In Bed notified nsg;fowlers;call light within reach;encouraged to call for assist;exit alarm on;with family/caregiver  -               User Key  (r) = Recorded By, (t) = Taken By, (c) = Cosigned By      Initials Name Provider Type     Rosa Szymanski, PT Physical Therapist                   Outcome Measures       Row Name 03/25/25 1547 03/25/25 0600       How much help from another person do you currently need...    Turning from your back to your side while in flat bed without using bedrails? 2  - 3  -AP    Moving from  lying on back to sitting on the side of a flat bed without bedrails? 2  - 3  -AP    Moving to and from a bed to a chair (including a wheelchair)? 1  - 3  -AP    Standing up from a chair using your arms (e.g., wheelchair, bedside chair)? 1  - 3  -AP    Climbing 3-5 steps with a railing? 1  - 2  -AP    To walk in hospital room? 1  - 3  -AP    AM-PAC 6 Clicks Score (PT) 8  - 17  -AP    Highest Level of Mobility Goal 3 --> Sit at edge of bed  - 5 --> Static standing  -AP      Row Name 03/25/25 1547          Functional Assessment    Outcome Measure Options AM-PAC 6 Clicks Basic Mobility (PT)  -               User Key  (r) = Recorded By, (t) = Taken By, (c) = Cosigned By      Initials Name Provider Type     Rosa Szymanski, PT Physical Therapist    Janice Pérez RN Registered Nurse                                 Physical Therapy Education       Title: PT OT SLP Therapies (In Progress)       Topic: Physical Therapy (In Progress)       Point: Mobility training (Done)       Learning Progress Summary            Patient Acceptance, E,TB,D, VU,NR by  at 3/25/2025 1547                      Point: Home exercise program (Not Started)       Learner Progress:  Not documented in this visit.              Point: Body mechanics (Done)       Learning Progress Summary            Patient Acceptance, E,TB,D, VU,NR by  at 3/25/2025 1547                      Point: Precautions (Done)       Learning Progress Summary            Patient Acceptance, E,TB,D, VU,NR by  at 3/25/2025 1547                                      User Key       Initials Effective Dates Name Provider Type [facility] 04/08/22 -  Rosa Szymanski PT Physical Therapist PT                  PT Recommendation and Plan  Planned Therapy Interventions (PT): balance training, bed mobility training, gait training, home exercise program, patient/family education, ROM (range of motion), stair training, strengthening, stretching, transfer  training  Outcome Evaluation: Pt is a 63 yo F admitted from home with intractable back pain. Pt lives with her daughter and is independent at BL with now AD, works fulltime as a teacher. Pt initially was ambulatory with a rwx on admission, but today with significant L hand/wrist pain, reporting 12/10 on arrival. Also with significant L knee pain limiting any OOB mobility today. Pt presents to PT with impaired strength, endurance, and pain. Pt transferred to EOB with mod A x2 and tolerated sitting for ~1 minute but d/t 10/10 L posterior knee pain, pt unable to tolerate sitting any longer and unable to safely attempt transfers. Pt returned to supine max A x2 and repositioned in bed. Pt tearful following activity, daughter present throughout and very involved, concerned about significant change in mobility overnight. PT will continue to follow, anticipate DC to SNF - pt is far below her BL and unable to care for herself at this time.     Time Calculation:   PT Evaluation Complexity  History, PT Evaluation Complexity: 1-2 personal factors and/or comorbidities  Examination of Body Systems (PT Eval Complexity): total of 3 or more elements  Clinical Presentation (PT Evaluation Complexity): evolving  Clinical Decision Making (PT Evaluation Complexity): moderate complexity  Overall Complexity (PT Evaluation Complexity): moderate complexity     PT Charges       Row Name 03/25/25 1548             Time Calculation    Start Time 1438  -      Stop Time 1504  -      Time Calculation (min) 26 min  -      PT Received On 03/25/25  -      PT - Next Appointment 03/26/25  [facility]      PT Goal Re-Cert Due Date 04/01/25  -         Time Calculation- PT    Total Timed Code Minutes- PT 20 minute(s)  -         Timed Charges    10238 - PT Therapeutic Activity Minutes 20  -BH         Total Minutes    Timed Charges Total Minutes 20  -BH       Total Minutes 20  -BH                User Key  (r) = Recorded By, (t) = Taken By, (c) = Cosigned  By      Initials Name Provider Type     Rosa Szymanski, PT Physical Therapist                  Therapy Charges for Today       Code Description Service Date Service Provider Modifiers Qty    38018667336 HC PT THERAPEUTIC ACT EA 15 MIN 3/25/2025 Rosa Szymanski, PT GP 1    84213289601 HC PT EVAL MOD COMPLEXITY 3 3/25/2025 Rosa Szymanski, PT GP 1            PT G-Codes  Outcome Measure Options: AM-PAC 6 Clicks Basic Mobility (PT)  AM-PAC 6 Clicks Score (PT): 8  PT Discharge Summary  Anticipated Discharge Disposition (PT): skilled nursing facility    Rosa Szymanski PT  3/25/2025

## 2025-03-25 NOTE — PLAN OF CARE
Goal Outcome Evaluation:  Plan of Care Reviewed With: patient, family        Progress: no change  Outcome Evaluation: 64 y.o. female admitted to [facility] with intractable back pain.  At baseline, griselda lives with her adult daughter and is Independent and functional mobility.  Patient is a full-time teacher.  Patient has has a functional decline with ambulation and general mobility since admission.  Patient reports 12/10 left wrist pain and 10/10 left posterior knee pain.  Patient presents to OT with decreased strength, activity tolerance, coordination and balance.  Patient able to transition to EOB with Mod A x2.  Patient tolerated sitting ~1-2 mins and requested to return to bed due to 10/10 left posterior knee pain.  Patient returned to supine in bed with Max A x2.  OT would be beneficial to address underlying physical deficits and increase ADLs.  Anticipate patient d/c to SNF for continued progress.    Anticipated Discharge Disposition (OT): skilled nursing facility

## 2025-03-25 NOTE — PROGRESS NOTES
Name: Pedrito Powell ADMIT: 3/23/2025   : 1960  PCP: Teo Fraser MD    MRN: [id number] LOS: 1 days   AGE/SEX: 64 y.o. female  ROOM: [facility]     Subjective   Subjective   Patient seen and examined this morning.  Hospital day 2, she is having ongoing back discomfort. Also endorsing some discomfort in her left knee, along with swelling and tenderness of left wrist.       Objective   Objective   Vital Signs  Temp:  [97.8 °F (36.6 °C)-100.7 °F (38.2 °C)] 100.7 °F (38.2 °C)  Heart Rate:  [76-92] 92  Resp:  [16-18] 16  BP: (139-153)/(70-91) 153/81  SpO2:  [93 %-97 %] 96 %  on  Flow (L/min) (Oxygen Therapy):  [1.5-2] 2;   Device (Oxygen Therapy): nasal cannula  Body mass index is 51.07 kg/m².  Physical Exam  Vitals and nursing note reviewed.   Constitutional:       General: She is not in acute distress.     Appearance: She is overweight.   Cardiovascular:      Rate and Rhythm: Normal rate.      Pulses: Normal pulses.      Heart sounds: Normal heart sounds.   Pulmonary:      Effort: Pulmonary effort is normal.      Breath sounds: No wheezing.   Abdominal:      General: Bowel sounds are normal. There is no distension.      Palpations: Abdomen is soft.      Tenderness: There is no abdominal tenderness.   Musculoskeletal:         General: Swelling (left wrist) and tenderness present.      Comments: Decreased mobility, decreased ROM secondary to pain, lower  to palpation   Skin:     General: Skin is warm and dry.      Comments: Erythema of lower extremities   Neurological:      Mental Status: She is alert.       Results Review     I reviewed the patient's new clinical results.  Results from last 7 days   Lab Units 25  0546 25  0533 25  1325 25  0952   WBC 10*3/mm3 10.88* 8.64 9.76 6.70   HEMOGLOBIN g/dL 9.5* 9.2* 9.9* 10.3*   PLATELETS 10*3/mm3 223 211 223 267     Results from last 7 days   Lab Units 25  0546 25  0533 25  1325 25  0952   SODIUM mmol/L  "137 139 134* 139   POTASSIUM mmol/L 3.9 3.8 3.7 3.6   CHLORIDE mmol/L 100 104 100 106   CO2 mmol/L 24.0 24.2 25.3 26.4   BUN mg/dL 11 9 14 12   CREATININE mg/dL 0.79 0.92 1.05* 0.86   GLUCOSE mg/dL 120* 95 107* 110*   EGFR mL/min/1.73 83.6 69.7 59.5*  --    EGFR RESULT mL/min/1.73  --   --   --  75.5     Results from last 7 days   Lab Units 03/24/25  0533 03/23/25  1325 03/21/25  0952   ALBUMIN g/dL 3.1* 4.0 3.7   BILIRUBIN mg/dL 0.8 0.7 0.5   ALK PHOS U/L 65 74 74   AST (SGOT) U/L 16 20 21   ALT (SGPT) U/L 10 15 17     Results from last 7 days   Lab Units 03/25/25  0546 03/24/25  0533 03/23/25  1325 03/21/25  0952   CALCIUM mg/dL 8.2* 8.4* 8.6 8.9   ALBUMIN g/dL  --  3.1* 4.0 3.7     Results from last 7 days   Lab Units 03/23/25  1507 03/23/25  1325   PROCALCITONIN ng/mL  --  0.03   LACTATE mmol/L 1.0  --      No results found for: \"HGBA1C\", \"POCGLU\"    MRI Lumbar Spine With & Without Contrast  Result Date: 3/24/2025   Multilevel degenerative change, see level by level details regarding canal and foraminal narrowing above.  No acute appearing abnormality at any level.   This report was finalized on 3/24/2025 12:02 AM by Dr. Rosendo Donnelly M.D on Workstation: [id number]        I have personally reviewed all medications:  Scheduled Medications  atorvastatin, 80 mg, Oral, Daily  azelastine, 1 spray, Each Nare, Daily  budesonide, 0.5 mg, Nebulization, BID - RT  cholecalciferol, 2,000 Units, Oral, Daily  doxycycline, 100 mg, Oral, Q12H  famotidine, 20 mg, Oral, BID AC  ipratropium, 1 spray, Each Nare, TID  Lidocaine, 3 patch, Transdermal, Q24H  losartan, 100 mg, Oral, Q24H  methocarbamol, 500 mg, Oral, Q8H  sodium chloride, 10 mL, Intravenous, Q12H  Vortioxetine HBr, 10 mg, Oral, Daily With Breakfast    Infusions     Diet  Diet: Cardiac; Healthy Heart (2-3 Na+); Fluid Consistency: Thin (IDDSI 0)    I have personally reviewed:  [x]  Laboratory   []  Microbiology   [x]  Radiology   []  EKG/Telemetry  []  " Cardiology/Vascular   []  Pathology    []  Records       Assessment/Plan     Active Hospital Problems    Diagnosis  POA    **Intractable low back pain [M54.59]  Yes    Spinal stenosis, lumbar region, without neurogenic claudication [M48.061]  Unknown    Spondylolisthesis, lumbar region [M43.16]  Unknown    Facet arthritis of lumbar region [M47.816]  Unknown    GERD (gastroesophageal reflux disease) [K21.9]  Yes    Acute low back pain [M54.50]  Unknown    Cellulitis of leg [L03.119]  Unknown    Acute back pain [M54.9]  Yes    Anxiety [F41.9]  Yes    Anemia [D64.9]  Yes    HTN (hypertension) [I10]  Yes      Resolved Hospital Problems   No resolved problems to display.       64 y.o. female admitted with Intractable low back pain.    Patient with worsening lower back pain with radiculopathy symptoms. MRI showing evidence of lumbar stenosis. ANDRES consulted, no acute intervention warranted, continue with medications for pain. Continue with therapy services and fall precautions.    She has swelling and tenderness in her left wrist, denies injury. Will check x-ray to start.    She has tenderness in her left knee, no obvious deformity or red flag symptoms on examination, but will check x-ray.    Blood pressure acceptable. Continue treatments as prescribed.    Continue Doxycycline as previously prescribed for recently diagnosed cellulitis.    Hemoglobin low but no evidence of bleeding, monitor for now, repeat Cbc in AM, transfuse for hemoglobin <7.     Appears to have CKD stage 2, baseline creatinine ~0.8 to 0.9, renal function stable, monitor, repeat BMP in AM.     UA with + LE, 6-10 WBC, but she denies urinary symptoms to suggest UTI, follow up urine culture.     Continue other medications for chronic conditions as prescribed. Further management TBD based on clinical course.    Addendum: She has been spiking intermittent fevers, WBC slightly elevated, but urine culture and blood culture no growth thus far. Will check CXR.  She has knee effusion, will consult orthopedic surgery to see if aspiration warranted--will await their impression. Also, going to check B/L LE duplex US and LUE Duplex US. Called and spoke with patient's daughter and discussed all current findings, plans. Further management based on clinical course. Going to await to see if knee aspiration warranted before starting antibiotics as to not skew results. Consider ID consultation in AM.     SCDs for DVT prophylaxis.  Full code.  Discussed with patient and nursing staff.  Anticipate discharge  TBD   pending PT evaluation, consultant clearance  Expected discharge date/ time has not been documented.      Yobani Akbar DO  [facility]  03/25/25

## 2025-03-25 NOTE — PLAN OF CARE
Goal Outcome Evaluation:  Plan of Care Reviewed With: patient, child           Outcome Evaluation: Pt is a 65 yo F admitted from home with intractable back pain. Pt lives with her daughter and is independent at BL with now AD, works fulltime as a teacher. Pt initially was ambulatory with a rwx on admission, but today with significant L hand/wrist pain, reporting 12/10 on arrival. Also with significant L knee pain limiting any OOB mobility today. Pt presents to PT with impaired strength, endurance, and pain. Pt transferred to EOB with mod A x2 and tolerated sitting for ~1 minute but d/t 10/10 L posterior knee pain, pt unable to tolerate sitting any longer and unable to safely attempt transfers. Pt returned to supine max A x2 and repositioned in bed. Pt tearful following activity, daughter present throughout and very involved, concerned about significant change in mobility overnight. PT will continue to follow, anticipate DC to SNF - pt is far below her BL and unable to care for herself at this time.    Anticipated Discharge Disposition (PT): skilled nursing facility

## 2025-03-25 NOTE — PLAN OF CARE
Goal Outcome Evaluation:  Plan of Care Reviewed With: patient        Progress: no change  Outcome Evaluation: Patient able to ambulate with walker today and stand by assist. VSS and voiding function is intact. Evaluated by ANDRES, see note, lidocaine patches and robaxin started. PT ordered and to see. Patient with c/o soreness to L hand, bruising present from lab sticks. Family at bedside during most of shift.

## 2025-03-26 ENCOUNTER — APPOINTMENT (OUTPATIENT)
Dept: CT IMAGING | Facility: HOSPITAL | Age: 65
End: 2025-03-26
Payer: COMMERCIAL

## 2025-03-26 ENCOUNTER — APPOINTMENT (OUTPATIENT)
Dept: CARDIOLOGY | Facility: HOSPITAL | Age: 65
End: 2025-03-26
Payer: COMMERCIAL

## 2025-03-26 LAB
ANION GAP SERPL CALCULATED.3IONS-SCNC: 12.1 MMOL/L (ref 5–15)
BASOPHILS # BLD AUTO: 0.04 10*3/MM3 (ref 0–0.2)
BASOPHILS NFR BLD AUTO: 0.4 % (ref 0–1.5)
BH CV LOWER VASCULAR LEFT COMMON FEMORAL AUGMENT: NORMAL
BH CV LOWER VASCULAR LEFT COMMON FEMORAL COMPETENT: NORMAL
BH CV LOWER VASCULAR LEFT COMMON FEMORAL COMPRESS: NORMAL
BH CV LOWER VASCULAR LEFT COMMON FEMORAL PHASIC: NORMAL
BH CV LOWER VASCULAR LEFT COMMON FEMORAL SPONT: NORMAL
BH CV LOWER VASCULAR LEFT DISTAL FEMORAL COMPRESS: NORMAL
BH CV LOWER VASCULAR LEFT GASTRONEMIUS COMPRESS: NORMAL
BH CV LOWER VASCULAR LEFT GREATER SAPH AK COMPRESS: NORMAL
BH CV LOWER VASCULAR LEFT GREATER SAPH BK COMPRESS: NORMAL
BH CV LOWER VASCULAR LEFT LESSER SAPH COMPRESS: NORMAL
BH CV LOWER VASCULAR LEFT MID FEMORAL AUGMENT: NORMAL
BH CV LOWER VASCULAR LEFT MID FEMORAL COMPETENT: NORMAL
BH CV LOWER VASCULAR LEFT MID FEMORAL COMPRESS: NORMAL
BH CV LOWER VASCULAR LEFT MID FEMORAL PHASIC: NORMAL
BH CV LOWER VASCULAR LEFT MID FEMORAL SPONT: NORMAL
BH CV LOWER VASCULAR LEFT PERONEAL COMPRESS: NORMAL
BH CV LOWER VASCULAR LEFT POPLITEAL AUGMENT: NORMAL
BH CV LOWER VASCULAR LEFT POPLITEAL COMPETENT: NORMAL
BH CV LOWER VASCULAR LEFT POPLITEAL COMPRESS: NORMAL
BH CV LOWER VASCULAR LEFT POPLITEAL PHASIC: NORMAL
BH CV LOWER VASCULAR LEFT POPLITEAL SPONT: NORMAL
BH CV LOWER VASCULAR LEFT POSTERIOR TIBIAL COMPRESS: NORMAL
BH CV LOWER VASCULAR LEFT PROFUNDA FEMORAL COMPRESS: NORMAL
BH CV LOWER VASCULAR LEFT PROXIMAL FEMORAL COMPRESS: NORMAL
BH CV LOWER VASCULAR LEFT SAPHENOFEMORAL JUNCTION COMPRESS: NORMAL
BH CV LOWER VASCULAR RIGHT COMMON FEMORAL AUGMENT: NORMAL
BH CV LOWER VASCULAR RIGHT COMMON FEMORAL COMPETENT: NORMAL
BH CV LOWER VASCULAR RIGHT COMMON FEMORAL COMPRESS: NORMAL
BH CV LOWER VASCULAR RIGHT COMMON FEMORAL PHASIC: NORMAL
BH CV LOWER VASCULAR RIGHT COMMON FEMORAL SPONT: NORMAL
BH CV LOWER VASCULAR RIGHT DISTAL FEMORAL COMPRESS: NORMAL
BH CV LOWER VASCULAR RIGHT GASTRONEMIUS COMPRESS: NORMAL
BH CV LOWER VASCULAR RIGHT GREATER SAPH AK COMPRESS: NORMAL
BH CV LOWER VASCULAR RIGHT GREATER SAPH BK COMPRESS: NORMAL
BH CV LOWER VASCULAR RIGHT LESSER SAPH COMPRESS: NORMAL
BH CV LOWER VASCULAR RIGHT MID FEMORAL AUGMENT: NORMAL
BH CV LOWER VASCULAR RIGHT MID FEMORAL COMPETENT: NORMAL
BH CV LOWER VASCULAR RIGHT MID FEMORAL COMPRESS: NORMAL
BH CV LOWER VASCULAR RIGHT MID FEMORAL PHASIC: NORMAL
BH CV LOWER VASCULAR RIGHT MID FEMORAL SPONT: NORMAL
BH CV LOWER VASCULAR RIGHT PERONEAL COMPRESS: NORMAL
BH CV LOWER VASCULAR RIGHT POPLITEAL AUGMENT: NORMAL
BH CV LOWER VASCULAR RIGHT POPLITEAL COMPETENT: NORMAL
BH CV LOWER VASCULAR RIGHT POPLITEAL COMPRESS: NORMAL
BH CV LOWER VASCULAR RIGHT POPLITEAL PHASIC: NORMAL
BH CV LOWER VASCULAR RIGHT POPLITEAL SPONT: NORMAL
BH CV LOWER VASCULAR RIGHT POSTERIOR TIBIAL COMPRESS: NORMAL
BH CV LOWER VASCULAR RIGHT PROFUNDA FEMORAL COMPRESS: NORMAL
BH CV LOWER VASCULAR RIGHT PROXIMAL FEMORAL COMPRESS: NORMAL
BH CV LOWER VASCULAR RIGHT SAPHENOFEMORAL JUNCTION COMPRESS: NORMAL
BH CV POP FLUID COLLECT LEFT: 1
BH CV UPPER VENOUS LEFT AXILLARY AUGMENT: NORMAL
BH CV UPPER VENOUS LEFT AXILLARY COMPRESS: NORMAL
BH CV UPPER VENOUS LEFT AXILLARY PHASIC: NORMAL
BH CV UPPER VENOUS LEFT AXILLARY SPONT: NORMAL
BH CV UPPER VENOUS LEFT BASILIC FOREARM COMPRESS: NORMAL
BH CV UPPER VENOUS LEFT BASILIC UPPER COMPRESS: NORMAL
BH CV UPPER VENOUS LEFT BRACHIAL COMPRESS: NORMAL
BH CV UPPER VENOUS LEFT CEPHALIC FOREARM COMPRESS: NORMAL
BH CV UPPER VENOUS LEFT CEPHALIC UPPER COMPRESS: NORMAL
BH CV UPPER VENOUS LEFT INTERNAL JUGULAR AUGMENT: NORMAL
BH CV UPPER VENOUS LEFT INTERNAL JUGULAR COMPRESS: NORMAL
BH CV UPPER VENOUS LEFT INTERNAL JUGULAR PHASIC: NORMAL
BH CV UPPER VENOUS LEFT INTERNAL JUGULAR SPONT: NORMAL
BH CV UPPER VENOUS LEFT RADIAL COMPRESS: NORMAL
BH CV UPPER VENOUS LEFT SUBCLAVIAN AUGMENT: NORMAL
BH CV UPPER VENOUS LEFT SUBCLAVIAN COMPRESS: NORMAL
BH CV UPPER VENOUS LEFT SUBCLAVIAN PHASIC: NORMAL
BH CV UPPER VENOUS LEFT SUBCLAVIAN SPONT: NORMAL
BH CV UPPER VENOUS LEFT ULNAR COMPRESS: NORMAL
BH CV UPPER VENOUS RIGHT INTERNAL JUGULAR AUGMENT: NORMAL
BH CV UPPER VENOUS RIGHT INTERNAL JUGULAR COMPRESS: NORMAL
BH CV UPPER VENOUS RIGHT INTERNAL JUGULAR PHASIC: NORMAL
BH CV UPPER VENOUS RIGHT INTERNAL JUGULAR SPONT: NORMAL
BH CV UPPER VENOUS RIGHT SUBCLAVIAN AUGMENT: NORMAL
BH CV UPPER VENOUS RIGHT SUBCLAVIAN COMPRESS: NORMAL
BH CV UPPER VENOUS RIGHT SUBCLAVIAN PHASIC: NORMAL
BH CV UPPER VENOUS RIGHT SUBCLAVIAN SPONT: NORMAL
BUN SERPL-MCNC: 12 MG/DL (ref 8–23)
BUN/CREAT SERPL: 17.1 (ref 7–25)
CALCIUM SPEC-SCNC: 8.3 MG/DL (ref 8.6–10.5)
CHLORIDE SERPL-SCNC: 97 MMOL/L (ref 98–107)
CO2 SERPL-SCNC: 22.9 MMOL/L (ref 22–29)
CREAT SERPL-MCNC: 0.7 MG/DL (ref 0.57–1)
DEPRECATED RDW RBC AUTO: 41.5 FL (ref 37–54)
EGFRCR SERPLBLD CKD-EPI 2021: 96.7 ML/MIN/1.73
EOSINOPHIL # BLD AUTO: 0.14 10*3/MM3 (ref 0–0.4)
EOSINOPHIL NFR BLD AUTO: 1.3 % (ref 0.3–6.2)
ERYTHROCYTE [DISTWIDTH] IN BLOOD BY AUTOMATED COUNT: 12.6 % (ref 12.3–15.4)
GLUCOSE SERPL-MCNC: 124 MG/DL (ref 65–99)
HCT VFR BLD AUTO: 29.6 % (ref 34–46.6)
HGB BLD-MCNC: 10 G/DL (ref 12–15.9)
IMM GRANULOCYTES # BLD AUTO: 0.06 10*3/MM3 (ref 0–0.05)
IMM GRANULOCYTES NFR BLD AUTO: 0.6 % (ref 0–0.5)
LYMPHOCYTES # BLD AUTO: 1.05 10*3/MM3 (ref 0.7–3.1)
LYMPHOCYTES NFR BLD AUTO: 9.7 % (ref 19.6–45.3)
MCH RBC QN AUTO: 30.8 PG (ref 26.6–33)
MCHC RBC AUTO-ENTMCNC: 33.8 G/DL (ref 31.5–35.7)
MCV RBC AUTO: 91.1 FL (ref 79–97)
MONOCYTES # BLD AUTO: 1.22 10*3/MM3 (ref 0.1–0.9)
MONOCYTES NFR BLD AUTO: 11.2 % (ref 5–12)
NEUTROPHILS NFR BLD AUTO: 76.8 % (ref 42.7–76)
NEUTROPHILS NFR BLD AUTO: 8.36 10*3/MM3 (ref 1.7–7)
NRBC BLD AUTO-RTO: 0 /100 WBC (ref 0–0.2)
PLATELET # BLD AUTO: 229 10*3/MM3 (ref 140–450)
PMV BLD AUTO: 9.4 FL (ref 6–12)
POTASSIUM SERPL-SCNC: 3.9 MMOL/L (ref 3.5–5.2)
PROCALCITONIN SERPL-MCNC: 0.21 NG/ML (ref 0–0.25)
RBC # BLD AUTO: 3.25 10*6/MM3 (ref 3.77–5.28)
SODIUM SERPL-SCNC: 132 MMOL/L (ref 136–145)
WBC NRBC COR # BLD AUTO: 10.87 10*3/MM3 (ref 3.4–10.8)

## 2025-03-26 PROCEDURE — 73700 CT LOWER EXTREMITY W/O DYE: CPT

## 2025-03-26 PROCEDURE — 80048 BASIC METABOLIC PNL TOTAL CA: CPT | Performed by: STUDENT IN AN ORGANIZED HEALTH CARE EDUCATION/TRAINING PROGRAM

## 2025-03-26 PROCEDURE — 90791 PSYCH DIAGNOSTIC EVALUATION: CPT | Performed by: SOCIAL WORKER

## 2025-03-26 PROCEDURE — 93970 EXTREMITY STUDY: CPT | Performed by: SURGERY

## 2025-03-26 PROCEDURE — 85025 COMPLETE CBC W/AUTO DIFF WBC: CPT | Performed by: STUDENT IN AN ORGANIZED HEALTH CARE EDUCATION/TRAINING PROGRAM

## 2025-03-26 PROCEDURE — 93970 EXTREMITY STUDY: CPT

## 2025-03-26 PROCEDURE — 99204 OFFICE O/P NEW MOD 45 MIN: CPT | Performed by: STUDENT IN AN ORGANIZED HEALTH CARE EDUCATION/TRAINING PROGRAM

## 2025-03-26 PROCEDURE — 25810000003 SODIUM CHLORIDE 0.9 % SOLUTION: Performed by: STUDENT IN AN ORGANIZED HEALTH CARE EDUCATION/TRAINING PROGRAM

## 2025-03-26 PROCEDURE — 25010000002 LEVOFLOXACIN PER 250 MG: Performed by: STUDENT IN AN ORGANIZED HEALTH CARE EDUCATION/TRAINING PROGRAM

## 2025-03-26 PROCEDURE — 93971 EXTREMITY STUDY: CPT

## 2025-03-26 PROCEDURE — 94799 UNLISTED PULMONARY SVC/PX: CPT

## 2025-03-26 PROCEDURE — 25010000002 HYDROMORPHONE PER 4 MG: Performed by: INTERNAL MEDICINE

## 2025-03-26 PROCEDURE — 84145 PROCALCITONIN (PCT): CPT | Performed by: STUDENT IN AN ORGANIZED HEALTH CARE EDUCATION/TRAINING PROGRAM

## 2025-03-26 PROCEDURE — 94761 N-INVAS EAR/PLS OXIMETRY MLT: CPT

## 2025-03-26 PROCEDURE — 93971 EXTREMITY STUDY: CPT | Performed by: SURGERY

## 2025-03-26 PROCEDURE — G0378 HOSPITAL OBSERVATION PER HR: HCPCS

## 2025-03-26 PROCEDURE — 73200 CT UPPER EXTREMITY W/O DYE: CPT

## 2025-03-26 RX ORDER — LEVOFLOXACIN 5 MG/ML
750 INJECTION, SOLUTION INTRAVENOUS EVERY 24 HOURS
Status: DISCONTINUED | OUTPATIENT
Start: 2025-03-26 | End: 2025-03-29

## 2025-03-26 RX ADMIN — HYDROCODONE BITARTRATE AND ACETAMINOPHEN 1 TABLET: 7.5; 325 TABLET ORAL at 17:04

## 2025-03-26 RX ADMIN — VORTIOXETINE 10 MG: 5 TABLET, FILM COATED ORAL at 10:34

## 2025-03-26 RX ADMIN — LEVOFLOXACIN 750 MG: 5 INJECTION, SOLUTION INTRAVENOUS at 13:58

## 2025-03-26 RX ADMIN — HYDROCODONE BITARTRATE AND ACETAMINOPHEN 1 TABLET: 7.5; 325 TABLET ORAL at 21:05

## 2025-03-26 RX ADMIN — METHOCARBAMOL TABLETS 500 MG: 500 TABLET, COATED ORAL at 13:24

## 2025-03-26 RX ADMIN — ATORVASTATIN CALCIUM 80 MG: 20 TABLET, FILM COATED ORAL at 10:33

## 2025-03-26 RX ADMIN — IPRATROPIUM BROMIDE 1 SPRAY: 21 SPRAY, METERED NASAL at 17:04

## 2025-03-26 RX ADMIN — HYDROMORPHONE HYDROCHLORIDE 0.5 MG: 1 INJECTION, SOLUTION INTRAMUSCULAR; INTRAVENOUS; SUBCUTANEOUS at 18:11

## 2025-03-26 RX ADMIN — HYDROMORPHONE HYDROCHLORIDE 0.5 MG: 1 INJECTION, SOLUTION INTRAMUSCULAR; INTRAVENOUS; SUBCUTANEOUS at 15:33

## 2025-03-26 RX ADMIN — HYDROMORPHONE HYDROCHLORIDE 0.5 MG: 1 INJECTION, SOLUTION INTRAMUSCULAR; INTRAVENOUS; SUBCUTANEOUS at 13:24

## 2025-03-26 RX ADMIN — METHOCARBAMOL TABLETS 500 MG: 500 TABLET, COATED ORAL at 21:04

## 2025-03-26 RX ADMIN — DOXYCYCLINE 100 MG: 100 CAPSULE ORAL at 10:34

## 2025-03-26 RX ADMIN — HYDROMORPHONE HYDROCHLORIDE 0.5 MG: 1 INJECTION, SOLUTION INTRAMUSCULAR; INTRAVENOUS; SUBCUTANEOUS at 10:33

## 2025-03-26 RX ADMIN — METHOCARBAMOL TABLETS 500 MG: 500 TABLET, COATED ORAL at 05:50

## 2025-03-26 RX ADMIN — HYDROCODONE BITARTRATE AND ACETAMINOPHEN 1 TABLET: 7.5; 325 TABLET ORAL at 12:15

## 2025-03-26 RX ADMIN — SODIUM CHLORIDE 500 ML: 9 INJECTION, SOLUTION INTRAVENOUS at 11:25

## 2025-03-26 RX ADMIN — AZELASTINE HYDROCHLORIDE 1 SPRAY: 137 SPRAY, METERED NASAL at 10:35

## 2025-03-26 RX ADMIN — LOSARTAN POTASSIUM 100 MG: 100 TABLET, FILM COATED ORAL at 10:34

## 2025-03-26 RX ADMIN — BUDESONIDE 0.5 MG: 0.5 INHALANT RESPIRATORY (INHALATION) at 21:14

## 2025-03-26 RX ADMIN — FAMOTIDINE 20 MG: 20 TABLET, FILM COATED ORAL at 17:04

## 2025-03-26 RX ADMIN — LIDOCAINE 3 PATCH: 4 PATCH TOPICAL at 10:34

## 2025-03-26 RX ADMIN — IPRATROPIUM BROMIDE 1 SPRAY: 21 SPRAY, METERED NASAL at 10:35

## 2025-03-26 RX ADMIN — Medication 10 ML: at 21:08

## 2025-03-26 RX ADMIN — Medication 10 ML: at 10:35

## 2025-03-26 RX ADMIN — Medication 2000 UNITS: at 10:33

## 2025-03-26 RX ADMIN — HYDROCODONE BITARTRATE AND ACETAMINOPHEN 1 TABLET: 7.5; 325 TABLET ORAL at 04:05

## 2025-03-26 RX ADMIN — IPRATROPIUM BROMIDE 1 SPRAY: 21 SPRAY, METERED NASAL at 21:04

## 2025-03-26 RX ADMIN — FAMOTIDINE 20 MG: 20 TABLET, FILM COATED ORAL at 05:50

## 2025-03-26 NOTE — CONSULTS
ORTHOPEDIC SURGERY CONSULT      Patient: Pedrito Powell  Date of Admission: 3/23/2025  2:18 PM  YOB: 1960  Medical Record Number: [id number]  Attending Physician: Yobani Akbar DO  Consulting Physician: Lindsay Saha PA-C    CHIEF COMPLAINT: Left wrist and left knee pain    HISTORY OF PRESENT ILLNESS: Patient is a 64 y.o. year old female presents to [facility] with above complaints.  I was consulted for further evaluation and treatment.  The patient presented with low back pain that has now gotten better.  She states that on Monday when she was here she was able to ambulate appropriately, but was having some mild left wrist pain.  She states that as her hospital stay has continued and the more that she has gotten up with occupational therapy with a walker, she is had increased severe left knee pain and left wrist pain.  She denies any acute injury or fall that she can remember.  She is febrile. She was recently diagnosed with cellulitis and is continuing medication for this.    ALLERGIES:   Allergies   Allergen Reactions    Cashew Nut Oil Anaphylaxis    Chocolate Anaphylaxis    Citrus Anaphylaxis    Nickel Anaphylaxis    Nuts Anaphylaxis    Tree Nut Anaphylaxis    Nadeau Itching    Nadeau Oil Other (See Comments)    Bacitracin Hives     Cannot remember, identified through testing    Baclofen Other (See Comments)    Chlorhexidine Unknown - Low Severity    Ciprofloxacin Other (See Comments)     THRUSH PER PATIENT    Citric Acid Unknown (See Comments)     Unsure      Covid-19 (Mrna) Vaccine Other (See Comments)     INSTRUCTED PER ALLERGIST SHE CAN NOT TAKE D/T ONE OF THE PRESERVATIVES    INSTRUCTED PER ALLERGIST SHE CAN NOT TAKE D/T ONE OF THE PRESERVATIVES      *is able to & has had the PFIZER vaccine*    Influenza Vaccines Nausea And Vomiting    Methyldibromoglutaronitrile Unknown (See Comments)      PATIENT UNSURE OF REACTION.    Naproxen Other (See Comments)    Neomycin  Unknown (See Comments)      PATIENT UNSURE OF REACTION.    Omnicef [Cefdinir] Other (See Comments)     THRUSH PER PATIENT    Palladium Chloride Unknown (See Comments)      PATIENT UNSURE OF REACTION    Penicillins Other (See Comments)     THRUSH PER PATIENT    Pineapple Extract Hives    Sulfa Antibiotics Other (See Comments)     THRUSH PER PATIENT    Adhesive Tape Unknown - Low Severity and Rash    Gold-Containing Drug Products Rash          Latex Rash       HOME MEDICATIONS:  Medications Prior to Admission   Medication Sig Dispense Refill Last Dose/Taking    acetaminophen (TYLENOL) 500 MG tablet Take 1 tablet by mouth Every 6 (Six) Hours As Needed for Mild Pain. 30 tablet 0 3/23/2025    albuterol sulfate  (90 Base) MCG/ACT inhaler Inhale 2 puffs Every 4 (Four) Hours As Needed for Wheezing. 18 g 0 3/23/2025    Arnuity Ellipta 200 MCG/ACT Daily.   Past Month    ASPIRIN 81 PO Take  by mouth.   3/23/2025    atorvastatin (LIPITOR) 80 MG tablet Take 1 tablet by mouth Daily. 90 tablet 1 3/23/2025    azelastine (ASTELIN) 0.1 % nasal spray Administer 1 spray into the nostril(s) as directed by provider Daily.  11 3/23/2025    B Complex Vitamins (B COMPLEX PO) Take 1 capsule by mouth Daily. HOLD FOR SURGERY 1 WEEK   3/23/2025    benzonatate (TESSALON) 100 MG capsule Take 1 capsule by mouth 3 (Three) Times a Day As Needed for Cough. 10 capsule 0 Past Month    Cholecalciferol (Vitamin D3) 50 MCG (2000 UT) capsule TAKE 1 CAPSULE BY MOUTH EVERY DAY 90 capsule 3 3/23/2025    clobetasol (TEMOVATE) 0.05 % cream Apply 1 Application topically to the appropriate area as directed As Needed.   Past Week    Diclofenac Sodium (VOLTAREN) 1 % gel gel Apply 4 g topically to the appropriate area as directed 4 (Four) Times a Day As Needed (foot pain). 50 g 0 Past Week    doxycycline (VIBRAMYCIN) 100 MG capsule Take 1 capsule by mouth 2 (Two) Times a Day for 5 days. 10 capsule 0 3/23/2025    EPINEPHrine (AUVI-Q IJ) Inject  as directed As  Needed.   Past Month    famotidine (PEPCID) 20 MG tablet Take 1 tablet by mouth.   3/23/2025    furosemide (Lasix) 20 MG tablet Take 1 tablet by mouth Daily for 4 days. 4 tablet 0 3/22/2025    ipratropium (ATROVENT) 0.03 % nasal spray Administer 1 spray into the nostril(s) as directed by provider 3 (Three) Times a Day.   Past Week    irbesartan (AVAPRO) 300 MG tablet Take 1 tablet by mouth Daily. 90 tablet 1 3/23/2025    loperamide (IMODIUM) 2 MG capsule Take 1 capsule by mouth Every 2 (Two) Hours As Needed for Diarrhea (max 4 doses daily).   Past Week    meclizine (ANTIVERT) 25 MG tablet TAKE 1 TABLET BY MOUTH THREE TIMES DAILY AS NEEDED FOR DIZZINESS 270 tablet 0 3/22/2025    ondansetron (Zofran) 4 MG tablet Take 1 tablet by mouth Every 8 (Eight) Hours As Needed for Nausea or Vomiting. 42 tablet 4 3/23/2025    potassium chloride (KLOR-CON M20) 20 MEQ CR tablet Take 1 tablet by mouth Daily. 4 tablet 0 3/22/2025    traMADol (ULTRAM) 50 MG tablet TAKE 1 TABLET BY MOUTH TWICE DAILY AS NEEDED FOR MODERATE PAIN 180 tablet 1 3/23/2025    triamcinolone (KENALOG) 0.1 % ointment Apply 1 Application topically to the appropriate area as directed As Needed.   Past Month    Vortioxetine HBr (Trintellix) 10 MG tablet tablet Take 1 tablet by mouth Daily With Breakfast. 90 tablet 3 3/22/2025    amitriptyline (ELAVIL) 25 MG tablet Take 1 tablet by mouth At Night As Needed for Sleep.   More than a month    hydrocortisone 2.5 % cream Apply  topically to the appropriate area as directed As Needed.          CURRENT MEDICATIONS:  Scheduled Meds:atorvastatin, 80 mg, Oral, Daily  azelastine, 1 spray, Each Nare, Daily  budesonide, 0.5 mg, Nebulization, BID - RT  cholecalciferol, 2,000 Units, Oral, Daily  doxycycline, 100 mg, Oral, Q12H  famotidine, 20 mg, Oral, BID AC  ipratropium, 1 spray, Each Nare, TID  Lidocaine, 3 patch, Transdermal, Q24H  losartan, 100 mg, Oral, Q24H  methocarbamol, 500 mg, Oral, Q8H  sodium chloride, 10 mL,  Intravenous, Q12H  Vortioxetine HBr, 10 mg, Oral, Daily With Breakfast      Continuous Infusions:   PRN Meds:.  acetaminophen **OR** acetaminophen **OR** acetaminophen    albuterol sulfate HFA    amitriptyline    benzonatate    Calcium Replacement - Follow Nurse / BPA Driven Protocol    Calcium Replacement - Follow Nurse / BPA Driven Protocol    Diclofenac Sodium    HYDROcodone-acetaminophen    HYDROmorphone    loperamide    Magnesium Standard Dose Replacement - Follow Nurse / BPA Driven Protocol    Magnesium Standard Dose Replacement - Follow Nurse / BPA Driven Protocol    meclizine    ondansetron    Phosphorus Replacement - Follow Nurse / BPA Driven Protocol    Phosphorus Replacement - Follow Nurse / BPA Driven Protocol    Potassium Replacement - Follow Nurse / BPA Driven Protocol    Potassium Replacement - Follow Nurse / BPA Driven Protocol    sodium chloride    sodium chloride    traMADol    Past Medical History:   Diagnosis Date    Anemia     IRON INFUSION RECENTLY    Arthritis     Asthma     Chronic kidney disease     stage 3    Elevated cholesterol     GERD (gastroesophageal reflux disease)     High risk medication use 2018    History of carpal tunnel syndrome     Hyperlipidemia     Hypertension     Intractable vomiting with nausea 2018    Left shoulder pain     Limited mobility     Rheumatoid arthritis     Vertigo     Weakness     AT TIMES LEFT SHOULDER/LEFT ARM     Past Surgical History:   Procedure Laterality Date    CARPAL TUNNEL RELEASE Left 10/02/2023     SECTION  , ,     COLONOSCOPY N/A 12/10/2018    normal ileum, IH, hyperplastic polyp, otherwise normal exam    ENDOSCOPY N/A 12/10/2018    no gross lesions in esophagus or examined duodenum, erythematous mucosa in stomach, biopsies benign    HYSTERECTOMY      OOPHORECTOMY Bilateral     SHOULDER MANIPULATION Left     TOTAL SHOULDER ARTHROPLASTY Left 2021    Procedure: REVERSE TOTAL SHOULDER ARTHROPLASTY,  BICEP  TENDONDESIS;  Surgeon: Bethel Devi MD;  Location: [facility];  Service: Orthopedics;  Laterality: Left;    TRIGGER FINGER RELEASE Left     3 fingers     Social History     Occupational History    Occupation: Teacher     Employer: [employer]   Tobacco Use    Smoking status: Never     Passive exposure: Never    Smokeless tobacco: Never   Vaping Use    Vaping status: Never Used   Substance and Sexual Activity    Alcohol use: Yes     Alcohol/week: 1.0 standard drink of alcohol     Types: 1 Glasses of wine per week     Comment: WEEKLY    Drug use: No    Sexual activity: Defer      Social History     Social History Narrative    Not on file     Family History   Problem Relation Age of Onset    Asthma Mother     Thyroid disease Father     Diabetes Maternal Grandmother     Breast cancer Maternal Aunt     Colon cancer Maternal Grandfather     Malig Hyperthermia Neg Hx        REVIEW OF SYSTEMS:    HEENT: Patient denies any headaches, vision changes, change in hearing, or tinnitus, Patient denies epistaxis, sinus pain, hoarseness, or dysphagia   Pulmonary: Patient denies any cough, congestion, acute change in SOA or wheezing.   Cardiovascular: Patient denies any change in chest pain, dyspnea, palpitations, weakness, intolerance of exercise, varicosities, change in murmur   Gastrointestinal:  Patient denies change in appetite, melena, change in bowel habits.   Genital/Urinary: Patient denies dysuria, change in color of urine, change in frequency of urination, pain with urgency, change in incontinence, retention.   Musculoskeletal: Patient admits to left knee and left wrist pain.   Neurological: Patient denies changes in dizziness, tremor, ataxia, or difficulty in speaking or changes in memory.   Endocrine system: Patient denies acute changes in tremors, palpitations, polyuria, polydipsia, polyphagia, diaphoresis, exophthalmos, or goiter.   Psychological: Patient denies thoughts/plans or harming self or  other; denies acute changes in depression,  insomnia, night terrors, ania, disorientation.   Skin: Patient denies any bruising, rashes, discoloration, pruritus,or wounds not mentioned in history of present illness or chief complaint above.   Hematopoietic: Patient denies current bleeding, epistaxis, hematuria, or melena.    PHYSICAL EXAM:   Vitals:  Vitals:    03/25/25 2026 03/26/25 0014 03/26/25 0419 03/26/25 0505   BP:  139/86 135/87    BP Location:  Right arm Right arm    Patient Position:  Lying Lying    Pulse: 92 88 97    Resp: 16 16 16    Temp:  97.7 °F (36.5 °C) (!) 102.6 °F (39.2 °C) (!) 100.6 °F (38.1 °C)   TempSrc:  Oral Oral Oral   SpO2: 97% 97% 97%    Weight:       Height:           General:  64 y.o. female who appears about stated age.    Alert, cooperative, in no acute distress         Head:    Normocephalic, without obvious abnormality, atraumatic   Eyes:            Lids and lashes normal, conjunctivae and sclerae normal, no         icterus, no pallor, corneas clear, PERRLA   Ears:    Ears appear intact with no abnormalities noted   Throat:   No oral lesions, no thrush, oral mucosa moist   Neck:   No adenopathy, supple, trachea midline, no JVD   Back:     Limited exam shows no severe kyphosis present,no visible           erythema, no excessive  tenderness to palpation.    Lungs:     Respirations regular, even and unlabored.     Heart:    Normal rate, Pulses palpable   Chest Wall:    No abnormalities observed.   Abdomen:     Normal bowel sounds, no masses, no organomegaly, soft              non-tender, non-distended, no guarding, no rebound                      tenderness   Rectal:     Deferred   Pulses:   Pulses palpable and equal bilaterally   Skin:   No bleeding, bruising or rash   Lymph nodes:   No palpable adenopathy   Extremities:     Examination of the left lower extremity reveals skin is intact.  No warmth or erythema noted over the left knee, but some warmth noted distally over the shin.   Tenderness to palpation over the patella and medial joint line.  Decreased range of motion due to pain.  Edema noted distally.  Neurovascularly intact distally.    Examination of the left wrist reveals significant edema that extends distally into the hand and digits.  No ecchymosis noted.  Tenderness to palpation at the distal radius.  Pain noted with range of motion.  Neurovascularly intact distally.  Capillary refill is brisk.    Examination of the right lower extremity reveals skin is intact.  Some warmth and erythema noted over the right shin distally.        DIAGNOSTIC TEST:  No results displayed because visit has over 200 results.          XR Chest 1 View  Result Date: 3/25/2025  Narrative: AP CHEST  HISTORY: Fever, dyspnea  COMPARISON: 3/15/2025  FINDINGS: There is increased atelectasis or consolidation at both lung bases. There is increased interstitial lung opacities and also some peribronchial cuffing noted. Heart size stable. There may be a small right pleural effusion. Postsurgical changes of left shoulder arthroplasty      Impression: Increased atelectasis or consolidation of both lung bases. Increased interstitial lung opacities and peribronchial cuffing may be infectious/inflammatory bronchitis or edema. Possible small right pleural effusion    This report was finalized on 3/25/2025 7:45 PM by Dr. Catalino Monae M.D on Workstation: [id number]      XR Knee 1 or 2 View Left  Result Date: 3/25/2025  Narrative: XR KNEE 1 OR 2 VW LEFT-   HISTORY:   c/o pain; M54.59-Other low back pain; R50.9-Fever, unspecified; R60.0-Localized edema  TECHNIQUE: 3 views of the left knee.  FINDINGS:  There is osteopenia and degenerative change, and there may be a joint effusion. However, there is no convincing evidence of acute fracture or dislocation, or lytic or blastic bone lesion.      Impression:  Degenerative change and possible joint effusion, no definite acute bony abnormality.  This report was finalized on  3/25/2025 5:45 PM by Dr. Rosendo Donnelly M.D on Workstation: [id number]      XR Wrist 3+ View Left  Result Date: 3/25/2025  Narrative: XR WRIST 3+ VW LEFT-  INDICATIONS: Pain and swelling, no injury.  TECHNIQUE: 3 VIEWS OF THE LEFT WRIST  COMPARISON: None available  FINDINGS:  Conspicuous soft tissue swelling is noted at the wrist, may be evidence of inflammation, infection, injury, correlate clinically. An ossific focus at the volar aspect of the wrist, 4 mm, appears corticated, could be sequela of old injury. No acute appearing fracture is identified. Some widening of the interval between the trapezium and the scaphoid is noted, of uncertain etiology. No dislocation. Follow-up/further evaluation can be obtained as indications persist.      Impression:  As described.    This report was finalized on 3/25/2025 10:19 AM by Dr. Ayden Nguyễn M.D on Workstation: [id number]      MRI Lumbar Spine With & Without Contrast  Result Date: 3/24/2025  Narrative: LUMBAR SPINE MRI WITHOUT AND WITH CONTRAST  INDICATION: Low back pain; M54.59-Other low back pain; R50.9-Fever, unspecified; R60.0-Localized edema, difficulty walking  COMPARISON: None.  TECHNIQUE: Lumbar spine MRI with and without contrast.  FINDINGS:  There is grade 1 anterolisthesis at L4-5. Alignment is otherwise within normal limits. There are degenerative bone marrow signal changes, but there is no evidence of bone marrow infiltration or replacement.  The distal cord and conus are normal in position and appearance.  The paraspinous soft tissues are unremarkable. Postcontrast images show no unexpected or abnormal enhancement.  T12-L1: There is no canal or foraminal stenosis.  L1-2: There is no canal stenosis. There is mild bilateral foraminal narrowing.  L2-3: There is mild degenerative canal stenosis, and moderate bilateral foraminal stenosis.  L3-4: There is mild degenerative canal stenosis, and mild to moderate bilateral foraminal stenosis.  L4-5: There is  moderate to severe canal stenosis, and moderate to severe right and left foraminal stenosis.  L5-S1: There is no overall canal stenosis, and there is moderate left and mild to moderate right foraminal stenosis.      Impression:  Multilevel degenerative change, see level by level details regarding canal and foraminal narrowing above.  No acute appearing abnormality at any level.   This report was finalized on 3/24/2025 12:02 AM by Dr. Rosendo Donnelly M.D on Workstation: [id number]      US Venous Doppler Lower Extremity Bilateral (duplex)  Result Date: 3/15/2025  Narrative: Patient: DINORA COHEN  Time Out: 20:33 Exam(s): US VENOUS BILATERAL LOWER EXTREMITIES EXAM: US Duplex Bilateral Lower Extremities Veins CLINICAL HISTORY: Bilateral extremity swelling x3 days TECHNIQUE: Real-time duplex ultrasound scan of the bilateral lower extremity veins integrating B-mode two-dimensional vascular structure, Doppler spectral analysis, color flow Doppler imaging and compression. COMPARISON: No relevant prior studies available. FINDINGS: Right deep veins:  Unremarkable.  No DVT in the right common femoral, femoral, proximal deep femoral or popliteal veins.  The veins demonstrate normal color flow, are normally compressible, with normal phasic flow and or augmentation response. Right superficial veins:  Unremarkable.  No thrombus in the visualized right great saphenous vein. Left deep veins:  Unremarkable.  No DVT in the left common femoral, femoral, proximal deep femoral or popliteal veins.  The veins demonstrate normal color flow, are normally compressible, with normal phasic flow and or augmentation response. Left superficial veins:  Unremarkable.  No thrombus in the visualized left great saphenous vein. Soft tissues: Soft tissue edema. IMPRESSION:     No evidence of acute DVT.  Soft tissue edema     Impression: Electronically signed by Gama Guevara M.D. on 03-15-25 at 2033    XR Chest 1 View  Result Date:  3/15/2025  Narrative: XR CHEST 1 VW-   INDICATION: Leg swelling.  COMPARISON: Chest radiograph December 21, 2024  TECHNIQUE: 1 view chest  FINDINGS:  No focal opacity. Low volumes. No effusions. Stable mediastinum. Heart is normal in size for AP technique. Total left shoulder arthroplasty. Right glenohumeral osteoarthritis.      Impression: No acute cardiopulmonary process  This report was finalized on 3/15/2025 2:59 PM by Dr. Aron Germain M.D on Workstation: [id number]        ASSESSMENT:  Left knee pain  Left wrist pain    Intractable low back pain    HTN (hypertension)    Anemia    Anxiety    Acute low back pain    Cellulitis of leg    Acute back pain    Spinal stenosis, lumbar region, without neurogenic claudication    Spondylolisthesis, lumbar region    Facet arthritis of lumbar region    GERD (gastroesophageal reflux disease)      PLAN:    Planning to further evaluate the left wrist and left knee with CT imaging.  The x-ray of the left knee demonstrated a possible knee effusion, would like to get a CT to further evaluate possible fluid in the knee and further determine if aspiration is warranted.  The x-ray of the left wrist demonstrated possibly a old distal radius fracture.  Would like to order CT to further evaluate the distal radius and surrounding soft tissue due to the significant edema of the wrist and hand along with the exquisite tenderness to palpation and severe pain with range of motion.  Weightbearing as tolerated for the left lower extremity and left upper extremity at this time.  Continue normal diet at this time.  Continue pain medications as indicated.  Continue all other medications as indicated by internal medicine.  Will continue to follow and make further management plan after CT studies have been performed.  The patient understands and agrees to this plan.  All questions answered.    The above diagnosis and treatment plan was discussed with the patient.  They were educated in  treatment options for their condition.   They were given the opportunity to ask questions and were answered to their satisfaction.  They agreed to proceed with the above treatment plan.        Lindsay Saha PA-C  Date: 3/26/2025

## 2025-03-26 NOTE — PLAN OF CARE
Goal Outcome Evaluation:  Plan of Care Reviewed With: patient        Progress: improving  Outcome Evaluation: vss, pain controlled by prn meds pt on 2L NC purewick in place. pt rested well during the shift. turned q2. labs in am.

## 2025-03-26 NOTE — PROGRESS NOTES
Name: Pedrito Powell ADMIT: 3/23/2025   : 1960  PCP: Teo Fraser MD    MRN: [id number] LOS: 1 days   AGE/SEX: 64 y.o. female  ROOM: [facility]     Subjective   Subjective   Patient seen and examined this morning.  Hospital day 3, she is resting in bed, having ongoing back pain and left knee pain.       Objective   Objective   Vital Signs  Temp:  [97.7 °F (36.5 °C)-102.6 °F (39.2 °C)] 100.6 °F (38.1 °C)  Heart Rate:  [88-97] 97  Resp:  [16] 16  BP: (135-167)/(70-87) 135/87  SpO2:  [93 %-98 %] 97 %  on  Flow (L/min) (Oxygen Therapy):  [2] 2;   Device (Oxygen Therapy): nasal cannula  Body mass index is 51.07 kg/m².  Physical Exam  Vitals and nursing note reviewed.   Constitutional:       General: She is not in acute distress.     Appearance: She is overweight.   Cardiovascular:      Rate and Rhythm: Normal rate.      Pulses: Normal pulses.      Heart sounds: Normal heart sounds.   Pulmonary:      Effort: Pulmonary effort is normal.      Breath sounds: No wheezing.   Abdominal:      General: Bowel sounds are normal. There is no distension.      Palpations: Abdomen is soft.      Tenderness: There is no abdominal tenderness. There is no guarding.   Musculoskeletal:         General: Swelling (left wrist) and tenderness present.      Comments: Decreased mobility, decreased ROM secondary to pain, lower  to palpation   Skin:     General: Skin is warm and dry.      Comments: Erythema of lower extremities   Neurological:      Mental Status: She is alert.       Results Review     I reviewed the patient's new clinical results.  Results from last 7 days   Lab Units 25  0602 25  0546 25  0533 25  1325   WBC 10*3/mm3 10.87* 10.88* 8.64 9.76   HEMOGLOBIN g/dL 10.0* 9.5* 9.2* 9.9*   PLATELETS 10*3/mm3 229 223 211 223     Results from last 7 days   Lab Units 25  0602 25  0546 25  0533 25  1325   SODIUM mmol/L 132* 137 139 134*   POTASSIUM mmol/L 3.9 3.9 3.8 3.7  "  CHLORIDE mmol/L 97* 100 104 100   CO2 mmol/L 22.9 24.0 24.2 25.3   BUN mg/dL 12 11 9 14   CREATININE mg/dL 0.70 0.79 0.92 1.05*   GLUCOSE mg/dL 124* 120* 95 107*   EGFR mL/min/1.73 96.7 83.6 69.7 59.5*     Results from last 7 days   Lab Units 03/24/25  0533 03/23/25  1325 03/21/25  0952   ALBUMIN g/dL 3.1* 4.0 3.7   BILIRUBIN mg/dL 0.8 0.7 0.5   ALK PHOS U/L 65 74 74   AST (SGOT) U/L 16 20 21   ALT (SGPT) U/L 10 15 17     Results from last 7 days   Lab Units 03/26/25  0602 03/25/25  0546 03/24/25  0533 03/23/25  1325 03/21/25  0952   CALCIUM mg/dL 8.3* 8.2* 8.4* 8.6 8.9   ALBUMIN g/dL  --   --  3.1* 4.0 3.7     Results from last 7 days   Lab Units 03/23/25  1507 03/23/25  1325   PROCALCITONIN ng/mL  --  0.03   LACTATE mmol/L 1.0  --      No results found for: \"HGBA1C\", \"POCGLU\"    XR Chest 1 View  Result Date: 3/25/2025  Increased atelectasis or consolidation of both lung bases. Increased interstitial lung opacities and peribronchial cuffing may be infectious/inflammatory bronchitis or edema. Possible small right pleural effusion    This report was finalized on 3/25/2025 7:45 PM by Dr. Catalino Monae M.D on Workstation: [id number]      XR Knee 1 or 2 View Left  Result Date: 3/25/2025   Degenerative change and possible joint effusion, no definite acute bony abnormality.  This report was finalized on 3/25/2025 5:45 PM by Dr. Rosendo Donnelly M.D on Workstation: [id number]      XR Wrist 3+ View Left  Result Date: 3/25/2025   As described.    This report was finalized on 3/25/2025 10:19 AM by Dr. Ayden Nguyễn M.D on Workstation: [id number]        I have personally reviewed all medications:  Scheduled Medications  atorvastatin, 80 mg, Oral, Daily  azelastine, 1 spray, Each Nare, Daily  budesonide, 0.5 mg, Nebulization, BID - RT  cholecalciferol, 2,000 Units, Oral, Daily  doxycycline, 100 mg, Oral, Q12H  famotidine, 20 mg, Oral, BID AC  ipratropium, 1 spray, Each Nare, TID  Lidocaine, 3 patch, Transdermal, " Q24H  losartan, 100 mg, Oral, Q24H  methocarbamol, 500 mg, Oral, Q8H  sodium chloride, 10 mL, Intravenous, Q12H  Vortioxetine HBr, 10 mg, Oral, Daily With Breakfast    Infusions     Diet  Diet: Cardiac; Healthy Heart (2-3 Na+); Fluid Consistency: Thin (IDDSI 0)    I have personally reviewed:  [x]  Laboratory   [x]  Microbiology   [x]  Radiology   []  EKG/Telemetry  []  Cardiology/Vascular   []  Pathology    []  Records       Assessment/Plan     Active Hospital Problems    Diagnosis  POA    **Intractable low back pain [M54.59]  Yes    Spinal stenosis, lumbar region, without neurogenic claudication [M48.061]  Unknown    Spondylolisthesis, lumbar region [M43.16]  Unknown    Facet arthritis of lumbar region [M47.816]  Unknown    GERD (gastroesophageal reflux disease) [K21.9]  Yes    Acute low back pain [M54.50]  Unknown    Cellulitis of leg [L03.119]  Unknown    Acute back pain [M54.9]  Yes    Anxiety [F41.9]  Yes    Anemia [D64.9]  Yes    HTN (hypertension) [I10]  Yes      Resolved Hospital Problems   No resolved problems to display.       64 y.o. female admitted with Intractable low back pain.    Lower back pain  - Patient with worsening lower back pain with radiculopathy symptoms. MRI showing evidence of lumbar stenosis. ANDRES consulted, no acute intervention warranted, continue with medications for pain. Continue with therapy services and fall precautions. Oral pain medication adjusted on 03/25, can monitor response to this. Can adjust further if needed. Patient currently receiving IV pain medication PRN, high risk medication. Need to closely monitor for over-sedation, respiratory depression and constipation.    Pain and swelling in left wrist  Pain and swelling in left knee  - X-rays obtained, imaging showing degenerative changes in knee with possible effusion. X-ray of wrist showed soft tissue swelling, old injury, some widening of the interval between the trapezium and the scaphoid is noted.   - Duplex LE US and  LUE US negative for DVT. She has no history of gout, no renal impairment, do not have clear reason to suspect this.  - Orthopedic surgery consulted. Will continue to follow their plans/recommendations. Greatly appreciate their help.    Fever  Leukocytosis  Recently diagnosed cellulitis  Pneumonia  - Noted. Prior UA with + LE, 6-10 WBC, but she denies urinary symptoms to suggest UTI, urine culture no growth. Blood cultures no growth to date. She was continued on Doxycycline for cellulitis previously diagnosed. Her LE swelling, erythema that was noted previously has improved.   - CXR ordered, showing findings concerning for pneumonia. She has multiple medication allergies, going to start empiric treatment with Levaquin now. Monitor response to this. Further management based on clinical course.  - Consult ID to assess.  - Trend WBC with CBC.    Hyponatremia  - Noted on labs, likely hypovolemic, give 500 cc fluid bolus for fluid challenge and repeat labs to guide management.    Hypertension  - Blood pressure appears stable and acceptable acutely at this time. No indications to warrant acute changes/intervention at this time.  - Continue current medications as prescribed. Trend BP to guide ongoing management decisions.    Anemia  - Hemoglobin low on most recent labs.No evidence of overt blood loss. No indications for acute intervention at this time.    - Order repeat CBC in AM for reassessment. Continue to monitor, transfuse for hemoglobin <7.    CKD stage 2?  - Previously, appears to have CKD stage 2, baseline creatinine ~0.8 to 0.9, renal function stable, eGFR has since improved. Repeat labs in AM.    Depression/anxiety  - Consult access and psychiatry to evaluate.    Continue other medications for chronic conditions as prescribed. Further management TBD based on clinical course.    SCDs for DVT prophylaxis.  Full code.  Discussed with patient, family, and nursing staff.  Anticipate discharge  TBD   pending PT  evaluation, consultant clearance  Expected discharge date/ time has not been documented.      Yobani Akbar DO  [facility]  03/26/25

## 2025-03-26 NOTE — CONSULTS
"[facility] evaluated 64-year-old female for depression.  Patient came in with intractable low back pain.  Patient was lying on her back keeping her eyes closed due to the pain.  Patient's adult daughter was there with patient's permission.  Patient's daughter had concerned that patient made a statement to her about \"pulling the plug\" due to the pain.  Patient denied any suicidal feelings but stated if this pain continue she would wish she was dead.  Patient states she has never had this kind of pain before and is currently going through ultrasounds and CAT scans excetra.  Access encouraged patient to talk about her feelings with her daughter and others.  Patient denies any psychiatric inpatient and had counseling \"years ago\".  Patient is currently on Trintellix and has been for the last 3 or 4 years prescribed by her family doctor.  Patient stated she would be willing to see the psychiatrist to examine her medication.  Patient denies current SI and denies any attempts in the past.  Patient denies any alcohol drug or tobacco use.  Patient states she sleeps in \"spurts\".  Patient states she has not had a good appetite since her admission 3 days ago.  Patient denies any psychosis.  Patient rates her current anxiety as a 9/10 and her current depressed mood as an 8/10.    The patient lives in a house with her adult daughter.  Patient has 3 adult children all of whom are supportive as well as her sister.  The patient has been  16 years and works full-time as a teacher.  Patient is not active in a [employer].  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  Patient denies any other stressors in her life other than her pain.  Patient states that this pain started recently. Access will follow and provide outpt counseling resources as pt's tx plan becomes known.  "

## 2025-03-26 NOTE — PROGRESS NOTES
[facility] Clinical Pharmacy Services: Levofloxacin Consult    Pt Name: Pedrito Powell   : 1960  Weight: 115 kg (252 lb 13.9 oz)  Antibiotic: Levofloxacin  Indication: Pneumonia    Relevant clinical data and objective history reviewed:    Past Medical History:   Diagnosis Date    Anemia     IRON INFUSION RECENTLY    Arthritis     Asthma     Chronic kidney disease     stage 3    Elevated cholesterol     GERD (gastroesophageal reflux disease)     High risk medication use 2018    History of carpal tunnel syndrome     Hyperlipidemia     Hypertension     Intractable vomiting with nausea 2018    Left shoulder pain     Limited mobility     Rheumatoid arthritis     Vertigo     Weakness     AT TIMES LEFT SHOULDER/LEFT ARM     Creatinine   Date Value Ref Range Status   2025 0.70 0.57 - 1.00 mg/dL Final   2025 0.79 0.57 - 1.00 mg/dL Final   2025 0.92 0.57 - 1.00 mg/dL Final     BUN   Date Value Ref Range Status   2025 12 8 - 23 mg/dL Final     Estimated Creatinine Clearance: 96.5 mL/min (by C-G formula based on SCr of 0.7 mg/dL).    Lab Results   Component Value Date    WBC 10.87 (H) 2025     Temp Readings from Last 3 Encounters:   25 (!) 100.6 °F (38.1 °C) (Oral)   25 99 °F (37.2 °C) (Oral)   03/15/25 96.9 °F (36.1 °C) (Oral)      Assessment/Plan    Ordered Levofloxacin 750 mg IV q24h for a total of 7 days. Will monitor and adjust if culture data or pertinent lab values indicate this is best for the patient.     Thank you for this consult and please contact pharmacy with any questions or concerns.     Booker Gandara, [facility]  Clinical Pharmacist

## 2025-03-26 NOTE — PLAN OF CARE
Goal Outcome Evaluation:  Plan of Care Reviewed With: patient        Progress: no change  Outcome Evaluation: Patient A & O. Makes needs known. Purewick intact. IV bolus and IV atb therapy started. PRN pain meds given as ordered. Family at bedside. Resting in between care. No s/s of distress noted.

## 2025-03-26 NOTE — CONSULTS
"Referring Provider: Hima Trejo MD  Reason for Consultation:     Fever, suspected pneumonia     Chief Complaint   Patient presents with    Back Pain         Subjective   History of present illness: Patient is a 64-year-old female with past medical history of morbid obesity BMI 51, lower extremity edema, CKD, and rheumatoid arthritis who presents with intractable back pain.  ID consulted for \"fever, suspected pneumonia\".    On admission patient afebrile with no leukocytosis and normal lactate and procalcitonin.  CRP elevated at 3.  Patient developed fevers starting 3/25 with peak overnight of 102.6.  Initially on doxycycline for suspected cellulitis of the bilateral lower extremities.  Erythema of the lower extremities has now resolved and WBC stable at 10.8.  Repeat procalcitonin 0.21.  Chest x-ray with bilateral lower lobe atelectasis versus infiltrate.  Initially on oxygen but has now been weaned to room air.  Ongoing swelling of the left wrist and left knee.  Seen by orthopedics with plan for further imaging.    Other infectious workup with blood cultures and urinalysis have been unremarkable.  DVT scans of bilateral lower extremities have been negative for DVT.    Patient reports she has not yet received any antibiotics other than doxycycline but currently breathing well on room air.  States her legs still hurt bilaterally and continues to have the lower back pain.  Tenderness of the left wrist also noted without any erythema.    Past Medical History:   Diagnosis Date    Anemia     IRON INFUSION RECENTLY    Arthritis     Asthma     Chronic kidney disease     stage 3    Elevated cholesterol     GERD (gastroesophageal reflux disease)     High risk medication use 01/19/2018    History of carpal tunnel syndrome     Hyperlipidemia     Hypertension     Intractable vomiting with nausea 12/07/2018    Left shoulder pain     Limited mobility     Rheumatoid arthritis     Vertigo     Weakness     AT TIMES LEFT " SHOULDER/LEFT ARM       Past Surgical History:   Procedure Laterality Date    CARPAL TUNNEL RELEASE Left 10/02/2023     SECTION  , ,     COLONOSCOPY N/A 12/10/2018    normal ileum, IH, hyperplastic polyp, otherwise normal exam    ENDOSCOPY N/A 12/10/2018    no gross lesions in esophagus or examined duodenum, erythematous mucosa in stomach, biopsies benign    HYSTERECTOMY      OOPHORECTOMY Bilateral     SHOULDER MANIPULATION Left     TOTAL SHOULDER ARTHROPLASTY Left 2021    Procedure: REVERSE TOTAL SHOULDER ARTHROPLASTY,  BICEP TENDONDESIS;  Surgeon: Bethel Devi MD;  Location: [facility];  Service: Orthopedics;  Laterality: Left;    TRIGGER FINGER RELEASE Left     3 fingers       family history includes Asthma in her mother; Breast cancer in her maternal aunt; Colon cancer in her maternal grandfather; Diabetes in her maternal grandmother; Thyroid disease in her father.     reports that she has never smoked. She has never been exposed to tobacco smoke. She has never used smokeless tobacco. She reports current alcohol use of about 1.0 standard drink of alcohol per week. She reports that she does not use drugs.     Allergies   Allergen Reactions    Cashew Nut Oil Anaphylaxis    Chocolate Anaphylaxis    Citrus Anaphylaxis    Nickel Anaphylaxis    Nuts Anaphylaxis    Tree Nut Anaphylaxis    Clifton Itching    Clifton Oil Other (See Comments)    Bacitracin Hives     Cannot remember, identified through testing    Baclofen Other (See Comments)    Chlorhexidine Unknown - Low Severity    Ciprofloxacin Other (See Comments)     THRUSH PER PATIENT    Citric Acid Unknown (See Comments)     Unsure      Covid-19 (Mrna) Vaccine Other (See Comments)     INSTRUCTED PER ALLERGIST SHE CAN NOT TAKE D/T ONE OF THE PRESERVATIVES    INSTRUCTED PER ALLERGIST SHE CAN NOT TAKE D/T ONE OF THE PRESERVATIVES      *is able to & has had the PFIZER vaccine*    Influenza Vaccines Nausea And Vomiting     Methyldibromoglutaronitrile Unknown (See Comments)      PATIENT UNSURE OF REACTION.    Naproxen Other (See Comments)    Neomycin Unknown (See Comments)      PATIENT UNSURE OF REACTION.    Omnicef [Cefdinir] Other (See Comments)     THRUSH PER PATIENT    Palladium Chloride Unknown (See Comments)      PATIENT UNSURE OF REACTION    Penicillins Other (See Comments)     THRUSH PER PATIENT    Pineapple Extract Hives    Sulfa Antibiotics Other (See Comments)     THRUSH PER PATIENT    Adhesive Tape Unknown - Low Severity and Rash    Gold-Containing Drug Products Rash          Latex Rash       Medication:  Antibiotics:  Anti-Infectives (From admission, onward)      Ordered     Dose/Rate Route Frequency Start Stop    03/26/25 1106  aztreonam (AZACTAM) 2,000 mg in sodium chloride 0.9 % 100 mL MBP  Status:  Discontinued        Ordering Provider: Yobani Akbar DO    2,000 mg  over 4 Hours Intravenous Every 8 Hours 03/26/25 2000 03/26/25 1226    03/26/25 1116  levoFLOXacin (LEVAQUIN) 750 mg/150 mL D5W (premix) (LEVAQUIN) 750 mg        Ordering Provider: Yobani Akbar DO    750 mg  100 mL/hr over 90 Minutes Intravenous Every 24 Hours 03/26/25 1215 04/02/25 1214    03/26/25 1106  aztreonam (AZACTAM) 2,000 mg in sodium chloride 0.9 % 100 mL MBP  Status:  Discontinued        Ordering Provider: Yobani Akbar DO    2,000 mg  200 mL/hr over 30 Minutes Intravenous Once 03/26/25 1200 03/26/25 1256    03/26/25 1056  Pharmacy To Dose: Levofloxacin  Status:  Discontinued        Ordering Provider: Yobani Akbar DO     Not Applicable Continuous PRN 03/26/25 1056 03/26/25 1117    03/26/25 1056  Pharmacy To Dose: Aztreonam  Status:  Discontinued        Ordering Provider: Yobani Akbar DO     Not Applicable Continuous PRN 03/26/25 1055 03/26/25 1117    03/23/25 1821  doxycycline (MONODOX) capsule 100 mg        Ordering Provider: Hima Trejo MD    100 mg Oral Every 12 Hours Scheduled 03/23/25 2100 03/26/25 1034              Objective  "    Physical Exam:   Vital Signs   Temp:  [97.7 °F (36.5 °C)-102.6 °F (39.2 °C)] 100.6 °F (38.1 °C)  Heart Rate:  [88-97] 97  Resp:  [16] 16  BP: (135-167)/(70-87) 135/87    GENERAL: Awake and alert, in no acute distress.   HEENT: Oropharynx is clear. Hearing is grossly normal.   EYES: PERRL. No conjunctival injection. No lid lag.   LUNGS: Normal work of breathing.  SKIN: Warm and dry without cutaneous eruptions in exposed areas.  PSYCHIATRIC: Appropriate mood, affect, insight, and judgment.     Results Review:   I reviewed the patient's new clinical results.  I reviewed the patient's new imaging results and agree with the interpretation.  I reviewed the patient's other test results and agree with the interpretation    Lab Results   Component Value Date    WBC 10.87 (H) 03/26/2025    HGB 10.0 (L) 03/26/2025    HCT 29.6 (L) 03/26/2025    MCV 91.1 03/26/2025     03/26/2025       No results found for: \"VANCOPEAK\", \"VANCOTROUGH\", \"VANCORANDOM\"    Lab Results   Component Value Date    GLUCOSE 124 (H) 03/26/2025    BUN 12 03/26/2025    CREATININE 0.70 03/26/2025    EGFRIFNONA 34 (L) 10/20/2021    EGFRIFAFRI 40 (L) 02/10/2020    BCR 17.1 03/26/2025    CO2 22.9 03/26/2025    CALCIUM 8.3 (L) 03/26/2025    ALBUMIN 3.1 (L) 03/24/2025    AST 16 03/24/2025    ALT 10 03/24/2025         Estimated Creatinine Clearance: 96.5 mL/min (by C-G formula based on SCr of 0.7 mg/dL).    Isolation:   No active isolations      Microbiology:  Microbiology Results (last 10 days)       Procedure Component Value - Date/Time    Urine Culture - Urine, Urine, Clean Catch [[id number]]  (Normal) Collected: 03/23/25 8878    Lab Status: Final result Specimen: Urine, Clean Catch Updated: 03/25/25 0758     Urine Culture No growth    Blood Culture - Blood, Arm, Right [[id number]]  (Normal) Collected: 03/23/25 1527    Lab Status: Preliminary result Specimen: Blood from Arm, Right Updated: 03/25/25 1545     Blood Culture No growth at 2 days    Narrative: "      Less than seven (7) mL's of blood was collected.  Insufficient quantity may yield false negative results.    Blood Culture - Blood, Arm, Right [[id number]]  (Normal) Collected: 03/23/25 1517    Lab Status: Preliminary result Specimen: Blood from Arm, Right Updated: 03/25/25 1530     Blood Culture No growth at 2 days    Respiratory Panel PCR w/COVID-19(SARS-CoV-2) KAYLEY/TORITO/EBONIE/PAD/COR/MITZY In-House, NP Swab in UTM/VTM, 2 HR TAT - Swab, Nasopharynx [[id number]]  (Normal) Collected: 03/23/25 1502    Lab Status: Final result Specimen: Swab from Nasopharynx Updated: 03/23/25 1618     ADENOVIRUS, PCR Not Detected     Coronavirus 229E Not Detected     Coronavirus HKU1 Not Detected     Coronavirus NL63 Not Detected     Coronavirus OC43 Not Detected     COVID19 Not Detected     Human Metapneumovirus Not Detected     Human Rhinovirus/Enterovirus Not Detected     Influenza A PCR Not Detected     Influenza B PCR Not Detected     Parainfluenza Virus 1 Not Detected     Parainfluenza Virus 2 Not Detected     Parainfluenza Virus 3 Not Detected     Parainfluenza Virus 4 Not Detected     RSV, PCR Not Detected     Bordetella pertussis pcr Not Detected     Bordetella parapertussis PCR Not Detected     Chlamydophila pneumoniae PCR Not Detected     Mycoplasma pneumo by PCR Not Detected    Narrative:      In the setting of a positive respiratory panel with a viral infection PLUS a negative procalcitonin without other underlying concern for bacterial infection, consider observing off antibiotics or discontinuation of antibiotics and continue supportive care. If the respiratory panel is positive for atypical bacterial infection (Bordetella pertussis, Chlamydophila pneumoniae, or Mycoplasma pneumoniae), consider antibiotic de-escalation to target atypical bacterial infection.             Radiology:  Bilateral lower extremity Dopplers negative for DVT.    3/25 chest x-ray reviewed by me with poor inspiratory effort.  Bibasilar consolidation  consistent with atelectasis versus infiltrate.  Also could be related to edema.    Assessment     #Fever  #Left wrist pain  #Left knee pain  #Intractable lower back pain  #CKD  #Recent cellulitis status post doxycycline  #Multiple antibiotic allergies     Difficult to say whether imaging is representative of pneumonia but the patient truly does have fever which could be related.  Procalcitonin is normal but elevated from prior.  Imaging with chest x-ray inconclusive and patient did have oxygen requirement that is now improved.  Will stop aztreonam. Empiric levofloxacin 750 mg daily for 7-day course seems very reasonable for pneumonia coverage.    Reviewed allergies with the patient today and most of her antibiotic allergies relate to thrush which would not be considered an allergy.  Does report history of airway edema related to penicillin when she was in college.  Does appear she is taking Keflex in the past and tolerated.    Agree with further imaging of the wrist and knee.  Orthopedics following.  ID will plan to sign off for now and only see again if imaging concerning for joint infection related to wrist and knee.    ------------------------------------------------------------------------------------------------  The above decisions regarding antimicrobials incorporates elements of engaging in complex medical decision-making associated with antimicrobial prescribing including considerations such as antimicrobial resistance patterns, emergence of new variants/strains, recent antibiotic exposure, interactions/complications from comorbidities including concurrent infections, public health considerations to minimize development of antimicrobial resistance, and emerging and re-emerging infections.

## 2025-03-26 NOTE — PROGRESS NOTES
[facility] Services: Aztreonam Consult    Pt Name: Pedrito Powell   : 1960  Weight: 115 kg (252 lb 13.9 oz)  Antibiotic: Aztreonam  Indication: Pneumonia    Relevant clinical data and objective history reviewed:    Past Medical History:   Diagnosis Date    Anemia     IRON INFUSION RECENTLY    Arthritis     Asthma     Chronic kidney disease     stage 3    Elevated cholesterol     GERD (gastroesophageal reflux disease)     High risk medication use 2018    History of carpal tunnel syndrome     Hyperlipidemia     Hypertension     Intractable vomiting with nausea 2018    Left shoulder pain     Limited mobility     Rheumatoid arthritis     Vertigo     Weakness     AT TIMES LEFT SHOULDER/LEFT ARM     Creatinine   Date Value Ref Range Status   2025 0.70 0.57 - 1.00 mg/dL Final   2025 0.79 0.57 - 1.00 mg/dL Final   2025 0.92 0.57 - 1.00 mg/dL Final     BUN   Date Value Ref Range Status   2025 12 8 - 23 mg/dL Final     Estimated Creatinine Clearance: 96.5 mL/min (by C-G formula based on SCr of 0.7 mg/dL).    Lab Results   Component Value Date    WBC 10.87 (H) 2025     Temp Readings from Last 3 Encounters:   25 (!) 100.6 °F (38.1 °C) (Oral)   25 99 °F (37.2 °C) (Oral)   03/15/25 96.9 °F (36.1 °C) (Oral)      Assessment/Plan    Ordered Aztreonam 2g IV q8h for a total of 7 days. Will monitor and adjust if culture data or pertinent lab values indicate this is best for the patient.     Thank you for this consult and please contact pharmacy with any questions or concerns.     Booker Gandara, [facility]  Clinical Pharmacist

## 2025-03-27 ENCOUNTER — APPOINTMENT (OUTPATIENT)
Dept: GENERAL RADIOLOGY | Facility: HOSPITAL | Age: 65
End: 2025-03-27
Payer: COMMERCIAL

## 2025-03-27 LAB
ANION GAP SERPL CALCULATED.3IONS-SCNC: 12.4 MMOL/L (ref 5–15)
BASOPHILS # BLD AUTO: 0.06 10*3/MM3 (ref 0–0.2)
BASOPHILS NFR BLD AUTO: 0.6 % (ref 0–1.5)
BUN SERPL-MCNC: 14 MG/DL (ref 8–23)
BUN/CREAT SERPL: 16.7 (ref 7–25)
CALCIUM SPEC-SCNC: 8.5 MG/DL (ref 8.6–10.5)
CHLORIDE SERPL-SCNC: 97 MMOL/L (ref 98–107)
CO2 SERPL-SCNC: 22.6 MMOL/L (ref 22–29)
CREAT SERPL-MCNC: 0.84 MG/DL (ref 0.57–1)
CRYSTALS FLD MICRO: NORMAL
D-LACTATE SERPL-SCNC: 0.9 MMOL/L (ref 0.5–2)
DEPRECATED RDW RBC AUTO: 40.3 FL (ref 37–54)
EGFRCR SERPLBLD CKD-EPI 2021: 77.7 ML/MIN/1.73
EOSINOPHIL # BLD AUTO: 0.43 10*3/MM3 (ref 0–0.4)
EOSINOPHIL NFR BLD AUTO: 4.5 % (ref 0.3–6.2)
ERYTHROCYTE [DISTWIDTH] IN BLOOD BY AUTOMATED COUNT: 12.5 % (ref 12.3–15.4)
GLUCOSE SERPL-MCNC: 96 MG/DL (ref 65–99)
HCT VFR BLD AUTO: 29.6 % (ref 34–46.6)
HGB BLD-MCNC: 10 G/DL (ref 12–15.9)
IMM GRANULOCYTES # BLD AUTO: 0.03 10*3/MM3 (ref 0–0.05)
IMM GRANULOCYTES NFR BLD AUTO: 0.3 % (ref 0–0.5)
LYMPHOCYTES # BLD AUTO: 1.06 10*3/MM3 (ref 0.7–3.1)
LYMPHOCYTES NFR BLD AUTO: 11.2 % (ref 19.6–45.3)
MCH RBC QN AUTO: 30.1 PG (ref 26.6–33)
MCHC RBC AUTO-ENTMCNC: 33.8 G/DL (ref 31.5–35.7)
MCV RBC AUTO: 89.2 FL (ref 79–97)
MONOCYTES # BLD AUTO: 1.14 10*3/MM3 (ref 0.1–0.9)
MONOCYTES NFR BLD AUTO: 12 % (ref 5–12)
NEUTROPHILS NFR BLD AUTO: 6.77 10*3/MM3 (ref 1.7–7)
NEUTROPHILS NFR BLD AUTO: 71.4 % (ref 42.7–76)
NRBC BLD AUTO-RTO: 0 /100 WBC (ref 0–0.2)
PLATELET # BLD AUTO: 238 10*3/MM3 (ref 140–450)
PMV BLD AUTO: 9.6 FL (ref 6–12)
POTASSIUM SERPL-SCNC: 4.2 MMOL/L (ref 3.5–5.2)
RBC # BLD AUTO: 3.32 10*6/MM3 (ref 3.77–5.28)
SODIUM SERPL-SCNC: 132 MMOL/L (ref 136–145)
WBC NRBC COR # BLD AUTO: 9.49 10*3/MM3 (ref 3.4–10.8)

## 2025-03-27 PROCEDURE — 97530 THERAPEUTIC ACTIVITIES: CPT

## 2025-03-27 PROCEDURE — 85025 COMPLETE CBC W/AUTO DIFF WBC: CPT | Performed by: STUDENT IN AN ORGANIZED HEALTH CARE EDUCATION/TRAINING PROGRAM

## 2025-03-27 PROCEDURE — 83605 ASSAY OF LACTIC ACID: CPT | Performed by: STUDENT IN AN ORGANIZED HEALTH CARE EDUCATION/TRAINING PROGRAM

## 2025-03-27 PROCEDURE — 77002 NEEDLE LOCALIZATION BY XRAY: CPT

## 2025-03-27 PROCEDURE — 87015 SPECIMEN INFECT AGNT CONCNTJ: CPT

## 2025-03-27 PROCEDURE — 89060 EXAM SYNOVIAL FLUID CRYSTALS: CPT

## 2025-03-27 PROCEDURE — 94799 UNLISTED PULMONARY SVC/PX: CPT

## 2025-03-27 PROCEDURE — 25010000002 LIDOCAINE 1 % SOLUTION: Performed by: STUDENT IN AN ORGANIZED HEALTH CARE EDUCATION/TRAINING PROGRAM

## 2025-03-27 PROCEDURE — 87075 CULTR BACTERIA EXCEPT BLOOD: CPT

## 2025-03-27 PROCEDURE — 25010000002 HYDROMORPHONE PER 4 MG: Performed by: INTERNAL MEDICINE

## 2025-03-27 PROCEDURE — 87070 CULTURE OTHR SPECIMN AEROBIC: CPT

## 2025-03-27 PROCEDURE — 87205 SMEAR GRAM STAIN: CPT

## 2025-03-27 PROCEDURE — 0S9D3ZX DRAINAGE OF LEFT KNEE JOINT, PERCUTANEOUS APPROACH, DIAGNOSTIC: ICD-10-PCS | Performed by: RADIOLOGY

## 2025-03-27 PROCEDURE — 25010000002 LEVOFLOXACIN PER 250 MG: Performed by: STUDENT IN AN ORGANIZED HEALTH CARE EDUCATION/TRAINING PROGRAM

## 2025-03-27 PROCEDURE — 94761 N-INVAS EAR/PLS OXIMETRY MLT: CPT

## 2025-03-27 PROCEDURE — 80048 BASIC METABOLIC PNL TOTAL CA: CPT | Performed by: STUDENT IN AN ORGANIZED HEALTH CARE EDUCATION/TRAINING PROGRAM

## 2025-03-27 RX ORDER — BISACODYL 5 MG/1
5 TABLET, DELAYED RELEASE ORAL DAILY PRN
Status: DISCONTINUED | OUTPATIENT
Start: 2025-03-27 | End: 2025-04-02 | Stop reason: HOSPADM

## 2025-03-27 RX ORDER — POLYETHYLENE GLYCOL 3350 17 G/17G
17 POWDER, FOR SOLUTION ORAL 2 TIMES DAILY
Status: DISCONTINUED | OUTPATIENT
Start: 2025-03-27 | End: 2025-04-02 | Stop reason: HOSPADM

## 2025-03-27 RX ORDER — LIDOCAINE HYDROCHLORIDE 10 MG/ML
2 INJECTION, SOLUTION INFILTRATION; PERINEURAL ONCE
Status: COMPLETED | OUTPATIENT
Start: 2025-03-27 | End: 2025-03-27

## 2025-03-27 RX ORDER — BISACODYL 10 MG
10 SUPPOSITORY, RECTAL RECTAL DAILY PRN
Status: DISCONTINUED | OUTPATIENT
Start: 2025-03-27 | End: 2025-04-02 | Stop reason: HOSPADM

## 2025-03-27 RX ORDER — LORAZEPAM 0.5 MG/1
0.5 TABLET ORAL EVERY 6 HOURS PRN
Status: DISCONTINUED | OUTPATIENT
Start: 2025-03-27 | End: 2025-04-02 | Stop reason: HOSPADM

## 2025-03-27 RX ORDER — AMOXICILLIN 250 MG
2 CAPSULE ORAL 2 TIMES DAILY
Status: DISCONTINUED | OUTPATIENT
Start: 2025-03-27 | End: 2025-04-02 | Stop reason: HOSPADM

## 2025-03-27 RX ADMIN — METHOCARBAMOL TABLETS 500 MG: 500 TABLET, COATED ORAL at 06:17

## 2025-03-27 RX ADMIN — FAMOTIDINE 20 MG: 20 TABLET, FILM COATED ORAL at 08:25

## 2025-03-27 RX ADMIN — HYDROCODONE BITARTRATE AND ACETAMINOPHEN 1 TABLET: 7.5; 325 TABLET ORAL at 06:19

## 2025-03-27 RX ADMIN — IPRATROPIUM BROMIDE 1 SPRAY: 21 SPRAY, METERED NASAL at 20:32

## 2025-03-27 RX ADMIN — HYDROMORPHONE HYDROCHLORIDE 0.5 MG: 1 INJECTION, SOLUTION INTRAMUSCULAR; INTRAVENOUS; SUBCUTANEOUS at 23:37

## 2025-03-27 RX ADMIN — LOSARTAN POTASSIUM 100 MG: 100 TABLET, FILM COATED ORAL at 08:26

## 2025-03-27 RX ADMIN — HYDROCODONE BITARTRATE AND ACETAMINOPHEN 1 TABLET: 7.5; 325 TABLET ORAL at 20:38

## 2025-03-27 RX ADMIN — LEVOFLOXACIN 750 MG: 5 INJECTION, SOLUTION INTRAVENOUS at 12:30

## 2025-03-27 RX ADMIN — IPRATROPIUM BROMIDE 1 SPRAY: 21 SPRAY, METERED NASAL at 15:54

## 2025-03-27 RX ADMIN — MECLIZINE HYDROCHLORIDE 25 MG: 25 TABLET ORAL at 21:26

## 2025-03-27 RX ADMIN — FAMOTIDINE 20 MG: 20 TABLET, FILM COATED ORAL at 17:03

## 2025-03-27 RX ADMIN — HYDROMORPHONE HYDROCHLORIDE 0.5 MG: 1 INJECTION, SOLUTION INTRAMUSCULAR; INTRAVENOUS; SUBCUTANEOUS at 08:25

## 2025-03-27 RX ADMIN — SENNOSIDES AND DOCUSATE SODIUM 2 TABLET: 50; 8.6 TABLET ORAL at 14:56

## 2025-03-27 RX ADMIN — LIDOCAINE HYDROCHLORIDE 2 ML: 10 INJECTION, SOLUTION INFILTRATION; PERINEURAL at 09:17

## 2025-03-27 RX ADMIN — HYDROMORPHONE HYDROCHLORIDE 0.5 MG: 1 INJECTION, SOLUTION INTRAMUSCULAR; INTRAVENOUS; SUBCUTANEOUS at 15:53

## 2025-03-27 RX ADMIN — LIDOCAINE 3 PATCH: 4 PATCH TOPICAL at 08:26

## 2025-03-27 RX ADMIN — POLYETHYLENE GLYCOL 3350 17 G: 17 POWDER, FOR SOLUTION ORAL at 20:38

## 2025-03-27 RX ADMIN — Medication 10 ML: at 20:31

## 2025-03-27 RX ADMIN — BUDESONIDE 0.5 MG: 0.5 INHALANT RESPIRATORY (INHALATION) at 22:21

## 2025-03-27 RX ADMIN — Medication 2000 UNITS: at 08:26

## 2025-03-27 RX ADMIN — VORTIOXETINE 10 MG: 5 TABLET, FILM COATED ORAL at 08:26

## 2025-03-27 RX ADMIN — HYDROCODONE BITARTRATE AND ACETAMINOPHEN 1 TABLET: 7.5; 325 TABLET ORAL at 11:41

## 2025-03-27 RX ADMIN — HYDROMORPHONE HYDROCHLORIDE 0.5 MG: 1 INJECTION, SOLUTION INTRAMUSCULAR; INTRAVENOUS; SUBCUTANEOUS at 12:29

## 2025-03-27 RX ADMIN — Medication 10 ML: at 08:26

## 2025-03-27 RX ADMIN — METHOCARBAMOL TABLETS 500 MG: 500 TABLET, COATED ORAL at 21:27

## 2025-03-27 RX ADMIN — ATORVASTATIN CALCIUM 80 MG: 20 TABLET, FILM COATED ORAL at 08:25

## 2025-03-27 RX ADMIN — HYDROCODONE BITARTRATE AND ACETAMINOPHEN 1 TABLET: 7.5; 325 TABLET ORAL at 17:02

## 2025-03-27 RX ADMIN — METHOCARBAMOL TABLETS 500 MG: 500 TABLET, COATED ORAL at 13:21

## 2025-03-27 RX ADMIN — POLYETHYLENE GLYCOL 3350 17 G: 17 POWDER, FOR SOLUTION ORAL at 14:56

## 2025-03-27 RX ADMIN — HYDROMORPHONE HYDROCHLORIDE 0.5 MG: 1 INJECTION, SOLUTION INTRAMUSCULAR; INTRAVENOUS; SUBCUTANEOUS at 04:49

## 2025-03-27 RX ADMIN — AZELASTINE HYDROCHLORIDE 1 SPRAY: 137 SPRAY, METERED NASAL at 08:26

## 2025-03-27 RX ADMIN — SENNOSIDES AND DOCUSATE SODIUM 2 TABLET: 50; 8.6 TABLET ORAL at 20:38

## 2025-03-27 RX ADMIN — HYDROMORPHONE HYDROCHLORIDE 0.5 MG: 1 INJECTION, SOLUTION INTRAMUSCULAR; INTRAVENOUS; SUBCUTANEOUS at 18:20

## 2025-03-27 RX ADMIN — IPRATROPIUM BROMIDE 1 SPRAY: 21 SPRAY, METERED NASAL at 08:26

## 2025-03-27 RX ADMIN — HYDROCODONE BITARTRATE AND ACETAMINOPHEN 1 TABLET: 7.5; 325 TABLET ORAL at 02:33

## 2025-03-27 NOTE — PLAN OF CARE
Goal Outcome Evaluation:  Plan of Care Reviewed With: patient        Progress: improving  Outcome Evaluation: Pt seen for PT tx today. Pt continues to have pain of left LE and Left wrist but reports pain is better. Pt able to complete supine to sit with Frandy needing increased time to complete. Once sitting EOB, pt able to sit with SBA. Pt completed 2 stands from a slightly elevated bed height.  Pt had difficulty with initial stand d/t pain and unable to lift bottom off bed. Pt improved with 2nd stand able to stand fully upright for about 15 seconds. Pt was unable to take a step d/t increased pain with weight bearing on left LE. Pt returned to supine with ModA needing assist with jessica LEs. Pt will need rehab at d/c to improve strength, balance and overall functional mobility. Will continue to follow and progress as able.

## 2025-03-27 NOTE — THERAPY TREATMENT NOTE
Patient Name: Pedrito Powell  : 1960    MRN: [id number]                              Today's Date: 3/27/2025       Admit Date: 3/23/2025    Visit Dx:     ICD-10-CM ICD-9-CM   1. Intractable low back pain  M54.59 724.2   2. Acute febrile illness  R50.9 780.60   3. Bilateral lower extremity edema  R60.0 782.3     Patient Active Problem List   Diagnosis    Hx of anaphylaxis    Primary osteoarthritis of knee    High risk medication use    HTN (hypertension)    Hyperlipidemia    Vertigo    Infected blister of left foot    Ganglion of right wrist    Vomiting and diarrhea    Intractable vomiting with nausea    Fatigue    Acute gastritis without hemorrhage    Anemia    Elevated serum creatinine    Fungal rash of torso    Cellulitis of abdominal wall    Bronchitis    Arthritis of shoulder    Chronic renal failure in pediatric patient, stage 3 (moderate)    H/O shoulder surgery    Anxiety    Depression    Iron deficiency    Dizziness    Hypoxia    COVID-19 virus infection    Acute low back pain    Cellulitis of leg    Intractable low back pain    Acute back pain    Spinal stenosis, lumbar region, without neurogenic claudication    Spondylolisthesis, lumbar region    Facet arthritis of lumbar region    GERD (gastroesophageal reflux disease)     Past Medical History:   Diagnosis Date    Anemia     IRON INFUSION RECENTLY    Arthritis     Asthma     Chronic kidney disease     stage 3    Elevated cholesterol     GERD (gastroesophageal reflux disease)     High risk medication use 2018    History of carpal tunnel syndrome     Hyperlipidemia     Hypertension     Intractable vomiting with nausea 2018    Left shoulder pain     Limited mobility     Rheumatoid arthritis     Vertigo     Weakness     AT TIMES LEFT SHOULDER/LEFT ARM     Past Surgical History:   Procedure Laterality Date    CARPAL TUNNEL RELEASE Left 10/02/2023     SECTION  , ,     COLONOSCOPY N/A 12/10/2018    normal ileum, IH,  hyperplastic polyp, otherwise normal exam    ENDOSCOPY N/A 12/10/2018    no gross lesions in esophagus or examined duodenum, erythematous mucosa in stomach, biopsies benign    HYSTERECTOMY  2000    OOPHORECTOMY Bilateral     SHOULDER MANIPULATION Left     TOTAL SHOULDER ARTHROPLASTY Left 05/13/2021    Procedure: REVERSE TOTAL SHOULDER ARTHROPLASTY,  BICEP TENDONDESIS;  Surgeon: Bethel Devi MD;  Location: [facility] OR;  Service: Orthopedics;  Laterality: Left;    TRIGGER FINGER RELEASE Left     3 fingers      General Information       Row Name 03/27/25 1511          Physical Therapy Time and Intention    Document Type therapy note (daily note)  -     Mode of Treatment physical therapy  -EB       Row Name 03/27/25 1511          General Information    Patient Profile Reviewed yes  -EB     Existing Precautions/Restrictions fall  -       Row Name 03/27/25 1511          Safety Issues/Impairments Affecting Functional Mobility    Impairments Affecting Function (Mobility) balance;endurance/activity tolerance;strength;pain  -EB               User Key  (r) = Recorded By, (t) = Taken By, (c) = Cosigned By      Initials Name Provider Type    EB Sophie Hall PTA Physical Therapist Assistant                   Mobility       Row Name 03/27/25 1512          Bed Mobility    Supine-Sit Hanson (Bed Mobility) minimum assist (75% patient effort);verbal cues  -EB     Sit-Supine [place] (Bed Mobility) moderate assist (50% patient effort)  -EB     Assistive Device (Bed Mobility) bed rails;head of bed elevated  -EB     Comment, (Bed Mobility) assist with upper trunk when sitting up then jessica LEs into bed.  -       Row Name 03/27/25 1512          Sit-Stand Transfer    Sit-Stand Hanson (Transfers) moderate assist (50% patient effort);verbal cues;nonverbal cues (demo/gesture)  -EB     Assistive Device (Sit-Stand Transfers) walker, front-wheeled  -EB     Comment, (Sit-Stand Transfer) 2 stands. unable to lift  bottom off bed, 2nd stand, pt able to stand fully upright for 15 seconds, unable to take steps.  -EB               User Key  (r) = Recorded By, (t) = Taken By, (c) = Cosigned By      Initials Name Provider Type    Sophie Hermosillo PTA Physical Therapist Assistant                   Obj/Interventions       Row Name 03/27/25 1514          Balance    Balance Assessment sitting static balance;standing static balance;standing dynamic balance  -EB     Static Sitting Balance contact guard  -EB     Position, Sitting Balance sitting edge of bed  -EB     Static Standing Balance minimal assist;verbal cues  -EB     Dynamic Standing Balance minimal assist;verbal cues  -EB     Position/Device Used, Standing Balance supported;walker, front-wheeled  -EB     Comment, Balance unable to take steps d/t pain.  -EB               User Key  (r) = Recorded By, (t) = Taken By, (c) = Cosigned By      Initials Name Provider Type    Sophie Hermosillo PTA Physical Therapist Assistant                   Goals/Plan    No documentation.                  Clinical Impression       Row Name 03/27/25 1515          Pain    Pain Location knee;wrist  -     Pain Side/Orientation left  -EB       Row Name 03/27/25 1515          Plan of Care Review    Plan of Care Reviewed With patient  -EB     Progress improving  -     Outcome Evaluation Pt seen for PT tx today. Pt continues to have pain of left LE and Left wrist but reports pain is better. Pt able to complete supine to sit with Frandy needing increased time to complete. Once sitting EOB, pt able to sit with SBA. Pt completed 2 stands from a slightly elevated bed height.  Pt had difficulty with initial stand d/t pain and unable to lift bottom off bed. Pt improved with 2nd stand able to stand fully upright for about 15 seconds. Pt was unable to take a step d/t increased pain with weight bearing on left LE. Pt returned to supine with ModA needing assist with jessica LEs. Pt will need rehab at d/c to improve  strength, balance and overall functional mobility. Will continue to follow and progress as able.  -       Row Name 03/27/25 1515          Therapy Assessment/Plan (PT)    Therapy Frequency (PT) 5 times/wk  -       Row Name 03/27/25 1515          Positioning and Restraints    Pre-Treatment Position in bed  -EB     Post Treatment Position bed  -EB     In Bed supine;call light within reach;encouraged to call for assist;exit alarm on  -EB               User Key  (r) = Recorded By, (t) = Taken By, (c) = Cosigned By      Initials Name Provider Type    Sophie Hermosillo PTA Physical Therapist Assistant                   Outcome Measures       Row Name 03/27/25 1523 03/27/25 0825       How much help from another person do you currently need...    Turning from your back to your side while in flat bed without using bedrails? 3  -EB 2  -KM    Moving from lying on back to sitting on the side of a flat bed without bedrails? 3  -EB 2  -KM    Moving to and from a bed to a chair (including a wheelchair)? 1  -EB 1  -KM    Standing up from a chair using your arms (e.g., wheelchair, bedside chair)? 2  -EB 1  -KM    Climbing 3-5 steps with a railing? 1  -EB 1  -KM    To walk in hospital room? 1  -EB 1  -KM    AM-PAC 6 Clicks Score (PT) 11  -EB 8  -KM    Highest Level of Mobility Goal 4 --> Transfer to chair/commode  -EB 3 --> Sit at edge of bed  -KM              User Key  (r) = Recorded By, (t) = Taken By, (c) = Cosigned By      Initials Name Provider Type    Sophie Hermosillo PTA Physical Therapist Assistant     Mae Babin RN Registered Nurse                                 Physical Therapy Education       Title: PT OT SLP Therapies (In Progress)       Topic: Physical Therapy (In Progress)       Point: Mobility training (Done)       Learning Progress Summary            Patient Acceptance, E,D, VU,NR by GEORGES at 3/27/2025 1523    Acceptance, E,TB,D, VU,NR by  at 3/25/2025 1547                      Point: Home exercise program  (Not Started)       Learner Progress:  Not documented in this visit.              Point: Body mechanics (Done)       Learning Progress Summary            Patient Acceptance, E,D, VU,NR by  at 3/27/2025 1523    Acceptance, E,TB,D, VU,NR by  at 3/25/2025 1547                      Point: Precautions (Done)       Learning Progress Summary            Patient Acceptance, E,TB,D, VU,NR by  at 3/25/2025 1547                                      User Key       Initials Effective Dates Name Provider Type Discipline     02/14/23 -  Sophie Hall PTA Physical Therapist Assistant PT     04/08/22 -  Rosa Szymanski PT Physical Therapist PT                  PT Recommendation and Plan     Progress: improving  Outcome Evaluation: Pt seen for PT tx today. Pt continues to have pain of left LE and Left wrist but reports pain is better. Pt able to complete supine to sit with Frandy needing increased time to complete. Once sitting EOB, pt able to sit with SBA. Pt completed 2 stands from a slightly elevated bed height.  Pt had difficulty with initial stand d/t pain and unable to lift bottom off bed. Pt improved with 2nd stand able to stand fully upright for about 15 seconds. Pt was unable to take a step d/t increased pain with weight bearing on left LE. Pt returned to supine with ModA needing assist with jessica LEs. Pt will need rehab at d/c to improve strength, balance and overall functional mobility. Will continue to follow and progress as able.     Time Calculation:         PT Charges       Row Name 03/27/25 1511             Time Calculation    Start Time 1430  -EB      Stop Time 1505  -EB      Time Calculation (min) 35 min  -      PT Received On 03/27/25  -      PT - Next Appointment 03/28/25  -         Time Calculation- PT    Total Timed Code Minutes- PT 35 minute(s)  -                User Key  (r) = Recorded By, (t) = Taken By, (c) = Cosigned By      Initials Name Provider Type     Sophie Hall PTA Physical  Therapist Assistant                  Therapy Charges for Today       Code Description Service Date Service Provider Modifiers Qty    [id number]  PT THERAPEUTIC ACT EA 15 MIN 3/27/2025 Sophie Hall, PTA GP 2            PT G-Codes  Outcome Measure Options: AM-PAC 6 Clicks Daily Activity (OT), Modified [place]  AM-PAC 6 Clicks Score (PT): 11  AM-PAC 6 Clicks Score (OT): 12  Modified [place] Scale: 4 - Moderately severe disability.  Unable to walk without assistance, and unable to attend to own bodily needs without assistance.       Sophie Hall, JULIETH  3/27/2025

## 2025-03-27 NOTE — PROGRESS NOTES
LOS: 1 day     Subjective :   The patient is still experiencing pain in her left knee and left wrist.  She states that she was unable to get up and walk with physical therapy due to the significance of her pain in her left knee along with her wrist.  She states that she is unable to put weight on her left wrist when using a walker.  She states that some of the swelling in her wrist has gone down since yesterday, but it is still painful.  She states that her pain is overall being managed well with medications.    Objective :    Vital signs in last 24 hours:  Vitals:    03/26/25 1602 03/26/25 2041 03/26/25 2334 03/27/25 0420   BP: 123/84 136/76 125/85 123/71   BP Location: Right arm Left arm Left arm Left arm   Patient Position: Lying Lying Lying Lying   Pulse: 97 93 89 89   Resp: 16 16 18 16   Temp: 99.3 °F (37.4 °C) 99.8 °F (37.7 °C) 99.1 °F (37.3 °C) 99.8 °F (37.7 °C)   TempSrc: Oral Oral Oral Oral   SpO2: 93% 92% 91% 92%   Weight:       Height:           PHYSICAL EXAM:  Patient is calm, in no acute distress, awake and oriented x 3.    Examination of the left wrist reveals skin is intact.  Moderate edema of the wrist and hand is noted.  No ecchymosis, erythema, or warmth noted.  Tenderness to palpation at the distal radius.  Pain noted with wrist range of motion.  Hand is warm and well-perfused.  Neurovascularly intact distally.    Examination of the left knee reveals skin is intact with mild effusion and warmth present.  No erythema or ecchymosis noted.  Very limited knee range of motion due to pain.  No exquisite tenderness to palpation today.  Foot is warm and well-perfused.  Neurovascularly intact distally.    LABS:  Results from last 7 days   Lab Units 03/27/25  0320   WBC 10*3/mm3 9.49   HEMOGLOBIN g/dL 10.0*   HEMATOCRIT % 29.6*   PLATELETS 10*3/mm3 238     Results from last 7 days   Lab Units 03/27/25  0320   SODIUM mmol/L 132*   POTASSIUM mmol/L 4.2   CHLORIDE mmol/L 97*   CO2 mmol/L 22.6   BUN  mg/dL 14   CREATININE mg/dL 0.84   GLUCOSE mg/dL 96   CALCIUM mg/dL 8.5*             ASSESSMENT:  Left knee pain with moderate effusion     Left wrist pain with edema    Plan:  CT scan of the left wrist was reviewed and there is no acute injury to the left wrist.  Discussed with the patient that we could possibly provide a wrist brace for her outpatient to help prevent severe arthritic flares.  CT scan of the left knee was reviewed and there is a moderate effusion present along with moderate to severe osteoarthritis.  Recommend proceeding with a left knee joint aspiration.  The patient has been spiking fevers, so would like to rule out a possible septic joint versus gout versus arthritic flare.  Joint fluid labs have been ordered for this aspiration as well.  Will continue to follow and monitor results after knee aspiration to continue with management of her left knee pain.  The patient understands and agrees to this plan.  All questions answered.    Lindsay Saha PA-C    Date: 3/27/2025  Time: 07:30 EDT    Lindsay Saha PA-C

## 2025-03-27 NOTE — PROGRESS NOTES
Continued Stay Note  [facility]     Patient Name: Pedrito Powell  MRN: [id number]  Today's Date: 3/27/2025    Admit Date: 3/23/2025    Plan: [facility], pending precert and bed availability   Discharge Plan       Row Name 03/27/25 1711       Plan    Plan [facility], pending precert and bed availability    Patient/Family in Agreement with Plan yes    Plan Comments Spoke with patient and daughter Yareli at bedside, verified correct information on facesheet and explained the role of CCP. Patient states she lives in a multi level home with 3 steps to enter with her daughter. Prior to this admission patient was using a cane and still driving/independent. Patient has never had home health or been to SNF in the past. Discussed options, patient would like referrals sent to [facility] and [facility]. Per [facility]ie[facility] approves pending precert and states they will build case and start precert 3/28. Plan will be to d/c to [facility], pending precert. CCP to f/u 3/28.                   Discharge Codes    No documentation.                       Mariel Ayoub RN

## 2025-03-27 NOTE — PLAN OF CARE
Goal Outcome Evaluation:  Plan of Care Reviewed With: patient        Progress: no change  Outcome Evaluation: Patient A  & O. Makes needs known. Family at bedside. Continues with atb therapy and PRN pain meds given as ordered. CT guided aspiration of L knee earlier today. Plan for acute rehab at d/c. No s/s of distress noted.

## 2025-03-27 NOTE — PLAN OF CARE
Goal Outcome Evaluation:  Plan of Care Reviewed With: patient        Progress: improving  Outcome Evaluation: Patient here with Intractable Low Back Pain. VSS. PO/IV pain medication helping with pain. VSS. Up with assistance. Voiding per purewick. Swelling to left hand and left knee. CT of left vwrist and left knee completed. Education provided on pain control  and repositioning . Patient verbalized understanding.

## 2025-03-27 NOTE — CONSULTS
"IDENTIFYING INFORMATION: The patient is a 64-year-old white female admitted with sudden onset of inability to ambulate.  She is seen related to increased anxiety.    CHIEF COMPLAINT: None given    INFORMANT: Patient and chart    RELIABILITY: Good    HISTORY OF PRESENT ILLNESS: The patient is a 64-year-old white female admitted secondary to intractable low back pain and inability to ambulate.  The patient had made a statement of wishing to \"pull the plug\" related to her uncertainty over her current medical condition but today expresses contrition over that statement and denies any suicidal ideations.  The patient is currently prescribed trintellix by her primary care physician and feels as though it has been effective for her.  The patient complains of some lack of appetite and poor sleep.  She denies use of any psychoactive substances.  She denies current suicidal or homicidal ideation or psychotic features.    PAST PSYCHIATRIC HISTORY: As noted above, the patient has been on trintellix for the past 3 to 4 years.    PAST MEDICAL HISTORY: Significant for anemia, arthritis, asthma, chronic renal disease, elevated cholesterol, GERD, carpal tunnel syndrome, hyperlipidemia, hypertension, left shoulder pain, vertigo, weakness    MEDICATIONS:   Current Facility-Administered Medications   Medication Dose Route Frequency Provider Last Rate Last Admin    acetaminophen (TYLENOL) tablet 650 mg  650 mg Oral Q4H PRN Hima Trejo MD   650 mg at 03/25/25 1751    Or    acetaminophen (TYLENOL) 160 MG/5ML oral solution 650 mg  650 mg Oral Q4H PRN Hima Trejo MD        Or    acetaminophen (TYLENOL) suppository 650 mg  650 mg Rectal Q4H PRN Hima Trejo MD        albuterol sulfate HFA (PROVENTIL HFA;VENTOLIN HFA;PROAIR HFA) inhaler 2 puff  2 puff Inhalation Q4H PRN Hima Trejo MD        amitriptyline (ELAVIL) tablet 25 mg  25 mg Oral Nightly PRN Hima Trejo MD        atorvastatin (LIPITOR) tablet 80 mg  80 mg Oral Daily Neil, " MD Hima   80 mg at 03/27/25 0825    azelastine (ASTELIN) nasal spray 1 spray  1 spray Each Nare Daily Hima Trejo MD   1 spray at 03/27/25 0826    benzonatate (TESSALON) capsule 100 mg  100 mg Oral TID PRN Hima Trejo MD        budesonide (PULMICORT) nebulizer solution 0.5 mg  0.5 mg Nebulization BID - RT Hima Trejo MD   0.5 mg at 03/26/25 2114    Calcium Replacement - Follow Nurse / BPA Driven Protocol   Not Applicable PRN Hima Trejo MD        Calcium Replacement - Follow Nurse / BPA Driven Protocol   Not Applicable PRN Yobani Akbar DO        cholecalciferol (VITAMIN D3) tablet 2,000 Units  2,000 Units Oral Daily Hima Trejo MD   2,000 Units at 03/27/25 0826    Diclofenac Sodium (VOLTAREN) 1 % gel 4 g  4 g Topical 4x Daily PRN Hima Trejo MD        famotidine (PEPCID) tablet 20 mg  20 mg Oral BID AC Hima Trejo MD   20 mg at 03/27/25 0825    HYDROcodone-acetaminophen (NORCO) 7.5-325 MG per tablet 1 tablet  1 tablet Oral Q4H PRN Yobani Akbar DO   1 tablet at 03/27/25 1141    HYDROmorphone (DILAUDID) injection 0.5 mg  0.5 mg Intravenous Q2H PRN Hima Trejo MD   0.5 mg at 03/27/25 1229    ipratropium (ATROVENT) nasal spray 0.03%  1 spray Each Nare TID Hima Trejo MD   1 spray at 03/27/25 0826    levoFLOXacin (LEVAQUIN) 750 mg/150 mL D5W (premix) (LEVAQUIN) 750 mg  750 mg Intravenous Q24H Yobani Akbar  mL/hr at 03/27/25 1230 750 mg at 03/27/25 1230    Lidocaine 4 % 3 patch  3 patch Transdermal Q24H Vivian Montero APRN   3 patch at 03/27/25 0826    loperamide (IMODIUM) capsule 2 mg  2 mg Oral Q2H PRN Hima Trejo MD        LORazepam (ATIVAN) tablet 0.5 mg  0.5 mg Oral Q6H PRN Cedric Torres III, MD        losartan (COZAAR) tablet 100 mg  100 mg Oral Q24H Hima Trejo MD   100 mg at 03/27/25 0826    Magnesium Standard Dose Replacement - Follow Nurse / BPA Driven Protocol   Not Applicable PRN Hima Trejo MD        Magnesium Standard Dose Replacement - Follow Nurse / BPA  Driven Protocol   Not Applicable PRN Yobani Akbar DO        meclizine (ANTIVERT) tablet 25 mg  25 mg Oral TID PRN Hima Trejo MD        methocarbamol (ROBAXIN) tablet 500 mg  500 mg Oral Q8H Vivian Montero APRN   500 mg at 03/27/25 1321    ondansetron (ZOFRAN) injection 4 mg  4 mg Intravenous Q6H PRN Hima Trejo MD   4 mg at 03/25/25 1320    Phosphorus Replacement - Follow Nurse / BPA Driven Protocol   Not Applicable PRN Hima Trejo MD        Phosphorus Replacement - Follow Nurse / BPA Driven Protocol   Not Applicable ANJUN Yobani Akbar DO        Potassium Replacement - Follow Nurse / BPA Driven Protocol   Not Applicable Hima Porras MD        Potassium Replacement - Follow Nurse / BPA Driven Protocol   Not Applicable PRN Yobani Akbar DO        sodium chloride 0.9 % flush 10 mL  10 mL Intravenous Q12H Hima Trejo MD   10 mL at 03/27/25 0826    sodium chloride 0.9 % flush 10 mL  10 mL Intravenous PRN Hima Trejo MD        sodium chloride 0.9 % infusion 40 mL  40 mL Intravenous PRN Hima Trejo MD        traMADol (ULTRAM) tablet 50 mg  50 mg Oral BID PRN Hima Trejo MD   50 mg at 03/24/25 1205    Vortioxetine HBr (TRINTELLIX) tablet 10 mg  10 mg Oral Daily With Breakfast Hima Trejo MD   10 mg at 03/27/25 0826     Facility-Administered Medications Ordered in Other Encounters   Medication Dose Route Frequency Provider Last Rate Last Admin    Chlorhexidine Gluconate 2 % pads 1 each  1 each Apply externally Take As Directed Bethel Devi MD             ALLERGIES: The patient lists 26 medical allergies which can be obtained from the chart    FAMILY HISTORY: Noncontributory    SOCIAL HISTORY: No reported use of alcohol tobacco or street drugs    MENTAL STATUS EXAM: The patient is morbidly obese white female appearing her stated age.  She has no apparent physical distress at the time of examination.  She is awake alert and oriented ulcers.  Her mood is euthymic her affect full range.  Speech is  generally relevant and coherent.  There are no deficits memory or cognition noted.  Intelligence is judged to be in the average range based on fund of knowledge, the patient is cooperative throughout the interview.  She denies current suicidal or homicidal ideation or psychotic features.  Judgement and insight are intact.    ASSETS/LIABILITIES: Motivation for change/health issues    DIAGNOSTIC IMPRESSION: Adjustment disorder with mixed emotional features, depressive disorder unspecified by history, medical problems as noted previously    PLAN: I would recommend continuation of the patient's currently prescribed Trintellix and will add conservatively dosed as needed lorazepam for any breakthrough anxiety during the patient's hospital stay.  I will continue to follow with you.

## 2025-03-27 NOTE — PROGRESS NOTES
Name: Pedrito Powell ADMIT: 3/23/2025   : 1960  PCP: Teo Fraser MD    MRN: [id number] LOS: 1 days   AGE/SEX: 64 y.o. female  [facility]     Subjective   Subjective   Patient seen and examined.  Hospital day 4, resting in bed, still with left knee pain and left wrist swelling, but seemingly improved some today from prior.       Objective   Objective   Vital Signs  Temp:  [98.4 °F (36.9 °C)-100.2 °F (37.9 °C)] 98.4 °F (36.9 °C)  Heart Rate:  [85-97] 85  Resp:  [16-18] 16  BP: (120-169)/() 120/69  SpO2:  [91 %-93 %] 93 %  on   ;   Device (Oxygen Therapy): room air  Body mass index is 51.07 kg/m².  Physical Exam  Vitals and nursing note reviewed.   Constitutional:       General: She is not in acute distress.     Appearance: She is overweight.   Cardiovascular:      Rate and Rhythm: Normal rate.      Pulses: Normal pulses.      Heart sounds: Normal heart sounds.   Pulmonary:      Effort: Pulmonary effort is normal.      Breath sounds: No wheezing.   Abdominal:      General: Bowel sounds are normal.      Palpations: Abdomen is soft.      Tenderness: There is no abdominal tenderness.   Musculoskeletal:         General: Swelling (left wrist) and tenderness present.      Comments: Bandage on left knee from aspiration, still with some left knee tenderness and left wrist swelling, but has improved from prior.   Skin:     General: Skin is warm and dry.      Comments: Erythema of lower extremities   Neurological:      Mental Status: She is alert.       Results Review     I reviewed the patient's new clinical results.  Results from last 7 days   Lab Units 25  0320 25  0602 25  0546 25  0533   WBC 10*3/mm3 9.49 10.87* 10.88* 8.64   HEMOGLOBIN g/dL 10.0* 10.0* 9.5* 9.2*   PLATELETS 10*3/mm3 238 229 223 211     Results from last 7 days   Lab Units 25  0320 25  0602 25  0546 25  0533   SODIUM mmol/L 132* 132* 137 139   POTASSIUM mmol/L 4.2 3.9 3.9 3.8  "  CHLORIDE mmol/L 97* 97* 100 104   CO2 mmol/L 22.6 22.9 24.0 24.2   BUN mg/dL 14 12 11 9   CREATININE mg/dL 0.84 0.70 0.79 0.92   GLUCOSE mg/dL 96 124* 120* 95   EGFR mL/min/1.73 77.7 96.7 83.6 69.7     Results from last 7 days   Lab Units 03/24/25  0533 03/23/25  1325 03/21/25  0952   ALBUMIN g/dL 3.1* 4.0 3.7   BILIRUBIN mg/dL 0.8 0.7 0.5   ALK PHOS U/L 65 74 74   AST (SGOT) U/L 16 20 21   ALT (SGPT) U/L 10 15 17     Results from last 7 days   Lab Units 03/27/25  0320 03/26/25  0602 03/25/25  0546 03/24/25  0533 03/23/25  1325 03/23/25  1325 03/21/25  0952   CALCIUM mg/dL 8.5* 8.3* 8.2* 8.4*   < > 8.6 8.9   ALBUMIN g/dL  --   --   --  3.1*  --  4.0 3.7    < > = values in this interval not displayed.     Results from last 7 days   Lab Units 03/27/25  0320 03/26/25  0602 03/23/25  1507 03/23/25  1325   PROCALCITONIN ng/mL  --  0.21  --  0.03   LACTATE mmol/L 0.9  --  1.0  --      No results found for: \"HGBA1C\", \"POCGLU\"    CT Upper Extremity Left Without Contrast  Result Date: 3/26/2025  1. Diffuse soft tissue swelling about the wrist, particular around the radiocarpal joint and the proximal carpal row. Unclear if there is any fluid present. Would suggest evaluation with MRI 2. Curvilinear osseous fragment located along the volar aspect of the triquetrum with some cystic change of the triquetral body. This may be chronic and reflect degenerative change or an old injury, however could also potentially reflect an acute avulsion fracture in the appropriate clinical setting.   Radiation dose reduction techniques were utilized, including automated exposure control and exposure modulation based on body size.   This report was finalized on 3/26/2025 6:25 PM by Dr. Catalino Monae M.D on Workstation: [id number]      CT Lower Extremity Left Without Contrast  Result Date: 3/26/2025  Degenerative changes as described without evidence of acute fracture. There is a moderate knee joint effusion   Radiation dose reduction " techniques were utilized, including automated exposure control and exposure modulation based on body size.   This report was finalized on 3/26/2025 6:07 PM by Dr. Catalino Monae M.D on Workstation: [id number]      XR Chest 1 View  Result Date: 3/25/2025  Increased atelectasis or consolidation of both lung bases. Increased interstitial lung opacities and peribronchial cuffing may be infectious/inflammatory bronchitis or edema. Possible small right pleural effusion    This report was finalized on 3/25/2025 7:45 PM by Dr. Catalino Monae M.D on Workstation: [id number]      XR Knee 1 or 2 View Left  Result Date: 3/25/2025   Degenerative change and possible joint effusion, no definite acute bony abnormality.  This report was finalized on 3/25/2025 5:45 PM by Dr. Rosendo Donnelly M.D on Workstation: [id number]        I have personally reviewed all medications:  Scheduled Medications  atorvastatin, 80 mg, Oral, Daily  azelastine, 1 spray, Each Nare, Daily  budesonide, 0.5 mg, Nebulization, BID - RT  cholecalciferol, 2,000 Units, Oral, Daily  famotidine, 20 mg, Oral, BID AC  ipratropium, 1 spray, Each Nare, TID  levoFLOXacin, 750 mg, Intravenous, Q24H  Lidocaine, 3 patch, Transdermal, Q24H  losartan, 100 mg, Oral, Q24H  methocarbamol, 500 mg, Oral, Q8H  sodium chloride, 10 mL, Intravenous, Q12H  Vortioxetine HBr, 10 mg, Oral, Daily With Breakfast    Infusions     Diet  Diet: Cardiac; Healthy Heart (2-3 Na+); Fluid Consistency: Thin (IDDSI 0)    I have personally reviewed:  [x]  Laboratory   [x]  Microbiology   [x]  Radiology   []  EKG/Telemetry  []  Cardiology/Vascular   []  Pathology    []  Records       Assessment/Plan     Active Hospital Problems    Diagnosis  POA    **Intractable low back pain [M54.59]  Yes    Spinal stenosis, lumbar region, without neurogenic claudication [M48.061]  Unknown    Spondylolisthesis, lumbar region [M43.16]  Unknown    Facet arthritis of lumbar region [M47.816]  Unknown    GERD  (gastroesophageal reflux disease) [K21.9]  Yes    Acute low back pain [M54.50]  Unknown    Cellulitis of leg [L03.119]  Unknown    Acute back pain [M54.9]  Yes    Anxiety [F41.9]  Yes    Anemia [D64.9]  Yes    HTN (hypertension) [I10]  Yes      Resolved Hospital Problems   No resolved problems to display.       64 y.o. female admitted with Intractable low back pain.    Lower back pain  - Patient with worsening lower back pain with radiculopathy symptoms. MRI showing evidence of lumbar stenosis. ANDRES consulted, no acute intervention warranted, continue with medications for pain. Continue with therapy services and fall precautions. Oral pain medication adjusted on 03/25, but she is still requiring IV for breakthrough. Will likely plan to increase PO dose, but she is constipated, so need to work on having BM at this time.     Pain and swelling in left wrist  Pain and swelling in left knee  - X-rays obtained, imaging showing degenerative changes in knee with possible effusion. X-ray of wrist showed soft tissue swelling, old injury, some widening of the interval between the trapezium and the scaphoid is noted.   - Duplex LE US and LUE US negative for DVT. She has no history of gout, no renal impairment, do not have clear reason to suspect this.  - Orthopedic surgery consulted. She is s/p left knee joint aspiration, fluid studies sent. Will follow up results of this. Will continue to follow their plans/recommendations. Greatly appreciate their help.    Fever  Leukocytosis  Recently diagnosed cellulitis  Pneumonia  - Noted. Prior UA with + LE, 6-10 WBC, but she denies urinary symptoms to suggest UTI, urine culture no growth. Blood cultures no growth to date. She was continued on Doxycycline for cellulitis previously diagnosed. Her LE swelling, erythema that was noted previously has improved.   - CXR ordered, showing findings concerning for pneumonia. She has multiple medication allergies, started empiric treatment with  Levaquin on 03/26 and consulted ID.  - ID notes reviewed, recommended empiric Levaquin x 7 days and signed off, available as needed. WBC has improved, no further fevers since ~0500 on 03/26. Continue treatments as ordered and continue monitoring.  - Trend WBC with CBC.    Hyponatremia  - Noted on labs, felt to be hypovolemic, was given 500 cc IV fluids, values relatively unchanged on most recent labs. Given stability, will repeat labs in AM. If worsening, will pursue further workup.     Hypertension  - Blood pressure appears stable and acceptable acutely at this time. No indications to warrant acute changes/intervention at this time.  - Continue current medications as prescribed. Trend BP to guide ongoing management decisions.    Anemia  - Hemoglobin low on most recent labs. No evidence of overt blood loss. No indications for acute intervention at this time.    - Order repeat CBC in AM for reassessment. Continue to monitor, transfuse for hemoglobin <7.    CKD stage 2?  - Previously, appears to have CKD stage 2, baseline creatinine ~0.8 to 0.9, renal function stable, Repeat labs in AM.    Depression/anxiety  - Consulted access and psychiatry to evaluate.    Constipation  - Patient endorsing constipation. Will adjust bowel regimen and plan for reassessment. Continue to monitor and adjust medications as needed until resolution. If no resolution following medication adjustment, will consider further workup with [facility].      Continue other medications for chronic conditions as prescribed. Further management TBD based on clinical course.    SCDs for DVT prophylaxis.  Full code.  Discussed with patient, family, and nursing staff.  Anticipate discharge  Likely SNF per PT notes   consultant clearance, pain control, arrangements for disposition finalized  Expected discharge date/ time has not been documented.      Yobani Akbar DO  [facility]  03/27/25

## 2025-03-27 NOTE — DISCHARGE PLACEMENT REQUEST
"Pedrito Powell (64 y.o. Female)       Date of Birth   1960    Social Security Number       Address   [address] [place] [address]    Home Phone   [phone]    MRN   [id number]       Restorationist   Jew    Marital Status                               Admission Date   3/23/2025    Admission Type   Emergency    Admitting Provider   Hima Trejo MD    Attending Provider   Yobani Akbar DO    Department, Room/Bed   [address], P797/1       Discharge Date       Discharge Disposition       Discharge Destination                                 Attending Provider: Yobani Akbar DO    Allergies: Cashew Nut Oil, Chocolate, Citrus, Nickel, Nuts, Tree Nut, [place], [place] Oil, Bacitracin, Baclofen, Chlorhexidine, Ciprofloxacin, Citric Acid, Covid-19 (Mrna) Vaccine, Influenza Vaccines, Methyldibromoglutaronitrile, Naproxen, Neomycin, Omnicef [Cefdinir], Palladium Chloride, Penicillins, Pineapple Extract, Sulfa Antibiotics, Adhesive Tape, Gold-containing Drug Products, Latex    Isolation: None   Infection: None   Code Status: CPR    Ht: 149.9 cm (59\")   Wt: 115 kg (252 lb 13.9 oz)    Admission Cmt: None   Principal Problem: Intractable low back pain [M54.59]                   Active Insurance as of 3/23/2025       Primary Coverage       Payor Plan Insurance Group Employer/Plan Group    [facility] [employer] [facility] [employer] [facility] PPO [id number]       Payor Plan Address Payor Plan Phone Number Payor Plan Fax Number Effective Dates    [address] [phone]  1/1/2024 - None Entered    [place] [address]         Subscriber Name Subscriber Birth Date Member ID       PEDRITO POWELL 1960 [id number]                     Emergency Contacts        (Rel.) Home Phone Work Phone Mobile Phone    Yareli Powell (Daughter) [phone] -- [phone]    Ld Powell (Son) -- -- [phone]    yasmin powell -- -- [phone]    BessYaneth Nicole (Sister) -- -- " [phone]

## 2025-03-28 LAB
ANION GAP SERPL CALCULATED.3IONS-SCNC: 9.1 MMOL/L (ref 5–15)
BACTERIA SPEC AEROBE CULT: NORMAL
BACTERIA SPEC AEROBE CULT: NORMAL
BASOPHILS # BLD AUTO: 0.07 10*3/MM3 (ref 0–0.2)
BASOPHILS NFR BLD AUTO: 1 % (ref 0–1.5)
BUN SERPL-MCNC: 18 MG/DL (ref 8–23)
BUN/CREAT SERPL: 20.7 (ref 7–25)
CALCIUM SPEC-SCNC: 8.5 MG/DL (ref 8.6–10.5)
CHLORIDE SERPL-SCNC: 98 MMOL/L (ref 98–107)
CO2 SERPL-SCNC: 23.9 MMOL/L (ref 22–29)
CREAT SERPL-MCNC: 0.87 MG/DL (ref 0.57–1)
DEPRECATED RDW RBC AUTO: 41.2 FL (ref 37–54)
EGFRCR SERPLBLD CKD-EPI 2021: 74.5 ML/MIN/1.73
EOSINOPHIL # BLD AUTO: 0.66 10*3/MM3 (ref 0–0.4)
EOSINOPHIL NFR BLD AUTO: 9.1 % (ref 0.3–6.2)
ERYTHROCYTE [DISTWIDTH] IN BLOOD BY AUTOMATED COUNT: 12.5 % (ref 12.3–15.4)
GLUCOSE SERPL-MCNC: 92 MG/DL (ref 65–99)
HCT VFR BLD AUTO: 29.1 % (ref 34–46.6)
HGB BLD-MCNC: 9.3 G/DL (ref 12–15.9)
IMM GRANULOCYTES # BLD AUTO: 0.02 10*3/MM3 (ref 0–0.05)
IMM GRANULOCYTES NFR BLD AUTO: 0.3 % (ref 0–0.5)
LYMPHOCYTES # BLD AUTO: 0.88 10*3/MM3 (ref 0.7–3.1)
LYMPHOCYTES NFR BLD AUTO: 12.2 % (ref 19.6–45.3)
MCH RBC QN AUTO: 29.1 PG (ref 26.6–33)
MCHC RBC AUTO-ENTMCNC: 32 G/DL (ref 31.5–35.7)
MCV RBC AUTO: 90.9 FL (ref 79–97)
MONOCYTES # BLD AUTO: 0.97 10*3/MM3 (ref 0.1–0.9)
MONOCYTES NFR BLD AUTO: 13.4 % (ref 5–12)
NEUTROPHILS NFR BLD AUTO: 4.64 10*3/MM3 (ref 1.7–7)
NEUTROPHILS NFR BLD AUTO: 64 % (ref 42.7–76)
NRBC BLD AUTO-RTO: 0 /100 WBC (ref 0–0.2)
PLATELET # BLD AUTO: 276 10*3/MM3 (ref 140–450)
PMV BLD AUTO: 9.6 FL (ref 6–12)
POTASSIUM SERPL-SCNC: 3.7 MMOL/L (ref 3.5–5.2)
RBC # BLD AUTO: 3.2 10*6/MM3 (ref 3.77–5.28)
SODIUM SERPL-SCNC: 131 MMOL/L (ref 136–145)
SODIUM UR-SCNC: 27 MMOL/L
TSH SERPL DL<=0.05 MIU/L-ACNC: 1.9 UIU/ML (ref 0.27–4.2)
URATE SERPL-MCNC: 2.9 MG/DL (ref 2.4–5.7)
WBC NRBC COR # BLD AUTO: 7.24 10*3/MM3 (ref 3.4–10.8)

## 2025-03-28 PROCEDURE — 84443 ASSAY THYROID STIM HORMONE: CPT | Performed by: STUDENT IN AN ORGANIZED HEALTH CARE EDUCATION/TRAINING PROGRAM

## 2025-03-28 PROCEDURE — 94799 UNLISTED PULMONARY SVC/PX: CPT

## 2025-03-28 PROCEDURE — 94760 N-INVAS EAR/PLS OXIMETRY 1: CPT

## 2025-03-28 PROCEDURE — 25010000002 HYDROMORPHONE PER 4 MG: Performed by: INTERNAL MEDICINE

## 2025-03-28 PROCEDURE — 25010000002 LIDOCAINE 1 % SOLUTION: Performed by: ORTHOPAEDIC SURGERY

## 2025-03-28 PROCEDURE — 25010000002 LEVOFLOXACIN PER 250 MG: Performed by: STUDENT IN AN ORGANIZED HEALTH CARE EDUCATION/TRAINING PROGRAM

## 2025-03-28 PROCEDURE — 85025 COMPLETE CBC W/AUTO DIFF WBC: CPT | Performed by: STUDENT IN AN ORGANIZED HEALTH CARE EDUCATION/TRAINING PROGRAM

## 2025-03-28 PROCEDURE — 84550 ASSAY OF BLOOD/URIC ACID: CPT | Performed by: STUDENT IN AN ORGANIZED HEALTH CARE EDUCATION/TRAINING PROGRAM

## 2025-03-28 PROCEDURE — 83935 ASSAY OF URINE OSMOLALITY: CPT | Performed by: STUDENT IN AN ORGANIZED HEALTH CARE EDUCATION/TRAINING PROGRAM

## 2025-03-28 PROCEDURE — 3E0U3BZ INTRODUCTION OF ANESTHETIC AGENT INTO JOINTS, PERCUTANEOUS APPROACH: ICD-10-PCS

## 2025-03-28 PROCEDURE — 3E0U33Z INTRODUCTION OF ANTI-INFLAMMATORY INTO JOINTS, PERCUTANEOUS APPROACH: ICD-10-PCS

## 2025-03-28 PROCEDURE — 25010000002 METHYLPREDNISOLONE PER 80 MG: Performed by: ORTHOPAEDIC SURGERY

## 2025-03-28 PROCEDURE — 97530 THERAPEUTIC ACTIVITIES: CPT

## 2025-03-28 PROCEDURE — 80048 BASIC METABOLIC PNL TOTAL CA: CPT | Performed by: STUDENT IN AN ORGANIZED HEALTH CARE EDUCATION/TRAINING PROGRAM

## 2025-03-28 PROCEDURE — 84300 ASSAY OF URINE SODIUM: CPT | Performed by: STUDENT IN AN ORGANIZED HEALTH CARE EDUCATION/TRAINING PROGRAM

## 2025-03-28 RX ORDER — LIDOCAINE HYDROCHLORIDE 10 MG/ML
5 INJECTION, SOLUTION INFILTRATION; PERINEURAL ONCE
Status: COMPLETED | OUTPATIENT
Start: 2025-03-28 | End: 2025-03-28

## 2025-03-28 RX ORDER — METHYLPREDNISOLONE ACETATE 80 MG/ML
80 INJECTION, SUSPENSION INTRA-ARTICULAR; INTRALESIONAL; INTRAMUSCULAR; SOFT TISSUE ONCE
Status: COMPLETED | OUTPATIENT
Start: 2025-03-28 | End: 2025-03-28

## 2025-03-28 RX ADMIN — VORTIOXETINE 10 MG: 5 TABLET, FILM COATED ORAL at 08:35

## 2025-03-28 RX ADMIN — Medication 10 ML: at 08:36

## 2025-03-28 RX ADMIN — BUDESONIDE 0.5 MG: 0.5 INHALANT RESPIRATORY (INHALATION) at 09:20

## 2025-03-28 RX ADMIN — HYDROCODONE BITARTRATE AND ACETAMINOPHEN 1 TABLET: 7.5; 325 TABLET ORAL at 12:28

## 2025-03-28 RX ADMIN — LIDOCAINE 3 PATCH: 4 PATCH TOPICAL at 08:35

## 2025-03-28 RX ADMIN — AZELASTINE HYDROCHLORIDE 1 SPRAY: 137 SPRAY, METERED NASAL at 08:40

## 2025-03-28 RX ADMIN — FAMOTIDINE 20 MG: 20 TABLET, FILM COATED ORAL at 16:26

## 2025-03-28 RX ADMIN — METHOCARBAMOL TABLETS 500 MG: 500 TABLET, COATED ORAL at 21:42

## 2025-03-28 RX ADMIN — HYDROCODONE BITARTRATE AND ACETAMINOPHEN 1 TABLET: 7.5; 325 TABLET ORAL at 05:10

## 2025-03-28 RX ADMIN — IPRATROPIUM BROMIDE 1 SPRAY: 21 SPRAY, METERED NASAL at 16:26

## 2025-03-28 RX ADMIN — FAMOTIDINE 20 MG: 20 TABLET, FILM COATED ORAL at 05:10

## 2025-03-28 RX ADMIN — ATORVASTATIN CALCIUM 80 MG: 20 TABLET, FILM COATED ORAL at 08:35

## 2025-03-28 RX ADMIN — BUDESONIDE 0.5 MG: 0.5 INHALANT RESPIRATORY (INHALATION) at 20:44

## 2025-03-28 RX ADMIN — METHOCARBAMOL TABLETS 500 MG: 500 TABLET, COATED ORAL at 05:10

## 2025-03-28 RX ADMIN — Medication 2000 UNITS: at 08:35

## 2025-03-28 RX ADMIN — HYDROCODONE BITARTRATE AND ACETAMINOPHEN 1 TABLET: 7.5; 325 TABLET ORAL at 00:47

## 2025-03-28 RX ADMIN — SENNOSIDES AND DOCUSATE SODIUM 2 TABLET: 50; 8.6 TABLET ORAL at 08:35

## 2025-03-28 RX ADMIN — LORAZEPAM 0.5 MG: 0.5 TABLET ORAL at 14:17

## 2025-03-28 RX ADMIN — LOSARTAN POTASSIUM 100 MG: 100 TABLET, FILM COATED ORAL at 08:35

## 2025-03-28 RX ADMIN — POLYETHYLENE GLYCOL 3350 17 G: 17 POWDER, FOR SOLUTION ORAL at 08:35

## 2025-03-28 RX ADMIN — Medication 10 ML: at 20:33

## 2025-03-28 RX ADMIN — METHYLPREDNISOLONE ACETATE 80 MG: 80 INJECTION, SUSPENSION INTRA-ARTICULAR; INTRALESIONAL; INTRAMUSCULAR; SOFT TISSUE at 16:26

## 2025-03-28 RX ADMIN — HYDROCODONE BITARTRATE AND ACETAMINOPHEN 1 TABLET: 7.5; 325 TABLET ORAL at 20:39

## 2025-03-28 RX ADMIN — HYDROMORPHONE HYDROCHLORIDE 0.5 MG: 1 INJECTION, SOLUTION INTRAMUSCULAR; INTRAVENOUS; SUBCUTANEOUS at 10:59

## 2025-03-28 RX ADMIN — IPRATROPIUM BROMIDE 1 SPRAY: 21 SPRAY, METERED NASAL at 08:40

## 2025-03-28 RX ADMIN — HYDROMORPHONE HYDROCHLORIDE 0.5 MG: 1 INJECTION, SOLUTION INTRAMUSCULAR; INTRAVENOUS; SUBCUTANEOUS at 19:11

## 2025-03-28 RX ADMIN — METHOCARBAMOL TABLETS 500 MG: 500 TABLET, COATED ORAL at 12:22

## 2025-03-28 RX ADMIN — BISACODYL 10 MG: 10 SUPPOSITORY RECTAL at 11:00

## 2025-03-28 RX ADMIN — HYDROMORPHONE HYDROCHLORIDE 0.5 MG: 1 INJECTION, SOLUTION INTRAMUSCULAR; INTRAVENOUS; SUBCUTANEOUS at 14:35

## 2025-03-28 RX ADMIN — LEVOFLOXACIN 750 MG: 5 INJECTION, SOLUTION INTRAVENOUS at 12:23

## 2025-03-28 RX ADMIN — IPRATROPIUM BROMIDE 1 SPRAY: 21 SPRAY, METERED NASAL at 20:33

## 2025-03-28 RX ADMIN — HYDROCODONE BITARTRATE AND ACETAMINOPHEN 1 TABLET: 7.5; 325 TABLET ORAL at 16:25

## 2025-03-28 RX ADMIN — LIDOCAINE HYDROCHLORIDE 5 ML: 10 INJECTION, SOLUTION INFILTRATION; PERINEURAL at 16:26

## 2025-03-28 NOTE — PLAN OF CARE
Goal Outcome Evaluation:  Plan of Care Reviewed With: patient        Progress: no change  Outcome Evaluation: Pt remains stable. Activity encouraged, but did poorly with physical therapy. Voiding per PW. Continues with back pain and jessica knee pain. Ortho following and did ASP. On IV levaquin for PNA, 2L on NC. Norco and dilaudid given for pain. Meclizine given for dizziness, has hx of vertigo. DC plan pending.

## 2025-03-28 NOTE — PROGRESS NOTES
Name: Pedrito Powell ADMIT: 3/23/2025   : 1960  PCP: Teo Fraser MD    MRN: [id number] LOS: 2 days   AGE/SEX: 64 y.o. female  ROOM: [facility]     Subjective   Subjective   Patient seen and examined.  Hospital day 5, resting in bed, pain better controlled today. Still no BM.       Objective   Objective   Vital Signs  Temp:  [98.9 °F (37.2 °C)-101.1 °F (38.4 °C)] 98.9 °F (37.2 °C)  Heart Rate:  [80-86] 86  Resp:  [16] 16  BP: (111-135)/(69-79) 125/70  SpO2:  [95 %-96 %] 96 %  on  Flow (L/min) (Oxygen Therapy):  [2] 2;   Device (Oxygen Therapy): nasal cannula  Body mass index is 51.07 kg/m².  Physical Exam  Vitals and nursing note reviewed.   Constitutional:       General: She is not in acute distress.     Appearance: She is overweight.   Cardiovascular:      Rate and Rhythm: Normal rate.      Pulses: Normal pulses.      Heart sounds: Normal heart sounds.   Pulmonary:      Effort: Pulmonary effort is normal.      Breath sounds: No wheezing.   Abdominal:      General: Bowel sounds are normal.      Palpations: Abdomen is soft.      Tenderness: There is no abdominal tenderness.   Musculoskeletal:         General: Swelling (left wrist) and tenderness present.      Comments: Trace B/L LE edema, Bandage on left knee from aspiration, left knee tenderness improved. Left wrist swelling still noted, but improved.   Skin:     General: Skin is warm and dry.   Neurological:      Mental Status: She is alert.       Results Review     I reviewed the patient's new clinical results.  Results from last 7 days   Lab Units 25  0523 25  0320 25  0602 25  0546   WBC 10*3/mm3 7.24 9.49 10.87* 10.88*   HEMOGLOBIN g/dL 9.3* 10.0* 10.0* 9.5*   PLATELETS 10*3/mm3 276 238 229 223     Results from last 7 days   Lab Units 25  0523 25  0320 25  0602 25  0546   SODIUM mmol/L 131* 132* 132* 137   POTASSIUM mmol/L 3.7 4.2 3.9 3.9   CHLORIDE mmol/L 98 97* 97* 100   CO2 mmol/L 23.9 22.6 22.9  "24.0   BUN mg/dL 18 14 12 11   CREATININE mg/dL 0.87 0.84 0.70 0.79   GLUCOSE mg/dL 92 96 124* 120*   EGFR mL/min/1.73 74.5 77.7 96.7 83.6     Results from last 7 days   Lab Units 03/24/25  0533 03/23/25  1325 03/21/25  0952   ALBUMIN g/dL 3.1* 4.0 3.7   BILIRUBIN mg/dL 0.8 0.7 0.5   ALK PHOS U/L 65 74 74   AST (SGOT) U/L 16 20 21   ALT (SGPT) U/L 10 15 17     Results from last 7 days   Lab Units 03/28/25  0523 03/27/25  0320 03/26/25  0602 03/25/25  0546 03/24/25  0533 03/23/25  1325 03/23/25  1325 03/21/25  0952   CALCIUM mg/dL 8.5* 8.5* 8.3* 8.2* 8.4*   < > 8.6 8.9   ALBUMIN g/dL  --   --   --   --  3.1*  --  4.0 3.7    < > = values in this interval not displayed.     Results from last 7 days   Lab Units 03/27/25  0320 03/26/25  0602 03/23/25  1507 03/23/25  1325   PROCALCITONIN ng/mL  --  0.21  --  0.03   LACTATE mmol/L 0.9  --  1.0  --      No results found for: \"HGBA1C\", \"POCGLU\"    FL Guided Aspiration Joint  Result Date: 3/27/2025  Technically successful left knee joint aspiration with removal of 22 mL of viscous slightly turbid pale yellow-colored fluid.   Procedure performed by KALYANI Dunlap. By electronically signing this report, I, the supervising radiologist, attest that I was not present for the procedure(s) but agree with the final edited report.  This report was finalized on 3/27/2025 2:05 PM by Dr. Catalino Monae M.D on Workstation: [id number]      CT Upper Extremity Left Without Contrast  Result Date: 3/26/2025  1. Diffuse soft tissue swelling about the wrist, particular around the radiocarpal joint and the proximal carpal row. Unclear if there is any fluid present. Would suggest evaluation with MRI 2. Curvilinear osseous fragment located along the volar aspect of the triquetrum with some cystic change of the triquetral body. This may be chronic and reflect degenerative change or an old injury, however could also potentially reflect an acute avulsion fracture in the appropriate clinical " setting.   Radiation dose reduction techniques were utilized, including automated exposure control and exposure modulation based on body size.   This report was finalized on 3/26/2025 6:25 PM by Dr. Catalino Monae M.D on Workstation: [id number]      CT Lower Extremity Left Without Contrast  Result Date: 3/26/2025  Degenerative changes as described without evidence of acute fracture. There is a moderate knee joint effusion   Radiation dose reduction techniques were utilized, including automated exposure control and exposure modulation based on body size.   This report was finalized on 3/26/2025 6:07 PM by Dr. Catalino Monae M.D on Workstation: [id number]        I have personally reviewed all medications:  Scheduled Medications  atorvastatin, 80 mg, Oral, Daily  azelastine, 1 spray, Each Nare, Daily  budesonide, 0.5 mg, Nebulization, BID - RT  cholecalciferol, 2,000 Units, Oral, Daily  famotidine, 20 mg, Oral, BID AC  ipratropium, 1 spray, Each Nare, TID  levoFLOXacin, 750 mg, Intravenous, Q24H  Lidocaine, 3 patch, Transdermal, Q24H  losartan, 100 mg, Oral, Q24H  methocarbamol, 500 mg, Oral, Q8H  senna-docusate sodium, 2 tablet, Oral, BID   And  polyethylene glycol, 17 g, Oral, BID  sodium chloride, 10 mL, Intravenous, Q12H  Vortioxetine HBr, 10 mg, Oral, Daily With Breakfast    Infusions     Diet  Diet: Cardiac; Healthy Heart (2-3 Na+); Fluid Consistency: Thin (IDDSI 0)    I have personally reviewed:  [x]  Laboratory   [x]  Microbiology   [x]  Radiology   []  EKG/Telemetry  []  Cardiology/Vascular   []  Pathology    []  Records       Assessment/Plan     Active Hospital Problems    Diagnosis  POA    **Intractable low back pain [M54.59]  Yes    Spinal stenosis, lumbar region, without neurogenic claudication [M48.061]  Unknown    Spondylolisthesis, lumbar region [M43.16]  Unknown    Facet arthritis of lumbar region [M47.816]  Unknown    GERD (gastroesophageal reflux disease) [K21.9]  Yes    Acute low back pain  [M54.50]  Unknown    Cellulitis of leg [L03.119]  Unknown    Acute back pain [M54.9]  Yes    Anxiety [F41.9]  Yes    Anemia [D64.9]  Yes    HTN (hypertension) [I10]  Yes      Resolved Hospital Problems   No resolved problems to display.       64 y.o. female admitted with Intractable low back pain.    Lower back pain  - Patient with worsening lower back pain with radiculopathy symptoms. MRI showing evidence of lumbar stenosis. ANDRES consulted, no acute intervention warranted, continue with medications for pain. Continue with therapy services and fall precautions. Oral pain medication adjusted on 03/25, pain better controlled. Continue treatment as prescribed, wean IV as tolerated, can adjust PO dose further if needed.    Pain and swelling in left wrist  Pain and swelling in left knee  Pseudogout, left knee effusion  - X-rays obtained, imaging showing degenerative changes in knee with possible effusion. X-ray of wrist showed soft tissue swelling, old injury, some widening of the interval between the trapezium and the scaphoid is noted. Duplex LE US and LUE US negative for DVT.   - Orthopedic surgery consulted. They ordered CT scans; CT LUE showing Diffuse soft tissue swelling about the wrist, particular around the radiocarpal joint and the proximal carpal row. CT LLE showing moderate left knee effusion. She underwent left knee aspiration on 03/26, 22 mL fluid removed. Fluid studies reviewed, showing calcium pyrophosphate crystals concerning for pseudogout. Will follow up with Orthopedic surgery regarding management, can likely give steroids. Greatly appreciate their help.    Fever  Leukocytosis  Recently diagnosed cellulitis  Pneumonia  - Noted. Prior UA with + LE, 6-10 WBC, but she denies urinary symptoms to suggest UTI, urine culture no growth. Blood cultures no growth to date. She was continued on Doxycycline for cellulitis previously diagnosed. Her LE swelling, erythema that was noted previously has improved.   -  CXR ordered, showing findings concerning for pneumonia. She has multiple medication allergies, started empiric treatment with Levaquin on 03/26 and consulted ID.  - ID notes reviewed, recommended empiric Levaquin x 7 days and signed off, available as needed. WBC has improved, and clinically, she appears to be improved, but she did have another fever on 03/27/25 at 101.1 F. Will reach back out to ID regarding this.  - Trend WBC with CBC.    Hyponatremia  - Noted on labs, felt to be hypovolemic, was given 500 cc IV fluids previously, however, values relatively unchanged on most recent labs.   - Order urine sodium, urine osmolality, TSH, uric acid.  - Follow up results of testing to guide ongoing management decisions.    Hypertension  - Blood pressure appears stable and acceptable acutely at this time. No indications to warrant acute changes/intervention at this time.  - Continue current medications as prescribed. Trend BP to guide ongoing management decisions.    Anemia  - Hemoglobin low on most recent labs. No evidence of overt blood loss. No indications for acute intervention at this time.    - Order repeat CBC in AM for reassessment. Continue to monitor, transfuse for hemoglobin <7.    CKD stage 2?  - Previously, appears to have CKD stage 2, baseline creatinine ~0.8 to 0.9, renal function stable, Repeat labs in AM.    Depression/anxiety  - Consulted access and psychiatry to evaluate. Greatly appreciate their help.    Constipation  - Patient endorsing constipation. Bowel regimen adjusted, give these medications today and monitor response. If no resolution following medication adjustment, will consider further workup with [facility]UB.      Continue other medications for chronic conditions as prescribed. Further management TBD based on clinical course.    SCDs for DVT prophylaxis.  Full code.  Discussed with patient and nursing staff.  Anticipate discharge  [facility]   consultant clearance, pain control, bowel  movement  Expected discharge date/ time has not been documented.      Yobani Akbar DO  [facility]  03/28/25

## 2025-03-28 NOTE — NURSING NOTE
Access attempted to see patient in follow-up twice today related to anxiety and depression.  However, she was either on the bedside commode or working with PT. She was seen by psychiatrist. RN reports patient appears anxious at times. She continues on Trintellix and Ativan.     Access following.

## 2025-03-28 NOTE — PLAN OF CARE
Goal Outcome Evaluation:  Plan of Care Reviewed With: patient        Progress: improving  Outcome Evaluation: Pt seen for PT tx today. Pt agreeable to participate despite left knee pain. Pt reports pain is getting better. Pt needed increased time with mobility but is progressing. Pt completed supine to sit with Frandy and stood fully upright at EOB with Frandy. Pt able to take lateral side steps to HOB ~2ft with rwx, Frandy. Pt unable to ambulate further d/t increase pain. Pt returned to supine with ModA. Pt will need IRF to improve strength, balance, and activtity tolerance to return to PLOF, independent. Will continue to follow.    Anticipated Discharge Disposition (PT): inpatient rehabilitation facility

## 2025-03-28 NOTE — PROGRESS NOTES
LOS: 2 days     Subjective :   The patient is still experiencing pain in her left knee and left wrist. She states that she was unable to get up and walk with physical therapy due to the significance of her pain in her left knee along with her wrist. The pain in her wrist has decreased significantly. She had a left knee joint aspiration yesterday and had fluid cultures and crystals evaluated. She states this procedure went well. She states that her pain is overall being managed well with medications     Objective :    Vital signs in last 24 hours:  Vitals:    03/27/25 1756 03/27/25 1950 03/27/25 2221 03/27/25 2330   BP:  111/69  125/70   BP Location:  Left arm  Left arm   Patient Position:  Lying  Lying   Pulse:  86 83 86   Resp:  16 16 16   Temp: (!) 101.1 °F (38.4 °C) 99.2 °F (37.3 °C)  98.9 °F (37.2 °C)   TempSrc: Oral Oral  Oral   SpO2:  95% 95% 96%   Weight:       Height:           PHYSICAL EXAM:  Patient is calm, in no acute distress, awake and oriented x 3.    Examination of the left wrist reveals skin is intact.  Mild edema of the wrist and hand is noted.  No ecchymosis, erythema, or warmth noted.  Tenderness to palpation at the distal radius.  Mild pain noted with wrist range of motion.  Hand is warm and well-perfused.  Neurovascularly intact distally.     Examination of the left knee reveals skin is intact with mild effusion and warmth present.  No erythema or ecchymosis noted.  Very limited knee range of motion due to pain.  No exquisite tenderness to palpation today.  Foot is warm and well-perfused.  Neurovascularly intact distally.    LABS:  Results from last 7 days   Lab Units 03/28/25  0523   WBC 10*3/mm3 7.24   HEMOGLOBIN g/dL 9.3*   HEMATOCRIT % 29.1*   PLATELETS 10*3/mm3 276     Results from last 7 days   Lab Units 03/28/25  0523   SODIUM mmol/L 131*   POTASSIUM mmol/L 3.7   CHLORIDE mmol/L 98   CO2 mmol/L 23.9   BUN mg/dL 18   CREATININE mg/dL 0.87   GLUCOSE mg/dL 92   CALCIUM mg/dL 8.5*              ASSESSMENT:  Left knee pain with pseudogout crystals noted    Left wrist pain with edema    Plan:  Continue with conservative management of left wrist pain. Swelling has continued to decrease over the past few days. Left knee preliminary results of fluid culture reveal no growth, moderate WBCs, and no organisms noted. Left knee crystal analysis reveals calcium pyrophosphate. Will proceed with a corticosteroid injection of the left knee at bedside later today. Order placed for injection material to be at bedside.  The patient understands and agrees to this plan. All questions answered.   Dispo planning for home once medically cleared.      Addendum at 4:50 PM 3/28/2025  Injection supplies were ordered to be at the patient's bedside.  A corticosteroid injection of the left knee was performed at 4:45 PM today, see procedure note for extended details.  Discussed with the patient that after this injection she will be considered stable from an orthopedic standpoint and she will no longer need to be seen by us.  Recommend that she follow-up with Dr. Devi in office as she is already an established patient with him.  Discussed that by receiving a corticosteroid injection in the left knee today she will need to wait 3 months before having surgery on this knee.  Discussed that she can have surgery on her right knee if she and Dr. Devi deem it necessary.  Overall the patient is stable from an orthopedic standpoint and we will sign off at this time.    Lindsay Saha PA-C    Date: 3/28/2025  Time: 08:55 EDT    Lindsay Saha PA-C

## 2025-03-28 NOTE — PROCEDURES
"  Patient: Pedrito Powell    Date of Admission: 3/23/2025  2:18 PM  YOB: 1960  Medical Record Number: [id number]    Attending Physician: Hima Trejo MD    Proprocedure Diagnosis: Primary Degenerative Osteoarhritis of the Knee  Post procedure Diagnosis:  Primary Degenerative Osteoarhritis of the Knee    Knee Injection Procedure Note    Left knee injection was discussed with the patient in detail, including indication, risks, benefits, and alternatives. Verbal consent was given for the procedure.    Time out was taken to verify correct patient, procedure, and extremity.    Injection site was identified by physical examination and cleaned with  alcohol swabs. Sterile technique was used.  A 25-gauge, 1.5\" needle was directed to the joint from anterior lateral approach.  This approach was unsuccessful in entering the joint space due to patient positioning.  The needle was removed and a bandage was applied.  The previous needle was removed and a new 25-gauge 1.5\" needle was placed on the syringe. A new injection site was obtained at the superior lateral aspect of the knee.  The injection site was marked and then cleaned with an alcohol swab.  The needle was directed to the joint space and this was successful.  Slight retraction of the syringe revealed yellow joint fluid and the remainder of the steroid was then injected into the knee.  The needle was removed and a simple bandage was applied. The procedure was tolerated well without difficulty.  No complications noted after superior lateral approach was taken and successfully entering the joint.    Injection mixture:  1% lidocaine without epinephrine: 2 mL  80 mg/mL Depomedrol: 1 mL    Lindsay Saha PA-C  3/28/2025     "

## 2025-03-28 NOTE — THERAPY TREATMENT NOTE
Patient Name: Pedrito Powell  : 1960    MRN: [id number]                              Today's Date: 3/28/2025       Admit Date: 3/23/2025    Visit Dx:     ICD-10-CM ICD-9-CM   1. Intractable low back pain  M54.59 724.2   2. Acute febrile illness  R50.9 780.60   3. Bilateral lower extremity edema  R60.0 782.3     Patient Active Problem List   Diagnosis    Hx of anaphylaxis    Primary osteoarthritis of knee    High risk medication use    HTN (hypertension)    Hyperlipidemia    Vertigo    Infected blister of left foot    Ganglion of right wrist    Vomiting and diarrhea    Intractable vomiting with nausea    Fatigue    Acute gastritis without hemorrhage    Anemia    Elevated serum creatinine    Fungal rash of torso    Cellulitis of abdominal wall    Bronchitis    Arthritis of shoulder    Chronic renal failure in pediatric patient, stage 3 (moderate)    H/O shoulder surgery    Anxiety    Depression    Iron deficiency    Dizziness    Hypoxia    COVID-19 virus infection    Acute low back pain    Cellulitis of leg    Intractable low back pain    Acute back pain    Spinal stenosis, lumbar region, without neurogenic claudication    Spondylolisthesis, lumbar region    Facet arthritis of lumbar region    GERD (gastroesophageal reflux disease)     Past Medical History:   Diagnosis Date    Anemia     IRON INFUSION RECENTLY    Arthritis     Asthma     Chronic kidney disease     stage 3    Elevated cholesterol     GERD (gastroesophageal reflux disease)     High risk medication use 2018    History of carpal tunnel syndrome     Hyperlipidemia     Hypertension     Intractable vomiting with nausea 2018    Left shoulder pain     Limited mobility     Rheumatoid arthritis     Vertigo     Weakness     AT TIMES LEFT SHOULDER/LEFT ARM     Past Surgical History:   Procedure Laterality Date    CARPAL TUNNEL RELEASE Left 10/02/2023     SECTION  , ,     COLONOSCOPY N/A 12/10/2018    normal ileum, IH,  hyperplastic polyp, otherwise normal exam    ENDOSCOPY N/A 12/10/2018    no gross lesions in esophagus or examined duodenum, erythematous mucosa in stomach, biopsies benign    HYSTERECTOMY  2000    OOPHORECTOMY Bilateral     SHOULDER MANIPULATION Left     TOTAL SHOULDER ARTHROPLASTY Left 05/13/2021    Procedure: REVERSE TOTAL SHOULDER ARTHROPLASTY,  BICEP TENDONDESIS;  Surgeon: Bethel Devi MD;  Location: [facility] OR;  Service: Orthopedics;  Laterality: Left;    TRIGGER FINGER RELEASE Left     3 fingers      General Information       Row Name 03/28/25 1644          Physical Therapy Time and Intention    Document Type therapy note (daily note)  -EB     Mode of Treatment physical therapy  -EB       Row Name 03/28/25 1644          General Information    Patient Profile Reviewed yes  -EB     Existing Precautions/Restrictions fall  -EB       Row Name 03/28/25 1644          Cognition    Orientation Status (Cognition) oriented x 4  -EB       Row Name 03/28/25 1644          Safety Issues/Impairments Affecting Functional Mobility    Impairments Affecting Function (Mobility) balance;endurance/activity tolerance;strength;pain  -EB               User Key  (r) = Recorded By, (t) = Taken By, (c) = Cosigned By      Initials Name Provider Type    EB Sophie Hall PTA Physical Therapist Assistant                   Mobility       Row Name 03/28/25 1644          Bed Mobility    Supine-Sit [place] (Bed Mobility) minimum assist (75% patient effort);verbal cues;nonverbal cues (demo/gesture)  -EB     Sit-Supine [place] (Bed Mobility) moderate assist (50% patient effort);verbal cues;nonverbal cues (demo/gesture)  -EB     Comment, (Bed Mobility) increased time with bed mobility  -EB       Row Name 03/28/25 1644          Sit-Stand Transfer    Sit-Stand [place] (Transfers) minimum assist (75% patient effort)  -EB     Assistive Device (Sit-Stand Transfers) walker, front-wheeled  -EB     Comment, (Sit-Stand Transfer) pt  able to stand fully upright with first stand.  -       Row Name 03/28/25 1646          Gait/Stairs (Locomotion)    [place]gs Level (Gait) minimum assist (75% patient effort)  -     Assistive Device (Gait) walker, front-wheeled  -     Distance in Feet (Gait) 2  -     Deviations/Abnormal Patterns (Gait) stride length decreased;gait speed decreased;frank decreased  -     Bilateral Gait Deviations heel strike decreased  -EB     Comment, (Gait/Stairs) pt able to take lateral side steps to HOB. Pt continues to have increased pain with WBing left LE.  -               User Key  (r) = Recorded By, (t) = Taken By, (c) = Cosigned By      Initials Name Provider Type    EB Sophie Hall PTA Physical Therapist Assistant                   Obj/Interventions    No documentation.                  Goals/Plan    No documentation.                  Clinical Impression       Row Name 03/28/25 1646          Pain    Pain Location knee;wrist  -     Pain Side/Orientation left  -       Row Name 03/28/25 1646          Plan of Care Review    Plan of Care Reviewed With patient  -EB     Progress improving  -     Outcome Evaluation Pt seen for PT tx today. Pt agreeable to participate despite left knee pain. Pt reports pain is getting better. Pt needed increased time with mobility but is progressing. Pt completed supine to sit with Frandy and stood fully upright at EOB with Frandy. Pt able to take lateral side steps to HOB ~2ft with rwx, Frandy. Pt unable to ambulate further d/t increase pain. Pt returned to supine with ModA. Pt will need IRF to improve strength, balance, and activtity tolerance to return to PLOF, independent. Will continue to follow.  -       Row Name 03/28/25 2199          Therapy Assessment/Plan (PT)    Therapy Frequency (PT) 5 times/wk  -       Row Name 03/28/25 1649          Positioning and Restraints    Pre-Treatment Position in bed  -EB     Post Treatment Position bed  -EB     In Bed supine;call light  within reach;encouraged to call for assist;exit alarm on  -EB               User Key  (r) = Recorded By, (t) = Taken By, (c) = Cosigned By      Initials Name Provider Type    Sophie Hermosillo PTA Physical Therapist Assistant                   Outcome Measures       Row Name 03/28/25 1649 03/28/25 0830       How much help from another person do you currently need...    Turning from your back to your side while in flat bed without using bedrails? 3  -EB 3  -CC    Moving from lying on back to sitting on the side of a flat bed without bedrails? 3  -EB 2  -CC    Moving to and from a bed to a chair (including a wheelchair)? 3  -EB 1  -CC    Standing up from a chair using your arms (e.g., wheelchair, bedside chair)? 3  -EB 2  -CC    Climbing 3-5 steps with a railing? 1  -EB 2  -CC    To walk in hospital room? 1  -EB 1  -CC    AM-PAC 6 Clicks Score (PT) 14  -EB 11  -CC    Highest Level of Mobility Goal 4 --> Transfer to chair/commode  -EB 4 --> Transfer to chair/commode  -CC      Row Name 03/28/25 1546          Modified Morrow Scale    Modified Morrow Scale 4 - Moderately severe disability.  Unable to walk without assistance, and unable to attend to own bodily needs without assistance.  -JR       Row Name 03/28/25 1546          Functional Assessment    Outcome Measure Options AM-PAC 6 Clicks Daily Activity (OT);Modified Morrow  -JR               User Key  (r) = Recorded By, (t) = Taken By, (c) = Cosigned By      Initials Name Provider Type    Sophie Hermosillo PTA Physical Therapist Assistant    CC Angélica Cunningham, RN Registered Nurse    Evans Harp OT Occupational Therapist                                 Physical Therapy Education       Title: PT OT SLP Therapies (In Progress)       Topic: Physical Therapy (In Progress)       Point: Mobility training (Done)       Learning Progress Summary            Patient Acceptance, E,D, VU,NR by GEORGES at 3/28/2025 1649    Acceptance, E,D, VU,NR by GEORGES at 3/27/2025 1523     Acceptance, E,TB,D, VU,NR by  at 3/25/2025 1547                      Point: Home exercise program (Not Started)       Learner Progress:  Not documented in this visit.              Point: Body mechanics (Done)       Learning Progress Summary            Patient Acceptance, E,D, VU,NR by  at 3/28/2025 1649    Acceptance, E,D, VU,NR by  at 3/27/2025 1523    Acceptance, E,TB,D, VU,NR by  at 3/25/2025 1547                      Point: Precautions (Done)       Learning Progress Summary            Patient Acceptance, E,TB,D, VU,NR by  at 3/25/2025 1547                                      User Key       Initials Effective Dates Name Provider Type Discipline     02/14/23 -  Sophie Hall PTA Physical Therapist Assistant PT     04/08/22 -  Rosa Szymanski PT Physical Therapist PT                  PT Recommendation and Plan     Progress: improving  Outcome Evaluation: Pt seen for PT tx today. Pt agreeable to participate despite left knee pain. Pt reports pain is getting better. Pt needed increased time with mobility but is progressing. Pt completed supine to sit with Frandy and stood fully upright at EOB with Frandy. Pt able to take lateral side steps to HOB ~2ft with rwx, Frandy. Pt unable to ambulate further d/t increase pain. Pt returned to supine with ModA. Pt will need IRF to improve strength, balance, and activtity tolerance to return to PLOF, independent. Will continue to follow.     Time Calculation:         PT Charges       Row Name 03/28/25 1649             Time Calculation    Start Time 1512  -      Stop Time 1535  -      Time Calculation (min) 23 min  -      PT Received On 03/28/25  -      PT - Next Appointment 03/29/25  -         Time Calculation- PT    Total Timed Code Minutes- PT 23 minute(s)  -                User Key  (r) = Recorded By, (t) = Taken By, (c) = Cosigned By      Initials Name Provider Type     Sophie Hall PTA Physical Therapist Assistant                  Therapy Charges  for Today       Code Description Service Date Service Provider Modifiers Qty    69553486935 HC PT THERAPEUTIC ACT EA 15 MIN 3/27/2025 Sophie Hall PTA GP 2    40233683319 HC PT THERAPEUTIC ACT EA 15 MIN 3/28/2025 Sophie Hall PTA GP 2            PT G-Codes  Outcome Measure Options: AM-PAC 6 Clicks Daily Activity (OT), Modified Betsy  AM-PAC 6 Clicks Score (PT): 14  AM-PAC 6 Clicks Score (OT): 12  Modified Betsy Scale: 4 - Moderately severe disability.  Unable to walk without assistance, and unable to attend to own bodily needs without assistance.  PT Discharge Summary  Anticipated Discharge Disposition (PT): inpatient rehabilitation facility    Sophie Hall PTA  3/28/2025

## 2025-03-28 NOTE — PAYOR COMM NOTE
"Pedrito Powell (64 y.o. Female)    PLEASE SEE ATTACHED FOR INPT AUTH  OBS 3/23  INPATIENT 3/27    REF#[id number]    THANK YOU   LORA URBANO RN/ DEPT   [facility]   PH: [phone]   FAX:  DEPT [phone]     Date of Birth   1960    Social Security Number       Address   [address] [place] [address]    Home Phone   [phone]    MRN   [id number]       Mormonism   Yazidi    Marital Status                               Admission Date   3/23/2025 OBS  3/27/2025 INPATIENT    Admission Type   Emergency    Admitting Provider   Hima Trejo MD    Attending Provider   Yobani Akbar DO    Department, Room/Bed   [address], 97/1       Discharge Date       Discharge Disposition       Discharge Destination                                 Attending Provider: Yobani Akbar DO    Allergies: Cashew Nut Oil, Chocolate, Citrus, Nickel, Nuts, Tree Nut, Lathrop, Lathrop Oil, Bacitracin, Baclofen, Chlorhexidine, Ciprofloxacin, Citric Acid, Covid-19 (Mrna) Vaccine, Influenza Vaccines, Methyldibromoglutaronitrile, Naproxen, Neomycin, Omnicef [Cefdinir], Palladium Chloride, Penicillins, Pineapple Extract, Sulfa Antibiotics, Adhesive Tape, Gold-containing Drug Products, Latex    Isolation: None   Infection: None   Code Status: CPR    Ht: 149.9 cm (59\")   Wt: 115 kg (252 lb 13.9 oz)    Admission Cmt: None   Principal Problem: Intractable low back pain [M54.59]                   Active Insurance as of 3/23/2025       Primary Coverage       Payor Plan Insurance Group Employer/Plan Group    ANTHEM [employer]pire AN[employer]M [employer] BLUE SHIELD PPO [id number]       Payor Plan Address Payor Plan Phone Number Payor Plan Fax Number Effective Dates    [address] [phone]  1/1/2024 - None Entered    [place] [address]         Subscriber Name Subscriber Birth Date Member ID       PEDRITO POWELL 1960 [id number]                     Emergency Contacts       Contact " Person (Rel.) Home Phone Work Phone Mobile Phone    Yareli Powell (Daughter) [phone] -- [phone]    Ld Powell (Son) -- -- [phone]    yasmin powell -- -- [phone]    Yaneth Kellogg (Sister) -- -- [phone]        PT HAS HAD MULTIPLE DOSES OF IV PAIN MEDS AND STILL WITH PAIN AND ALSO PNA W/ HYPOXIA AND FEVER, FAILED OBS       [place]: [facility] [id number]  Tax ID [id number]     History & Physical        Hima Trejo MD at 25 1540          Internal medicine history and physical  INTERNAL MEDICINE   [facility]       Patient Identification:  Name: Pedrito Powell  Age: 64 y.o.  Sex: female  :  1960  MRN: [id number]                   Primary Care Physician: Teo Fraser MD                               Date of admission:3/23/2025    Chief Complaint: Acute pain in the lower back with inability to ambulate and walk starting at around 8:00 last night.    History of Present Illness:   Patient is a 64-year-old female with morbid obesity, chronic lower extremity edema chronic kidney disease, hypertension, history of gastroesophageal reflux disease as well as asthma and anemia developed significant bilateral lower extremity redness and swelling which was initially treated with a course of antibiotics without any improvement and subsequently changed to doxycycline which resulted in significant decrease in the redness.  Because of persistent swelling patient was started on Lasix which she took first dose on Friday night.  Subsequently patient developed severe pain and discomfort last night in her lower back while she was sitting at around 8:00 last night.  According to her she noticed the pain if she attempted to get up.  The pain does not go into her lower extremities and she does not have any associated incontinence.  Patient does not recall having a busy day in which she did house chores and laundry and other staff.  She says that because of the swelling in her leg  she had to take a time to do these things but does require repetitive bending over picking up laundry and folding it.  In the emergency room patient told ER physician that she had some chills and low-grade fever but never more than 100.  Workup in the emergency room did show temperature of 100.  Patient does not have any urinary symptoms or flank pain.  A lactate level was normal and procalcitonin was 0.03.  According to her her both lower extremity redness is much improved on current treatment with doxycycline and wants to continue her antibiotics.  In the emergency room MRI of the lower back is ordered and patient is being admitted for further care.      Past Medical History:  Past Medical History:   Diagnosis Date    Anemia     IRON INFUSION RECENTLY    Arthritis     Asthma     Chronic kidney disease     stage 3    Elevated cholesterol     GERD (gastroesophageal reflux disease)     High risk medication use 2018    History of carpal tunnel syndrome     Hyperlipidemia     Hypertension     Intractable vomiting with nausea 2018    Left shoulder pain     Limited mobility     Vertigo     Weakness     AT TIMES LEFT SHOULDER/LEFT ARM     Past Surgical History:  Past Surgical History:   Procedure Laterality Date    CARPAL TUNNEL RELEASE Left 10/02/2023     SECTION  , ,     COLONOSCOPY N/A 12/10/2018    normal ileum, IH, hyperplastic polyp, otherwise normal exam    ENDOSCOPY N/A 12/10/2018    no gross lesions in esophagus or examined duodenum, erythematous mucosa in stomach, biopsies benign    HYSTERECTOMY      OOPHORECTOMY Bilateral     SHOULDER MANIPULATION Left     TOTAL SHOULDER ARTHROPLASTY Left 2021    Procedure: REVERSE TOTAL SHOULDER ARTHROPLASTY,  BICEP TENDONDESIS;  Surgeon: Bethel Devi MD;  Location: [facility];  Service: Orthopedics;  Laterality: Left;    TRIGGER FINGER RELEASE Left     3 fingers      Home Meds:  (Not in a hospital admission)    Current Meds:    No current facility-administered medications for this encounter.    Current Outpatient Medications:     acetaminophen (TYLENOL) 500 MG tablet, Take 1 tablet by mouth Every 6 (Six) Hours As Needed for Mild Pain., Disp: 30 tablet, Rfl: 0    albuterol sulfate  (90 Base) MCG/ACT inhaler, Inhale 2 puffs Every 4 (Four) Hours As Needed for Wheezing., Disp: 18 g, Rfl: 0    amitriptyline (ELAVIL) 25 MG tablet, Take 1 tablet by mouth At Night As Needed for Sleep., Disp: , Rfl:     Arnuity Ellipta 200 MCG/ACT, Daily., Disp: , Rfl:     ASPIRIN 81 PO, Take  by mouth., Disp: , Rfl:     atorvastatin (LIPITOR) 80 MG tablet, Take 1 tablet by mouth Daily., Disp: 90 tablet, Rfl: 1    azelastine (ASTELIN) 0.1 % nasal spray, Administer 1 spray into the nostril(s) as directed by provider Daily., Disp: , Rfl: 11    B Complex Vitamins (B COMPLEX PO), Take 1 capsule by mouth Daily. HOLD FOR SURGERY 1 WEEK, Disp: , Rfl:     benzonatate (TESSALON) 100 MG capsule, Take 1 capsule by mouth 3 (Three) Times a Day As Needed for Cough., Disp: 10 capsule, Rfl: 0    Cholecalciferol (Vitamin D3) 50 MCG (2000 UT) capsule, TAKE 1 CAPSULE BY MOUTH EVERY DAY, Disp: 90 capsule, Rfl: 3    clobetasol (TEMOVATE) 0.05 % cream, Apply 1 Application topically to the appropriate area as directed As Needed., Disp: , Rfl:     Diclofenac Sodium (VOLTAREN) 1 % gel gel, Apply 4 g topically to the appropriate area as directed 4 (Four) Times a Day As Needed (foot pain)., Disp: 50 g, Rfl: 0    doxycycline (VIBRAMYCIN) 100 MG capsule, Take 1 capsule by mouth 2 (Two) Times a Day for 5 days., Disp: 10 capsule, Rfl: 0    EPINEPHrine (AUVI-Q IJ), Inject  as directed As Needed., Disp: , Rfl:     famotidine (PEPCID) 20 MG tablet, Take 1 tablet by mouth., Disp: , Rfl:     furosemide (Lasix) 20 MG tablet, Take 1 tablet by mouth Daily for 4 days., Disp: 4 tablet, Rfl: 0    hydrocortisone 2.5 % cream, Apply  topically to the appropriate area as directed As Needed., Disp: ,  Rfl:     ipratropium (ATROVENT) 0.03 % nasal spray, Administer 1 spray into the nostril(s) as directed by provider 3 (Three) Times a Day., Disp: , Rfl:     irbesartan (AVAPRO) 300 MG tablet, Take 1 tablet by mouth Daily., Disp: 90 tablet, Rfl: 1    loperamide (IMODIUM) 2 MG capsule, Take 1 capsule by mouth Every 2 (Two) Hours As Needed for Diarrhea (max 4 doses daily)., Disp: , Rfl:     meclizine (ANTIVERT) 25 MG tablet, TAKE 1 TABLET BY MOUTH THREE TIMES DAILY AS NEEDED FOR DIZZINESS, Disp: 270 tablet, Rfl: 0    ondansetron (Zofran) 4 MG tablet, Take 1 tablet by mouth Every 8 (Eight) Hours As Needed for Nausea or Vomiting., Disp: 42 tablet, Rfl: 4    potassium chloride (KLOR-CON M20) 20 MEQ CR tablet, Take 1 tablet by mouth Daily., Disp: 4 tablet, Rfl: 0    traMADol (ULTRAM) 50 MG tablet, TAKE 1 TABLET BY MOUTH TWICE DAILY AS NEEDED FOR MODERATE PAIN, Disp: 180 tablet, Rfl: 1    triamcinolone (KENALOG) 0.1 % ointment, Apply 1 Application topically to the appropriate area as directed As Needed., Disp: , Rfl:     Vortioxetine HBr (Trintellix) 10 MG tablet tablet, Take 1 tablet by mouth Daily With Breakfast., Disp: 90 tablet, Rfl: 3    Facility-Administered Medications Ordered in Other Encounters:     Chlorhexidine Gluconate 2 % pads 1 each, 1 each, Apply externally, Take As Directed, Bethel Devi MD  Allergies:  Allergies   Allergen Reactions    Cashew Nut Oil Anaphylaxis    Chocolate Anaphylaxis    Nickel Anaphylaxis    Nuts Anaphylaxis    Tree Nut Anaphylaxis    Greenbelt Itching    Greenbelt Oil Other (See Comments)    Bacitracin Hives     Cannot remember, identified through testing    Baclofen Other (See Comments)    Chlorhexidine Unknown - Low Severity    Ciprofloxacin Other (See Comments)     THRUSH PER PATIENT    Citric Acid Unknown (See Comments)     Unsure      Covid-19 (Mrna) Vaccine Other (See Comments)     INSTRUCTED PER ALLERGIST SHE CAN NOT TAKE D/T ONE OF THE PRESERVATIVES    INSTRUCTED PER ALLERGIST  SHE CAN NOT TAKE D/T ONE OF THE PRESERVATIVES      *is able to & has had the PFIZER vaccine*    Egg-Derived Products Nausea And Vomiting    Influenza Vaccines Nausea And Vomiting    Methyldibromoglutaronitrile Unknown (See Comments)      PATIENT UNSURE OF REACTION.    Naproxen Other (See Comments)    Neomycin Unknown (See Comments)      PATIENT UNSURE OF REACTION.    Omnicef [Cefdinir] Other (See Comments)     THRUSH PER PATIENT    Palladium Chloride Unknown (See Comments)      PATIENT UNSURE OF REACTION    Penicillins Other (See Comments)     THRUSH PER PATIENT    Pineapple Extract Hives    Sulfa Antibiotics Other (See Comments)     THRUSH PER PATIENT    Yeast-Derived Drug Products Hives    Adhesive Tape Unknown - Low Severity and Rash    Gold-Containing Drug Products Rash          Latex Rash     Social History:   Social History     Tobacco Use    Smoking status: Never     Passive exposure: Never    Smokeless tobacco: Never   Substance Use Topics    Alcohol use: Yes     Alcohol/week: 1.0 standard drink of alcohol     Types: 1 Glasses of wine per week     Comment: WEEKLY      Family History:  Family History   Problem Relation Age of Onset    Asthma Mother     Thyroid disease Father     Diabetes Maternal Grandmother     Breast cancer Maternal Aunt     Colon cancer Maternal Grandfather     Malig Hyperthermia Neg Hx           Review of Systems  See history of present illness and past medical history.   Constitutional:  Positive for fever (Temperature 100 at triage).   Respiratory:  Negative for shortness of breath.    Cardiovascular:  Negative for chest pain.   Musculoskeletal:  Positive for back pain.   Skin:         Recent skin rash with erythema and redness to both lower extremities.  Symptoms markedly improved on antibiotics, initially clindamycin now doxycycline   All other systems reviewed and are negative.    Vitals:   /66   Pulse 72   Temp 100 °F (37.8 °C) (Tympanic)   Resp 16   SpO2 96%   I/O: No  intake or output data in the 24 hours ending 03/23/25 1540  Exam:  Patient is examined using the personal protective equipment as per guidelines from infection control for this particular patient as enacted.  Hand washing was performed before and after patient interaction.  General Appearance:  Alert cooperative morbidly obese nontoxic-appearing female who is with her family members at the bedside.   Head:    Normocephalic, without obvious abnormality, atraumatic   Eyes:    PERRL, conjunctiva/corneas clear, EOM's intact, both eyes   Ears:    Normal external ear canals, both ears   Nose:   Nares normal, septum midline, mucosa normal, no drainage    or sinus tenderness   Throat:   Lips, tongue, gums normal; oral mucosa pink and moist   Neck:   Supple, symmetrical, trachea midline, no adenopathy;     thyroid:  no enlargement/tenderness/nodules; no carotid    bruit or JVD   Back:   Lumbar paraspinal muscle spasm but no midline tenderness.     Lungs:     Clear to auscultation bilaterally, respirations unlabored   Chest Wall:    No tenderness or deformity    Heart:  S1-S2 regular   Abdomen:   Obese soft nontender   Extremities: Mild erythema of bilateral lower extremity with no warmth or tenderness.  Bilateral lower extremity trace edema noted.   Pulses:   Pulses palpable in all extremities; symmetric all extremities   Skin: No obvious cellulitic changes involving the lower extremity noted   Neurologic: Alert and oriented x 3 gait not tested.       Data Review:      I reviewed the patient's new clinical results.  Results from last 7 days   Lab Units 03/23/25  1325 03/21/25  0952   WBC 10*3/mm3 9.76 6.70   HEMOGLOBIN g/dL 9.9* 10.3*   PLATELETS 10*3/mm3 223 267     Results from last 7 days   Lab Units 03/23/25  1325 03/21/25  0952   SODIUM mmol/L 134* 139   POTASSIUM mmol/L 3.7 3.6   CHLORIDE mmol/L 100 106   CO2 mmol/L 25.3 26.4   BUN mg/dL 14 12   CREATININE mg/dL 1.05* 0.86   CALCIUM mg/dL 8.6 8.9   GLUCOSE mg/dL 107*  110*     US Venous Doppler Lower Extremity Bilateral (duplex)  Result Date: 3/15/2025  Electronically signed by Gama Guevara M.D. on 03-15-25 at 2033    XR Chest 1 View  Result Date: 3/15/2025  No acute cardiopulmonary process  This report was finalized on 3/15/2025 2:59 PM by Dr. Aron Germain M.D on Workstation: [id number]            Assessment:  Active Hospital Problems    Diagnosis  POA    Acute low back pain [M54.50]  Unknown    Cellulitis of leg [L03.119]  Unknown    Anxiety [F41.9]  Yes    Anemia [D64.9]  Yes    HTN (hypertension) [I10]  Yes       Medical decision making/care plan: See admitting orders  Acute low back pain-this likely represent acute paraspinal muscle spasm due to physical activity and subsequent pain in the setting of likely chronic degenerative disease and morbid obesity further exacerbated by hampered movement due to issues with cellulitis of the lower extremity and edema causing her put further strain in her back.  Her lack of radiation and sensory symptoms involving the perineal area argues against significant disc herniation or radicular process.  There is a chance given her low-grade fever and recent cellulitis of the lower extremity that she may have emetogenic seeding in her causing this presentation that she does not appear to be toxic or septic and does not recall high fever and chills and shakes in the past.  Plan is to continue with pain control, physical therapy, MRI of her back and muscle relaxants.  Provided with continuous pulse ox monitoring while she is receiving pain medication given her body habitus and increased risk of sleep apnea and CO2 retention.  Bilateral lower extremity cellulitis-according to her it is all improved-continue her remaining doses of doxycycline.  Chronic anemia-plan is to monitor.  Hypertension-continue her home regimen consisting of losartan, hold Lasix   Anxiety and depression-continue her Trintellix.  Gastroesophageal reflux  disease-continue her Pepcid.  Renal insufficiency-marked multifactorial including recent introduction of diuretics-provided with cautious hydration and avoid nephrotoxic agents and hypotensive episodes.  Hima Trejo MD   3/23/2025  15:40 EDT    Parts of this note may be an electronic transcription/translation of spoken language to printed text using the Dragon dictation system.      Electronically signed by Hima Trejo MD at 03/24/25 0515          Emergency Department Notes        Shiloh Sanchez, RN at 03/23/25 1612          Nursing report ED to floor  Pedrito Powell  64 y.o.  female    HPI :  HPI  Stated Reason for Visit: cellulitis last saturday bilat legs, PCP Lasix, took one dose of that and now having extreme back pain, unable to walk.    Chief Complaint  Chief Complaint   Patient presents with    Back Pain       Admitting doctor:   Hima Trejo MD    Admitting diagnosis:   The primary encounter diagnosis was Intractable low back pain. Diagnoses of Acute febrile illness and Bilateral lower extremity edema were also pertinent to this visit.    Code status:   Current Code Status       Date Active Code Status Order ID Comments User Context       Prior            Allergies:   Cashew nut oil, Chocolate, Nickel, Nuts, Tree nut, Fleetwood, Fleetwood oil, Bacitracin, Baclofen, Chlorhexidine, Ciprofloxacin, Citric acid, Covid-19 (mrna) vaccine, Egg-derived products, Influenza vaccines, Methyldibromoglutaronitrile, Naproxen, Neomycin, Omnicef [cefdinir], Palladium chloride, Penicillins, Pineapple extract, Sulfa antibiotics, Yeast-derived drug products, Adhesive tape, Gold-containing drug products, and Latex    Isolation:   No active isolations    Intake and Output  No intake or output data in the 24 hours ending 03/23/25 1612    Weight:   There were no vitals filed for this visit.    Most recent vitals:   Vitals:    03/23/25 1309 03/23/25 1312 03/23/25 1501   BP:  159/95 135/66   BP Location:  Left arm    Patient  Position:  Sitting    Pulse: 85  72   Resp: 16     Temp: 100 °F (37.8 °C)     TempSrc: Tympanic     SpO2: 97%  96%       Active LDAs/IV Access:   Lines, Drains & Airways       Active LDAs       Name Placement date Placement time Site Days    Peripheral IV 03/23/25 1508 Right Antecubital 03/23/25  1508  Antecubital  less than 1                    Labs (abnormal labs have a star):   Labs Reviewed   COMPREHENSIVE METABOLIC PANEL - Abnormal; Notable for the following components:       Result Value    Glucose 107 (*)     Creatinine 1.05 (*)     Sodium 134 (*)     eGFR 59.5 (*)     All other components within normal limits    Narrative:     GFR Categories in Chronic Kidney Disease (CKD)      GFR Category          GFR (mL/min/1.73)    Interpretation  G1                     90 or greater         Normal or high (1)  G2                      60-89                Mild decrease (1)  G3a                   45-59                Mild to moderate decrease  G3b                   30-44                Moderate to severe decrease  G4                    15-29                Severe decrease  G5                    14 or less           Kidney failure          (1)In the absence of evidence of kidney disease, neither GFR category G1 or G2 fulfill the criteria for CKD.    eGFR calculation 2021 CKD-EPI creatinine equation, which does not include race as a factor   CBC WITH AUTO DIFFERENTIAL - Abnormal; Notable for the following components:    RBC 3.28 (*)     Hemoglobin 9.9 (*)     Hematocrit 30.0 (*)     Lymphocyte % 12.9 (*)     Neutrophils, Absolute 7.14 (*)     All other components within normal limits   C-REACTIVE PROTEIN - Abnormal; Notable for the following components:    C-Reactive Protein 3.17 (*)     All other components within normal limits   PROCALCITONIN - Normal    Narrative:     As a Marker for Sepsis (Non-Neonates):    1. <0.5 ng/mL represents a low risk of severe sepsis and/or septic shock.  2. >2 ng/mL represents a high risk of  "severe sepsis and/or septic shock.    As a Marker for Lower Respiratory Tract Infections that require antibiotic therapy:    PCT on Admission    Antibiotic Therapy       6-12 Hrs later    >0.5                Strongly Recommended  >0.25 - <0.5        Recommended   0.1 - 0.25          Discouraged              Remeasure/reassess PCT  <0.1                Strongly Discouraged     Remeasure/reassess PCT    As 28 day mortality risk marker: \"Change in Procalcitonin Result\" (>80% or <=80%) if Day 0 (or Day 1) and Day 4 values are available. Refer to http://www.Zinc softwareAMG [facility]-pct-calculator.com    Change in PCT <=80%  A decrease of PCT levels below or equal to 80% defines a positive change in PCT test result representing a higher risk for 28-day all-cause mortality of patients diagnosed with severe sepsis for septic shock.    Change in PCT >80%  A decrease of PCT levels of more than 80% defines a negative change in PCT result representing a lower risk for 28-day all-cause mortality of patients diagnosed with severe sepsis or septic shock.      LACTIC ACID, PLASMA - Normal   SEDIMENTATION RATE - Normal   BLOOD CULTURE   BLOOD CULTURE   RESPIRATORY PANEL PCR W/ COVID-19 (SARS-COV-2), NP SWAB IN UTM/VTP, 2 HR TAT   RAINBOW DRAW    Narrative:     The following orders were created for panel order [place] Draw.  Procedure                               Abnormality         Status                     ---------                               -----------         ------                     Green Top (Gel)[[id number]]                                  Final result               Lavender Top[[id number]]                                     Final result               Gold Top - SST[[id number]]                                   Final result               Light Blue Top[[id number]]                                   Final result                 Please view results for these tests on the individual orders.   CBC AND DIFFERENTIAL    Narrative:     The " following orders were created for panel order CBC & Differential.  Procedure                               Abnormality         Status                     ---------                               -----------         ------                     CBC Auto Differential[[id number]]        Abnormal            Final result                 Please view results for these tests on the individual orders.   GREEN TOP   LAVENDER TOP   GOLD TOP - SST   LIGHT BLUE TOP       EKG:   No orders to display       Meds given in ED:   Medications   HYDROmorphone (DILAUDID) injection 1 mg (1 mg Intravenous Given 3/23/25 1508)   ondansetron (ZOFRAN) injection 4 mg (4 mg Intravenous Given 3/23/25 1508)       Imaging results:  No radiology results for the last day    Ambulatory status:   - standby    Social issues:   Social History     Socioeconomic History    Marital status:     Number of children: 3    Years of education: College   Tobacco Use    Smoking status: Never     Passive exposure: Never    Smokeless tobacco: Never   Vaping Use    Vaping status: Never Used   Substance and Sexual Activity    Alcohol use: Yes     Alcohol/week: 1.0 standard drink of alcohol     Types: 1 Glasses of wine per week     Comment: WEEKLY    Drug use: No    Sexual activity: Defer       Peripheral Neurovascular  Peripheral Neurovascular (Adult)  Peripheral Neurovascular WDL: .WDL except  Additional Documentation: Edema (Group)  Edema  Edema: leg, left, leg, right, ankle, left, ankle, right, foot, right, foot, left  Leg, Left Edema: 3+ (Moderate)  Leg, Right Edema: 3+ (Moderate)  Ankle, Left Edema: 3+ (Moderate)  Ankle, Right Edema: 3+ (Moderate)  Foot, Left Edema: 2+ (Mild)  Foot, Right Edema: 2+ (Mild)    Neuro Cognitive  Neuro Cognitive (Adult)  Cognitive/Neuro/Behavioral WDL: WDL    Learning  Learning Assessment  Learning Readiness and Ability: no barriers identified  Education Provided  Person Taught: patient    Respiratory  Respiratory WDL  Respiratory  WDL: WDL    Abdominal Pain       Pain Assessments  Pain (Adult)  (0-10) Pain Rating: Rest: 10  Pain Location: back    NIH Stroke Scale       Shiloh Sanchez RN  03/23/25 16:12 EDT      Electronically signed by Shiloh Sanchez RN at 03/23/25 1612       Aditya Niño MD at 03/23/25 1450           EMERGENCY DEPARTMENT ENCOUNTER  Room Number:  31/31  PCP: Teo Fraser MD  Independent Historians: Patient, daughter at bedside      HPI:  Chief Complaint: had concerns including Back Pain.     A complete HPI/ROS/PMH/PSH/SH/FH are unobtainable due to:   Chronic or social conditions impacting patient care (Social Determinants of Health):       Context: Pedrito Powell is a 64 y.o. female with a medical history of morbid obesity, hypertension, asthma, chronic anemia who presents to the ED c/o acute lower back pain, cannot walk.  Patient had abrupt onset of lower back pain last night.  Pain is in the lower back and does not really radiate to the legs that much.  Pain is markedly increased when standing or walking.  Pain is mild when laying flat.  Patient states pain started shortly after initiating 20 mg of Lasix which was prescribed for increasing lower extremity edema.  She denies any injury to the back.  Denies history of prior back problems.  She has had low-grade fever off and on over the last week or so.  Has not measured recent fever other than 100 at triage.  Denies history of IV drug abuse.  She has a  at a local [employer] school.      Review of prior external notes (non-ED) -and- Review of prior external test results outside of this encounter:   I reviewed prior medical records including admission from December of this year or patient was admitted with hypoxia and COVID infection.  Chronic medical problems include hypertension and chronic renal disease.  She was recently seen by primary care provider, Dr. Morgan and he and started on doxycycline and some Lasix for some cellulitis and lower  extremity edema.    Prescription drug monitoring program review:         PAST MEDICAL HISTORY  Active Ambulatory Problems     Diagnosis Date Noted    Hx of anaphylaxis 2018    Primary osteoarthritis of knee 2018    High risk medication use 2018    HTN (hypertension) 2018    Hyperlipidemia 2018    Vertigo 2018    Infected blister of left foot 2018    Ganglion of right wrist 2018    Vomiting and diarrhea 2018    Intractable vomiting with nausea 2018    Fatigue 2019    Acute gastritis without hemorrhage 2019    Anemia 2019    Elevated serum creatinine 2019    Fungal rash of torso 10/28/2019    Cellulitis of abdominal wall 10/28/2019    Bronchitis 10/28/2019    Arthritis of shoulder 2021    Chronic renal failure in pediatric patient, stage 3 (moderate) 2021    H/O shoulder surgery 2021    Anxiety 2021    Depression 2021    Iron deficiency 11/15/2023    Dizziness 2024    Hypoxia 2024    COVID-19 virus infection 2024     Resolved Ambulatory Problems     Diagnosis Date Noted    No Resolved Ambulatory Problems     Past Medical History:   Diagnosis Date    Arthritis     Asthma     Chronic kidney disease     Elevated cholesterol     GERD (gastroesophageal reflux disease)     History of carpal tunnel syndrome     Hypertension     Left shoulder pain     Limited mobility     Weakness          PAST SURGICAL HISTORY  Past Surgical History:   Procedure Laterality Date    CARPAL TUNNEL RELEASE Left 10/02/2023     SECTION  , ,     COLONOSCOPY N/A 12/10/2018    normal ileum, IH, hyperplastic polyp, otherwise normal exam    ENDOSCOPY N/A 12/10/2018    no gross lesions in esophagus or examined duodenum, erythematous mucosa in stomach, biopsies benign    HYSTERECTOMY      OOPHORECTOMY Bilateral     SHOULDER MANIPULATION Left     TOTAL SHOULDER ARTHROPLASTY Left 2021     Procedure: REVERSE TOTAL SHOULDER ARTHROPLASTY,  BICEP TENDONDESIS;  Surgeon: Bethel Devi MD;  Location: [facility] OR;  Service: Orthopedics;  Laterality: Left;    TRIGGER FINGER RELEASE Left     3 fingers         FAMILY HISTORY  Family History   Problem Relation Age of Onset    Asthma Mother     Thyroid disease Father     Diabetes Maternal Grandmother     Breast cancer Maternal Aunt     Colon cancer Maternal Grandfather     Malig Hyperthermia Neg Hx          SOCIAL HISTORY  Social History     Socioeconomic History    Marital status:     Number of children: 3    Years of education: College   Tobacco Use    Smoking status: Never     Passive exposure: Never    Smokeless tobacco: Never   Vaping Use    Vaping status: Never Used   Substance and Sexual Activity    Alcohol use: Yes     Alcohol/week: 1.0 standard drink of alcohol     Types: 1 Glasses of wine per week     Comment: WEEKLY    Drug use: No    Sexual activity: Defer         ALLERGIES  Cashew nut oil, Chocolate, Nickel, Nuts, Tree nut, Milligan, Milligan oil, Bacitracin, Baclofen, Chlorhexidine, Ciprofloxacin, Citric acid, Covid-19 (mrna) vaccine, Egg-derived products, Influenza vaccines, Methyldibromoglutaronitrile, Naproxen, Neomycin, Omnicef [cefdinir], Palladium chloride, Penicillins, Pineapple extract, Sulfa antibiotics, Yeast-derived drug products, Adhesive tape, Gold-containing drug products, and Latex      REVIEW OF SYSTEMS  Review of Systems   Constitutional:  Positive for fever (Temperature 100 at triage).   Respiratory:  Negative for shortness of breath.    Cardiovascular:  Negative for chest pain.   Musculoskeletal:  Positive for back pain.   Skin:         Recent skin rash with erythema and redness to both lower extremities.  Symptoms markedly improved on antibiotics, initially clindamycin now doxycycline   All other systems reviewed and are negative.    Included in HPI  All systems reviewed and negative except for those discussed in  HPI.      PHYSICAL EXAM    I have reviewed the triage vital signs and nursing notes.    ED Triage Vitals   Temp Heart Rate Resp BP SpO2   03/23/25 1309 03/23/25 1309 03/23/25 1309 03/23/25 1312 03/23/25 1309   100 °F (37.8 °C) 85 16 159/95 97 %      Temp src Heart Rate Source Patient Position BP Location FiO2 (%)   03/23/25 1309 -- 03/23/25 1312 03/23/25 1312 --   Tympanic  Sitting Left arm        Physical Exam  GENERAL: Alert well-appearing female no obvious distress.  Triage vitals reviewed notable for temperature 100.  Heart rate 85, blood pressure 159/95.  O2 sats 97% on room air  SKIN: Warm, dry  HENT: Normocephalic, atraumatic  EYES: no scleral icterus  CV: regular rhythm, regular rate-no murmur  RESPIRATORY: normal effort, lungs clear-O2 sats upper 90s room air  ABDOMEN: soft, nontender, morbidly obese   MUSCULOSKELETAL: spine-mild diffuse lumbar spine tenderness to palpation, no noted bony deformity  Lower extremities-good range of motion, no noted bony deformity  NEURO: alert, moves all extremities, follows commands  Strength and sensation are intact throughout bilateral upper and lower extremities    LAB RESULTS  Recent Results (from the past 24 hours)   Comprehensive Metabolic Panel    Collection Time: 03/23/25  1:25 PM    Specimen: Arm, Left; Blood   Result Value Ref Range    Glucose 107 (H) 65 - 99 mg/dL    BUN 14 8 - 23 mg/dL    Creatinine 1.05 (H) 0.57 - 1.00 mg/dL    Sodium 134 (L) 136 - 145 mmol/L    Potassium 3.7 3.5 - 5.2 mmol/L    Chloride 100 98 - 107 mmol/L    CO2 25.3 22.0 - 29.0 mmol/L    Calcium 8.6 8.6 - 10.5 mg/dL    Total Protein 6.9 6.0 - 8.5 g/dL    Albumin 4.0 3.5 - 5.2 g/dL    ALT (SGPT) 15 1 - 33 U/L    AST (SGOT) 20 1 - 32 U/L    Alkaline Phosphatase 74 39 - 117 U/L    Total Bilirubin 0.7 0.0 - 1.2 mg/dL    Globulin 2.9 gm/dL    A/G Ratio 1.4 g/dL    BUN/Creatinine Ratio 13.3 7.0 - 25.0    Anion Gap 8.7 5.0 - 15.0 mmol/L    eGFR 59.5 (L) >60.0 mL/min/1.73   Green Top (Gel)     Collection Time: 03/23/25  1:25 PM   Result Value Ref Range    Extra Tube Hold for add-ons.    Lavender Top    Collection Time: 03/23/25  1:25 PM   Result Value Ref Range    Extra Tube hold for add-on    Gold Top - SST    Collection Time: 03/23/25  1:25 PM   Result Value Ref Range    Extra Tube Hold for add-ons.    Light Blue Top    Collection Time: 03/23/25  1:25 PM   Result Value Ref Range    Extra Tube Hold for add-ons.    CBC Auto Differential    Collection Time: 03/23/25  1:25 PM    Specimen: Arm, Left; Blood   Result Value Ref Range    WBC 9.76 3.40 - 10.80 10*3/mm3    RBC 3.28 (L) 3.77 - 5.28 10*6/mm3    Hemoglobin 9.9 (L) 12.0 - 15.9 g/dL    Hematocrit 30.0 (L) 34.0 - 46.6 %    MCV 91.5 79.0 - 97.0 fL    MCH 30.2 26.6 - 33.0 pg    MCHC 33.0 31.5 - 35.7 g/dL    RDW 12.8 12.3 - 15.4 %    RDW-SD 42.4 37.0 - 54.0 fl    MPV 8.8 6.0 - 12.0 fL    Platelets 223 140 - 450 10*3/mm3    Neutrophil % 73.2 42.7 - 76.0 %    Lymphocyte % 12.9 (L) 19.6 - 45.3 %    Monocyte % 9.2 5.0 - 12.0 %    Eosinophil % 3.8 0.3 - 6.2 %    Basophil % 0.6 0.0 - 1.5 %    Immature Grans % 0.3 0.0 - 0.5 %    Neutrophils, Absolute 7.14 (H) 1.70 - 7.00 10*3/mm3    Lymphocytes, Absolute 1.26 0.70 - 3.10 10*3/mm3    Monocytes, Absolute 0.90 0.10 - 0.90 10*3/mm3    Eosinophils, Absolute 0.37 0.00 - 0.40 10*3/mm3    Basophils, Absolute 0.06 0.00 - 0.20 10*3/mm3    Immature Grans, Absolute 0.03 0.00 - 0.05 10*3/mm3    nRBC 0.0 0.0 - 0.2 /100 WBC   Procalcitonin    Collection Time: 03/23/25  1:25 PM    Specimen: Arm, Left; Blood   Result Value Ref Range    Procalcitonin 0.03 0.00 - 0.25 ng/mL   Sedimentation Rate    Collection Time: 03/23/25  1:25 PM    Specimen: Arm, Left; Blood   Result Value Ref Range    Sed Rate 26 0 - 30 mm/hr   C-reactive Protein    Collection Time: 03/23/25  1:25 PM    Specimen: Arm, Left; Blood   Result Value Ref Range    C-Reactive Protein 3.17 (H) 0.00 - 0.50 mg/dL   Lactic Acid, Plasma    Collection Time: 03/23/25  3:07 PM     Specimen: Blood   Result Value Ref Range    Lactate 1.0 0.5 - 2.0 mmol/L         RADIOLOGY  No Radiology Exams Resulted Within Past 24 Hours      MEDICATIONS GIVEN IN ER  Medications   HYDROmorphone (DILAUDID) injection 1 mg (1 mg Intravenous Given 3/23/25 1508)   ondansetron (ZOFRAN) injection 4 mg (4 mg Intravenous Given 3/23/25 1508)         ORDERS PLACED DURING THIS VISIT:  Orders Placed This Encounter   Procedures    Blood Culture - Blood,    Blood Culture - Blood,    Respiratory Panel PCR w/COVID-19(SARS-CoV-2) KAYLEY/TORITO/EBONIE/PAD/COR/MITZY In-House, NP Swab in UTM/VTM, 2 HR TAT - Swab, Nasopharynx    MRI Lumbar Spine With & Without Contrast    [place] Draw    Comprehensive Metabolic Panel    CBC Auto Differential    Procalcitonin    Lactic Acid, Plasma    Sedimentation Rate    C-reactive Protein    [facility] (on-call MD unless specified) Details    Initiate Observation Status    Inpatient Admission    CBC & Differential    Green Top (Gel)    Lavender Top    Gold Top - SST    Light Blue Top         OUTPATIENT MEDICATION MANAGEMENT:  Current Facility-Administered Medications Ordered in Epic   Medication Dose Route Frequency Provider Last Rate Last Admin    Chlorhexidine Gluconate 2 % pads 1 each  1 each Apply externally Take As Directed Bethel Devi MD         Current Outpatient Medications Ordered in Epic   Medication Sig Dispense Refill    acetaminophen (TYLENOL) 500 MG tablet Take 1 tablet by mouth Every 6 (Six) Hours As Needed for Mild Pain. 30 tablet 0    albuterol sulfate  (90 Base) MCG/ACT inhaler Inhale 2 puffs Every 4 (Four) Hours As Needed for Wheezing. 18 g 0    amitriptyline (ELAVIL) 25 MG tablet Take 1 tablet by mouth At Night As Needed for Sleep.      Arnuity Ellipta 200 MCG/ACT Daily.      ASPIRIN 81 PO Take  by mouth.      atorvastatin (LIPITOR) 80 MG tablet Take 1 tablet by mouth Daily. 90 tablet 1    azelastine (ASTELIN) 0.1 % nasal spray Administer 1 spray into the nostril(s) as directed  by provider Daily.  11    B Complex Vitamins (B COMPLEX PO) Take 1 capsule by mouth Daily. HOLD FOR SURGERY 1 WEEK      benzonatate (TESSALON) 100 MG capsule Take 1 capsule by mouth 3 (Three) Times a Day As Needed for Cough. 10 capsule 0    Cholecalciferol (Vitamin D3) 50 MCG (2000 UT) capsule TAKE 1 CAPSULE BY MOUTH EVERY DAY 90 capsule 3    clobetasol (TEMOVATE) 0.05 % cream Apply 1 Application topically to the appropriate area as directed As Needed.      Diclofenac Sodium (VOLTAREN) 1 % gel gel Apply 4 g topically to the appropriate area as directed 4 (Four) Times a Day As Needed (foot pain). 50 g 0    doxycycline (VIBRAMYCIN) 100 MG capsule Take 1 capsule by mouth 2 (Two) Times a Day for 5 days. 10 capsule 0    EPINEPHrine (AUVI-Q IJ) Inject  as directed As Needed.      famotidine (PEPCID) 20 MG tablet Take 1 tablet by mouth.      furosemide (Lasix) 20 MG tablet Take 1 tablet by mouth Daily for 4 days. 4 tablet 0    hydrocortisone 2.5 % cream Apply  topically to the appropriate area as directed As Needed.      ipratropium (ATROVENT) 0.03 % nasal spray Administer 1 spray into the nostril(s) as directed by provider 3 (Three) Times a Day.      irbesartan (AVAPRO) 300 MG tablet Take 1 tablet by mouth Daily. 90 tablet 1    loperamide (IMODIUM) 2 MG capsule Take 1 capsule by mouth Every 2 (Two) Hours As Needed for Diarrhea (max 4 doses daily).      meclizine (ANTIVERT) 25 MG tablet TAKE 1 TABLET BY MOUTH THREE TIMES DAILY AS NEEDED FOR DIZZINESS 270 tablet 0    ondansetron (Zofran) 4 MG tablet Take 1 tablet by mouth Every 8 (Eight) Hours As Needed for Nausea or Vomiting. 42 tablet 4    potassium chloride (KLOR-CON M20) 20 MEQ CR tablet Take 1 tablet by mouth Daily. 4 tablet 0    traMADol (ULTRAM) 50 MG tablet TAKE 1 TABLET BY MOUTH TWICE DAILY AS NEEDED FOR MODERATE PAIN 180 tablet 1    triamcinolone (KENALOG) 0.1 % ointment Apply 1 Application topically to the appropriate area as directed As Needed.      Vortioxetine  HBr (Trintellix) 10 MG tablet tablet Take 1 tablet by mouth Daily With Breakfast. 90 tablet 3         PROCEDURES  Procedures            PROGRESS, DATA ANALYSIS, CONSULTS, AND MEDICAL DECISION MAKING  All labs have been independently interpreted by me.  All radiology studies have been reviewed by me. All EKG's have been independently viewed and interpreted by me.  Discussion below represents my analysis of pertinent findings related to patient's condition, differential diagnosis, treatment plan and final disposition.    Differential diagnosis includes but is not limited to lumbar sprain, lumbar disc disease, epidural spinal abscess.      ED Course as of 03/23/25 1546   Sun Mar 23, 2025   1543 Care of this patient discussed with Dr. Trejo who admit on behalf of [facility] [DB]   1544 Patient was given IV Dilaudid and Zofran to help with pain and nausea.  She did have significantly if after these medications [DB]      ED Course User Index  [DB] Aditya Niño MD             AS OF 15:46 EDT VITALS:    BP - 135/66  HR - 72  TEMP - 100 °F (37.8 °C) (Tympanic)  O2 SATS - 96%    COMPLEXITY OF CARE  64-year-old female with history of obesity, hypertension, sleep apnea presents with lower back pain to the point that she has difficulty standing and walking secondary to pain.  Patient was febrile at triage at 100.0.    I did independently evaluate and interpret all ED testing notable for the following:    CBC without significant leukocytosis, chronic anemia unchanged from baseline  Chemistries benign without significant hepatic or renal failure.  Sed rate and procalcitonin normal which would go against serious underlying bacterial infection.  There was elevated CRP of 3.17 unclear clinical significance    Patient was treated with IV Dilaudid and Zofran which helped her pain significantly.  Given her fever and significant pain she will be admitted for MRI of the spine with and without contrast as well as further  evaluations.      DIAGNOSIS  Final diagnoses:   Intractable low back pain   Acute febrile illness   Bilateral lower extremity edema         DISPOSITION  ED Disposition       ED Disposition   Decision to Admit    Condition   --    Comment   Level of Care: Med/Surg [1]   Diagnosis: Acute back pain [[id number]]   Certification: I Certify That Inpatient Hospital Services Are Medically Necessary For Greater Than 2 Midnights                  Please note that portions of this document were completed with a voice recognition program.    Note Disclaimer: At [facility], we believe that sharing information builds trust and better relationships. You are receiving this note because you recently visited [facility]. It is possible you will see health information before a provider has talked with you about it. This kind of information can be easy to misunderstand. To help you fully understand what it means for your health, we urge you to discuss this note with your provider.         Aditya Niño MD  03/23/25 1546      Electronically signed by Aditya Niño MD at 03/23/25 1546       Caroline Gutierrez, RN at 03/23/25 1307          Pt to ED via PCP with c/o cellulitis last saturday bilat legs- took antibx, PCP on Friday and was given Lasix, took one dose of that and now having extreme back pain, unable to walk.     Electronically signed by Caroline Gutierrez, RN at 03/23/25 1308       Medication Administration Report for Pedrito Powell as of 3/28/25 through 3/28/25     Legend:    Given Hold Not Given Due Canceled Entry Other Actions    Time Time (Time) Time Time-Action         Discontinued     Completed     Future     MAR Hold     Linked             Medications 03/28/25      acetaminophen (TYLENOL) tablet 650 mg  Dose: 650 mg  Freq: Every 4 Hours PRN Route: PO  PRN Reason: Mild Pain  Start: 03/23/25 1821     Admin Instructions:   If given for fever, use fever parameter: fever greater than 100.4 °F  Based on patient  request - if ordered for moderate or severe pain, provider allows for administration of a medication prescribed for a lower pain scale.    Do not exceed 4 grams of acetaminophen in a 24 hr period. Max dose of 2gm for AST/ALT greater than 120 units/L.    If given for pain, use the following pain scale:   Mild Pain = Pain Score of 1-3, CPOT 1-2  Moderate Pain = Pain Score of 4-6, CPOT 3-4  Severe Pain = Pain Score of 7-10, CPOT 5-8         Or   acetaminophen (TYLENOL) 160 MG/5ML oral solution 650 mg  Dose: 650 mg  Freq: Every 4 Hours PRN Route: PO  PRN Reason: Mild Pain  Start: 03/23/25 1821     Admin Instructions:   If given for fever, use fever parameter: fever greater than 100.4 °F  Based on patient request - if ordered for moderate or severe pain, provider allows for administration of a medication prescribed for a lower pain scale.    Do not exceed 4 grams of acetaminophen in a 24 hr period. Max dose of 2gm for AST/ALT greater than 120 units/L.    If given for pain, use the following pain scale:   Mild Pain = Pain Score of 1-3, CPOT 1-2  Moderate Pain = Pain Score of 4-6, CPOT 3-4  Severe Pain = Pain Score of 7-10, CPOT 5-8         Or   acetaminophen (TYLENOL) suppository 650 mg  Dose: 650 mg  Freq: Every 4 Hours PRN Route: RE  PRN Reason: Mild Pain  Start: 03/23/25 1821     Admin Instructions:   If given for fever, use fever parameter: fever greater than 100.4 °F  Based on patient request - if ordered for moderate or severe pain, provider allows for administration of a medication prescribed for a lower pain scale.    Do not exceed 4 grams of acetaminophen in a 24 hr period. Max dose of 2gm for AST/ALT greater than 120 units/L.    If given for pain, use the following pain scale:   Mild Pain = Pain Score of 1-3, CPOT 1-2  Moderate Pain = Pain Score of 4-6, CPOT 3-4  Severe Pain = Pain Score of 7-10, CPOT 5-8         albuterol sulfate HFA (PROVENTIL HFA;VENTOLIN HFA;PROAIR HFA) inhaler 2 puff  Dose: 2 puff  Freq:  Every 4 Hours PRN Route: IN  PRN Reason: Wheezing  Start: 03/23/25 1821     Admin Instructions:   Include Respiratory Treatment Education   Shake well.         amitriptyline (ELAVIL) tablet 25 mg  Dose: 25 mg  Freq: Nightly PRN Route: PO  PRN Reason: Sleep  Start: 03/23/25 1821         atorvastatin (LIPITOR) tablet 80 mg  Dose: 80 mg  Freq: Daily Route: PO  Start: 03/24/25 0900     Admin Instructions:   Avoid grapefruit juice.      0835-Given                   azelastine (ASTELIN) nasal spray 1 spray  Dose: 1 spray  Freq: Daily Route: EACH NARE  Start: 03/24/25 0900      0840-Given                   benzonatate (TESSALON) capsule 100 mg  Dose: 100 mg  Freq: 3 Times Daily PRN Route: PO  PRN Reason: Cough  Start: 03/23/25 1821     Admin Instructions:   Do not crush or chew the capsules or tablets. The drug may not work as designed if the capsule or tablet is crushed or chewed. Swallow whole.  Swallow whole.  Do not crush, chew, or open capsule.          sennosides-docusate (PERICOLACE) 8.6-50 MG per tablet 2 tablet  Dose: 2 tablet  Freq: 2 Times Daily Route: PO  Start: 03/27/25 1530     Admin Instructions:   HOLD MEDICATION IF PATIENT HAS HAD BOWEL MOVEMENT. Start bowel management regimen if patient has not had a bowel movement after 12 hours.      0835-Given     2100                  And   polyethylene glycol (MIRALAX) packet 17 g  Dose: 17 g  Freq: 2 Times Daily Route: PO  Start: 03/27/25 1530     Admin Instructions:   Use if no bowel movement after 12 hours. Mix in 6-8 ounces of water.  Use 4-8 ounces of water, tea, or juice for each 17 gram dose.      0835-Given     2100                  And   bisacodyl (DULCOLAX) EC tablet 5 mg  Dose: 5 mg  Freq: Daily PRN Route: PO  PRN Reason: Constipation  PRN Comment: Use if polyethylene glycol is ineffective  Start: 03/27/25 1440     Admin Instructions:   Use if no bowel movement after 12 hours.  Swallow whole. Do not crush, split, or chew tablet.         And   bisacodyl  "(DULCOLAX) suppository 10 mg  Dose: 10 mg  Freq: Daily PRN Route: RE  PRN Reason: Constipation  PRN Comment: Use if bisacodyl oral is ineffective  Start: 03/27/25 1440     Admin Instructions:   Use if no bowel movement after 12 hours.  Hold for diarrhea      1200                   budesonide (PULMICORT) nebulizer solution 0.5 mg  Dose: 0.5 mg  Freq: 2 Times Daily - RT Route: NEBULIZATION  Start: 03/23/25 2130     Admin Instructions:   Do not shake.  Protect from light.      0920-Given     2130                  Calcium Replacement - Follow Nurse / BPA Driven Protocol  Freq: As Needed Route: XX  PRN Reason: Other  Start: 03/24/25 1114     Admin Instructions:   Open Order & Select \"[facility] Electrolyte Replacement Protocol Algorithm\" to View Details         Calcium Replacement - Follow Nurse / BPA Driven Protocol  Freq: As Needed Route: XX  PRN Reason: Other  Start: 03/23/25 1821     Admin Instructions:   Open Order & Select \"[facility] Electrolyte Replacement Protocol Algorithm\" to View Details         cholecalciferol (VITAMIN D3) tablet 2,000 Units  Dose: 2,000 Units  Freq: Daily Route: PO  Start: 03/24/25 1315      0835-Given                   Diclofenac Sodium (VOLTAREN) 1 % gel 4 g  Dose: 4 g  Freq: 4 Times Daily PRN Route: TOP  PRN Comment: foot pain  Start: 03/23/25 1821     Admin Instructions:   Apply to affected area  .   Do not cover area with occlusive dressing or apply any other med to affected area. Do not wash affected area for 1 hr after applying. Use dosing card for correct dose measurement.         famotidine (PEPCID) tablet 20 mg  Dose: 20 mg  Freq: 2 Times Daily Before Meals Route: PO  Start: 03/23/25 1915      0510-Given     1730                  HYDROcodone-acetaminophen (NORCO) 7.5-325 MG per tablet 1 tablet  Dose: 1 tablet  Freq: Every 4 Hours PRN Route: PO  PRN Reason: Moderate Pain  Start: 03/25/25 1851 End: 03/30/25 1850     Admin Instructions:   Based on patient request - if ordered for moderate or " severe pain, provider allows for administration of a medication prescribed for a lower pain scale.  [EDILIA]    Do not exceed 4 grams of acetaminophen in a 24 hr period. Max dose of 2gm for AST/ALT greater than 120 units/L        If given for pain, use the following pain scale:   Mild Pain = Pain Score of 1-3, CPOT 1-2  Moderate Pain = Pain Score of 4-6, CPOT 3-4  Severe Pain = Pain Score of 7-10, CPOT 5-8      0047-Given     0510-Given                  HYDROmorphone (DILAUDID) injection 0.5 mg  Dose: 0.5 mg  Freq: Every 2 Hours PRN Route: IV  PRN Reason: Severe Pain  Start: 03/23/25 1821     Admin Instructions:   Based on patient request - if ordered for moderate or severe pain, provider allows for administration of a medication prescribed for a lower pain scale.  If given for pain, use the following pain scale:  Mild Pain = Pain Score of 1-3, CPOT 1-2  Moderate Pain = Pain Score of 4-6, CPOT 3-4  Severe Pain = Pain Score of 7-10, CPOT 5-8         ipratropium (ATROVENT) nasal spray 0.03%  Dose: 1 spray  Freq: 3 Times Daily Route: EACH NARE  Start: 03/23/25 2100     Admin Instructions:         0840-Given     1600     2100                 levoFLOXacin (LEVAQUIN) 750 mg/150 mL D5W (premix) (LEVAQUIN) 750 mg  Dose: 750 mg  Freq: Every 24 Hours Route: IV  Indications of Use: PNEUMONIA  Start: 03/26/25 1215 End: 04/02/25 1214     Admin Instructions:   Caution: Look alike/sound alike drug alert. Protect from light. Do NOT refrigerate.      1215                   lidocaine (XYLOCAINE) 1 % injection 5 mL  Dose: 5 mL  Freq: Once Route: INFILTRATION  Start: 03/28/25 1015      1200                   Lidocaine 4 % 3 patch  Dose: 3 patch  Freq: Every 24 Hours Scheduled Route: TD  Start: 03/24/25 1330     Admin Instructions:   Apply to skin on midline low back, jessica SI joints . Remove patch in 12 hours. Apply patch only once for up to 12 hours in 24 hour period. Based on patient request -  if ordered for moderate or severe pain,  "provider allows for administration of a medication prescribed for a lower pain scale.  If given for pain, use the following pain scale:  Mild Pain = Pain Score of 1-3, CPOT 1-2  Moderate Pain = Pain Score of 4-6, CPOT 3-4  Severe Pain = Pain Score of 7-10, CPOT 5-8      0835-Medication Applied     2035 (Medication Removed)                  loperamide (IMODIUM) capsule 2 mg  Dose: 2 mg  Freq: Every 2 Hours PRN Route: PO  PRN Reason: Diarrhea  PRN Comment: max 4 doses daily  Start: 03/23/25 1821     Admin Instructions:   Maximum recommended 16 mg / 24 hours.           LORazepam (ATIVAN) tablet 0.5 mg  Dose: 0.5 mg  Freq: Every 6 Hours PRN Route: PO  PRN Reason: Anxiety  Start: 03/27/25 1355 End: 04/03/25 1354     Admin Instructions:    Caution: Look alike/sound alike drug alert         losartan (COZAAR) tablet 100 mg  Dose: 100 mg  Freq: Every 24 Hours Scheduled Route: PO  Start: 03/24/25 0900      0835-Given                   Magnesium Standard Dose Replacement - Follow Nurse / BPA Driven Protocol  Freq: As Needed Route: XX  PRN Reason: Other  Start: 03/24/25 1114     Admin Instructions:   Open Order & Select \"[facility] Electrolyte Replacement Protocol Algorithm\" to View Details         Magnesium Standard Dose Replacement - Follow Nurse / BPA Driven Protocol  Freq: As Needed Route: XX  PRN Reason: Other  Start: 03/23/25 1821     Admin Instructions:   Open Order & Select \"[facility] Electrolyte Replacement Protocol Algorithm\" to View Details         meclizine (ANTIVERT) tablet 25 mg  Dose: 25 mg  Freq: 3 Times Daily PRN Route: PO  PRN Reason: Dizziness  Start: 03/23/25 1821         methocarbamol (ROBAXIN) tablet 500 mg  Dose: 500 mg  Freq: Every 8 Hours Scheduled Route: PO  Start: 03/24/25 1400      0510-Given     1400     2200                 methylPREDNISolone acetate (DEPO-medrol) injection 80 mg  Dose: 80 mg  Freq: Once Route: IM  Start: 03/28/25 1015     Admin Instructions:   ** Not for IV use **  Caution: Look alike/sound " "alike drug alert      1015                   ondansetron (ZOFRAN) injection 4 mg  Dose: 4 mg  Freq: Every 6 Hours PRN Route: IV  PRN Reasons: Nausea,Vomiting  Start: 03/23/25 1821     Admin Instructions:   \"If multiple N/V medications ordered, use in the following order: Ondansetron, Prochlorperazine, Promethazine. Use PO unless patient refuses or patient unable to swallow.\"         Phosphorus Replacement - Follow Nurse / BPA Driven Protocol  Freq: As Needed Route: XX  PRN Reason: Other  Start: 03/24/25 1114     Admin Instructions:   Open Order & Select \"[facility] Electrolyte Replacement Protocol Algorithm\" to View Details         Phosphorus Replacement - Follow Nurse / BPA Driven Protocol  Freq: As Needed Route: XX  PRN Reason: Other  Start: 03/23/25 1821     Admin Instructions:   Open Order & Select \"[facility] Electrolyte Replacement Protocol Algorithm\" to View Details         Potassium Replacement - Follow Nurse / BPA Driven Protocol  Freq: As Needed Route: XX  PRN Reason: Other  Start: 03/24/25 1114     Admin Instructions:   Open Order & Select \"[facility] Electrolyte Replacement Protocol Algorithm\" to View Details         Potassium Replacement - Follow Nurse / BPA Driven Protocol  Freq: As Needed Route: XX  PRN Reason: Other  Start: 03/23/25 1821     Admin Instructions:   Open Order & Select \"[facility] Electrolyte Replacement Protocol Algorithm\" to View Details         sodium chloride 0.9 % flush 10 mL  Dose: 10 mL  Freq: As Needed Route: IV  PRN Reason: Line Care  Start: 03/23/25 1821         sodium chloride 0.9 % flush 10 mL  Dose: 10 mL  Freq: Every 12 Hours Scheduled Route: IV  Start: 03/23/25 2100      0836-Given     2100                  sodium chloride 0.9 % infusion 40 mL  Dose: 40 mL  Freq: As Needed Route: IV  PRN Reason: Line Care  Start: 03/23/25 1821     Admin Instructions:   Following administration of an IV intermittent medication, flush line with 40mL NS at 100mL/hr.         traMADol (ULTRAM) tablet 50 mg  Dose: 50 " mg  Freq: 2 Times Daily PRN Route: PO  PRN Reason: Moderate Pain  Start: 03/23/25 1821     Admin Instructions:   Based on patient request - if ordered for moderate or severe pain, provider allows for administration of a medication prescribed for a lower pain scale.      Caution: Look alike/sound alike drug alert    If given for pain, use the following pain scale:  Mild Pain = Pain Score of 1-3, CPOT 1-2  Moderate Pain = Pain Score of 4-6, CPOT 3-4  Severe Pain = Pain Score of 7-10, CPOT 5-8         Vortioxetine HBr (TRINTELLIX) tablet 10 mg  Dose: 10 mg  Freq: Daily With Breakfast Route: PO  Start: 03/24/25 0800      0835-Given                   Completed Medications  Medications 03/28/25      doxycycline (MONODOX) capsule 100 mg  Dose: 100 mg  Freq: Every 12 Hours Scheduled Route: PO  Indications of Use: SKIN AND SOFT TISSUE INFECTION  Start: 03/23/25 2100 End: 03/26/25 1034     Admin Instructions:   Take with food if GI upset occurs. Administer 2 hours before or 4 hours after administration of oral polyvalent cations (calcium, zinc, magnesium, iron)         gadobenate dimeglumine (MULTIHANCE) injection 20 mL  Dose: 20 mL  Freq: Once in Imaging Route: IV  Start: 03/23/25 2230 End: 03/23/25 2203     Admin Instructions:   Vesicant; admin as rapid bolus; flush with 5 mL NS after admin or 20 mL for renal or aortoiliofemoral vasculature         HYDROmorphone (DILAUDID) injection 1 mg  Dose: 1 mg  Freq: Once Route: IV  Start: 03/23/25 1511 End: 03/23/25 1508     Admin Instructions:   Based on patient request - if ordered for moderate or severe pain, provider allows for administration of a medication prescribed for a lower pain scale.      Caution: Look alike/sound alike drug alert    If given for pain, use the following pain scale:  Mild Pain = Pain Score of 1-3, CPOT 1-2  Moderate Pain = Pain Score of 4-6, CPOT 3-4  Severe Pain = Pain Score of 7-10, CPOT 5-8         lidocaine (XYLOCAINE) 1 % injection 2 mL  Dose: 2  "mL  Freq: Once Route: ID  Start: 03/27/25 1015 End: 03/27/25 0917         LORazepam (ATIVAN) tablet 0.5 mg  Dose: 0.5 mg  Freq: Once Route: PO  Start: 03/23/25 2000 End: 03/23/25 2113     Admin Instructions:   Give 30 mins before MRI    Caution: Look alike/sound alike drug alert         ondansetron (ZOFRAN) injection 4 mg  Dose: 4 mg  Freq: Once Route: IV  Start: 03/23/25 1511 End: 03/23/25 1508     Admin Instructions:   \"If multiple N/V medications ordered, use in the following order: Ondansetron, Prochlorperazine, Promethazine. Use PO unless patient refuses or patient unable to swallow.\"         sodium chloride 0.9 % bolus 500 mL  Dose: 500 mL  Freq: Once Route: IV  Start: 03/26/25 1145 End: 03/26/25 1836         Discontinued Medications  Medications 03/28/25      acetaminophen (TYLENOL) tablet 500 mg  Dose: 500 mg  Freq: Every 6 Hours PRN Route: PO  PRN Reason: Mild Pain  Start: 03/23/25 1821 End: 03/23/25 1825     Admin Instructions:   If given for fever, use fever parameter: fever greater than 100.4 °F  Based on patient request - if ordered for moderate or severe pain, provider allows for administration of a medication prescribed for a lower pain scale.    Do not exceed 4 grams of acetaminophen in a 24 hr period. Max dose of 2gm for AST/ALT greater than 120 units/L.    If given for pain, use the following pain scale:   Mild Pain = Pain Score of 1-3, CPOT 1-2  Moderate Pain = Pain Score of 4-6, CPOT 3-4  Severe Pain = Pain Score of 7-10, CPOT 5-8         aztreonam (AZACTAM) 2,000 mg in sodium chloride 0.9 % 100 mL MBP  Dose: 2,000 mg  Freq: Every 8 Hours Route: IV  Indications of Use: PNEUMONIA  Start: 03/26/25 2000 End: 03/26/25 1226     Admin Instructions:   Activate vial before using.         aztreonam (AZACTAM) 2,000 mg in sodium chloride 0.9 % 100 mL MBP  Dose: 2,000 mg  Freq: Once Route: IV  Start: 03/26/25 1200 End: 03/26/25 1496     Admin Instructions:   Activate vial before using.         furosemide " (LASIX) tablet 20 mg  Dose: 20 mg  Freq: Daily Route: PO  Start: 03/23/25 1915 End: 03/24/25 0515         HYDROcodone-acetaminophen (NORCO) 5-325 MG per tablet 1 tablet  Dose: 1 tablet  Freq: Every 4 Hours PRN Route: PO  PRN Reasons: Moderate Pain,Severe Pain  Start: 03/24/25 2007 End: 03/25/25 1853     Admin Instructions:   Based on patient request - if ordered for moderate or severe pain, provider allows for administration of a medication prescribed for a lower pain scale.  [EDILIA]    Do not exceed 4 grams of acetaminophen in a 24 hr period. Max dose of 2gm for AST/ALT greater than 120 units/L        If given for pain, use the following pain scale:   Mild Pain = Pain Score of 1-3, CPOT 1-2  Moderate Pain = Pain Score of 4-6, CPOT 3-4  Severe Pain = Pain Score of 7-10, CPOT 5-8         Pharmacy To Dose: Aztreonam  Freq: Continuous PRN Route: XX  PRN Reason: Consult  Start: 03/26/25 1055 End: 03/26/25 1117     Order specific questions:   Pharmacy to dose Aztreonam  Indications of Use: Pneumonia         Pharmacy To Dose: Levofloxacin  Freq: Continuous PRN Route: XX  PRN Reason: Consult  Start: 03/26/25 1056 End: 03/26/25 1117     Order specific questions:   Pharmacy to dose Levofloxacin  Indications of Use: Pneumonia         potassium chloride (KLOR-CON M20) CR tablet 20 mEq  Dose: 20 mEq  Freq: Daily Route: PO  Start: 03/23/25 1915 End: 03/24/25 0515     Admin Instructions:   Do not crush or chew the capsules or tablets. The drug may not work as designed if the capsule or tablet is crushed or chewed. Swallow whole.  Take with food.         sodium chloride 0.9 % infusion  Rate: 100 mL/hr Dose: 100 mL/hr  Freq: Continuous Route: IV  Start: 03/23/25 1915 End: 03/24/25 2023         Vitamin D capsule 2,000 Units  Dose: 2,000 Units  Freq: Daily Route: PO  Start: 03/24/25 0900 End: 03/24/25 1218                        Physician Progress Notes (last 48 hours)        Lindsay Saha PA-C at 03/28/25 0822                    LOS: 2 days     Subjective :   The patient is still experiencing pain in her left knee and left wrist. She states that she was unable to get up and walk with physical therapy due to the significance of her pain in her left knee along with her wrist. The pain in her wrist has decreased significantly. She had a left knee joint aspiration yesterday and had fluid cultures and crystals evaluated. She states this procedure went well. She states that her pain is overall being managed well with medications     Objective :    Vital signs in last 24 hours:  Vitals:    03/27/25 1756 03/27/25 1950 03/27/25 2221 03/27/25 2330   BP:  111/69  125/70   BP Location:  Left arm  Left arm   Patient Position:  Lying  Lying   Pulse:  86 83 86   Resp:  16 16 16   Temp: (!) 101.1 °F (38.4 °C) 99.2 °F (37.3 °C)  98.9 °F (37.2 °C)   TempSrc: Oral Oral  Oral   SpO2:  95% 95% 96%   Weight:       Height:           PHYSICAL EXAM:  Patient is calm, in no acute distress, awake and oriented x 3.    Examination of the left wrist reveals skin is intact.  Mild edema of the wrist and hand is noted.  No ecchymosis, erythema, or warmth noted.  Tenderness to palpation at the distal radius.  Mild pain noted with wrist range of motion.  Hand is warm and well-perfused.  Neurovascularly intact distally.     Examination of the left knee reveals skin is intact with mild effusion and warmth present.  No erythema or ecchymosis noted.  Very limited knee range of motion due to pain.  No exquisite tenderness to palpation today.  Foot is warm and well-perfused.  Neurovascularly intact distally.    LABS:  Results from last 7 days   Lab Units 03/28/25  0523   WBC 10*3/mm3 7.24   HEMOGLOBIN g/dL 9.3*   HEMATOCRIT % 29.1*   PLATELETS 10*3/mm3 276     Results from last 7 days   Lab Units 03/28/25  0523   SODIUM mmol/L 131*   POTASSIUM mmol/L 3.7   CHLORIDE mmol/L 98   CO2 mmol/L 23.9   BUN mg/dL 18   CREATININE mg/dL 0.87   GLUCOSE mg/dL 92   CALCIUM mg/dL 8.5*              ASSESSMENT:  Left knee pain with pseudogout crystals noted    Left wrist pain with edema    Plan:  Continue with conservative management of left wrist pain. Swelling has continued to decrease over the past few days. Left knee preliminary results of fluid culture reveal no growth, moderate WBCs, and no organisms noted. Left knee crystal analysis reveals calcium pyrophosphate. Will proceed with a corticosteroid injection of the left knee at bedside later today. Order placed for injection material to be at bedside.  The patient understands and agrees to this plan. All questions answered.   Dispo planning for home once medically cleared.    Lindsay Saha PA-C    Date: 3/28/2025  Time: 08:55 EDT    Lindsay Saha PA-C     Electronically signed by Lindsay Saha PA-C at 25 0907       Yobani Akbar DO at 25 0805              Name: Pedrito Powell ADMIT: 3/23/2025   : 1960  PCP: Teo Fraser MD    MRN: [id number] LOS: 2 days   AGE/SEX: 64 y.o. female  ROOM: [facility]     Subjective   Subjective   Patient seen and examined.  Hospital day 5, resting in bed, pain better controlled today. Still no BM.      Objective   Objective   Vital Signs  Temp:  [98.9 °F (37.2 °C)-101.1 °F (38.4 °C)] 98.9 °F (37.2 °C)  Heart Rate:  [80-86] 86  Resp:  [16] 16  BP: (111-135)/(69-79) 125/70  SpO2:  [95 %-96 %] 96 %  on  Flow (L/min) (Oxygen Therapy):  [2] 2;   Device (Oxygen Therapy): nasal cannula  Body mass index is 51.07 kg/m².  Physical Exam  Vitals and nursing note reviewed.   Constitutional:       General: She is not in acute distress.     Appearance: She is overweight.   Cardiovascular:      Rate and Rhythm: Normal rate.      Pulses: Normal pulses.      Heart sounds: Normal heart sounds.   Pulmonary:      Effort: Pulmonary effort is normal.      Breath sounds: No wheezing.   Abdominal:      General: Bowel sounds are normal.      Palpations: Abdomen is soft.      Tenderness: There is no abdominal  "tenderness.   Musculoskeletal:         General: Swelling (left wrist) and tenderness present.      Comments: Trace B/L LE edema, Bandage on left knee from aspiration, left knee tenderness improved. Left wrist swelling still noted, but improved.   Skin:     General: Skin is warm and dry.   Neurological:      Mental Status: She is alert.       Results Review     I reviewed the patient's new clinical results.  Results from last 7 days   Lab Units 03/28/25  0523 03/27/25  0320 03/26/25  0602 03/25/25  0546   WBC 10*3/mm3 7.24 9.49 10.87* 10.88*   HEMOGLOBIN g/dL 9.3* 10.0* 10.0* 9.5*   PLATELETS 10*3/mm3 276 238 229 223     Results from last 7 days   Lab Units 03/28/25  0523 03/27/25  0320 03/26/25  0602 03/25/25  0546   SODIUM mmol/L 131* 132* 132* 137   POTASSIUM mmol/L 3.7 4.2 3.9 3.9   CHLORIDE mmol/L 98 97* 97* 100   CO2 mmol/L 23.9 22.6 22.9 24.0   BUN mg/dL 18 14 12 11   CREATININE mg/dL 0.87 0.84 0.70 0.79   GLUCOSE mg/dL 92 96 124* 120*   EGFR mL/min/1.73 74.5 77.7 96.7 83.6     Results from last 7 days   Lab Units 03/24/25  0533 03/23/25  1325 03/21/25  0952   ALBUMIN g/dL 3.1* 4.0 3.7   BILIRUBIN mg/dL 0.8 0.7 0.5   ALK PHOS U/L 65 74 74   AST (SGOT) U/L 16 20 21   ALT (SGPT) U/L 10 15 17     Results from last 7 days   Lab Units 03/28/25  0523 03/27/25  0320 03/26/25  0602 03/25/25  0546 03/24/25  0533 03/23/25  1325 03/23/25  1325 03/21/25  0952   CALCIUM mg/dL 8.5* 8.5* 8.3* 8.2* 8.4*   < > 8.6 8.9   ALBUMIN g/dL  --   --   --   --  3.1*  --  4.0 3.7    < > = values in this interval not displayed.     Results from last 7 days   Lab Units 03/27/25  0320 03/26/25  0602 03/23/25  1507 03/23/25  1325   PROCALCITONIN ng/mL  --  0.21  --  0.03   LACTATE mmol/L 0.9  --  1.0  --      No results found for: \"HGBA1C\", \"POCGLU\"    FL Guided Aspiration Joint  Result Date: 3/27/2025  Technically successful left knee joint aspiration with removal of 22 mL of viscous slightly turbid pale yellow-colored fluid.   Procedure " performed by KALYANI Dunlap. By electronically signing this report, I, the supervising radiologist, attest that I was not present for the procedure(s) but agree with the final edited report.  This report was finalized on 3/27/2025 2:05 PM by Dr. Catalino Monae M.D on Workstation: [id number]      CT Upper Extremity Left Without Contrast  Result Date: 3/26/2025  1. Diffuse soft tissue swelling about the wrist, particular around the radiocarpal joint and the proximal carpal row. Unclear if there is any fluid present. Would suggest evaluation with MRI 2. Curvilinear osseous fragment located along the volar aspect of the triquetrum with some cystic change of the triquetral body. This may be chronic and reflect degenerative change or an old injury, however could also potentially reflect an acute avulsion fracture in the appropriate clinical setting.   Radiation dose reduction techniques were utilized, including automated exposure control and exposure modulation based on body size.   This report was finalized on 3/26/2025 6:25 PM by Dr. Catalino Monae M.D on Workstation: [id number]      CT Lower Extremity Left Without Contrast  Result Date: 3/26/2025  Degenerative changes as described without evidence of acute fracture. There is a moderate knee joint effusion   Radiation dose reduction techniques were utilized, including automated exposure control and exposure modulation based on body size.   This report was finalized on 3/26/2025 6:07 PM by Dr. Catalino Monae M.D on Workstation: [id number]        I have personally reviewed all medications:  Scheduled Medications  atorvastatin, 80 mg, Oral, Daily  azelastine, 1 spray, Each Nare, Daily  budesonide, 0.5 mg, Nebulization, BID - RT  cholecalciferol, 2,000 Units, Oral, Daily  famotidine, 20 mg, Oral, BID AC  ipratropium, 1 spray, Each Nare, TID  levoFLOXacin, 750 mg, Intravenous, Q24H  Lidocaine, 3 patch, Transdermal, Q24H  losartan, 100 mg, Oral,  Q24H  methocarbamol, 500 mg, Oral, Q8H  senna-docusate sodium, 2 tablet, Oral, BID   And  polyethylene glycol, 17 g, Oral, BID  sodium chloride, 10 mL, Intravenous, Q12H  Vortioxetine HBr, 10 mg, Oral, Daily With Breakfast    Infusions     Diet  Diet: Cardiac; Healthy Heart (2-3 Na+); Fluid Consistency: Thin (IDDSI 0)    I have personally reviewed:  [x]  Laboratory   [x]  Microbiology   [x]  Radiology   []  EKG/Telemetry  []  Cardiology/Vascular   []  Pathology    []  Records      Assessment/Plan     Active Hospital Problems    Diagnosis  POA    **Intractable low back pain [M54.59]  Yes    Spinal stenosis, lumbar region, without neurogenic claudication [M48.061]  Unknown    Spondylolisthesis, lumbar region [M43.16]  Unknown    Facet arthritis of lumbar region [M47.816]  Unknown    GERD (gastroesophageal reflux disease) [K21.9]  Yes    Acute low back pain [M54.50]  Unknown    Cellulitis of leg [L03.119]  Unknown    Acute back pain [M54.9]  Yes    Anxiety [F41.9]  Yes    Anemia [D64.9]  Yes    HTN (hypertension) [I10]  Yes      Resolved Hospital Problems   No resolved problems to display.       64 y.o. female admitted with Intractable low back pain.    Lower back pain  - Patient with worsening lower back pain with radiculopathy symptoms. MRI showing evidence of lumbar stenosis. ANDRES consulted, no acute intervention warranted, continue with medications for pain. Continue with therapy services and fall precautions. Oral pain medication adjusted on 03/25, pain better controlled. Continue treatment as prescribed, wean IV as tolerated, can adjust PO dose further if needed.    Pain and swelling in left wrist  Pain and swelling in left knee  Pseudogout, left knee effusion  - X-rays obtained, imaging showing degenerative changes in knee with possible effusion. X-ray of wrist showed soft tissue swelling, old injury, some widening of the interval between the trapezium and the scaphoid is noted. Duplex LE US and LUE US negative  for DVT.   - Orthopedic surgery consulted. They ordered CT scans; CT LUE showing Diffuse soft tissue swelling about the wrist, particular around the radiocarpal joint and the proximal carpal row. CT LLE showing moderate left knee effusion. She underwent left knee aspiration on 03/26, 22 mL fluid removed. Fluid studies reviewed, showing calcium pyrophosphate crystals concerning for pseudogout. Will follow up with Orthopedic surgery regarding management, can likely give steroids. Greatly appreciate their help.    Fever  Leukocytosis  Recently diagnosed cellulitis  Pneumonia  - Noted. Prior UA with + LE, 6-10 WBC, but she denies urinary symptoms to suggest UTI, urine culture no growth. Blood cultures no growth to date. She was continued on Doxycycline for cellulitis previously diagnosed. Her LE swelling, erythema that was noted previously has improved.   - CXR ordered, showing findings concerning for pneumonia. She has multiple medication allergies, started empiric treatment with Levaquin on 03/26 and consulted ID.  - ID notes reviewed, recommended empiric Levaquin x 7 days and signed off, available as needed. WBC has improved, and clinically, she appears to be improved, but she did have another fever on 03/27/25 at 101.1 F. Will reach back out to ID regarding this.  - Trend WBC with CBC.    Hyponatremia  - Noted on labs, felt to be hypovolemic, was given 500 cc IV fluids previously, however, values relatively unchanged on most recent labs.   - Order urine sodium, urine osmolality, TSH, uric acid.  - Follow up results of testing to guide ongoing management decisions.    Hypertension  - Blood pressure appears stable and acceptable acutely at this time. No indications to warrant acute changes/intervention at this time.  - Continue current medications as prescribed. Trend BP to guide ongoing management decisions.    Anemia  - Hemoglobin low on most recent labs. No evidence of overt blood loss. No indications for acute  intervention at this time.    - Order repeat CBC in AM for reassessment. Continue to monitor, transfuse for hemoglobin <7.    CKD stage 2?  - Previously, appears to have CKD stage 2, baseline creatinine ~0.8 to 0.9, renal function stable, Repeat labs in AM.    Depression/anxiety  - Consulted access and psychiatry to evaluate. Greatly appreciate their help.    Constipation  - Patient endorsing constipation. Bowel regimen adjusted, give these medications today and monitor response. If no resolution following medication adjustment, will consider further workup with [facility].      Continue other medications for chronic conditions as prescribed. Further management TBD based on clinical course.    SCDs for DVT prophylaxis.  Full code.  Discussed with patient and nursing staff.  Anticipate discharge  [facility]   consultant clearance, pain control, bowel movement  Expected discharge date/ time has not been documented.      Yobani Akbar DO  [facility]  25        Electronically signed by Yobani Akbar DO at 25 0855       Yobani Akbar DO at 25 1200              Name: Pedrito Powell ADMIT: 3/23/2025   : 1960  PCP: Teo Fraser MD    MRN: [id number] LOS: 1 days   AGE/SEX: 64 y.o. female  ROOM: [facility]     Subjective   Subjective   Patient seen and examined.  Hospital day 4, resting in bed, still with left knee pain and left wrist swelling, but seemingly improved some today from prior.      Objective   Objective   Vital Signs  Temp:  [98.4 °F (36.9 °C)-100.2 °F (37.9 °C)] 98.4 °F (36.9 °C)  Heart Rate:  [85-97] 85  Resp:  [16-18] 16  BP: (120-169)/() 120/69  SpO2:  [91 %-93 %] 93 %  on   ;   Device (Oxygen Therapy): room air  Body mass index is 51.07 kg/m².  Physical Exam  Vitals and nursing note reviewed.   Constitutional:       General: She is not in acute distress.     Appearance: She is overweight.   Cardiovascular:      Rate and Rhythm: Normal rate.       Pulses: Normal pulses.      Heart sounds: Normal heart sounds.   Pulmonary:      Effort: Pulmonary effort is normal.      Breath sounds: No wheezing.   Abdominal:      General: Bowel sounds are normal.      Palpations: Abdomen is soft.      Tenderness: There is no abdominal tenderness.   Musculoskeletal:         General: Swelling (left wrist) and tenderness present.      Comments: Bandage on left knee from aspiration, still with some left knee tenderness and left wrist swelling, but has improved from prior.   Skin:     General: Skin is warm and dry.      Comments: Erythema of lower extremities   Neurological:      Mental Status: She is alert.       Results Review     I reviewed the patient's new clinical results.  Results from last 7 days   Lab Units 03/27/25  0320 03/26/25  0602 03/25/25  0546 03/24/25  0533   WBC 10*3/mm3 9.49 10.87* 10.88* 8.64   HEMOGLOBIN g/dL 10.0* 10.0* 9.5* 9.2*   PLATELETS 10*3/mm3 238 229 223 211     Results from last 7 days   Lab Units 03/27/25  0320 03/26/25  0602 03/25/25  0546 03/24/25  0533   SODIUM mmol/L 132* 132* 137 139   POTASSIUM mmol/L 4.2 3.9 3.9 3.8   CHLORIDE mmol/L 97* 97* 100 104   CO2 mmol/L 22.6 22.9 24.0 24.2   BUN mg/dL 14 12 11 9   CREATININE mg/dL 0.84 0.70 0.79 0.92   GLUCOSE mg/dL 96 124* 120* 95   EGFR mL/min/1.73 77.7 96.7 83.6 69.7     Results from last 7 days   Lab Units 03/24/25  0533 03/23/25  1325 03/21/25  0952   ALBUMIN g/dL 3.1* 4.0 3.7   BILIRUBIN mg/dL 0.8 0.7 0.5   ALK PHOS U/L 65 74 74   AST (SGOT) U/L 16 20 21   ALT (SGPT) U/L 10 15 17     Results from last 7 days   Lab Units 03/27/25  0320 03/26/25  0602 03/25/25  0546 03/24/25  0533 03/23/25  1325 03/23/25  1325 03/21/25  0952   CALCIUM mg/dL 8.5* 8.3* 8.2* 8.4*   < > 8.6 8.9   ALBUMIN g/dL  --   --   --  3.1*  --  4.0 3.7    < > = values in this interval not displayed.     Results from last 7 days   Lab Units 03/27/25  0320 03/26/25  0602 03/23/25  1507 03/23/25  1325   PROCALCITONIN ng/mL  --   "0.21  --  0.03   LACTATE mmol/L 0.9  --  1.0  --      No results found for: \"HGBA1C\", \"POCGLU\"    CT Upper Extremity Left Without Contrast  Result Date: 3/26/2025  1. Diffuse soft tissue swelling about the wrist, particular around the radiocarpal joint and the proximal carpal row. Unclear if there is any fluid present. Would suggest evaluation with MRI 2. Curvilinear osseous fragment located along the volar aspect of the triquetrum with some cystic change of the triquetral body. This may be chronic and reflect degenerative change or an old injury, however could also potentially reflect an acute avulsion fracture in the appropriate clinical setting.   Radiation dose reduction techniques were utilized, including automated exposure control and exposure modulation based on body size.   This report was finalized on 3/26/2025 6:25 PM by Dr. Catalino Monae M.D on Workstation: [id number]      CT Lower Extremity Left Without Contrast  Result Date: 3/26/2025  Degenerative changes as described without evidence of acute fracture. There is a moderate knee joint effusion   Radiation dose reduction techniques were utilized, including automated exposure control and exposure modulation based on body size.   This report was finalized on 3/26/2025 6:07 PM by Dr. Catalino Monae M.D on Workstation: [id number]      XR Chest 1 View  Result Date: 3/25/2025  Increased atelectasis or consolidation of both lung bases. Increased interstitial lung opacities and peribronchial cuffing may be infectious/inflammatory bronchitis or edema. Possible small right pleural effusion    This report was finalized on 3/25/2025 7:45 PM by Dr. Catalino Monae M.D on Workstation: [id number]      XR Knee 1 or 2 View Left  Result Date: 3/25/2025   Degenerative change and possible joint effusion, no definite acute bony abnormality.  This report was finalized on 3/25/2025 5:45 PM by Dr. Rosendo Donnelly M.D on Workstation: [id number]        I have " personally reviewed all medications:  Scheduled Medications  atorvastatin, 80 mg, Oral, Daily  azelastine, 1 spray, Each Nare, Daily  budesonide, 0.5 mg, Nebulization, BID - RT  cholecalciferol, 2,000 Units, Oral, Daily  famotidine, 20 mg, Oral, BID AC  ipratropium, 1 spray, Each Nare, TID  levoFLOXacin, 750 mg, Intravenous, Q24H  Lidocaine, 3 patch, Transdermal, Q24H  losartan, 100 mg, Oral, Q24H  methocarbamol, 500 mg, Oral, Q8H  sodium chloride, 10 mL, Intravenous, Q12H  Vortioxetine HBr, 10 mg, Oral, Daily With Breakfast    Infusions     Diet  Diet: Cardiac; Healthy Heart (2-3 Na+); Fluid Consistency: Thin (IDDSI 0)    I have personally reviewed:  [x]  Laboratory   [x]  Microbiology   [x]  Radiology   []  EKG/Telemetry  []  Cardiology/Vascular   []  Pathology    []  Records      Assessment/Plan     Active Hospital Problems    Diagnosis  POA    **Intractable low back pain [M54.59]  Yes    Spinal stenosis, lumbar region, without neurogenic claudication [M48.061]  Unknown    Spondylolisthesis, lumbar region [M43.16]  Unknown    Facet arthritis of lumbar region [M47.816]  Unknown    GERD (gastroesophageal reflux disease) [K21.9]  Yes    Acute low back pain [M54.50]  Unknown    Cellulitis of leg [L03.119]  Unknown    Acute back pain [M54.9]  Yes    Anxiety [F41.9]  Yes    Anemia [D64.9]  Yes    HTN (hypertension) [I10]  Yes      Resolved Hospital Problems   No resolved problems to display.       64 y.o. female admitted with Intractable low back pain.    Lower back pain  - Patient with worsening lower back pain with radiculopathy symptoms. MRI showing evidence of lumbar stenosis. ANDRES consulted, no acute intervention warranted, continue with medications for pain. Continue with therapy services and fall precautions. Oral pain medication adjusted on 03/25, but she is still requiring IV for breakthrough. Will likely plan to increase PO dose, but she is constipated, so need to work on having BM at this time.     Pain and  swelling in left wrist  Pain and swelling in left knee  - X-rays obtained, imaging showing degenerative changes in knee with possible effusion. X-ray of wrist showed soft tissue swelling, old injury, some widening of the interval between the trapezium and the scaphoid is noted.   - Duplex LE US and LUE US negative for DVT. She has no history of gout, no renal impairment, do not have clear reason to suspect this.  - Orthopedic surgery consulted. She is s/p left knee joint aspiration, fluid studies sent. Will follow up results of this. Will continue to follow their plans/recommendations. Greatly appreciate their help.    Fever  Leukocytosis  Recently diagnosed cellulitis  Pneumonia  - Noted. Prior UA with + LE, 6-10 WBC, but she denies urinary symptoms to suggest UTI, urine culture no growth. Blood cultures no growth to date. She was continued on Doxycycline for cellulitis previously diagnosed. Her LE swelling, erythema that was noted previously has improved.   - CXR ordered, showing findings concerning for pneumonia. She has multiple medication allergies, started empiric treatment with Levaquin on 03/26 and consulted ID.  - ID notes reviewed, recommended empiric Levaquin x 7 days and signed off, available as needed. WBC has improved, no further fevers since ~0500 on 03/26. Continue treatments as ordered and continue monitoring.  - Trend WBC with CBC.    Hyponatremia  - Noted on labs, felt to be hypovolemic, was given 500 cc IV fluids, values relatively unchanged on most recent labs. Given stability, will repeat labs in AM. If worsening, will pursue further workup.     Hypertension  - Blood pressure appears stable and acceptable acutely at this time. No indications to warrant acute changes/intervention at this time.  - Continue current medications as prescribed. Trend BP to guide ongoing management decisions.    Anemia  - Hemoglobin low on most recent labs. No evidence of overt blood loss. No indications for acute  intervention at this time.    - Order repeat CBC in AM for reassessment. Continue to monitor, transfuse for hemoglobin <7.    CKD stage 2?  - Previously, appears to have CKD stage 2, baseline creatinine ~0.8 to 0.9, renal function stable, Repeat labs in AM.    Depression/anxiety  - Consulted access and psychiatry to evaluate.    Constipation  - Patient endorsing constipation. Will adjust bowel regimen and plan for reassessment. Continue to monitor and adjust medications as needed until resolution. If no resolution following medication adjustment, will consider further workup with KUB.      Continue other medications for chronic conditions as prescribed. Further management TBD based on clinical course.    SCDs for DVT prophylaxis.  Full code.  Discussed with patient, family, and nursing staff.  Anticipate discharge  Likely SNF per PT notes   consultant clearance, pain control, arrangements for disposition finalized  Expected discharge date/ time has not been documented.      Yobani Akbar DO  [facility]  03/27/25        Electronically signed by Yobani Akbar DO at 03/27/25 1439       Lindsay Saha PA-C at 03/27/25 0730                   LOS: 1 day     Subjective :   The patient is still experiencing pain in her left knee and left wrist.  She states that she was unable to get up and walk with physical therapy due to the significance of her pain in her left knee along with her wrist.  She states that she is unable to put weight on her left wrist when using a walker.  She states that some of the swelling in her wrist has gone down since yesterday, but it is still painful.  She states that her pain is overall being managed well with medications.    Objective :    Vital signs in last 24 hours:  Vitals:    03/26/25 1602 03/26/25 2041 03/26/25 2334 03/27/25 0420   BP: 123/84 136/76 125/85 123/71   BP Location: Right arm Left arm Left arm Left arm   Patient Position: Lying Lying Lying Lying    Pulse: 97 93 89 89   Resp: 16 16 18 16   Temp: 99.3 °F (37.4 °C) 99.8 °F (37.7 °C) 99.1 °F (37.3 °C) 99.8 °F (37.7 °C)   TempSrc: Oral Oral Oral Oral   SpO2: 93% 92% 91% 92%   Weight:       Height:           PHYSICAL EXAM:  Patient is calm, in no acute distress, awake and oriented x 3.    Examination of the left wrist reveals skin is intact.  Moderate edema of the wrist and hand is noted.  No ecchymosis, erythema, or warmth noted.  Tenderness to palpation at the distal radius.  Pain noted with wrist range of motion.  Hand is warm and well-perfused.  Neurovascularly intact distally.    Examination of the left knee reveals skin is intact with mild effusion and warmth present.  No erythema or ecchymosis noted.  Very limited knee range of motion due to pain.  No exquisite tenderness to palpation today.  Foot is warm and well-perfused.  Neurovascularly intact distally.    LABS:  Results from last 7 days   Lab Units 03/27/25  0320   WBC 10*3/mm3 9.49   HEMOGLOBIN g/dL 10.0*   HEMATOCRIT % 29.6*   PLATELETS 10*3/mm3 238     Results from last 7 days   Lab Units 03/27/25  0320   SODIUM mmol/L 132*   POTASSIUM mmol/L 4.2   CHLORIDE mmol/L 97*   CO2 mmol/L 22.6   BUN mg/dL 14   CREATININE mg/dL 0.84   GLUCOSE mg/dL 96   CALCIUM mg/dL 8.5*             ASSESSMENT:  Left knee pain with moderate effusion     Left wrist pain with edema    Plan:  CT scan of the left wrist was reviewed and there is no acute injury to the left wrist.  Discussed with the patient that we could possibly provide a wrist brace for her outpatient to help prevent severe arthritic flares.  CT scan of the left knee was reviewed and there is a moderate effusion present along with moderate to severe osteoarthritis.  Recommend proceeding with a left knee joint aspiration.  The patient has been spiking fevers, so would like to rule out a possible septic joint versus gout versus arthritic flare.  Joint fluid labs have been ordered for this aspiration as  "well.  Will continue to follow and monitor results after knee aspiration to continue with management of her left knee pain.  The patient understands and agrees to this plan.  All questions answered.    Lindsay Saha PA-C    Date: 3/27/2025  Time: 07:30 EDT    Lindsay Saha PA-C     Electronically signed by Lindsay Saha PA-C at 03/27/25 0731          Consult Notes (last 48 hours)        Cedric Torres III, MD at 03/27/25 3203        Consult Orders    1. Inpatient Psychiatrist Consult [[id number]] ordered by Yobani Akbar DO at 03/26/25 3720                 IDENTIFYING INFORMATION: The patient is a 64-year-old white female admitted with sudden onset of inability to ambulate.  She is seen related to increased anxiety.    CHIEF COMPLAINT: None given    INFORMANT: Patient and chart    RELIABILITY: Good    HISTORY OF PRESENT ILLNESS: The patient is a 64-year-old white female admitted secondary to intractable low back pain and inability to ambulate.  The patient had made a statement of wishing to \"pull the plug\" related to her uncertainty over her current medical condition but today expresses contrition over that statement and denies any suicidal ideations.  The patient is currently prescribed trintellix by her primary care physician and feels as though it has been effective for her.  The patient complains of some lack of appetite and poor sleep.  She denies use of any psychoactive substances.  She denies current suicidal or homicidal ideation or psychotic features.    PAST PSYCHIATRIC HISTORY: As noted above, the patient has been on trintellix for the past 3 to 4 years.    PAST MEDICAL HISTORY: Significant for anemia, arthritis, asthma, chronic renal disease, elevated cholesterol, GERD, carpal tunnel syndrome, hyperlipidemia, hypertension, left shoulder pain, vertigo, weakness    MEDICATIONS:   Current Facility-Administered Medications   Medication Dose Route Frequency Provider Last Rate Last Admin "    acetaminophen (TYLENOL) tablet 650 mg  650 mg Oral Q4H PRN Hima Trejo MD   650 mg at 03/25/25 1751    Or    acetaminophen (TYLENOL) 160 MG/5ML oral solution 650 mg  650 mg Oral Q4H PRN Hima Trejo MD        Or    acetaminophen (TYLENOL) suppository 650 mg  650 mg Rectal Q4H PRN Hima Trejo MD        albuterol sulfate HFA (PROVENTIL HFA;VENTOLIN HFA;PROAIR HFA) inhaler 2 puff  2 puff Inhalation Q4H PRN Hima Trejo MD        amitriptyline (ELAVIL) tablet 25 mg  25 mg Oral Nightly PRN Hima Trejo MD        atorvastatin (LIPITOR) tablet 80 mg  80 mg Oral Daily Hima Trejo MD   80 mg at 03/27/25 0825    azelastine (ASTELIN) nasal spray 1 spray  1 spray Each Nare Daily Hima Trejo MD   1 spray at 03/27/25 0826    benzonatate (TESSALON) capsule 100 mg  100 mg Oral TID PRN Hima Trejo MD        budesonide (PULMICORT) nebulizer solution 0.5 mg  0.5 mg Nebulization BID - RT Hima Trejo MD   0.5 mg at 03/26/25 2114    Calcium Replacement - Follow Nurse / BPA Driven Protocol   Not Applicable PRN Hima Trejo MD        Calcium Replacement - Follow Nurse / BPA Driven Protocol   Not Applicable PRN Yobani Akbar DO        cholecalciferol (VITAMIN D3) tablet 2,000 Units  2,000 Units Oral Daily Hima Trejo MD   2,000 Units at 03/27/25 0826    Diclofenac Sodium (VOLTAREN) 1 % gel 4 g  4 g Topical 4x Daily PRN Hima Trejo MD        famotidine (PEPCID) tablet 20 mg  20 mg Oral BID AC Hima Trejo MD   20 mg at 03/27/25 0825    HYDROcodone-acetaminophen (NORCO) 7.5-325 MG per tablet 1 tablet  1 tablet Oral Q4H PRN Yobani Akbar DO   1 tablet at 03/27/25 1141    HYDROmorphone (DILAUDID) injection 0.5 mg  0.5 mg Intravenous Q2H PRN Hima Trejo MD   0.5 mg at 03/27/25 1229    ipratropium (ATROVENT) nasal spray 0.03%  1 spray Each Nare TID Hima Trejo MD   1 spray at 03/27/25 0832    levoFLOXacin (LEVAQUIN) 750 mg/150 mL D5W (premix) (LEVAQUIN) 750 mg  750 mg Intravenous Q24H Yobani Akbar  mL/hr at  03/27/25 1230 750 mg at 03/27/25 1230    Lidocaine 4 % 3 patch  3 patch Transdermal Q24H Vivian Montero APRN   3 patch at 03/27/25 0826    loperamide (IMODIUM) capsule 2 mg  2 mg Oral Q2H PRN Hima Trejo MD        LORazepam (ATIVAN) tablet 0.5 mg  0.5 mg Oral Q6H PRN Cedric Torres III, MD        losartan (COZAAR) tablet 100 mg  100 mg Oral Q24H Hima Trejo MD   100 mg at 03/27/25 0826    Magnesium Standard Dose Replacement - Follow Nurse / BPA Driven Protocol   Not Applicable PRN Hima Trejo MD        Magnesium Standard Dose Replacement - Follow Nurse / BPA Driven Protocol   Not Applicable PRN Yobani Akbar DO        meclizine (ANTIVERT) tablet 25 mg  25 mg Oral TID PRN Hima Trejo MD        methocarbamol (ROBAXIN) tablet 500 mg  500 mg Oral Q8H Vivian Montero APRN   500 mg at 03/27/25 1321    ondansetron (ZOFRAN) injection 4 mg  4 mg Intravenous Q6H PRN Hima Trejo MD   4 mg at 03/25/25 1320    Phosphorus Replacement - Follow Nurse / BPA Driven Protocol   Not Applicable Hima Porras MD        Phosphorus Replacement - Follow Nurse / BPA Driven Protocol   Not Applicable Yobani Zapien DO        Potassium Replacement - Follow Nurse / BPA Driven Protocol   Not Applicable Hima Porras MD        Potassium Replacement - Follow Nurse / BPA Driven Protocol   Not Applicable Yobani Zapien DO        sodium chloride 0.9 % flush 10 mL  10 mL Intravenous Q12H Hima Trejo MD   10 mL at 03/27/25 0826    sodium chloride 0.9 % flush 10 mL  10 mL Intravenous PRN Hima Trejo MD        sodium chloride 0.9 % infusion 40 mL  40 mL Intravenous PRN Hima Trejo MD        traMADol (ULTRAM) tablet 50 mg  50 mg Oral BID PRN Hima Trejo MD   50 mg at 03/24/25 1205    Vortioxetine HBr (TRINTELLIX) tablet 10 mg  10 mg Oral Daily With Breakfast Hima Trejo MD   10 mg at 03/27/25 0826     Facility-Administered Medications Ordered in Other Encounters   Medication Dose Route Frequency Provider  Last Rate Last Admin    Chlorhexidine Gluconate 2 % pads 1 each  1 each Apply externally Take As Directed Bethel Devi MD             ALLERGIES: The patient lists 26 medical allergies which can be obtained from the chart    FAMILY HISTORY: Noncontributory    SOCIAL HISTORY: No reported use of alcohol tobacco or street drugs    MENTAL STATUS EXAM: The patient is morbidly obese white female appearing her stated age.  She has no apparent physical distress at the time of examination.  She is awake alert and oriented ulcers.  Her mood is euthymic her affect full range.  Speech is generally relevant and coherent.  There are no deficits memory or cognition noted.  Intelligence is judged to be in the average range based on fund of knowledge, the patient is cooperative throughout the interview.  She denies current suicidal or homicidal ideation or psychotic features.  Judgement and insight are intact.    ASSETS/LIABILITIES: Motivation for change/health issues    DIAGNOSTIC IMPRESSION: Adjustment disorder with mixed emotional features, depressive disorder unspecified by history, medical problems as noted previously    PLAN: I would recommend continuation of the patient's currently prescribed Trintellix and will add conservatively dosed as needed lorazepam for any breakthrough anxiety during the patient's hospital stay.  I will continue to follow with you.    Electronically signed by Cedric Torres III, MD at 03/27/25 1402       Juwan Gilmore DO at 03/26/25 1318        Consult Orders    1. Inpatient Infectious Diseases Consult [[id number]] ordered by Yobani Akbar DO at 03/26/25 1242                 Referring Provider: Hima Trejo MD  Reason for Consultation:     Fever, suspected pneumonia     Chief Complaint   Patient presents with    Back Pain         Subjective   History of present illness: Patient is a 64-year-old female with past medical history of morbid obesity BMI 51, lower extremity edema,  "CKD, and rheumatoid arthritis who presents with intractable back pain.  ID consulted for \"fever, suspected pneumonia\".    On admission patient afebrile with no leukocytosis and normal lactate and procalcitonin.  CRP elevated at 3.  Patient developed fevers starting 3/25 with peak overnight of 102.6.  Initially on doxycycline for suspected cellulitis of the bilateral lower extremities.  Erythema of the lower extremities has now resolved and WBC stable at 10.8.  Repeat procalcitonin 0.21.  Chest x-ray with bilateral lower lobe atelectasis versus infiltrate.  Initially on oxygen but has now been weaned to room air.  Ongoing swelling of the left wrist and left knee.  Seen by orthopedics with plan for further imaging.    Other infectious workup with blood cultures and urinalysis have been unremarkable.  DVT scans of bilateral lower extremities have been negative for DVT.    Patient reports she has not yet received any antibiotics other than doxycycline but currently breathing well on room air.  States her legs still hurt bilaterally and continues to have the lower back pain.  Tenderness of the left wrist also noted without any erythema.    Past Medical History:   Diagnosis Date    Anemia     IRON INFUSION RECENTLY    Arthritis     Asthma     Chronic kidney disease     stage 3    Elevated cholesterol     GERD (gastroesophageal reflux disease)     High risk medication use 2018    History of carpal tunnel syndrome     Hyperlipidemia     Hypertension     Intractable vomiting with nausea 2018    Left shoulder pain     Limited mobility     Rheumatoid arthritis     Vertigo     Weakness     AT TIMES LEFT SHOULDER/LEFT ARM       Past Surgical History:   Procedure Laterality Date    CARPAL TUNNEL RELEASE Left 10/02/2023     SECTION  , ,     COLONOSCOPY N/A 12/10/2018    normal ileum, IH, hyperplastic polyp, otherwise normal exam    ENDOSCOPY N/A 12/10/2018    no gross lesions in esophagus or " examined duodenum, erythematous mucosa in stomach, biopsies benign    HYSTERECTOMY  2000    OOPHORECTOMY Bilateral     SHOULDER MANIPULATION Left     TOTAL SHOULDER ARTHROPLASTY Left 05/13/2021    Procedure: REVERSE TOTAL SHOULDER ARTHROPLASTY,  BICEP TENDONDESIS;  Surgeon: Bethel Devi MD;  Location: [facility];  Service: Orthopedics;  Laterality: Left;    TRIGGER FINGER RELEASE Left     3 fingers       family history includes Asthma in her mother; Breast cancer in her maternal aunt; Colon cancer in her maternal grandfather; Diabetes in her maternal grandmother; Thyroid disease in her father.     reports that she has never smoked. She has never been exposed to tobacco smoke. She has never used smokeless tobacco. She reports current alcohol use of about 1.0 standard drink of alcohol per week. She reports that she does not use drugs.     Allergies   Allergen Reactions    Cashew Nut Oil Anaphylaxis    Chocolate Anaphylaxis    Citrus Anaphylaxis    Nickel Anaphylaxis    Nuts Anaphylaxis    Tree Nut Anaphylaxis    Eunice Itching    Eunice Oil Other (See Comments)    Bacitracin Hives     Cannot remember, identified through testing    Baclofen Other (See Comments)    Chlorhexidine Unknown - Low Severity    Ciprofloxacin Other (See Comments)     THRUSH PER PATIENT    Citric Acid Unknown (See Comments)     Unsure      Covid-19 (Mrna) Vaccine Other (See Comments)     INSTRUCTED PER ALLERGIST SHE CAN NOT TAKE D/T ONE OF THE PRESERVATIVES    INSTRUCTED PER ALLERGIST SHE CAN NOT TAKE D/T ONE OF THE PRESERVATIVES      *is able to & has had the PFIZER vaccine*    Influenza Vaccines Nausea And Vomiting    Methyldibromoglutaronitrile Unknown (See Comments)      PATIENT UNSURE OF REACTION.    Naproxen Other (See Comments)    Neomycin Unknown (See Comments)      PATIENT UNSURE OF REACTION.    Omnicef [Cefdinir] Other (See Comments)     THRUSH PER PATIENT    Palladium Chloride Unknown (See Comments)      PATIENT UNSURE OF  REACTION    Penicillins Other (See Comments)     THRUSH PER PATIENT    Pineapple Extract Hives    Sulfa Antibiotics Other (See Comments)     THRUSH PER PATIENT    Adhesive Tape Unknown - Low Severity and Rash    Gold-Containing Drug Products Rash          Latex Rash       Medication:  Antibiotics:  Anti-Infectives (From admission, onward)      Ordered     Dose/Rate Route Frequency Start Stop    03/26/25 1106  aztreonam (AZACTAM) 2,000 mg in sodium chloride 0.9 % 100 mL MBP  Status:  Discontinued        Ordering Provider: Yobani Akbar DO    2,000 mg  over 4 Hours Intravenous Every 8 Hours 03/26/25 2000 03/26/25 1226    03/26/25 1116  levoFLOXacin (LEVAQUIN) 750 mg/150 mL D5W (premix) (LEVAQUIN) 750 mg        Ordering Provider: Yobani Akbar DO    750 mg  100 mL/hr over 90 Minutes Intravenous Every 24 Hours 03/26/25 1215 04/02/25 1214    03/26/25 1106  aztreonam (AZACTAM) 2,000 mg in sodium chloride 0.9 % 100 mL MBP  Status:  Discontinued        Ordering Provider: Yobani Akbar DO    2,000 mg  200 mL/hr over 30 Minutes Intravenous Once 03/26/25 1200 03/26/25 1256    03/26/25 1056  Pharmacy To Dose: Levofloxacin  Status:  Discontinued        Ordering Provider: Yobani Akbar DO     Not Applicable Continuous PRN 03/26/25 1056 03/26/25 1117    03/26/25 1056  Pharmacy To Dose: Aztreonam  Status:  Discontinued        Ordering Provider: Yobani Akbar DO     Not Applicable Continuous PRN 03/26/25 1055 03/26/25 1117    03/23/25 1821  doxycycline (MONODOX) capsule 100 mg        Ordering Provider: Hima Trejo MD    100 mg Oral Every 12 Hours Scheduled 03/23/25 2100 03/26/25 1034              Objective     Physical Exam:   Vital Signs   Temp:  [97.7 °F (36.5 °C)-102.6 °F (39.2 °C)] 100.6 °F (38.1 °C)  Heart Rate:  [88-97] 97  Resp:  [16] 16  BP: (135-167)/(70-87) 135/87    GENERAL: Awake and alert, in no acute distress.   HEENT: Oropharynx is clear. Hearing is grossly normal.   EYES: PERRL. No conjunctival injection. No lid  "lag.   LUNGS: Normal work of breathing.  SKIN: Warm and dry without cutaneous eruptions in exposed areas.  PSYCHIATRIC: Appropriate mood, affect, insight, and judgment.     Results Review:   I reviewed the patient's new clinical results.  I reviewed the patient's new imaging results and agree with the interpretation.  I reviewed the patient's other test results and agree with the interpretation    Lab Results   Component Value Date    WBC 10.87 (H) 03/26/2025    HGB 10.0 (L) 03/26/2025    HCT 29.6 (L) 03/26/2025    MCV 91.1 03/26/2025     03/26/2025       No results found for: \"VANCOPEAK\", \"VANCOTROUGH\", \"VANCORANDOM\"    Lab Results   Component Value Date    GLUCOSE 124 (H) 03/26/2025    BUN 12 03/26/2025    CREATININE 0.70 03/26/2025    EGFRIFNONA 34 (L) 10/20/2021    EGFRIFAFRI 40 (L) 02/10/2020    BCR 17.1 03/26/2025    CO2 22.9 03/26/2025    CALCIUM 8.3 (L) 03/26/2025    ALBUMIN 3.1 (L) 03/24/2025    AST 16 03/24/2025    ALT 10 03/24/2025         Estimated Creatinine Clearance: 96.5 mL/min (by C-G formula based on SCr of 0.7 mg/dL).    Isolation:   No active isolations      Microbiology:  Microbiology Results (last 10 days)       Procedure Component Value - Date/Time    Urine Culture - Urine, Urine, Clean Catch [[id number]]  (Normal) Collected: 03/23/25 2301    Lab Status: Final result Specimen: Urine, Clean Catch Updated: 03/25/25 0758     Urine Culture No growth    Blood Culture - Blood, Arm, Right [[id number]]  (Normal) Collected: 03/23/25 1527    Lab Status: Preliminary result Specimen: Blood from Arm, Right Updated: 03/25/25 1545     Blood Culture No growth at 2 days    Narrative:      Less than seven (7) mL's of blood was collected.  Insufficient quantity may yield false negative results.    Blood Culture - Blood, Arm, Right [[id number]]  (Normal) Collected: 03/23/25 1517    Lab Status: Preliminary result Specimen: Blood from Arm, Right Updated: 03/25/25 1530     Blood Culture No growth at 2 days    " Respiratory Panel PCR w/COVID-19(SARS-CoV-2) [facility]ONIE/[facility] In-House, NP Swab in UTM/VTM, 2 HR TAT - Swab, Nasopharynx [[id number]]  (Normal) Collected: 03/23/25 1502    Lab Status: Final result Specimen: Swab from Nasopharynx Updated: 03/23/25 1618     ADENOVIRUS, PCR Not Detected     Coronavirus 229E Not Detected     Coronavirus HKU1 Not Detected     Coronavirus NL63 Not Detected     Coronavirus OC43 Not Detected     COVID19 Not Detected     Human Metapneumovirus Not Detected     Human Rhinovirus/Enterovirus Not Detected     Influenza A PCR Not Detected     Influenza B PCR Not Detected     Parainfluenza Virus 1 Not Detected     Parainfluenza Virus 2 Not Detected     Parainfluenza Virus 3 Not Detected     Parainfluenza Virus 4 Not Detected     RSV, PCR Not Detected     Bordetella pertussis pcr Not Detected     Bordetella parapertussis PCR Not Detected     Chlamydophila pneumoniae PCR Not Detected     Mycoplasma pneumo by PCR Not Detected    Narrative:      In the setting of a positive respiratory panel with a viral infection PLUS a negative procalcitonin without other underlying concern for bacterial infection, consider observing off antibiotics or discontinuation of antibiotics and continue supportive care. If the respiratory panel is positive for atypical bacterial infection (Bordetella pertussis, Chlamydophila pneumoniae, or Mycoplasma pneumoniae), consider antibiotic de-escalation to target atypical bacterial infection.             Radiology:  Bilateral lower extremity Dopplers negative for DVT.    3/25 chest x-ray reviewed by me with poor inspiratory effort.  Bibasilar consolidation consistent with atelectasis versus infiltrate.  Also could be related to edema.    Assessment     #Fever  #Left wrist pain  #Left knee pain  #Intractable lower back pain  #CKD  #Recent cellulitis status post doxycycline  #Multiple antibiotic allergies     Difficult to say whether imaging is representative of pneumonia but  the patient truly does have fever which could be related.  Procalcitonin is normal but elevated from prior.  Imaging with chest x-ray inconclusive and patient did have oxygen requirement that is now improved.  Will stop aztreonam. Empiric levofloxacin 750 mg daily for 7-day course seems very reasonable for pneumonia coverage.    Reviewed allergies with the patient today and most of her antibiotic allergies relate to thrush which would not be considered an allergy.  Does report history of airway edema related to penicillin when she was in college.  Does appear she is taking Keflex in the past and tolerated.    Agree with further imaging of the wrist and knee.  Orthopedics following.  ID will plan to sign off for now and only see again if imaging concerning for joint infection related to wrist and knee.    ------------------------------------------------------------------------------------------------  The above decisions regarding antimicrobials incorporates elements of engaging in complex medical decision-making associated with antimicrobial prescribing including considerations such as antimicrobial resistance patterns, emergence of new variants/strains, recent antibiotic exposure, interactions/complications from comorbidities including concurrent infections, public health considerations to minimize development of antimicrobial resistance, and emerging and re-emerging infections.       Electronically signed by Juwan Gilmore DO at 03/26/25 1326       Richie Sears LCSW at 03/26/25 1240        Consult Orders    1. [facility] Consult [[id number]] ordered by Yobani Akbar DO at 03/26/25 105                 [facility] evaluated 64-year-old female for depression.  Patient came in with intractable low back pain.  Patient was lying on her back keeping her eyes closed due to the pain.  Patient's adult daughter was there with patient's permission.  Patient's daughter had concerned that  "patient made a statement to her about \"pulling the plug\" due to the pain.  Patient denied any suicidal feelings but stated if this pain continue she would wish she was dead.  Patient states she has never had this kind of pain before and is currently going through ultrasounds and CAT scans excetra.  Access encouraged patient to talk about her feelings with her daughter and others.  Patient denies any psychiatric inpatient and had counseling \"years ago\".  Patient is currently on Trintellix and has been for the last 3 or 4 years prescribed by her family doctor.  Patient stated she would be willing to see the psychiatrist to examine her medication.  Patient denies current SI and denies any attempts in the past.  Patient denies any alcohol drug or tobacco use.  Patient states she sleeps in \"spurts\".  Patient states she has not had a good appetite since her admission 3 days ago.  Patient denies any psychosis.  Patient rates her current anxiety as a 9/10 and her current depressed mood as an 8/10.    The patient lives in a house with her adult daughter.  Patient has 3 adult children all of whom are supportive as well as her sister.  The patient has been  16 years and works full-time as a teacher.  Patient is not active in a [employer].  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  Patient denies any other stressors in her life other than her pain.  Patient states that this pain started recently. Access will follow and provide outpt counseling resources as pt's tx plan becomes known.    Electronically signed by Richie Sears LCSW at 03/26/25 9013       "

## 2025-03-28 NOTE — PROGRESS NOTES
The patient reports that her pain is somewhat improved today.  She continues to complain of anxiety.  I explained to her that she can ask for as needed lorazepam if she becomes anxious.  She is understanding.

## 2025-03-28 NOTE — PROGRESS NOTES
"Chief Complaint  Edema (BOTH LEGS )    Subjective          Pedrito Powell presents to [facility]  History of Present Illness  The patient is a 64-year-old female who came in because her lower legs have been swollen. Last week, she noticed that her calves felt really hard, but they didn't hurt. Her feet were also swollen, which she was told could happen because of her arthritis injections. On Saturday, she woke up and saw that her feet were red and hot, so she went to urgent care. They did an ultrasound, which came back normal, but blood work and an EKG showed she had cellulitis in both legs. She was given clindamycin 300 mg to take three times a day. Since starting the antibiotics, the swelling and hardness have gone down a bit. However, she sometimes feels like she has pulled muscles when she walks, which happened yesterday but not today. She has two more days left of her antibiotic course. She has never taken Lasix before. This morning, she noticed her temperature was higher than usual, which she thinks is because of her current condition.    ALLERGIES  The patient is allergic to PENICILLIN and SULFA DRUGS.    MEDICATIONS  Current: clindamycin    Objective   Vital Signs:   /74 (BP Location: Left arm, Patient Position: Sitting)   Pulse 91   Temp 99 °F (37.2 °C) (Oral)   Resp 14   Ht 149.9 cm (59.02\")   Wt 103 kg (228 lb)   SpO2 96%   BMI 46.03 kg/m²     Physical Exam  Vitals and nursing note reviewed.   Constitutional:       Appearance: She is well-developed.   HENT:      Head: Normocephalic and atraumatic.   Musculoskeletal:      Cervical back: Normal range of motion and neck supple.      Right lower leg: No edema.      Left lower leg: Edema present.   Skin:     Comments: Mild redness on bilateral lower extremeties.   Neurological:      Mental Status: She is alert and oriented to person, place, and time.   Psychiatric:         Behavior: Behavior normal. "         Physical Exam  There is no redness or signs of infection in the musculoskeletal system.    Vital Signs  Temperature is 99.     Result Review :                 Assessment and Plan    Diagnoses and all orders for this visit:    1. Cellulitis of lower extremity, unspecified laterality (Primary)  -     Discontinue: clindamycin (CLEOCIN) 300 MG capsule; Take 1 capsule by mouth 3 (Three) Times a Day for 4 days.  Dispense: 12 capsule; Refill: 0  -     CBC & Differential  -     Comprehensive Metabolic Panel  -     doxycycline (VIBRAMYCIN) 100 MG capsule; Take 1 capsule by mouth 2 (Two) Times a Day for 5 days.  Dispense: 10 capsule; Refill: 0    2. Edema of both lower legs  -     CBC & Differential  -     Comprehensive Metabolic Panel  -     furosemide (Lasix) 20 MG tablet; Take 1 tablet by mouth Daily for 4 days.  Dispense: 4 tablet; Refill: 0  -     potassium chloride (KLOR-CON M20) 20 MEQ CR tablet; Take 1 tablet by mouth Daily.  Dispense: 4 tablet; Refill: 0      Assessment & Plan  1. Bilateral lower extremity edema/cellulitis.  The infection appears to be improving, with no visible signs of infection such as redness. The clindamycin seems to be effective, but it has not completely resolved the swelling. The ultrasound results were negative for DVT. Symptoms are likely due to the residual swelling. A CBC and CMP will be conducted today to assess electrolyte levels and white blood cell count. The current clindamycin regimen will be extended by an additional 4 days, totaling a 10-day course. Lasix 20 mg will be administered daily for the next 4 days, along with potassium chloride to counteract potential potassium depletion caused by Lasix. Doxycycline will be prescribed for 5 days to provide MRSA coverage.    Follow-up  The patient will follow up next week.    Follow Up   No follow-ups on file.  Patient was given instructions and counseling regarding her condition or for health maintenance advice. Please see  specific information pulled into the AVS if appropriate.           Patient or patient representative verbalized consent for the use of Ambient Listening during the visit with  Teo Fraser MD for chart documentation. 3/28/2025  09:20 EDT

## 2025-03-28 NOTE — THERAPY TREATMENT NOTE
Patient Name: Pedrito Powell  : 1960    MRN: [id number]                              Today's Date: 3/28/2025       Admit Date: 3/23/2025    Visit Dx:     ICD-10-CM ICD-9-CM   1. Intractable low back pain  M54.59 724.2   2. Acute febrile illness  R50.9 780.60   3. Bilateral lower extremity edema  R60.0 782.3     Patient Active Problem List   Diagnosis    Hx of anaphylaxis    Primary osteoarthritis of knee    High risk medication use    HTN (hypertension)    Hyperlipidemia    Vertigo    Infected blister of left foot    Ganglion of right wrist    Vomiting and diarrhea    Intractable vomiting with nausea    Fatigue    Acute gastritis without hemorrhage    Anemia    Elevated serum creatinine    Fungal rash of torso    Cellulitis of abdominal wall    Bronchitis    Arthritis of shoulder    Chronic renal failure in pediatric patient, stage 3 (moderate)    H/O shoulder surgery    Anxiety    Depression    Iron deficiency    Dizziness    Hypoxia    COVID-19 virus infection    Acute low back pain    Cellulitis of leg    Intractable low back pain    Acute back pain    Spinal stenosis, lumbar region, without neurogenic claudication    Spondylolisthesis, lumbar region    Facet arthritis of lumbar region    GERD (gastroesophageal reflux disease)     Past Medical History:   Diagnosis Date    Anemia     IRON INFUSION RECENTLY    Arthritis     Asthma     Chronic kidney disease     stage 3    Elevated cholesterol     GERD (gastroesophageal reflux disease)     High risk medication use 2018    History of carpal tunnel syndrome     Hyperlipidemia     Hypertension     Intractable vomiting with nausea 2018    Left shoulder pain     Limited mobility     Rheumatoid arthritis     Vertigo     Weakness     AT TIMES LEFT SHOULDER/LEFT ARM     Past Surgical History:   Procedure Laterality Date    CARPAL TUNNEL RELEASE Left 10/02/2023     SECTION  , ,     COLONOSCOPY N/A 12/10/2018    normal ileum, IH,  hyperplastic polyp, otherwise normal exam    ENDOSCOPY N/A 12/10/2018    no gross lesions in esophagus or examined duodenum, erythematous mucosa in stomach, biopsies benign    HYSTERECTOMY  2000    OOPHORECTOMY Bilateral     SHOULDER MANIPULATION Left     TOTAL SHOULDER ARTHROPLASTY Left 05/13/2021    Procedure: REVERSE TOTAL SHOULDER ARTHROPLASTY,  BICEP TENDONDESIS;  Surgeon: Bethel Devi MD;  Location: [facility] OR;  Service: Orthopedics;  Laterality: Left;    TRIGGER FINGER RELEASE Left     3 fingers      General Information       Row Name 03/28/25 1537          OT Time and Intention    Document Type therapy note (daily note)  -JR     Mode of Treatment occupational therapy  -JR       Row Name 03/28/25 1537          General Information    Patient Profile Reviewed yes  -JR     Existing Precautions/Restrictions fall  -JR       Row Name 03/28/25 1537          Cognition    Orientation Status (Cognition) oriented x 4  -JR       Row Name 03/28/25 1537          Safety Issues/Impairments Affecting Functional Mobility    Impairments Affecting Function (Mobility) balance;endurance/activity tolerance;strength;pain  -JR     Comment, Safety Issues/Impairments (Mobility) gait belt and non skid socks donned  -               User Key  (r) = Recorded By, (t) = Taken By, (c) = Cosigned By      Initials Name Provider Type    JR Evans Moon, OT Occupational Therapist                     Mobility/ADL's       Row Name 03/28/25 1538          Bed Mobility    Bed Mobility supine-sit  -JR     Supine-Sit [place] (Bed Mobility) minimum assist (75% patient effort);verbal cues  -JR     Assistive Device (Bed Mobility) bed rails;head of bed elevated  -JR     Comment, (Bed Mobility) Improvement with BUE with mobility.  -JR       Row Name 03/28/25 1538          Transfers    Transfers sit-stand transfer  -JR       Row Name 03/28/25 1538          Sit-Stand Transfer    Sit-Stand [place] (Transfers) minimum assist (75% patient  effort)  -     Assistive Device (Sit-Stand Transfers) walker, front-wheeled  -JR       Row Name 03/28/25 1538          Functional Mobility    Functional Mobility- Device walker, front-wheeled  -JR     Functional Mobility- Comment patient able to perform side steps toward [facility]  -JR               User Key  (r) = Recorded By, (t) = Taken By, (c) = Cosigned By      Initials Name Provider Type    Evans Harp, OT Occupational Therapist                   Obj/Interventions       Row Name 03/28/25 1540          Sensory Assessment (Somatosensory)    Sensory Assessment (Somatosensory) sensation intact  -       Row Name 03/28/25 1540          Vision Assessment/Intervention    Visual Impairment/Limitations WFL  -       Row Name 03/28/25 1540          Balance    Balance Assessment sitting static balance;sitting dynamic balance;standing static balance;standing dynamic balance  -JR     Static Sitting Balance contact guard  -JR     Dynamic Sitting Balance contact guard  -JR     Position, Sitting Balance sitting edge of bed  -JR     Static Standing Balance minimal assist  -JR     Dynamic Standing Balance minimal assist  -JR     Position/Device Used, Standing Balance supported;walker, front-wheeled  -JR     Comment, Balance patient able to perform side steps toward [facility]  -JR               User Key  (r) = Recorded By, (t) = Taken By, (c) = Cosigned By      Initials Name Provider Type    Evans Harp, OT Occupational Therapist                   Goals/Plan    No documentation.                  Clinical Impression       Row Name 03/28/25 1540          Pain Assessment    Pre/Posttreatment Pain Comment Patient did not rate pain, but voiced it was much less today.  -       Row Name 03/28/25 154          Plan of Care Review    Plan of Care Reviewed With patient;daughter  -     Progress improving  -     Outcome Evaluation Patient seen for OT this pm.  Patient reports she is in less pain today.  Patient resting in bed and  agreeable to tx.  Patient transitioned to EOB with Min A.  Improvement with Left hand with function and grabbing bed rail.  STS from EOB with Min A and Rw.  Patient able to take side steps toward HOB (Right).  Improvement with strength and balance with transitional movements.  Patient returned to bed with min A (BLEs).  Patient motivated to d/c to Acute Rehab for continued progress.  Cont OT as tolerated.  -       Row Name 03/28/25 8447          Therapy Assessment/Plan (OT)    Rehab Potential (OT) good  -     Criteria for Skilled Therapeutic Interventions Met (OT) yes;skilled treatment is necessary  -     Therapy Frequency (OT) 5 times/wk  -       Row Name 03/28/25 5303          Therapy Plan Review/Discharge Plan (OT)    Anticipated Discharge Disposition (OT) inpatient rehabilitation facility  -       Row Name 03/28/25 8773          Vital Signs    O2 Delivery Pre Treatment room air  -JR     O2 Delivery Intra Treatment room air  -JR     O2 Delivery Post Treatment room air  -JR     Pre Patient Position Supine  -JR     Intra Patient Position Standing  -JR     Post Patient Position Sitting  -       Row Name 03/28/25 8128          Positioning and Restraints    Pre-Treatment Position in bed  -JR     Post Treatment Position bed  -JR     In Bed notified nsg;call light within reach;encouraged to call for assist;exit alarm on;with family/caregiver  -               User Key  (r) = Recorded By, (t) = Taken By, (c) = Cosigned By      Initials Name Provider Type    Evans Harp, OT Occupational Therapist                   Outcome Measures       Row Name 03/28/25 5614          How much help from another is currently needed...    Putting on and taking off regular lower body clothing? 2  -JR     Bathing (including washing, rinsing, and drying) 2  -JR     Toileting (which includes using toilet bed pan or urinal) 2  -JR     Taking care of personal grooming (such as brushing teeth) 2  -JR     Eating meals 3  -JR        Row Name 03/28/25 0830          How much help from another person do you currently need...    Turning from your back to your side while in flat bed without using bedrails? 3  -CC     Moving from lying on back to sitting on the side of a flat bed without bedrails? 2  -CC     Moving to and from a bed to a chair (including a wheelchair)? 1  -CC     Standing up from a chair using your arms (e.g., wheelchair, bedside chair)? 2  -CC     Climbing 3-5 steps with a railing? 2  -CC     To walk in hospital room? 1  -CC     AM-PAC 6 Clicks Score (PT) 11  -CC     Highest Level of Mobility Goal 4 --> Transfer to chair/commode  -CC       Row Name 03/28/25 1546          Modified Atwood Scale    Modified [place]wood Scale 4 - Moderately severe disability.  Unable to walk without assistance, and unable to attend to own bodily needs without assistance.  -       Row Name 03/28/25 1546          Functional Assessment    Outcome Measure Options AM-PAC 6 Clicks Daily Activity (OT);Modified [place]  -               User Key  (r) = Recorded By, (t) = Taken By, (c) = Cosigned By      Initials Name Provider Type    CC Angélica Cunningham, RN Registered Nurse    Evans Harp OT Occupational Therapist                    Occupational Therapy Education       Title: PT OT SLP Therapies (In Progress)       Topic: Occupational Therapy (Done)       Point: ADL training (Done)       Learning Progress Summary            Patient GAURI Richey VU by JR at 3/25/2025 1605    Comment: Role of OT                      Point: Home exercise program (Done)       Learning Progress Summary            GAURI Roth VU by JR at 3/25/2025 1605    Comment: Role of OT                      Point: Precautions (Done)       Learning Progress Summary            GAURI Roth VU by  at 3/25/2025 1605    Comment: Role of OT                      Point: Body mechanics (Done)       Learning Progress Summary            GAURI Roth VU by JR at 3/25/2025 1605    Comment:  Role of OT                                      User Key       Initials Effective Dates Name Provider Type Discipline     07/24/24 -  Evans Moon OT Occupational Therapist OT                  OT Recommendation and Plan  Planned Therapy Interventions (OT): activity tolerance training, adaptive equipment training, BADL retraining, neuromuscular control/coordination retraining, functional balance retraining, occupation/activity based interventions, passive ROM/stretching, transfer/mobility retraining, ROM/therapeutic exercise, strengthening exercise  Therapy Frequency (OT): 5 times/wk  Plan of Care Review  Plan of Care Reviewed With: patient, daughter  Progress: improving  Outcome Evaluation: Patient seen for OT this pm.  Patient reports she is in less pain today.  Patient resting in bed and agreeable to tx.  Patient transitioned to EOB with Min A.  Improvement with Left hand with function and grabbing bed rail.  STS from EOB with Min A and Rw.  Patient able to take side steps toward HOB (Right).  Improvement with strength and balance with transitional movements.  Patient returned to bed with min A (BLEs).  Patient motivated to d/c to Acute Rehab for continued progress.  Cont OT as tolerated.     Time Calculation:   Evaluation Complexity (OT)  Review Occupational Profile/Medical/Therapy History Complexity: expanded/moderate complexity  Assessment, Occupational Performance/Identification of Deficit Complexity: 3-5 performance deficits  Clinical Decision Making Complexity (OT): detailed assessment/moderate complexity  Overall Complexity of Evaluation (OT): moderate complexity     Time Calculation- OT       Row Name 03/28/25 1547             Time Calculation- OT    OT Start Time 1500  -      OT Stop Time 1535  -      OT Time Calculation (min) 35 min  -      Total Timed Code Minutes- OT 35 minute(s)  -      OT Received On 03/28/25  -      OT - Next Appointment 03/31/25  -         Timed Charges    77226 - OT  Therapeutic Activity Minutes 35  -JR         Total Minutes    Timed Charges Total Minutes 35  -JR       Total Minutes 35  -JR                User Key  (r) = Recorded By, (t) = Taken By, (c) = Cosigned By      Initials Name Provider Type    Evans Harp OT Occupational Therapist                  Therapy Charges for Today       Code Description Service Date Service Provider Modifiers Qty    [id number]  OT THERAPEUTIC ACT EA 15 MIN 3/28/2025 Evans Moon OT GO 2                 Evans Moon OT  3/28/2025

## 2025-03-28 NOTE — NURSING NOTE
Psychiatrist and Access consulted for anxiety and depression.     Psychiatrist evaluated earlier today and started on prn ativan for breakthrough anxiety. Introduced myself and role in her care. Patient's oldest daughter is in room also. Daughter states she is staying for awhile to advocate for patient and make sure she receives her pain medication. Patient states they drained fluid off of her right knee today and she was able to stand with PT and sat on the side of the bed independently for approximately 10 minutes today. Currently c/o dizziness. Patient is pleasant and joking with her daughter; daughter states her mothers' mood seems improved.     Access following.

## 2025-03-28 NOTE — PROGRESS NOTES
Continued Stay Note  [facility]     Patient Name: Pedrito Powell  MRN: [id number]  Today's Date: 3/28/2025    Admit Date: 3/23/2025    Plan: [facility], pending precert (no new therapy notes to start precert)   Discharge Plan       Row Name 03/28/25 1542       Plan    Plan [facility], pending precert (no new therapy notes to start precert)    Plan Comments No PT/OT notes available to start precert, notified Sherri Perez and PT/OT assigned regarding needing notes to start precert 3/28 at 9am, still no notes available at this time. Patient has been accepted to d/c to [facility], precert pending. Notified MD and nursing. Delay of d/c due to no therapy notes available. CCP to f/u 3/31. [facility] to arrange transport at d/c.                   Discharge Codes    No documentation.                       Mariel Ayoub RN

## 2025-03-28 NOTE — PLAN OF CARE
Goal Outcome Evaluation:  Plan of Care Reviewed With: patient, daughter        Progress: improving  Outcome Evaluation: Patient seen for OT this pm.  Patient reports she is in less pain today.  Patient resting in bed and agreeable to tx.  Patient transitioned to EOB with Min A.  Improvement with Left hand with function and grabbing bed rail.  STS from EOB with Min A and Rw.  Patient able to take side steps toward HOB (Right).  Improvement with strength and balance with transitional movements.  Patient returned to bed with min A (BLEs).  Patient motivated to d/c to Acute Rehab for continued progress.  Cont OT as tolerated.    Anticipated Discharge Disposition (OT): inpatient rehabilitation facility

## 2025-03-28 NOTE — PLAN OF CARE
Goal Outcome Evaluation:              Outcome Evaluation: vss, a/o x4, steriod injection at bedside, increased pain this shift, bowel movement this shift after suppository, voiding well, PT to work with patient for precert.

## 2025-03-29 LAB
ANION GAP SERPL CALCULATED.3IONS-SCNC: 11.2 MMOL/L (ref 5–15)
BASOPHILS # BLD AUTO: 0.02 10*3/MM3 (ref 0–0.2)
BASOPHILS NFR BLD AUTO: 0.4 % (ref 0–1.5)
BUN SERPL-MCNC: 21 MG/DL (ref 8–23)
BUN/CREAT SERPL: 26.3 (ref 7–25)
CALCIUM SPEC-SCNC: 9.3 MG/DL (ref 8.6–10.5)
CHLORIDE SERPL-SCNC: 100 MMOL/L (ref 98–107)
CO2 SERPL-SCNC: 24.8 MMOL/L (ref 22–29)
CREAT SERPL-MCNC: 0.8 MG/DL (ref 0.57–1)
DEPRECATED RDW RBC AUTO: 40.8 FL (ref 37–54)
EGFRCR SERPLBLD CKD-EPI 2021: 82.4 ML/MIN/1.73
EOSINOPHIL # BLD AUTO: 0 10*3/MM3 (ref 0–0.4)
EOSINOPHIL NFR BLD AUTO: 0 % (ref 0.3–6.2)
ERYTHROCYTE [DISTWIDTH] IN BLOOD BY AUTOMATED COUNT: 12.5 % (ref 12.3–15.4)
GLUCOSE SERPL-MCNC: 191 MG/DL (ref 65–99)
HCT VFR BLD AUTO: 32.1 % (ref 34–46.6)
HGB BLD-MCNC: 10.3 G/DL (ref 12–15.9)
IMM GRANULOCYTES # BLD AUTO: 0.01 10*3/MM3 (ref 0–0.05)
IMM GRANULOCYTES NFR BLD AUTO: 0.2 % (ref 0–0.5)
LYMPHOCYTES # BLD AUTO: 0.53 10*3/MM3 (ref 0.7–3.1)
LYMPHOCYTES NFR BLD AUTO: 10.7 % (ref 19.6–45.3)
MCH RBC QN AUTO: 28.9 PG (ref 26.6–33)
MCHC RBC AUTO-ENTMCNC: 32.1 G/DL (ref 31.5–35.7)
MCV RBC AUTO: 90.2 FL (ref 79–97)
MONOCYTES # BLD AUTO: 0.15 10*3/MM3 (ref 0.1–0.9)
MONOCYTES NFR BLD AUTO: 3 % (ref 5–12)
NEUTROPHILS NFR BLD AUTO: 4.26 10*3/MM3 (ref 1.7–7)
NEUTROPHILS NFR BLD AUTO: 85.7 % (ref 42.7–76)
NRBC BLD AUTO-RTO: 0 /100 WBC (ref 0–0.2)
OSMOLALITY UR: 299 MOSM/KG
PLATELET # BLD AUTO: 315 10*3/MM3 (ref 140–450)
PMV BLD AUTO: 9.6 FL (ref 6–12)
POTASSIUM SERPL-SCNC: 4.3 MMOL/L (ref 3.5–5.2)
RBC # BLD AUTO: 3.56 10*6/MM3 (ref 3.77–5.28)
SODIUM SERPL-SCNC: 136 MMOL/L (ref 136–145)
WBC NRBC COR # BLD AUTO: 4.97 10*3/MM3 (ref 3.4–10.8)

## 2025-03-29 PROCEDURE — 94799 UNLISTED PULMONARY SVC/PX: CPT

## 2025-03-29 PROCEDURE — 80048 BASIC METABOLIC PNL TOTAL CA: CPT | Performed by: STUDENT IN AN ORGANIZED HEALTH CARE EDUCATION/TRAINING PROGRAM

## 2025-03-29 PROCEDURE — 25010000002 HYDROMORPHONE PER 4 MG: Performed by: INTERNAL MEDICINE

## 2025-03-29 PROCEDURE — 94761 N-INVAS EAR/PLS OXIMETRY MLT: CPT

## 2025-03-29 PROCEDURE — 94664 DEMO&/EVAL PT USE INHALER: CPT

## 2025-03-29 PROCEDURE — 85025 COMPLETE CBC W/AUTO DIFF WBC: CPT | Performed by: STUDENT IN AN ORGANIZED HEALTH CARE EDUCATION/TRAINING PROGRAM

## 2025-03-29 PROCEDURE — 25010000002 LEVOFLOXACIN PER 250 MG: Performed by: STUDENT IN AN ORGANIZED HEALTH CARE EDUCATION/TRAINING PROGRAM

## 2025-03-29 RX ORDER — LEVOFLOXACIN 750 MG/1
750 TABLET, FILM COATED ORAL EVERY 24 HOURS
Status: COMPLETED | OUTPATIENT
Start: 2025-03-30 | End: 2025-04-01

## 2025-03-29 RX ADMIN — HYDROCODONE BITARTRATE AND ACETAMINOPHEN 1 TABLET: 7.5; 325 TABLET ORAL at 05:03

## 2025-03-29 RX ADMIN — LORAZEPAM 0.5 MG: 0.5 TABLET ORAL at 00:50

## 2025-03-29 RX ADMIN — METHOCARBAMOL TABLETS 500 MG: 500 TABLET, COATED ORAL at 21:05

## 2025-03-29 RX ADMIN — Medication 10 ML: at 08:36

## 2025-03-29 RX ADMIN — LOSARTAN POTASSIUM 100 MG: 100 TABLET, FILM COATED ORAL at 08:35

## 2025-03-29 RX ADMIN — FAMOTIDINE 20 MG: 20 TABLET, FILM COATED ORAL at 16:21

## 2025-03-29 RX ADMIN — METHOCARBAMOL TABLETS 500 MG: 500 TABLET, COATED ORAL at 11:48

## 2025-03-29 RX ADMIN — AZELASTINE HYDROCHLORIDE 1 SPRAY: 137 SPRAY, METERED NASAL at 08:36

## 2025-03-29 RX ADMIN — VORTIOXETINE 10 MG: 5 TABLET, FILM COATED ORAL at 08:35

## 2025-03-29 RX ADMIN — FAMOTIDINE 20 MG: 20 TABLET, FILM COATED ORAL at 05:03

## 2025-03-29 RX ADMIN — LIDOCAINE 3 PATCH: 4 PATCH TOPICAL at 08:36

## 2025-03-29 RX ADMIN — Medication 2000 UNITS: at 08:35

## 2025-03-29 RX ADMIN — POLYETHYLENE GLYCOL 3350 17 G: 17 POWDER, FOR SOLUTION ORAL at 08:36

## 2025-03-29 RX ADMIN — IPRATROPIUM BROMIDE 1 SPRAY: 21 SPRAY, METERED NASAL at 08:35

## 2025-03-29 RX ADMIN — BUDESONIDE 0.5 MG: 0.5 INHALANT RESPIRATORY (INHALATION) at 20:07

## 2025-03-29 RX ADMIN — HYDROCODONE BITARTRATE AND ACETAMINOPHEN 1 TABLET: 7.5; 325 TABLET ORAL at 21:05

## 2025-03-29 RX ADMIN — IPRATROPIUM BROMIDE 1 SPRAY: 21 SPRAY, METERED NASAL at 16:21

## 2025-03-29 RX ADMIN — SENNOSIDES AND DOCUSATE SODIUM 2 TABLET: 50; 8.6 TABLET ORAL at 08:36

## 2025-03-29 RX ADMIN — HYDROMORPHONE HYDROCHLORIDE 0.5 MG: 1 INJECTION, SOLUTION INTRAMUSCULAR; INTRAVENOUS; SUBCUTANEOUS at 18:41

## 2025-03-29 RX ADMIN — ACETAMINOPHEN 650 MG: 325 TABLET, FILM COATED ORAL at 00:50

## 2025-03-29 RX ADMIN — ATORVASTATIN CALCIUM 80 MG: 20 TABLET, FILM COATED ORAL at 08:35

## 2025-03-29 RX ADMIN — METHOCARBAMOL TABLETS 500 MG: 500 TABLET, COATED ORAL at 05:03

## 2025-03-29 RX ADMIN — HYDROCODONE BITARTRATE AND ACETAMINOPHEN 1 TABLET: 7.5; 325 TABLET ORAL at 16:21

## 2025-03-29 RX ADMIN — LEVOFLOXACIN 750 MG: 5 INJECTION, SOLUTION INTRAVENOUS at 11:48

## 2025-03-29 RX ADMIN — BUDESONIDE 0.5 MG: 0.5 INHALANT RESPIRATORY (INHALATION) at 09:45

## 2025-03-29 RX ADMIN — HYDROCODONE BITARTRATE AND ACETAMINOPHEN 1 TABLET: 7.5; 325 TABLET ORAL at 00:50

## 2025-03-29 RX ADMIN — IPRATROPIUM BROMIDE 1 SPRAY: 21 SPRAY, METERED NASAL at 21:06

## 2025-03-29 RX ADMIN — HYDROCODONE BITARTRATE AND ACETAMINOPHEN 1 TABLET: 7.5; 325 TABLET ORAL at 11:48

## 2025-03-29 NOTE — PLAN OF CARE
Goal Outcome Evaluation:              Outcome Evaluation: vss, a/o x4, max assist x2, voiding well, bowel movement, RA, 2L at night, sterioid injection in left knee, prn pain medication, plan for discharge Monday.

## 2025-03-29 NOTE — NURSING NOTE
Access center follow up regarding anxiety and depression: appeared to be resting on approach. Ativan for anxiety overnight appears effective along with pain management. No acute changes or new complaints reported overnight. Following.

## 2025-03-29 NOTE — PROGRESS NOTES
Name: Pedrito Powell ADMIT: 3/23/2025   : 1960  PCP: Teo Fraser MD    MRN: [id number] LOS: 3 days   AGE/SEX: 64 y.o. female  ROOM: [facility]     Subjective   Subjective   Patient seen and examined.  Hospital day 6, resting in bed, pain controlled this morning.       Objective   Objective   Vital Signs  Temp:  [97.3 °F (36.3 °C)-99.7 °F (37.6 °C)] 97.5 °F (36.4 °C)  Heart Rate:  [68-88] 80  Resp:  [16-18] 16  BP: (110-150)/(67-98) 125/68  SpO2:  [88 %-99 %] 95 %  on  Flow (L/min) (Oxygen Therapy):  [1-2] 1.5;   Device (Oxygen Therapy): nasal cannula  Body mass index is 51.07 kg/m².  Physical Exam  Vitals and nursing note reviewed.   Constitutional:       General: She is not in acute distress.     Appearance: She is overweight.   Cardiovascular:      Rate and Rhythm: Normal rate.      Pulses: Normal pulses.      Heart sounds: Normal heart sounds.   Pulmonary:      Effort: Pulmonary effort is normal.      Breath sounds: No wheezing.   Abdominal:      General: Bowel sounds are normal. There is no distension.      Palpations: Abdomen is soft.      Tenderness: There is no abdominal tenderness.   Musculoskeletal:         General: No tenderness.      Comments: Trace B/L LE edema, Bandage on left knee from aspiration, left knee tenderness improved overall, left wrist swelling improved overall, no significant tenderness with palpation today   Skin:     General: Skin is warm and dry.   Neurological:      Mental Status: She is alert.       Results Review     I reviewed the patient's new clinical results.  Results from last 7 days   Lab Units 25  0622 25  0523 25  0320 25  0602   WBC 10*3/mm3 4.97 7.24 9.49 10.87*   HEMOGLOBIN g/dL 10.3* 9.3* 10.0* 10.0*   PLATELETS 10*3/mm3 315 276 238 229     Results from last 7 days   Lab Units 25  0622 25  0523 25  0320 25  0602   SODIUM mmol/L 136 131* 132* 132*   POTASSIUM mmol/L 4.3 3.7 4.2 3.9   CHLORIDE mmol/L 100 98 97*  "97*   CO2 mmol/L 24.8 23.9 22.6 22.9   BUN mg/dL 21 18 14 12   CREATININE mg/dL 0.80 0.87 0.84 0.70   GLUCOSE mg/dL 191* 92 96 124*   EGFR mL/min/1.73 82.4 74.5 77.7 96.7     Results from last 7 days   Lab Units 03/24/25  0533 03/23/25  1325   ALBUMIN g/dL 3.1* 4.0   BILIRUBIN mg/dL 0.8 0.7   ALK PHOS U/L 65 74   AST (SGOT) U/L 16 20   ALT (SGPT) U/L 10 15     Results from last 7 days   Lab Units 03/29/25  0622 03/28/25  0523 03/27/25  0320 03/26/25  0602 03/25/25  0546 03/24/25  0533 03/23/25  1325   CALCIUM mg/dL 9.3 8.5* 8.5* 8.3*   < > 8.4* 8.6   ALBUMIN g/dL  --   --   --   --   --  3.1* 4.0    < > = values in this interval not displayed.     Results from last 7 days   Lab Units 03/27/25  0320 03/26/25  0602 03/23/25  1507 03/23/25  1325   PROCALCITONIN ng/mL  --  0.21  --  0.03   LACTATE mmol/L 0.9  --  1.0  --      No results found for: \"HGBA1C\", \"POCGLU\"    FL Guided Aspiration Joint  Result Date: 3/27/2025  Technically successful left knee joint aspiration with removal of 22 mL of viscous slightly turbid pale yellow-colored fluid.   Procedure performed by KALYANI Dunlap. By electronically signing this report, I, the supervising radiologist, attest that I was not present for the procedure(s) but agree with the final edited report.  This report was finalized on 3/27/2025 2:05 PM by Dr. Catalino Monae M.D on Workstation: [id number]        I have personally reviewed all medications:  Scheduled Medications  atorvastatin, 80 mg, Oral, Daily  azelastine, 1 spray, Each Nare, Daily  budesonide, 0.5 mg, Nebulization, BID - RT  cholecalciferol, 2,000 Units, Oral, Daily  famotidine, 20 mg, Oral, BID AC  ipratropium, 1 spray, Each Nare, TID  levoFLOXacin, 750 mg, Intravenous, Q24H  Lidocaine, 3 patch, Transdermal, Q24H  losartan, 100 mg, Oral, Q24H  methocarbamol, 500 mg, Oral, Q8H  senna-docusate sodium, 2 tablet, Oral, BID   And  polyethylene glycol, 17 g, Oral, BID  sodium chloride, 10 mL, Intravenous, " Q12H  Vortioxetine HBr, 10 mg, Oral, Daily With Breakfast    Infusions     Diet  Diet: Cardiac; Healthy Heart (2-3 Na+); Fluid Consistency: Thin (IDDSI 0)    I have personally reviewed:  [x]  Laboratory   [x]  Microbiology   []  Radiology   []  EKG/Telemetry  []  Cardiology/Vascular   []  Pathology    []  Records       Assessment/Plan     Active Hospital Problems    Diagnosis  POA    **Intractable low back pain [M54.59]  Yes    Spinal stenosis, lumbar region, without neurogenic claudication [M48.061]  Unknown    Spondylolisthesis, lumbar region [M43.16]  Unknown    Facet arthritis of lumbar region [M47.816]  Unknown    GERD (gastroesophageal reflux disease) [K21.9]  Yes    Acute low back pain [M54.50]  Unknown    Cellulitis of leg [L03.119]  Unknown    Acute back pain [M54.9]  Yes    Anxiety [F41.9]  Yes    Anemia [D64.9]  Yes    HTN (hypertension) [I10]  Yes      Resolved Hospital Problems   No resolved problems to display.       64 y.o. female admitted with Intractable low back pain.    Lower back pain  - Patient with worsening lower back pain with radiculopathy symptoms. MRI showing evidence of lumbar stenosis. ANDRES consulted, no acute intervention warranted, continue with medications for pain. Continue with therapy services and fall precautions. Oral pain medication adjusted on 03/25, pain better controlled. Continue treatment as prescribed, wean IV as tolerated, can adjust PO dose further if needed.    Pain and swelling in left wrist  Pain and swelling in left knee  Pseudogout, left knee effusion  - X-rays obtained, imaging showing degenerative changes in knee with possible effusion. X-ray of wrist showed soft tissue swelling, old injury, some widening of the interval between the trapezium and the scaphoid is noted. Duplex LE US and LUE US negative for DVT.   - Orthopedic surgery consulted. They ordered CT scans; CT LUE showing Diffuse soft tissue swelling about the wrist, particular around the radiocarpal joint  and the proximal carpal row. CT LLE showing moderate left knee effusion. She underwent left knee aspiration on 03/26, 22 mL fluid removed. Fluid studies reviewed, showing calcium pyrophosphate crystals concerning for pseudogout. She is s/p left knee corticosteroid injection, pain is improving. Continue to monitor.    Fever  Leukocytosis  Recently diagnosed cellulitis  Pneumonia  - Noted. Prior UA with + LE, 6-10 WBC, but she denies urinary symptoms to suggest UTI, urine culture no growth. Blood cultures no growth to date. She was continued on Doxycycline for cellulitis previously diagnosed. Her LE swelling, erythema that was noted previously has improved.   - CXR ordered, showing findings concerning for pneumonia. She has multiple medication allergies, started empiric treatment with Levaquin on 03/26 and consulted ID.  - ID notes reviewed, recommended empiric Levaquin x 7 days and signed off, available as needed. WBC has improved, and clinically, she appears to be improved. Last fever on 03/27/25 at 101.1 F.  - Trend WBC with CBC.    Hyponatremia  - Noted on labs, further workup reviewed, values have since improved. Continue to monitor.   - Repeat labs in AM.    Hypertension  - Blood pressure appears stable and acceptable acutely at this time. No indications to warrant acute changes/intervention at this time.  - Continue current medications as prescribed. Trend BP to guide ongoing management decisions.    Anemia  - Hemoglobin low on most recent labs. No evidence of overt blood loss. No indications for acute intervention at this time.    - Order repeat CBC in AM for reassessment. Continue to monitor, transfuse for hemoglobin <7.    Prediabetes with Hyperglycemia  - Glucose elevated on most recent labs. Patient without known history of T2DM.  Most recent hemoglobin A1c 5.90% (12/22/24). Continue monitoring.    CKD stage 2?  - Previously, appears to have CKD stage 2, baseline creatinine ~0.8 to 0.9, renal function  stable, Repeat labs in AM.    Depression/anxiety  - Consulted access and psychiatry to evaluate. Greatly appreciate their help.    Constipation  - Resolved with adjustment to bowel regimen. Continue as prescribed.     Portions of this text have been copied and I have reviewed these. Documentation accurate as of 03/29/25.     SCDs for DVT prophylaxis.  Full code.  Discussed with patient and nursing staff.  Anticipate discharge  [facility]   consultant clearance, pain control, bowel movement  Expected discharge date/ time has not been documented.      Yobani Akbar DO  [facility]  03/29/25

## 2025-03-29 NOTE — PROGRESS NOTES
The patient seems significantly brighter today.  She reports that her pain is much better and that her anxiety symptoms are responding well to as needed lorazepam.

## 2025-03-29 NOTE — PLAN OF CARE
Goal Outcome Evaluation:  Plan of Care Reviewed With: patient        Progress: improving  Outcome Evaluation: Pt remains stable. Ambulation is difficult, progressing slowly with PT. Corticosteriod inj yesterday to L knee. Norco and dilaudid for pain. Anxiety meds given PRN. Voiding per PW and had a few BMs yesterday. 2L O2 when asleeo. Awaiting rehab.

## 2025-03-30 ENCOUNTER — APPOINTMENT (OUTPATIENT)
Dept: GENERAL RADIOLOGY | Facility: HOSPITAL | Age: 65
End: 2025-03-30
Payer: COMMERCIAL

## 2025-03-30 LAB
ANION GAP SERPL CALCULATED.3IONS-SCNC: 12.3 MMOL/L (ref 5–15)
BASOPHILS # BLD AUTO: 0.02 10*3/MM3 (ref 0–0.2)
BASOPHILS NFR BLD AUTO: 0.2 % (ref 0–1.5)
BUN SERPL-MCNC: 25 MG/DL (ref 8–23)
BUN/CREAT SERPL: 28.1 (ref 7–25)
CALCIUM SPEC-SCNC: 8.7 MG/DL (ref 8.6–10.5)
CHLORIDE SERPL-SCNC: 102 MMOL/L (ref 98–107)
CO2 SERPL-SCNC: 24.7 MMOL/L (ref 22–29)
CREAT SERPL-MCNC: 0.89 MG/DL (ref 0.57–1)
DEPRECATED RDW RBC AUTO: 41.2 FL (ref 37–54)
EGFRCR SERPLBLD CKD-EPI 2021: 72.5 ML/MIN/1.73
EOSINOPHIL # BLD AUTO: 0.05 10*3/MM3 (ref 0–0.4)
EOSINOPHIL NFR BLD AUTO: 0.6 % (ref 0.3–6.2)
ERYTHROCYTE [DISTWIDTH] IN BLOOD BY AUTOMATED COUNT: 12.6 % (ref 12.3–15.4)
GLUCOSE SERPL-MCNC: 150 MG/DL (ref 65–99)
HCT VFR BLD AUTO: 30.4 % (ref 34–46.6)
HGB BLD-MCNC: 9.8 G/DL (ref 12–15.9)
IMM GRANULOCYTES # BLD AUTO: 0.04 10*3/MM3 (ref 0–0.05)
IMM GRANULOCYTES NFR BLD AUTO: 0.5 % (ref 0–0.5)
LYMPHOCYTES # BLD AUTO: 1.26 10*3/MM3 (ref 0.7–3.1)
LYMPHOCYTES NFR BLD AUTO: 15.1 % (ref 19.6–45.3)
MCH RBC QN AUTO: 29.2 PG (ref 26.6–33)
MCHC RBC AUTO-ENTMCNC: 32.2 G/DL (ref 31.5–35.7)
MCV RBC AUTO: 90.5 FL (ref 79–97)
MONOCYTES # BLD AUTO: 0.51 10*3/MM3 (ref 0.1–0.9)
MONOCYTES NFR BLD AUTO: 6.1 % (ref 5–12)
NEUTROPHILS NFR BLD AUTO: 6.49 10*3/MM3 (ref 1.7–7)
NEUTROPHILS NFR BLD AUTO: 77.5 % (ref 42.7–76)
NRBC BLD AUTO-RTO: 0 /100 WBC (ref 0–0.2)
PLATELET # BLD AUTO: 334 10*3/MM3 (ref 140–450)
PMV BLD AUTO: 9.2 FL (ref 6–12)
POTASSIUM SERPL-SCNC: 3.9 MMOL/L (ref 3.5–5.2)
RBC # BLD AUTO: 3.36 10*6/MM3 (ref 3.77–5.28)
SODIUM SERPL-SCNC: 139 MMOL/L (ref 136–145)
WBC NRBC COR # BLD AUTO: 8.37 10*3/MM3 (ref 3.4–10.8)

## 2025-03-30 PROCEDURE — 80048 BASIC METABOLIC PNL TOTAL CA: CPT | Performed by: STUDENT IN AN ORGANIZED HEALTH CARE EDUCATION/TRAINING PROGRAM

## 2025-03-30 PROCEDURE — 97110 THERAPEUTIC EXERCISES: CPT

## 2025-03-30 PROCEDURE — 25010000002 ENOXAPARIN PER 10 MG: Performed by: STUDENT IN AN ORGANIZED HEALTH CARE EDUCATION/TRAINING PROGRAM

## 2025-03-30 PROCEDURE — 25010000002 HYDROMORPHONE PER 4 MG: Performed by: INTERNAL MEDICINE

## 2025-03-30 PROCEDURE — 97530 THERAPEUTIC ACTIVITIES: CPT

## 2025-03-30 PROCEDURE — 94761 N-INVAS EAR/PLS OXIMETRY MLT: CPT

## 2025-03-30 PROCEDURE — 73560 X-RAY EXAM OF KNEE 1 OR 2: CPT

## 2025-03-30 PROCEDURE — 94664 DEMO&/EVAL PT USE INHALER: CPT

## 2025-03-30 PROCEDURE — 94799 UNLISTED PULMONARY SVC/PX: CPT

## 2025-03-30 PROCEDURE — 25010000002 ONDANSETRON PER 1 MG: Performed by: INTERNAL MEDICINE

## 2025-03-30 PROCEDURE — 97116 GAIT TRAINING THERAPY: CPT

## 2025-03-30 PROCEDURE — 85025 COMPLETE CBC W/AUTO DIFF WBC: CPT | Performed by: STUDENT IN AN ORGANIZED HEALTH CARE EDUCATION/TRAINING PROGRAM

## 2025-03-30 RX ORDER — ENOXAPARIN SODIUM 100 MG/ML
40 INJECTION SUBCUTANEOUS EVERY 24 HOURS
Status: DISCONTINUED | OUTPATIENT
Start: 2025-03-30 | End: 2025-04-02

## 2025-03-30 RX ADMIN — IPRATROPIUM BROMIDE 1 SPRAY: 21 SPRAY, METERED NASAL at 20:17

## 2025-03-30 RX ADMIN — FAMOTIDINE 20 MG: 20 TABLET, FILM COATED ORAL at 16:00

## 2025-03-30 RX ADMIN — LEVOFLOXACIN 750 MG: 750 TABLET, FILM COATED ORAL at 11:56

## 2025-03-30 RX ADMIN — AMITRIPTYLINE HYDROCHLORIDE 25 MG: 25 TABLET, FILM COATED ORAL at 20:17

## 2025-03-30 RX ADMIN — HYDROCODONE BITARTRATE AND ACETAMINOPHEN 1 TABLET: 7.5; 325 TABLET ORAL at 16:00

## 2025-03-30 RX ADMIN — BUDESONIDE 0.5 MG: 0.5 INHALANT RESPIRATORY (INHALATION) at 08:35

## 2025-03-30 RX ADMIN — HYDROCODONE BITARTRATE AND ACETAMINOPHEN 1 TABLET: 7.5; 325 TABLET ORAL at 07:45

## 2025-03-30 RX ADMIN — BUDESONIDE 0.5 MG: 0.5 INHALANT RESPIRATORY (INHALATION) at 21:02

## 2025-03-30 RX ADMIN — ATORVASTATIN CALCIUM 80 MG: 20 TABLET, FILM COATED ORAL at 07:46

## 2025-03-30 RX ADMIN — HYDROCODONE BITARTRATE AND ACETAMINOPHEN 1 TABLET: 7.5; 325 TABLET ORAL at 11:56

## 2025-03-30 RX ADMIN — HYDROCODONE BITARTRATE AND ACETAMINOPHEN 1 TABLET: 7.5; 325 TABLET ORAL at 20:17

## 2025-03-30 RX ADMIN — VORTIOXETINE 10 MG: 5 TABLET, FILM COATED ORAL at 07:46

## 2025-03-30 RX ADMIN — METHOCARBAMOL TABLETS 500 MG: 500 TABLET, COATED ORAL at 13:38

## 2025-03-30 RX ADMIN — HYDROCODONE BITARTRATE AND ACETAMINOPHEN 1 TABLET: 7.5; 325 TABLET ORAL at 01:06

## 2025-03-30 RX ADMIN — IPRATROPIUM BROMIDE 1 SPRAY: 21 SPRAY, METERED NASAL at 07:47

## 2025-03-30 RX ADMIN — METHOCARBAMOL TABLETS 500 MG: 500 TABLET, COATED ORAL at 06:43

## 2025-03-30 RX ADMIN — Medication 2000 UNITS: at 07:46

## 2025-03-30 RX ADMIN — ONDANSETRON 4 MG: 2 INJECTION, SOLUTION INTRAMUSCULAR; INTRAVENOUS at 10:39

## 2025-03-30 RX ADMIN — IPRATROPIUM BROMIDE 1 SPRAY: 21 SPRAY, METERED NASAL at 16:05

## 2025-03-30 RX ADMIN — LOSARTAN POTASSIUM 100 MG: 100 TABLET, FILM COATED ORAL at 07:46

## 2025-03-30 RX ADMIN — FAMOTIDINE 20 MG: 20 TABLET, FILM COATED ORAL at 07:46

## 2025-03-30 RX ADMIN — LIDOCAINE 3 PATCH: 4 PATCH TOPICAL at 16:00

## 2025-03-30 RX ADMIN — METHOCARBAMOL TABLETS 500 MG: 500 TABLET, COATED ORAL at 21:22

## 2025-03-30 RX ADMIN — HYDROMORPHONE HYDROCHLORIDE 0.5 MG: 1 INJECTION, SOLUTION INTRAMUSCULAR; INTRAVENOUS; SUBCUTANEOUS at 17:32

## 2025-03-30 RX ADMIN — LORAZEPAM 0.5 MG: 0.5 TABLET ORAL at 01:05

## 2025-03-30 RX ADMIN — ENOXAPARIN SODIUM 40 MG: 100 INJECTION SUBCUTANEOUS at 16:07

## 2025-03-30 RX ADMIN — AZELASTINE HYDROCHLORIDE 1 SPRAY: 137 SPRAY, METERED NASAL at 07:47

## 2025-03-30 NOTE — PROGRESS NOTES
"[facility] Clinical Pharmacy Services: Enoxaparin Consult    Pedrito Powell has a pharmacy consult to dose prophylactic enoxaparin per Dr Akbar's request.     Indication: VTE Prophylaxis  Home Anticoagulation: none     Relevant clinical data and objective history reviewed:  64 y.o. female 149.9 cm (59\") 115 kg (252 lb 13.9 oz)   Body mass index is 51.07 kg/m².   Results from last 7 days   Lab Units 03/30/25  0548   PLATELETS 10*3/mm3 334     Estimated Creatinine Clearance: 75.9 mL/min (by C-G formula based on SCr of 0.89 mg/dL).    Assessment/Plan    Will start patient on 40mg subcutaneous every 24 hours, adjusted for renal function. Consult order will be discontinued but pharmacy will continue to follow.     Leilani Nguyễn, Pharm.D., [facility]  Clinical Pharmacist    "

## 2025-03-30 NOTE — PLAN OF CARE
Goal Outcome Evaluation:           Progress: improving  Outcome Evaluation: Patient A&Ox4. Assist x2. BM 3/29. Voiding via purewick. Began complaining of R knee pain/stiffness today. MD notified and Xray completed. Lovenox started today. PRN PO medications for pain. Plans to discharge to [facility].

## 2025-03-30 NOTE — PLAN OF CARE
Problem: Adult Inpatient Plan of Care  Goal: Absence of Hospital-Acquired Illness or Injury  Intervention: Identify and Manage Fall Risk  Recent Flowsheet Documentation  Taken 3/29/2025 2045 by Bree Zarate RN  Safety Promotion/Fall Prevention:   activity supervised   assistive device/personal items within reach   clutter free environment maintained   safety round/check completed   nonskid shoes/slippers when out of bed     Problem: Fall Injury Risk  Goal: Absence of Fall and Fall-Related Injury  Intervention: Identify and Manage Contributors  Recent Flowsheet Documentation  Taken 3/29/2025 2045 by Bree Zarate, RN  Medication Review/Management: medications reviewed  Intervention: Promote Injury-Free Environment  Recent Flowsheet Documentation  Taken 3/29/2025 2045 by Bree Zarate, RN  Safety Promotion/Fall Prevention:   activity supervised   assistive device/personal items within reach   clutter free environment maintained   safety round/check completed   nonskid shoes/slippers when out of bed   Goal Outcome Evaluation:

## 2025-03-30 NOTE — PROGRESS NOTES
Name: Pedrito Powell ADMIT: 3/23/2025   : 1960  PCP: Teo Fraser MD    MRN: [id number] LOS: 4 days   AGE/SEX: 64 y.o. female  ROOM: [facility]     Subjective   Subjective   Patient seen and examined.  Hospital day 7, she is resting in bed, doing okay at present, no acute events overnight.       Objective   Objective   Vital Signs  Temp:  [97.7 °F (36.5 °C)-98.4 °F (36.9 °C)] 98.4 °F (36.9 °C)  Heart Rate:  [61-81] 69  Resp:  [16-18] 16  BP: (119-143)/(62-89) 143/89  SpO2:  [90 %-97 %] 96 %  on   ;   Device (Oxygen Therapy): room air  Body mass index is 51.07 kg/m².  Physical Exam  Vitals and nursing note reviewed.   Constitutional:       General: She is not in acute distress.     Appearance: She is overweight.   Cardiovascular:      Rate and Rhythm: Normal rate.      Pulses: Normal pulses.      Heart sounds: Normal heart sounds.   Pulmonary:      Effort: Pulmonary effort is normal.      Breath sounds: No wheezing.   Abdominal:      General: Bowel sounds are normal. There is no distension.      Palpations: Abdomen is soft.      Tenderness: There is no abdominal tenderness.   Musculoskeletal:         General: No tenderness.      Comments: Left knee swelling and tenderness overall improved, left wrist swelling improved, minimal LE edema   Skin:     General: Skin is warm and dry.   Neurological:      Mental Status: She is alert.       Results Review     I reviewed the patient's new clinical results.  Results from last 7 days   Lab Units 25  0548 25  0622 25  0523 25  0320   WBC 10*3/mm3 8.37 4.97 7.24 9.49   HEMOGLOBIN g/dL 9.8* 10.3* 9.3* 10.0*   PLATELETS 10*3/mm3 334 315 276 238     Results from last 7 days   Lab Units 25  0548 25  0622 25  0523 25  0320   SODIUM mmol/L 139 136 131* 132*   POTASSIUM mmol/L 3.9 4.3 3.7 4.2   CHLORIDE mmol/L 102 100 98 97*   CO2 mmol/L 24.7 24.8 23.9 22.6   BUN mg/dL 25* 21 18 14   CREATININE mg/dL 0.89 0.80 0.87 0.84  "  GLUCOSE mg/dL 150* 191* 92 96   EGFR mL/min/1.73 72.5 82.4 74.5 77.7     Results from last 7 days   Lab Units 03/24/25  0533 03/23/25  1325   ALBUMIN g/dL 3.1* 4.0   BILIRUBIN mg/dL 0.8 0.7   ALK PHOS U/L 65 74   AST (SGOT) U/L 16 20   ALT (SGPT) U/L 10 15     Results from last 7 days   Lab Units 03/30/25  0548 03/29/25  0622 03/28/25  0523 03/27/25  0320 03/25/25  0546 03/24/25  0533 03/23/25  1325   CALCIUM mg/dL 8.7 9.3 8.5* 8.5*   < > 8.4* 8.6   ALBUMIN g/dL  --   --   --   --   --  3.1* 4.0    < > = values in this interval not displayed.     Results from last 7 days   Lab Units 03/27/25  0320 03/26/25  0602 03/23/25  1507 03/23/25  1325   PROCALCITONIN ng/mL  --  0.21  --  0.03   LACTATE mmol/L 0.9  --  1.0  --      No results found for: \"HGBA1C\", \"POCGLU\"    No radiology results for the last day      I have personally reviewed all medications:  Scheduled Medications  atorvastatin, 80 mg, Oral, Daily  azelastine, 1 spray, Each Nare, Daily  budesonide, 0.5 mg, Nebulization, BID - RT  cholecalciferol, 2,000 Units, Oral, Daily  famotidine, 20 mg, Oral, BID AC  ipratropium, 1 spray, Each Nare, TID  levoFLOXacin, 750 mg, Oral, Q24H  Lidocaine, 3 patch, Transdermal, Q24H  losartan, 100 mg, Oral, Q24H  methocarbamol, 500 mg, Oral, Q8H  senna-docusate sodium, 2 tablet, Oral, BID   And  polyethylene glycol, 17 g, Oral, BID  sodium chloride, 10 mL, Intravenous, Q12H  Vortioxetine HBr, 10 mg, Oral, Daily With Breakfast    Infusions     Diet  Diet: Cardiac; Healthy Heart (2-3 Na+); Fluid Consistency: Thin (IDDSI 0)    I have personally reviewed:  [x]  Laboratory   [x]  Microbiology   []  Radiology   []  EKG/Telemetry  []  Cardiology/Vascular   []  Pathology    []  Records       Assessment/Plan     Active Hospital Problems    Diagnosis  POA    **Intractable low back pain [M54.59]  Yes    Spinal stenosis, lumbar region, without neurogenic claudication [M48.061]  Unknown    Spondylolisthesis, lumbar region [M43.16]  Unknown "    Facet arthritis of lumbar region [M47.816]  Unknown    GERD (gastroesophageal reflux disease) [K21.9]  Yes    Acute low back pain [M54.50]  Unknown    Cellulitis of leg [L03.119]  Unknown    Acute back pain [M54.9]  Yes    Anxiety [F41.9]  Yes    Anemia [D64.9]  Yes    HTN (hypertension) [I10]  Yes      Resolved Hospital Problems   No resolved problems to display.       64 y.o. female admitted with Intractable low back pain.    Addendum: Notified by nursing that she is now endorsing worsening pain and swelling in her right knee with decreased range of motion. Will check x-ray of right knee now to assess further.    Lower back pain  - Patient with worsening lower back pain with radiculopathy symptoms. MRI showing evidence of lumbar stenosis. ANDRES consulted, no acute intervention warranted, continue with medications for pain. Continue with therapy services and fall precautions. Oral pain medication adjusted on 03/25, pain better controlled. Continue treatment as prescribed.    Pain and swelling in left wrist  Pain and swelling in left knee  Pseudogout, left knee effusion  - X-rays obtained, imaging showing degenerative changes in knee with possible effusion. X-ray of wrist showed soft tissue swelling, old injury, some widening of the interval between the trapezium and the scaphoid is noted. Duplex LE US and LUE US negative for DVT.   - Orthopedic surgery consulted. They ordered CT scans; CT LUE showing Diffuse soft tissue swelling about the wrist, particular around the radiocarpal joint and the proximal carpal row. CT LLE showing moderate left knee effusion. She underwent left knee aspiration on 03/26, 22 mL fluid removed. Fluid studies reviewed, showing calcium pyrophosphate crystals concerning for pseudogout. She is s/p left knee corticosteroid injection, pain is improving. Continue to monitor.    Fever  Leukocytosis  Recently diagnosed cellulitis  Pneumonia  - Noted. Prior UA with + LE, 6-10 WBC, but she denies  urinary symptoms to suggest UTI, urine culture no growth. Blood cultures no growth to date. She was continued on Doxycycline for cellulitis previously diagnosed. Her LE swelling, erythema that was noted previously has improved.   - CXR ordered, showing findings concerning for pneumonia. She has multiple medication allergies, started empiric treatment with Levaquin on 03/26 and consulted ID.  - ID notes reviewed, recommended empiric Levaquin x 7 days and signed off, available as needed. WBC has improved.Last fever on 03/27/25 at 101.1 F. No evidence at present to suggest worsening and/or new developing infection.  - Trend WBC with CBC.    Hyponatremia  - Noted on labs, further workup reviewed, values have since improved. Continue to monitor.   - Repeat labs in AM.    Hypertension  - Blood pressure appears stable and acceptable acutely at this time.   - Continue current medications as prescribed. Trend BP to guide ongoing management decisions.    Anemia  - Hemoglobin low on most recent labs. No evidence of overt blood loss. No indications for acute intervention at this time.    - Order repeat CBC in AM for reassessment. Continue to monitor, transfuse for hemoglobin <7.    Prediabetes with Hyperglycemia  - Glucose elevated on most recent labs. Patient without known history of T2DM.  Most recent hemoglobin A1c 5.90% (12/22/24). Continue monitoring.    CKD stage 2?  - Previously, appears to have CKD stage 2, baseline creatinine ~0.8 to 0.9, renal function stable, Repeat labs in AM.    Depression/anxiety  - Consulted access and psychiatry to evaluate. Greatly appreciate their help.    Constipation  - Resolved with adjustment to bowel regimen. Continue as prescribed.     Portions of this text have been copied and I have reviewed these. Documentation accurate as of 03/30/25.     Pharmacy to dose Lovenox for DVT prophylaxis.  Full code.  Discussed with patient, family, and nursing staff.  Anticipate discharge  [facility]   once arrangements are made  Expected discharge date/ time has not been documented.      Yobani Akbar DO  [facility]  03/30/25

## 2025-03-30 NOTE — THERAPY TREATMENT NOTE
Patient Name: Pedrito Powell  : 1960    MRN: [id number]                              Today's Date: 3/30/2025       Admit Date: 3/23/2025    Visit Dx:     ICD-10-CM ICD-9-CM   1. Intractable low back pain  M54.59 724.2   2. Acute febrile illness  R50.9 780.60   3. Bilateral lower extremity edema  R60.0 782.3     Patient Active Problem List   Diagnosis    Hx of anaphylaxis    Primary osteoarthritis of knee    High risk medication use    HTN (hypertension)    Hyperlipidemia    Vertigo    Infected blister of left foot    Ganglion of right wrist    Vomiting and diarrhea    Intractable vomiting with nausea    Fatigue    Acute gastritis without hemorrhage    Anemia    Elevated serum creatinine    Fungal rash of torso    Cellulitis of abdominal wall    Bronchitis    Arthritis of shoulder    Chronic renal failure in pediatric patient, stage 3 (moderate)    H/O shoulder surgery    Anxiety    Depression    Iron deficiency    Dizziness    Hypoxia    COVID-19 virus infection    Acute low back pain    Cellulitis of leg    Intractable low back pain    Acute back pain    Spinal stenosis, lumbar region, without neurogenic claudication    Spondylolisthesis, lumbar region    Facet arthritis of lumbar region    GERD (gastroesophageal reflux disease)     Past Medical History:   Diagnosis Date    Anemia     IRON INFUSION RECENTLY    Arthritis     Asthma     Chronic kidney disease     stage 3    Elevated cholesterol     GERD (gastroesophageal reflux disease)     High risk medication use 2018    History of carpal tunnel syndrome     Hyperlipidemia     Hypertension     Intractable vomiting with nausea 2018    Left shoulder pain     Limited mobility     Rheumatoid arthritis     Vertigo     Weakness     AT TIMES LEFT SHOULDER/LEFT ARM     Past Surgical History:   Procedure Laterality Date    CARPAL TUNNEL RELEASE Left 10/02/2023     SECTION  , ,     COLONOSCOPY N/A 12/10/2018    normal ileum, IH,  hyperplastic polyp, otherwise normal exam    ENDOSCOPY N/A 12/10/2018    no gross lesions in esophagus or examined duodenum, erythematous mucosa in stomach, biopsies benign    HYSTERECTOMY  2000    OOPHORECTOMY Bilateral     SHOULDER MANIPULATION Left     TOTAL SHOULDER ARTHROPLASTY Left 05/13/2021    Procedure: REVERSE TOTAL SHOULDER ARTHROPLASTY,  BICEP TENDONDESIS;  Surgeon: Bethel Devi MD;  Location: [facility];  Service: Orthopedics;  Laterality: Left;    TRIGGER FINGER RELEASE Left     3 fingers      General Information       Row Name 03/30/25 1626          Physical Therapy Time and Intention    Document Type therapy note (daily note)  -     Mode of Treatment physical therapy  -       Row Name 03/30/25 1626          General Information    Patient Profile Reviewed yes  -ST     Existing Precautions/Restrictions fall  -ST       Row Name 03/30/25 1626          Cognition    Orientation Status (Cognition) oriented x 4  -ST       Row Name 03/30/25 1626          Safety Issues/Impairments Affecting Functional Mobility    Impairments Affecting Function (Mobility) balance;endurance/activity tolerance;strength;pain  -ST     Comment, Safety Issues/Impairments (Mobility) nonskid socks, gait belt donned  -               User Key  (r) = Recorded By, (t) = Taken By, (c) = Cosigned By      Initials Name Provider Type    ST Nai Vela, PT Physical Therapist                   Mobility       Row Name 03/30/25 1627          Bed Mobility    Comment, (Bed Mobility) [facility] at start and end of session  -       Row Name 03/30/25 1627          Sit-Stand Transfer    Sit-Stand [place] (Transfers) minimum assist (75% patient effort);verbal cues  -     Assistive Device (Sit-Stand Transfers) walker, front-wheeled  -     Comment, (Sit-Stand Transfer) VC for hand placement. multiple stands from recliner and from BSC  -       Row Name 03/30/25 1627          Gait/Stairs (Locomotion)    [place] Level (Gait)  minimum assist (75% patient effort);verbal cues  -ST     Assistive Device (Gait) walker, front-wheeled  -ST     Distance in Feet (Gait) 3  x 2  -ST     Deviations/Abnormal Patterns (Gait) stride length decreased;gait speed decreased;frank decreased;antalgic  -ST     Bilateral Gait Deviations heel strike decreased;forward flexed posture  -ST     Right Sided Gait Deviations weight shift ability decreased  -ST     Comment, (Gait/Stairs) ambulated to/from [facility] for sequencing and quad control in standing for improved WB. no buckle today  -ST               User Key  (r) = Recorded By, (t) = Taken By, (c) = Cosigned By      Initials Name Provider Type    ST Nai Vela, PT Physical Therapist                   Obj/Interventions       Row Name 03/30/25 1628          Motor Skills    Therapeutic Exercise --  reclined AP, heel slides, quad sets 2 x 10 bilat  -ST       Row Name 03/30/25 1628          Balance    Comment, Balance CGA with static standing, min A for dynamic standing. Pt tolerates prolonged static standing CGA while pericare performed  -ST               User Key  (r) = Recorded By, (t) = Taken By, (c) = Cosigned By      Initials Name Provider Type    ST Nai Vela, PT Physical Therapist                   Goals/Plan    No documentation.                  Clinical Impression       Row Name 03/30/25 1629          Pain    Pain Location knee  -ST     Pain Side/Orientation bilateral  -ST     Pain Management Interventions exercise or physical activity utilized  -ST     Response to Pain Interventions activity participation with tolerable pain  -ST     Pre/Posttreatment Pain Comment R> L - does not rate  -ST       Row Name 03/30/25 1629          Plan of Care Review    Plan of Care Reviewed With patient  -ST     Progress improving  -ST     Outcome Evaluation Pt seen for PT this PM - improved overall activity tolerance noted. initiated HEP for reclined and supine positions to focuse on bilat knee mobility  with good tolerance and improved awareness of quad control. min A to stand and ambulate 3' x 2 with rwx today. Fatigues quickly, but no buckling noted. VC for sequencing for comfort and quad control on R LE with good carryover. Pt uses BSC during session.  -       Row Name 03/30/25 1629          Therapy Assessment/Plan (PT)    Rehab Potential (PT) good  -ST     Criteria for Skilled Interventions Met (PT) yes  -ST     Therapy Frequency (PT) 5 times/wk  -       Row Name 03/30/25 1629          Positioning and Restraints    Pre-Treatment Position sitting in chair/recliner  -ST     Post Treatment Position chair  -ST     In Chair reclined;call light within reach;encouraged to call for assist;notified nsg  -ST               User Key  (r) = Recorded By, (t) = Taken By, (c) = Cosigned By      Initials Name Provider Type    Nai Lin PT Physical Therapist                   Outcome Measures       Row Name 03/30/25 1630 03/30/25 0745       How much help from another person do you currently need...    Turning from your back to your side while in flat bed without using bedrails? 3  -ST 3  -STA    Moving from lying on back to sitting on the side of a flat bed without bedrails? 3  -ST 3  -STA    Moving to and from a bed to a chair (including a wheelchair)? 3  -ST 2  -STA    Standing up from a chair using your arms (e.g., wheelchair, bedside chair)? 3  -ST 2  -STA    Climbing 3-5 steps with a railing? 2  -ST 1  -STA    To walk in hospital room? 3  -ST 1  -STA    AM-PAC 6 Clicks Score (PT) 17  -ST 12  -STA    Highest Level of Mobility Goal 5 --> Static standing  -ST 4 --> Transfer to chair/commode  -STA              User Key  (r) = Recorded By, (t) = Taken By, (c) = Cosigned By      Initials Name Provider Type    Naomi Kilgore, RN Registered Nurse    Nai Lin PT Physical Therapist                                 Physical Therapy Education       Title: PT OT SLP Therapies (Done)       Topic:  Physical Therapy (Done)       Point: Mobility training (Done)       Learning Progress Summary            Patient Acceptance, TB,E, VU,NR by  at 3/30/2025 1631    Acceptance, E,D, VU,NR by  at 3/28/2025 1649    Acceptance, E,D, VU,NR by  at 3/27/2025 1523    Acceptance, E,TB,D, VU,NR by  at 3/25/2025 1547                      Point: Home exercise program (Done)       Learning Progress Summary            Patient Acceptance, TB,E, VU,NR by  at 3/30/2025 1631                      Point: Body mechanics (Done)       Learning Progress Summary            Patient Acceptance, TB,E, VU,NR by  at 3/30/2025 1631    Acceptance, E,D, VU,NR by  at 3/28/2025 1649    Acceptance, E,D, VU,NR by  at 3/27/2025 1523    Acceptance, E,TB,D, VU,NR by  at 3/25/2025 1547                      Point: Precautions (Done)       Learning Progress Summary            Patient Acceptance, E,TB,D, VU,NR by  at 3/25/2025 1547                                      User Key       Initials Effective Dates Name Provider Type Discipline     02/14/23 -  Sophie Hall PTA Physical Therapist Assistant PT     04/08/22 -  Rosa Szymanski PT Physical Therapist PT     09/22/22 -  Nai Vela PT Physical Therapist PT                  PT Recommendation and Plan     Progress: improving  Outcome Evaluation: Pt seen for PT this PM - improved overall activity tolerance noted. initiated HEP for reclined and supine positions to focuse on bilat knee mobility with good tolerance and improved awareness of quad control. min A to stand and ambulate 3' x 2 with rwx today. Fatigues quickly, but no buckling noted. VC for sequencing for comfort and quad control on R LE with good carryover. Pt uses BSC during session.     Time Calculation:         PT Charges       Row Name 03/30/25 1631             Time Calculation    Start Time 1450  -ST      Stop Time 1528  -ST      Time Calculation (min) 38 min  -ST      PT Received On 03/30/25  -ST      PT -  Next Appointment 03/31/25  -ST         Time Calculation- PT    Total Timed Code Minutes- PT 38 minute(s)  -ST         Timed Charges    48338 - PT Therapeutic Exercise Minutes 12  -ST      97938 - Gait Training Minutes  15  -ST      13931 - PT Therapeutic Activity Minutes 11  -ST         Total Minutes    Timed Charges Total Minutes 38  -ST       Total Minutes 38  -ST                User Key  (r) = Recorded By, (t) = Taken By, (c) = Cosigned By      Initials Name Provider Type     Nai Vela, PT Physical Therapist                  Therapy Charges for Today       Code Description Service Date Service Provider Modifiers Qty    86045247698 HC PT THER PROC EA 15 MIN 3/30/2025 Nai Vela, PT GP 1    42474856023 HC GAIT TRAINING EA 15 MIN 3/30/2025 Nai Vela, PT GP 1    01063625349 HC PT THERAPEUTIC ACT EA 15 MIN 3/30/2025 Nai Vela, PT GP 1            PT G-Codes  Outcome Measure Options: AM-PAC 6 Clicks Daily Activity (OT), Modified [place]  AM-PAC 6 Clicks Score (PT): 17  AM-PAC 6 Clicks Score (OT): 12  Modified [place] Scale: 4 - Moderately severe disability.  Unable to walk without assistance, and unable to attend to own bodily needs without assistance.  PT Discharge Summary  Anticipated Discharge Disposition (PT): inpatient rehabilitation facility    Nai Vela, LENNOX  3/30/2025

## 2025-03-30 NOTE — PLAN OF CARE
Goal Outcome Evaluation:  Plan of Care Reviewed With: patient        Progress: improving  Outcome Evaluation: Pt seen for PT this PM - improved overall activity tolerance noted. initiated HEP for reclined and supine positions to focuse on bilat knee mobility with good tolerance and improved awareness of quad control. min A to stand and ambulate 3' x 2 with rwx today. Fatigues quickly, but no buckling noted. VC for sequencing for comfort and quad control on R LE with good carryover. Pt uses BSC during session.    Anticipated Discharge Disposition (PT): inpatient rehabilitation facility

## 2025-03-31 LAB
ANION GAP SERPL CALCULATED.3IONS-SCNC: 14.3 MMOL/L (ref 5–15)
BASOPHILS # BLD AUTO: 0.07 10*3/MM3 (ref 0–0.2)
BASOPHILS NFR BLD AUTO: 0.9 % (ref 0–1.5)
BUN SERPL-MCNC: 28 MG/DL (ref 8–23)
BUN/CREAT SERPL: 28.3 (ref 7–25)
CALCIUM SPEC-SCNC: 8.3 MG/DL (ref 8.6–10.5)
CHLORIDE SERPL-SCNC: 102 MMOL/L (ref 98–107)
CO2 SERPL-SCNC: 24.7 MMOL/L (ref 22–29)
CREAT SERPL-MCNC: 0.99 MG/DL (ref 0.57–1)
DEPRECATED RDW RBC AUTO: 43.4 FL (ref 37–54)
EGFRCR SERPLBLD CKD-EPI 2021: 63.8 ML/MIN/1.73
EOSINOPHIL # BLD AUTO: 0.42 10*3/MM3 (ref 0–0.4)
EOSINOPHIL NFR BLD AUTO: 5.7 % (ref 0.3–6.2)
ERYTHROCYTE [DISTWIDTH] IN BLOOD BY AUTOMATED COUNT: 12.6 % (ref 12.3–15.4)
GLUCOSE SERPL-MCNC: 96 MG/DL (ref 65–99)
HCT VFR BLD AUTO: 31.5 % (ref 34–46.6)
HGB BLD-MCNC: 10 G/DL (ref 12–15.9)
IMM GRANULOCYTES # BLD AUTO: 0.03 10*3/MM3 (ref 0–0.05)
IMM GRANULOCYTES NFR BLD AUTO: 0.4 % (ref 0–0.5)
LYMPHOCYTES # BLD AUTO: 2.26 10*3/MM3 (ref 0.7–3.1)
LYMPHOCYTES NFR BLD AUTO: 30.6 % (ref 19.6–45.3)
MCH RBC QN AUTO: 29.8 PG (ref 26.6–33)
MCHC RBC AUTO-ENTMCNC: 31.7 G/DL (ref 31.5–35.7)
MCV RBC AUTO: 93.8 FL (ref 79–97)
MONOCYTES # BLD AUTO: 0.55 10*3/MM3 (ref 0.1–0.9)
MONOCYTES NFR BLD AUTO: 7.4 % (ref 5–12)
NEUTROPHILS NFR BLD AUTO: 4.06 10*3/MM3 (ref 1.7–7)
NEUTROPHILS NFR BLD AUTO: 55 % (ref 42.7–76)
NRBC BLD AUTO-RTO: 0 /100 WBC (ref 0–0.2)
PLATELET # BLD AUTO: 324 10*3/MM3 (ref 140–450)
PMV BLD AUTO: 9.3 FL (ref 6–12)
POTASSIUM SERPL-SCNC: 4 MMOL/L (ref 3.5–5.2)
RBC # BLD AUTO: 3.36 10*6/MM3 (ref 3.77–5.28)
SODIUM SERPL-SCNC: 141 MMOL/L (ref 136–145)
WBC NRBC COR # BLD AUTO: 7.39 10*3/MM3 (ref 3.4–10.8)

## 2025-03-31 PROCEDURE — 25010000002 ONDANSETRON PER 1 MG: Performed by: INTERNAL MEDICINE

## 2025-03-31 PROCEDURE — 94799 UNLISTED PULMONARY SVC/PX: CPT

## 2025-03-31 PROCEDURE — 85025 COMPLETE CBC W/AUTO DIFF WBC: CPT | Performed by: STUDENT IN AN ORGANIZED HEALTH CARE EDUCATION/TRAINING PROGRAM

## 2025-03-31 PROCEDURE — 94664 DEMO&/EVAL PT USE INHALER: CPT

## 2025-03-31 PROCEDURE — 94760 N-INVAS EAR/PLS OXIMETRY 1: CPT

## 2025-03-31 PROCEDURE — 25010000002 ENOXAPARIN PER 10 MG: Performed by: STUDENT IN AN ORGANIZED HEALTH CARE EDUCATION/TRAINING PROGRAM

## 2025-03-31 PROCEDURE — 80048 BASIC METABOLIC PNL TOTAL CA: CPT | Performed by: STUDENT IN AN ORGANIZED HEALTH CARE EDUCATION/TRAINING PROGRAM

## 2025-03-31 RX ORDER — DICLOFENAC SODIUM 30 MG/G
1 GEL TOPICAL 4 TIMES DAILY
Status: DISCONTINUED | OUTPATIENT
Start: 2025-03-31 | End: 2025-04-02 | Stop reason: HOSPADM

## 2025-03-31 RX ADMIN — ENOXAPARIN SODIUM 40 MG: 100 INJECTION SUBCUTANEOUS at 17:20

## 2025-03-31 RX ADMIN — ATORVASTATIN CALCIUM 80 MG: 20 TABLET, FILM COATED ORAL at 08:40

## 2025-03-31 RX ADMIN — FAMOTIDINE 20 MG: 20 TABLET, FILM COATED ORAL at 07:01

## 2025-03-31 RX ADMIN — HYDROCODONE BITARTRATE AND ACETAMINOPHEN 1 TABLET: 7.5; 325 TABLET ORAL at 10:54

## 2025-03-31 RX ADMIN — METHOCARBAMOL TABLETS 500 MG: 500 TABLET, COATED ORAL at 22:11

## 2025-03-31 RX ADMIN — LORAZEPAM 0.5 MG: 0.5 TABLET ORAL at 01:25

## 2025-03-31 RX ADMIN — FAMOTIDINE 20 MG: 20 TABLET, FILM COATED ORAL at 17:20

## 2025-03-31 RX ADMIN — IPRATROPIUM BROMIDE 1 SPRAY: 21 SPRAY, METERED NASAL at 22:10

## 2025-03-31 RX ADMIN — VORTIOXETINE 10 MG: 5 TABLET, FILM COATED ORAL at 08:40

## 2025-03-31 RX ADMIN — HYDROCODONE BITARTRATE AND ACETAMINOPHEN 1 TABLET: 7.5; 325 TABLET ORAL at 14:54

## 2025-03-31 RX ADMIN — BUDESONIDE 0.5 MG: 0.5 INHALANT RESPIRATORY (INHALATION) at 20:25

## 2025-03-31 RX ADMIN — LEVOFLOXACIN 750 MG: 750 TABLET, FILM COATED ORAL at 12:41

## 2025-03-31 RX ADMIN — ONDANSETRON 4 MG: 2 INJECTION, SOLUTION INTRAMUSCULAR; INTRAVENOUS at 17:20

## 2025-03-31 RX ADMIN — Medication 2000 UNITS: at 08:41

## 2025-03-31 RX ADMIN — Medication 10 ML: at 21:00

## 2025-03-31 RX ADMIN — HYDROCODONE BITARTRATE AND ACETAMINOPHEN 1 TABLET: 7.5; 325 TABLET ORAL at 01:25

## 2025-03-31 RX ADMIN — HYDROCODONE BITARTRATE AND ACETAMINOPHEN 1 TABLET: 7.5; 325 TABLET ORAL at 07:01

## 2025-03-31 RX ADMIN — LOPERAMIDE HYDROCHLORIDE 2 MG: 2 CAPSULE ORAL at 14:54

## 2025-03-31 RX ADMIN — BUDESONIDE 0.5 MG: 0.5 INHALANT RESPIRATORY (INHALATION) at 08:12

## 2025-03-31 RX ADMIN — DICLOFENAC SODIUM 1 APPLICATION: 30 GEL TOPICAL at 14:51

## 2025-03-31 RX ADMIN — METHOCARBAMOL TABLETS 500 MG: 500 TABLET, COATED ORAL at 14:51

## 2025-03-31 RX ADMIN — LIDOCAINE 3 PATCH: 4 PATCH TOPICAL at 17:20

## 2025-03-31 RX ADMIN — IPRATROPIUM BROMIDE 1 SPRAY: 21 SPRAY, METERED NASAL at 08:41

## 2025-03-31 RX ADMIN — DICLOFENAC SODIUM 1 APPLICATION: 30 GEL TOPICAL at 22:10

## 2025-03-31 RX ADMIN — HYDROCODONE BITARTRATE AND ACETAMINOPHEN 1 TABLET: 7.5; 325 TABLET ORAL at 19:29

## 2025-03-31 RX ADMIN — Medication 10 ML: at 08:45

## 2025-03-31 RX ADMIN — METHOCARBAMOL TABLETS 500 MG: 500 TABLET, COATED ORAL at 07:01

## 2025-03-31 RX ADMIN — AZELASTINE HYDROCHLORIDE 1 SPRAY: 137 SPRAY, METERED NASAL at 08:41

## 2025-03-31 RX ADMIN — AMITRIPTYLINE HYDROCHLORIDE 25 MG: 25 TABLET, FILM COATED ORAL at 22:11

## 2025-03-31 RX ADMIN — LOSARTAN POTASSIUM 100 MG: 100 TABLET, FILM COATED ORAL at 08:41

## 2025-03-31 NOTE — PROGRESS NOTES
The patient is in bright spirits today and reports that her pain is greatly improved and that her anxiety symptoms have responded well to low-dose as needed Ativan.  I would recommend that the Ativan be continued following discharge at least while the patient is in a rehab setting.  Little else to add, I will sign off.

## 2025-03-31 NOTE — PLAN OF CARE
Problem: Adult Inpatient Plan of Care  Goal: Absence of Hospital-Acquired Illness or Injury  Intervention: Identify and Manage Fall Risk  Recent Flowsheet Documentation  Taken 3/30/2025 2017 by Bree Zarate RN  Safety Promotion/Fall Prevention:   activity supervised   assistive device/personal items within reach   clutter free environment maintained   safety round/check completed   nonskid shoes/slippers when out of bed   Goal Outcome Evaluation:

## 2025-03-31 NOTE — CASE MANAGEMENT/SOCIAL WORK
Continued Stay Note  [facility]     Patient Name: Pedrito Powell  MRN: [id number]  Today's Date: 3/31/2025    Admit Date: 3/23/2025    Plan: pre-cert pending for BAR   Discharge Plan       Row Name 03/31/25 1116       Plan    Plan pre-cert pending for BAR    Plan Comments CCP spoke with [facility]; they started auth on 3/29. CCP will follow for pending pre-cert and assist as needed. Dorys JAMES                   Discharge Codes    No documentation.                 Expected Discharge Date and Time       Expected Discharge Date Expected Discharge Time    Apr 1, 2025               ERIKA Grewal

## 2025-03-31 NOTE — PAYOR COMM NOTE
"Pedrito Powell (64 y.o. Female)    PLEASE SEE ATTACHED FOR CLINICAL UPDATES    REF#[id number]  THANK YOU  LORA URBANO RN/ DEPT   [facility]  PH: [phone]  FAX:  [phone]     Date of Birth   1960    Social Security Number       Address   [address] [place] [address]    Home Phone   [phone]    MRN   [id number]       [employer]    Marital Status                               Admission Date   3/23/2025    Admission Type   Emergency    Admitting Provider   Hima Trejo MD    Attending Provider   Cisco Esteban MD    Department, Room/Bed   [address], P797/1       Discharge Date       Discharge Disposition       Discharge Destination                                 Attending Provider: Cisco Esteban MD    Allergies: Cashew Nut Oil, Chocolate, Citrus, Nickel, Nuts, Tree Nut, Royal, Royal Oil, Bacitracin, Baclofen, Chlorhexidine, Ciprofloxacin, Citric Acid, Covid-19 (Mrna) Vaccine, Influenza Vaccines, Methyldibromoglutaronitrile, Naproxen, Neomycin, Omnicef [Cefdinir], Palladium Chloride, Penicillins, Pineapple Extract, Sulfa Antibiotics, Adhesive Tape, Gold-containing Drug Products, Latex    Isolation: None   Infection: None   Code Status: CPR    Ht: 149.9 cm (59\")   Wt: 115 kg (252 lb 13.9 oz)    Admission Cmt: None   Principal Problem: Intractable low back pain [M54.59]                   Active Insurance as of 3/23/2025       Primary Coverage       Payor Plan Insurance Group Employer/Plan Group    [facility]ly [facility] BLUE [facility] BLUE [facility] PPO [id number]       Payor Plan Address Payor Plan Phone Number Payor Plan Fax Number Effective Dates    [address] [phone]  1/1/2024 - None Entered    [facility] [id number]         Subscriber Name Subscriber Birth Date Member ID       PEDRITO POWELL 1960 [id number]                     Emergency Contacts        (Rel.) Home Phone Work Phone Mobile Phone    " Yareli Velasquez (Daughter) [phone] -- [phone]    Ld Velasquez (Son) -- -- [phone]    yasmin velasquez -- -- [phone]    Yaneth Kellogg (Sister) -- -- [phone]              [place]: NPI [id number]  Tax ID [id number]  Medication Administration Report for Pedrito Velasquez as of 3/30/25 through 3/31/25     Legend:    Given Hold Not Given Due Canceled Entry Other Actions    Time Time (Time) Time Time-Action         Discontinued     Completed     Future     MAR Hold     Linked             Medications 03/30/25 03/31/25      acetaminophen (TYLENOL) tablet 650 mg  Dose: 650 mg  Freq: Every 4 Hours PRN Route: PO  PRN Reason: Mild Pain  Start: 03/23/25 1821     Admin Instructions:   If given for fever, use fever parameter: fever greater than 100.4 °F  Based on patient request - if ordered for moderate or severe pain, provider allows for administration of a medication prescribed for a lower pain scale.    Do not exceed 4 grams of acetaminophen in a 24 hr period. Max dose of 2gm for AST/ALT greater than 120 units/L.    If given for pain, use the following pain scale:   Mild Pain = Pain Score of 1-3, CPOT 1-2  Moderate Pain = Pain Score of 4-6, CPOT 3-4  Severe Pain = Pain Score of 7-10, CPOT 5-8          Or   acetaminophen (TYLENOL) 160 MG/5ML oral solution 650 mg  Dose: 650 mg  Freq: Every 4 Hours PRN Route: PO  PRN Reason: Mild Pain  Start: 03/23/25 1821     Admin Instructions:   If given for fever, use fever parameter: fever greater than 100.4 °F  Based on patient request - if ordered for moderate or severe pain, provider allows for administration of a medication prescribed for a lower pain scale.    Do not exceed 4 grams of acetaminophen in a 24 hr period. Max dose of 2gm for AST/ALT greater than 120 units/L.    If given for pain, use the following pain scale:   Mild Pain = Pain Score of 1-3, CPOT 1-2  Moderate Pain = Pain Score of 4-6, CPOT 3-4  Severe Pain = Pain Score of 7-10, CPOT 5-8           Or   acetaminophen (TYLENOL) suppository 650 mg  Dose: 650 mg  Freq: Every 4 Hours PRN Route: RE  PRN Reason: Mild Pain  Start: 03/23/25 1821     Admin Instructions:   If given for fever, use fever parameter: fever greater than 100.4 °F  Based on patient request - if ordered for moderate or severe pain, provider allows for administration of a medication prescribed for a lower pain scale.    Do not exceed 4 grams of acetaminophen in a 24 hr period. Max dose of 2gm for AST/ALT greater than 120 units/L.    If given for pain, use the following pain scale:   Mild Pain = Pain Score of 1-3, CPOT 1-2  Moderate Pain = Pain Score of 4-6, CPOT 3-4  Severe Pain = Pain Score of 7-10, CPOT 5-8          albuterol sulfate HFA (PROVENTIL HFA;VENTOLIN HFA;PROAIR HFA) inhaler 2 puff  Dose: 2 puff  Freq: Every 4 Hours PRN Route: IN  PRN Reason: Wheezing  Start: 03/23/25 1821     Admin Instructions:   Include Respiratory Treatment Education   Shake well.          amitriptyline (ELAVIL) tablet 25 mg  Dose: 25 mg  Freq: Nightly PRN Route: PO  PRN Reason: Sleep  Start: 03/23/25 1821 2017-Given               atorvastatin (LIPITOR) tablet 80 mg  Dose: 80 mg  Freq: Daily Route: PO  Start: 03/24/25 0900     Admin Instructions:   Avoid grapefruit juice.      0746-Given     0904-Canceled Entry           0840-Given              azelastine (ASTELIN) nasal spray 1 spray  Dose: 1 spray  Freq: Daily Route: EACH NARE  Start: 03/24/25 0900      0747-Given     0904-Canceled Entry           0841-Given              benzonatate (TESSALON) capsule 100 mg  Dose: 100 mg  Freq: 3 Times Daily PRN Route: PO  PRN Reason: Cough  Start: 03/23/25 1821     Admin Instructions:   Do not crush or chew the capsules or tablets. The drug may not work as designed if the capsule or tablet is crushed or chewed. Swallow whole.  Swallow whole.  Do not crush, chew, or open capsule.           sennosides-docusate (PERICOLACE) 8.6-50 MG per tablet 2 tablet  Dose: 2  "tablet  Freq: 2 Times Daily Route: PO  Start: 03/27/25 1530     Admin Instructions:   HOLD MEDICATION IF PATIENT HAS HAD BOWEL MOVEMENT. Start bowel management regimen if patient has not had a bowel movement after 12 hours.      (0904)-Not Given     2018-Canceled Entry           (1126)-Not Given     2100             And   polyethylene glycol (MIRALAX) packet 17 g  Dose: 17 g  Freq: 2 Times Daily Route: PO  Start: 03/27/25 1530     Admin Instructions:   Use if no bowel movement after 12 hours. Mix in 6-8 ounces of water.  Use 4-8 ounces of water, tea, or juice for each 17 gram dose.      (0904)-Not Given     2018-Canceled Entry           (1126)-Not Given     2100             And   bisacodyl (DULCOLAX) EC tablet 5 mg  Dose: 5 mg  Freq: Daily PRN Route: PO  PRN Reason: Constipation  PRN Comment: Use if polyethylene glycol is ineffective  Start: 03/27/25 1440     Admin Instructions:   Use if no bowel movement after 12 hours.  Swallow whole. Do not crush, split, or chew tablet.          And   bisacodyl (DULCOLAX) suppository 10 mg  Dose: 10 mg  Freq: Daily PRN Route: RE  PRN Reason: Constipation  PRN Comment: Use if bisacodyl oral is ineffective  Start: 03/27/25 1440     Admin Instructions:   Use if no bowel movement after 12 hours.  Hold for diarrhea          budesonide (PULMICORT) nebulizer solution 0.5 mg  Dose: 0.5 mg  Freq: 2 Times Daily - RT Route: NEBULIZATION  Start: 03/23/25 2130     Admin Instructions:   Do not shake.  Protect from light.      0835-Given     2102-Given           0812-Given     2130             Calcium Replacement - Follow Nurse / BPA Driven Protocol  Freq: As Needed Route: XX  PRN Reason: Other  Start: 03/24/25 1114     Admin Instructions:   Open Order & Select \"[facility] Electrolyte Replacement Protocol Algorithm\" to View Details          Calcium Replacement - Follow Nurse / BPA Driven Protocol  Freq: As Needed Route: XX  PRN Reason: Other  Start: 03/23/25 1821     Admin Instructions:   Open Order & " "Select \"[facility] Electrolyte Replacement Protocol Algorithm\" to View Details          cholecalciferol (VITAMIN D3) tablet 2,000 Units  Dose: 2,000 Units  Freq: Daily Route: PO  Start: 03/24/25 1315      0746-Given     0904-Canceled Entry           0841-Given              Diclofenac Sodium (VOLTAREN) 1 % gel 4 g  Dose: 4 g  Freq: 4 Times Daily PRN Route: TOP  PRN Comment: foot pain  Start: 03/23/25 1821     Admin Instructions:   Apply to affected area  .   Do not cover area with occlusive dressing or apply any other med to affected area. Do not wash affected area for 1 hr after applying. Use dosing card for correct dose measurement.          Diclofenac Sodium 3 % gel 1 Application  Dose: 1 Application  Freq: 4 Times Daily Route: TOP  Start: 03/31/25 1200     Admin Instructions:   Apply to right knee.   Do not cover area with occlusive dressing or apply any other med to affected area. Do not wash affected area for 1 hr after applying. Use dosing card for correct dose measurement.       1451-Given     1800     2100            enoxaparin sodium (LOVENOX) syringe 40 mg  Dose: 40 mg  Freq: Every 24 Hours Route: SC  Indications of Use: PROPHYLAXIS OF VENOUS THROMBOEMBOLISM  Start: 03/30/25 1700     Admin Instructions:   Give subcutaneous in abdomen only. Do not massage site after injection.      1607-Given            1720-Given              famotidine (PEPCID) tablet 20 mg  Dose: 20 mg  Freq: 2 Times Daily Before Meals Route: PO  Start: 03/23/25 1915      0746-Given     1600-Given     1644-Canceled Entry          0701-Given     1720-Given             HYDROcodone-acetaminophen (NORCO) 7.5-325 MG per tablet 1 tablet  Dose: 1 tablet  Freq: Every 4 Hours PRN Route: PO  PRN Reason: Moderate Pain  Start: 03/25/25 1851 End: 04/04/25 1426     Admin Instructions:   Based on patient request - if ordered for moderate or severe pain, provider allows for administration of a medication prescribed for a lower pain scale.  [EDILIA]    Do not " exceed 4 grams of acetaminophen in a 24 hr period. Max dose of 2gm for AST/ALT greater than 120 units/L        If given for pain, use the following pain scale:   Mild Pain = Pain Score of 1-3, CPOT 1-2  Moderate Pain = Pain Score of 4-6, CPOT 3-4  Severe Pain = Pain Score of 7-10, CPOT 5-8      0106-Given     0745-Given     1156-Given     1600-Given     2017-Given        0125-Given     0701-Given     1054-Given     1454-Given           HYDROmorphone (DILAUDID) injection 0.5 mg  Dose: 0.5 mg  Freq: Every 2 Hours PRN Route: IV  PRN Reason: Severe Pain  Start: 03/23/25 1821     Admin Instructions:   Based on patient request - if ordered for moderate or severe pain, provider allows for administration of a medication prescribed for a lower pain scale.  If given for pain, use the following pain scale:  Mild Pain = Pain Score of 1-3, CPOT 1-2  Moderate Pain = Pain Score of 4-6, CPOT 3-4  Severe Pain = Pain Score of 7-10, CPOT 5-8      1732-Given               ipratropium (ATROVENT) nasal spray 0.03%  Dose: 1 spray  Freq: 3 Times Daily Route: EACH NARE  Start: 03/23/25 2100     Admin Instructions:         0747-Given     0904-Canceled Entry     1605-Given     2017-Given         0841-Given     1600     2100            levoFLOXacin (LEVAQUIN) tablet 750 mg  Dose: 750 mg  Freq: Every 24 Hours Route: PO  Indications of Use: PNEUMONIA  Start: 03/30/25 1200 End: 04/02/25 1159     Admin Instructions:   Levofloxacin has been changed from IV to PO per System Policy.  Caution: Look alike/sound alike drug alert.  Avoid antacids/vitamins/calcium/iron.      1156-Given            1241-Given              Lidocaine 4 % 3 patch  Dose: 3 patch  Freq: Every 24 Hours Scheduled Route: TD  Start: 03/24/25 1330     Admin Instructions:   Apply to skin on midline low back, jessica SI joints . Remove patch in 12 hours. Apply patch only once for up to 12 hours in 24 hour period. Based on patient request -  if ordered for moderate or severe pain, provider  "allows for administration of a medication prescribed for a lower pain scale.  If given for pain, use the following pain scale:  Mild Pain = Pain Score of 1-3, CPOT 1-2  Moderate Pain = Pain Score of 4-6, CPOT 3-4  Severe Pain = Pain Score of 7-10, CPOT 5-8      1600-Medication Applied [C]            0433-Medication Removed     1720-Medication Applied             loperamide (IMODIUM) capsule 2 mg  Dose: 2 mg  Freq: Every 2 Hours PRN Route: PO  PRN Reason: Diarrhea  PRN Comment: max 4 doses daily  Start: 03/23/25 1821     Admin Instructions:   Maximum recommended 16 mg / 24 hours.         1454-Given              LORazepam (ATIVAN) tablet 0.5 mg  Dose: 0.5 mg  Freq: Every 6 Hours PRN Route: PO  PRN Reason: Anxiety  Start: 03/27/25 1355 End: 04/03/25 1354     Admin Instructions:    Caution: Look alike/sound alike drug alert      0105-Given            0125-Given              losartan (COZAAR) tablet 100 mg  Dose: 100 mg  Freq: Every 24 Hours Scheduled Route: PO  Start: 03/24/25 0900      0746-Given     0904-Canceled Entry           0841-Given              Magnesium Standard Dose Replacement - Follow Nurse / BPA Driven Protocol  Freq: As Needed Route: XX  PRN Reason: Other  Start: 03/24/25 1114     Admin Instructions:   Open Order & Select \"[facility] Electrolyte Replacement Protocol Algorithm\" to View Details          Magnesium Standard Dose Replacement - Follow Nurse / BPA Driven Protocol  Freq: As Needed Route: XX  PRN Reason: Other  Start: 03/23/25 1821     Admin Instructions:   Open Order & Select \"[facility] Electrolyte Replacement Protocol Algorithm\" to View Details          meclizine (ANTIVERT) tablet 25 mg  Dose: 25 mg  Freq: 3 Times Daily PRN Route: PO  PRN Reason: Dizziness  Start: 03/23/25 1821          methocarbamol (ROBAXIN) tablet 500 mg  Dose: 500 mg  Freq: Every 8 Hours Scheduled Route: PO  Start: 03/24/25 1400      0643-Given     1338-Given     2122-Given          0701-Given     1451-Given     2200          " "  ondansetron (ZOFRAN) injection 4 mg  Dose: 4 mg  Freq: Every 6 Hours PRN Route: IV  PRN Reasons: Nausea,Vomiting  Start: 03/23/25 1821     Admin Instructions:   \"If multiple N/V medications ordered, use in the following order: Ondansetron, Prochlorperazine, Promethazine. Use PO unless patient refuses or patient unable to swallow.\"      1039-Given            1720-Given              Phosphorus Replacement - Follow Nurse / BPA Driven Protocol  Freq: As Needed Route: XX  PRN Reason: Other  Start: 03/24/25 1114     Admin Instructions:   Open Order & Select \"[facility] Electrolyte Replacement Protocol Algorithm\" to View Details          Phosphorus Replacement - Follow Nurse / BPA Driven Protocol  Freq: As Needed Route: XX  PRN Reason: Other  Start: 03/23/25 1821     Admin Instructions:   Open Order & Select \"[facility] Electrolyte Replacement Protocol Algorithm\" to View Details          Potassium Replacement - Follow Nurse / BPA Driven Protocol  Freq: As Needed Route: XX  PRN Reason: Other  Start: 03/24/25 1114     Admin Instructions:   Open Order & Select \"[facility] Electrolyte Replacement Protocol Algorithm\" to View Details          Potassium Replacement - Follow Nurse / BPA Driven Protocol  Freq: As Needed Route: XX  PRN Reason: Other  Start: 03/23/25 1821     Admin Instructions:   Open Order & Select \"[facility] Electrolyte Replacement Protocol Algorithm\" to View Details          sodium chloride 0.9 % flush 10 mL  Dose: 10 mL  Freq: As Needed Route: IV  PRN Reason: Line Care  Start: 03/23/25 1821          sodium chloride 0.9 % flush 10 mL  Dose: 10 mL  Freq: Every 12 Hours Scheduled Route: IV  Start: 03/23/25 2100      0904-Canceled Entry     2012-Canceled Entry           0845-Given     2100             sodium chloride 0.9 % infusion 40 mL  Dose: 40 mL  Freq: As Needed Route: IV  PRN Reason: Line Care  Start: 03/23/25 1821     Admin Instructions:   Following administration of an IV intermittent medication, flush line with 40mL NS at " 100mL/hr.          traMADol (ULTRAM) tablet 50 mg  Dose: 50 mg  Freq: 2 Times Daily PRN Route: PO  PRN Reason: Moderate Pain  Start: 03/23/25 1821     Admin Instructions:   Based on patient request - if ordered for moderate or severe pain, provider allows for administration of a medication prescribed for a lower pain scale.      Caution: Look alike/sound alike drug alert    If given for pain, use the following pain scale:  Mild Pain = Pain Score of 1-3, CPOT 1-2  Moderate Pain = Pain Score of 4-6, CPOT 3-4  Severe Pain = Pain Score of 7-10, CPOT 5-8          Vortioxetine HBr (TRINTELLIX) tablet 10 mg  Dose: 10 mg  Freq: Daily With Breakfast Route: PO  Start: 03/24/25 0800      0746-Given            0840-Given              Completed Medications  Medications 03/30/25 03/31/25      doxycycline (MONODOX) capsule 100 mg  Dose: 100 mg  Freq: Every 12 Hours Scheduled Route: PO  Indications of Use: SKIN AND SOFT TISSUE INFECTION  Start: 03/23/25 2100 End: 03/26/25 1034     Admin Instructions:   Take with food if GI upset occurs. Administer 2 hours before or 4 hours after administration of oral polyvalent cations (calcium, zinc, magnesium, iron)          gadobenate dimeglumine (MULTIHANCE) injection 20 mL  Dose: 20 mL  Freq: Once in Imaging Route: IV  Start: 03/23/25 2230 End: 03/23/25 2203     Admin Instructions:   Vesicant; admin as rapid bolus; flush with 5 mL NS after admin or 20 mL for renal or aortoiliofemoral vasculature          HYDROmorphone (DILAUDID) injection 1 mg  Dose: 1 mg  Freq: Once Route: IV  Start: 03/23/25 1511 End: 03/23/25 1508     Admin Instructions:   Based on patient request - if ordered for moderate or severe pain, provider allows for administration of a medication prescribed for a lower pain scale.      Caution: Look alike/sound alike drug alert    If given for pain, use the following pain scale:  Mild Pain = Pain Score of 1-3, CPOT 1-2  Moderate Pain = Pain Score of 4-6, CPOT 3-4  Severe Pain =  "Pain Score of 7-10, CPOT 5-8          lidocaine (XYLOCAINE) 1 % injection 2 mL  Dose: 2 mL  Freq: Once Route: ID  Start: 03/27/25 1015 End: 03/27/25 0917          lidocaine (XYLOCAINE) 1 % injection 5 mL  Dose: 5 mL  Freq: Once Route: INFILTRATION  Start: 03/28/25 1015 End: 03/28/25 1626          LORazepam (ATIVAN) tablet 0.5 mg  Dose: 0.5 mg  Freq: Once Route: PO  Start: 03/23/25 2000 End: 03/23/25 2113     Admin Instructions:   Give 30 mins before MRI    Caution: Look alike/sound alike drug alert          methylPREDNISolone acetate (DEPO-medrol) injection 80 mg  Dose: 80 mg  Freq: Once Route: IM  Start: 03/28/25 1015 End: 03/28/25 1626     Admin Instructions:   ** Not for IV use **  Caution: Look alike/sound alike drug alert          ondansetron (ZOFRAN) injection 4 mg  Dose: 4 mg  Freq: Once Route: IV  Start: 03/23/25 1511 End: 03/23/25 1508     Admin Instructions:   \"If multiple N/V medications ordered, use in the following order: Ondansetron, Prochlorperazine, Promethazine. Use PO unless patient refuses or patient unable to swallow.\"          sodium chloride 0.9 % bolus 500 mL  Dose: 500 mL  Freq: Once Route: IV  Start: 03/26/25 1145 End: 03/26/25 1836          Discontinued Medications  Medications 03/30/25 03/31/25      acetaminophen (TYLENOL) tablet 500 mg  Dose: 500 mg  Freq: Every 6 Hours PRN Route: PO  PRN Reason: Mild Pain  Start: 03/23/25 1821 End: 03/23/25 1825     Admin Instructions:   If given for fever, use fever parameter: fever greater than 100.4 °F  Based on patient request - if ordered for moderate or severe pain, provider allows for administration of a medication prescribed for a lower pain scale.    Do not exceed 4 grams of acetaminophen in a 24 hr period. Max dose of 2gm for AST/ALT greater than 120 units/L.    If given for pain, use the following pain scale:   Mild Pain = Pain Score of 1-3, CPOT 1-2  Moderate Pain = Pain Score of 4-6, CPOT 3-4  Severe Pain = Pain Score of 7-10, CPOT 5-8       "    aztreonam (AZACTAM) 2,000 mg in sodium chloride 0.9 % 100 mL MBP  Dose: 2,000 mg  Freq: Every 8 Hours Route: IV  Indications of Use: PNEUMONIA  Start: 03/26/25 2000 End: 03/26/25 1226     Admin Instructions:   Activate vial before using.          aztreonam (AZACTAM) 2,000 mg in sodium chloride 0.9 % 100 mL MBP  Dose: 2,000 mg  Freq: Once Route: IV  Start: 03/26/25 1200 End: 03/26/25 1256     Admin Instructions:   Activate vial before using.          furosemide (LASIX) tablet 20 mg  Dose: 20 mg  Freq: Daily Route: PO  Start: 03/23/25 1915 End: 03/24/25 0515          HYDROcodone-acetaminophen (NORCO) 5-325 MG per tablet 1 tablet  Dose: 1 tablet  Freq: Every 4 Hours PRN Route: PO  PRN Reasons: Moderate Pain,Severe Pain  Start: 03/24/25 2007 End: 03/25/25 1853     Admin Instructions:   Based on patient request - if ordered for moderate or severe pain, provider allows for administration of a medication prescribed for a lower pain scale.  [EDILIA]    Do not exceed 4 grams of acetaminophen in a 24 hr period. Max dose of 2gm for AST/ALT greater than 120 units/L        If given for pain, use the following pain scale:   Mild Pain = Pain Score of 1-3, CPOT 1-2  Moderate Pain = Pain Score of 4-6, CPOT 3-4  Severe Pain = Pain Score of 7-10, CPOT 5-8          levoFLOXacin (LEVAQUIN) 750 mg/150 mL D5W (premix) (LEVAQUIN) 750 mg  Dose: 750 mg  Freq: Every 24 Hours Route: IV  Indications of Use: PNEUMONIA  Start: 03/26/25 1215 End: 03/29/25 1406     Admin Instructions:   Caution: Look alike/sound alike drug alert. Protect from light. Do NOT refrigerate.          Pharmacy to Dose enoxaparin (LOVENOX)  Freq: Continuous PRN Route: XX  PRN Reason: Consult  Start: 03/30/25 1548 End: 03/30/25 1604     Order specific questions:   Indication of use VTE Prophylaxis          Pharmacy To Dose: Aztreonam  Freq: Continuous PRN Route: XX  PRN Reason: Consult  Start: 03/26/25 1055 End: 03/26/25 1117     Order specific questions:   Pharmacy to dose  Aztreonam  Indications of Use: Pneumonia          Pharmacy To Dose: Levofloxacin  Freq: Continuous PRN Route: XX  PRN Reason: Consult  Start: 25 1056 End: 25 1117     Order specific questions:   Pharmacy to dose Levofloxacin  Indications of Use: Pneumonia          potassium chloride (KLOR-CON M20) CR tablet 20 mEq  Dose: 20 mEq  Freq: Daily Route: PO  Start: 25 End: 25 0515     Admin Instructions:   Do not crush or chew the capsules or tablets. The drug may not work as designed if the capsule or tablet is crushed or chewed. Swallow whole.  Take with food.          sodium chloride 0.9 % infusion  Rate: 100 mL/hr Dose: 100 mL/hr  Freq: Continuous Route: IV  Start: 25 End: 25          Vitamin D capsule 2,000 Units  Dose: 2,000 Units  Freq: Daily Route: PO  Start: 25 0900 End: 25 1218                         Physician Progress Notes (last 48 hours)        Cedric Torres III, MD at 25 1304          The patient is in bright spirits today and reports that her pain is greatly improved and that her anxiety symptoms have responded well to low-dose as needed Ativan.  I would recommend that the Ativan be continued following discharge at least while the patient is in a rehab setting.  Little else to add, I will sign off.    Electronically signed by Cedric Torres III, MD at 25 1305       Cisco Esteban MD at 25 1053              Name: Pedrito Powell ADMIT: 3/23/2025   : 1960  PCP: Teo Fraser MD    MRN: [id number] LOS: 5 days   AGE/SEX: 64 y.o. female  ROOM: [facility]     Subjective   Subjective   No acute events. Patient denies new complaints. No family at bedside.    Objective   Objective   Vital Signs  Temp:  [98.1 °F (36.7 °C)-98.5 °F (36.9 °C)] 98.4 °F (36.9 °C)  Heart Rate:  [64-80] 70  Resp:  [16-17] 16  BP: (117-144)/(76-86) 144/82  SpO2:  [95 %-97 %] 97 %  on   ;   Device (Oxygen Therapy): room  air  Body mass index is 51.07 kg/m².  Physical Exam  Vitals and nursing note reviewed.   Constitutional:       General: She is not in acute distress.     Appearance: She is not toxic-appearing or diaphoretic.   HENT:      Head: Normocephalic and atraumatic.      Nose: Nose normal.      Mouth/Throat:      Mouth: Mucous membranes are moist.      Pharynx: Oropharynx is clear.   Eyes:      Conjunctiva/sclera: Conjunctivae normal.      Pupils: Pupils are equal, round, and reactive to light.   Cardiovascular:      Rate and Rhythm: Normal rate and regular rhythm.      Pulses: Normal pulses.   Pulmonary:      Effort: Pulmonary effort is normal.      Breath sounds: Normal breath sounds.   Abdominal:      General: Bowel sounds are normal. There is no distension.      Palpations: Abdomen is soft.      Tenderness: There is no abdominal tenderness.   Musculoskeletal:         General: Swelling and tenderness present.      Cervical back: Neck supple.      Comments: Trace swelling and tenderness of the right knee, no erythema or excessive warmth  Trace left knee and left wrist swelling   Skin:     General: Skin is warm and dry.      Capillary Refill: Capillary refill takes less than 2 seconds.   Neurological:      General: No focal deficit present.      Mental Status: She is alert and oriented to person, place, and time.   Psychiatric:         Mood and Affect: Mood normal.         Behavior: Behavior normal.       Results Review     I reviewed the patient's new clinical results.  Results from last 7 days   Lab Units 03/31/25  0544 03/30/25  0548 03/29/25 0622 03/28/25  0523   WBC 10*3/mm3 7.39 8.37 4.97 7.24   HEMOGLOBIN g/dL 10.0* 9.8* 10.3* 9.3*   PLATELETS 10*3/mm3 324 334 315 276     Results from last 7 days   Lab Units 03/31/25  0544 03/30/25  0548 03/29/25  0622 03/28/25  0523   SODIUM mmol/L 141 139 136 131*   POTASSIUM mmol/L 4.0 3.9 4.3 3.7   CHLORIDE mmol/L 102 102 100 98   CO2 mmol/L 24.7 24.7 24.8 23.9   BUN mg/dL 28*  "25* 21 18   CREATININE mg/dL 0.99 0.89 0.80 0.87   GLUCOSE mg/dL 96 150* 191* 92   EGFR mL/min/1.73 63.8 72.5 82.4 74.5       Results from last 7 days   Lab Units 03/31/25  0544 03/30/25  0548 03/29/25  0622 03/28/25  0523   CALCIUM mg/dL 8.3* 8.7 9.3 8.5*     Results from last 7 days   Lab Units 03/27/25  0320 03/26/25  0602   PROCALCITONIN ng/mL  --  0.21   LACTATE mmol/L 0.9  --      No results found for: \"HGBA1C\", \"POCGLU\"    XR Knee 1 or 2 View Right  Result Date: 3/30/2025  1. Severe degenerative arthritis of the right knee. 2. Loose body lateral to the distal femur. 3. But no acute bony abnormality is seen  This report was finalized on 3/30/2025 4:35 PM by Dr. Morgan Beard M.D on Workstation: [id number]        I have personally reviewed all medications:  Scheduled Medications  atorvastatin, 80 mg, Oral, Daily  azelastine, 1 spray, Each Nare, Daily  budesonide, 0.5 mg, Nebulization, BID - RT  cholecalciferol, 2,000 Units, Oral, Daily  Diclofenac Sodium, 1 Application, Topical, 4x Daily  enoxaparin sodium, 40 mg, Subcutaneous, Q24H  famotidine, 20 mg, Oral, BID AC  ipratropium, 1 spray, Each Nare, TID  levoFLOXacin, 750 mg, Oral, Q24H  Lidocaine, 3 patch, Transdermal, Q24H  losartan, 100 mg, Oral, Q24H  methocarbamol, 500 mg, Oral, Q8H  senna-docusate sodium, 2 tablet, Oral, BID   And  polyethylene glycol, 17 g, Oral, BID  sodium chloride, 10 mL, Intravenous, Q12H  Vortioxetine HBr, 10 mg, Oral, Daily With Breakfast    Infusions   Diet  Diet: Cardiac; Healthy Heart (2-3 Na+); Fluid Consistency: Thin (IDDSI 0)    I have personally reviewed:  [x]  Laboratory   [x]  Microbiology   [x]  Radiology   [x]  EKG/Telemetry  []  Cardiology/Vascular   []  Pathology    [x]  Records      Assessment/Plan     Active Hospital Problems    Diagnosis  POA    **Intractable low back pain [M54.59]  Yes    Spinal stenosis, lumbar region, without neurogenic claudication [M48.061]  Unknown    Spondylolisthesis, lumbar region " [M43.16]  Unknown    Facet arthritis of lumbar region [M47.816]  Unknown    GERD (gastroesophageal reflux disease) [K21.9]  Yes    Acute low back pain [M54.50]  Unknown    Cellulitis of leg [L03.119]  Unknown    Acute back pain [M54.9]  Yes    Anxiety [F41.9]  Yes    Anemia [D64.9]  Yes    HTN (hypertension) [I10]  Yes      Resolved Hospital Problems   No resolved problems to display.   Lower Back Pain  - MRI showed lumbar stenosis  - neurosurgery evaluated and recommended no intervention at this time  - continue pain control, PT    Pseudogout of the Left Knee and Wrist  - s/p left knee aspiration 3/26/25 with calcium pyrophosphate crystals, s/p steroid injection with improvement  - having left knee pain, xray showed degenerative changes-start voltaren gel    Pneumonia due to Gram negative bacteria  - continue on levofloxacin for 7d course  - appreciate ID recommendations    HTN  - BP acceptable acutely  - continue current regimen    Depression/Anxiety  - appears controlled, continue current regimen  - appreciate psychiatry recommendations    Lovenox 40 mg SC daily for DVT prophylaxis.  Full code.  Discussed with patient and nursing staff.  Anticipate discharge to SNU facility once arrangements have been made.  Expected Discharge Date: 2025; Expected Discharge Time:       Cisco Esteban MD  [facility]  25  10:58 EDT    Electronically signed by Cisco Esteban MD at 25 1103       Yobani Akbar DO at 25 1117              Name: Pedrito Powell ADMIT: 3/23/2025   : 1960  PCP: Teo Fraser MD    MRN: [id number] LOS: 4 days   AGE/SEX: 64 y.o. female  ROOM: [facility]     Subjective   Subjective   Patient seen and examined.  Hospital day 7, she is resting in bed, doing okay at present, no acute events overnight.      Objective   Objective   Vital Signs  Temp:  [97.7 °F (36.5 °C)-98.4 °F (36.9 °C)] 98.4 °F (36.9 °C)  Heart Rate:  [61-81] 69  Resp:  [16-18] 16  BP:  (119-143)/(62-89) 143/89  SpO2:  [90 %-97 %] 96 %  on   ;   Device (Oxygen Therapy): room air  Body mass index is 51.07 kg/m².  Physical Exam  Vitals and nursing note reviewed.   Constitutional:       General: She is not in acute distress.     Appearance: She is overweight.   Cardiovascular:      Rate and Rhythm: Normal rate.      Pulses: Normal pulses.      Heart sounds: Normal heart sounds.   Pulmonary:      Effort: Pulmonary effort is normal.      Breath sounds: No wheezing.   Abdominal:      General: Bowel sounds are normal. There is no distension.      Palpations: Abdomen is soft.      Tenderness: There is no abdominal tenderness.   Musculoskeletal:         General: No tenderness.      Comments: Left knee swelling and tenderness overall improved, left wrist swelling improved, minimal LE edema   Skin:     General: Skin is warm and dry.   Neurological:      Mental Status: She is alert.       Results Review     I reviewed the patient's new clinical results.  Results from last 7 days   Lab Units 03/30/25  0548 03/29/25  0622 03/28/25  0523 03/27/25  0320   WBC 10*3/mm3 8.37 4.97 7.24 9.49   HEMOGLOBIN g/dL 9.8* 10.3* 9.3* 10.0*   PLATELETS 10*3/mm3 334 315 276 238     Results from last 7 days   Lab Units 03/30/25  0548 03/29/25  0622 03/28/25  0523 03/27/25  0320   SODIUM mmol/L 139 136 131* 132*   POTASSIUM mmol/L 3.9 4.3 3.7 4.2   CHLORIDE mmol/L 102 100 98 97*   CO2 mmol/L 24.7 24.8 23.9 22.6   BUN mg/dL 25* 21 18 14   CREATININE mg/dL 0.89 0.80 0.87 0.84   GLUCOSE mg/dL 150* 191* 92 96   EGFR mL/min/1.73 72.5 82.4 74.5 77.7     Results from last 7 days   Lab Units 03/24/25  0533 03/23/25  1325   ALBUMIN g/dL 3.1* 4.0   BILIRUBIN mg/dL 0.8 0.7   ALK PHOS U/L 65 74   AST (SGOT) U/L 16 20   ALT (SGPT) U/L 10 15     Results from last 7 days   Lab Units 03/30/25  0548 03/29/25  0622 03/28/25  0523 03/27/25  0320 03/25/25  0546 03/24/25  0533 03/23/25  1325   CALCIUM mg/dL 8.7 9.3 8.5* 8.5*   < > 8.4* 8.6   ALBUMIN  "g/dL  --   --   --   --   --  3.1* 4.0    < > = values in this interval not displayed.     Results from last 7 days   Lab Units 03/27/25  0320 03/26/25  0602 03/23/25  1507 03/23/25  1325   PROCALCITONIN ng/mL  --  0.21  --  0.03   LACTATE mmol/L 0.9  --  1.0  --      No results found for: \"HGBA1C\", \"POCGLU\"    No radiology results for the last day      I have personally reviewed all medications:  Scheduled Medications  atorvastatin, 80 mg, Oral, Daily  azelastine, 1 spray, Each Nare, Daily  budesonide, 0.5 mg, Nebulization, BID - RT  cholecalciferol, 2,000 Units, Oral, Daily  famotidine, 20 mg, Oral, BID AC  ipratropium, 1 spray, Each Nare, TID  levoFLOXacin, 750 mg, Oral, Q24H  Lidocaine, 3 patch, Transdermal, Q24H  losartan, 100 mg, Oral, Q24H  methocarbamol, 500 mg, Oral, Q8H  senna-docusate sodium, 2 tablet, Oral, BID   And  polyethylene glycol, 17 g, Oral, BID  sodium chloride, 10 mL, Intravenous, Q12H  Vortioxetine HBr, 10 mg, Oral, Daily With Breakfast    Infusions     Diet  Diet: Cardiac; Healthy Heart (2-3 Na+); Fluid Consistency: Thin (IDDSI 0)    I have personally reviewed:  [x]  Laboratory   [x]  Microbiology   []  Radiology   []  EKG/Telemetry  []  Cardiology/Vascular   []  Pathology    []  Records      Assessment/Plan     Active Hospital Problems    Diagnosis  POA    **Intractable low back pain [M54.59]  Yes    Spinal stenosis, lumbar region, without neurogenic claudication [M48.061]  Unknown    Spondylolisthesis, lumbar region [M43.16]  Unknown    Facet arthritis of lumbar region [M47.816]  Unknown    GERD (gastroesophageal reflux disease) [K21.9]  Yes    Acute low back pain [M54.50]  Unknown    Cellulitis of leg [L03.119]  Unknown    Acute back pain [M54.9]  Yes    Anxiety [F41.9]  Yes    Anemia [D64.9]  Yes    HTN (hypertension) [I10]  Yes      Resolved Hospital Problems   No resolved problems to display.       64 y.o. female admitted with Intractable low back pain.    Addendum: Notified by nursing " that she is now endorsing worsening pain and swelling in her right knee with decreased range of motion. Will check x-ray of right knee now to assess further.    Lower back pain  - Patient with worsening lower back pain with radiculopathy symptoms. MRI showing evidence of lumbar stenosis. ANDRES consulted, no acute intervention warranted, continue with medications for pain. Continue with therapy services and fall precautions. Oral pain medication adjusted on 03/25, pain better controlled. Continue treatment as prescribed.    Pain and swelling in left wrist  Pain and swelling in left knee  Pseudogout, left knee effusion  - X-rays obtained, imaging showing degenerative changes in knee with possible effusion. X-ray of wrist showed soft tissue swelling, old injury, some widening of the interval between the trapezium and the scaphoid is noted. Duplex LE US and LUE US negative for DVT.   - Orthopedic surgery consulted. They ordered CT scans; CT LUE showing Diffuse soft tissue swelling about the wrist, particular around the radiocarpal joint and the proximal carpal row. CT LLE showing moderate left knee effusion. She underwent left knee aspiration on 03/26, 22 mL fluid removed. Fluid studies reviewed, showing calcium pyrophosphate crystals concerning for pseudogout. She is s/p left knee corticosteroid injection, pain is improving. Continue to monitor.    Fever  Leukocytosis  Recently diagnosed cellulitis  Pneumonia  - Noted. Prior UA with + LE, 6-10 WBC, but she denies urinary symptoms to suggest UTI, urine culture no growth. Blood cultures no growth to date. She was continued on Doxycycline for cellulitis previously diagnosed. Her LE swelling, erythema that was noted previously has improved.   - CXR ordered, showing findings concerning for pneumonia. She has multiple medication allergies, started empiric treatment with Levaquin on 03/26 and consulted ID.  - ID notes reviewed, recommended empiric Levaquin x 7 days and signed  off, available as needed. WBC has improved.Last fever on 03/27/25 at 101.1 F. No evidence at present to suggest worsening and/or new developing infection.  - Trend WBC with CBC.    Hyponatremia  - Noted on labs, further workup reviewed, values have since improved. Continue to monitor.   - Repeat labs in AM.    Hypertension  - Blood pressure appears stable and acceptable acutely at this time.   - Continue current medications as prescribed. Trend BP to guide ongoing management decisions.    Anemia  - Hemoglobin low on most recent labs. No evidence of overt blood loss. No indications for acute intervention at this time.    - Order repeat CBC in AM for reassessment. Continue to monitor, transfuse for hemoglobin <7.    Prediabetes with Hyperglycemia  - Glucose elevated on most recent labs. Patient without known history of T2DM.  Most recent hemoglobin A1c 5.90% (12/22/24). Continue monitoring.    CKD stage 2?  - Previously, appears to have CKD stage 2, baseline creatinine ~0.8 to 0.9, renal function stable, Repeat labs in AM.    Depression/anxiety  - Consulted access and psychiatry to evaluate. Greatly appreciate their help.    Constipation  - Resolved with adjustment to bowel regimen. Continue as prescribed.     Portions of this text have been copied and I have reviewed these. Documentation accurate as of 03/30/25.     Pharmacy to dose Lovenox for DVT prophylaxis.  Full code.  Discussed with patient, family, and nursing staff.  Anticipate discharge  [facility]   once arrangements are made  Expected discharge date/ time has not been documented.      Yobani Akbar DO  [facility]  03/30/25        Electronically signed by Yobani Akbar DO at 03/30/25 1550       Consult Notes (last 48 hours)  Notes from 03/29/25 1858 through 03/31/25 1858   No notes of this type exist for this encounter.

## 2025-03-31 NOTE — NURSING NOTE
Access consulted and following d/t depression and anxiety. Patient has also been seen by psychiatrist and he is following.     Reviewed chart and spoke with RN. Patient appeared to be asleep and resting comfortably when follow up completed. Patient began c/o right knee pain and stiffness yesterday; xray completed. PT working with patient and states improved overall activity tolerance. Plan is to discharge to [facility].     Access following.

## 2025-03-31 NOTE — PROGRESS NOTES
Name: Pedrito Powell ADMIT: 3/23/2025   : 1960  PCP: Teo Fraser MD    MRN: [id number] LOS: 5 days   AGE/SEX: 64 y.o. female  ROOM: [facility]     Subjective   Subjective   No acute events. Patient denies new complaints. No family at bedside.    Objective   Objective   Vital Signs  Temp:  [98.1 °F (36.7 °C)-98.5 °F (36.9 °C)] 98.4 °F (36.9 °C)  Heart Rate:  [64-80] 70  Resp:  [16-17] 16  BP: (117-144)/(76-86) 144/82  SpO2:  [95 %-97 %] 97 %  on   ;   Device (Oxygen Therapy): room air  Body mass index is 51.07 kg/m².  Physical Exam  Vitals and nursing note reviewed.   Constitutional:       General: She is not in acute distress.     Appearance: She is not toxic-appearing or diaphoretic.   HENT:      Head: Normocephalic and atraumatic.      Nose: Nose normal.      Mouth/Throat:      Mouth: Mucous membranes are moist.      Pharynx: Oropharynx is clear.   Eyes:      Conjunctiva/sclera: Conjunctivae normal.      Pupils: Pupils are equal, round, and reactive to light.   Cardiovascular:      Rate and Rhythm: Normal rate and regular rhythm.      Pulses: Normal pulses.   Pulmonary:      Effort: Pulmonary effort is normal.      Breath sounds: Normal breath sounds.   Abdominal:      General: Bowel sounds are normal. There is no distension.      Palpations: Abdomen is soft.      Tenderness: There is no abdominal tenderness.   Musculoskeletal:         General: Swelling and tenderness present.      Cervical back: Neck supple.      Comments: Trace swelling and tenderness of the right knee, no erythema or excessive warmth  Trace left knee and left wrist swelling   Skin:     General: Skin is warm and dry.      Capillary Refill: Capillary refill takes less than 2 seconds.   Neurological:      General: No focal deficit present.      Mental Status: She is alert and oriented to person, place, and time.   Psychiatric:         Mood and Affect: Mood normal.         Behavior: Behavior normal.       Results Review     I  "reviewed the patient's new clinical results.  Results from last 7 days   Lab Units 03/31/25  0544 03/30/25  0548 03/29/25  0622 03/28/25  0523   WBC 10*3/mm3 7.39 8.37 4.97 7.24   HEMOGLOBIN g/dL 10.0* 9.8* 10.3* 9.3*   PLATELETS 10*3/mm3 324 334 315 276     Results from last 7 days   Lab Units 03/31/25  0544 03/30/25  0548 03/29/25  0622 03/28/25  0523   SODIUM mmol/L 141 139 136 131*   POTASSIUM mmol/L 4.0 3.9 4.3 3.7   CHLORIDE mmol/L 102 102 100 98   CO2 mmol/L 24.7 24.7 24.8 23.9   BUN mg/dL 28* 25* 21 18   CREATININE mg/dL 0.99 0.89 0.80 0.87   GLUCOSE mg/dL 96 150* 191* 92   EGFR mL/min/1.73 63.8 72.5 82.4 74.5       Results from last 7 days   Lab Units 03/31/25  0544 03/30/25  0548 03/29/25  0622 03/28/25  0523   CALCIUM mg/dL 8.3* 8.7 9.3 8.5*     Results from last 7 days   Lab Units 03/27/25  0320 03/26/25  0602   PROCALCITONIN ng/mL  --  0.21   LACTATE mmol/L 0.9  --      No results found for: \"HGBA1C\", \"POCGLU\"    XR Knee 1 or 2 View Right  Result Date: 3/30/2025  1. Severe degenerative arthritis of the right knee. 2. Loose body lateral to the distal femur. 3. But no acute bony abnormality is seen  This report was finalized on 3/30/2025 4:35 PM by Dr. Morgan Beard M.D on Workstation: [id number]        I have personally reviewed all medications:  Scheduled Medications  atorvastatin, 80 mg, Oral, Daily  azelastine, 1 spray, Each Nare, Daily  budesonide, 0.5 mg, Nebulization, BID - RT  cholecalciferol, 2,000 Units, Oral, Daily  Diclofenac Sodium, 1 Application, Topical, 4x Daily  enoxaparin sodium, 40 mg, Subcutaneous, Q24H  famotidine, 20 mg, Oral, BID AC  ipratropium, 1 spray, Each Nare, TID  levoFLOXacin, 750 mg, Oral, Q24H  Lidocaine, 3 patch, Transdermal, Q24H  losartan, 100 mg, Oral, Q24H  methocarbamol, 500 mg, Oral, Q8H  senna-docusate sodium, 2 tablet, Oral, BID   And  polyethylene glycol, 17 g, Oral, BID  sodium chloride, 10 mL, Intravenous, Q12H  Vortioxetine HBr, 10 mg, Oral, Daily With " Breakfast    Infusions   Diet  Diet: Cardiac; Healthy Heart (2-3 Na+); Fluid Consistency: Thin (IDDSI 0)    I have personally reviewed:  [x]  Laboratory   [x]  Microbiology   [x]  Radiology   [x]  EKG/Telemetry  []  Cardiology/Vascular   []  Pathology    [x]  Records       Assessment/Plan     Active Hospital Problems    Diagnosis  POA    **Intractable low back pain [M54.59]  Yes    Spinal stenosis, lumbar region, without neurogenic claudication [M48.061]  Unknown    Spondylolisthesis, lumbar region [M43.16]  Unknown    Facet arthritis of lumbar region [M47.816]  Unknown    GERD (gastroesophageal reflux disease) [K21.9]  Yes    Acute low back pain [M54.50]  Unknown    Cellulitis of leg [L03.119]  Unknown    Acute back pain [M54.9]  Yes    Anxiety [F41.9]  Yes    Anemia [D64.9]  Yes    HTN (hypertension) [I10]  Yes      Resolved Hospital Problems   No resolved problems to display.   Lower Back Pain  - MRI showed lumbar stenosis  - neurosurgery evaluated and recommended no intervention at this time  - continue pain control, PT    Pseudogout of the Left Knee and Wrist  - s/p left knee aspiration 3/26/25 with calcium pyrophosphate crystals, s/p steroid injection with improvement  - having left knee pain, xray showed degenerative changes-start voltaren gel    Pneumonia due to Gram negative bacteria  - continue on levofloxacin for 7d course  - appreciate ID recommendations    HTN  - BP acceptable acutely  - continue current regimen    Depression/Anxiety  - appears controlled, continue current regimen  - appreciate psychiatry recommendations    Lovenox 40 mg SC daily for DVT prophylaxis.  Full code.  Discussed with patient and nursing staff.  Anticipate discharge to SNU facility once arrangements have been made.  Expected Discharge Date: 4/1/2025; Expected Discharge Time:       Cisco Esteban MD  [facility]  03/31/25  10:58 EDT

## 2025-04-01 LAB
ANION GAP SERPL CALCULATED.3IONS-SCNC: 11.3 MMOL/L (ref 5–15)
BACTERIA FLD CULT: NORMAL
BACTERIA SPEC ANAEROBE CULT: NORMAL
BASOPHILS # BLD AUTO: 0.07 10*3/MM3 (ref 0–0.2)
BASOPHILS NFR BLD AUTO: 0.9 % (ref 0–1.5)
BUN SERPL-MCNC: 27 MG/DL (ref 8–23)
BUN/CREAT SERPL: 29.3 (ref 7–25)
CALCIUM SPEC-SCNC: 8.3 MG/DL (ref 8.6–10.5)
CHLORIDE SERPL-SCNC: 102 MMOL/L (ref 98–107)
CO2 SERPL-SCNC: 25.7 MMOL/L (ref 22–29)
CREAT SERPL-MCNC: 0.92 MG/DL (ref 0.57–1)
DEPRECATED RDW RBC AUTO: 43.7 FL (ref 37–54)
EGFRCR SERPLBLD CKD-EPI 2021: 69.7 ML/MIN/1.73
EOSINOPHIL # BLD AUTO: 0.62 10*3/MM3 (ref 0–0.4)
EOSINOPHIL NFR BLD AUTO: 7.6 % (ref 0.3–6.2)
ERYTHROCYTE [DISTWIDTH] IN BLOOD BY AUTOMATED COUNT: 12.7 % (ref 12.3–15.4)
GLUCOSE SERPL-MCNC: 91 MG/DL (ref 65–99)
GRAM STN SPEC: NORMAL
GRAM STN SPEC: NORMAL
HCT VFR BLD AUTO: 30.7 % (ref 34–46.6)
HGB BLD-MCNC: 10 G/DL (ref 12–15.9)
IMM GRANULOCYTES # BLD AUTO: 0.05 10*3/MM3 (ref 0–0.05)
IMM GRANULOCYTES NFR BLD AUTO: 0.6 % (ref 0–0.5)
LYMPHOCYTES # BLD AUTO: 2.5 10*3/MM3 (ref 0.7–3.1)
LYMPHOCYTES NFR BLD AUTO: 30.7 % (ref 19.6–45.3)
MCH RBC QN AUTO: 30.1 PG (ref 26.6–33)
MCHC RBC AUTO-ENTMCNC: 32.6 G/DL (ref 31.5–35.7)
MCV RBC AUTO: 92.5 FL (ref 79–97)
MONOCYTES # BLD AUTO: 0.63 10*3/MM3 (ref 0.1–0.9)
MONOCYTES NFR BLD AUTO: 7.7 % (ref 5–12)
NEUTROPHILS NFR BLD AUTO: 4.28 10*3/MM3 (ref 1.7–7)
NEUTROPHILS NFR BLD AUTO: 52.5 % (ref 42.7–76)
NRBC BLD AUTO-RTO: 0 /100 WBC (ref 0–0.2)
PLATELET # BLD AUTO: 367 10*3/MM3 (ref 140–450)
PMV BLD AUTO: 9 FL (ref 6–12)
POTASSIUM SERPL-SCNC: 3.9 MMOL/L (ref 3.5–5.2)
RBC # BLD AUTO: 3.32 10*6/MM3 (ref 3.77–5.28)
SODIUM SERPL-SCNC: 139 MMOL/L (ref 136–145)
WBC NRBC COR # BLD AUTO: 8.15 10*3/MM3 (ref 3.4–10.8)

## 2025-04-01 PROCEDURE — 80048 BASIC METABOLIC PNL TOTAL CA: CPT | Performed by: STUDENT IN AN ORGANIZED HEALTH CARE EDUCATION/TRAINING PROGRAM

## 2025-04-01 PROCEDURE — 85025 COMPLETE CBC W/AUTO DIFF WBC: CPT | Performed by: STUDENT IN AN ORGANIZED HEALTH CARE EDUCATION/TRAINING PROGRAM

## 2025-04-01 PROCEDURE — 97535 SELF CARE MNGMENT TRAINING: CPT

## 2025-04-01 PROCEDURE — 25010000002 ENOXAPARIN PER 10 MG: Performed by: STUDENT IN AN ORGANIZED HEALTH CARE EDUCATION/TRAINING PROGRAM

## 2025-04-01 PROCEDURE — 97116 GAIT TRAINING THERAPY: CPT

## 2025-04-01 PROCEDURE — 94799 UNLISTED PULMONARY SVC/PX: CPT

## 2025-04-01 RX ADMIN — BUDESONIDE 0.5 MG: 0.5 INHALANT RESPIRATORY (INHALATION) at 07:49

## 2025-04-01 RX ADMIN — AMITRIPTYLINE HYDROCHLORIDE 25 MG: 25 TABLET, FILM COATED ORAL at 21:58

## 2025-04-01 RX ADMIN — METHOCARBAMOL TABLETS 500 MG: 500 TABLET, COATED ORAL at 13:17

## 2025-04-01 RX ADMIN — DICLOFENAC SODIUM 1 APPLICATION: 30 GEL TOPICAL at 09:36

## 2025-04-01 RX ADMIN — DICLOFENAC SODIUM 1 APPLICATION: 30 GEL TOPICAL at 21:58

## 2025-04-01 RX ADMIN — Medication 2000 UNITS: at 09:35

## 2025-04-01 RX ADMIN — HYDROCODONE BITARTRATE AND ACETAMINOPHEN 1 TABLET: 7.5; 325 TABLET ORAL at 18:51

## 2025-04-01 RX ADMIN — IPRATROPIUM BROMIDE 1 SPRAY: 21 SPRAY, METERED NASAL at 09:36

## 2025-04-01 RX ADMIN — AZELASTINE HYDROCHLORIDE 1 SPRAY: 137 SPRAY, METERED NASAL at 09:36

## 2025-04-01 RX ADMIN — BUDESONIDE 0.5 MG: 0.5 INHALANT RESPIRATORY (INHALATION) at 20:03

## 2025-04-01 RX ADMIN — IPRATROPIUM BROMIDE 1 SPRAY: 21 SPRAY, METERED NASAL at 15:01

## 2025-04-01 RX ADMIN — POLYETHYLENE GLYCOL 3350 17 G: 17 POWDER, FOR SOLUTION ORAL at 21:00

## 2025-04-01 RX ADMIN — VORTIOXETINE 10 MG: 5 TABLET, FILM COATED ORAL at 09:35

## 2025-04-01 RX ADMIN — LEVOFLOXACIN 750 MG: 750 TABLET, FILM COATED ORAL at 13:17

## 2025-04-01 RX ADMIN — FAMOTIDINE 20 MG: 20 TABLET, FILM COATED ORAL at 17:10

## 2025-04-01 RX ADMIN — DICLOFENAC SODIUM 1 APPLICATION: 30 GEL TOPICAL at 13:17

## 2025-04-01 RX ADMIN — LOSARTAN POTASSIUM 100 MG: 100 TABLET, FILM COATED ORAL at 09:35

## 2025-04-01 RX ADMIN — HYDROCODONE BITARTRATE AND ACETAMINOPHEN 1 TABLET: 7.5; 325 TABLET ORAL at 07:01

## 2025-04-01 RX ADMIN — ATORVASTATIN CALCIUM 80 MG: 20 TABLET, FILM COATED ORAL at 09:35

## 2025-04-01 RX ADMIN — POLYETHYLENE GLYCOL 3350 17 G: 17 POWDER, FOR SOLUTION ORAL at 09:35

## 2025-04-01 RX ADMIN — FAMOTIDINE 20 MG: 20 TABLET, FILM COATED ORAL at 09:35

## 2025-04-01 RX ADMIN — Medication 10 ML: at 09:36

## 2025-04-01 RX ADMIN — SENNOSIDES AND DOCUSATE SODIUM 2 TABLET: 50; 8.6 TABLET ORAL at 09:35

## 2025-04-01 RX ADMIN — Medication 10 ML: at 21:00

## 2025-04-01 RX ADMIN — METHOCARBAMOL TABLETS 500 MG: 500 TABLET, COATED ORAL at 21:58

## 2025-04-01 RX ADMIN — DICLOFENAC SODIUM 1 APPLICATION: 30 GEL TOPICAL at 17:10

## 2025-04-01 RX ADMIN — LIDOCAINE 3 PATCH: 4 PATCH TOPICAL at 09:35

## 2025-04-01 RX ADMIN — IPRATROPIUM BROMIDE 1 SPRAY: 21 SPRAY, METERED NASAL at 21:58

## 2025-04-01 RX ADMIN — HYDROCODONE BITARTRATE AND ACETAMINOPHEN 1 TABLET: 7.5; 325 TABLET ORAL at 15:01

## 2025-04-01 RX ADMIN — METHOCARBAMOL TABLETS 500 MG: 500 TABLET, COATED ORAL at 06:56

## 2025-04-01 RX ADMIN — ENOXAPARIN SODIUM 40 MG: 100 INJECTION SUBCUTANEOUS at 17:10

## 2025-04-01 NOTE — PROGRESS NOTES
"Nutrition Services    Patient Name: Pedrito Powell  YOB: 1960  MRN: [id number]  Admission date: 3/23/2025    Assessment Date:  04/01/25    NUTRITION EVALUATION      Reason for Encounter Length of Stay   Diagnosis/Problem Admission Diagnosis:  Acute febrile illness [R50.9]  Acute back pain [M54.9]  Intractable low back pain [M54.59]  Bilateral lower extremity edema [R60.0]    Problem List:    Intractable low back pain    HTN (hypertension)    Anemia    Anxiety    Acute low back pain    Cellulitis of leg    Acute back pain    Spinal stenosis, lumbar region, without neurogenic claudication    Spondylolisthesis, lumbar region    Facet arthritis of lumbar region    GERD (gastroesophageal reflux disease)     Narrative Ms Powell reports she is eating ok. Reports it is hard to make food selections due to her many allergies.  Denies assistance needed at this time       PO Diet Diet: Cardiac; Healthy Heart (2-3 Na+); Fluid Consistency: Thin (IDDSI 0)   Allergies Tree nuts, Other: Pineapple extract, citric acid, cashews, tree nuts, nuts, citrus, chocolate   Supplements n/a   PO Intake % %       Chewing/Swallowing Difficulty no issues identified at this time       Medications D3, Pepcid, Miralax, Colace   Labs  reviewed       Physical Findings alert, oriented     Edema 2+ (mild)    GI Function + BM 3/31   Skin Status skin intact    Lines/Drains none   I/O reviewed        Height  Weight  BMI  Weight Trend     Height: 149.9 cm (59\")  Weight: 103 kg (226 lb 1.6 oz) (04/01/25 0600)  Body mass index is 45.67 kg/m².  Stable, Other: UBW ~230#       NFPE No clinical signs of muscle wasting or fat loss       Nutrition Problem (PES) Problem: Other Potential for inadequate nutrient intake  Etiology: Functional Diagnosis -   and Factors Affecting Nutrition - Multiple Food Allergies    Signs/Symptoms: Other (comment)Patient report of allergy       Intervention/Plan Liberalize diet to Regular due to many food " restrictions from allergies, no edema or h/o CHF noted.    Will follow and assist as needed    RD to follow up per protocol.     Results from last 7 days   Lab Units 04/01/25  0451 03/31/25  0544 03/30/25  0548   SODIUM mmol/L 139 141 139   POTASSIUM mmol/L 3.9 4.0 3.9   CHLORIDE mmol/L 102 102 102   CO2 mmol/L 25.7 24.7 24.7   BUN mg/dL 27* 28* 25*   CREATININE mg/dL 0.92 0.99 0.89   CALCIUM mg/dL 8.3* 8.3* 8.7   GLUCOSE mg/dL 91 96 150*     Results from last 7 days   Lab Units 04/01/25  0451   HEMOGLOBIN g/dL 10.0*   HEMATOCRIT % 30.7*     Lab Results   Component Value Date    HGBA1C 5.90 (H) 12/22/2024     Wt Readings from Last 10 Encounters:   04/01/25 103 kg (226 lb 1.6 oz)   03/21/25 103 kg (228 lb)   03/15/25 104 kg (230 lb)   02/07/25 112 kg (246 lb)   01/08/25 114 kg (250 lb 9.6 oz)   12/21/24 104 kg (230 lb)   12/03/24 104 kg (230 lb)   11/20/24 104 kg (230 lb)   11/06/24 113 kg (250 lb)   10/05/24 109 kg (240 lb)       Electronically signed by:  Booker Bustillo RD  04/01/25 12:24 EDT

## 2025-04-01 NOTE — CASE MANAGEMENT/SOCIAL WORK
Continued Stay Note  [facility]     Patient Name: Pedrito Powell  MRN: [id number]  Today's Date: 4/1/2025    Admit Date: 3/23/2025    Plan: [facility] when bed available   Discharge Plan       Row Name 04/01/25 1155       Plan    Plan [facility] when bed available    Plan Comments CCP spoke with [facility]. [facility] has been approved but no bed is available at this time. Per [facility], they will possibly have a bed tomorrow or Thursday. CCP to follow. ERIKA MITCHELL                   Discharge Codes    No documentation.                 Expected Discharge Date and Time       Expected Discharge Date Expected Discharge Time    Apr 1, 2025               ERIKA Hill

## 2025-04-01 NOTE — PLAN OF CARE
Problem: Adult Inpatient Plan of Care  Goal: Absence of Hospital-Acquired Illness or Injury  Intervention: Identify and Manage Fall Risk  Recent Flowsheet Documentation  Taken 4/1/2025 1827 by Kori Sterling RN  Safety Promotion/Fall Prevention:   assistive device/personal items within reach   safety round/check completed  Taken 4/1/2025 1645 by Kori Sterling RN  Safety Promotion/Fall Prevention:   assistive device/personal items within reach   safety round/check completed  Taken 4/1/2025 1459 by Kori Setrling RN  Safety Promotion/Fall Prevention:   assistive device/personal items within reach   safety round/check completed  Taken 4/1/2025 1201 by Kori Sterling RN  Safety Promotion/Fall Prevention:   assistive device/personal items within reach   safety round/check completed  Taken 4/1/2025 0806 by Kori Sterling RN  Safety Promotion/Fall Prevention:   activity supervised   assistive device/personal items within reach   clutter free environment maintained   fall prevention program maintained   gait belt   lighting adjusted   mobility aid in reach   nonskid shoes/slippers when out of bed   safety round/check completed  Intervention: Prevent Skin Injury  Recent Flowsheet Documentation  Taken 4/1/2025 1827 by Kori Sterling RN  Body Position: supine  Taken 4/1/2025 1645 by Kori Sterling RN  Body Position: supine  Taken 4/1/2025 1459 by Kori Sterling RN  Body Position: supine  Taken 4/1/2025 1201 by Kori Sterling RN  Body Position: supine  Taken 4/1/2025 0806 by Kori Sterling RN  Body Position: supine  Skin Protection:   drying agents applied   incontinence pads utilized   protective footwear used  Intervention: Prevent and Manage VTE (Venous Thromboembolism) Risk  Recent Flowsheet Documentation  Taken 4/1/2025 0806 by Kori Sterling RN  VTE Prevention/Management:   bilateral   SCDs (sequential compression devices) off  Goal: Optimal Comfort and Wellbeing  Intervention: Monitor Pain and Promote Comfort  Recent Flowsheet  Documentation  Taken 4/1/2025 1501 by Kori Sterling RN  Pain Management Interventions: pain medication given  Taken 4/1/2025 0806 by Kori Sterling RN  Pain Management Interventions: pain medication given  Intervention: Provide Person-Centered Care  Recent Flowsheet Documentation  Taken 4/1/2025 0806 by Kori Sterling RN  Trust Relationship/Rapport:   care explained   thoughts/feelings acknowledged     Problem: Fall Injury Risk  Goal: Absence of Fall and Fall-Related Injury  Intervention: Identify and Manage Contributors  Recent Flowsheet Documentation  Taken 4/1/2025 1827 by Kori Sterling RN  Medication Review/Management: medications reviewed  Taken 4/1/2025 1645 by Kori Sterling RN  Medication Review/Management: medications reviewed  Taken 4/1/2025 1459 by Kori Sterling RN  Medication Review/Management: medications reviewed  Taken 4/1/2025 1201 by Kori Sterling RN  Medication Review/Management: medications reviewed  Taken 4/1/2025 0806 by Kori Sterling RN  Medication Review/Management: medications reviewed  Self-Care Promotion:   independence encouraged   BADL personal objects within reach  Intervention: Promote Injury-Free Environment  Recent Flowsheet Documentation  Taken 4/1/2025 1827 by Kori Sterling RN  Safety Promotion/Fall Prevention:   assistive device/personal items within reach   safety round/check completed  Taken 4/1/2025 1645 by Kori Sterling RN  Safety Promotion/Fall Prevention:   assistive device/personal items within reach   safety round/check completed  Taken 4/1/2025 1459 by Kori Sterling RN  Safety Promotion/Fall Prevention:   assistive device/personal items within reach   safety round/check completed  Taken 4/1/2025 1201 by Kori Sterling RN  Safety Promotion/Fall Prevention:   assistive device/personal items within reach   safety round/check completed  Taken 4/1/2025 0806 by Kori Sterling RN  Safety Promotion/Fall Prevention:   activity supervised   assistive device/personal items within reach   clutter free  environment maintained   fall prevention program maintained   gait belt   lighting adjusted   mobility aid in reach   nonskid shoes/slippers when out of bed   safety round/check completed     Problem: Comorbidity Management  Goal: Maintenance of Asthma Control  Intervention: Maintain Asthma Symptom Control  Recent Flowsheet Documentation  Taken 4/1/2025 1827 by Kori Sterling RN  Medication Review/Management: medications reviewed  Taken 4/1/2025 1645 by Kori Sterling RN  Medication Review/Management: medications reviewed  Taken 4/1/2025 1459 by Kori Sterling RN  Medication Review/Management: medications reviewed  Taken 4/1/2025 1201 by Kori Sterling RN  Medication Review/Management: medications reviewed  Taken 4/1/2025 0806 by Kori Sterling RN  Medication Review/Management: medications reviewed  Goal: Blood Pressure in Desired Range  Intervention: Maintain Blood Pressure Management  Recent Flowsheet Documentation  Taken 4/1/2025 1827 by Kori Sterling RN  Medication Review/Management: medications reviewed  Taken 4/1/2025 1645 by Kori Sterling RN  Medication Review/Management: medications reviewed  Taken 4/1/2025 1459 by Kori Sterling RN  Medication Review/Management: medications reviewed  Taken 4/1/2025 1201 by Kori Sterling RN  Medication Review/Management: medications reviewed  Taken 4/1/2025 0806 by Kori Sterling RN  Medication Review/Management: medications reviewed     Problem: Skin Injury Risk Increased  Goal: Skin Health and Integrity  Intervention: Optimize Skin Protection  Recent Flowsheet Documentation  Taken 4/1/2025 1459 by Kori Sterling RN  Head of Bed (HOB) Positioning: HOB elevated  Taken 4/1/2025 1201 by Kori Sterling RN  Head of Bed (HOB) Positioning: HOB elevated  Taken 4/1/2025 0806 by Kori Sterling RN  Activity Management:   up in chair   dorsiflexion/plantar flexion performed  Pressure Reduction Techniques:   frequent weight shift encouraged   weight shift assistance provided  Pressure Reduction Devices:  alternating pressure pump (LAITH)  Skin Protection:   drying agents applied   incontinence pads utilized   protective footwear used     Problem: Pain Acute  Goal: Optimal Pain Control and Function  Intervention: Optimize Psychosocial Wellbeing  Recent Flowsheet Documentation  Taken 4/1/2025 0806 by Kori Sterling RN  Supportive Measures:   active listening utilized   self-care encouraged  Diversional Activities:   television   tablet  Intervention: Develop Pain Management Plan  Recent Flowsheet Documentation  Taken 4/1/2025 1501 by Kori Sterling RN  Pain Management Interventions: pain medication given  Taken 4/1/2025 0806 by Kori Sterling RN  Pain Management Interventions: pain medication given  Intervention: Prevent or Manage Pain  Recent Flowsheet Documentation  Taken 4/1/2025 1827 by Kori Sterling RN  Medication Review/Management: medications reviewed  Taken 4/1/2025 1645 by Kori Sterling RN  Medication Review/Management: medications reviewed  Taken 4/1/2025 1459 by Kori Sterling RN  Medication Review/Management: medications reviewed  Taken 4/1/2025 1201 by Kori Sterling RN  Medication Review/Management: medications reviewed  Taken 4/1/2025 0806 by Kori Sterling RN  Sensory Stimulation Regulation:   television on   quiet environment promoted  Sleep/Rest Enhancement: regular sleep/rest pattern promoted  Medication Review/Management: medications reviewed     Problem: Sepsis/Septic Shock  Goal: Optimal Coping  Intervention: Support Patient and Family Response  Recent Flowsheet Documentation  Taken 4/1/2025 0806 by Kori Sterling RN  Supportive Measures:   active listening utilized   self-care encouraged  Goal: Absence of Bleeding  Intervention: Monitor and Manage Bleeding  Recent Flowsheet Documentation  Taken 4/1/2025 0806 by Kori Sterling RN  Bleeding Management: dressing monitored  Goal: Absence of Infection Signs and Symptoms  Intervention: Promote Recovery  Recent Flowsheet Documentation  Taken 4/1/2025 0806 by Kori Sterling  RN  Activity Management:   up in chair   dorsiflexion/plantar flexion performed  Sleep/Rest Enhancement: regular sleep/rest pattern promoted     Problem: Suicide Risk  Goal: Absence of Self-Harm  Intervention: Assess Risk to Self and Maintain Safety  Recent Flowsheet Documentation  Taken 4/1/2025 1827 by Kori Sterling RN  Enhanced Safety Measures: chair alarm set  Taken 4/1/2025 1645 by Kori Sterling RN  Enhanced Safety Measures:   chair alarm set   family to remain at bedside  Taken 4/1/2025 1459 by Kori Sterling RN  Enhanced Safety Measures:   bed alarm set   family to remain at bedside  Taken 4/1/2025 1201 by Kori Sterling RN  Enhanced Safety Measures: chair alarm set  Taken 4/1/2025 0806 by Kori Sterling RN  Enhanced Safety Measures: chair alarm set  Intervention: Promote Psychosocial Wellbeing  Recent Flowsheet Documentation  Taken 4/1/2025 0806 by Kori Sterling RN  Supportive Measures:   active listening utilized   self-care encouraged  Sleep/Rest Enhancement: regular sleep/rest pattern promoted     Problem: Pneumonia  Goal: Effective Oxygenation and Ventilation  Intervention: Promote Airway Secretion Clearance  Recent Flowsheet Documentation  Taken 4/1/2025 0806 by Kori Sterling RN  Activity Management:   up in chair   dorsiflexion/plantar flexion performed  Intervention: Optimize Oxygenation and Ventilation  Recent Flowsheet Documentation  Taken 4/1/2025 1459 by Kori Sterling RN  Head of Bed (HOB) Positioning: HOB elevated  Taken 4/1/2025 1201 by Kori Sterling RN  Head of Bed (HOB) Positioning: HOB elevated   Goal Outcome Evaluation:

## 2025-04-01 NOTE — PLAN OF CARE
Goal Outcome Evaluation:                      Pt admitted on 3/23 with back pain. Pt has arthritis through out her body. Pt continues with PRN meds and Diclofenac ointment to the left knee. Pending precert for [facility],. Will continue to monitor.

## 2025-04-01 NOTE — THERAPY TREATMENT NOTE
Patient Name: Pedrito Powell  : 1960    MRN: [id number]                              Today's Date: 2025       Admit Date: 3/23/2025    Visit Dx:     ICD-10-CM ICD-9-CM   1. Intractable low back pain  M54.59 724.2   2. Acute febrile illness  R50.9 780.60   3. Bilateral lower extremity edema  R60.0 782.3     Patient Active Problem List   Diagnosis    Hx of anaphylaxis    Primary osteoarthritis of knee    High risk medication use    HTN (hypertension)    Hyperlipidemia    Vertigo    Infected blister of left foot    Ganglion of right wrist    Vomiting and diarrhea    Intractable vomiting with nausea    Fatigue    Acute gastritis without hemorrhage    Anemia    Elevated serum creatinine    Fungal rash of torso    Cellulitis of abdominal wall    Bronchitis    Arthritis of shoulder    Chronic renal failure in pediatric patient, stage 3 (moderate)    H/O shoulder surgery    Anxiety    Depression    Iron deficiency    Dizziness    Hypoxia    COVID-19 virus infection    Acute low back pain    Cellulitis of leg    Intractable low back pain    Acute back pain    Spinal stenosis, lumbar region, without neurogenic claudication    Spondylolisthesis, lumbar region    Facet arthritis of lumbar region    GERD (gastroesophageal reflux disease)     Past Medical History:   Diagnosis Date    Anemia     IRON INFUSION RECENTLY    Arthritis     Asthma     Chronic kidney disease     stage 3    Elevated cholesterol     GERD (gastroesophageal reflux disease)     High risk medication use 2018    History of carpal tunnel syndrome     Hyperlipidemia     Hypertension     Intractable vomiting with nausea 2018    Left shoulder pain     Limited mobility     Rheumatoid arthritis     Vertigo     Weakness     AT TIMES LEFT SHOULDER/LEFT ARM     Past Surgical History:   Procedure Laterality Date    CARPAL TUNNEL RELEASE Left 10/02/2023     SECTION  , ,     COLONOSCOPY N/A 12/10/2018    normal ileum, IH,  hyperplastic polyp, otherwise normal exam    ENDOSCOPY N/A 12/10/2018    no gross lesions in esophagus or examined duodenum, erythematous mucosa in stomach, biopsies benign    HYSTERECTOMY  2000    OOPHORECTOMY Bilateral     SHOULDER MANIPULATION Left     TOTAL SHOULDER ARTHROPLASTY Left 05/13/2021    Procedure: REVERSE TOTAL SHOULDER ARTHROPLASTY,  BICEP TENDONDESIS;  Surgeon: Bethel Devi MD;  Location: [facility] OR;  Service: Orthopedics;  Laterality: Left;    TRIGGER FINGER RELEASE Left     3 fingers      General Information       Row Name 04/01/25 1208          OT Time and Intention    Subjective Information no complaints  -KG     Document Type therapy note (daily note)  -KG     Mode of Treatment individual therapy;occupational therapy  -KG     Patient Effort good  -KG     Symptoms Noted During/After Treatment fatigue  -KG       Row Name 04/01/25 1208          General Information    Patient Profile Reviewed yes  -KG     Existing Precautions/Restrictions fall  -KG     Barriers to Rehab none identified  -KG       Row Name 04/01/25 1208          Safety Issues/Impairments Affecting Functional Mobility    Impairments Affecting Function (Mobility) balance;endurance/activity tolerance;pain;strength  -KG               User Key  (r) = Recorded By, (t) = Taken By, (c) = Cosigned By      Initials Name Provider Type    KG Christofer Slater, OT Occupational Therapist                     Mobility/ADL's       Row Name 04/01/25 1208          Bed Mobility    Bed Mobility supine-sit  -KG     Supine-Sit [place] (Bed Mobility) standby assist  -KG       Row Name 04/01/25 1208          Transfers    Transfers sit-stand transfer;stand-sit transfer  -KG       Row Name 04/01/25 1208          Sit-Stand Transfer    Sit-Stand [place] (Transfers) contact guard  -KG     Assistive Device (Sit-Stand Transfers) walker, front-wheeled  -KG       Row Name 04/01/25 1208          Stand-Sit Transfer    Stand-Sit [place] (Transfers)  contact guard  -KG     Assistive Device (Stand-Sit Transfers) walker, front-wheeled  -KG       Row Name 04/01/25 1208          Functional Mobility    Functional Mobility- Ind. Level contact guard assist  from EOB to bathroom, and then to recliner  -KG     Functional Mobility- Device walker, front-wheeled  -KG       Row Name 04/01/25 1208          Activities of Daily Living    BADL Assessment/Intervention lower body dressing;grooming  -KG       Row Name 04/01/25 1208          Lower Body Dressing Assessment/Training    [place] Level (Lower Body Dressing) don;socks;set up  -KG     Position (Lower Body Dressing) edge of bed sitting  -KG       Row Name 04/01/25 1208          Grooming Assessment/Training    [place] Level (Grooming) oral care regimen;wash face, hands;contact guard assist  -KG     Position (Grooming) sink side  -KG               User Key  (r) = Recorded By, (t) = Taken By, (c) = Cosigned By      Initials Name Provider Type    KG Christofer Slater OT Occupational Therapist                   Obj/Interventions       Row Name 04/01/25 1212          Motor Skills    Motor Skills functional endurance  -KG     Functional Endurance mild impairment  -KG       Row Name 04/01/25 1212          Balance    Balance Assessment sitting static balance;sitting dynamic balance;sit to stand dynamic balance;standing static balance;standing dynamic balance  -KG     Static Sitting Balance standby assist  -KG     Dynamic Sitting Balance standby assist  -KG     Position, Sitting Balance sitting edge of bed  -KG     Sit to Stand Dynamic Balance contact guard  -KG     Static Standing Balance contact guard  -KG     Dynamic Standing Balance contact guard  -KG     Position/Device Used, Standing Balance walker, front-wheeled  -KG     Balance Interventions sitting;standing;sit to stand;supported;dynamic;static;occupation based/functional task  -KG               User Key  (r) = Recorded By, (t) = Taken By, (c) = Cosigned By       Initials Name Provider Type    Christofer Bedoya OT Occupational Therapist                   Goals/Plan    No documentation.                  Clinical Impression       Row Name 04/01/25 1212          Pain Assessment    Pretreatment Pain Rating 0/10 - no pain  -KG     Posttreatment Pain Rating 0/10 - no pain  -KG       Row Name 04/01/25 1212          Plan of Care Review    Plan of Care Reviewed With patient  -KG     Outcome Evaluation Pt seen this morning for OT treatment. Supine in bed upon arrival. Performed bed mobility w/ SBA. Setup for LBD at EOB. Performed STS, functional mobility, and sink side grooming/hygiene w/ CGA + RW. Pt continues to fatigue quickly w/ minimal activity and reports concern about her ability to take care of BADLs and IADLs at home. Pt is not at PLOF and would benefit from IRF to return to (I) baseline. OT to f/u while inpatient.  -KG       Row Name 04/01/25 1212          Therapy Plan Review/Discharge Plan (OT)    Anticipated Discharge Disposition (OT) inpatient rehabilitation facility  -KG       Row Name 04/01/25 1212          Vital Signs    Pre Patient Position Supine  -KG     Intra Patient Position Standing  -KG     Post Patient Position Sitting  -KG       Row Name 04/01/25 1212          Positioning and Restraints    Pre-Treatment Position in bed  -KG     Post Treatment Position chair  -KG     In Chair notified nsg;reclined;call light within reach;encouraged to call for assist;exit alarm on  -KG               User Key  (r) = Recorded By, (t) = Taken By, (c) = Cosigned By      Initials Name Provider Type    Christofer Bedoya OT Occupational Therapist                   Outcome Measures       Row Name 04/01/25 0806 04/01/25 0600       How much help from another person do you currently need...    Turning from your back to your side while in flat bed without using bedrails? 3  -FABIEN 3  -JF    Moving from lying on back to sitting on the side of a flat bed without bedrails? 3  -FABIEN 3  -JF     Moving to and from a bed to a chair (including a wheelchair)? 3  -FABIEN 4  -JF    Standing up from a chair using your arms (e.g., wheelchair, bedside chair)? 3  -FABIEN 3  -JF    Climbing 3-5 steps with a railing? 2  -FABIEN 2  -JF    To walk in hospital room? 3  -FABIEN 3  -JF    AM-PAC 6 Clicks Score (PT) 17  -FABIEN 18  -JF    Highest Level of Mobility Goal 5 --> Static standing  -FABIEN 6 --> Walk 10 steps or more  -              User Key  (r) = Recorded By, (t) = Taken By, (c) = Cosigned By      Initials Name Provider Type    Dorys William, RN Registered Nurse    Kori Elena RN Registered Nurse                      OT Recommendation and Plan     Plan of Care Review  Plan of Care Reviewed With: patient  Outcome Evaluation: Pt seen this morning for OT treatment. Supine in bed upon arrival. Performed bed mobility w/ SBA. Setup for LBD at EOB. Performed STS, functional mobility, and sink side grooming/hygiene w/ CGA + RW. Pt continues to fatigue quickly w/ minimal activity and reports concern about her ability to take care of BADLs and IADLs at home. Pt is not at PLOF and would benefit from IRF to return to (I) baseline. OT to f/u while inpatient.     Time Calculation:         Time Calculation- OT       Row Name 04/01/25 1215             Time Calculation- OT    OT Start Time 0914  -KG      OT Stop Time 0927  -KG      OT Time Calculation (min) 13 min  -KG      Total Timed Code Minutes- OT 13 minute(s)  -KG      OT Received On 04/01/25  -KG      OT - Next Appointment 04/02/25  -KG         Timed Charges    00326 - OT Self Care/Mgmt Minutes 13  -KG         Total Minutes    Timed Charges Total Minutes 13  -KG       Total Minutes 13  -KG                User Key  (r) = Recorded By, (t) = Taken By, (c) = Cosigned By      Initials Name Provider Type    Christofer Bedoya OT Occupational Therapist                  Therapy Charges for Today       Code Description Service Date Service Provider Modifiers Qty    [id number]  OT  SELF CARE/MGMT/TRAIN EA 15 MIN 4/1/2025 Christofer Slater, OT GO 1                 Christofer Slater OT  4/1/2025

## 2025-04-01 NOTE — PLAN OF CARE
Goal Outcome Evaluation:  Plan of Care Reviewed With: patient        Progress: improving  Outcome Evaluation: Patient stable during shift, VSS and voiding function is intact, BM today. Patient able to ambulate short distances with walker and x1 assist. Pain managed with prn norco. CCP following for placement, awaiting precert for [facility].

## 2025-04-01 NOTE — NURSING NOTE
"[facility] follow-up d/t depression.     Patient RIB. The patient reported she was \"pretty good\" and reported good sleep. The patient voiced \"a little bit\" of depression but voiced it is better d/t feeling better physically. The patient voiced some anxiety but voiced it is not overwhelming and reported PRN Ativan has been effective. The patient denied need for any outpatient mental health resources or any further [facility] follow-up visits. Patient encouraged to reach out to staff if she would like to see the [facility] again. [facility] will sign off.   "

## 2025-04-01 NOTE — THERAPY TREATMENT NOTE
Patient Name: Pedrito Powell  : 1960    MRN: [id number]                              Today's Date: 2025       Admit Date: 3/23/2025    Visit Dx:     ICD-10-CM ICD-9-CM   1. Intractable low back pain  M54.59 724.2   2. Acute febrile illness  R50.9 780.60   3. Bilateral lower extremity edema  R60.0 782.3     Patient Active Problem List   Diagnosis    Hx of anaphylaxis    Primary osteoarthritis of knee    High risk medication use    HTN (hypertension)    Hyperlipidemia    Vertigo    Infected blister of left foot    Ganglion of right wrist    Vomiting and diarrhea    Intractable vomiting with nausea    Fatigue    Acute gastritis without hemorrhage    Anemia    Elevated serum creatinine    Fungal rash of torso    Cellulitis of abdominal wall    Bronchitis    Arthritis of shoulder    Chronic renal failure in pediatric patient, stage 3 (moderate)    H/O shoulder surgery    Anxiety    Depression    Iron deficiency    Dizziness    Hypoxia    COVID-19 virus infection    Acute low back pain    Cellulitis of leg    Intractable low back pain    Acute back pain    Spinal stenosis, lumbar region, without neurogenic claudication    Spondylolisthesis, lumbar region    Facet arthritis of lumbar region    GERD (gastroesophageal reflux disease)     Past Medical History:   Diagnosis Date    Anemia     IRON INFUSION RECENTLY    Arthritis     Asthma     Chronic kidney disease     stage 3    Elevated cholesterol     GERD (gastroesophageal reflux disease)     High risk medication use 2018    History of carpal tunnel syndrome     Hyperlipidemia     Hypertension     Intractable vomiting with nausea 2018    Left shoulder pain     Limited mobility     Rheumatoid arthritis     Vertigo     Weakness     AT TIMES LEFT SHOULDER/LEFT ARM     Past Surgical History:   Procedure Laterality Date    CARPAL TUNNEL RELEASE Left 10/02/2023     SECTION  , ,     COLONOSCOPY N/A 12/10/2018    normal ileum, IH,  hyperplastic polyp, otherwise normal exam    ENDOSCOPY N/A 12/10/2018    no gross lesions in esophagus or examined duodenum, erythematous mucosa in stomach, biopsies benign    HYSTERECTOMY  2000    OOPHORECTOMY Bilateral     SHOULDER MANIPULATION Left     TOTAL SHOULDER ARTHROPLASTY Left 05/13/2021    Procedure: REVERSE TOTAL SHOULDER ARTHROPLASTY,  BICEP TENDONDESIS;  Surgeon: Bethel Devi MD;  Location: [facility] OR;  Service: Orthopedics;  Laterality: Left;    TRIGGER FINGER RELEASE Left     3 fingers      General Information       Row Name 04/01/25 1623          Physical Therapy Time and Intention    Document Type therapy note (daily note)  -     Mode of Treatment individual therapy;physical therapy  -       Row Name 04/01/25 1623          General Information    Patient Profile Reviewed yes  -     Existing Precautions/Restrictions fall  -EB       Row Name 04/01/25 1623          Cognition    Orientation Status (Cognition) oriented x 4  -       Row Name 04/01/25 1623          Safety Issues/Impairments Affecting Functional Mobility    Impairments Affecting Function (Mobility) balance;endurance/activity tolerance;strength;pain  -               User Key  (r) = Recorded By, (t) = Taken By, (c) = Cosigned By      Initials Name Provider Type    EB Sophie Hall PTA Physical Therapist Assistant                   Mobility       Row Name 04/01/25 1624          Bed Mobility    Supine-Sit [place] (Bed Mobility) contact guard  -     Sit-Supine [place] (Bed Mobility) contact guard  -     Assistive Device (Bed Mobility) bed rails;head of bed elevated  -       Row Name 04/01/25 1624          Sit-Stand Transfer    Sit-Stand [place] (Transfers) contact guard  -     Assistive Device (Sit-Stand Transfers) walker, front-wheeled  -EB     Comment, (Sit-Stand Transfer) VCs for hand placement  -       Row Name 04/01/25 1624          Gait/Stairs (Locomotion)    [place] Level (Gait) contact  guard  -EB     Assistive Device (Gait) walker, front-wheeled  -EB     Distance in Feet (Gait) 20  -EB     Deviations/Abnormal Patterns (Gait) gait speed decreased;antalgic;stride length decreased  -EB     Bilateral Gait Deviations heel strike decreased;forward flexed posture  -EB     Comment, (Gait/Stairs) Gait limited d/t left knee pain. Improved balance, no knee buckling.  -EB               User Key  (r) = Recorded By, (t) = Taken By, (c) = Cosigned By      Initials Name Provider Type    Sophie Hermosillo PTA Physical Therapist Assistant                   Obj/Interventions    No documentation.                  Goals/Plan    No documentation.                  Clinical Impression       Row Name 04/01/25 1628          Pain    Pretreatment Pain Rating 7/10  -EB     Posttreatment Pain Rating 7/10  -EB     Pain Location knee  -EB     Pain Side/Orientation left  -EB       Row Name 04/01/25 1628          Plan of Care Review    Plan of Care Reviewed With patient  -EB     Progress improving  -EB     Outcome Evaluation Pt seen for PT tx today. Pt reports feeling fatigued d/t being up OOB multiple times today but agreeable to participate. Pt continues to have left knee pain but is improving with mobility. Pt completed supine to sit with CGA and stood with CGA. Pt ambulated 20ft with CGA with rwx. Pt demos antalgic gait with decreased wbing on left LE. Pt will need IRF at d/c to return to PLOF, independent. Will continue to follow and progress.  -EB       Row Name 04/01/25 1628          Therapy Assessment/Plan (PT)    Therapy Frequency (PT) 5 times/wk  -EB       Row Name 04/01/25 1628          Positioning and Restraints    Pre-Treatment Position in bed  -EB     Post Treatment Position bed  -EB     In Bed supine;call light within reach;exit alarm on;encouraged to call for assist  -EB               User Key  (r) = Recorded By, (t) = Taken By, (c) = Cosigned By      Initials Name Provider Type    Sophie Hermosillo PTA Physical  Therapist Assistant                   Outcome Measures       Row Name 04/01/25 1628 04/01/25 0806       How much help from another person do you currently need...    Turning from your back to your side while in flat bed without using bedrails? 3  -EB 3  -FABIEN    Moving from lying on back to sitting on the side of a flat bed without bedrails? 3  -EB 3  -FABIEN    Moving to and from a bed to a chair (including a wheelchair)? 3  -EB 3  -FABIEN    Standing up from a chair using your arms (e.g., wheelchair, bedside chair)? 3  -EB 3  -FABIEN    Climbing 3-5 steps with a railing? 1  -EB 2  -FABIEN    To walk in hospital room? 3  -EB 3  -FABIEN    AM-PAC 6 Clicks Score (PT) 16  -EB 17  -FABIEN    Highest Level of Mobility Goal 5 --> Static standing  -EB 5 --> Static standing  -FABIEN      Row Name 04/01/25 0600          How much help from another person do you currently need...    Turning from your back to your side while in flat bed without using bedrails? 3  -JF     Moving from lying on back to sitting on the side of a flat bed without bedrails? 3  -JF     Moving to and from a bed to a chair (including a wheelchair)? 4  -JF     Standing up from a chair using your arms (e.g., wheelchair, bedside chair)? 3  -JF     Climbing 3-5 steps with a railing? 2  -JF     To walk in hospital room? 3  -JF     AM-PAC 6 Clicks Score (PT) 18  -JF     Highest Level of Mobility Goal 6 --> Walk 10 steps or more  -               User Key  (r) = Recorded By, (t) = Taken By, (c) = Cosigned By      Initials Name Provider Type    Dorys William, RN Registered Nurse    Sophie Hermosillo PTA Physical Therapist Assistant    Kori Elena RN Registered Nurse                                 Physical Therapy Education       Title: PT OT SLP Therapies (Done)       Topic: Physical Therapy (Done)       Point: Mobility training (Done)       Learning Progress Summary            Patient Acceptance, D,E, VU by GEORGES at 4/1/2025 1627    Acceptance, E,TB, VU by FABIEN at 4/1/2025  1102    Acceptance, TB,E, VU,NR by  at 3/30/2025 1631    Acceptance, E,D, VU,NR by  at 3/28/2025 1649    Acceptance, E,D, VU,NR by  at 3/27/2025 1523    Acceptance, E,TB,D, VU,NR by  at 3/25/2025 1547                      Point: Home exercise program (Done)       Learning Progress Summary            Patient Acceptance, E,TB, VU by  at 4/1/2025 1102    Acceptance, TB,E, VU,NR by  at 3/30/2025 1631                      Point: Body mechanics (Done)       Learning Progress Summary            Patient Acceptance, D,E, VU by  at 4/1/2025 1627    Acceptance, E,TB, VU by  at 4/1/2025 1102    Acceptance, TB,E, VU,NR by  at 3/30/2025 1631    Acceptance, E,D, VU,NR by  at 3/28/2025 1649    Acceptance, E,D, VU,NR by  at 3/27/2025 1523    Acceptance, E,TB,D, VU,NR by  at 3/25/2025 1547                      Point: Precautions (Done)       Learning Progress Summary            Patient Acceptance, E,TB, VU by  at 4/1/2025 1102    Acceptance, E,TB,D, VU,NR by  at 3/25/2025 1547                                      User Key       Initials Effective Dates Name Provider Type Discipline     02/14/23 -  Sophie Hall PTA Physical Therapist Assistant PT     04/08/22 -  Rosa Szymanski PT Physical Therapist PT     06/09/23 -  Kori Sterling, RN Registered Nurse Nurse     09/22/22 -  Nai Vela PT Physical Therapist PT                  PT Recommendation and Plan     Progress: improving  Outcome Evaluation: Pt seen for PT tx today. Pt reports feeling fatigued d/t being up OOB multiple times today but agreeable to participate. Pt continues to have left knee pain but is improving with mobility. Pt completed supine to sit with CGA and stood with CGA. Pt ambulated 20ft with CGA with rwx. Pt demos antalgic gait with decreased wbing on left LE. Pt will need IRF at d/c to return to PLOF, independent. Will continue to follow and progress.     Time Calculation:         PT Charges       Row Name 04/01/25  1627             Time Calculation    Start Time 1403  -EB      Stop Time 1418  -EB      Time Calculation (min) 15 min  -EB      PT Received On 04/01/25  -EB      PT - Next Appointment 04/02/25  -EB         Time Calculation- PT    Total Timed Code Minutes- PT 15 minute(s)  -EB                User Key  (r) = Recorded By, (t) = Taken By, (c) = Cosigned By      Initials Name Provider Type    EB Sophie Hall PTA Physical Therapist Assistant                  Therapy Charges for Today       Code Description Service Date Service Provider Modifiers Qty    [id number] HC GAIT TRAINING EA 15 MIN 4/1/2025 Sophie Hall PTA GP 1            PT G-Codes  Outcome Measure Options: AM-PAC 6 Clicks Daily Activity (OT), Modified [place]  AM-PAC 6 Clicks Score (PT): 16  AM-PAC 6 Clicks Score (OT): 12  Modified [place] Scale: 4 - Moderately severe disability.  Unable to walk without assistance, and unable to attend to own bodily needs without assistance.  PT Discharge Summary  Anticipated Discharge Disposition (PT): inpatient rehabilitation facility    Sophie Hall PTA  4/1/2025

## 2025-04-01 NOTE — PLAN OF CARE
Goal Outcome Evaluation:  Plan of Care Reviewed With: patient        Progress: improving  Outcome Evaluation: Pt seen for PT tx today. Pt reports feeling fatigued d/t being up OOB multiple times today but agreeable to participate. Pt continues to have left knee pain but is improving with mobility. Pt completed supine to sit with CGA and stood with CGA. Pt ambulated 20ft with CGA with rwx. Pt demos antalgic gait with decreased wbing on left LE. Pt will need IRF at d/c to return to PLOF, independent. Will continue to follow and progress.    Anticipated Discharge Disposition (PT): inpatient rehabilitation facility

## 2025-04-01 NOTE — PROGRESS NOTES
Name: Pedrito Powell ADMIT: 3/23/2025   : 1960  PCP: Teo Fraser MD    MRN: [id number] LOS: 6 days   AGE/SEX: 64 y.o. female  ROOM: [facility]     Subjective   Subjective   No acute events. Patient denies new complaints. Left knee pain is much better with voltaren gel. Sister at bedside.    Objective   Objective   Vital Signs  Temp:  [97.5 °F (36.4 °C)-98.1 °F (36.7 °C)] 97.5 °F (36.4 °C)  Heart Rate:  [70-80] 72  Resp:  [16-18] 16  BP: (105-136)/(62-75) 136/68  SpO2:  [94 %-96 %] 95 %  on  Flow (L/min) (Oxygen Therapy):  [2] 2;   Device (Oxygen Therapy): room air  Body mass index is 45.67 kg/m².  Physical Exam  Vitals and nursing note reviewed.   Constitutional:       General: She is not in acute distress.     Appearance: She is not toxic-appearing or diaphoretic.   HENT:      Head: Normocephalic and atraumatic.      Nose: Nose normal.      Mouth/Throat:      Mouth: Mucous membranes are moist.      Pharynx: Oropharynx is clear.   Eyes:      Conjunctiva/sclera: Conjunctivae normal.      Pupils: Pupils are equal, round, and reactive to light.   Cardiovascular:      Rate and Rhythm: Normal rate and regular rhythm.      Pulses: Normal pulses.   Pulmonary:      Effort: Pulmonary effort is normal.      Breath sounds: Normal breath sounds.   Abdominal:      General: Bowel sounds are normal. There is no distension.      Palpations: Abdomen is soft.      Tenderness: There is no abdominal tenderness.   Musculoskeletal:         General: No swelling or tenderness.      Cervical back: Neck supple.   Skin:     General: Skin is warm and dry.      Capillary Refill: Capillary refill takes less than 2 seconds.   Neurological:      General: No focal deficit present.      Mental Status: She is alert and oriented to person, place, and time.   Psychiatric:         Mood and Affect: Mood normal.         Behavior: Behavior normal.       Results Review     I reviewed the patient's new clinical results.  Results from last 7  "days   Lab Units 04/01/25  0451 03/31/25  0544 03/30/25  0548 03/29/25  0622   WBC 10*3/mm3 8.15 7.39 8.37 4.97   HEMOGLOBIN g/dL 10.0* 10.0* 9.8* 10.3*   PLATELETS 10*3/mm3 367 324 334 315     Results from last 7 days   Lab Units 04/01/25  0451 03/31/25  0544 03/30/25  0548 03/29/25  0622   SODIUM mmol/L 139 141 139 136   POTASSIUM mmol/L 3.9 4.0 3.9 4.3   CHLORIDE mmol/L 102 102 102 100   CO2 mmol/L 25.7 24.7 24.7 24.8   BUN mg/dL 27* 28* 25* 21   CREATININE mg/dL 0.92 0.99 0.89 0.80   GLUCOSE mg/dL 91 96 150* 191*   EGFR mL/min/1.73 69.7 63.8 72.5 82.4       Results from last 7 days   Lab Units 04/01/25  0451 03/31/25  0544 03/30/25  0548 03/29/25  0622   CALCIUM mg/dL 8.3* 8.3* 8.7 9.3     Results from last 7 days   Lab Units 03/27/25  0320 03/26/25  0602   PROCALCITONIN ng/mL  --  0.21   LACTATE mmol/L 0.9  --      No results found for: \"HGBA1C\", \"POCGLU\"    XR Knee 1 or 2 View Right  Result Date: 3/30/2025  1. Severe degenerative arthritis of the right knee. 2. Loose body lateral to the distal femur. 3. But no acute bony abnormality is seen  This report was finalized on 3/30/2025 4:35 PM by Dr. Morgan Beard M.D on Workstation: [id number]        I have personally reviewed all medications:  Scheduled Medications  atorvastatin, 80 mg, Oral, Daily  azelastine, 1 spray, Each Nare, Daily  budesonide, 0.5 mg, Nebulization, BID - RT  cholecalciferol, 2,000 Units, Oral, Daily  Diclofenac Sodium, 1 Application, Topical, 4x Daily  enoxaparin sodium, 40 mg, Subcutaneous, Q24H  famotidine, 20 mg, Oral, BID AC  ipratropium, 1 spray, Each Nare, TID  levoFLOXacin, 750 mg, Oral, Q24H  Lidocaine, 3 patch, Transdermal, Q24H  losartan, 100 mg, Oral, Q24H  methocarbamol, 500 mg, Oral, Q8H  senna-docusate sodium, 2 tablet, Oral, BID   And  polyethylene glycol, 17 g, Oral, BID  sodium chloride, 10 mL, Intravenous, Q12H  Vortioxetine HBr, 10 mg, Oral, Daily With Breakfast    Infusions   Diet  Diet: Cardiac; Healthy Heart (2-3 " Na+); Fluid Consistency: Thin (IDDSI 0)    I have personally reviewed:  [x]  Laboratory   []  Microbiology   []  Radiology   []  EKG/Telemetry  []  Cardiology/Vascular   []  Pathology    []  Records       Assessment/Plan     Active Hospital Problems    Diagnosis  POA    **Intractable low back pain [M54.59]  Yes    Spinal stenosis, lumbar region, without neurogenic claudication [M48.061]  Unknown    Spondylolisthesis, lumbar region [M43.16]  Unknown    Facet arthritis of lumbar region [M47.816]  Unknown    GERD (gastroesophageal reflux disease) [K21.9]  Yes    Acute low back pain [M54.50]  Unknown    Cellulitis of leg [L03.119]  Unknown    Acute back pain [M54.9]  Yes    Anxiety [F41.9]  Yes    Anemia [D64.9]  Yes    HTN (hypertension) [I10]  Yes      Resolved Hospital Problems   No resolved problems to display.   Lower Back Pain  - MRI showed lumbar stenosis  - neurosurgery evaluated and recommended no intervention at this time  - continue pain control, PT    Pseudogout of the Left Knee and Wrist  - s/p left knee aspiration 3/26/25 with calcium pyrophosphate crystals, s/p steroid injection with improvement  - having left knee pain, xray showed degenerative changes-continue voltaren gel    Pneumonia due to Gram negative bacteria  - continue on levofloxacin for 7d course  - appreciate ID recommendations    HTN  - BP acceptable acutely  - continue current regimen    Depression/Anxiety  - appears controlled, continue current regimen  - appreciate psychiatry recommendations    Lovenox 40 mg SC daily for DVT prophylaxis.  Full code.  Discussed with patient and nursing staff.  Anticipate discharge to acute rehab once precert has been obtained.  Expected Discharge Date: 4/1/2025; Expected Discharge Time:       Cisco Esteban MD  [facility]  04/01/25  11:46 EDT    Portions of this text have been copied and I have reviewed them. They are accurate as of 4/1/2025

## 2025-04-01 NOTE — PLAN OF CARE
Goal Outcome Evaluation:  Plan of Care Reviewed With: patient           Outcome Evaluation: Pt seen this morning for OT treatment. Supine in bed upon arrival. Performed bed mobility w/ SBA. Setup for LBD at EOB. Performed STS, functional mobility, and sink side grooming/hygiene w/ CGA + RW. Pt continues to fatigue quickly w/ minimal activity and reports concern about her ability to take care of BADLs and IADLs at home. Pt is not at PLOF and would benefit from IRF to return to (I) baseline. OT to f/u while inpatient.    Anticipated Discharge Disposition (OT): inpatient rehabilitation facility

## 2025-04-02 VITALS
DIASTOLIC BLOOD PRESSURE: 69 MMHG | HEIGHT: 59 IN | WEIGHT: 226.1 LBS | OXYGEN SATURATION: 95 % | TEMPERATURE: 97.9 F | SYSTOLIC BLOOD PRESSURE: 119 MMHG | BODY MASS INDEX: 45.58 KG/M2 | HEART RATE: 79 BPM | RESPIRATION RATE: 16 BRPM

## 2025-04-02 LAB
ANION GAP SERPL CALCULATED.3IONS-SCNC: 12.2 MMOL/L (ref 5–15)
BASOPHILS # BLD AUTO: 0.07 10*3/MM3 (ref 0–0.2)
BASOPHILS NFR BLD AUTO: 0.7 % (ref 0–1.5)
BUN SERPL-MCNC: 24 MG/DL (ref 8–23)
BUN/CREAT SERPL: 22.6 (ref 7–25)
CALCIUM SPEC-SCNC: 9 MG/DL (ref 8.6–10.5)
CHLORIDE SERPL-SCNC: 100 MMOL/L (ref 98–107)
CO2 SERPL-SCNC: 25.8 MMOL/L (ref 22–29)
CREAT SERPL-MCNC: 1.06 MG/DL (ref 0.57–1)
DEPRECATED RDW RBC AUTO: 44 FL (ref 37–54)
EGFRCR SERPLBLD CKD-EPI 2021: 58.8 ML/MIN/1.73
EOSINOPHIL # BLD AUTO: 0.74 10*3/MM3 (ref 0–0.4)
EOSINOPHIL NFR BLD AUTO: 7.1 % (ref 0.3–6.2)
ERYTHROCYTE [DISTWIDTH] IN BLOOD BY AUTOMATED COUNT: 12.9 % (ref 12.3–15.4)
GLUCOSE SERPL-MCNC: 98 MG/DL (ref 65–99)
HCT VFR BLD AUTO: 32.9 % (ref 34–46.6)
HGB BLD-MCNC: 10.4 G/DL (ref 12–15.9)
IMM GRANULOCYTES # BLD AUTO: 0.07 10*3/MM3 (ref 0–0.05)
IMM GRANULOCYTES NFR BLD AUTO: 0.7 % (ref 0–0.5)
LYMPHOCYTES # BLD AUTO: 2.58 10*3/MM3 (ref 0.7–3.1)
LYMPHOCYTES NFR BLD AUTO: 24.8 % (ref 19.6–45.3)
MCH RBC QN AUTO: 29.5 PG (ref 26.6–33)
MCHC RBC AUTO-ENTMCNC: 31.6 G/DL (ref 31.5–35.7)
MCV RBC AUTO: 93.2 FL (ref 79–97)
MONOCYTES # BLD AUTO: 0.62 10*3/MM3 (ref 0.1–0.9)
MONOCYTES NFR BLD AUTO: 6 % (ref 5–12)
NEUTROPHILS NFR BLD AUTO: 6.33 10*3/MM3 (ref 1.7–7)
NEUTROPHILS NFR BLD AUTO: 60.7 % (ref 42.7–76)
NRBC BLD AUTO-RTO: 0 /100 WBC (ref 0–0.2)
PLATELET # BLD AUTO: 412 10*3/MM3 (ref 140–450)
PMV BLD AUTO: 8.7 FL (ref 6–12)
POTASSIUM SERPL-SCNC: 3.7 MMOL/L (ref 3.5–5.2)
RBC # BLD AUTO: 3.53 10*6/MM3 (ref 3.77–5.28)
SODIUM SERPL-SCNC: 138 MMOL/L (ref 136–145)
WBC NRBC COR # BLD AUTO: 10.41 10*3/MM3 (ref 3.4–10.8)

## 2025-04-02 PROCEDURE — 94799 UNLISTED PULMONARY SVC/PX: CPT

## 2025-04-02 PROCEDURE — 85025 COMPLETE CBC W/AUTO DIFF WBC: CPT | Performed by: STUDENT IN AN ORGANIZED HEALTH CARE EDUCATION/TRAINING PROGRAM

## 2025-04-02 PROCEDURE — 25010000002 ONDANSETRON PER 1 MG: Performed by: INTERNAL MEDICINE

## 2025-04-02 PROCEDURE — 94664 DEMO&/EVAL PT USE INHALER: CPT

## 2025-04-02 PROCEDURE — 25010000002 ENOXAPARIN PER 10 MG: Performed by: STUDENT IN AN ORGANIZED HEALTH CARE EDUCATION/TRAINING PROGRAM

## 2025-04-02 PROCEDURE — 80048 BASIC METABOLIC PNL TOTAL CA: CPT | Performed by: STUDENT IN AN ORGANIZED HEALTH CARE EDUCATION/TRAINING PROGRAM

## 2025-04-02 RX ORDER — HYDROCODONE BITARTRATE AND ACETAMINOPHEN 7.5; 325 MG/1; MG/1
1 TABLET ORAL EVERY 4 HOURS PRN
Qty: 18 TABLET | Refills: 0 | Status: SHIPPED | OUTPATIENT
Start: 2025-04-02

## 2025-04-02 RX ORDER — AMOXICILLIN 250 MG
2 CAPSULE ORAL 2 TIMES DAILY
Qty: 120 TABLET | Refills: 0 | Status: SHIPPED | OUTPATIENT
Start: 2025-04-02

## 2025-04-02 RX ORDER — CALCIUM CARBONATE 500 MG/1
2 TABLET, CHEWABLE ORAL 3 TIMES DAILY PRN
Status: DISCONTINUED | OUTPATIENT
Start: 2025-04-02 | End: 2025-04-02 | Stop reason: HOSPADM

## 2025-04-02 RX ORDER — POLYETHYLENE GLYCOL 3350 17 G/17G
17 POWDER, FOR SOLUTION ORAL 2 TIMES DAILY
Qty: 238 G | Refills: 0 | Status: SHIPPED | OUTPATIENT
Start: 2025-04-02

## 2025-04-02 RX ORDER — LIDOCAINE 4 G/G
3 PATCH TOPICAL
Qty: 30 EACH | Refills: 0 | Status: SHIPPED | OUTPATIENT
Start: 2025-04-03

## 2025-04-02 RX ORDER — ENOXAPARIN SODIUM 100 MG/ML
40 INJECTION SUBCUTANEOUS EVERY 12 HOURS
Status: DISCONTINUED | OUTPATIENT
Start: 2025-04-02 | End: 2025-04-02 | Stop reason: HOSPADM

## 2025-04-02 RX ORDER — METHOCARBAMOL 500 MG/1
500 TABLET, FILM COATED ORAL EVERY 8 HOURS SCHEDULED
Qty: 90 TABLET | Refills: 0 | Status: SHIPPED | OUTPATIENT
Start: 2025-04-02

## 2025-04-02 RX ADMIN — VORTIOXETINE 10 MG: 5 TABLET, FILM COATED ORAL at 08:52

## 2025-04-02 RX ADMIN — METHOCARBAMOL TABLETS 500 MG: 500 TABLET, COATED ORAL at 05:50

## 2025-04-02 RX ADMIN — Medication 2000 UNITS: at 08:52

## 2025-04-02 RX ADMIN — ONDANSETRON 4 MG: 2 INJECTION, SOLUTION INTRAMUSCULAR; INTRAVENOUS at 09:32

## 2025-04-02 RX ADMIN — HYDROCODONE BITARTRATE AND ACETAMINOPHEN 1 TABLET: 7.5; 325 TABLET ORAL at 01:25

## 2025-04-02 RX ADMIN — HYDROCODONE BITARTRATE AND ACETAMINOPHEN 1 TABLET: 7.5; 325 TABLET ORAL at 10:48

## 2025-04-02 RX ADMIN — BUDESONIDE 0.5 MG: 0.5 INHALANT RESPIRATORY (INHALATION) at 07:39

## 2025-04-02 RX ADMIN — DICLOFENAC SODIUM 1 APPLICATION: 30 GEL TOPICAL at 08:53

## 2025-04-02 RX ADMIN — ATORVASTATIN CALCIUM 80 MG: 20 TABLET, FILM COATED ORAL at 08:52

## 2025-04-02 RX ADMIN — DICLOFENAC SODIUM 1 APPLICATION: 30 GEL TOPICAL at 12:46

## 2025-04-02 RX ADMIN — LOSARTAN POTASSIUM 100 MG: 100 TABLET, FILM COATED ORAL at 08:52

## 2025-04-02 RX ADMIN — IPRATROPIUM BROMIDE 1 SPRAY: 21 SPRAY, METERED NASAL at 08:52

## 2025-04-02 RX ADMIN — Medication 10 ML: at 10:49

## 2025-04-02 RX ADMIN — HYDROCODONE BITARTRATE AND ACETAMINOPHEN 1 TABLET: 7.5; 325 TABLET ORAL at 05:50

## 2025-04-02 RX ADMIN — ANTACID TABLETS 2 TABLET: 500 TABLET, CHEWABLE ORAL at 10:51

## 2025-04-02 RX ADMIN — HYDROCODONE BITARTRATE AND ACETAMINOPHEN 1 TABLET: 7.5; 325 TABLET ORAL at 16:05

## 2025-04-02 RX ADMIN — AZELASTINE HYDROCHLORIDE 1 SPRAY: 137 SPRAY, METERED NASAL at 08:53

## 2025-04-02 RX ADMIN — FAMOTIDINE 20 MG: 20 TABLET, FILM COATED ORAL at 08:52

## 2025-04-02 RX ADMIN — ENOXAPARIN SODIUM 40 MG: 100 INJECTION SUBCUTANEOUS at 15:23

## 2025-04-02 RX ADMIN — LIDOCAINE 3 PATCH: 4 PATCH TOPICAL at 08:52

## 2025-04-02 RX ADMIN — METHOCARBAMOL TABLETS 500 MG: 500 TABLET, COATED ORAL at 15:23

## 2025-04-02 NOTE — PROGRESS NOTES
Continued Stay Note  [facility]     Patient Name: Pedrito Powell  MRN: [id number]  Today's Date: 4/2/2025    Admit Date: 3/23/2025    Plan: [facility] when bed available   Discharge Plan       Row Name 04/02/25 1706       Plan    Final Discharge Disposition Code 62 - inpatient rehab facility    Final Note [facility], WC van at 4:30pm.                   Discharge Codes    No documentation.                 Expected Discharge Date and Time       Expected Discharge Date Expected Discharge Time    Apr 2, 2025               Mariel Ayoub RN

## 2025-04-02 NOTE — PAYOR COMM NOTE
"Pedrito Powell (64 y.o. Female)    PLEASE SEE ATTACHED FOR CLINICAL UPDATES    REF#[id number]  THANK YOU  LORA URBANO RN/ DEPT   [facility]  PH: [phone]  FAX:  [phone]     Date of Birth   1960    Social Security Number       Address   [address] [place] [address]    Home Phone   [phone]    MRN   [id number]       Islam   Amish    Marital Status                               Admission Date   3/23/2025    Admission Type   Emergency    Admitting Provider   Hima Trejo MD    Attending Provider   Cisco Esteban MD    Department, Room/Bed   [address], P797/1       Discharge Date       Discharge Disposition   Skilled Nursing Facility (DC - External)    Discharge Destination                                 Attending Provider: Cisco Esteban MD    Allergies: Cashew Nut Oil, Chocolate, Citrus, Nickel, Nuts, Tree Nut, [place], [place] Oil, Bacitracin, Baclofen, Chlorhexidine, Ciprofloxacin, Citric Acid, Covid-19 (Mrna) Vaccine, Influenza Vaccines, Methyldibromoglutaronitrile, Naproxen, Neomycin, Omnicef [Cefdinir], Palladium Chloride, Penicillins, Pineapple Extract, Sulfa Antibiotics, Adhesive Tape, Gold-containing Drug Products, Latex    Isolation: None   Infection: None   Code Status: CPR    Ht: 149.9 cm (59\")   Wt: 103 kg (226 lb 1.6 oz)    Admission Cmt: None   Principal Problem: Intractable low back pain [M54.59]                   Active Insurance as of 3/23/2025       Primary Coverage       Payor Plan Insurance Group Employer/Plan Group    [facility] [employer] [facility] [employer] [facility] PPO [id number]       Payor Plan Address Payor Plan Phone Number Payor Plan Fax Number Effective Dates    [address] [phone]  1/1/2024 - None Entered    [facility] [id number]         Subscriber Name Subscriber Birth Date Member ID       PEDRITO POWELL 1960 [id number]                     Emergency Contacts        (Rel.) " Home Phone Work Phone Mobile Phone    Yareli Velasquez (Daughter) [phone] -- [phone]    Ld Velasquez (Son) -- -- [phone]    yasmin velasquez -- -- [phone]    Yaneth Kellogg (Sister) -- -- [phone]              [place]: [facility] [id number]  Tax ID [id number]  Medication Administration Report for Pedrito Velasquez as of 4/1/25 through 4/2/25     Legend:    Given Hold Not Given Due Canceled Entry Other Actions    Time Time (Time) Time Time-Action         Discontinued     Completed     Future     MAR Hold     Linked             Medications 04/01/25 04/02/25      acetaminophen (TYLENOL) tablet 650 mg  Dose: 650 mg  Freq: Every 4 Hours PRN Route: PO  PRN Reason: Mild Pain  Start: 03/23/25 1821     Admin Instructions:   If given for fever, use fever parameter: fever greater than 100.4 °F  Based on patient request - if ordered for moderate or severe pain, provider allows for administration of a medication prescribed for a lower pain scale.    Do not exceed 4 grams of acetaminophen in a 24 hr period. Max dose of 2gm for AST/ALT greater than 120 units/L.    If given for pain, use the following pain scale:   Mild Pain = Pain Score of 1-3, CPOT 1-2  Moderate Pain = Pain Score of 4-6, CPOT 3-4  Severe Pain = Pain Score of 7-10, CPOT 5-8          Or   acetaminophen (TYLENOL) 160 MG/5ML oral solution 650 mg  Dose: 650 mg  Freq: Every 4 Hours PRN Route: PO  PRN Reason: Mild Pain  Start: 03/23/25 1821     Admin Instructions:   If given for fever, use fever parameter: fever greater than 100.4 °F  Based on patient request - if ordered for moderate or severe pain, provider allows for administration of a medication prescribed for a lower pain scale.    Do not exceed 4 grams of acetaminophen in a 24 hr period. Max dose of 2gm for AST/ALT greater than 120 units/L.    If given for pain, use the following pain scale:   Mild Pain = Pain Score of 1-3, CPOT 1-2  Moderate Pain = Pain Score of 4-6, CPOT 3-4  Severe Pain = Pain  Score of 7-10, CPOT 5-8          Or   acetaminophen (TYLENOL) suppository 650 mg  Dose: 650 mg  Freq: Every 4 Hours PRN Route: RE  PRN Reason: Mild Pain  Start: 03/23/25 1821     Admin Instructions:   If given for fever, use fever parameter: fever greater than 100.4 °F  Based on patient request - if ordered for moderate or severe pain, provider allows for administration of a medication prescribed for a lower pain scale.    Do not exceed 4 grams of acetaminophen in a 24 hr period. Max dose of 2gm for AST/ALT greater than 120 units/L.    If given for pain, use the following pain scale:   Mild Pain = Pain Score of 1-3, CPOT 1-2  Moderate Pain = Pain Score of 4-6, CPOT 3-4  Severe Pain = Pain Score of 7-10, CPOT 5-8          albuterol sulfate HFA (PROVENTIL HFA;VENTOLIN HFA;PROAIR HFA) inhaler 2 puff  Dose: 2 puff  Freq: Every 4 Hours PRN Route: IN  PRN Reason: Wheezing  Start: 03/23/25 1821     Admin Instructions:   Include Respiratory Treatment Education   Shake well.          amitriptyline (ELAVIL) tablet 25 mg  Dose: 25 mg  Freq: Nightly PRN Route: PO  PRN Reason: Sleep  Start: 03/23/25 1821 2158-Given               atorvastatin (LIPITOR) tablet 80 mg  Dose: 80 mg  Freq: Daily Route: PO  Start: 03/24/25 0900     Admin Instructions:   Avoid grapefruit juice.      0935-Given            0852-Given              azelastine (ASTELIN) nasal spray 1 spray  Dose: 1 spray  Freq: Daily Route: EACH NARE  Start: 03/24/25 0900      0936-Given            0853-Given              benzonatate (TESSALON) capsule 100 mg  Dose: 100 mg  Freq: 3 Times Daily PRN Route: PO  PRN Reason: Cough  Start: 03/23/25 1821     Admin Instructions:   Do not crush or chew the capsules or tablets. The drug may not work as designed if the capsule or tablet is crushed or chewed. Swallow whole.  Swallow whole.  Do not crush, chew, or open capsule.           sennosides-docusate (PERICOLACE) 8.6-50 MG per tablet 2 tablet  Dose: 2 tablet  Freq: 2 Times  Daily Route: PO  Start: 03/27/25 1530     Admin Instructions:   HOLD MEDICATION IF PATIENT HAS HAD BOWEL MOVEMENT. Start bowel management regimen if patient has not had a bowel movement after 12 hours.      0935-Given     (2100)-Not Given           (0856)-Not Given     2100             And   polyethylene glycol (MIRALAX) packet 17 g  Dose: 17 g  Freq: 2 Times Daily Route: PO  Start: 03/27/25 1530     Admin Instructions:   Use if no bowel movement after 12 hours. Mix in 6-8 ounces of water.  Use 4-8 ounces of water, tea, or juice for each 17 gram dose.      0935-Given     2100-Given           (0856)-Not Given     2100             And   bisacodyl (DULCOLAX) EC tablet 5 mg  Dose: 5 mg  Freq: Daily PRN Route: PO  PRN Reason: Constipation  PRN Comment: Use if polyethylene glycol is ineffective  Start: 03/27/25 1440     Admin Instructions:   Use if no bowel movement after 12 hours.  Swallow whole. Do not crush, split, or chew tablet.          And   bisacodyl (DULCOLAX) suppository 10 mg  Dose: 10 mg  Freq: Daily PRN Route: RE  PRN Reason: Constipation  PRN Comment: Use if bisacodyl oral is ineffective  Start: 03/27/25 1440     Admin Instructions:   Use if no bowel movement after 12 hours.  Hold for diarrhea          budesonide (PULMICORT) nebulizer solution 0.5 mg  Dose: 0.5 mg  Freq: 2 Times Daily - RT Route: NEBULIZATION  Start: 03/23/25 2130     Admin Instructions:   Do not shake.  Protect from light.      0749-Given     2003-Given           0739-Given     2130             calcium carbonate (TUMS) chewable tablet 500 mg (200 mg elemental)  Dose: 2 tablet  Freq: 3 Times Daily PRN Route: PO  PRN Reasons: Heartburn,Indigestion  Start: 04/02/25 1042     Admin Instructions:   One tablet contains 200 mg elemental calcium.  Take with food.       1051-Given              Calcium Replacement - Follow Nurse / BPA Driven Protocol  Freq: As Needed Route: XX  PRN Reason: Other  Start: 03/24/25 1114     Admin Instructions:   Open  "Order & Select \"S Electrolyte Replacement Protocol Algorithm\" to View Details          Calcium Replacement - Follow Nurse / BPA Driven Protocol  Freq: As Needed Route: XX  PRN Reason: Other  Start: 03/23/25 1821     Admin Instructions:   Open Order & Select \"[facility] Electrolyte Replacement Protocol Algorithm\" to View Details          cholecalciferol (VITAMIN D3) tablet 2,000 Units  Dose: 2,000 Units  Freq: Daily Route: PO  Start: 03/24/25 1315      0935-Given            0852-Given              Diclofenac Sodium (VOLTAREN) 1 % gel 4 g  Dose: 4 g  Freq: 4 Times Daily PRN Route: TOP  PRN Comment: foot pain  Start: 03/23/25 1821     Admin Instructions:   Apply to affected area  .   Do not cover area with occlusive dressing or apply any other med to affected area. Do not wash affected area for 1 hr after applying. Use dosing card for correct dose measurement.          Diclofenac Sodium 3 % gel 1 Application  Dose: 1 Application  Freq: 4 Times Daily Route: TOP  Start: 03/31/25 1200     Admin Instructions:   Apply to right knee.   Do not cover area with occlusive dressing or apply any other med to affected area. Do not wash affected area for 1 hr after applying. Use dosing card for correct dose measurement.      0936-Given     1317-Given     1710-Given     2158-Given         0853-Given     1246-Given     1800     2100           enoxaparin sodium (LOVENOX) syringe 40 mg  Dose: 40 mg  Freq: Every 12 Hours Route: SC  Indications of Use: PROPHYLAXIS OF VENOUS THROMBOEMBOLISM  Start: 04/02/25 1400     Admin Instructions:   Give subcutaneous in abdomen only. Do not massage site after injection.       1430              enoxaparin sodium (LOVENOX) syringe 40 mg  Dose: 40 mg  Freq: Every 24 Hours Route: SC  Indications of Use: PROPHYLAXIS OF VENOUS THROMBOEMBOLISM  Start: 03/30/25 1700 End: 04/02/25 1341     Admin Instructions:   Give subcutaneous in abdomen only. Do not massage site after injection.      1710-Given             "   famotidine (PEPCID) tablet 20 mg  Dose: 20 mg  Freq: 2 Times Daily Before Meals Route: PO  Start: 03/23/25 1915      0935-Given     1710-Given           0852-Given     1730             HYDROcodone-acetaminophen (NORCO) 7.5-325 MG per tablet 1 tablet  Dose: 1 tablet  Freq: Every 4 Hours PRN Route: PO  PRN Reason: Moderate Pain  Start: 03/25/25 1851 End: 04/04/25 1426     Admin Instructions:   Based on patient request - if ordered for moderate or severe pain, provider allows for administration of a medication prescribed for a lower pain scale.  [EDILIA]    Do not exceed 4 grams of acetaminophen in a 24 hr period. Max dose of 2gm for AST/ALT greater than 120 units/L        If given for pain, use the following pain scale:   Mild Pain = Pain Score of 1-3, CPOT 1-2  Moderate Pain = Pain Score of 4-6, CPOT 3-4  Severe Pain = Pain Score of 7-10, CPOT 5-8      0701-Given     1501-Given     1851-Given          0125-Given     0550-Given     1048-Given [C]            HYDROmorphone (DILAUDID) injection 0.5 mg  Dose: 0.5 mg  Freq: Every 2 Hours PRN Route: IV  PRN Reason: Severe Pain  Start: 03/23/25 1821     Admin Instructions:   Based on patient request - if ordered for moderate or severe pain, provider allows for administration of a medication prescribed for a lower pain scale.  If given for pain, use the following pain scale:  Mild Pain = Pain Score of 1-3, CPOT 1-2  Moderate Pain = Pain Score of 4-6, CPOT 3-4  Severe Pain = Pain Score of 7-10, CPOT 5-8          ipratropium (ATROVENT) nasal spray 0.03%  Dose: 1 spray  Freq: 3 Times Daily Route: EACH NARE  Start: 03/23/25 2100     Admin Instructions:         0936-Given     1501-Given     2158-Given          0852-Given     1600     2100            Lidocaine 4 % 3 patch  Dose: 3 patch  Freq: Every 24 Hours Scheduled Route: TD  Start: 03/24/25 1330     Admin Instructions:   Apply to skin on midline low back, jessica SI joints . Remove patch in 12 hours. Apply patch only once for up to  "12 hours in 24 hour period. Based on patient request -  if ordered for moderate or severe pain, provider allows for administration of a medication prescribed for a lower pain scale.  If given for pain, use the following pain scale:  Mild Pain = Pain Score of 1-3, CPOT 1-2  Moderate Pain = Pain Score of 4-6, CPOT 3-4  Severe Pain = Pain Score of 7-10, CPOT 5-8      0658-Medication Removed     0935-Medication Applied [C]     2135-Medication Removed          0852-Medication Applied     2052 (Medication Removed)             loperamide (IMODIUM) capsule 2 mg  Dose: 2 mg  Freq: Every 2 Hours PRN Route: PO  PRN Reason: Diarrhea  PRN Comment: max 4 doses daily  Start: 03/23/25 1821     Admin Instructions:   Maximum recommended 16 mg / 24 hours.            LORazepam (ATIVAN) tablet 0.5 mg  Dose: 0.5 mg  Freq: Every 6 Hours PRN Route: PO  PRN Reason: Anxiety  Start: 03/27/25 1355 End: 04/03/25 1354     Admin Instructions:    Caution: Look alike/sound alike drug alert          losartan (COZAAR) tablet 100 mg  Dose: 100 mg  Freq: Every 24 Hours Scheduled Route: PO  Start: 03/24/25 0900      0935-Given            0852-Given              Magnesium Standard Dose Replacement - Follow Nurse / BPA Driven Protocol  Freq: As Needed Route: XX  PRN Reason: Other  Start: 03/24/25 1114     Admin Instructions:   Open Order & Select \"[facility] Electrolyte Replacement Protocol Algorithm\" to View Details          Magnesium Standard Dose Replacement - Follow Nurse / BPA Driven Protocol  Freq: As Needed Route: XX  PRN Reason: Other  Start: 03/23/25 1821     Admin Instructions:   Open Order & Select \"BHS Electrolyte Replacement Protocol Algorithm\" to View Details          meclizine (ANTIVERT) tablet 25 mg  Dose: 25 mg  Freq: 3 Times Daily PRN Route: PO  PRN Reason: Dizziness  Start: 03/23/25 1821          methocarbamol (ROBAXIN) tablet 500 mg  Dose: 500 mg  Freq: Every 8 Hours Scheduled Route: PO  Start: 03/24/25 1400      0656-Given     1317-Given     " "2158-Given          0550-Given     1400     2200            ondansetron (ZOFRAN) injection 4 mg  Dose: 4 mg  Freq: Every 6 Hours PRN Route: IV  PRN Reasons: Nausea,Vomiting  Start: 03/23/25 1821     Admin Instructions:   \"If multiple N/V medications ordered, use in the following order: Ondansetron, Prochlorperazine, Promethazine. Use PO unless patient refuses or patient unable to swallow.\"       0932-Given              Phosphorus Replacement - Follow Nurse / BPA Driven Protocol  Freq: As Needed Route: XX  PRN Reason: Other  Start: 03/24/25 1114     Admin Instructions:   Open Order & Select \"BHS Electrolyte Replacement Protocol Algorithm\" to View Details          Phosphorus Replacement - Follow Nurse / BPA Driven Protocol  Freq: As Needed Route: XX  PRN Reason: Other  Start: 03/23/25 1821     Admin Instructions:   Open Order & Select \"BHS Electrolyte Replacement Protocol Algorithm\" to View Details          Potassium Replacement - Follow Nurse / BPA Driven Protocol  Freq: As Needed Route: XX  PRN Reason: Other  Start: 03/24/25 1114     Admin Instructions:   Open Order & Select \"BHS Electrolyte Replacement Protocol Algorithm\" to View Details          Potassium Replacement - Follow Nurse / BPA Driven Protocol  Freq: As Needed Route: XX  PRN Reason: Other  Start: 03/23/25 1821     Admin Instructions:   Open Order & Select \"BHS Electrolyte Replacement Protocol Algorithm\" to View Details          sodium chloride 0.9 % flush 10 mL  Dose: 10 mL  Freq: As Needed Route: IV  PRN Reason: Line Care  Start: 03/23/25 1821          sodium chloride 0.9 % flush 10 mL  Dose: 10 mL  Freq: Every 12 Hours Scheduled Route: IV  Start: 03/23/25 2100      0936-Given     2100-Given           1049-Given     2100             sodium chloride 0.9 % infusion 40 mL  Dose: 40 mL  Freq: As Needed Route: IV  PRN Reason: Line Care  Start: 03/23/25 1821     Admin Instructions:   Following administration of an IV intermittent medication, flush line with " 40mL NS at 100mL/hr.          traMADol (ULTRAM) tablet 50 mg  Dose: 50 mg  Freq: 2 Times Daily PRN Route: PO  PRN Reason: Moderate Pain  Start: 03/23/25 1821     Admin Instructions:   Based on patient request - if ordered for moderate or severe pain, provider allows for administration of a medication prescribed for a lower pain scale.      Caution: Look alike/sound alike drug alert    If given for pain, use the following pain scale:  Mild Pain = Pain Score of 1-3, CPOT 1-2  Moderate Pain = Pain Score of 4-6, CPOT 3-4  Severe Pain = Pain Score of 7-10, CPOT 5-8          Vortioxetine HBr (TRINTELLIX) tablet 10 mg  Dose: 10 mg  Freq: Daily With Breakfast Route: PO  Start: 03/24/25 0800      0935-Given            0852-Given              Completed Medications  Medications 04/01/25 04/02/25      doxycycline (MONODOX) capsule 100 mg  Dose: 100 mg  Freq: Every 12 Hours Scheduled Route: PO  Indications of Use: SKIN AND SOFT TISSUE INFECTION  Start: 03/23/25 2100 End: 03/26/25 1034     Admin Instructions:   Take with food if GI upset occurs. Administer 2 hours before or 4 hours after administration of oral polyvalent cations (calcium, zinc, magnesium, iron)          gadobenate dimeglumine (MULTIHANCE) injection 20 mL  Dose: 20 mL  Freq: Once in Imaging Route: IV  Start: 03/23/25 2230 End: 03/23/25 2203     Admin Instructions:   Vesicant; admin as rapid bolus; flush with 5 mL NS after admin or 20 mL for renal or aortoiliofemoral vasculature          HYDROmorphone (DILAUDID) injection 1 mg  Dose: 1 mg  Freq: Once Route: IV  Start: 03/23/25 1511 End: 03/23/25 1508     Admin Instructions:   Based on patient request - if ordered for moderate or severe pain, provider allows for administration of a medication prescribed for a lower pain scale.      Caution: Look alike/sound alike drug alert    If given for pain, use the following pain scale:  Mild Pain = Pain Score of 1-3, CPOT 1-2  Moderate Pain = Pain Score of 4-6, CPOT  "3-4  Severe Pain = Pain Score of 7-10, CPOT 5-8          levoFLOXacin (LEVAQUIN) tablet 750 mg  Dose: 750 mg  Freq: Every 24 Hours Route: PO  Indications of Use: PNEUMONIA  Start: 03/30/25 1200 End: 04/01/25 1317     Admin Instructions:   Levofloxacin has been changed from IV to PO per System Policy.  Caution: Look alike/sound alike drug alert.  Avoid antacids/vitamins/calcium/iron.      1317-Given               lidocaine (XYLOCAINE) 1 % injection 2 mL  Dose: 2 mL  Freq: Once Route: ID  Start: 03/27/25 1015 End: 03/27/25 0917          lidocaine (XYLOCAINE) 1 % injection 5 mL  Dose: 5 mL  Freq: Once Route: INFILTRATION  Start: 03/28/25 1015 End: 03/28/25 1626          LORazepam (ATIVAN) tablet 0.5 mg  Dose: 0.5 mg  Freq: Once Route: PO  Start: 03/23/25 2000 End: 03/23/25 2113     Admin Instructions:   Give 30 mins before MRI    Caution: Look alike/sound alike drug alert          methylPREDNISolone acetate (DEPO-medrol) injection 80 mg  Dose: 80 mg  Freq: Once Route: IM  Start: 03/28/25 1015 End: 03/28/25 1626     Admin Instructions:   ** Not for IV use **  Caution: Look alike/sound alike drug alert          ondansetron (ZOFRAN) injection 4 mg  Dose: 4 mg  Freq: Once Route: IV  Start: 03/23/25 1511 End: 03/23/25 1508     Admin Instructions:   \"If multiple N/V medications ordered, use in the following order: Ondansetron, Prochlorperazine, Promethazine. Use PO unless patient refuses or patient unable to swallow.\"          sodium chloride 0.9 % bolus 500 mL  Dose: 500 mL  Freq: Once Route: IV  Start: 03/26/25 1145 End: 03/26/25 1836          Discontinued Medications  Medications 04/01/25 04/02/25      acetaminophen (TYLENOL) tablet 500 mg  Dose: 500 mg  Freq: Every 6 Hours PRN Route: PO  PRN Reason: Mild Pain  Start: 03/23/25 1821 End: 03/23/25 1825     Admin Instructions:   If given for fever, use fever parameter: fever greater than 100.4 °F  Based on patient request - if ordered for moderate or severe pain, provider " allows for administration of a medication prescribed for a lower pain scale.    Do not exceed 4 grams of acetaminophen in a 24 hr period. Max dose of 2gm for AST/ALT greater than 120 units/L.    If given for pain, use the following pain scale:   Mild Pain = Pain Score of 1-3, CPOT 1-2  Moderate Pain = Pain Score of 4-6, CPOT 3-4  Severe Pain = Pain Score of 7-10, CPOT 5-8          aztreonam (AZACTAM) 2,000 mg in sodium chloride 0.9 % 100 mL MBP  Dose: 2,000 mg  Freq: Every 8 Hours Route: IV  Indications of Use: PNEUMONIA  Start: 03/26/25 2000 End: 03/26/25 1226     Admin Instructions:   Activate vial before using.          aztreonam (AZACTAM) 2,000 mg in sodium chloride 0.9 % 100 mL MBP  Dose: 2,000 mg  Freq: Once Route: IV  Start: 03/26/25 1200 End: 03/26/25 1256     Admin Instructions:   Activate vial before using.          furosemide (LASIX) tablet 20 mg  Dose: 20 mg  Freq: Daily Route: PO  Start: 03/23/25 1915 End: 03/24/25 0515          HYDROcodone-acetaminophen (NORCO) 5-325 MG per tablet 1 tablet  Dose: 1 tablet  Freq: Every 4 Hours PRN Route: PO  PRN Reasons: Moderate Pain,Severe Pain  Start: 03/24/25 2007 End: 03/25/25 3843     Admin Instructions:   Based on patient request - if ordered for moderate or severe pain, provider allows for administration of a medication prescribed for a lower pain scale.  [EDILIA]    Do not exceed 4 grams of acetaminophen in a 24 hr period. Max dose of 2gm for AST/ALT greater than 120 units/L        If given for pain, use the following pain scale:   Mild Pain = Pain Score of 1-3, CPOT 1-2  Moderate Pain = Pain Score of 4-6, CPOT 3-4  Severe Pain = Pain Score of 7-10, CPOT 5-8          levoFLOXacin (LEVAQUIN) 750 mg/150 mL D5W (premix) (LEVAQUIN) 750 mg  Dose: 750 mg  Freq: Every 24 Hours Route: IV  Indications of Use: PNEUMONIA  Start: 03/26/25 1215 End: 03/29/25 1406     Admin Instructions:   Caution: Look alike/sound alike drug alert. Protect from light. Do NOT refrigerate.           Pharmacy to Dose enoxaparin (LOVENOX)  Freq: Continuous PRN Route: XX  PRN Reason: Consult  Start: 25 1548 End: 25 1604     Order specific questions:   Indication of use VTE Prophylaxis          Pharmacy To Dose: Aztreonam  Freq: Continuous PRN Route: XX  PRN Reason: Consult  Start: 25 105 End: 25 111     Order specific questions:   Pharmacy to dose Aztreonam  Indications of Use: Pneumonia          Pharmacy To Dose: Levofloxacin  Freq: Continuous PRN Route: XX  PRN Reason: Consult  Start: 25 105 End: 25 111     Order specific questions:   Pharmacy to dose Levofloxacin  Indications of Use: Pneumonia          potassium chloride (KLOR-CON M20) CR tablet 20 mEq  Dose: 20 mEq  Freq: Daily Route: PO  Start: 25 End: 25 0515     Admin Instructions:   Do not crush or chew the capsules or tablets. The drug may not work as designed if the capsule or tablet is crushed or chewed. Swallow whole.  Take with food.          sodium chloride 0.9 % infusion  Rate: 100 mL/hr Dose: 100 mL/hr  Freq: Continuous Route: IV  Start: 25 End: 25          Vitamin D capsule 2,000 Units  Dose: 2,000 Units  Freq: Daily Route: PO  Start: 25 0900 End: 25 1218                         Physician Progress Notes (last 48 hours)        Cisco Esteban MD at 25 1043              Name: Pedrito Powell ADMIT: 3/23/2025   : 1960  PCP: Teo Fraser MD    MRN: [id number] LOS: 7 days   AGE/SEX: 64 y.o. female  ROOM: [facility]     Subjective   Subjective   No acute events. Patient complains of some indigestion. No other new complaints. Overall feels better. No family at bedside.    Objective   Objective   Vital Signs  Temp:  [97.5 °F (36.4 °C)-98.5 °F (36.9 °C)] 98.4 °F (36.9 °C)  Heart Rate:  [69-87] 71  Resp:  [16-18] 16  BP: (115-136)/(67-74) 123/68  SpO2:  [94 %-95 %] 95 %  on   ;   Device (Oxygen Therapy): room air  Body mass index is 45.67  kg/m².  Physical Exam  Vitals and nursing note reviewed.   Constitutional:       General: She is not in acute distress.     Appearance: She is not toxic-appearing or diaphoretic.   HENT:      Head: Normocephalic and atraumatic.      Nose: Nose normal.      Mouth/Throat:      Mouth: Mucous membranes are moist.      Pharynx: Oropharynx is clear.   Eyes:      Conjunctiva/sclera: Conjunctivae normal.      Pupils: Pupils are equal, round, and reactive to light.   Cardiovascular:      Rate and Rhythm: Normal rate and regular rhythm.      Pulses: Normal pulses.   Pulmonary:      Effort: Pulmonary effort is normal.      Breath sounds: Normal breath sounds.   Abdominal:      General: Bowel sounds are normal. There is no distension.      Palpations: Abdomen is soft.      Tenderness: There is no abdominal tenderness.   Musculoskeletal:         General: No swelling or tenderness.      Cervical back: Neck supple.   Skin:     General: Skin is warm and dry.      Capillary Refill: Capillary refill takes less than 2 seconds.   Neurological:      General: No focal deficit present.      Mental Status: She is alert and oriented to person, place, and time.   Psychiatric:         Mood and Affect: Mood normal.         Behavior: Behavior normal.       Results Review     I reviewed the patient's new clinical results.  Results from last 7 days   Lab Units 04/02/25  0556 04/01/25  0451 03/31/25  0544 03/30/25  0548   WBC 10*3/mm3 10.41 8.15 7.39 8.37   HEMOGLOBIN g/dL 10.4* 10.0* 10.0* 9.8*   PLATELETS 10*3/mm3 412 367 324 334     Results from last 7 days   Lab Units 04/02/25  0556 04/01/25  0451 03/31/25  0544 03/30/25  0548   SODIUM mmol/L 138 139 141 139   POTASSIUM mmol/L 3.7 3.9 4.0 3.9   CHLORIDE mmol/L 100 102 102 102   CO2 mmol/L 25.8 25.7 24.7 24.7   BUN mg/dL 24* 27* 28* 25*   CREATININE mg/dL 1.06* 0.92 0.99 0.89   GLUCOSE mg/dL 98 91 96 150*   EGFR mL/min/1.73 58.8* 69.7 63.8 72.5       Results from last 7 days   Lab Units  "04/02/25  0556 04/01/25  0451 03/31/25  0544 03/30/25  0548   CALCIUM mg/dL 9.0 8.3* 8.3* 8.7     Results from last 7 days   Lab Units 03/27/25  0320   LACTATE mmol/L 0.9     No results found for: \"HGBA1C\", \"POCGLU\"    No radiology results for the last day      I have personally reviewed all medications:  Scheduled Medications  atorvastatin, 80 mg, Oral, Daily  azelastine, 1 spray, Each Nare, Daily  budesonide, 0.5 mg, Nebulization, BID - RT  cholecalciferol, 2,000 Units, Oral, Daily  Diclofenac Sodium, 1 Application, Topical, 4x Daily  enoxaparin sodium, 40 mg, Subcutaneous, Q24H  famotidine, 20 mg, Oral, BID AC  ipratropium, 1 spray, Each Nare, TID  Lidocaine, 3 patch, Transdermal, Q24H  losartan, 100 mg, Oral, Q24H  methocarbamol, 500 mg, Oral, Q8H  senna-docusate sodium, 2 tablet, Oral, BID   And  polyethylene glycol, 17 g, Oral, BID  sodium chloride, 10 mL, Intravenous, Q12H  Vortioxetine HBr, 10 mg, Oral, Daily With Breakfast    Infusions   Diet  Diet: Regular/House; Texture: Regular (IDDSI 7); Fluid Consistency: Thin (IDDSI 0)    I have personally reviewed:  [x]  Laboratory   []  Microbiology   []  Radiology   []  EKG/Telemetry  []  Cardiology/Vascular   []  Pathology    []  Records      Assessment/Plan     Active Hospital Problems    Diagnosis  POA    **Intractable low back pain [M54.59]  Yes    Spinal stenosis, lumbar region, without neurogenic claudication [M48.061]  Unknown    Spondylolisthesis, lumbar region [M43.16]  Unknown    Facet arthritis of lumbar region [M47.816]  Unknown    GERD (gastroesophageal reflux disease) [K21.9]  Yes    Acute low back pain [M54.50]  Unknown    Cellulitis of leg [L03.119]  Unknown    Acute back pain [M54.9]  Yes    Anxiety [F41.9]  Yes    Anemia [D64.9]  Yes    HTN (hypertension) [I10]  Yes      Resolved Hospital Problems   No resolved problems to display.   Lower Back Pain  - MRI showed lumbar stenosis  - neurosurgery evaluated and recommended no intervention at this " time  - continue pain control, PT    Pseudogout of the Left Knee and Wrist  - s/p left knee aspiration 3/26/25 with calcium pyrophosphate crystals, s/p steroid injection with improvement  - having left knee pain, xray showed degenerative changes-continue voltaren gel    Pneumonia due to Gram negative bacteria  - completed 7d course of levofloxacin  - appreciate ID recommendations    HTN  - BP acceptable acutely  - continue current regimen    Depression/Anxiety  - appears controlled, continue current regimen  - appreciate psychiatry recommendations    Indigestion  - abdominal examination is benign  - add tums    Lovenox 40 mg SC daily for DVT prophylaxis.  Full code.  Discussed with patient and nursing staff.  Anticipate discharge to acute rehab once precert has been obtained.  Expected Discharge Date: 2025; Expected Discharge Time:       Cisco Esteban MD  [facility]  25  10:43 EDT    Portions of this text have been copied and I have reviewed them. They are accurate as of 2025      Electronically signed by Cisco Esteban MD at 25 1044       Cisco Esteban MD at 25 1146              Name: Pedrito Powell ADMIT: 3/23/2025   : 1960  PCP: Teo Fraser MD    MRN: [id number] LOS: 6 days   AGE/SEX: 64 y.o. female  ROOM: [facility]     Subjective   Subjective   No acute events. Patient denies new complaints. Left knee pain is much better with voltaren gel. Sister at bedside.    Objective   Objective   Vital Signs  Temp:  [97.5 °F (36.4 °C)-98.1 °F (36.7 °C)] 97.5 °F (36.4 °C)  Heart Rate:  [70-80] 72  Resp:  [16-18] 16  BP: (105-136)/(62-75) 136/68  SpO2:  [94 %-96 %] 95 %  on  Flow (L/min) (Oxygen Therapy):  [2] 2;   Device (Oxygen Therapy): room air  Body mass index is 45.67 kg/m².  Physical Exam  Vitals and nursing note reviewed.   Constitutional:       General: She is not in acute distress.     Appearance: She is not toxic-appearing or diaphoretic.    HENT:      Head: Normocephalic and atraumatic.      Nose: Nose normal.      Mouth/Throat:      Mouth: Mucous membranes are moist.      Pharynx: Oropharynx is clear.   Eyes:      Conjunctiva/sclera: Conjunctivae normal.      Pupils: Pupils are equal, round, and reactive to light.   Cardiovascular:      Rate and Rhythm: Normal rate and regular rhythm.      Pulses: Normal pulses.   Pulmonary:      Effort: Pulmonary effort is normal.      Breath sounds: Normal breath sounds.   Abdominal:      General: Bowel sounds are normal. There is no distension.      Palpations: Abdomen is soft.      Tenderness: There is no abdominal tenderness.   Musculoskeletal:         General: No swelling or tenderness.      Cervical back: Neck supple.   Skin:     General: Skin is warm and dry.      Capillary Refill: Capillary refill takes less than 2 seconds.   Neurological:      General: No focal deficit present.      Mental Status: She is alert and oriented to person, place, and time.   Psychiatric:         Mood and Affect: Mood normal.         Behavior: Behavior normal.       Results Review     I reviewed the patient's new clinical results.  Results from last 7 days   Lab Units 04/01/25 0451 03/31/25  0544 03/30/25  0548 03/29/25  0622   WBC 10*3/mm3 8.15 7.39 8.37 4.97   HEMOGLOBIN g/dL 10.0* 10.0* 9.8* 10.3*   PLATELETS 10*3/mm3 367 324 334 315     Results from last 7 days   Lab Units 04/01/25  0451 03/31/25  0544 03/30/25  0548 03/29/25  0622   SODIUM mmol/L 139 141 139 136   POTASSIUM mmol/L 3.9 4.0 3.9 4.3   CHLORIDE mmol/L 102 102 102 100   CO2 mmol/L 25.7 24.7 24.7 24.8   BUN mg/dL 27* 28* 25* 21   CREATININE mg/dL 0.92 0.99 0.89 0.80   GLUCOSE mg/dL 91 96 150* 191*   EGFR mL/min/1.73 69.7 63.8 72.5 82.4       Results from last 7 days   Lab Units 04/01/25  0451 03/31/25  0544 03/30/25  0548 03/29/25  0622   CALCIUM mg/dL 8.3* 8.3* 8.7 9.3     Results from last 7 days   Lab Units 03/27/25  0320 03/26/25  0602   PROCALCITONIN ng/mL   "--  0.21   LACTATE mmol/L 0.9  --      No results found for: \"HGBA1C\", \"POCGLU\"    XR Knee 1 or 2 View Right  Result Date: 3/30/2025  1. Severe degenerative arthritis of the right knee. 2. Loose body lateral to the distal femur. 3. But no acute bony abnormality is seen  This report was finalized on 3/30/2025 4:35 PM by Dr. Morgan Beard M.D on Workstation: [id number]        I have personally reviewed all medications:  Scheduled Medications  atorvastatin, 80 mg, Oral, Daily  azelastine, 1 spray, Each Nare, Daily  budesonide, 0.5 mg, Nebulization, BID - RT  cholecalciferol, 2,000 Units, Oral, Daily  Diclofenac Sodium, 1 Application, Topical, 4x Daily  enoxaparin sodium, 40 mg, Subcutaneous, Q24H  famotidine, 20 mg, Oral, BID AC  ipratropium, 1 spray, Each Nare, TID  levoFLOXacin, 750 mg, Oral, Q24H  Lidocaine, 3 patch, Transdermal, Q24H  losartan, 100 mg, Oral, Q24H  methocarbamol, 500 mg, Oral, Q8H  senna-docusate sodium, 2 tablet, Oral, BID   And  polyethylene glycol, 17 g, Oral, BID  sodium chloride, 10 mL, Intravenous, Q12H  Vortioxetine HBr, 10 mg, Oral, Daily With Breakfast    Infusions   Diet  Diet: Cardiac; Healthy Heart (2-3 Na+); Fluid Consistency: Thin (IDDSI 0)    I have personally reviewed:  [x]  Laboratory   []  Microbiology   []  Radiology   []  EKG/Telemetry  []  Cardiology/Vascular   []  Pathology    []  Records      Assessment/Plan     Active Hospital Problems    Diagnosis  POA    **Intractable low back pain [M54.59]  Yes    Spinal stenosis, lumbar region, without neurogenic claudication [M48.061]  Unknown    Spondylolisthesis, lumbar region [M43.16]  Unknown    Facet arthritis of lumbar region [M47.816]  Unknown    GERD (gastroesophageal reflux disease) [K21.9]  Yes    Acute low back pain [M54.50]  Unknown    Cellulitis of leg [L03.119]  Unknown    Acute back pain [M54.9]  Yes    Anxiety [F41.9]  Yes    Anemia [D64.9]  Yes    HTN (hypertension) [I10]  Yes      Resolved Hospital Problems   No " resolved problems to display.   Lower Back Pain  - MRI showed lumbar stenosis  - neurosurgery evaluated and recommended no intervention at this time  - continue pain control, PT    Pseudogout of the Left Knee and Wrist  - s/p left knee aspiration 3/26/25 with calcium pyrophosphate crystals, s/p steroid injection with improvement  - having left knee pain, xray showed degenerative changes-continue voltaren gel    Pneumonia due to Gram negative bacteria  - continue on levofloxacin for 7d course  - appreciate ID recommendations    HTN  - BP acceptable acutely  - continue current regimen    Depression/Anxiety  - appears controlled, continue current regimen  - appreciate psychiatry recommendations    Lovenox 40 mg SC daily for DVT prophylaxis.  Full code.  Discussed with patient and nursing staff.  Anticipate discharge to acute rehab once precert has been obtained.  Expected Discharge Date: 4/1/2025; Expected Discharge Time:       Cisco Esteban MD  [facility]  04/01/25  11:46 EDT    Portions of this text have been copied and I have reviewed them. They are accurate as of 4/1/2025      Electronically signed by Cisco Esteban MD at 04/01/25 1147       Consult Notes (last 48 hours)  Notes from 03/31/25 1416 through 04/02/25 1416   No notes of this type exist for this encounter.

## 2025-04-02 NOTE — PROGRESS NOTES
Name: Pedrito Powell ADMIT: 3/23/2025   : 1960  PCP: Teo Fraser MD    MRN: [id number] LOS: 7 days   AGE/SEX: 64 y.o. female  ROOM: [facility]     Subjective   Subjective   No acute events. Patient complains of some indigestion. No other new complaints. Overall feels better. No family at bedside.    Objective   Objective   Vital Signs  Temp:  [97.5 °F (36.4 °C)-98.5 °F (36.9 °C)] 98.4 °F (36.9 °C)  Heart Rate:  [69-87] 71  Resp:  [16-18] 16  BP: (115-136)/(67-74) 123/68  SpO2:  [94 %-95 %] 95 %  on   ;   Device (Oxygen Therapy): room air  Body mass index is 45.67 kg/m².  Physical Exam  Vitals and nursing note reviewed.   Constitutional:       General: She is not in acute distress.     Appearance: She is not toxic-appearing or diaphoretic.   HENT:      Head: Normocephalic and atraumatic.      Nose: Nose normal.      Mouth/Throat:      Mouth: Mucous membranes are moist.      Pharynx: Oropharynx is clear.   Eyes:      Conjunctiva/sclera: Conjunctivae normal.      Pupils: Pupils are equal, round, and reactive to light.   Cardiovascular:      Rate and Rhythm: Normal rate and regular rhythm.      Pulses: Normal pulses.   Pulmonary:      Effort: Pulmonary effort is normal.      Breath sounds: Normal breath sounds.   Abdominal:      General: Bowel sounds are normal. There is no distension.      Palpations: Abdomen is soft.      Tenderness: There is no abdominal tenderness.   Musculoskeletal:         General: No swelling or tenderness.      Cervical back: Neck supple.   Skin:     General: Skin is warm and dry.      Capillary Refill: Capillary refill takes less than 2 seconds.   Neurological:      General: No focal deficit present.      Mental Status: She is alert and oriented to person, place, and time.   Psychiatric:         Mood and Affect: Mood normal.         Behavior: Behavior normal.       Results Review     I reviewed the patient's new clinical results.  Results from last 7 days   Lab Units  "04/02/25  0556 04/01/25  0451 03/31/25  0544 03/30/25  0548   WBC 10*3/mm3 10.41 8.15 7.39 8.37   HEMOGLOBIN g/dL 10.4* 10.0* 10.0* 9.8*   PLATELETS 10*3/mm3 412 367 324 334     Results from last 7 days   Lab Units 04/02/25  0556 04/01/25  0451 03/31/25  0544 03/30/25  0548   SODIUM mmol/L 138 139 141 139   POTASSIUM mmol/L 3.7 3.9 4.0 3.9   CHLORIDE mmol/L 100 102 102 102   CO2 mmol/L 25.8 25.7 24.7 24.7   BUN mg/dL 24* 27* 28* 25*   CREATININE mg/dL 1.06* 0.92 0.99 0.89   GLUCOSE mg/dL 98 91 96 150*   EGFR mL/min/1.73 58.8* 69.7 63.8 72.5       Results from last 7 days   Lab Units 04/02/25  0556 04/01/25  0451 03/31/25  0544 03/30/25  0548   CALCIUM mg/dL 9.0 8.3* 8.3* 8.7     Results from last 7 days   Lab Units 03/27/25  0320   LACTATE mmol/L 0.9     No results found for: \"HGBA1C\", \"POCGLU\"    No radiology results for the last day      I have personally reviewed all medications:  Scheduled Medications  atorvastatin, 80 mg, Oral, Daily  azelastine, 1 spray, Each Nare, Daily  budesonide, 0.5 mg, Nebulization, BID - RT  cholecalciferol, 2,000 Units, Oral, Daily  Diclofenac Sodium, 1 Application, Topical, 4x Daily  enoxaparin sodium, 40 mg, Subcutaneous, Q24H  famotidine, 20 mg, Oral, BID AC  ipratropium, 1 spray, Each Nare, TID  Lidocaine, 3 patch, Transdermal, Q24H  losartan, 100 mg, Oral, Q24H  methocarbamol, 500 mg, Oral, Q8H  senna-docusate sodium, 2 tablet, Oral, BID   And  polyethylene glycol, 17 g, Oral, BID  sodium chloride, 10 mL, Intravenous, Q12H  Vortioxetine HBr, 10 mg, Oral, Daily With Breakfast    Infusions   Diet  Diet: Regular/House; Texture: Regular (IDDSI 7); Fluid Consistency: Thin (IDDSI 0)    I have personally reviewed:  [x]  Laboratory   []  Microbiology   []  Radiology   []  EKG/Telemetry  []  Cardiology/Vascular   []  Pathology    []  Records       Assessment/Plan     Active Hospital Problems    Diagnosis  POA    **Intractable low back pain [M54.59]  Yes    Spinal stenosis, lumbar region, " without neurogenic claudication [M48.061]  Unknown    Spondylolisthesis, lumbar region [M43.16]  Unknown    Facet arthritis of lumbar region [M47.816]  Unknown    GERD (gastroesophageal reflux disease) [K21.9]  Yes    Acute low back pain [M54.50]  Unknown    Cellulitis of leg [L03.119]  Unknown    Acute back pain [M54.9]  Yes    Anxiety [F41.9]  Yes    Anemia [D64.9]  Yes    HTN (hypertension) [I10]  Yes      Resolved Hospital Problems   No resolved problems to display.   Lower Back Pain  - MRI showed lumbar stenosis  - neurosurgery evaluated and recommended no intervention at this time  - continue pain control, PT    Pseudogout of the Left Knee and Wrist  - s/p left knee aspiration 3/26/25 with calcium pyrophosphate crystals, s/p steroid injection with improvement  - having left knee pain, xray showed degenerative changes-continue voltaren gel    Pneumonia due to Gram negative bacteria  - completed 7d course of levofloxacin  - appreciate ID recommendations    HTN  - BP acceptable acutely  - continue current regimen    Depression/Anxiety  - appears controlled, continue current regimen  - appreciate psychiatry recommendations    Indigestion  - abdominal examination is benign  - add tums    Lovenox 40 mg SC daily for DVT prophylaxis.  Full code.  Discussed with patient and nursing staff.  Anticipate discharge to acute rehab once precert has been obtained.  Expected Discharge Date: 4/2/2025; Expected Discharge Time:       Cisco Esteban MD  [facility]  04/02/25  10:43 EDT    Portions of this text have been copied and I have reviewed them. They are accurate as of 4/2/2025

## 2025-04-02 NOTE — DISCHARGE SUMMARY
"Date of Admission: 3/23/2025  Date of Discharge:  4/2/2025  Primary Care Physician: Teo Fraser MD     Discharge Diagnosis:  Active Hospital Problems    Diagnosis  POA    **Intractable low back pain [M54.59]  Yes    Spinal stenosis, lumbar region, without neurogenic claudication [M48.061]  Yes    Spondylolisthesis, lumbar region [M43.16]  Yes    Facet arthritis of lumbar region [M47.816]  Yes    GERD (gastroesophageal reflux disease) [K21.9]  Yes    Acute low back pain [M54.50]  Yes    Cellulitis of leg [L03.119]  Yes    Acute back pain [M54.9]  Yes    Anxiety [F41.9]  Yes    Anemia [D64.9]  Yes    HTN (hypertension) [I10]  Yes      Resolved Hospital Problems   No resolved problems to display.       Presenting Problem/History of Present Illness:  Acute febrile illness [R50.9]  Acute back pain [M54.9]  Intractable low back pain [M54.59]  Bilateral lower extremity edema [R60.0]     Hospital Course:  The patient is a 64 y.o. female who presented with back pain. She had lumbar stenosis on MRI and neurosurgery evaluated. They recommended no surgical intervention and she should follow up with them in 3-4 weeks. She was given pain control and physical therapy and her pain has improved. She was additionally found to have pneumonia and completed a 7 day course of levofloxacin. She had swelling of her bilateral knees and left wrist and a left knee aspiration on 3/26/25 yielded fluid with calcium pyrophosphate crystals consistent with pseudogout. She had a steroid injection to the left knee which improved her pain. She received voltaren for her right knee and this improved. She is medically stable and will be discharged to rehab.    Exam Today:  Blood pressure 126/78, pulse 80, temperature 98.3 °F (36.8 °C), temperature source Oral, resp. rate 16, height 149.9 cm (59\"), weight 103 kg (226 lb 1.6 oz), SpO2 94%, not currently breastfeeding.  Vitals and nursing note reviewed.   Constitutional:       General: She is not in " acute distress.     Appearance: She is not toxic-appearing or diaphoretic.   HENT:      Head: Normocephalic and atraumatic.      Nose: Nose normal.      Mouth/Throat:      Mouth: Mucous membranes are moist.      Pharynx: Oropharynx is clear.   Eyes:      Conjunctiva/sclera: Conjunctivae normal.      Pupils: Pupils are equal, round, and reactive to light.   Cardiovascular:      Rate and Rhythm: Normal rate and regular rhythm.      Pulses: Normal pulses.   Pulmonary:      Effort: Pulmonary effort is normal.      Breath sounds: Normal breath sounds.   Abdominal:      General: Bowel sounds are normal. There is no distension.      Palpations: Abdomen is soft.      Tenderness: There is no abdominal tenderness.   Musculoskeletal:         General: No swelling or tenderness.      Cervical back: Neck supple.   Skin:     General: Skin is warm and dry.      Capillary Refill: Capillary refill takes less than 2 seconds.   Neurological:      General: No focal deficit present.      Mental Status: She is alert and oriented to person, place, and time.   Psychiatric:         Mood and Affect: Mood normal.         Behavior: Behavior normal.   Consults:   Consults       Date and Time Order Name Status Description    3/26/2025 12:42 PM Inpatient Infectious Diseases Consult Completed     3/26/2025 12:06 PM Inpatient Psychiatrist Consult Completed     3/25/2025  6:07 PM Inpatient Orthopedic Surgery Consult      3/24/2025  9:26 AM Inpatient Neurosurgery Consult Completed     3/23/2025  2:55 PM [facility] (on-call MD unless specified) Details               Discharge Disposition:  Skilled Nursing Facility (DC - External)    Discharge Medications:     Discharge Medications        New Medications        Instructions Start Date   HYDROcodone-acetaminophen 7.5-325 MG per tablet  Commonly known as: NORCO   1 tablet, Oral, Every 4 Hours PRN      Lidocaine 4 %   3 patches, Transdermal, Every 24 Hours Scheduled, Remove & Discard patch within 12 hours or as  directed by MD   Start Date: April 3, 2025     methocarbamol 500 MG tablet  Commonly known as: ROBAXIN   500 mg, Oral, Every 8 Hours Scheduled      polyethylene glycol 17 GM/SCOOP powder  Commonly known as: MIRALAX   17 g, Oral, 2 Times Daily      sennosides-docusate 8.6-50 MG per tablet  Commonly known as: PERICOLACE   2 tablets, Oral, 2 Times Daily             Continue These Medications        Instructions Start Date   albuterol sulfate  (90 Base) MCG/ACT inhaler  Commonly known as: PROVENTIL HFA;VENTOLIN HFA;PROAIR HFA   2 puffs, Inhalation, Every 4 Hours PRN      amitriptyline 25 MG tablet  Commonly known as: ELAVIL   25 mg, Nightly PRN      Arnuity Ellipta 200 MCG/ACT  Generic drug: Fluticasone Furoate   Daily - RT      ASPIRIN 81 PO   Take  by mouth.      atorvastatin 80 MG tablet  Commonly known as: LIPITOR   80 mg, Oral, Daily      AUVI-Q IJ   As Needed      azelastine 0.1 % nasal spray  Commonly known as: ASTELIN   1 spray, Daily      B COMPLEX PO   1 capsule, Daily      benzonatate 100 MG capsule  Commonly known as: TESSALON   100 mg, Oral, 3 Times Daily PRN      Diclofenac Sodium 1 % gel gel  Commonly known as: VOLTAREN   4 g, Topical, 4 Times Daily PRN      famotidine 20 MG tablet  Commonly known as: PEPCID   20 mg      ipratropium 0.03 % nasal spray  Commonly known as: ATROVENT   1 spray, 3 Times Daily      irbesartan 300 MG tablet  Commonly known as: AVAPRO   300 mg, Oral, Daily      loperamide 2 MG capsule  Commonly known as: IMODIUM   2 mg, Oral, Every 2 Hours PRN      meclizine 25 MG tablet  Commonly known as: ANTIVERT   TAKE 1 TABLET BY MOUTH THREE TIMES DAILY AS NEEDED FOR DIZZINESS      ondansetron 4 MG tablet  Commonly known as: Zofran   4 mg, Oral, Every 8 Hours PRN      Vitamin D3 50 MCG (2000 UT) capsule   TAKE 1 CAPSULE BY MOUTH EVERY DAY      Vortioxetine HBr 10 MG tablet tablet  Commonly known as: Trintellix   10 mg, Oral, Daily With Breakfast             Stop These Medications       acetaminophen 500 MG tablet  Commonly known as: TYLENOL     clobetasol propionate 0.05 % cream  Commonly known as: TEMOVATE     doxycycline 100 MG capsule  Commonly known as: VIBRAMYCIN     furosemide 20 MG tablet  Commonly known as: Lasix     hydrocortisone 2.5 % cream     potassium chloride 20 MEQ CR tablet  Commonly known as: KLOR-CON M20     traMADol 50 MG tablet  Commonly known as: ULTRAM     triamcinolone 0.1 % ointment  Commonly known as: KENALOG              Discharge Diet:   Diet Instructions       Diet: Regular/House Diet; Regular (IDDSI 7); Thin (IDDSI 0)      Discharge Diet: Regular/House Diet    Texture: Regular (IDDSI 7)    Fluid Consistency: Thin (IDDSI 0)            Activity at Discharge:   Activity Instructions       Activity as Tolerated              Follow-up Appointments:  Future Appointments   Date Time Provider Department Center   4/9/2025  3:50 PM Sherie Meehan APRN [facility]   8/8/2025  3:50 PM LAB CHAIR 1 [facility] LAB [facility]   8/8/2025  4:20 PM Sukhjinder Reinoso II, MD [facility]     Additional Instructions for the Follow-ups that You Need to Schedule       Discharge Follow-up with Specialty: general practitioner or PCP at [facility]   As directed      Specialty: general practitioner or PCP at [facility]   Follow Up Details: 1-3d        Discharge Follow-up with Specified Provider: Dr. Jones (Neurosurgery)   As directed      To: Dr. Jones (Neurosurgery)   Follow Up Details: 3-4 weeks                 Cisco Esteban MD  04/02/25  12:15 EDT    Time Spent on Discharge Activities: Greater than 30 minutes.

## 2025-04-02 NOTE — PLAN OF CARE
Goal Outcome Evaluation:  Patient stable, VSS and voiding function is intact. Pain managed with prn norco, scheduled lidocaine patches, voltaren gel and robaxin. Patient able to ambulate with walker and stand by assist for short distances. Patient prepared and ready for d/c to [facility].

## 2025-04-03 NOTE — PAYOR COMM NOTE
"Pedrito Powell (64 y.o. Female)    PLEASE SEE ATTACHED FOR DC NOTICE   REF#[id number]   THANK YOU  LORA URBANO RN/ DEPT   [facility]  PH: [phone]  FAX:  [phone]     Date of Birth   1960    Social Security Number       Address   [address] [place] [address]    Home Phone   [phone]    MRN   [id number]       Jew   Denominational    Marital Status                               Admission Date   3/23/2025    Admission Type   Emergency    Admitting Provider   Hima Trejo MD    Attending Provider       Department, Room/Bed   [address], P797/1       Discharge Date   4/2/2025    Discharge Disposition   Skilled Nursing Facility (DC - External)    Discharge Destination                                 Attending Provider: (none)   Allergies: Cashew Nut Oil, Chocolate, Citrus, Nickel, Nuts, Tree Nut, Chandler, Chandler Oil, Bacitracin, Baclofen, Chlorhexidine, Ciprofloxacin, Citric Acid, Covid-19 (Mrna) Vaccine, Influenza Vaccines, Methyldibromoglutaronitrile, Naproxen, Neomycin, Omnicef [Cefdinir], Palladium Chloride, Penicillins, Pineapple Extract, Sulfa Antibiotics, Adhesive Tape, Gold-containing Drug Products, Latex    Isolation: None   Infection: None   Code Status: Prior    Ht: 149.9 cm (59\")   Wt: 103 kg (226 lb 1.6 oz)    Admission Cmt: None   Principal Problem: Intractable low back pain [M54.59]                   Active Insurance as of 3/23/2025       Primary Coverage       Payor Plan Insurance Group Employer/Plan Group    [facility] BLUE [employer]OSS [employer] BLUE [facility] PPO [id number]       Payor Plan Address Payor Plan Phone Number Payor Plan Fax Number Effective Dates    [address] [phone]  1/1/2024 - None Entered    [facility] [id number]         Subscriber Name Subscriber Birth Date Member ID       PEDRITO POWELL 1960 [id number]                     Emergency Contacts        (Rel.) Home Phone Work Phone Mobile Phone "    Yareli Powell (Daughter) [phone] -- [phone]    Ld Powell (Son) -- -- [phone]    yasmin powell -- -- [phone]    Yaneth Kellogg (Sister) -- -- [phone]              Albers: NP [id number]  Tax ID [id number]     Discharge Summary        Cisco Esteban MD at 04/02/25 1210          Date of Admission: 3/23/2025  Date of Discharge:  4/2/2025  Primary Care Physician: Teo Fraser MD     Discharge Diagnosis:  Active Hospital Problems    Diagnosis  POA    **Intractable low back pain [M54.59]  Yes    Spinal stenosis, lumbar region, without neurogenic claudication [M48.061]  Yes    Spondylolisthesis, lumbar region [M43.16]  Yes    Facet arthritis of lumbar region [M47.816]  Yes    GERD (gastroesophageal reflux disease) [K21.9]  Yes    Acute low back pain [M54.50]  Yes    Cellulitis of leg [L03.119]  Yes    Acute back pain [M54.9]  Yes    Anxiety [F41.9]  Yes    Anemia [D64.9]  Yes    HTN (hypertension) [I10]  Yes      Resolved Hospital Problems   No resolved problems to display.       Presenting Problem/History of Present Illness:  Acute febrile illness [R50.9]  Acute back pain [M54.9]  Intractable low back pain [M54.59]  Bilateral lower extremity edema [R60.0]     Hospital Course:  The patient is a 64 y.o. female who presented with back pain. She had lumbar stenosis on MRI and neurosurgery evaluated. They recommended no surgical intervention and she should follow up with them in 3-4 weeks. She was given pain control and physical therapy and her pain has improved. She was additionally found to have pneumonia and completed a 7 day course of levofloxacin. She had swelling of her bilateral knees and left wrist and a left knee aspiration on 3/26/25 yielded fluid with calcium pyrophosphate crystals consistent with pseudogout. She had a steroid injection to the left knee which improved her pain. She received voltaren for her right knee and this improved. She is medically stable and will  "be discharged to rehab.    Exam Today:  Blood pressure 126/78, pulse 80, temperature 98.3 °F (36.8 °C), temperature source Oral, resp. rate 16, height 149.9 cm (59\"), weight 103 kg (226 lb 1.6 oz), SpO2 94%, not currently breastfeeding.  Vitals and nursing note reviewed.   Constitutional:       General: She is not in acute distress.     Appearance: She is not toxic-appearing or diaphoretic.   HENT:      Head: Normocephalic and atraumatic.      Nose: Nose normal.      Mouth/Throat:      Mouth: Mucous membranes are moist.      Pharynx: Oropharynx is clear.   Eyes:      Conjunctiva/sclera: Conjunctivae normal.      Pupils: Pupils are equal, round, and reactive to light.   Cardiovascular:      Rate and Rhythm: Normal rate and regular rhythm.      Pulses: Normal pulses.   Pulmonary:      Effort: Pulmonary effort is normal.      Breath sounds: Normal breath sounds.   Abdominal:      General: Bowel sounds are normal. There is no distension.      Palpations: Abdomen is soft.      Tenderness: There is no abdominal tenderness.   Musculoskeletal:         General: No swelling or tenderness.      Cervical back: Neck supple.   Skin:     General: Skin is warm and dry.      Capillary Refill: Capillary refill takes less than 2 seconds.   Neurological:      General: No focal deficit present.      Mental Status: She is alert and oriented to person, place, and time.   Psychiatric:         Mood and Affect: Mood normal.         Behavior: Behavior normal.   Consults:   Consults       Date and Time Order Name Status Description    3/26/2025 12:42 PM Inpatient Infectious Diseases Consult Completed     3/26/2025 12:06 PM Inpatient Psychiatrist Consult Completed     3/25/2025  6:07 PM Inpatient Orthopedic Surgery Consult      3/24/2025  9:26 AM Inpatient Neurosurgery Consult Completed     3/23/2025  2:55 PM [facility] (on-call MD unless specified) Details               Discharge Disposition:  Skilled Nursing Facility (DC - External)    Discharge " Medications:     Discharge Medications        New Medications        Instructions Start Date   HYDROcodone-acetaminophen 7.5-325 MG per tablet  Commonly known as: NORCO   1 tablet, Oral, Every 4 Hours PRN      Lidocaine 4 %   3 patches, Transdermal, Every 24 Hours Scheduled, Remove & Discard patch within 12 hours or as directed by MD   Start Date: April 3, 2025     methocarbamol 500 MG tablet  Commonly known as: ROBAXIN   500 mg, Oral, Every 8 Hours Scheduled      polyethylene glycol 17 GM/SCOOP powder  Commonly known as: MIRALAX   17 g, Oral, 2 Times Daily      sennosides-docusate 8.6-50 MG per tablet  Commonly known as: PERICOLACE   2 tablets, Oral, 2 Times Daily             Continue These Medications        Instructions Start Date   albuterol sulfate  (90 Base) MCG/ACT inhaler  Commonly known as: PROVENTIL HFA;VENTOLIN HFA;PROAIR HFA   2 puffs, Inhalation, Every 4 Hours PRN      amitriptyline 25 MG tablet  Commonly known as: ELAVIL   25 mg, Nightly PRN      Arnuity Ellipta 200 MCG/ACT  Generic drug: Fluticasone Furoate   Daily - RT      ASPIRIN 81 PO   Take  by mouth.      atorvastatin 80 MG tablet  Commonly known as: LIPITOR   80 mg, Oral, Daily      AUVI-Q IJ   As Needed      azelastine 0.1 % nasal spray  Commonly known as: ASTELIN   1 spray, Daily      B COMPLEX PO   1 capsule, Daily      benzonatate 100 MG capsule  Commonly known as: TESSALON   100 mg, Oral, 3 Times Daily PRN      Diclofenac Sodium 1 % gel gel  Commonly known as: VOLTAREN   4 g, Topical, 4 Times Daily PRN      famotidine 20 MG tablet  Commonly known as: PEPCID   20 mg      ipratropium 0.03 % nasal spray  Commonly known as: ATROVENT   1 spray, 3 Times Daily      irbesartan 300 MG tablet  Commonly known as: AVAPRO   300 mg, Oral, Daily      loperamide 2 MG capsule  Commonly known as: IMODIUM   2 mg, Oral, Every 2 Hours PRN      meclizine 25 MG tablet  Commonly known as: ANTIVERT   TAKE 1 TABLET BY MOUTH THREE TIMES DAILY AS NEEDED FOR  DIZZINESS      ondansetron 4 MG tablet  Commonly known as: Zofran   4 mg, Oral, Every 8 Hours PRN      Vitamin D3 50 MCG (2000 UT) capsule   TAKE 1 CAPSULE BY MOUTH EVERY DAY      Vortioxetine HBr 10 MG tablet tablet  Commonly known as: Trintellix   10 mg, Oral, Daily With Breakfast             Stop These Medications      acetaminophen 500 MG tablet  Commonly known as: TYLENOL     clobetasol propionate 0.05 % cream  Commonly known as: TEMOVATE     doxycycline 100 MG capsule  Commonly known as: VIBRAMYCIN     furosemide 20 MG tablet  Commonly known as: Lasix     hydrocortisone 2.5 % cream     potassium chloride 20 MEQ CR tablet  Commonly known as: KLOR-CON M20     traMADol 50 MG tablet  Commonly known as: ULTRAM     triamcinolone 0.1 % ointment  Commonly known as: KENALOG              Discharge Diet:   Diet Instructions       Diet: Regular/House Diet; Regular (IDDSI 7); Thin (IDDSI 0)      Discharge Diet: Regular/House Diet    Texture: Regular (IDDSI 7)    Fluid Consistency: Thin (IDDSI 0)            Activity at Discharge:   Activity Instructions       Activity as Tolerated              Follow-up Appointments:  Future Appointments   Date Time Provider Department Center   4/9/2025  3:50 PM Sherie Meehan APRN [facility]   8/8/2025  3:50 PM LAB CHAIR 1 [facility] LAB [facility]   8/8/2025  4:20 PM Sukhjinder Reinoso II, MD [facility]     Additional Instructions for the Follow-ups that You Need to Schedule       Discharge Follow-up with Specialty: general practitioner or PCP at [facility]   As directed      Specialty: general practitioner or PCP at [facility]   Follow Up Details: 1-3d        Discharge Follow-up with Specified Provider: Dr. Jones (Neurosurgery)   As directed      To: Dr. Jones (Neurosurgery)   Follow Up Details: 3-4 weeks                 Cisco Esteban MD  04/02/25  12:15 EDT    Time Spent on Discharge Activities: Greater than 30 minutes.          Electronically signed by Cisco Esteban  MD at 04/02/25 2792

## 2025-04-07 ENCOUNTER — TELEPHONE (OUTPATIENT)
Dept: NEUROSURGERY | Facility: CLINIC | Age: 65
End: 2025-04-07

## 2025-04-07 NOTE — TELEPHONE ENCOUNTER
Caller: Pedrito Powell    Relationship to patient: Self    Best call back number: [phone]    Chief complaint: LOW BACK PAIN    Type of visit: HOSPITAL FOLLOW UP    Requested date: 3-4 WEEKS    If rescheduling, when is the original appointment: N/A     Additional notes: PATIENT CALLED AFTER BEING DISCHARGED FROM REHAB HOSPITAL. ROXIE WATKINS WAS CONSULTED AND ADVISED TO SCHEDULE WITH LILI OR AYSE IN 3-4 WEEKS. PLEASE CALL PATIENT TO ADVISE/SCHEDULE.

## 2025-04-09 ENCOUNTER — TREATMENT (OUTPATIENT)
Dept: PHYSICAL THERAPY | Facility: CLINIC | Age: 65
End: 2025-04-09
Payer: COMMERCIAL

## 2025-04-09 ENCOUNTER — TELEPHONE (OUTPATIENT)
Dept: ORTHOPEDIC SURGERY | Facility: CLINIC | Age: 65
End: 2025-04-09

## 2025-04-09 DIAGNOSIS — Z74.09 IMPAIRED FUNCTIONAL MOBILITY AND ACTIVITY TOLERANCE: ICD-10-CM

## 2025-04-09 DIAGNOSIS — M17.0 PRIMARY OSTEOARTHRITIS OF BOTH KNEES: ICD-10-CM

## 2025-04-09 DIAGNOSIS — M48.061 SPINAL STENOSIS OF LUMBAR REGION WITHOUT NEUROGENIC CLAUDICATION: Primary | ICD-10-CM

## 2025-04-09 NOTE — TELEPHONE ENCOUNTER
Hub staff attempted to follow warm transfer process and was unsuccessful     Caller: Yareli Powell    Relationship to patient: Emergency Contact    Best call back number: [phone]*    Patient is needing: PT HAD APPT SCHEDULED WITH [facility] FOR TODAY AT 3PM THAT WAS CANCELLED. WHEN LOOKING AT THE APPT LOOKS LIKE IT WAS CANCELLED VIA THE AUTOMATED MESSAGE. PT'S DAUGHTER WOULD LIKE TO HAVE APPT PUT BACK ON THE SCHEDULE FOR 3PM TODAY. PLEASE ADVISE.
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
## 2025-04-10 ENCOUNTER — OFFICE VISIT (OUTPATIENT)
Age: 65
End: 2025-04-10
Payer: COMMERCIAL

## 2025-04-10 VITALS — TEMPERATURE: 98 F | HEIGHT: 59 IN | BODY MASS INDEX: 48.59 KG/M2 | WEIGHT: 241 LBS

## 2025-04-10 DIAGNOSIS — M17.11 ARTHRITIS OF RIGHT KNEE: ICD-10-CM

## 2025-04-10 DIAGNOSIS — G89.29 CHRONIC PAIN OF RIGHT KNEE: Primary | ICD-10-CM

## 2025-04-10 DIAGNOSIS — M25.561 CHRONIC PAIN OF RIGHT KNEE: Primary | ICD-10-CM

## 2025-04-10 RX ORDER — LIDOCAINE HYDROCHLORIDE 10 MG/ML
2 INJECTION, SOLUTION EPIDURAL; INFILTRATION; INTRACAUDAL; PERINEURAL
Status: COMPLETED | OUTPATIENT
Start: 2025-04-10 | End: 2025-04-10

## 2025-04-10 RX ORDER — BUDESONIDE AND FORMOTEROL FUMARATE 160; 4.5 UG/1; UG/1
AEROSOL, METERED RESPIRATORY (INHALATION)
COMMUNITY
Start: 2025-02-12

## 2025-04-10 RX ORDER — ONDANSETRON 4 MG/1
TABLET, ORALLY DISINTEGRATING ORAL
COMMUNITY
Start: 2025-02-28

## 2025-04-10 RX ORDER — SCOPOLAMINE 1 MG/3D
PATCH, EXTENDED RELEASE TRANSDERMAL
COMMUNITY
Start: 2025-03-05

## 2025-04-10 RX ORDER — METHYLPREDNISOLONE ACETATE 80 MG/ML
80 INJECTION, SUSPENSION INTRA-ARTICULAR; INTRALESIONAL; INTRAMUSCULAR; SOFT TISSUE
Status: COMPLETED | OUTPATIENT
Start: 2025-04-10 | End: 2025-04-10

## 2025-04-10 RX ADMIN — METHYLPREDNISOLONE ACETATE 80 MG: 80 INJECTION, SUSPENSION INTRA-ARTICULAR; INTRALESIONAL; INTRAMUSCULAR; SOFT TISSUE at 15:54

## 2025-04-10 RX ADMIN — LIDOCAINE HYDROCHLORIDE 2 ML: 10 INJECTION, SOLUTION EPIDURAL; INFILTRATION; INTRACAUDAL; PERINEURAL at 15:54

## 2025-04-10 NOTE — PROGRESS NOTES
Physical Therapy Daily Treatment Note  4/11/2025  Visit Diagnoses:    ICD-10-CM ICD-9-CM   1. Spinal stenosis of lumbar region without neurogenic claudication  M48.061 724.02   2. Impaired functional mobility and activity tolerance  Z74.09 V49.89       VISIT#: 2      Pedrito Powell reports: She is sore today in her R hip and groin. She took a  pain pill this morning secondary to PT. Her R>L knees hurt all of the time. She noticed when lying on her bed to do her HEP it is harder to do some of her exercises.   Current Pain Level:    6/10; Worst:   8-9/10;  Affected Area: across the lower back, no LE radiculopathy, bilateral knee pain - some tiime sharp pain in knees, usually is achy   Quality of Pain: tight, dull ache, discomfort, knife-like and sharp   Functional Deficits/Irritating Factors: prolonged positioning, squatting, lifting and stairs (sit in hard chair. slumpiing)   Progression: no significant change  Compliance with HEP Reported: Yes    Objective  Presents: Presents using rolling walker  Right knee with stiff legged gait pattern, fluid left knee motion    Added to Program: SB Roll Outs, Leg Press, Clam Shells       See Exercise, Manual, and Modality Logs for complete treatment.     Patient Education: Pt was educated on exercise biomechanical correctness, intensity, and speed.     Assessment:  Pedrito demonstrated good recall of her exercises she is doing at home. She was not able to tolerate standing mini squats secondary to knee pain. Still experiencing moderate pain in her R low back, hip and groin.  Pt will continue to benefit from skilled PT interventions to address current functional deficits and impairments.     Plan Goals: SHORT TERM GOALS:  4 weeks  1.  Patient to be able to tolerate initial HEP                           2.  Pain level < 4/10 at worst with mentioned activities to improve function  3.  Patient will be able to sleep without pain interruption   4.  Patient will be able to sit and  stand for 15 minutes without increased symptoms      LONG TERM GOALS: 8 weeks  1. Patient will be independent in performing the HEP  2.  Patient will have pain free functional spinal AROM  3.  Pain will be less than 2/10 with normal ADL's  4.  Patient will be able to stand and walk for >30 minutes without increased symptoms         Plan: Progress to/Continue with current program.       Timed:  Manual Therapy:            0_    mins  51743;  Ultrasound:                     0      mins  49477;   Therapeutic Exercise:    30     mins  41534;     Neuromuscular Dion:   0     mins  18930;    Therapeutic Activity:      0     mins  85655;      Iontophoresis              _0__   mins  Dry Needling               _0____   mins         Untimed:  Work Conditioning: __0__ mins 23377  Electrical Stimulation:    0    mins  44541 ( );  Mechanical Traction:       0        mins  92438;   Paraffin                       __0___  mins   Ice/Heat: __0__ mins      Timed Treatment:   30   mins   Total Treatment:     30   mins          Sheila Galdamez PTA  [place] License # [id number]  Physical Therapist Assistant

## 2025-04-10 NOTE — PROGRESS NOTES
Ms. Powell comes in today for right knee follow-up.  Reports she experienced some significant health issues in March which resulted in hospitalization followed by inpatient rehabilitation.  She says her symptoms started with cellulitis in the lower extremities.  Reports she was prescribed Lasix and then developed back pain and was unable to stand or walk.  She was evaluated and determined she had spinal stenosis.  Additionally, she had pseudogout in the left knee.  Reports she had an aspiration and injection in her left knee has improved tremendously.  She reports previous injections have worked well.  She is interested in getting the right knee injected today.    We had a lengthy discussion regarding total knee replacement.  We discussed her current BMI is 48.6.  In order to qualify as a surgical candidate her BMI must be no greater than 45.  Based on the quick calculation, patient was informed she must lose at least 21 pounds in order to be a surgical candidate.  She verbalized understanding.  She will develop a plan in try to get her weight down so she can move forward with knee replacement.  The risk, benefits, and alternatives to the injection were discussed.  She verbalized understanding and consented to proceed.  The injection was performed as noted below.  I encouraged her to call me when she starts to see her weight dropping so we can make arrangements for her to get on Dr. Devi surgical schedule.  She verbalized understanding and agreed with this plan.      Large Joint Arthrocentesis: R knee  Date/Time: 4/10/2025 3:54 PM  Consent given by: patient  Site marked: site marked  Timeout: Immediately prior to procedure a time out was called to verify the correct patient, procedure, equipment, support staff and site/side marked as required   Supporting Documentation  Indications: pain and joint swelling   Procedure Details  Location: knee - R knee  Preparation: Patient was prepped and draped in the usual  sterile fashion  Needle gauge: 21G.  Approach: anterolateral  Medications administered: 80 mg methylPREDNISolone acetate 80 MG/ML; 2 mL lidocaine PF 1% 1 %  Patient tolerance: patient tolerated the procedure well with no immediate complications      Sherie Meehan, KALYANI    04/10/2025

## 2025-04-11 ENCOUNTER — TREATMENT (OUTPATIENT)
Dept: PHYSICAL THERAPY | Facility: CLINIC | Age: 65
End: 2025-04-11
Payer: COMMERCIAL

## 2025-04-11 ENCOUNTER — HOSPITAL ENCOUNTER (OUTPATIENT)
Dept: GENERAL RADIOLOGY | Facility: HOSPITAL | Age: 65
Discharge: HOME | End: 2025-04-11
Payer: COMMERCIAL

## 2025-04-11 ENCOUNTER — OFFICE VISIT (OUTPATIENT)
Dept: INTERNAL MEDICINE | Facility: CLINIC | Age: 65
End: 2025-04-11
Payer: COMMERCIAL

## 2025-04-11 ENCOUNTER — LAB (OUTPATIENT)
Dept: LAB | Facility: HOSPITAL | Age: 65
End: 2025-04-11
Payer: COMMERCIAL

## 2025-04-11 VITALS
HEIGHT: 59 IN | HEART RATE: 80 BPM | RESPIRATION RATE: 16 BRPM | WEIGHT: 246 LBS | BODY MASS INDEX: 49.59 KG/M2 | DIASTOLIC BLOOD PRESSURE: 72 MMHG | OXYGEN SATURATION: 98 % | SYSTOLIC BLOOD PRESSURE: 116 MMHG | TEMPERATURE: 98 F

## 2025-04-11 DIAGNOSIS — Z74.09 IMPAIRED FUNCTIONAL MOBILITY AND ACTIVITY TOLERANCE: ICD-10-CM

## 2025-04-11 DIAGNOSIS — J18.9 PNEUMONIA DUE TO INFECTIOUS ORGANISM, UNSPECIFIED LATERALITY, UNSPECIFIED PART OF LUNG: ICD-10-CM

## 2025-04-11 DIAGNOSIS — M54.59 INTRACTABLE LOW BACK PAIN: ICD-10-CM

## 2025-04-11 DIAGNOSIS — M25.561 CHRONIC PAIN OF BOTH KNEES: ICD-10-CM

## 2025-04-11 DIAGNOSIS — M48.061 SPINAL STENOSIS OF LUMBAR REGION WITHOUT NEUROGENIC CLAUDICATION: Primary | ICD-10-CM

## 2025-04-11 DIAGNOSIS — G89.29 CHRONIC PAIN OF BOTH KNEES: ICD-10-CM

## 2025-04-11 DIAGNOSIS — M48.061 SPINAL STENOSIS, LUMBAR REGION, WITHOUT NEUROGENIC CLAUDICATION: ICD-10-CM

## 2025-04-11 DIAGNOSIS — M11.20 PSEUDOGOUT: Primary | ICD-10-CM

## 2025-04-11 DIAGNOSIS — M25.562 CHRONIC PAIN OF BOTH KNEES: ICD-10-CM

## 2025-04-11 DIAGNOSIS — R60.0 BILATERAL LEG EDEMA: ICD-10-CM

## 2025-04-11 LAB
ALBUMIN SERPL-MCNC: 3.9 G/DL (ref 3.5–5.2)
ALBUMIN/GLOB SERPL: 1.1 G/DL
ALP SERPL-CCNC: 78 U/L (ref 39–117)
ALT SERPL W P-5'-P-CCNC: 14 U/L (ref 1–33)
ANION GAP SERPL CALCULATED.3IONS-SCNC: 14 MMOL/L (ref 5–15)
AST SERPL-CCNC: 14 U/L (ref 1–32)
BASOPHILS # BLD AUTO: 0.01 10*3/MM3 (ref 0–0.2)
BASOPHILS NFR BLD AUTO: 0.1 % (ref 0–1.5)
BILIRUB SERPL-MCNC: 0.3 MG/DL (ref 0–1.2)
BUN SERPL-MCNC: 20 MG/DL (ref 8–23)
BUN/CREAT SERPL: 21.5 (ref 7–25)
CALCIUM SPEC-SCNC: 9.5 MG/DL (ref 8.6–10.5)
CHLORIDE SERPL-SCNC: 104 MMOL/L (ref 98–107)
CO2 SERPL-SCNC: 23 MMOL/L (ref 22–29)
CREAT SERPL-MCNC: 0.93 MG/DL (ref 0.57–1)
DEPRECATED RDW RBC AUTO: 41.6 FL (ref 37–54)
EGFRCR SERPLBLD CKD-EPI 2021: 68.8 ML/MIN/1.73
EOSINOPHIL # BLD AUTO: 0 10*3/MM3 (ref 0–0.4)
EOSINOPHIL NFR BLD AUTO: 0 % (ref 0.3–6.2)
ERYTHROCYTE [DISTWIDTH] IN BLOOD BY AUTOMATED COUNT: 12.6 % (ref 12.3–15.4)
GLOBULIN UR ELPH-MCNC: 3.4 GM/DL
GLUCOSE SERPL-MCNC: 149 MG/DL (ref 65–99)
HCT VFR BLD AUTO: 30.8 % (ref 34–46.6)
HGB BLD-MCNC: 10 G/DL (ref 12–15.9)
IMM GRANULOCYTES # BLD AUTO: 0.07 10*3/MM3 (ref 0–0.05)
IMM GRANULOCYTES NFR BLD AUTO: 0.5 % (ref 0–0.5)
LYMPHOCYTES # BLD AUTO: 0.92 10*3/MM3 (ref 0.7–3.1)
LYMPHOCYTES NFR BLD AUTO: 6.3 % (ref 19.6–45.3)
MCH RBC QN AUTO: 29.2 PG (ref 26.6–33)
MCHC RBC AUTO-ENTMCNC: 32.5 G/DL (ref 31.5–35.7)
MCV RBC AUTO: 90.1 FL (ref 79–97)
MONOCYTES # BLD AUTO: 0.23 10*3/MM3 (ref 0.1–0.9)
MONOCYTES NFR BLD AUTO: 1.6 % (ref 5–12)
NEUTROPHILS NFR BLD AUTO: 13.29 10*3/MM3 (ref 1.7–7)
NEUTROPHILS NFR BLD AUTO: 91.5 % (ref 42.7–76)
NRBC BLD AUTO-RTO: 0 /100 WBC (ref 0–0.2)
PLATELET # BLD AUTO: 356 10*3/MM3 (ref 140–450)
PMV BLD AUTO: 9.7 FL (ref 6–12)
POTASSIUM SERPL-SCNC: 4.5 MMOL/L (ref 3.5–5.2)
PROT SERPL-MCNC: 7.3 G/DL (ref 6–8.5)
RBC # BLD AUTO: 3.42 10*6/MM3 (ref 3.77–5.28)
SODIUM SERPL-SCNC: 141 MMOL/L (ref 136–145)
WBC NRBC COR # BLD AUTO: 14.52 10*3/MM3 (ref 3.4–10.8)

## 2025-04-11 PROCEDURE — 71046 X-RAY EXAM CHEST 2 VIEWS: CPT

## 2025-04-11 PROCEDURE — 80053 COMPREHEN METABOLIC PANEL: CPT | Performed by: FAMILY MEDICINE

## 2025-04-11 PROCEDURE — 85025 COMPLETE CBC W/AUTO DIFF WBC: CPT | Performed by: FAMILY MEDICINE

## 2025-04-11 PROCEDURE — 36415 COLL VENOUS BLD VENIPUNCTURE: CPT | Performed by: FAMILY MEDICINE

## 2025-04-11 RX ORDER — METHOCARBAMOL 500 MG/1
500 TABLET, FILM COATED ORAL EVERY 8 HOURS SCHEDULED
Qty: 90 TABLET | Refills: 0 | Status: SHIPPED | OUTPATIENT
Start: 2025-04-11

## 2025-04-11 RX ORDER — HYDROCODONE BITARTRATE AND ACETAMINOPHEN 7.5; 325 MG/1; MG/1
1 TABLET ORAL EVERY 4 HOURS PRN
Qty: 40 TABLET | Refills: 0 | Status: SHIPPED | OUTPATIENT
Start: 2025-04-11

## 2025-04-11 NOTE — PROGRESS NOTES
"Transitional Care Follow Up Visit  Subjective     Pedrito Powell is a 64 y.o. female who presents for a transitional care management visit.    Within 48 business hours after discharge our office contacted her via telephone to coordinate her care and needs.      I reviewed and discussed the details of that call along with the discharge summary, hospital problems, inpatient lab results, inpatient diagnostic studies, and consultation reports with Pedrito.     Current outpatient and discharge medications have been reconciled for the patient.        5/14/2021     7:50 PM 4/14/2024     2:52 PM 12/23/2024     5:32 PM   Date of TCM Phone Call   [facility]   Date of Admission 5/13/2021 4/13/2024 12/21/2024   Date of Discharge 5/14/2021 4/14/2024 12/23/2024   Discharge Disposition Home or Self Care Home or Self Care Home or Self Care       Date of Admission: 3/23/2025  Date of Discharge:  4/2/2025  Primary Care Physician: Teo Fraser MD           Risk for Readmission (LACE) Score: 14 (4/2/2025  6:00 AM)      History of Present Illness   Course During Hospital Stay:      \"Presenting Problem/History of Present Illness:  Acute febrile illness [R50.9]  Acute back pain [M54.9]  Intractable low back pain [M54.59]  Bilateral lower extremity edema [R60.0]             Hospital Course:  The patient is a 64 y.o. female who presented with back pain. She had lumbar stenosis on MRI and neurosurgery evaluated. They recommended no surgical intervention and she should follow up with them in 3-4 weeks. She was given pain control and physical therapy and her pain has improved. She was additionally found to have pneumonia and completed a 7 day course of levofloxacin. She had swelling of her bilateral knees and left wrist and a left knee aspiration on 3/26/25 yielded fluid with calcium pyrophosphate crystals consistent with pseudogout. She had a steroid injection to the left knee which improved her " "pain. She received voltaren for her right knee and this improved. She is medically stable and will be discharged to rehab.\"        The following portions of the patient's history were reviewed and updated as appropriate: allergies, current medications, past family history, past medical history, past social history, past surgical history, and problem list.  History of Present Illness  The patient is a 64-year-old female who came in today to talk about her leg pain, spinal stenosis, pneumonia, and weight gain. She is here with her child.    She feels her condition has improved but is unsure if she continued taking Lasix during her hospital stay. She remembers getting one dose of Lasix three weeks ago, which she thought might have caused her problems, but then they found out she has spinal stenosis. She doesn't remember if she kept taking Lasix in the hospital and hasn't taken any in the past week.    She had severe pain in both legs, starting in the right leg and then moving to the left, making it impossible to touch or move them. She was diagnosed with pseudogout and had fluid taken from her left knee. They did a CT scan on her left knee but found no fluid in her right knee, so they didn't take any fluid from that knee. She says her legs aren't swollen or hard now, which started three weeks ago.    She has gained a lot of weight, going from 231 pounds when she left rehab to 241 pounds in a week, and then to 246 pounds this morning. She is worried about the side effects of weight loss medications.    She finished her antibiotics for pneumonia and spent 10 to 11 days in the hospital and 4 to 5 days in rehab.    She has an appointment with Dr. Walters next week and has had two physical therapy sessions where they said her hips and back are weak. She is still going to physical therapy at a center. She left the hospital with hydrocodone and Tylenol and was told not to take tramadol. She usually takes one hydrocodone a day, " sometimes two, but tries to manage her pain mostly with Tylenol. She finds methocarbamol somewhat helpful and it doesn't make her sleepy.    MEDICATIONS  Current: Lasix, hydrocodone, Tylenol, methocarbamol  Past: Levaquin  Current outpatient and discharge medications have been reconciled for the patient.  Reviewed by: Teo Fraser MD      Review of Systems    Objective   Physical Exam  Vitals and nursing note reviewed.   Constitutional:       Appearance: She is well-developed.   HENT:      Head: Normocephalic and atraumatic.   Musculoskeletal:      Cervical back: Normal range of motion and neck supple.   Neurological:      Mental Status: She is alert and oriented to person, place, and time.   Psychiatric:         Behavior: Behavior normal.         Assessment & Plan   Diagnoses and all orders for this visit:    1. Pseudogout (Primary)    2. Pneumonia due to infectious organism, unspecified laterality, unspecified part of lung  -     XR Chest PA & Lateral  -     CBC & Differential  -     Comprehensive Metabolic Panel  -     Cancel: CBC & Differential  -     Cancel: Comprehensive Metabolic Panel    3. Spinal stenosis, lumbar region, without neurogenic claudication  -     HYDROcodone-acetaminophen (NORCO) 7.5-325 MG per tablet; Take 1 tablet by mouth Every 4 (Four) Hours As Needed for Moderate Pain or Severe Pain.  Dispense: 40 tablet; Refill: 0    4. Intractable low back pain  -     HYDROcodone-acetaminophen (NORCO) 7.5-325 MG per tablet; Take 1 tablet by mouth Every 4 (Four) Hours As Needed for Moderate Pain or Severe Pain.  Dispense: 40 tablet; Refill: 0  -     methocarbamol (ROBAXIN) 500 MG tablet; Take 1 tablet by mouth Every 8 (Eight) Hours.  Dispense: 90 tablet; Refill: 0    5. Chronic pain of both knees  -     HYDROcodone-acetaminophen (NORCO) 7.5-325 MG per tablet; Take 1 tablet by mouth Every 4 (Four) Hours As Needed for Moderate Pain or Severe Pain.  Dispense: 40 tablet; Refill: 0    6. Bilateral leg  edema  -     Adult Transthoracic Echo Complete W/ Cont if Necessary Per Protocol; Future      Assessment & Plan  1. Bilateral leg pain.  The bilateral leg pain may be attributed to pseudogout, which could have potentially caused the observed leg swelling. A prescription for hydrocodone, consisting of 40 tablets, will be provided. She is advised to continue with her current physical therapy regimen. If necessary, a new order for physical therapy will be issued during her next visit.    2. Spinal stenosis.  She has an appointment with Dr. Walters next week for further evaluation. She is currently attending physical therapy sessions at the center. She is advised to continue with her current physical therapy regimen.    3. Pneumonia.  The pneumonia appears to have been successfully treated. A chest x-ray will be ordered for further evaluation.    4. Weight gain.  Significant weight gain has been noted, with an increase from 231 pounds to 246 pounds within a short period. This could be due to fluid retention. A 2D echocardiogram will be ordered to assess cardiac function. Blood work will also be ordered to evaluate renal function.    5. Medication management.  She is currently taking hydrocodone and Tylenol for pain management. A prescription for hydrocodone, consisting of 40 tablets, will be provided. She is advised to continue using methocarbamol as a muscle relaxant.    Follow-up  The patient will follow up in 1 month.    PROCEDURE  The patient underwent knee aspiration for pseudogout.             Patient or patient representative verbalized consent for the use of Ambient Listening during the visit with  Teo Fraser MD for chart documentation. 4/11/2025  12:55 EDT

## 2025-04-14 ENCOUNTER — TREATMENT (OUTPATIENT)
Dept: PHYSICAL THERAPY | Facility: CLINIC | Age: 65
End: 2025-04-14
Payer: COMMERCIAL

## 2025-04-14 DIAGNOSIS — Z74.09 IMPAIRED FUNCTIONAL MOBILITY AND ACTIVITY TOLERANCE: ICD-10-CM

## 2025-04-14 DIAGNOSIS — M48.061 SPINAL STENOSIS OF LUMBAR REGION WITHOUT NEUROGENIC CLAUDICATION: Primary | ICD-10-CM

## 2025-04-14 DIAGNOSIS — M17.0 PRIMARY OSTEOARTHRITIS OF BOTH KNEES: ICD-10-CM

## 2025-04-14 PROCEDURE — 97530 THERAPEUTIC ACTIVITIES: CPT | Performed by: PHYSICAL THERAPIST

## 2025-04-14 PROCEDURE — 97110 THERAPEUTIC EXERCISES: CPT | Performed by: PHYSICAL THERAPIST

## 2025-04-14 NOTE — PROGRESS NOTES
Physical Therapy Daily Treatment Note  [facility]  [address], [address]  [place] [address]    Patient: Pedrito Powell   : 1960  Referring practitioner: Tung Price MD  Today's Date: 2025  Patient seen for 3 sessions    Visit Diagnoses:    ICD-10-CM ICD-9-CM   1. Spinal stenosis of lumbar region without neurogenic claudication  M48.061 724.02   2. Impaired functional mobility and activity tolerance  Z74.09 V49.89   3. Primary osteoarthritis of both knees  M17.0 715.16       Subjective   Pedrito Powell reports: that her back is feeling sore but not painful.  Rates it a 3-10.  States that she is feeling a bit stronger since starting therapy.  Notes that her knees are really bothering her now but also knows that a weather front is coming through and that makes her ache.       Objective   Presents using her rolling walker     See Exercise, Manual, and Modality Logs for complete treatment.     Patient Education:    Assessment/Plan  Considerable weakness in right leg abduction.  Right knee pain preventing some standing exercises today.   Very compliant with her HEP    Progress strengthening /stabilization /functional activity           Timed:  Manual Therapy:    0     mins  75780;  Therapeutic Exercise:    25/35     mins  48057;     Neuromuscular Dion:    0    mins  63295;    Therapeutic Activity:     10     mins  72223;     Ultrasound:      0     mins  20381;    Iontophoresis              __0_   mins  Dry Needling               _____   mins        Untimed:  Electrical Stimulation:     0    mins  26452 ( );  Mechanical Traction:             mins  60906;   Paraffin                       _____  mins     Timed Treatment:   35   mins   Total Treatment:     55   mins    Anais Aguilera PT  [place] License # [id number]  Physical Therapist    Electronically signed by Anais Aguilera PT, 25, 4:54 PM EDT

## 2025-04-17 NOTE — PROGRESS NOTES
Physical Therapy Daily Treatment Note  4/18/2025  Visit Diagnoses:    ICD-10-CM ICD-9-CM   1. Spinal stenosis of lumbar region without neurogenic claudication  M48.061 724.02   2. Impaired functional mobility and activity tolerance  Z74.09 V49.89   3. Primary osteoarthritis of both knees  M17.0 715.16       VISIT#: 4      Pedrito Powell reports: Her back is doing better. Heat helps her low back. Her pain is less intense but still frequent. She is taking Tylenol  Current Pain Level:    3/10; Worst:   6-7/10;  Affected Area: across the lower back, no LE radiculopathy, bilateral knee pain - some tiime sharp pain in knees, usually is achy   Quality of Pain: tight, dull ache, discomfort, knife-like and sharp   Functional Deficits/Irritating Factors: prolonged positioning, squatting, lifting and stairs (sit in hard chair. slumpiing)   Progression: improving  Compliance with HEP Reported: Yes    Objective  Presents: Presents using rolling walker  Right knee with stiff legged gait pattern, fluid left knee motion  Increased sets/reps of:  bridges, Clams, HS curls   Increased resistance on:  leg press  Added to Program: none        See Exercise, Manual, and Modality Logs for complete treatment.     Patient Education: Pt was educated on exercise biomechanical correctness, intensity, and speed.     Assessment:  Pedrito is making progress towards her goal. Pain is still present but has decreased in intensity. She works hard here in the clinic and is compliant with her HEP. Progressed exercise volume today so may have some muscular soreness next session.  Pt will continue to benefit from skilled PT interventions to address current functional deficits and impairments.       Plan: Progress to/Continue with current program.       Timed:  Manual Therapy:            0_    mins  65934;  Ultrasound:                     0      mins  43100;   Therapeutic Exercise:    40     mins  05845;     Neuromuscular Dion:   0     mins  39977;     Therapeutic Activity:      0     mins  50869;      Iontophoresis              _0__   mins  Dry Needling               _0____   mins         Untimed:  Work Conditioning: __0__ mins 66172  Electrical Stimulation:    0    mins  25745 ( );  Mechanical Traction:       0        mins  65566;   Paraffin                       __0___  mins   Ice/Heat: __0__ mins      Timed Treatment:   40   mins   Total Treatment:     40   mins          Sheila Galdamez PTA  [place] License # [id number]  Physical Therapist Assistant

## 2025-04-18 ENCOUNTER — TREATMENT (OUTPATIENT)
Dept: PHYSICAL THERAPY | Facility: CLINIC | Age: 65
End: 2025-04-18
Payer: COMMERCIAL

## 2025-04-18 DIAGNOSIS — M48.061 SPINAL STENOSIS OF LUMBAR REGION WITHOUT NEUROGENIC CLAUDICATION: Primary | ICD-10-CM

## 2025-04-18 DIAGNOSIS — M17.0 PRIMARY OSTEOARTHRITIS OF BOTH KNEES: ICD-10-CM

## 2025-04-18 DIAGNOSIS — Z74.09 IMPAIRED FUNCTIONAL MOBILITY AND ACTIVITY TOLERANCE: ICD-10-CM

## 2025-04-21 ENCOUNTER — TREATMENT (OUTPATIENT)
Dept: PHYSICAL THERAPY | Facility: CLINIC | Age: 65
End: 2025-04-21
Payer: COMMERCIAL

## 2025-04-21 DIAGNOSIS — Z74.09 IMPAIRED FUNCTIONAL MOBILITY AND ACTIVITY TOLERANCE: ICD-10-CM

## 2025-04-21 DIAGNOSIS — M17.0 PRIMARY OSTEOARTHRITIS OF BOTH KNEES: ICD-10-CM

## 2025-04-21 DIAGNOSIS — M48.061 SPINAL STENOSIS OF LUMBAR REGION WITHOUT NEUROGENIC CLAUDICATION: Primary | ICD-10-CM

## 2025-04-21 PROCEDURE — 97530 THERAPEUTIC ACTIVITIES: CPT | Performed by: PHYSICAL THERAPIST

## 2025-04-21 PROCEDURE — 97140 MANUAL THERAPY 1/> REGIONS: CPT | Performed by: PHYSICAL THERAPIST

## 2025-04-21 PROCEDURE — 97110 THERAPEUTIC EXERCISES: CPT | Performed by: PHYSICAL THERAPIST

## 2025-04-21 NOTE — PROGRESS NOTES
Physical Therapy Daily Treatment Note  [facility]  [address], [address]  [place] [address]    Patient: Pedrito Powell   : 1960  Referring practitioner: Tung Price MD  Today's Date: 2025  Patient seen for 5 sessions    Visit Diagnoses:    ICD-10-CM ICD-9-CM   1. Spinal stenosis of lumbar region without neurogenic claudication  M48.061 724.02   2. Impaired functional mobility and activity tolerance  Z74.09 V49.89   3. Primary osteoarthritis of both knees  M17.0 715.16       Subjective   Pedrito Powell reports: that she was able to do more walking this weekend with less pain.  Still using her rolling walker but was able to walk for 3/4 mile with a few breaks in between.  States that her left knee was sore after the additional walking.      Objective     See Exercise, Manual, and Modality Logs for complete treatment.     Patient Education: Lengthy educational session on anatomy with use of spine model, discussed possible outcomes of appt with martin Holt next week    Assessment/Plan  Pedrito does have some increased activity tolerance based on less back pain and improved core stabilization.  Very compliant with her HEP.  Left knee still quite symptomatic with prolonged WB.    Progress strengthening /stabilization /functional activity           Timed:  Manual Therapy:    8     mins  87608;  Therapeutic Exercise:    25/35     mins  90986;     Neuromuscular Dion:    0    mins  30579;    Therapeutic Activity:     10     mins  85038;     Ultrasound:      0     mins  90801;    Iontophoresis              __0_   mins  Dry Needling               _____   mins        Untimed:  Electrical Stimulation:     0    mins  80543 ( );  Mechanical Traction:             mins  96530;   Paraffin                       _____  mins     Timed Treatment:   43   mins   Total Treatment:     65   mins    Anais Aguilera, PT  [place] License # [id number]  Physical  Therapist    Electronically signed by Anais Aguilera, PT, 04/21/25, 8:59 AM EDT

## 2025-04-23 ENCOUNTER — TREATMENT (OUTPATIENT)
Dept: PHYSICAL THERAPY | Facility: CLINIC | Age: 65
End: 2025-04-23
Payer: COMMERCIAL

## 2025-04-23 DIAGNOSIS — M17.0 PRIMARY OSTEOARTHRITIS OF BOTH KNEES: ICD-10-CM

## 2025-04-23 DIAGNOSIS — M48.061 SPINAL STENOSIS OF LUMBAR REGION WITHOUT NEUROGENIC CLAUDICATION: Primary | ICD-10-CM

## 2025-04-23 DIAGNOSIS — Z74.09 IMPAIRED FUNCTIONAL MOBILITY AND ACTIVITY TOLERANCE: ICD-10-CM

## 2025-04-23 NOTE — PROGRESS NOTES
Patient Name: Pedrito Powell   YOB: 1960  Referring Primary Care Physician: Teo Fraser MD      Chief Complaint:    Chief Complaint   Patient presents with    Lumbar Spine - Initial Evaluation, Pain        Previous Treatment:     PT for LBP? YES     ED to Hosp- 3/23/2025 - 4/2/202    MRI:   03/23/2025 - MRI of L-Spine W & W/O ([facility])     Back Pain  This is a new problem. The current episode started more than 1 month ago. The problem occurs constantly. The problem has been improved since onset. The pain is present in the lumbar spine. The pain radiates to the left buttock and right buttock. The pain is at a severity of 4/10. The pain is moderate. The pain is The same all the time. The symptoms are aggravated by standing (walking). Stiffness is present In the morning. Pertinent negatives include no bladder incontinence, bowel incontinence, leg pain, numbness, tingling or weakness. She has tried heat and bed rest (pain medication, physical therapy) for the symptoms. The treatment provided moderate relief.        HPI:  Pedrito Powell is a 64 y.o. female who presents to [facility] for evaluation of above complaints.  She presented to the emergency room 03/23/2025 with intractable back pain.  No associated injury.  She says she really has never experienced back pain like that in the past.  She denies any pain radiating down lower extremities even with prolonged standing or walking.  She was evaluated by neurosurgery and had lumbar MRI while in the hospital.  She was sent through outpatient physical therapy.  She is scheduled with physical therapy through 05/15/2025.  She ambulates with a walker.  She had other medical issues identified while hospitalized including pneumonia and cellulitis.  She is accompanied by her daughter today.  This is an established patient to the practice who recently saw KALYANI Vences for knee pain, new to me.  Prior pertinent records were  reviewed.    PFSH:  See attached    ROS: As per HPI, otherwise negative    Objective:      64 y.o. female  Body mass index is 48.96 kg/m²., 110 kg (242 lb 6.4 oz), @HT@  Vitals:    04/25/25 1517   Temp: 97.5 °F (36.4 °C)     Pain Score    04/25/25 1517   PainSc: 4    PainLoc: Back            Spine Musculoskeletal Exam    Gait    Assistive device: walker    Inspection    Coronal balance: no imbalance    Sagittal balance: no imbalance    Strength    Thoracolumbar    Thoracolumbar motor exam is normal.       Sensory    Thoracolumbar    Thoracolumbar sensation is normal.    Reflexes    Right      Quadriceps: 0/4      Achilles: 1/4     Left      Quadriceps: 0/4      Achilles: 0/4    Special Tests    Thoracolumbar    Marcel's: absent      Right      SLR: no back or leg pain      Left      SLR: no back or leg pain    General      Constitutional: severely obese, well-developed and well-nourished    Scleral icterus: no    Labored breathing: no    Psychiatric: normal mood and affect and no acute distress    Neurological: alert and oriented x3    Skin: intact        IMAGING:       No imaging in office today.  Lumbar from 3/23/2025 was reviewed and reveals lower lumbar facet arthropathy and discogenic changes contributing to moderate severe central and right greater than left foraminal narrowing at L4-5 and mild to moderate left greater than right foraminal narrowing L5-S1, minimal anterolisthesis L4 on L5.    Assessment:           Diagnoses and all orders for this visit:    1. Spinal stenosis, lumbar region, without neurogenic claudication (Primary)    2. Acute bilateral low back pain without sciatica             Plan:    For the axial low back pain, she has already started physical therapy.  Would recommend she finish out the therapy and continue home exercise program long-term.  Next level of treatment could be a trial of injections through pain management.  We discussed facet ablations and epidural injections today.   Although the radiologist does not specifically mention facet arthropathy on the MRI, she clearly has facet degenerative change in the lower lumbar spine.  We discussed there really are no surgical options for axial low back pain in the absence of fracture or deformity.  She has no loss of nerve function on exam today to raise concern.  We did discuss utilizing the lidocaine patches as needed for low back pain and avoid placing heat directly on top of the patches.  We also discussed weight loss to help with the back pain as well.  If she does not gain satisfactory relief from physical therapy she will notify the office at that point we will refer her to pain management to try facet ablations and/or epidural injections.    Return if symptoms worsen or fail to improve.    Obesity contributes to factors that promote the development of some pain states, including LBP. Persons with LBP exhibit abnormal movement patterns that include gait and postural dysfunction, increased thoracolumbar stiffness, decreased proprioception, and alteration in abdominal and extensor muscle activation. Pain can result from joint or structural damage due to aberrant or increased biomechanical forces over time. These processes are accelerated by obesity, and regardless of age, obesity contributes to chronic LBP, mobility impairment, and ultimately physical disability. The relationship between obesity-related LBP and functional decline is mediated by skeletal muscle strength deterioration, systemic inflammation, and psychosocial characteristics, such as pain catastrophizing, kinesiophobia, and depression. Morbid obesity greatly alters biomechanical forces on lower back tissues to accelerate the development of chronic back pain syndromes.   Overweight and obesity are risk factors for both lumbar radicular pain and sciatica, and a direct relationship exists between BMI and low back pathology and pain in men and women. A meta-analysis of 28 studies  found overweight and obesity were associated with lumbar radicular pain, increased the risk of hospitalization for sciatica, and increased the risk of lumbar disk herniation surgery. These associations were similar for men and women. Clarification of whether or not LBP precedes obesity was addressed by an 11-year longitudinal study of 25,450 individuals. Among those without LBP at baseline, a significant positive association was found between BMI >=30 and risk of LBP in men and women.   Adipose tissue releases inflammatory mediators that contribute to the development of chronic, low-grade inflammation in obesity, which may accelerate the development of sciatica or cause sciatica symptoms to persist. Obesity may delay disk injury healing and lead to slower and less overall improvement in back-related disability, regardless of treatment modality. Obesity also increases the risk of recurrent disk herniation following lumbar surgical repair. Intervertebral disk nutrition may be disrupted by obesity, impairing the healing process. Long-term data have found BMI to be the strongest predictor of lumbar artery occlusion in patients with sciatica, suggesting impairment of nutrition as a pathway for the relationship between obesity and sciatica.  Some studies also suggest an increased risk of failed back surgery in people with a BMI >=40.     EMR Dragon/Transcription Disclaimer:   Much of this encounter note is an electronic transcription/translation of spoken language to printed text.   Red flags have been discussed at this or previous visits to include but not limited weakness in extremities, worsening pain that does not respond to conservative treatment and bowel or bladder dysfunction. These are reasons to present to ER and patient has been informed.    The diagnosis(es), natural history, pathophysiology and treatment for diagnosis(es) were discussed. Opportunity given and questions answered. Biomechanics of pertinent body  areas discussed.    EXERCISES:  Advice on benefits of, and types of regular/moderate exercise pertaining to diagnosis.  Continue HEP. For back or neck pain, recommend pilates and or yoga as tolerated. Generally it is best to start any new exercise under the guidance of a  or therapist.   MEDICATIONS:  When prescribe, the risks, benefits, warnings,side effects and alternatives of medications discussed. Advised against long term use of narcotics.   PAIN CONTROL:  Cold, heat, OTC lidocaine patches and/or ointment as needed. Avoid direct skin contact with ice. Ice 15-20 minutes 3-4 times daily as needed. For SI joint pain, recommend ice bath in water about 50 degrees for 5 consecutive days, add ice slowly to help with adjustment and may cover with warm towel or robe to help with cold tolerance. If using lidocaine, do not apply heat in conjunction as this can cause a burn.   MEDICAL RECORDS reviewed from other provider(s) for past and current medical history pertinent to this visit..

## 2025-04-23 NOTE — PROGRESS NOTES
Physical Therapy Daily Treatment Note  [facility] Therapy [facility]  [address], [address]  [place] [address]    Patient: Pedrito Powell   : 1960  Referring practitioner: Tung Price MD  Today's Date: 2025  Patient seen for 6 sessions    Visit Diagnoses:    ICD-10-CM ICD-9-CM   1. Spinal stenosis of lumbar region without neurogenic claudication  M48.061 724.02   2. Impaired functional mobility and activity tolerance  Z74.09 V49.89   3. Primary osteoarthritis of both knees  M17.0 715.16       Subjective   Pedrito Powell reports: that she had a very long day yesterday which caused a significant ache in the lower back.  Ache preventing her from standing.  Pain in bilateral lower back and hips but not in the legs.  Pain woke her up a couple of times last night.  Had to take some pain meds last night and this morning to get up and move.  Prio to yesterday she was doing much better.  Pain pressure type pain 6/10 when standing.  Sitting relieves it immediately.       Objective   Presents in rolling walker with flexed posture     Spinal flexion 75%, extension 25% with pain    Tenderness in right LS area    See Exercise, Manual, and Modality Logs for complete treatment.     Patient Education: resume full stretching exercises later today    Assessment/Plan  Patient with flare up of symptoms after having a very long day yesterday at work.  Did more conservative therapy today to calm the symptoms down.  Felt better after the manual therapy.  Will resume full program upon next visit as able    Progress strengthening /stabilization /functional activity           Timed:  Manual Therapy:    10     mins  72433;  Therapeutic Exercise:    10     mins  02214;     Neuromuscular Dion:    0    mins  56036;    Therapeutic Activity:     0     mins  62591;     Ultrasound:      8     mins  46231;    Iontophoresis              __0_   mins  Dry Needling               _____   mins         Untimed:  Electrical Stimulation:     15    mins  87238 ( );  Mechanical Traction:             mins  55407;   Paraffin                       _____  mins     Timed Treatment:   28   mins   Total Treatment:     60   mins    Anais Aguilera PT  [place] License # [id number]  Physical Therapist    Electronically signed by Anais Aguilera PT, 04/23/25, 7:33 AM EDT

## 2025-04-25 ENCOUNTER — OFFICE VISIT (OUTPATIENT)
Dept: ORTHOPEDIC SURGERY | Facility: CLINIC | Age: 65
End: 2025-04-25
Payer: COMMERCIAL

## 2025-04-25 VITALS — WEIGHT: 242.4 LBS | TEMPERATURE: 97.5 F | BODY MASS INDEX: 48.87 KG/M2 | HEIGHT: 59 IN

## 2025-04-25 DIAGNOSIS — M54.50 ACUTE BILATERAL LOW BACK PAIN WITHOUT SCIATICA: ICD-10-CM

## 2025-04-25 DIAGNOSIS — M48.061 SPINAL STENOSIS, LUMBAR REGION, WITHOUT NEUROGENIC CLAUDICATION: Primary | ICD-10-CM

## 2025-04-25 PROCEDURE — 99214 OFFICE O/P EST MOD 30 MIN: CPT | Performed by: NURSE PRACTITIONER

## 2025-04-30 ENCOUNTER — TREATMENT (OUTPATIENT)
Dept: PHYSICAL THERAPY | Facility: CLINIC | Age: 65
End: 2025-04-30
Payer: COMMERCIAL

## 2025-04-30 DIAGNOSIS — M17.0 PRIMARY OSTEOARTHRITIS OF BOTH KNEES: ICD-10-CM

## 2025-04-30 DIAGNOSIS — Z74.09 IMPAIRED FUNCTIONAL MOBILITY AND ACTIVITY TOLERANCE: ICD-10-CM

## 2025-04-30 DIAGNOSIS — M48.061 SPINAL STENOSIS OF LUMBAR REGION WITHOUT NEUROGENIC CLAUDICATION: Primary | ICD-10-CM

## 2025-04-30 RX ORDER — ONDANSETRON 4 MG/1
TABLET, ORALLY DISINTEGRATING ORAL
Qty: 60 TABLET | Refills: 0 | Status: SHIPPED | OUTPATIENT
Start: 2025-04-30

## 2025-04-30 RX ORDER — MECLIZINE HYDROCHLORIDE 25 MG/1
25 TABLET ORAL 3 TIMES DAILY PRN
Qty: 270 TABLET | Refills: 0 | Status: SHIPPED | OUTPATIENT
Start: 2025-04-30

## 2025-04-30 RX ORDER — ONDANSETRON 4 MG/1
4 TABLET, FILM COATED ORAL EVERY 8 HOURS PRN
Qty: 42 TABLET | Refills: 4 | OUTPATIENT
Start: 2025-04-30

## 2025-04-30 NOTE — PROGRESS NOTES
Physical Therapy Daily Treatment Note  [facility]  [address], [address]  [place] [address]    Patient: Pedrito Powell   : 1960  Referring practitioner: Tung Price MD  Today's Date: 2025  Patient seen for 7 sessions    Visit Diagnoses:    ICD-10-CM ICD-9-CM   1. Spinal stenosis of lumbar region without neurogenic claudication  M48.061 724.02   2. Impaired functional mobility and activity tolerance  Z74.09 V49.89   3. Primary osteoarthritis of both knees  M17.0 715.16       Subjective   Pedrito Powell reports: that her back pain is now less intense (2/10) compared to her initial status.  No longer is having any sharp pain but feels bruised.  Has pain radiating into the hips but not in to the legs.  No NT      Objective   Presents with stiff legged gait on the right using rolling walker     See Exercise, Manual, and Modality Logs for complete treatment.     Patient Education: Lengthy discussion with patient concerning her appt with ortho spine and potential continuation of therapy vs referral to pain mgmt      Assessment  Pedrito is making slow but steady progress in therapy.  The pain is less intense.  The severe arthritis in the right>left kknee is preventing some exercise/activity.  Patient very compliant with therapy and HEP.      Progress strengthening /stabilization /functional activity           Timed:  Manual Therapy:    0     mins  52698;  Therapeutic Exercise:    30     mins  10451;     Neuromuscular Dion:    0    mins  89230;    Therapeutic Activity:     25     mins  54175;     Ultrasound:           mins  54933;    Iontophoresis              ___   mins  Dry Needling               _____   mins        Untimed:  Electrical Stimulation:     0    mins  96517 ( );  Mechanical Traction:             mins  15821;   Paraffin                       _____  mins     Timed Treatment:   55   mins   Total Treatment:     65   mins    Anais QUIROGA  Willy, LENNOX  [place] License # [id number]  Physical Therapist    Electronically signed by Anais Aguilera, PT, 04/30/25, 7:38 AM EDT

## 2025-04-30 NOTE — TELEPHONE ENCOUNTER
Rx Refill Note  Requested Prescriptions     Pending Prescriptions Disp Refills    ondansetron ODT (ZOFRAN-ODT) 4 MG disintegrating tablet [Pharmacy Med Name: ONDANSETRON ODT 4MG TABLETS] 60 tablet      Sig: DISSOLVE 1 TABLET ON THE TONGUE EVERY 8 HOURS AS NEEDED FOR NAUSEA OR VOMITING    ondansetron (ZOFRAN) 4 MG tablet [Pharmacy Med Name: ONDANSETRON 4MG TABLETS] 42 tablet 4     Sig: TAKE 1 TABLET BY MOUTH EVERY 8 HOURS AS NEEDED FOR NAUSEA OR VOMITING      Last office visit with prescribing clinician: 4/11/2025   Last telemedicine visit with prescribing clinician: Visit date not found   Next office visit with prescribing clinician: 4/28/2025       Leah Cox  04/30/25, 11:31 EDT

## 2025-04-30 NOTE — TELEPHONE ENCOUNTER
Rx Refill Note  Requested Prescriptions     Pending Prescriptions Disp Refills    meclizine (ANTIVERT) 25 MG tablet [Pharmacy Med Name: MECLIZINE 25MG RX TABLETS] 270 tablet 0     Sig: TAKE 1 TABLET BY MOUTH THREE TIMES DAILY AS NEEDED FOR DIZZINESS      Last office visit with prescribing clinician: 4/11/2025   Last telemedicine visit with prescribing clinician: Visit date not found   Next office visit with prescribing clinician: 5/15/2025       Leah Cox  04/30/25, 15:06 EDT

## 2025-05-02 ENCOUNTER — TREATMENT (OUTPATIENT)
Dept: PHYSICAL THERAPY | Facility: CLINIC | Age: 65
End: 2025-05-02
Payer: COMMERCIAL

## 2025-05-02 DIAGNOSIS — M17.0 PRIMARY OSTEOARTHRITIS OF BOTH KNEES: ICD-10-CM

## 2025-05-02 DIAGNOSIS — Z74.09 IMPAIRED FUNCTIONAL MOBILITY AND ACTIVITY TOLERANCE: ICD-10-CM

## 2025-05-02 DIAGNOSIS — M48.061 SPINAL STENOSIS OF LUMBAR REGION WITHOUT NEUROGENIC CLAUDICATION: Primary | ICD-10-CM

## 2025-05-02 NOTE — PROGRESS NOTES
Physical Therapy Daily Treatment Note  5/2/2025  Visit Diagnoses:    ICD-10-CM ICD-9-CM   1. Spinal stenosis of lumbar region without neurogenic claudication  M48.061 724.02   2. Impaired functional mobility and activity tolerance  Z74.09 V49.89   3. Primary osteoarthritis of both knees  M17.0 715.16       VISIT#: 8      Pedrito Powell reports: her back has been stable at 2-3/10. She has been just using tylenol. She does think she is improving. Her pain is less intense and occurring less often. Tylenol has been able to take care of her pain. She has not had to take her mm relaxer the past couple of days. Her knees broke out into a rash and the thinks it may be from the increased strength of her Voltaren prescription. She has been able to work in her kitchen and perform tasks that had been too difficult to do for long.   Current Pain Level:    3/10; Worst:   6-7/10;  Affected Area: across the lower back, no LE radiculopathy, bilateral knee pain - some tiime sharp pain in knees, usually is achy   Quality of Pain: tight, dull ache, discomfort, knife-like and sharp   Functional Deficits/Irritating Factors: prolonged positioning, squatting, lifting and stairs (sit in hard chair. slumpiing)   Progression: improving  Compliance with HEP Reported: Yes    Objective          See Exercise, Manual, and Modality Logs for complete treatment.     Patient Education: Pt was educated on exercise biomechanical correctness, intensity, and speed.     Assessment:  Pedrito is making steady progress. The intensity and frequency of her pain has decreased enabling her to be able to perform more of her activities of daily living without issue.  Pt will continue to benefit from skilled PT interventions to address current functional deficits and impairments.     Plan Goals: SHORT TERM GOALS:  4 weeks  1.  Patient to be able to tolerate initial HEP                           2.  Pain level < 4/10 at worst with mentioned activities to improve  function  3.  Patient will be able to sleep without pain interruption   4.  Patient will be able to sit and stand for 15 minutes without increased symptoms      LONG TERM GOALS: 8 weeks  1. Patient will be independent in performing the HEP  2.  Patient will have pain free functional spinal AROM  3.  Pain will be less than 2/10 with normal ADL's  4.  Patient will be able to stand and walk for >30 minutes without increased symptoms       Plan: Progress to/Continue with current program.       Timed:  Manual Therapy:            0_    mins  93436;  Ultrasound:                      0     mins  91226;   Therapeutic Exercise:    20     mins  83668;     Neuromuscular Dion:   10     mins  24401;    Therapeutic Activity:      10     mins  75649;      Iontophoresis              _0__   mins  Dry Needling               _0____   mins         Untimed:  Work Conditioning: __0__ mins 98453  Electrical Stimulation:    0    mins  49317 ( );  Mechanical Traction:       0        mins  25541;   Paraffin                       __0___  mins   Ice/Heat: __0__ mins      Timed Treatment:   40   mins   Total Treatment:     40   mins          JULIETH Edmondson License # [id number]  Physical Therapist Assistant

## 2025-05-05 ENCOUNTER — PATIENT MESSAGE (OUTPATIENT)
Dept: ORTHOPEDIC SURGERY | Facility: CLINIC | Age: 65
End: 2025-05-05
Payer: COMMERCIAL

## 2025-05-05 ENCOUNTER — TREATMENT (OUTPATIENT)
Dept: PHYSICAL THERAPY | Facility: CLINIC | Age: 65
End: 2025-05-05
Payer: COMMERCIAL

## 2025-05-05 DIAGNOSIS — M48.061 SPINAL STENOSIS, LUMBAR REGION, WITHOUT NEUROGENIC CLAUDICATION: Primary | ICD-10-CM

## 2025-05-05 DIAGNOSIS — M17.0 PRIMARY OSTEOARTHRITIS OF BOTH KNEES: ICD-10-CM

## 2025-05-05 DIAGNOSIS — Z74.09 IMPAIRED FUNCTIONAL MOBILITY AND ACTIVITY TOLERANCE: ICD-10-CM

## 2025-05-05 DIAGNOSIS — M48.061 SPINAL STENOSIS OF LUMBAR REGION WITHOUT NEUROGENIC CLAUDICATION: Primary | ICD-10-CM

## 2025-05-05 PROCEDURE — 97014 ELECTRIC STIMULATION THERAPY: CPT | Performed by: PHYSICAL THERAPIST

## 2025-05-05 PROCEDURE — 97110 THERAPEUTIC EXERCISES: CPT | Performed by: PHYSICAL THERAPIST

## 2025-05-05 PROCEDURE — 97530 THERAPEUTIC ACTIVITIES: CPT | Performed by: PHYSICAL THERAPIST

## 2025-05-05 NOTE — PROGRESS NOTES
Physical Therapy Daily Treatment Note  [facility] Physical Therapy [facility]  [address]  [place] [address]    Patient: Pedrito Powell   : 1960  Referring practitioner: Tung Price MD  Today's Date: 2025  Patient seen for 9 sessions    Visit Diagnoses:    ICD-10-CM ICD-9-CM   1. Spinal stenosis of lumbar region without neurogenic claudication  M48.061 724.02   2. Impaired functional mobility and activity tolerance  Z74.09 V49.89   3. Primary osteoarthritis of both knees  M17.0 715.16       Subjective   Pedrito Powell reports: that she is having pain in the upper back now which seems to be radiating from the lower back.  Has not spoken to the ortho office yet to request referral to pain mgmt/.  Does note that she is able to stand for longer periods of time than she had been able to do.  Goes without her walker at home but not at all when out.  States that she must use her walker at school based on her release from the MD.  Does her stretches consistently at home.    Objective   Presents with stiff legged gait pattern bilaterally but able to walk with more normal pattern with cueing    See Exercise, Manual, and Modality Logs for complete treatment.     Patient Education: proper gait pattern    Assessment/Plan  Pedrito has less pain in standing and has increased her activity tolerances.  She is still using her walker at work all of the time but thinks that her restrictions indicate that she must do so    Progress strengthening /stabilization /functional activity           Timed:  Manual Therapy:    0     mins  59376;  Therapeutic Exercise:    15/35     mins  99461;     Neuromuscular Dion:    0    mins  23586;    Therapeutic Activity:     10     mins  85826;     Ultrasound:      0     mins  21729;    Iontophoresis              __0_   mins  Dry Needling               _____   mins        Untimed:  Electrical Stimulation:     15    mins  36001 ( );  Mechanical  Traction:             mins  51828;   Paraffin                       _____  mins     Timed Treatment:   25   mins   Total Treatment:     70   mins    Anais Aguilera, PT  [place] License # [id number]  Physical Therapist    Electronically signed by Anais Aguilera PT, 05/05/25, 7:06 AM EDT

## 2025-05-05 NOTE — TELEPHONE ENCOUNTER
"Please advise on PM referral as patient lives in [facility] your last note stated \"pain management to try facet ablations and/or epidural injections.\"   "

## 2025-05-06 NOTE — PROGRESS NOTES
Physical Therapy Daily Treatment Note  5/7/2025  Visit Diagnoses:    ICD-10-CM ICD-9-CM   1. Spinal stenosis of lumbar region without neurogenic claudication  M48.061 724.02   2. Impaired functional mobility and activity tolerance  Z74.09 V49.89   3. Primary osteoarthritis of both knees  M17.0 715.16       VISIT#: 10      Pedrito Powell reports: Her back is not having any pain today, but having soreness. Her L knee gave out on her about 3 times yesterday. Once when she stood for awhile putting gas in her car and the other times when walking. She feels like it is more difficult getting her posture straight and upright.   Current Pain Level:    sore/10; Worst:   2-3/10;  Affected Area: across the lower back, L knee gives out  Quality of Pain: tight, dull ache, discomfort, knife-like and sharp   Functional Deficits/Irritating Factors: prolonged positioning, squatting, lifting and stairs (sit in hard chair. slumpiing)       Objective  Presents: Six minutes late for her appointment    Added to Program: machine crunches        See Exercise, Manual, and Modality Logs for complete treatment.     Patient Education: Pt was educated on exercise biomechanical correctness, intensity, and speed.     Assessment:  Liv has improved  quit a bit as far as pain levels go and tolerance to function activities. However, her L knee continues to give out intermittently without warning. It occurs in standing of walking positions. Pt will continue to benefit from skilled PT interventions to address current functional deficits and impairments.     Plan Goals: SHORT TERM GOALS:  4 weeks  1.  Patient to be able to tolerate initial HEP   - MET                        2.  Pain level < 4/10 at worst with mentioned activities to improve function - MET  3.  Patient will be able to sleep without pain interruption   4.  Patient will be able to sit and stand for 15 minutes without increased symptoms - MET     LONG TERM GOALS: 8 weeks  1. Patient will be  independent in performing the HEP  2.  Patient will have pain free functional spinal AROM  3.  Pain will be less than 2/10 with normal ADL's  4.  Patient will be able to stand and walk for >30 minutes without increased symptoms          Plan: Progress to/Continue with current program.       Timed:  Manual Therapy:            0_    mins  24876;  Ultrasound:                     0      mins  67064;   Therapeutic Exercise:    30     mins  12125;     Neuromuscular Dion:   10     mins  01829;    Therapeutic Activity:      15     mins  71105;      Iontophoresis              _0__   mins  Dry Needling               _0____   mins         Untimed:  Work Conditioning: __0__ mins 76174  Electrical Stimulation:    15    mins  78070 ( );  Mechanical Traction:       0        mins  24157;   Paraffin                       __0___  mins   Ice/Heat: __15__ mins      Timed Treatment:   55   mins   Total Treatment:     70   mins          JULIETH Edmondson License # [id number]  Physical Therapist Assistant

## 2025-05-07 ENCOUNTER — TREATMENT (OUTPATIENT)
Dept: PHYSICAL THERAPY | Facility: CLINIC | Age: 65
End: 2025-05-07
Payer: COMMERCIAL

## 2025-05-07 DIAGNOSIS — M48.061 SPINAL STENOSIS OF LUMBAR REGION WITHOUT NEUROGENIC CLAUDICATION: Primary | ICD-10-CM

## 2025-05-07 DIAGNOSIS — Z74.09 IMPAIRED FUNCTIONAL MOBILITY AND ACTIVITY TOLERANCE: ICD-10-CM

## 2025-05-07 DIAGNOSIS — M17.0 PRIMARY OSTEOARTHRITIS OF BOTH KNEES: ICD-10-CM

## 2025-05-13 ENCOUNTER — TREATMENT (OUTPATIENT)
Dept: PHYSICAL THERAPY | Facility: CLINIC | Age: 65
End: 2025-05-13
Payer: COMMERCIAL

## 2025-05-13 DIAGNOSIS — M48.061 SPINAL STENOSIS OF LUMBAR REGION WITHOUT NEUROGENIC CLAUDICATION: Primary | ICD-10-CM

## 2025-05-13 DIAGNOSIS — M17.0 PRIMARY OSTEOARTHRITIS OF BOTH KNEES: ICD-10-CM

## 2025-05-13 DIAGNOSIS — Z74.09 IMPAIRED FUNCTIONAL MOBILITY AND ACTIVITY TOLERANCE: ICD-10-CM

## 2025-05-13 PROCEDURE — 97530 THERAPEUTIC ACTIVITIES: CPT | Performed by: PHYSICAL THERAPIST

## 2025-05-13 PROCEDURE — 97110 THERAPEUTIC EXERCISES: CPT | Performed by: PHYSICAL THERAPIST

## 2025-05-13 PROCEDURE — 97014 ELECTRIC STIMULATION THERAPY: CPT | Performed by: PHYSICAL THERAPIST

## 2025-05-13 NOTE — PROGRESS NOTES
Physical Therapy Daily Treatment Note - Reassessment  [facility] Physical Therapy [facility]  [address], [address]  [place] [address]    Patient: Pedrito Powell   : 1960  Referring practitioner: Tung Price MD  Today's Date: 2025  Patient seen for 11 sessions    Visit Diagnoses:    ICD-10-CM ICD-9-CM   1. Spinal stenosis of lumbar region without neurogenic claudication  M48.061 724.02   2. Impaired functional mobility and activity tolerance  Z74.09 V49.89   3. Primary osteoarthritis of both knees  M17.0 715.16       Subjective   Pedrito Powell reports: that she still fatigues easily with standing.  Feels achy with pain ranging from 1-6/10.  Feels pain radiating up the back but does not go down into the legs.  Since starting PT she is feeling stronger, with improved endurance.  States that she is doing more at home now without taking a break.        Objective   Spinal AROM - WNL a;; planes except for extension which is 50% limited    Palpation - increased tone in spinal PVM    See Exercise, Manual, and Modality Logs for complete treatment.     Patient Education: cont HEP    Assessment/Plan  Patient has demonstrated moderate  improvement since the initiation of therapy.  The pain has  decreased in intensity.  The motion has increased.  The activity tolerances have increased but not to desired levels. I feel that the patient would benefit from a further course of therapy.  If you have any questions concerning the care, please do not hesitate to call me.      Plan Goals: SHORT TERM GOALS:  4 weeks  1.  Patient to be able to tolerate initial HEP - MET                          2.  Pain level < 4/10 at worst with mentioned activities to improve function _ Progressing  3.  Patient will be able to sleep without pain interruption - MET  4.  Patient will be able to sit and stand for 15 minutes without increased symptoms -Partially met     LONG TERM GOALS: 8 weeks  1. Patient will  be independent in performing the HEP _ Met  2.  Patient will have pain free functional spinal AROM - Met  3.  Pain will be less than 2/10 with normal ADL's - Partially met  4.  Patient will be able to stand and walk for >30 minutes without increased symptoms - Partially met          Progress strengthening /stabilization /functional activity           Timed:  Manual Therapy:    0     mins  70280;  Therapeutic Exercise:    10/30     mins  81555;     Neuromuscular Dion:    0    mins  32427;    Therapeutic Activity:     15/25     mins  34632;     Ultrasound:      0     mins  14266;    Iontophoresis              __0_   mins  Dry Needling               _____   mins        Untimed:  Electrical Stimulation:     15    mins  55825 ( );  Mechanical Traction:             mins  31256;   Paraffin                       _____  mins     Timed Treatment:   25   mins   Total Treatment:     70   mins    Anais Aguilera, PT  [place] License # [id number]  Physical Therapist    Electronically signed by Anais Aguilera PT, 05/13/25, 4:32 PM EDT

## 2025-05-15 ENCOUNTER — OFFICE VISIT (OUTPATIENT)
Dept: INTERNAL MEDICINE | Facility: CLINIC | Age: 65
End: 2025-05-15
Payer: COMMERCIAL

## 2025-05-15 ENCOUNTER — TREATMENT (OUTPATIENT)
Dept: PHYSICAL THERAPY | Facility: CLINIC | Age: 65
End: 2025-05-15
Payer: COMMERCIAL

## 2025-05-15 VITALS
DIASTOLIC BLOOD PRESSURE: 78 MMHG | WEIGHT: 242 LBS | BODY MASS INDEX: 48.79 KG/M2 | TEMPERATURE: 98.1 F | HEIGHT: 59 IN | SYSTOLIC BLOOD PRESSURE: 126 MMHG | RESPIRATION RATE: 16 BRPM | HEART RATE: 87 BPM | OXYGEN SATURATION: 97 %

## 2025-05-15 DIAGNOSIS — F32.A DEPRESSION, UNSPECIFIED DEPRESSION TYPE: ICD-10-CM

## 2025-05-15 DIAGNOSIS — M17.0 PRIMARY OSTEOARTHRITIS OF BOTH KNEES: ICD-10-CM

## 2025-05-15 DIAGNOSIS — E78.5 HYPERLIPIDEMIA, UNSPECIFIED HYPERLIPIDEMIA TYPE: ICD-10-CM

## 2025-05-15 DIAGNOSIS — F41.9 ANXIETY: ICD-10-CM

## 2025-05-15 DIAGNOSIS — M25.562 CHRONIC PAIN OF BOTH KNEES: ICD-10-CM

## 2025-05-15 DIAGNOSIS — M48.061 SPINAL STENOSIS OF LUMBAR REGION WITHOUT NEUROGENIC CLAUDICATION: Primary | ICD-10-CM

## 2025-05-15 DIAGNOSIS — M48.061 SPINAL STENOSIS, LUMBAR REGION, WITHOUT NEUROGENIC CLAUDICATION: ICD-10-CM

## 2025-05-15 DIAGNOSIS — Z74.09 IMPAIRED FUNCTIONAL MOBILITY AND ACTIVITY TOLERANCE: ICD-10-CM

## 2025-05-15 DIAGNOSIS — G89.29 CHRONIC PAIN OF BOTH KNEES: ICD-10-CM

## 2025-05-15 DIAGNOSIS — M25.561 CHRONIC PAIN OF BOTH KNEES: ICD-10-CM

## 2025-05-15 DIAGNOSIS — M54.59 INTRACTABLE LOW BACK PAIN: ICD-10-CM

## 2025-05-15 PROCEDURE — 99214 OFFICE O/P EST MOD 30 MIN: CPT | Performed by: FAMILY MEDICINE

## 2025-05-15 RX ORDER — ATORVASTATIN CALCIUM 80 MG/1
80 TABLET, FILM COATED ORAL DAILY
Qty: 90 TABLET | Refills: 1 | Status: SHIPPED | OUTPATIENT
Start: 2025-05-15

## 2025-05-15 RX ORDER — HYDROCODONE BITARTRATE AND ACETAMINOPHEN 7.5; 325 MG/1; MG/1
1 TABLET ORAL EVERY 4 HOURS PRN
Qty: 40 TABLET | Refills: 0 | Status: SHIPPED | OUTPATIENT
Start: 2025-05-15

## 2025-05-15 NOTE — PROGRESS NOTES
"Chief Complaint  Primary Care Follow-Up (On Lab results and pain management )    Subjective          Pedrito Powell presents to [facility] PRIMARY CARE  History of Present Illness  The patient came in for evaluation of her low back pain, bruising, and anemia.    She mentioned having a constant, mild ache in her lower back that she's been living with and managing without much trouble. Despite this discomfort, she has been able to continue driving. She has an appointment with a pain management specialist scheduled for June 2nd, 2025, and has about a dozen hydrocodone tablets left.    Two days ago, she had an incident at work where she got stuck between two tables, which caused some minor bruising and a large lump. She is currently taking aspirin, which has made the bruising more noticeable.    She is seeing a hematologist for her anemia, with her next appointment scheduled for August 2025. Her anemia is borderline, and they have talked about the possibility of injections if her levels drop significantly.    She is taking Trintellix 10 mg daily and has no issues with this medication. She continues to take her cholesterol medication and hasn't noticed any changes in her breathing. She feels her weight has stabilized and hasn't seen any further weight gain. An echocardiogram is scheduled for next week to check her heart function.    Objective   Vital Signs:   /78   Pulse 87   Temp 98.1 °F (36.7 °C) (Oral)   Resp 16   Ht 149.9 cm (59.02\")   Wt 110 kg (242 lb)   SpO2 97%   BMI 48.85 kg/m²     Physical Exam  Vitals and nursing note reviewed.   Constitutional:       Appearance: She is well-developed.   HENT:      Head: Normocephalic and atraumatic.   Musculoskeletal:      Cervical back: Normal range of motion and neck supple.   Neurological:      Mental Status: She is alert and oriented to person, place, and time.   Psychiatric:         Behavior: Behavior normal.         Physical " Exam  General: No acute distress, moving around better  Skin: Bruising noted with a large lump due to capillary damage     Result Review :                 Assessment and Plan    Diagnoses and all orders for this visit:    1. Spinal stenosis, lumbar region, without neurogenic claudication  -     HYDROcodone-acetaminophen (NORCO) 7.5-325 MG per tablet; Take 1 tablet by mouth Every 4 (Four) Hours As Needed for Moderate Pain or Severe Pain.  Dispense: 40 tablet; Refill: 0    2. Intractable low back pain  -     HYDROcodone-acetaminophen (NORCO) 7.5-325 MG per tablet; Take 1 tablet by mouth Every 4 (Four) Hours As Needed for Moderate Pain or Severe Pain.  Dispense: 40 tablet; Refill: 0    3. Chronic pain of both knees  -     HYDROcodone-acetaminophen (NORCO) 7.5-325 MG per tablet; Take 1 tablet by mouth Every 4 (Four) Hours As Needed for Moderate Pain or Severe Pain.  Dispense: 40 tablet; Refill: 0    4. Hyperlipidemia, unspecified hyperlipidemia type  -     atorvastatin (LIPITOR) 80 MG tablet; Take 1 tablet by mouth Daily.  Dispense: 90 tablet; Refill: 1    5. Depression, unspecified depression type  -     Vortioxetine HBr (Trintellix) 10 MG tablet tablet; Take 1 tablet by mouth Daily With Breakfast.  Dispense: 90 tablet; Refill: 3    6. Anxiety  -     Vortioxetine HBr (Trintellix) 10 MG tablet tablet; Take 1 tablet by mouth Daily With Breakfast.  Dispense: 90 tablet; Refill: 3      Assessment & Plan  1. Low back pain.  - Reports a constant low back ache but is managing to drive without significant issues.  - Has about a dozen hydrocodone tablets left.  - Prescription for hydrocodone will be provided to manage pain until the appointment with pain management on 06/02/2025.  - PDMP checked and is as expected.    2. Bruising.  - Experienced a bruise after getting wedged between two tables at work two days ago.  - Bruising is likely intensified due to aspirin use.  - Reassured that this is a normal bruising process and  advised to be cautious to prevent further injuries.  - No additional treatment required at this time.    3. Anemia.  - Under the care of a hematologist for anemia, with a follow-up scheduled in 08/2025.  - No new blood work will be done today as recent labs were completed last month.  - Blood work will be considered at the next visit towards the end of 07/2025.  - Monitoring anemia status through hematologist follow-up.    4. Health maintenance.  - Currently on Trintellix 10 mg daily and reports no issues with this medication.  - Continues to take cholesterol medication with no changes in respiratory status.  - Weight has stabilized, and she is a few pounds lighter than at the last visit.  - Echocardiogram scheduled for next week to assess cardiac function and rule out fluid-related issues contributing to weight gain.    Follow-up  The patient will follow up in the latter half of 07/2025.    Follow Up   No follow-ups on file.  Patient was given instructions and counseling regarding her condition or for health maintenance advice. Please see specific information pulled into the AVS if appropriate.           Patient or patient representative verbalized consent for the use of Ambient Listening during the visit with  Teo Fraser MD for chart documentation. 5/15/2025  09:41 EDT

## 2025-05-15 NOTE — PROGRESS NOTES
Physical Therapy Daily Treatment Note  [facility] Physical Therapy [facility]  [address], [address]  [place] [address]    Patient: Pedrito Powell   : 1960  Referring practitioner: Tung Price MD  Today's Date: 5/15/2025  Patient seen for 12 sessions    Visit Diagnoses:    ICD-10-CM ICD-9-CM   1. Spinal stenosis of lumbar region without neurogenic claudication  M48.061 724.02   2. Impaired functional mobility and activity tolerance  Z74.09 V49.89   3. Primary osteoarthritis of both knees  M17.0 715.16       Subjective   Pedrito Powell reports: that she fears that her current status will be her new normal.  Both lower back and bilateral knee pain prevent activity. Reports compliance with her HEP      Objective   Presents walking with somewhat stiff legged gait on the left and irght knees with rolling walker and flexed at the spine gait pattern    See Exercise, Manual, and Modality Logs for complete treatment.     Patient Education: Lengthy discussion of possible interventions by pain mgmt after appt next month.  Also discussed knee replacement surgery versus steroid injections     Assessment/Plan  Pedrito does have improved standing and walking tolerance now but must still use the walker for safety if going for more than a few steps.  Has been able to tolerate some spinal stabilization exercises without an increase in pain in hopes of improving abilities for home activities    Progress strengthening /stabilization /functional activity           Timed:  Manual Therapy:    0     mins  15951;  Therapeutic Exercise:    10/30     mins  78657;     Neuromuscular Dion:    10    mins  08419;    Therapeutic Activity:     10     mins  30460;     Ultrasound:      0     mins  41559;    Iontophoresis              __0_   mins  Dry Needling               _____   mins        Untimed:  Electrical Stimulation:     15    mins  58489 ( );  Mechanical Traction:             mins  94601;    Paraffin                       _____  mins     Timed Treatment:   30   mins   Total Treatment:     57   mins    Anais Aguilera, PT  [place] License # [id number]  Physical Therapist    Electronically signed by Anais Aguilera PT, 05/15/25, 4:41 PM EDT

## 2025-05-22 ENCOUNTER — TREATMENT (OUTPATIENT)
Dept: PHYSICAL THERAPY | Facility: CLINIC | Age: 65
End: 2025-05-22
Payer: COMMERCIAL

## 2025-05-22 DIAGNOSIS — Z74.09 IMPAIRED FUNCTIONAL MOBILITY AND ACTIVITY TOLERANCE: ICD-10-CM

## 2025-05-22 DIAGNOSIS — M48.061 SPINAL STENOSIS OF LUMBAR REGION WITHOUT NEUROGENIC CLAUDICATION: Primary | ICD-10-CM

## 2025-05-22 DIAGNOSIS — M17.0 PRIMARY OSTEOARTHRITIS OF BOTH KNEES: ICD-10-CM

## 2025-05-22 NOTE — PROGRESS NOTES
Physical Therapy Daily Treatment Note  [facility] Physical Therapy [facility]  [address], [address]  [place] [address]    Patient: Pedrito Powell   : 1960  Referring practitioner: Tung Price MD  Today's Date: 2025  Patient seen for 13 sessions    Visit Diagnoses:    ICD-10-CM ICD-9-CM   1. Spinal stenosis of lumbar region without neurogenic claudication  M48.061 724.02   2. Impaired functional mobility and activity tolerance  Z74.09 V49.89   3. Primary osteoarthritis of both knees  M17.0 715.16       Subjective   Pedrito Powell reports: that she does not have any radicular pain with prolonged standing but does have aching in the bilateral lower back.  States that she is not able to fully straighten her right knee when sitting due to pain.        Objective   Palpable crepitus with right knee extension    See Exercise, Manual, and Modality Logs for complete treatment.     Patient Education: purpose of patellar taping    Assessment/Plan  Patient with end stage OA of right knee preventing WB without AD.  Had less pain with tape in place today.  Very cooperative with therapy but limited due to arthritis and body habitus    Progress strengthening /stabilization /functional activity           Timed:  Manual Therapy:    5     mins  67224;  Therapeutic Exercise:    15     mins  83452;     Neuromuscular Dion:    0    mins  42953;    Therapeutic Activity:     10     mins  18874;     Ultrasound:      0     mins  98110;    Iontophoresis              __0_   mins        Untimed:  Electrical Stimulation:     15    mins  80835 (MC );  Mechanical Traction:             mins  99958;   Paraffin                       _____  mins   Dry Needling               _____   mins      Timed Treatment:   30   mins   Total Treatment:     55   mins    Anais Aguilera PT  [place] License # [id number]  Physical Therapist    Electronically signed by Anais Aguilera PT, 25, 7:30 AM EDT

## 2025-05-27 ENCOUNTER — TELEPHONE (OUTPATIENT)
Dept: CARDIOLOGY | Age: 65
End: 2025-05-27
Payer: COMMERCIAL

## 2025-05-28 ENCOUNTER — HOSPITAL ENCOUNTER (OUTPATIENT)
Dept: CARDIOLOGY | Facility: HOSPITAL | Age: 65
Discharge: HOME | End: 2025-05-28
Admitting: FAMILY MEDICINE
Payer: COMMERCIAL

## 2025-05-28 VITALS
BODY MASS INDEX: 48.79 KG/M2 | SYSTOLIC BLOOD PRESSURE: 146 MMHG | HEART RATE: 70 BPM | WEIGHT: 242 LBS | HEIGHT: 59 IN | DIASTOLIC BLOOD PRESSURE: 70 MMHG

## 2025-05-28 DIAGNOSIS — R60.0 BILATERAL LEG EDEMA: ICD-10-CM

## 2025-05-28 PROCEDURE — 25510000001 PERFLUTREN 6.52 MG/ML SUSPENSION 2 ML VIAL: Performed by: FAMILY MEDICINE

## 2025-05-28 PROCEDURE — 93306 TTE W/DOPPLER COMPLETE: CPT

## 2025-05-28 RX ADMIN — PERFLUTREN 2 ML: 6.52 INJECTION, SUSPENSION INTRAVENOUS at 07:47

## 2025-05-29 ENCOUNTER — TREATMENT (OUTPATIENT)
Dept: PHYSICAL THERAPY | Facility: CLINIC | Age: 65
End: 2025-05-29
Payer: COMMERCIAL

## 2025-05-29 DIAGNOSIS — M17.0 PRIMARY OSTEOARTHRITIS OF BOTH KNEES: ICD-10-CM

## 2025-05-29 DIAGNOSIS — Z74.09 IMPAIRED FUNCTIONAL MOBILITY AND ACTIVITY TOLERANCE: ICD-10-CM

## 2025-05-29 DIAGNOSIS — M48.061 SPINAL STENOSIS OF LUMBAR REGION WITHOUT NEUROGENIC CLAUDICATION: Primary | ICD-10-CM

## 2025-05-29 LAB
AORTIC ARCH: 3.6 CM
AORTIC DIMENSIONLESS INDEX: 0.8 (DI)
ASCENDING AORTA: 2.6 CM
AV MEAN PRESS GRAD SYS DOP V1V2: 5 MMHG
AV VMAX SYS DOP: 147 CM/SEC
BH CV ECHO MEAS - ACS: 1.65 CM
BH CV ECHO MEAS - AO MAX PG: 8.6 MMHG
BH CV ECHO MEAS - AO ROOT AREA (BSA CORRECTED): 1.3 CM2
BH CV ECHO MEAS - AO ROOT DIAM: 2.5 CM
BH CV ECHO MEAS - AO V2 VTI: 35.2 CM
BH CV ECHO MEAS - AVA(I,D): 2.6 CM2
BH CV ECHO MEAS - EDV(CUBED): 140.4 ML
BH CV ECHO MEAS - EDV(MOD-SP2): 138 ML
BH CV ECHO MEAS - EDV(MOD-SP4): 126 ML
BH CV ECHO MEAS - EF(MOD-SP2): 60.1 %
BH CV ECHO MEAS - EF(MOD-SP4): 63.5 %
BH CV ECHO MEAS - ESV(CUBED): 40.4 ML
BH CV ECHO MEAS - ESV(MOD-SP2): 55 ML
BH CV ECHO MEAS - ESV(MOD-SP4): 46 ML
BH CV ECHO MEAS - FS: 34 %
BH CV ECHO MEAS - IVS/LVPW: 0.87 CM
BH CV ECHO MEAS - IVSD: 0.88 CM
BH CV ECHO MEAS - LAT PEAK E' VEL: 9.7 CM/SEC
BH CV ECHO MEAS - LV DIASTOLIC VOL/BSA (35-75): 63 CM2
BH CV ECHO MEAS - LV MASS(C)D: 180.1 GRAMS
BH CV ECHO MEAS - LV MAX PG: 5.7 MMHG
BH CV ECHO MEAS - LV MEAN PG: 3 MMHG
BH CV ECHO MEAS - LV SYSTOLIC VOL/BSA (12-30): 23 CM2
BH CV ECHO MEAS - LV V1 MAX: 119 CM/SEC
BH CV ECHO MEAS - LV V1 VTI: 28.2 CM
BH CV ECHO MEAS - LVIDD: 5.2 CM
BH CV ECHO MEAS - LVIDS: 3.4 CM
BH CV ECHO MEAS - LVOT AREA: 3.2 CM2
BH CV ECHO MEAS - LVOT DIAM: 2.02 CM
BH CV ECHO MEAS - LVPWD: 1.01 CM
BH CV ECHO MEAS - MED PEAK E' VEL: 8.7 CM/SEC
BH CV ECHO MEAS - MV A DUR: 0.17 SEC
BH CV ECHO MEAS - MV A MAX VEL: 136 CM/SEC
BH CV ECHO MEAS - MV DEC SLOPE: 577.6 CM/SEC2
BH CV ECHO MEAS - MV DEC TIME: 0.23 SEC
BH CV ECHO MEAS - MV E MAX VEL: 103 CM/SEC
BH CV ECHO MEAS - MV E/A: 0.76
BH CV ECHO MEAS - MV MAX PG: 9.3 MMHG
BH CV ECHO MEAS - MV MEAN PG: 4.5 MMHG
BH CV ECHO MEAS - MV P1/2T: 71.2 MSEC
BH CV ECHO MEAS - MV V2 VTI: 43.5 CM
BH CV ECHO MEAS - MVA(P1/2T): 3.1 CM2
BH CV ECHO MEAS - MVA(VTI): 2.07 CM2
BH CV ECHO MEAS - PA ACC TIME: 0.18 SEC
BH CV ECHO MEAS - PA V2 MAX: 93.7 CM/SEC
BH CV ECHO MEAS - PULM A REVS DUR: 0.17 SEC
BH CV ECHO MEAS - PULM A REVS VEL: 28 CM/SEC
BH CV ECHO MEAS - PULM DIAS VEL: 42.2 CM/SEC
BH CV ECHO MEAS - PULM S/D: 0.74
BH CV ECHO MEAS - PULM SYS VEL: 31.2 CM/SEC
BH CV ECHO MEAS - QP/QS: 0.58
BH CV ECHO MEAS - RAP SYSTOLE: 3 MMHG
BH CV ECHO MEAS - RV MAX PG: 1.65 MMHG
BH CV ECHO MEAS - RV V1 MAX: 64.3 CM/SEC
BH CV ECHO MEAS - RV V1 VTI: 15.5 CM
BH CV ECHO MEAS - RVOT DIAM: 2.07 CM
BH CV ECHO MEAS - RVSP: 36 MMHG
BH CV ECHO MEAS - SV(LVOT): 90 ML
BH CV ECHO MEAS - SV(MOD-SP2): 83 ML
BH CV ECHO MEAS - SV(MOD-SP4): 80 ML
BH CV ECHO MEAS - SV(RVOT): 52.5 ML
BH CV ECHO MEAS - SVI(LVOT): 45 ML/M2
BH CV ECHO MEAS - SVI(MOD-SP2): 41.5 ML/M2
BH CV ECHO MEAS - SVI(MOD-SP4): 40 ML/M2
BH CV ECHO MEAS - TAPSE (>1.6): 2.6 CM
BH CV ECHO MEAS - TR MAX PG: 33.4 MMHG
BH CV ECHO MEAS - TR MAX VEL: 289 CM/SEC
BH CV ECHO MEASUREMENTS AVERAGE E/E' RATIO: 11.2
BH CV XLRA - RV BASE: 2.8 CM
BH CV XLRA - RV LENGTH: 7.2 CM
BH CV XLRA - RV MID: 2.5 CM
BH CV XLRA - TDI S': 12.3 CM/SEC
LEFT ATRIUM VOLUME INDEX: 37 ML/M2
LV EF BIPLANE MOD: 61.5 %
SINUS: 3 CM
STJ: 2.6 CM

## 2025-05-29 NOTE — PROGRESS NOTES
Physical Therapy Daily Treatment Note  [facility] - [facility]  [address], [address]  [place] [address]    Patient: Pedrito Powell   : 1960  Referring practitioner: No ref. provider found  Today's Date: 2025  Patient seen for 14 sessions    Visit Diagnoses:    ICD-10-CM ICD-9-CM   1. Spinal stenosis of lumbar region without neurogenic claudication  M48.061 724.02   2. Impaired functional mobility and activity tolerance  Z74.09 V49.89   3. Primary osteoarthritis of both knees  M17.0 715.16       Subjective   Pedrito Powell reports: that her endurance for activity has greatly increased since starting therapy.  States that she was able to walk around the [employer] [place] with a few rest breaks but would not have attempted that a few weeks ago.   States that she no longer uses her walker or cane at home but always does when out of the house.  States that she is having a hard time bending the right knee.  To see pain mgmt on Monday.       Objective   Presents walking with her rolling walker but putting much less weight on it than previously done    Heel slide - Right 17* - 107*,  Left 2* - 118*    See Exercise, Manual, and Modality Logs for complete treatment.     Patient Education:    Assessment/Plan  Pedrito has increased her activity tolerances at home and when out in the community but still must take rest breaks due to back pain.  Right knee pain and limitation in motion does also limit her mobility.    Progress strengthening /stabilization /functional activity           Timed:  Manual Therapy:    10     mins  14772;  Therapeutic Exercise:    25     mins  82156;     Neuromuscular Dion:    0    mins  54865;    Therapeutic Activity:     10     mins  53422;     Ultrasound:      0     mins  18654;    Iontophoresis              __0_   mins        Untimed:  Electrical Stimulation:     15    mins  71762 ( );  Mechanical Traction:             mins  79820;   Paraffin                        _____  mins   Dry Needling               _____   mins      Timed Treatment:   45   mins   Total Treatment:     75   mins    Anais Aguilera, PT  [place] License # [id number]  Physical Therapist    Electronically signed by Anais Aguilera, PT, 05/29/25, 6:59 AM EDT

## 2025-05-30 ENCOUNTER — TELEPHONE (OUTPATIENT)
Dept: PAIN MEDICINE | Facility: CLINIC | Age: 65
End: 2025-05-30
Payer: COMMERCIAL

## 2025-06-02 ENCOUNTER — OFFICE VISIT (OUTPATIENT)
Dept: PAIN MEDICINE | Facility: CLINIC | Age: 65
End: 2025-06-02
Payer: COMMERCIAL

## 2025-06-02 ENCOUNTER — PREP FOR SURGERY (OUTPATIENT)
Dept: SURGERY | Facility: SURGERY CENTER | Age: 65
End: 2025-06-02
Payer: COMMERCIAL

## 2025-06-02 VITALS
HEIGHT: 59 IN | OXYGEN SATURATION: 100 % | DIASTOLIC BLOOD PRESSURE: 85 MMHG | TEMPERATURE: 98.8 F | SYSTOLIC BLOOD PRESSURE: 147 MMHG | HEART RATE: 71 BPM | WEIGHT: 250.6 LBS | BODY MASS INDEX: 50.52 KG/M2 | RESPIRATION RATE: 18 BRPM

## 2025-06-02 DIAGNOSIS — M47.816 FACET ARTHRITIS OF LUMBAR REGION: Primary | ICD-10-CM

## 2025-06-02 DIAGNOSIS — M51.360 DEGENERATION OF INTERVERTEBRAL DISC OF LUMBAR REGION WITH DISCOGENIC BACK PAIN: ICD-10-CM

## 2025-06-02 DIAGNOSIS — M48.061 SPINAL STENOSIS, LUMBAR REGION, WITHOUT NEUROGENIC CLAUDICATION: ICD-10-CM

## 2025-06-02 PROCEDURE — 99204 OFFICE O/P NEW MOD 45 MIN: CPT | Performed by: PHYSICIAN ASSISTANT

## 2025-06-02 RX ORDER — DIAZEPAM 5 MG/1
10 TABLET ORAL ONCE
OUTPATIENT
Start: 2025-06-02 | End: 2025-06-02

## 2025-06-02 NOTE — PROGRESS NOTES
CHIEF COMPLAINT  Ms. Powell states that she had had back pain for over 1 year.     Subjective   Pedrito Powell is a 64 y.o. female.   She presents to the office for initial evaluation of low back pain. She was referred here by KALYANI Holman.  This patient has a longstanding history of waxing and waning pain within the lumbar spine but over the past year the back pain has worsened.  She denies any precipitating trauma or inciting event.  The patient reports pain in the transverse distribution across the lumbar spine referred into the bilateral hips and occasionally radiates into the upper portion of the buttocks.  Denies any lower extremity pain, numbness, tingling or weakness.  Described in the back as a constant aching and bruised sensation.  She finds that her pain worsens as the day progresses.  It is also exacerbated by standing for any length of time, going up and down stairs, washing dishes, cooking or prolonged walking.    Denies any history of cervical or lumbar spine surgery.  Denies any previous interventional pain management.    The patient is still attending physical therapy and has 2 more weeks of sessions.  She does feel therapy has been helpful with improving her range of motion and strengthening and is also helped to decrease her pain somewhat.  Alternates with heat therapy and is also utilize NSAIDs as well as Tylenol and intermittent use of pain medication.    Pain today 6/10 VAS in severity.      Back Pain  Chronicity:  Chronic  Onset:  More than 1 year ago  Frequency:  Constantly  Progression since onset:  Worsening  Pain location:  Lumbar spine  Pain quality:  Aching (bruised feeling)  Radiates to:  Left buttock and right buttock  Pain-numeric:  6/10  Pain severity:  Moderate  Worse during: worsens throughout the day.  Aggravated by:  Standing (going up/down stairs/ prolonged walking/washing dishes/cooking)  Associated symptoms: no numbness and no weakness    Risk factors:  Obesity and  sedentary lifestyle  Treatments tried:  Home exercises, physical therapy, analgesics and heat  Improvement on treatment:  Moderate       PEG Assessment   What number best describes your pain on average in the past week?4  What number best describes how, during the past week, pain has interfered with your enjoyment of life?6  What number best describes how, during the past week, pain has interfered with your general activity?  6        Current Outpatient Medications:     albuterol sulfate  (90 Base) MCG/ACT inhaler, Inhale 2 puffs Every 4 (Four) Hours As Needed for Wheezing., Disp: 18 g, Rfl: 0    amitriptyline (ELAVIL) 25 MG tablet, Take 1 tablet by mouth At Night As Needed for Sleep., Disp: , Rfl:     ASPIRIN 81 PO, Take  by mouth., Disp: , Rfl:     atorvastatin (LIPITOR) 80 MG tablet, Take 1 tablet by mouth Daily., Disp: 90 tablet, Rfl: 1    azelastine (ASTELIN) 0.1 % nasal spray, Administer 1 spray into the nostril(s) as directed by provider Daily., Disp: , Rfl: 11    B Complex Vitamins (B COMPLEX PO), Take 1 capsule by mouth Daily. HOLD FOR SURGERY 1 WEEK, Disp: , Rfl:     Breyna 160-4.5 MCG/ACT inhaler, INHALE 2 PUFFS VIA SPACER EVERY 12 HOURS. RINSE MOUTH AFTER USE, Disp: , Rfl:     Cholecalciferol (Vitamin D3) 50 MCG (2000 UT) capsule, TAKE 1 CAPSULE BY MOUTH EVERY DAY, Disp: 90 capsule, Rfl: 3    Diclofenac Sodium (VOLTAREN) 1 % gel gel, Apply 4 g topically to the appropriate area as directed 4 (Four) Times a Day As Needed (foot pain)., Disp: 50 g, Rfl: 0    EPINEPHrine (AUVI-Q IJ), Inject  as directed As Needed., Disp: , Rfl:     famotidine (PEPCID) 20 MG tablet, Take 1 tablet by mouth., Disp: , Rfl:     HYDROcodone-acetaminophen (NORCO) 7.5-325 MG per tablet, Take 1 tablet by mouth Every 4 (Four) Hours As Needed for Moderate Pain or Severe Pain., Disp: 40 tablet, Rfl: 0    ipratropium (ATROVENT) 0.03 % nasal spray, Administer 1 spray into the nostril(s) as directed by provider 3 (Three) Times a  Day., Disp: , Rfl:     irbesartan (AVAPRO) 300 MG tablet, Take 1 tablet by mouth Daily., Disp: 90 tablet, Rfl: 1    Lidocaine 4 %, Place 3 patches on the skin as directed by provider Daily. Remove & Discard patch within 12 hours or as directed by MD, Disp: 30 each, Rfl: 0    loperamide (IMODIUM) 2 MG capsule, Take 1 capsule by mouth Every 2 (Two) Hours As Needed for Diarrhea (max 4 doses daily)., Disp: , Rfl:     meclizine (ANTIVERT) 25 MG tablet, TAKE 1 TABLET BY MOUTH THREE TIMES DAILY AS NEEDED FOR DIZZINESS, Disp: 270 tablet, Rfl: 0    methocarbamol (ROBAXIN) 500 MG tablet, Take 1 tablet by mouth Every 8 (Eight) Hours., Disp: 90 tablet, Rfl: 0    ondansetron ODT (ZOFRAN-ODT) 4 MG disintegrating tablet, DISSOLVE 1 TABLET ON THE TONGUE EVERY 8 HOURS AS NEEDED FOR NAUSEA OR VOMITING, Disp: 60 tablet, Rfl: 0    Scopolamine 1 MG/3DAYS patch, , Disp: , Rfl:     Vortioxetine HBr (Trintellix) 10 MG tablet tablet, Take 1 tablet by mouth Daily With Breakfast., Disp: 90 tablet, Rfl: 3  No current facility-administered medications for this visit.    Facility-Administered Medications Ordered in Other Visits:     Chlorhexidine Gluconate 2 % pads 1 each, 1 each, Apply externally, Take As Directed, Bethel Devi MD    The following portions of the patient's history were reviewed and updated as appropriate: allergies, current medications, past family history, past medical history, past social history, past surgical history, and problem list.      REVIEW OF PERTINENT MEDICAL DATA    LUMBAR SPINE MRI WITHOUT AND WITH CONTRAST     INDICATION: Low back pain; M54.59-Other low back pain; R50.9-Fever,  unspecified; R60.0-Localized edema, difficulty walking     COMPARISON: None.     TECHNIQUE: Lumbar spine MRI with and without contrast.     FINDINGS:     There is grade 1 anterolisthesis at L4-5. Alignment is otherwise within  normal limits. There are degenerative bone marrow signal changes, but  there is no evidence of bone marrow  "infiltration or replacement.  The  distal cord and conus are normal in position and appearance.  The  paraspinous soft tissues are unremarkable. Postcontrast images show no  unexpected or abnormal enhancement.     T12-L1: There is no canal or foraminal stenosis.     L1-2: There is no canal stenosis. There is mild bilateral foraminal  narrowing.     L2-3: There is mild degenerative canal stenosis, and moderate bilateral  foraminal stenosis.     L3-4: There is mild degenerative canal stenosis, and mild to moderate  bilateral foraminal stenosis.     L4-5: There is moderate to severe canal stenosis, and moderate to severe  right and left foraminal stenosis.     L5-S1: There is no overall canal stenosis, and there is moderate left  and mild to moderate right foraminal stenosis.     IMPRESSION:     Multilevel degenerative change, see level by level details regarding  canal and foraminal narrowing above.     No acute appearing abnormality at any level.        This report was finalized on 3/24/2025 12:02 AM by Dr. Rosendo Donnelly,    Review of Systems   Gastrointestinal:  Negative for constipation and diarrhea.   Genitourinary:  Negative for difficulty urinating.   Musculoskeletal:  Positive for back pain.   Neurological:  Negative for weakness and numbness.   Psychiatric/Behavioral:  Positive for sleep disturbance. Negative for suicidal ideas. The patient is nervous/anxious.        I have reviewed and confirmed the accuracy of the ROS as documented by the MA/LPN/RN POPEYE Kaufman    Vitals:    06/02/25 1545   BP: 147/85   Pulse: 71   Resp: 18   Temp: 98.8 °F (37.1 °C)   SpO2: 100%   Weight: 114 kg (250 lb 9.6 oz)   Height: 149.9 cm (59\")   PainSc: 6    PainLoc: Back         Objective       Physical Exam  Vitals and nursing note reviewed.   Constitutional:       Appearance: Normal appearance. She is obese.   HENT:      Head: Normocephalic.   Pulmonary:      Effort: Pulmonary effort is normal.   Musculoskeletal:      " Lumbar back: Spasms and tenderness (exquisite pain to palpation over the bilateral lumbar facet joint spaces) present. Decreased range of motion.        Back:    Skin:     General: Skin is warm and dry.   Neurological:      General: No focal deficit present.      Mental Status: She is alert and oriented to person, place, and time.      Cranial Nerves: Cranial nerves 2-12 are intact.      Sensory: Sensation is intact.      Motor: Motor function is intact.      Gait: Gait abnormal (ambulates with a walker).      Deep Tendon Reflexes:      Reflex Scores:       Patellar reflexes are 0 on the right side and 0 on the left side.  Psychiatric:         Mood and Affect: Mood normal.         Behavior: Behavior normal.         Thought Content: Thought content normal.         Judgment: Judgment normal.         Assessment & Plan   Diagnoses and all orders for this visit:    1. Facet arthritis of lumbar region (Primary)    2. Spinal stenosis, lumbar region, without neurogenic claudication    3. Degeneration of intervertebral disc of lumbar region with discogenic back pain        --- Pedrito Powell reports a pain score of 6.  Given her pain assessment as noted, treatment options were discussed and the following options were decided upon as a follow-up plan to address the patient's pain: continuation of current treatment plan for pain, patient not interested in pharmacological measures, and use of non-medical modalities (ice, heat, stretching and/or behavior modifications).    PHQ-2 Depression Screening  Little interest or pleasure in doing things? Not at all   Feeling down, depressed, or hopeless? Not at all   PHQ-2 Total Score 0     Tobacco Use: Low Risk  (6/2/2025)    Patient History     Smoking Tobacco Use: Never     Smokeless Tobacco Use: Never     Passive Exposure: Never             --- Due to the patient's physical exam and MRI findings I do feel that she would benefit from #1 diagnostic bilateral L4-S1 medial branch block  under fluoroscopy guidance with plan to proceed to radiofrequency ablation of favorable response is obtained.  Risk and benefits of the procedure have been discussed with the patient and all questions have been addressed.  --- Follow up 1 week after undergoing injective therapy  --- Patient does have an element of discogenic low back pain with severe spinal stenosis and may benefit from bilateral L4-5 lumbar TFESI in the future.       Pain / Disability Scale    The scale used for measurement of pain and/or disability for this patient was the [place] back pain disability scale.  The score was 50 on 06/02/2025    Diagnostic Facet Joint Procedure:   MBPAULA     The first diagnostic facet joint procedure is considered medically reasonable and necessary for the diagnosis and treatment of chronic pain for this patient due to the patient meeting all of the following criteria:    - 1. Moderate to severe chronic neck or low back pain, predominantly axial, that causes functional deficit measured on pain or disability scale.  - 2. Pain present for minimum of 3 months with documented failure to respond to noninvasive conservative management (as tolerated)  - 3. Absence of untreated radiculopathy or neurogenic claudication (except for radiculopathy caused by facet joint synovial cyst)  - 4. There is no non-facet pathology per clinical assessment or radiology studies that could explain the source of the patient’s pain, including but not limited to fracture, tumor, infection, or significant deformity.    The confirmatory diagnostic facet joint procedure is considered medically reasonable and necessary for the diagnosis and treatment of chronic pain for this patient due to the patient meeting all of the following criteria:    - 1. Moderate to severe chronic neck or low back pain, predominantly axial, that causes functional deficit measured on pain or disability scale.  - 2. Pain present for minimum of 3 months with documented failure to  respond to noninvasive conservative management (as tolerated)  - 3. Absence of untreated radiculopathy or neurogenic claudication (except for radiculopathy caused by facet joint synovial cyst)  - 4. There is no non-facet pathology per clinical assessment or radiology studies that could explain the source of the patient’s pain, including but not limited to fracture, tumor, infection, or significant deformity.    The patient has also shown a consistent positive response of at least 80% relief of primary (index) pain (with the duration of relief being consistent with the agent used).         CARMENCITA REPORT    CARMENCITA report has been reviewed and scanned into the patient's chart.    As the clinician, I personally reviewed the CARMENCITA from 6/2/25 while the patient was in the office today.        Dictated utilizing Dragon dictation.

## 2025-06-03 ENCOUNTER — TRANSCRIBE ORDERS (OUTPATIENT)
Dept: SURGERY | Facility: SURGERY CENTER | Age: 65
End: 2025-06-03
Payer: COMMERCIAL

## 2025-06-03 DIAGNOSIS — Z41.9 SURGERY, ELECTIVE: Primary | ICD-10-CM

## 2025-06-04 ENCOUNTER — TREATMENT (OUTPATIENT)
Dept: PHYSICAL THERAPY | Facility: CLINIC | Age: 65
End: 2025-06-04
Payer: COMMERCIAL

## 2025-06-04 DIAGNOSIS — M17.0 PRIMARY OSTEOARTHRITIS OF BOTH KNEES: ICD-10-CM

## 2025-06-04 DIAGNOSIS — Z74.09 IMPAIRED FUNCTIONAL MOBILITY AND ACTIVITY TOLERANCE: ICD-10-CM

## 2025-06-04 DIAGNOSIS — M48.061 SPINAL STENOSIS OF LUMBAR REGION WITHOUT NEUROGENIC CLAUDICATION: Primary | ICD-10-CM

## 2025-06-04 PROCEDURE — 97110 THERAPEUTIC EXERCISES: CPT | Performed by: PHYSICAL THERAPIST

## 2025-06-04 PROCEDURE — 97112 NEUROMUSCULAR REEDUCATION: CPT | Performed by: PHYSICAL THERAPIST

## 2025-06-04 PROCEDURE — 97530 THERAPEUTIC ACTIVITIES: CPT | Performed by: PHYSICAL THERAPIST

## 2025-06-04 NOTE — PROGRESS NOTES
Physical Therapy Daily Treatment Note  [facility] Physical Therapy [facility]  [address], [address]  [place] [address]    Patient: Pedrito Powell   : 1960  Referring practitioner: Tung Price MD  Today's Date: 2025  Patient seen for 15 sessions    Visit Diagnoses:    ICD-10-CM ICD-9-CM   1. Spinal stenosis of lumbar region without neurogenic claudication  M48.061 724.02   2. Impaired functional mobility and activity tolerance  Z74.09 V49.89   3. Primary osteoarthritis of both knees  M17.0 715.16       Subjective   Pedrito Powell reports: that she saw pain mgmt on Monday is now scheduled to have MBB starting in July with potential RFA if block is successful.  States that her back pain is some better as is her right knee pain.  Has been sitting more this week than usual.  States that she sleeps well.        Objective   Presents keeping the right knee nearly straight with ambulation    See Exercise, Manual, and Modality Logs for complete treatment.     Patient Education: added sidestepping at kitchen counter to HEP    Assessment/Plan  Patient has seen pain mgmt and will be getting MBB of multiple levels due to considerable DDD.  Added sidestepping at home with focus on trunk stability with walking instead of lateral weight shift.    Progress strengthening /stabilization /functional activity           Timed:  Manual Therapy:    0     mins  43557;  Therapeutic Exercise:    15/30     mins  58021;     Neuromuscular Dion:    10    mins  16349;    Therapeutic Activity:     10     mins  06458;     Ultrasound:      0     mins  21571;    Iontophoresis              __0_   mins        Untimed:  Electrical Stimulation:     0    mins  20619 ( );  Mechanical Traction:             mins  60079;   Paraffin                       _____  mins   Dry Needling               _____   mins      Timed Treatment:   35   mins   Total Treatment:     60   mins    Anais Aguilera, PT  [place] License #  [id number]  Physical Therapist    Electronically signed by Anais Aguilera, PT, 06/04/25, 4:30 PM EDT

## 2025-06-06 ENCOUNTER — TELEPHONE (OUTPATIENT)
Dept: INTERNAL MEDICINE | Facility: CLINIC | Age: 65
End: 2025-06-06
Payer: COMMERCIAL

## 2025-06-06 ENCOUNTER — TREATMENT (OUTPATIENT)
Dept: PHYSICAL THERAPY | Facility: CLINIC | Age: 65
End: 2025-06-06
Payer: COMMERCIAL

## 2025-06-06 DIAGNOSIS — Z74.09 IMPAIRED FUNCTIONAL MOBILITY AND ACTIVITY TOLERANCE: ICD-10-CM

## 2025-06-06 DIAGNOSIS — M54.59 INTRACTABLE LOW BACK PAIN: ICD-10-CM

## 2025-06-06 DIAGNOSIS — M48.061 SPINAL STENOSIS OF LUMBAR REGION WITHOUT NEUROGENIC CLAUDICATION: Primary | ICD-10-CM

## 2025-06-06 DIAGNOSIS — M17.0 PRIMARY OSTEOARTHRITIS OF BOTH KNEES: ICD-10-CM

## 2025-06-06 RX ORDER — METHOCARBAMOL 500 MG/1
500 TABLET, FILM COATED ORAL 3 TIMES DAILY
Qty: 90 TABLET | Refills: 0 | Status: SHIPPED | OUTPATIENT
Start: 2025-06-06

## 2025-06-06 NOTE — PROGRESS NOTES
Physical Therapy Daily Treatment Note    [facility] Physical Therapy [facility]  [address] [place] [address]  [phone] (phone)  [phone] (fax)    Patient: Pedrito Powell   : 1960  Diagnosis/ICD-10 Code:  Spinal stenosis of lumbar region without neurogenic claudication [M48.061]  Referring practitioner: Tung Price MD  Today's Date: 2025  Patient seen for 16 sessions    Visit Diagnoses:    ICD-10-CM ICD-9-CM   1. Spinal stenosis of lumbar region without neurogenic claudication  M48.061 724.02   2. Impaired functional mobility and activity tolerance  Z74.09 V49.89   3. Primary osteoarthritis of both knees  M17.0 715.16              Subjective   Pt reports 7/10 pain in knees today, 3/10 pain in back.  She reports feeling like her mobility has decreased the past week, possibly due to the weather.    Objective   See Exercise, Manual, and Modality Logs for complete treatment.       Assessment/Plan  Pt progressing with strengthening exercises today.  She fatigued quickly and required multiple rest breaks.  She was able to increase the number of reps on sit to stand with good form and no use of hands.  Pt required min verbal cueing for stability in side stepping today, and had significant improvement in hamstring stretching with contract/relax tech.    Plan:  Plan to continue with strengthening/improving mobility and function without increasing pain level.         Timed:    Manual Therapy:    8     mins  48345;  Therapeutic Exercise:    17/32     mins  65382;     Neuromuscular Dion:    10    mins  47000;    Therapeutic Activity:     10     mins  83854;     Gait Training:           mins  74086;     Ultrasound:          mins  07539;    Aquatic Therapy           mins     81790;  Self Care                               mins   19841      Untimed:  Electrical Stimulation:  15         mins  44474 ( );  Traction:           mins  42629;       Timed Treatment:   45   mins    Total Treatment:     75   mins    Kyleigh Martins, PT  Physical Therapist    [place] License:[id number]

## 2025-06-06 NOTE — TELEPHONE ENCOUNTER
Rx Refill Note  Requested Prescriptions     Pending Prescriptions Disp Refills    methocarbamol (ROBAXIN) 500 MG tablet [Pharmacy Med Name: METHOCARBAMOL 500MG TABLETS] 90 tablet 0     Sig: TAKE 1 TABLET BY MOUTH EVERY 8 HOURS      Last office visit with prescribing clinician: 5/15/2025   Last telemedicine visit with prescribing clinician: Visit date not found   Next office visit with prescribing clinician: 7/24/2025     Leah Cox  06/06/25, 08:58 EDT

## 2025-06-10 ENCOUNTER — TREATMENT (OUTPATIENT)
Dept: PHYSICAL THERAPY | Facility: CLINIC | Age: 65
End: 2025-06-10
Payer: COMMERCIAL

## 2025-06-10 DIAGNOSIS — M17.0 PRIMARY OSTEOARTHRITIS OF BOTH KNEES: ICD-10-CM

## 2025-06-10 DIAGNOSIS — Z74.09 IMPAIRED FUNCTIONAL MOBILITY AND ACTIVITY TOLERANCE: ICD-10-CM

## 2025-06-10 DIAGNOSIS — M48.061 SPINAL STENOSIS OF LUMBAR REGION WITHOUT NEUROGENIC CLAUDICATION: Primary | ICD-10-CM

## 2025-06-10 PROCEDURE — 97140 MANUAL THERAPY 1/> REGIONS: CPT | Performed by: PHYSICAL THERAPIST

## 2025-06-10 PROCEDURE — 97530 THERAPEUTIC ACTIVITIES: CPT | Performed by: PHYSICAL THERAPIST

## 2025-06-10 PROCEDURE — 97110 THERAPEUTIC EXERCISES: CPT | Performed by: PHYSICAL THERAPIST

## 2025-06-10 NOTE — PROGRESS NOTES
Physical Therapy Daily Treatment Note - Reassessment  [facility] Physical Therapy [facility]  [address], [address]  [place] [address]    Patient: Pedrito Powell   : 1960  Referring practitioner: Tung Price MD  Today's Date: 6/10/2025  Patient seen for 17 sessions    Visit Diagnoses:    ICD-10-CM ICD-9-CM   1. Spinal stenosis of lumbar region without neurogenic claudication  M48.061 724.02   2. Impaired functional mobility and activity tolerance  Z74.09 V49.89   3. Primary osteoarthritis of both knees  M17.0 715.16       Subjective   Pedrito Powell reports: that she has been sitting all day for the past 2 weeks and is more stiff in the right knee and lower back.  Reports pain varying from 2-3/10 compared to 4-8/10 upon her initial evaluation.  States that she is able to stand for 10-15 minutes without having to sit and has to sit for shorter periods of time to relieve the pain.  States that she has been able to do more housework now than she had been able to do.  Folded clothes for 15-20 minutes with a short break.        Objective   Presents walking with rolling walker but not putting much weight on the walker - uses cane at home but walker when out    Crepitus with right knee extension    Knee AROM - *    Sit to stand without use of hands     See Exercise, Manual, and Modality Logs for complete treatment.     Patient Education: cont HEP    Assessment/Plan  Patient has demonstrated moderate  improvement since the initiation of therapy.  The pain has  decreased in intensity but is still constant.  The motion has increased in the knee.  The activity tolerances have iincreased but not to desired levels. I feel that the patient would benefit from continued therapy to make further progress towards her goals .  If you have any questions concerning the care, please do not hesitate to call me.  Plan Goals: SHORT TERM GOALS:  4 weeks  1.  Patient to be able to tolerate initial  HEP - MET                          2.  Pain level < 4/10 at worst with mentioned activities to improve function - MET  3.  Patient will be able to sleep without pain interruption - MET  4.  Patient will be able to sit and stand for 15 minutes without increased symptoms - progressing     LONG TERM GOALS: 8 weeks  1. Patient will be independent in performing the HEP - MET  2.  Patient will have pain free functional spinal AROM - Progressing  3.  Pain will be less than 2/10 with normal ADL's  4.  Patient will be able to stand and walk for >30 minutes without increased symptoms     Progress strengthening /stabilization /functional activity           Timed:  Manual Therapy:    10     mins  58054;  Therapeutic Exercise:    10/30     mins  80398;     Neuromuscular Dion:    0    mins  95768;    Therapeutic Activity:     10     mins  74647;     Ultrasound:      0     mins  58077;    Iontophoresis              __0_   mins        Untimed:  Electrical Stimulation:     0    mins  80873 ( );  Mechanical Traction:             mins  22590;   Paraffin                       _____  mins   Dry Needling               _____   mins      Timed Treatment:   30   mins   Total Treatment:     65   mins    Anais Aguilera PT  [place] License # [id number]  Physical Therapist    Electronically signed by Anais Aguilera PT, 06/10/25, 2:01 PM EDT

## 2025-06-12 ENCOUNTER — TREATMENT (OUTPATIENT)
Dept: PHYSICAL THERAPY | Facility: CLINIC | Age: 65
End: 2025-06-12
Payer: COMMERCIAL

## 2025-06-12 DIAGNOSIS — M48.061 SPINAL STENOSIS OF LUMBAR REGION WITHOUT NEUROGENIC CLAUDICATION: Primary | ICD-10-CM

## 2025-06-12 DIAGNOSIS — M17.0 PRIMARY OSTEOARTHRITIS OF BOTH KNEES: ICD-10-CM

## 2025-06-12 DIAGNOSIS — Z74.09 IMPAIRED FUNCTIONAL MOBILITY AND ACTIVITY TOLERANCE: ICD-10-CM

## 2025-06-12 NOTE — PROGRESS NOTES
Physical Therapy Daily Treatment Note  [facility] Therapy [facility]  [address], [address]  [place] [address]    Patient: Pedrito Powell   : 1960  Referring practitioner: Tung Price MD  Today's Date: 2025  Patient seen for 18 sessions    Visit Diagnoses:    ICD-10-CM ICD-9-CM   1. Spinal stenosis of lumbar region without neurogenic claudication  M48.061 724.02   2. Impaired functional mobility and activity tolerance  Z74.09 V49.89   3. Primary osteoarthritis of both knees  M17.0 715.16       Subjective   Pedrito Powell reports: that she is standing for longer periods of time at home.  States that she gets up and walks at all breaks at school to increase her standing endurance      Objective   Standing in nearly fully erect position during pallof press    See Exercise, Manual, and Modality Logs for complete treatment.     Patient Education: issued tband and written copy of pallof press for home    Assessment/Plan  Pedrito has been very compliant with her therapy.  Her activity toelrances have increased but the stenosis still prevents very long periods of standing Knee pain also inhibiting some progress    Progress strengthening /stabilization /functional activity           Timed:  Manual Therapy:    0     mins  98932;  Therapeutic Exercise:    20/30     mins  02747;     Neuromuscular Dion:    10    mins  77545;    Therapeutic Activity:     10     mins  06796;     Ultrasound:      0     mins  41086;    Iontophoresis              __0_   mins        Untimed:  Electrical Stimulation:     0    mins  55408 (MC );  Mechanical Traction:             mins  63523;   Paraffin                       _____  mins   Dry Needling               _____   mins      Timed Treatment:   40   mins   Total Treatment:     60   mins    Anais Aguilera PT  [place] License # [id number]  Physical Therapist    Electronically signed by Anais Aguilera PT, 25, 5:51 PM EDT

## 2025-06-15 DIAGNOSIS — I10 HYPERTENSION, UNSPECIFIED TYPE: ICD-10-CM

## 2025-06-16 ENCOUNTER — TREATMENT (OUTPATIENT)
Dept: PHYSICAL THERAPY | Facility: CLINIC | Age: 65
End: 2025-06-16
Payer: COMMERCIAL

## 2025-06-16 DIAGNOSIS — M48.061 SPINAL STENOSIS OF LUMBAR REGION WITHOUT NEUROGENIC CLAUDICATION: Primary | ICD-10-CM

## 2025-06-16 DIAGNOSIS — Z74.09 IMPAIRED FUNCTIONAL MOBILITY AND ACTIVITY TOLERANCE: ICD-10-CM

## 2025-06-16 DIAGNOSIS — M17.0 PRIMARY OSTEOARTHRITIS OF BOTH KNEES: ICD-10-CM

## 2025-06-16 PROCEDURE — 97140 MANUAL THERAPY 1/> REGIONS: CPT | Performed by: PHYSICAL THERAPIST

## 2025-06-16 PROCEDURE — 97112 NEUROMUSCULAR REEDUCATION: CPT | Performed by: PHYSICAL THERAPIST

## 2025-06-16 PROCEDURE — 97110 THERAPEUTIC EXERCISES: CPT | Performed by: PHYSICAL THERAPIST

## 2025-06-16 PROCEDURE — 97530 THERAPEUTIC ACTIVITIES: CPT | Performed by: PHYSICAL THERAPIST

## 2025-06-16 NOTE — PROGRESS NOTES
Physical Therapy Daily Treatment Note  [facility] Physical Therapy [facility]  [address], [address]  [place] [address]    Patient: Pedrito Powell   : 1960  Referring practitioner: Tung Price MD  Today's Date: 2025  Patient seen for 19 sessions    Visit Diagnoses:    ICD-10-CM ICD-9-CM   1. Spinal stenosis of lumbar region without neurogenic claudication  M48.061 724.02   2. Impaired functional mobility and activity tolerance  Z74.09 V49.89   3. Primary osteoarthritis of both knees  M17.0 715.16       Subjective   Pedrito Powell reports: that she was able to walk for 45 minutes twice on Saturday with her walker without pain.  Did have considerable soreness yesterday in the back and knees but no sharp pain.        Objective   Palpable crepitus with right knee flexion/extension    Unable to fully extend right knee due to pain    See Exercise, Manual, and Modality Logs for complete treatment.     Patient Education: discussed home TENS unit    Assessment/Plan  Activity tolerances continue to improve.  Knee stability improving but arthritis preventing full range and causing pain with prolonged WB    Progress strengthening /stabilization /functional activity           Timed:  Manual Therapy:    10     mins  54584;  Therapeutic Exercise:    20     mins  19936;     Neuromuscular Dion:    10    mins  90778;    Therapeutic Activity:     10     mins  16392;     Ultrasound:      0     mins  85108;    Iontophoresis              __0_   mins        Untimed:  Electrical Stimulation:     0    mins  42529 ( );  Mechanical Traction:             mins  77635;   Paraffin                       _____  mins   Dry Needling               _____   mins      Timed Treatment:   50   mins   Total Treatment:     60   mins    Anais Aguilera PT  [place] License # [id number]  Physical Therapist    Electronically signed by Anais Aguilera PT, 25, 8:41 AM EDT

## 2025-06-17 ENCOUNTER — TELEPHONE (OUTPATIENT)
Dept: ONCOLOGY | Facility: CLINIC | Age: 65
End: 2025-06-17
Payer: COMMERCIAL

## 2025-06-17 NOTE — TELEPHONE ENCOUNTER
Caller: Pedrito Powell    Relationship to patient: Self    Best call back number: [phone]    Type of visit: LAB, FU    Requested date: PT NEEDS A CB TO RS THIS APPT      If rescheduling, when is the original appointment: 8/19

## 2025-06-18 ENCOUNTER — TELEPHONE (OUTPATIENT)
Dept: ONCOLOGY | Facility: CLINIC | Age: 65
End: 2025-06-18

## 2025-06-18 NOTE — TELEPHONE ENCOUNTER
Caller: Pedrito Powell    Relationship: Self    Best call back number: [phone]    What is the best time to reach you: ANYTIME     Who are you requesting to speak with (clinical staff, provider,  specific staff member): SCHEDULING     What was the call regarding: RETURNING MISSED CALL TO LINDA

## 2025-06-19 RX ORDER — IRBESARTAN 300 MG/1
300 TABLET ORAL DAILY
Qty: 90 TABLET | Refills: 1 | Status: SHIPPED | OUTPATIENT
Start: 2025-06-19

## 2025-06-19 NOTE — PROGRESS NOTES
Physical Therapy Daily Treatment Note  6/20/2025  Visit Diagnoses:    ICD-10-CM ICD-9-CM   1. Spinal stenosis of lumbar region without neurogenic claudication  M48.061 724.02   2. Impaired functional mobility and activity tolerance  Z74.09 V49.89   3. Primary osteoarthritis of both knees  M17.0 715.16       VISIT#: 20      Pedrito Powell reports: Her back is at a baseline level of discomfort today. She has been trying to do more at home but what should take about 15 minutes, takes her an hour. She gets sore and has to sit down and rest. The soreness does pass after resting. She is doing her HEP. She is walking around the house with her cane, not the walker. She did try in [facility] just using the cane and it was awful.   Current Pain Level:    2/10  Affected Area: across the lower back, L knee gives out  Quality of Pain: tight, dull ache, discomfort, knife-like and sharp   Functional Deficits/Irritating Factors: prolonged positioning, squatting, lifting and stairs (sit in hard chair. slumpiing)   Progression: improving overall  Compliance with HEP Reported: y      Objective  Presents: ambulating with RW. Forward flexed trunk, stiff-legged gait on RLE  Increased sets/reps of:  none   Increased resistance on:  backwards walking  Added to Program: none        See Exercise, Manual, and Modality Logs for complete treatment.     Patient Education: Pt was educated on exercise biomechanical correctness, intensity, and speed.     Assessment:  Pedrito did very well today with her exercises. She had little to no rest breaks but was able to complete her flow sheet. She is able to flex her R knee during gait but needs cues. Having difficulty and fear walking in the community with SPC rather than her walker.  Pt will continue to benefit from skilled PT interventions to address current functional deficits and impairments.     Plan Goals: SHORT TERM GOALS:  4 weeks  1.  Patient to be able to tolerate initial HEP   - MET                         2.  Pain level < 4/10 at worst with mentioned activities to improve function - MET  3.  Patient will be able to sleep without pain interruption   4.  Patient will be able to sit and stand for 15 minutes without increased symptoms - MET     LONG TERM GOALS: 8 weeks  1. Patient will be independent in performing the HEP  2.  Patient will have pain free functional spinal AROM  3.  Pain will be less than 2/10 with normal ADL's  4.  Patient will be able to stand and walk for >30 minutes without increased symptoms       Plan: Progress to/Continue with current program.       Timed:  Manual Therapy:            0_    mins  75233;  Ultrasound:                     0      mins  93341;   Therapeutic Exercise:    35     mins  13941;     Neuromuscular Dion:   0     mins  81160;    Therapeutic Activity:      25     mins  66012;      Iontophoresis              _0__   mins  Dry Needling               _0____   mins         Untimed:  Work Conditioning: __0__ mins 49703  Electrical Stimulation:    0    mins  52341 ( );  Mechanical Traction:       0        mins  45452;   Paraffin                       __0___  mins   Ice/Heat: __0__ mins      Timed Treatment:   60   mins   Total Treatment:     60   mins          Sheila Galdamez PTA  [place] License # [id number]  Physical Therapist Assistant

## 2025-06-19 NOTE — TELEPHONE ENCOUNTER
Rx Refill Note  Requested Prescriptions     Pending Prescriptions Disp Refills    irbesartan (AVAPRO) 300 MG tablet [Pharmacy Med Name: IRBESARTAN 300MG TABLETS] 90 tablet 1     Sig: TAKE 1 TABLET BY MOUTH DAILY      Last office visit with prescribing clinician: 5/15/2025   Last telemedicine visit with prescribing clinician: Visit date not found   Next office visit with prescribing clinician: 7/24/2025       Leah Cox  06/19/25, 10:28 EDT

## 2025-06-20 ENCOUNTER — TREATMENT (OUTPATIENT)
Dept: PHYSICAL THERAPY | Facility: CLINIC | Age: 65
End: 2025-06-20
Payer: COMMERCIAL

## 2025-06-20 DIAGNOSIS — M17.0 PRIMARY OSTEOARTHRITIS OF BOTH KNEES: ICD-10-CM

## 2025-06-20 DIAGNOSIS — Z74.09 IMPAIRED FUNCTIONAL MOBILITY AND ACTIVITY TOLERANCE: ICD-10-CM

## 2025-06-20 DIAGNOSIS — M48.061 SPINAL STENOSIS OF LUMBAR REGION WITHOUT NEUROGENIC CLAUDICATION: Primary | ICD-10-CM

## 2025-06-24 ENCOUNTER — TREATMENT (OUTPATIENT)
Dept: PHYSICAL THERAPY | Facility: CLINIC | Age: 65
End: 2025-06-24
Payer: COMMERCIAL

## 2025-06-24 DIAGNOSIS — M17.0 PRIMARY OSTEOARTHRITIS OF BOTH KNEES: ICD-10-CM

## 2025-06-24 DIAGNOSIS — Z74.09 IMPAIRED FUNCTIONAL MOBILITY AND ACTIVITY TOLERANCE: ICD-10-CM

## 2025-06-24 DIAGNOSIS — M48.061 SPINAL STENOSIS OF LUMBAR REGION WITHOUT NEUROGENIC CLAUDICATION: Primary | ICD-10-CM

## 2025-06-24 PROCEDURE — 97530 THERAPEUTIC ACTIVITIES: CPT | Performed by: PHYSICAL THERAPIST

## 2025-06-24 PROCEDURE — 97112 NEUROMUSCULAR REEDUCATION: CPT | Performed by: PHYSICAL THERAPIST

## 2025-06-24 PROCEDURE — 97140 MANUAL THERAPY 1/> REGIONS: CPT | Performed by: PHYSICAL THERAPIST

## 2025-06-24 PROCEDURE — 97110 THERAPEUTIC EXERCISES: CPT | Performed by: PHYSICAL THERAPIST

## 2025-06-24 NOTE — PROGRESS NOTES
Physical Therapy Daily Treatment Note  [facility] Therapy [facility]  [address], [address]  [place] [id number]    Patient: Pedrito Powell   : 1960  Referring practitioner: Tung Price MD  Today's Date: 2025  Patient seen for 21 sessions    Visit Diagnoses:    ICD-10-CM ICD-9-CM   1. Spinal stenosis of lumbar region without neurogenic claudication  M48.061 724.02   2. Impaired functional mobility and activity tolerance  Z74.09 V49.89   3. Primary osteoarthritis of both knees  M17.0 715.16       Subjective   Pedrito Powell reports: that she has been walking and exercising in the pool nearly daily since last visit.  Knee and back both feel better in the pool.  Has had some episodes of the right knee popping/catching in the pool but those have greatly decreased in frequency.  States that in general she feels as though she is better able to stand erect except for first thing in the morning.  States that it takes quite a bit in the morning to stand up straight due to pain/pressure in the back.       Objective   Presents on rolling walker but a bit more upright posture when standing    Right knee extension 12*    See Exercise, Manual, and Modality Logs for complete treatment.     Patient Education: cont HEP    Assessment/Plan  Steady progress as far as activity tolerance goes.  Standing better at home for self care skills and normal ADL's.  Right knee pain limits standing activities more than back pain does now.    Progress strengthening /stabilization /functional activity           Timed:  Manual Therapy:    10     mins  73315;  Therapeutic Exercise:    10/20     mins  79760;     Neuromuscular Dion:    10    mins  97152;    Therapeutic Activity:     10     mins  32398;     Ultrasound:      0     mins  96366;    Iontophoresis              __0_   mins        Untimed:  Electrical Stimulation:     0    mins  63495 ( );  Mechanical Traction:             mins  68238;    Paraffin                       _____  mins   Dry Needling               _____   mins      Timed Treatment:   40   mins   Total Treatment:     65   mins    Anais Aguilera, PT  [place] License # [id number]  Physical Therapist    Electronically signed by Anais Aguilera PT, 06/24/25, 7:26 AM EDT

## 2025-06-30 ENCOUNTER — TREATMENT (OUTPATIENT)
Dept: PHYSICAL THERAPY | Facility: CLINIC | Age: 65
End: 2025-06-30
Payer: COMMERCIAL

## 2025-06-30 DIAGNOSIS — M17.0 PRIMARY OSTEOARTHRITIS OF BOTH KNEES: ICD-10-CM

## 2025-06-30 DIAGNOSIS — M48.061 SPINAL STENOSIS OF LUMBAR REGION WITHOUT NEUROGENIC CLAUDICATION: Primary | ICD-10-CM

## 2025-06-30 DIAGNOSIS — Z74.09 IMPAIRED FUNCTIONAL MOBILITY AND ACTIVITY TOLERANCE: ICD-10-CM

## 2025-06-30 NOTE — PROGRESS NOTES
Physical Therapy Daily Treatment Note  [facility] Physical Therapy [facility]  [address], [address]  [place] [address]    Patient: Pedrito Powell   : 1960  Referring practitioner: Tung Price MD  Today's Date: 2025  Patient seen for 22 sessions    Visit Diagnoses:    ICD-10-CM ICD-9-CM   1. Spinal stenosis of lumbar region without neurogenic claudication  M48.061 724.02   2. Impaired functional mobility and activity tolerance  Z74.09 V49.89   3. Primary osteoarthritis of both knees  M17.0 715.16       Subjective   Pedrito Powell reports: that she did very well this past weekend. Was more active, doing steps and standing more.  Drove home from [place] yesterday and felt fine.  Upon awakening has left side tightness , pressure and pain.  Localizes the pain to the SI region without radiation to the LE's.        Objective   Presents with more flexed posture on her walker.      Exquisite tenderness iin the left SI region    See Exercise, Manual, and Modality Logs for complete treatment.     Patient Education: possible causes of flare up    Assessment/Plan  Patient with increased symptoms today after doing so well over the weekend.  Was more active this weekend as she was out of town with family.  No radicular symptoms but quite symptomatic in the SI region.  Unable to do any exericse today due to increased pain.    Progress strengthening /stabilization /functional activity  Resume exercises next visit as symptoms allow         Timed:  Manual Therapy:    15     mins  29211;  Therapeutic Exercise:    0     mins  09528;     Neuromuscular Dion:    0    mins  76650;    Therapeutic Activity:     0     mins  77920;     Ultrasound:      8     mins  64264;    Iontophoresis              __0_   mins        Untimed:  Electrical Stimulation:     15    mins  89470 ( );  Mechanical Traction:             mins  60864;   Paraffin                       _____  mins   Dry Needling                _____   mins      Timed Treatment:   23   mins   Total Treatment:     50   mins    Anais Aguilera, PT  [place] License # [id number]  Physical Therapist    Electronically signed by Anais Aguilera PT, 06/30/25, 8:14 AM EDT

## 2025-07-03 ENCOUNTER — TREATMENT (OUTPATIENT)
Dept: PHYSICAL THERAPY | Facility: CLINIC | Age: 65
End: 2025-07-03
Payer: COMMERCIAL

## 2025-07-03 DIAGNOSIS — M48.061 SPINAL STENOSIS OF LUMBAR REGION WITHOUT NEUROGENIC CLAUDICATION: Primary | ICD-10-CM

## 2025-07-03 DIAGNOSIS — Z74.09 IMPAIRED FUNCTIONAL MOBILITY AND ACTIVITY TOLERANCE: ICD-10-CM

## 2025-07-03 DIAGNOSIS — M17.0 PRIMARY OSTEOARTHRITIS OF BOTH KNEES: ICD-10-CM

## 2025-07-03 NOTE — DISCHARGE INSTRUCTIONS
[facility] - Post-procedure Instructions          --  While there are no absolute restrictions, it is recommended that you do not perform strenuous activity today. In the morning, you may resume your level of activity as before your block.    --  If you have a band-aid at your injection site, please remove it later today. Observe the area for any redness, swelling, pus-like drainage, or a temperature over 101°. If any of these symptoms occur, please call your doctor at [phone]. If after office hours, leave a message and the on-call provider will return your call.    --  Ice may be applied to your injection site. It is recommended you avoid direct heat (heating pad; hot tub) for 1-2 days.    --  Call [facility] at [phone] if you experience persistent headache, persistent bleeding from the injection site, or severe pain not relieved by heat or oral medication.    --  Do not make important decisions today.    --  Due to the effects of the block and/or the I.V. Sedation, DO NOT drive or operate hazardous machinery for 12 hours.  Local anesthetics may cause numbness after procedure and precautions must be taken with regards to operating equipment as well as with walking, even if ambulating with assistance of another person or with an assistive device.    --  Do not drink alcohol for 12 hours.    -- You may return to work tomorrow, or as directed by your referring doctor.    --  Occasionally you may notice a slight increase in your pain after the procedure. This should start to improve within the next 24-48 hours. Radiofrequency ablation procedure pain may last 3-4 weeks.    --  It may take as long as 3-4 days before you notice a gradual improvement in your pain and/or other symptoms.    -- You may continue to take your prescribed pain medication as needed.    --  Some normal possible side effects of steroid use could include fluid retention, increased blood sugar, dull headache,  increased sweating, increased appetite, mood swings and flushing.    --  Diabetics are recommended to watch their blood glucose level closely for 24-48 hours after the injection.    --  Must stay in PACU for 20 min upon arrival and prove no leg weakness before being discharged.    --  IN THE EVENT OF A LIFE THREATENING EMERGENCY, (CHEST PAIN, BREATHING DIFFICULTIES, PARALYSIS…) YOU SHOULD GO TO YOUR NEAREST EMERGENCY ROOM.    --  You should be contacted by our office within 2-3 days to schedule follow up or next appointment date.  If not contacted within 7 days, please call the office at [phone]     If you have even 1 hour of relief, these injections are considered successful.

## 2025-07-03 NOTE — PROGRESS NOTES
Physical Therapy Daily Treatment Note  [place] [facility] Physical Therapy [facility]  [address], [address]  [place] [address]    Patient: Pedrito Powell   : 1960  Referring practitioner: Tung Price MD  Today's Date: 7/3/2025  Patient seen for 23 sessions    Visit Diagnoses:    ICD-10-CM ICD-9-CM   1. Spinal stenosis of lumbar region without neurogenic claudication  M48.061 724.02   2. Impaired functional mobility and activity tolerance  Z74.09 V49.89   3. Primary osteoarthritis of both knees  M17.0 715.16       Subjective   Pedrito Powell reports: that she feels some better than last visit but still not as good as she was 2 weeks ago.        Objective   Labored transitional movements today    Tenderness in lumbar PVM    See Exercise, Manual, and Modality Logs for complete treatment.     Patient Education: gentel stretch and activity this weekend    Assessment/Plan  Less pain than on last session but still more symptomatic than she was last week.  Relief of ache after lumbar rotation exercise.     Progress strengthening /stabilization /functional activity           Timed:  Manual Therapy:    0     mins  77926;  Therapeutic Exercise:    20/30     mins  67317;     Neuromuscular Dion:    0    mins  55770;    Therapeutic Activity:     10     mins  45034;     Ultrasound:      0     mins  58185;    Iontophoresis              __0_   mins        Untimed:  Electrical Stimulation:     15    mins  80704 ( );  Mechanical Traction:             mins  69427;   Paraffin                       _____  mins   Dry Needling               _____   mins      Timed Treatment:   30   mins   Total Treatment:     65   mins    Anais Aguilera PT  [place] License # [id number]  Physical Therapist    Electronically signed by Anais Aguilera PT, 25, 8:03 AM EDT

## 2025-07-07 ENCOUNTER — TELEPHONE (OUTPATIENT)
Dept: INTERNAL MEDICINE | Facility: CLINIC | Age: 65
End: 2025-07-07

## 2025-07-07 ENCOUNTER — HOSPITAL ENCOUNTER (OUTPATIENT)
Facility: SURGERY CENTER | Age: 65
Setting detail: HOSPITAL OUTPATIENT SURGERY
Discharge: HOME | End: 2025-07-07
Attending: ANESTHESIOLOGY | Admitting: ANESTHESIOLOGY
Payer: COMMERCIAL

## 2025-07-07 ENCOUNTER — HOSPITAL ENCOUNTER (OUTPATIENT)
Dept: GENERAL RADIOLOGY | Facility: SURGERY CENTER | Age: 65
End: 2025-07-07
Payer: COMMERCIAL

## 2025-07-07 VITALS
RESPIRATION RATE: 18 BRPM | SYSTOLIC BLOOD PRESSURE: 124 MMHG | HEIGHT: 60 IN | OXYGEN SATURATION: 97 % | HEART RATE: 60 BPM | TEMPERATURE: 98.2 F | DIASTOLIC BLOOD PRESSURE: 70 MMHG | WEIGHT: 230 LBS | BODY MASS INDEX: 45.16 KG/M2

## 2025-07-07 DIAGNOSIS — M47.816 FACET ARTHRITIS OF LUMBAR REGION: ICD-10-CM

## 2025-07-07 DIAGNOSIS — Z41.9 SURGERY, ELECTIVE: ICD-10-CM

## 2025-07-07 PROCEDURE — 64493 INJ PARAVERT F JNT L/S 1 LEV: CPT | Performed by: ANESTHESIOLOGY

## 2025-07-07 PROCEDURE — 77002 NEEDLE LOCALIZATION BY XRAY: CPT

## 2025-07-07 PROCEDURE — 64494 INJ PARAVERT F JNT L/S 2 LEV: CPT | Performed by: ANESTHESIOLOGY

## 2025-07-07 PROCEDURE — 25010000002 BUPIVACAINE (PF) 0.25 % SOLUTION 10 ML VIAL: Performed by: ANESTHESIOLOGY

## 2025-07-07 PROCEDURE — 76000 FLUOROSCOPY <1 HR PHYS/QHP: CPT

## 2025-07-07 PROCEDURE — 25010000002 LIDOCAINE PF 1% 1 % SOLUTION 5 ML VIAL: Performed by: ANESTHESIOLOGY

## 2025-07-07 RX ORDER — DIAZEPAM 5 MG/1
10 TABLET ORAL ONCE
Status: COMPLETED | OUTPATIENT
Start: 2025-07-07 | End: 2025-07-07

## 2025-07-07 RX ADMIN — DIAZEPAM 10 MG: 5 TABLET ORAL at 10:35

## 2025-07-07 NOTE — H&P
[place] [facility]   HISTORY AND PHYSICAL    Patient Name: Pedrito Powell  : 1960  MRN: [id number]  Primary Care Physician:  Teo Fraser MD  Date of admission: 2025    Subjective   Subjective     Chief Complaint: Low back pain    History of Present Illness  Chronic axial low back pain that has not improved with conservative therapies.       Review of Systems   Constitutional:  Negative for chills and fever.   Respiratory:  Negative for cough and shortness of breath.    Musculoskeletal:  Positive for back pain.        Personal History     Past Medical History:   Diagnosis Date    Allergic     Anemia     IRON INFUSION RECENTLY    Arthritis     Asthma     Chronic kidney disease     stage 3    Elevated cholesterol     GERD (gastroesophageal reflux disease)     High risk medication use 2018    History of carpal tunnel syndrome     Hyperlipidemia     Hypertension     Intractable vomiting with nausea 2018    Left shoulder pain     Limited mobility     Rheumatoid arthritis     Vertigo     Weakness     AT TIMES LEFT SHOULDER/LEFT ARM       Past Surgical History:   Procedure Laterality Date    CARPAL TUNNEL RELEASE Left 10/02/2023    CARPAL TUNNEL RELEASE Right 2024     SECTION  , ,     COLONOSCOPY N/A 12/10/2018    normal ileum, IH, hyperplastic polyp, otherwise normal exam    ENDOSCOPY N/A 12/10/2018    no gross lesions in esophagus or examined duodenum, erythematous mucosa in stomach, biopsies benign    HYSTERECTOMY  2000    JOINT REPLACEMENT      OOPHORECTOMY Bilateral     SHOULDER MANIPULATION Left     TOTAL SHOULDER ARTHROPLASTY Left 2021    Procedure: REVERSE TOTAL SHOULDER ARTHROPLASTY,  BICEP TENDONDESIS;  Surgeon: Bethel Devi MD;  Location: [facility];  Service: Orthopedics;  Laterality: Left;    TRIGGER FINGER RELEASE Left     3 fingers       Family History: family history includes Asthma in her mother; Breast cancer in her maternal aunt; Colon  cancer in her maternal grandfather; Diabetes in her maternal grandmother; Thyroid disease in her father; Vision loss in her son. Otherwise pertinent FHx was reviewed and not pertinent to current issue.    Social History:  reports that she has never smoked. She has never been exposed to tobacco smoke. She has never used smokeless tobacco. She reports current alcohol use of about 1.0 standard drink of alcohol per week. She reports that she does not use drugs.    Home Medications:  Aspirin, B Complex Vitamins, Diclofenac Sodium, EPINEPHrine, HYDROcodone-acetaminophen, Lidocaine, Scopolamine, Vitamin D3, Vortioxetine HBr, albuterol sulfate HFA, amitriptyline, atorvastatin, azelastine, budesonide-formoterol, famotidine, ipratropium, irbesartan, loperamide, meclizine, methocarbamol, and ondansetron ODT    Allergies:  Allergies   Allergen Reactions    Cashew Nut Oil Anaphylaxis    Chocolate Anaphylaxis    Citrus Anaphylaxis    Nickel Anaphylaxis    Nuts Anaphylaxis    Tree Nut Anaphylaxis    Tatum Itching    Tatum Oil Other (See Comments)    Bacitracin Hives     Cannot remember, identified through testing    Baclofen Other (See Comments)    Chlorhexidine Unknown - Low Severity    Ciprofloxacin Other (See Comments)     THRUSH PER PATIENT    Citric Acid Unknown (See Comments)     Unsure      Covid-19 (Mrna) Vaccine Other (See Comments)     INSTRUCTED PER ALLERGIST SHE CAN NOT TAKE D/T ONE OF THE PRESERVATIVES    INSTRUCTED PER ALLERGIST SHE CAN NOT TAKE D/T ONE OF THE PRESERVATIVES      *is able to & has had the PFIZER vaccine*    Influenza Vaccines Nausea And Vomiting    Methyldibromoglutaronitrile Unknown (See Comments)      PATIENT UNSURE OF REACTION.    Naproxen Other (See Comments)    Neomycin Unknown (See Comments)      PATIENT UNSURE OF REACTION.    Omnicef [Cefdinir] Other (See Comments)     THRUSH PER PATIENT    Palladium Chloride Unknown (See Comments)      PATIENT UNSURE OF REACTION    Penicillins Other (See  Comments)     THRUSH PER PATIENT    Pineapple Extract Hives    Sulfa Antibiotics Other (See Comments)     THRUSH PER PATIENT    Adhesive Tape Unknown - Low Severity and Rash    Gold-Containing Drug Products Rash          Latex Rash       Objective    Objective     Vitals:        Physical Exam  Constitutional:       General: She is not in acute distress.  Pulmonary:      Effort: Pulmonary effort is normal. No respiratory distress.   Skin:     General: Skin is warm and dry.   Neurological:      Mental Status: She is alert.   Psychiatric:         Mood and Affect: Mood normal.         Thought Content: Thought content normal.         Result Review    Result Review:  I have personally reviewed the results from the time of this admission to 7/7/2025 10:33 EDT and agree with these findings:  []  Laboratory list / accordion  []  Microbiology  []  Radiology  []  EKG/Telemetry   []  Cardiology/Vascular   []  Pathology  []  Old records  []  Other:  Most notable findings include: No new       Assessment & Plan   Assessment / Plan     Brief Patient Summary:  Pedrito Powell is a 64 y.o. female who has chronic axial low back pain.    Active Hospital Problems:  Active Hospital Problems    Diagnosis     **Facet arthritis of lumbar region      Plan: Bilateral lumbar Medial branch blocks at ~L4-S1      VTE Prophylaxis:  No VTE prophylaxis order currently exists.      Kasandra Ayala MD

## 2025-07-07 NOTE — OP NOTE
Lumbar Medial Branch Blockade at  Bilateral L4-S1  [facility]      PREOPERATIVE DIAGNOSIS:  Lumbar spondylosis without myelopathy    POSTOPERATIVE DIAGNOSIS:  Lumbar spondylosis without myelopathy    PROCEDURE:   Diagnostic Lumbar Medial Branch Nerve Blockades, with fluoroscopy:  at the L4, L5 transverse processes and the sacral alar groove)   46226-48 -- Lumbar Facet blocks, 1st Level  46644-30 -- Lumbar Facet blocks, 2nd  Level    PRE-PROCEDURE DISCUSSION WITH PATIENT:    Risks and complications were discussed with the patient prior to starting the procedure and informed consent was obtained.      SURGEON:  Kasandra Ayala MD    REASON FOR PROCEDURE:    The patient complains of pain that seems to have a significant axial component and Painful area identified on exam under fluoroscopy    SEDATION:  Anxiolysis was used for this procedure which included PO Valium 10mg  ANESTHETIC:  0.25% bupivacaine  STEROID:  None  TOTAL VOLUME OF SOLUTION:  6ml    DESCRIPTON OF PROCEDURE:  After obtaining informed consent, IV access was not obtained in the preoperative area.   The patient was taken to the operating room.  The patient was placed in the prone position with a pillow under the abdomen. All pressure points were well padded.  Heart rate, blood pressure, and pulse oximeter were monitored.  The patient was monitored and sedated by the RN under my direction. The lumbosacral area was prepped with Chloraprep and draped in a sterile fashion.     AP fluoroscopic image was used to visualize the junction of the right S1 superior articular process with the sacral ala.  The skin and subcutaneous tissue over the area was anesthetized with 1% lidocaine.  A 22-gauge spinal needle was then advanced percutaneously through the anesthetized skin tract under fluoroscopic guidance in a coaxial view to contact periosteum.  After negative aspiration, a volume of 1 mL of the local anesthetic was injected without resistance.   The image was then optimized to maximize visualization of the junctions of the L4, L5 superior articular processes with the transverse processes.  The skin and subcutaneous tissue over the areas was anesthetized with 1% lidocaine.  A 22-gauge spinal needle was then advanced percutaneously through the anesthetized skin tracts under fluoroscopic guidance in a coaxial view to contact periosteum at the target sites.  After negative aspiration, a volume of 1 mL of the local anesthetic  was injected without resistance at each of the target sites.      The same procedure was then performed on the contralateral side in the exact same fashion.        Onset of analgesia was noted.  Vital signs remained stable throughout.      ESTIMATED BLOOD LOSS:  <5 mL  SPECIMENS:  none    COMPLICATIONS:   No complications were noted.    TOLERANCE & DISCHARGE CONDITION:    The patient tolerated the procedure well.  The patient was transported to the recovery area without difficulties.  The patient was discharged to home under the care of family in stable and satisfactory condition.    Pre-procedure pain score: 3/10 at rest, 7/10 with activity  Post-procedure pain score: 0/10    PLAN OF CARE:  The patient was given our standard instruction sheet.  We discussed that Lumbar Medial Branch Blockade is a diagnostic procedure in consideration for radiofrequency ablation if two diagnostic procedures prove to be positive for significant benefit.  An alternative plan could be therapeutic lumbar branch blockades.  The patient is asked to keep an account of pain relief after the procedure today.  The patient will  Return to clinic 1-2 wks.  The patient will resume all medications as per the medication reconciliation sheet.

## 2025-07-09 ENCOUNTER — OFFICE VISIT (OUTPATIENT)
Dept: ORTHOPEDIC SURGERY | Facility: CLINIC | Age: 65
End: 2025-07-09
Payer: COMMERCIAL

## 2025-07-09 ENCOUNTER — TREATMENT (OUTPATIENT)
Dept: PHYSICAL THERAPY | Facility: CLINIC | Age: 65
End: 2025-07-09
Payer: COMMERCIAL

## 2025-07-09 VITALS — BODY MASS INDEX: 46.33 KG/M2 | WEIGHT: 236 LBS | TEMPERATURE: 99.1 F | HEIGHT: 60 IN

## 2025-07-09 DIAGNOSIS — M17.11 PRIMARY OSTEOARTHRITIS OF RIGHT KNEE: ICD-10-CM

## 2025-07-09 DIAGNOSIS — M17.0 PRIMARY OSTEOARTHRITIS OF BOTH KNEES: ICD-10-CM

## 2025-07-09 DIAGNOSIS — M48.061 SPINAL STENOSIS OF LUMBAR REGION WITHOUT NEUROGENIC CLAUDICATION: Primary | ICD-10-CM

## 2025-07-09 DIAGNOSIS — Z74.09 IMPAIRED FUNCTIONAL MOBILITY AND ACTIVITY TOLERANCE: ICD-10-CM

## 2025-07-09 DIAGNOSIS — M25.562 CHRONIC PAIN OF BOTH KNEES: Primary | ICD-10-CM

## 2025-07-09 DIAGNOSIS — M25.561 CHRONIC PAIN OF BOTH KNEES: Primary | ICD-10-CM

## 2025-07-09 DIAGNOSIS — M17.12 PRIMARY OSTEOARTHRITIS OF LEFT KNEE: ICD-10-CM

## 2025-07-09 DIAGNOSIS — G89.29 CHRONIC PAIN OF BOTH KNEES: Primary | ICD-10-CM

## 2025-07-09 RX ORDER — LIDOCAINE HYDROCHLORIDE 10 MG/ML
2 INJECTION, SOLUTION EPIDURAL; INFILTRATION; INTRACAUDAL; PERINEURAL
Status: COMPLETED | OUTPATIENT
Start: 2025-07-09 | End: 2025-07-09

## 2025-07-09 RX ORDER — METHYLPREDNISOLONE ACETATE 80 MG/ML
80 INJECTION, SUSPENSION INTRA-ARTICULAR; INTRALESIONAL; INTRAMUSCULAR; SOFT TISSUE
Status: COMPLETED | OUTPATIENT
Start: 2025-07-09 | End: 2025-07-09

## 2025-07-09 RX ADMIN — LIDOCAINE HYDROCHLORIDE 2 ML: 10 INJECTION, SOLUTION EPIDURAL; INFILTRATION; INTRACAUDAL; PERINEURAL at 10:14

## 2025-07-09 RX ADMIN — METHYLPREDNISOLONE ACETATE 80 MG: 80 INJECTION, SUSPENSION INTRA-ARTICULAR; INTRALESIONAL; INTRAMUSCULAR; SOFT TISSUE at 10:14

## 2025-07-09 NOTE — PROGRESS NOTES
Ms. Powell comes in today for follow-up.  Injections have worked well in the past.  The patient would like to get repeat injections today.  The risks, benefits and alternatives were discussed and the patient consented.  Going forward, the patient will follow-up as needed.    KALYANI Cornelius    07/09/2025      Large Joint Arthrocentesis: R knee  Date/Time: 7/9/2025 10:14 AM  Consent given by: patient  Site marked: site marked  Timeout: Immediately prior to procedure a time out was called to verify the correct patient, procedure, equipment, support staff and site/side marked as required   Supporting Documentation  Indications: pain   Procedure Details  Location: knee - R knee  Preparation: Patient was prepped and draped in the usual sterile fashion  Needle size: 22 G  Approach: anterolateral  Medications administered: 80 mg methylPREDNISolone acetate 80 MG/ML  Patient tolerance: patient tolerated the procedure well with no immediate complications      Large Joint Arthrocentesis: L knee  Date/Time: 7/9/2025 10:14 AM  Consent given by: patient  Site marked: site marked  Timeout: Immediately prior to procedure a time out was called to verify the correct patient, procedure, equipment, support staff and site/side marked as required   Supporting Documentation  Indications: pain   Procedure Details  Location: knee - L knee  Preparation: Patient was prepped and draped in the usual sterile fashion  Needle size: 22 G  Approach: anterolateral  Medications administered: 80 mg methylPREDNISolone acetate 80 MG/ML; 2 mL lidocaine PF 1% 1 %  Patient tolerance: patient tolerated the procedure well with no immediate complications

## 2025-07-09 NOTE — PROGRESS NOTES
Physical Therapy Daily Treatment Note  [facility] [place], [place]  [address] [address], [address]  [place] [address]    Patient: Pedrito Powell   : 1960  Referring practitioner: Tung Price MD  Today's Date: 2025  Patient seen for 24 sessions    Visit Diagnoses:    ICD-10-CM ICD-9-CM   1. Spinal stenosis of lumbar region without neurogenic claudication  M48.061 724.02   2. Impaired functional mobility and activity tolerance  Z74.09 V49.89   3. Primary osteoarthritis of both knees  M17.0 715.16       Subjective   Pedrito Powell reports: that she has her MBB on Monday and got excellent results from it.  Also got 2 steroid injections in the knees this morning.  States that Sherie told her that she would be OK for therapy today.      Objective   Presents with upright posture with minimal weight on her walker today    Audible pop in left knee with SLS    See Exercise, Manual, and Modality Logs for complete treatment.     Patient Education: rest the legs the rest of the day    Assessment/Plan  Patient did very well with MBB getting considerable relief from it.  Will meet with pain mgmt provider to discuss second block.  Knee pain was minimal today as she received steroid injections in them so we did not do as much LE strengthening today to allow the injections to work.    Progress strengthening /stabilization /functional activity  Resume full program as able         Timed:  Manual Therapy:    0     mins  81748;  Therapeutic Exercise:    25/35     mins  30889;     Neuromuscular Dion:    0    mins  11945;    Therapeutic Activity:     15     mins  92208;     Ultrasound:      0     mins  54235;    Iontophoresis              __0_   mins        Untimed:  Electrical Stimulation:     0    mins  72094 ( );  Mechanical Traction:             mins  59102;   Paraffin                       _____  mins   Dry Needling               _____   mins      Timed Treatment:   40   mins   Total  Treatment:     60   mins    Anais Aguilera, PT  [place] License # [id number]  Physical Therapist    Electronically signed by Anais Aguilera, PT, 07/09/25, 12:04 PM EDT

## 2025-07-10 NOTE — PROGRESS NOTES
Physical Therapy Daily Treatment Note  7/11/2025  Visit Diagnoses:    ICD-10-CM ICD-9-CM   1. Spinal stenosis of lumbar region without neurogenic claudication  M48.061 724.02   2. Impaired functional mobility and activity tolerance  Z74.09 V49.89   3. Primary osteoarthritis of both knees  M17.0 715.16       VISIT#: 25      Pedrito Powell reports: Her back is doing well today. Ever since the MBB her back has been feeling better. She did not sleep well last night - maybe an hour. Not from pain but she just couldn't sleep.   Current Pain Level:    2/10  Affected Area: across the lower back, L knee gives out  Quality of Pain: tight, dull ache, discomfort, knife-like and sharp   Functional Deficits/Irritating Factors: prolonged positioning, squatting, lifting and stairs (sit in hard chair. slumpiing)      Objective  Presents: ambulating with RW. Forward flexed trunk, stiff-legged gait on RLE   Increased sets/reps of:  Leg Press           See Exercise, Manual, and Modality Logs for complete treatment.     Patient Education: Pt was educated on exercise biomechanical correctness, intensity, and speed.     Assessment:  Pedrito is doing much better in her lumbar spine since her MBB. Performed all her exercises today without any issues, including LE exercises.  Pt will continue to benefit from skilled PT interventions to address current functional deficits and impairments.       Plan: Progress to/Continue with current program.       Timed:  Manual Therapy:            0_    mins  09087;  Ultrasound:                     0      mins  85028;   Therapeutic Exercise:    25     mins  60486;     Neuromuscular Dion:   0     mins  81114;    Therapeutic Activity:      15     mins  66822;      Iontophoresis              _0__   mins  Dry Needling               _0____   mins         Untimed:  Work Conditioning: __0__ mins 59598  Electrical Stimulation:    0    mins  47714 ( );  Mechanical Traction:       0        mins  86621;    Paraffin                       __0___  mins   Ice/Heat: __0__ mins      Timed Treatment:   40   mins   Total Treatment:     40   mins          Sheila Galdamez, JULIETH  [place] License # [id number]  Physical Therapist Assistant

## 2025-07-11 ENCOUNTER — TREATMENT (OUTPATIENT)
Dept: PHYSICAL THERAPY | Facility: CLINIC | Age: 65
End: 2025-07-11
Payer: COMMERCIAL

## 2025-07-11 DIAGNOSIS — M48.061 SPINAL STENOSIS OF LUMBAR REGION WITHOUT NEUROGENIC CLAUDICATION: Primary | ICD-10-CM

## 2025-07-11 DIAGNOSIS — M17.0 PRIMARY OSTEOARTHRITIS OF BOTH KNEES: ICD-10-CM

## 2025-07-11 DIAGNOSIS — Z74.09 IMPAIRED FUNCTIONAL MOBILITY AND ACTIVITY TOLERANCE: ICD-10-CM

## 2025-07-11 PROCEDURE — 97110 THERAPEUTIC EXERCISES: CPT | Performed by: PHYSICAL THERAPIST

## 2025-07-11 PROCEDURE — 97530 THERAPEUTIC ACTIVITIES: CPT | Performed by: PHYSICAL THERAPIST

## 2025-07-16 ENCOUNTER — TRANSCRIBE ORDERS (OUTPATIENT)
Dept: SURGERY | Facility: SURGERY CENTER | Age: 65
End: 2025-07-16
Payer: COMMERCIAL

## 2025-07-16 ENCOUNTER — OFFICE VISIT (OUTPATIENT)
Dept: PAIN MEDICINE | Facility: CLINIC | Age: 65
End: 2025-07-16
Payer: COMMERCIAL

## 2025-07-16 ENCOUNTER — PREP FOR SURGERY (OUTPATIENT)
Dept: SURGERY | Facility: SURGERY CENTER | Age: 65
End: 2025-07-16
Payer: COMMERCIAL

## 2025-07-16 ENCOUNTER — TREATMENT (OUTPATIENT)
Dept: PHYSICAL THERAPY | Facility: CLINIC | Age: 65
End: 2025-07-16
Payer: COMMERCIAL

## 2025-07-16 VITALS
HEART RATE: 76 BPM | HEIGHT: 60 IN | OXYGEN SATURATION: 97 % | DIASTOLIC BLOOD PRESSURE: 72 MMHG | BODY MASS INDEX: 45.16 KG/M2 | WEIGHT: 230 LBS | SYSTOLIC BLOOD PRESSURE: 128 MMHG | TEMPERATURE: 97.3 F

## 2025-07-16 DIAGNOSIS — Z41.9 SURGERY, ELECTIVE: Primary | ICD-10-CM

## 2025-07-16 DIAGNOSIS — M47.816 FACET ARTHRITIS OF LUMBAR REGION: Primary | ICD-10-CM

## 2025-07-16 DIAGNOSIS — M17.0 PRIMARY OSTEOARTHRITIS OF BOTH KNEES: ICD-10-CM

## 2025-07-16 DIAGNOSIS — M48.061 SPINAL STENOSIS OF LUMBAR REGION WITHOUT NEUROGENIC CLAUDICATION: Primary | ICD-10-CM

## 2025-07-16 DIAGNOSIS — Z74.09 IMPAIRED FUNCTIONAL MOBILITY AND ACTIVITY TOLERANCE: ICD-10-CM

## 2025-07-16 DIAGNOSIS — M48.061 SPINAL STENOSIS, LUMBAR REGION, WITHOUT NEUROGENIC CLAUDICATION: ICD-10-CM

## 2025-07-16 DIAGNOSIS — M51.360 DEGENERATION OF INTERVERTEBRAL DISC OF LUMBAR REGION WITH DISCOGENIC BACK PAIN: ICD-10-CM

## 2025-07-16 PROCEDURE — 99214 OFFICE O/P EST MOD 30 MIN: CPT | Performed by: PHYSICIAN ASSISTANT

## 2025-07-16 RX ORDER — SODIUM CHLORIDE 0.9 % (FLUSH) 0.9 %
10 SYRINGE (ML) INJECTION AS NEEDED
OUTPATIENT
Start: 2025-07-16

## 2025-07-16 RX ORDER — SODIUM CHLORIDE 0.9 % (FLUSH) 0.9 %
10 SYRINGE (ML) INJECTION EVERY 12 HOURS SCHEDULED
OUTPATIENT
Start: 2025-07-16

## 2025-07-16 RX ORDER — HYDROCODONE BITARTRATE AND ACETAMINOPHEN 5; 325 MG/1; MG/1
1 TABLET ORAL
COMMUNITY
Start: 2025-07-14 | End: 2025-08-13

## 2025-07-16 NOTE — PROGRESS NOTES
CHIEF COMPLAINT    Back pain.    Subjective   Pedrito Powell is a 64 y.o. female  who presents to the office for follow-up of procedure.  She completed a LUMBAR MEDIAL BRANCH BLOCK BILATERAL L4-S1 #1  on  7/7/25 performed by Dr. Ayala for management of back pain. Patient reports 2 days of 95% relief from the procedure.  She has since noted a return of pain in a bandlike distribution across the lumbar spine which is referred into the bilateral hips and occasionally radiates into the upper portion of the buttocks.  She denies lower extremity radicular symptoms.    On 7/14/2025 the patient underwent removal of a hand mucous cyst by Dr. Harley.    Pain today 3/10 VAS while seated however pain will increase to 6/10 with activity.        Back Pain  Chronicity:  Chronic  Onset:  More than 1 year ago  Frequency:  Constantly  Progression since onset:  Unchanged  Pain location:  Lumbar spine  Pain quality:  Aching (bruised feeling)  Radiates to:  Left buttock and right buttock  Pain-numeric:  3/10  Pain severity:  Moderate  Worse during: worsens throughout the day.  Aggravated by:  Standing (going up/down stairs/ prolonged walking/washing dishes/cooking)  Associated symptoms: no fever, no numbness and no weakness    Risk factors:  Obesity and sedentary lifestyle  Treatments tried:  Home exercises, physical therapy, analgesics and heat  Improvement on treatment:  Moderate       PEG Assessment   What number best describes your pain on average in the past week?5  What number best describes how, during the past week, pain has interfered with your enjoyment of life?4  What number best describes how, during the past week, pain has interfered with your general activity?  3    Review of Pertinent Medical Data ---  No imaging reviewed today    The following portions of the patient's history were reviewed and updated as appropriate: allergies, current medications, past family history, past medical history, past social history, past  "surgical history, and problem list.    Review of Systems   Constitutional:  Negative for chills and fever.   Respiratory:  Negative for cough and shortness of breath.    Gastrointestinal:  Negative for constipation and diarrhea.   Genitourinary:  Negative for difficulty urinating.   Musculoskeletal:  Positive for back pain and gait problem (ambulates with a walker).   Neurological:  Negative for dizziness, weakness, light-headedness and numbness.   Psychiatric/Behavioral:  Negative for suicidal ideas.      I have reviewed and confirmed the accuracy of the ROS as documented by the MA/LPN/RN POPEYE Kaufman   Vitals:    07/16/25 1443   BP: 128/72   BP Location: Left arm   Patient Position: Sitting   Pulse: 76   Temp: 97.3 °F (36.3 °C)   TempSrc: Temporal   SpO2: 97%   Weight: 104 kg (230 lb)   Height: 152.4 cm (60\")   PainSc: 3    PainLoc: Back         Objective   Physical Exam  Vitals and nursing note reviewed.   Constitutional:       Appearance: Normal appearance. She is obese.   HENT:      Head: Normocephalic.   Pulmonary:      Effort: Pulmonary effort is normal.   Musculoskeletal:      Left hand: Decreased range of motion.      Lumbar back: Spasms and tenderness (exquisite pain to palpation over the bilateral lumbar facet joint spaces) present. Decreased range of motion.        Back:       Comments: Left hand and thumb are wrapped s/p surgery   Skin:     General: Skin is warm and dry.   Neurological:      General: No focal deficit present.      Mental Status: She is alert and oriented to person, place, and time.      Cranial Nerves: Cranial nerves 2-12 are intact.      Sensory: Sensation is intact.      Motor: Motor function is intact.      Gait: Gait abnormal (ambulates with a walker).      Deep Tendon Reflexes:      Reflex Scores:       Patellar reflexes are 0 on the right side and 0 on the left side.  Psychiatric:         Mood and Affect: Mood normal.         Behavior: Behavior normal.         Thought " Content: Thought content normal.         Judgment: Judgment normal.       Assessment & Plan   Diagnoses and all orders for this visit:    1. Facet arthritis of lumbar region (Primary)    2. Spinal stenosis, lumbar region, without neurogenic claudication    3. Degeneration of intervertebral disc of lumbar region with discogenic back pain        Pedrito Powell reports a pain score of 3.  Given her pain assessment as noted, treatment options were discussed and the following options were decided upon as a follow-up plan to address the patient's pain: continuation of current treatment plan for pain and use of non-medical modalities (ice, heat, stretching and/or behavior modifications).    PHQ-2 Depression Screening  Little interest or pleasure in doing things? Not at all   Feeling down, depressed, or hopeless? Not at all   PHQ-2 Total Score 0     Tobacco Use: Low Risk  (7/16/2025)    Patient History     Smoking Tobacco Use: Never     Smokeless Tobacco Use: Never     Passive Exposure: Never           --- Return for #2 confirmatory/diagnostic bilateral L4-S1 medial branch block on nephroscopy guidance with plan to proceed to radiofrequency ablation with continued favorable response.  --- Follow-up 1 week after injective therapy        Diagnostic Facet Joint Procedure:   MBB   The confirmatory diagnostic facet joint procedure is considered medically reasonable and necessary for the diagnosis and treatment of chronic pain for this patient due to the patient meeting all of the following criteria:    - 1. Moderate to severe chronic neck or low back pain, predominantly axial, that causes functional deficit measured on pain or disability scale.  - 2. Pain present for minimum of 3 months with documented failure to respond to noninvasive conservative management (as tolerated)  - 3. Absence of untreated radiculopathy or neurogenic claudication (except for radiculopathy caused by facet joint synovial cyst)  - 4. There is no  non-facet pathology per clinical assessment or radiology studies that could explain the source of the patient’s pain, including but not limited to fracture, tumor, infection, or significant deformity.    The patient has also shown a consistent positive response of at least 80% relief of primary (index) pain (with the duration of relief being consistent with the agent used).     Discussed with the patient that sedation is optional for this procedure.  The sedation offered is called conscious sedation which is different from general anesthesia that is utilized in surgical procedures. The dosing of the sedation is determined by the physician and they will be monitored throughout the procedure. With conscious sedation it is possible to remember parts or all of the procedure, this is normal. They will need to have a  with them as driving is prohibited following conscious sedation.     NPO instructions for conscious sedation:  --- Do not eat 8 hours prior to the procedure.   --- Do not drink any dairy or citrus 4 hours prior to the procedure.   --- Do not drink anything, including clear liquids, 2 hours prior to procedure.     If the NPO instructions are not followed then the procedure may be performed without sedation or the procedure will need to be rescheduled.            Dictated utilizing Dragon dictation.

## 2025-07-16 NOTE — PROGRESS NOTES
Physical Therapy Daily Treatment Note  [facility]  [address] [address] [facility], [address]  [place] [address]    Patient: Pedrito Powell   : 1960  Referring practitioner: Tung Price MD  Today's Date: 2025  Patient seen for 26 sessions    Visit Diagnoses:    ICD-10-CM ICD-9-CM   1. Spinal stenosis of lumbar region without neurogenic claudication  M48.061 724.02   2. Impaired functional mobility and activity tolerance  Z74.09 V49.89   3. Primary osteoarthritis of both knees  M17.0 715.16       Subjective   Pedrito Powell reports: that she had thumb surgery earlier this week and is not to use her hand for any exercises today.  States that she is going to pain mgmt today to discuss possible seconds MBB.      Objective   Walking with very stiff leg on the right    See Exercise, Manual, and Modality Logs for complete treatment.     Patient Education:    Assessment/Plan  Defer many exercises today due to thumb surgery.  Back pain has decreased with improved ease of standing    Progress strengthening /stabilization /functional activity           Timed:  Manual Therapy:    0     mins  20391;  Therapeutic Exercise:    10/20     mins  81625;     Neuromuscular Dion:    10    mins  86017;    Therapeutic Activity:     10     mins  05530;     Ultrasound:      0     mins  50276;    Iontophoresis              __0_   mins        Untimed:  Electrical Stimulation:     0    mins  68040 ( );  Mechanical Traction:             mins  28380;   Paraffin                       _____  mins   Dry Needling               _____   mins      Timed Treatment:   30   mins   Total Treatment:     55   mins    Anais Aguilera PT  [place] License # [id number]  Physical Therapist    Electronically signed by Anais Aguilera PT, 25, 7:15 AM EDT

## 2025-07-24 ENCOUNTER — OFFICE VISIT (OUTPATIENT)
Dept: INTERNAL MEDICINE | Facility: CLINIC | Age: 65
End: 2025-07-24
Payer: COMMERCIAL

## 2025-07-24 VITALS
DIASTOLIC BLOOD PRESSURE: 78 MMHG | SYSTOLIC BLOOD PRESSURE: 122 MMHG | BODY MASS INDEX: 45.55 KG/M2 | WEIGHT: 232 LBS | RESPIRATION RATE: 18 BRPM | HEART RATE: 82 BPM | OXYGEN SATURATION: 96 % | HEIGHT: 60 IN | TEMPERATURE: 98.1 F

## 2025-07-24 DIAGNOSIS — F41.9 ANXIETY: ICD-10-CM

## 2025-07-24 DIAGNOSIS — I10 HYPERTENSION, UNSPECIFIED TYPE: ICD-10-CM

## 2025-07-24 DIAGNOSIS — F32.A DEPRESSION, UNSPECIFIED DEPRESSION TYPE: ICD-10-CM

## 2025-07-24 DIAGNOSIS — Z74.09 DECREASED AMBULATION STATUS: ICD-10-CM

## 2025-07-24 DIAGNOSIS — M54.50 ACUTE LOW BACK PAIN WITHOUT SCIATICA, UNSPECIFIED BACK PAIN LATERALITY: ICD-10-CM

## 2025-07-24 DIAGNOSIS — E78.5 HYPERLIPIDEMIA, UNSPECIFIED HYPERLIPIDEMIA TYPE: Primary | ICD-10-CM

## 2025-07-24 PROCEDURE — 99214 OFFICE O/P EST MOD 30 MIN: CPT | Performed by: FAMILY MEDICINE

## 2025-07-24 RX ORDER — ATORVASTATIN CALCIUM 80 MG/1
80 TABLET, FILM COATED ORAL DAILY
Qty: 90 TABLET | Refills: 1 | Status: SHIPPED | OUTPATIENT
Start: 2025-07-24

## 2025-07-24 RX ORDER — IRBESARTAN 300 MG/1
300 TABLET ORAL DAILY
Qty: 90 TABLET | Refills: 1 | Status: SHIPPED | OUTPATIENT
Start: 2025-07-24

## 2025-07-24 NOTE — PROGRESS NOTES
Physical Therapy Daily Treatment Note  7/25/2025  Visit Diagnoses:    ICD-10-CM ICD-9-CM   1. Spinal stenosis of lumbar region without neurogenic claudication  M48.061 724.02   2. Impaired functional mobility and activity tolerance  Z74.09 V49.89   3. Primary osteoarthritis of both knees  M17.0 715.16       VISIT#: 27      Pedrito Powell reports: Her back is doing pretty well. Her current pain is about a 2-3/10 and stays there most of the time. Her back did well during her traveling. She is walking longer, sitting longer and sleeping well.   Current Pain Level:    2-3/10  Affected Area: across the lower back,  Quality of Pain: tight, dull ache, discomfort, knife-like and sharp   Functional Deficits/Irritating Factors: prolonged positioning, squatting, lifting and stairs (sit in hard chair. slumpiing)        Objective  Presents: ambulating with RW. Forward flexed trunk, stiff-legged gait on RLE   Increased sets/reps of:  SL hip abduction   Increased resistance on:  standing hip extension  Added to Program: none        See Exercise, Manual, and Modality Logs for complete treatment.     Patient Education: Pt was educated on exercise biomechanical correctness, intensity, and speed.     Assessment:  Pedrito's low back has been doing better. She is sleeping better, walking further and able to sit for longer periods of time before symptoms begin to increase.  Unable to flex R knee when ambulating secondary to pain. Pt will continue to benefit from skilled PT interventions to address current functional deficits and impairments.       Plan: Progress to/Continue with current program.       Timed:  Manual Therapy:            0_    mins  01572;  Ultrasound:                     0      mins  97532;   Therapeutic Exercise:    25     mins  90759;     Neuromuscular Dion:   10     mins  39964;    Therapeutic Activity:      10     mins  45247;      Iontophoresis              _0__   mins  Dry Needling               _0____   mins          Untimed:  Work Conditioning: __0__ mins 33422  Electrical Stimulation:    15    mins  29352 ( );  Mechanical Traction:       0        mins  60496;   Paraffin                       __0___  mins   Ice/Heat: __15 with stim__ mins      Timed Treatment:   45   mins   Total Treatment:     60   mins          JULIETH Edmondson License # [id number]  Physical Therapist Assistant

## 2025-07-24 NOTE — PROGRESS NOTES
"Chief Complaint  Leg Swelling (Follow up )    Subjective          Pedrito Powell presents to [facility]  History of Present Illness  The patient came in for a check-up on her left hand pain, stenosis, high blood pressure, high cholesterol, and anxiety and depression.    She has been dealing with pain in her left hand since her surgery and is scheduled to have her stitches removed today. She is currently doing physical therapy.    The pain management team gave her the first medial branch block for her stenosis, which worked well. They plan to do a second one, and if that helps too, they will consider an ablation procedure. She is also continuing physical therapy for her back, which is getting better.    When she left the hospital, she was given a walker. However, she prefers to use a cane at home and only uses the walker when she really needs it. She hasn't had a full day of walking on her own yet. She is looking for advice on whether she should keep using the walker or switch to the cane.    She also mentioned having a recent ear infection.    She has a history of high blood pressure and is taking irbesartan 300 mg daily. She also has high cholesterol and is taking Lipitor 80 mg daily. For her anxiety and depression, she is taking Trintellix 10 mg daily without any issues.    Objective   Vital Signs:   /78 (BP Location: Right arm, Patient Position: Sitting)   Pulse 82   Temp 98.1 °F (36.7 °C) (Oral)   Resp 18   Ht 152.4 cm (60\")   Wt 105 kg (232 lb)   SpO2 96%   BMI 45.31 kg/m²     Physical Exam  Vitals and nursing note reviewed.   Constitutional:       Appearance: She is well-developed.   HENT:      Head: Normocephalic and atraumatic.   Musculoskeletal:      Cervical back: Normal range of motion and neck supple.   Neurological:      Mental Status: She is alert and oriented to person, place, and time.   Psychiatric:         Behavior: Behavior normal.         Physical " Exam       Result Review :                 Assessment and Plan    Diagnoses and all orders for this visit:    1. Hyperlipidemia, unspecified hyperlipidemia type (Primary)  -     atorvastatin (LIPITOR) 80 MG tablet; Take 1 tablet by mouth Daily.  Dispense: 90 tablet; Refill: 1  -     Lipid Panel With LDL / HDL Ratio    2. Acute low back pain without sciatica, unspecified back pain laterality    3. Hypertension, unspecified type  -     irbesartan (AVAPRO) 300 MG tablet; Take 1 tablet by mouth Daily.  Dispense: 90 tablet; Refill: 1  -     Comprehensive Metabolic Panel  -     CBC & Differential    4. Depression, unspecified depression type  -     Vortioxetine HBr (Trintellix) 10 MG tablet tablet; Take 1 tablet by mouth Daily With Breakfast.  Dispense: 90 tablet; Refill: 3    5. Anxiety  -     Vortioxetine HBr (Trintellix) 10 MG tablet tablet; Take 1 tablet by mouth Daily With Breakfast.  Dispense: 90 tablet; Refill: 3    6. Decreased ambulation status      Assessment & Plan  1. Left hand pain.  - Persistent pain reported since surgery.  - Undergoing physical therapy; first medial branch block for stenosis was effective.  - Second medial branch block upcoming; potential ablation if successful.  - Advised to continue using the walker for mobility in school settings until independent walking is feasible; reassessment after Labor Day.    2. Stenosis.  - First medial branch block was effective.  - Second medial branch block scheduled soon.  - Ablation procedure will be considered if the second block is effective.    3. Essential hypertension.  - History of essential hypertension; currently taking irbesartan 300 mg daily.  - Blood work will be conducted today to monitor condition.    4. Hyperlipidemia.  - History of hyperlipidemia; currently taking Lipitor 80 mg daily.  - Blood work will be conducted today to monitor lipid levels.    Follow-up: Mid-September.    Follow Up   No follow-ups on file.  Patient was given  instructions and counseling regarding her condition or for health maintenance advice. Please see specific information pulled into the AVS if appropriate.           Patient or patient representative verbalized consent for the use of Ambient Listening during the visit with  Teo Fraser MD for chart documentation. 7/24/2025  09:25 EDT

## 2025-07-25 ENCOUNTER — TREATMENT (OUTPATIENT)
Dept: PHYSICAL THERAPY | Facility: CLINIC | Age: 65
End: 2025-07-25
Payer: COMMERCIAL

## 2025-07-25 DIAGNOSIS — M17.0 PRIMARY OSTEOARTHRITIS OF BOTH KNEES: ICD-10-CM

## 2025-07-25 DIAGNOSIS — M48.061 SPINAL STENOSIS OF LUMBAR REGION WITHOUT NEUROGENIC CLAUDICATION: Primary | ICD-10-CM

## 2025-07-25 DIAGNOSIS — Z74.09 IMPAIRED FUNCTIONAL MOBILITY AND ACTIVITY TOLERANCE: ICD-10-CM

## 2025-07-25 LAB
ALBUMIN SERPL-MCNC: 4 G/DL (ref 3.5–5.2)
ALBUMIN/GLOB SERPL: 1.3 G/DL
ALP SERPL-CCNC: 79 U/L (ref 39–117)
ALT SERPL-CCNC: 16 U/L (ref 1–33)
AST SERPL-CCNC: 17 U/L (ref 1–32)
BASOPHILS # BLD AUTO: 0.06 10*3/MM3 (ref 0–0.2)
BASOPHILS NFR BLD AUTO: 0.6 % (ref 0–1.5)
BILIRUB SERPL-MCNC: 0.3 MG/DL (ref 0–1.2)
BUN SERPL-MCNC: 20 MG/DL (ref 8–23)
BUN/CREAT SERPL: 17.5 (ref 7–25)
CALCIUM SERPL-MCNC: 9.4 MG/DL (ref 8.6–10.5)
CHLORIDE SERPL-SCNC: 106 MMOL/L (ref 98–107)
CHOLEST SERPL-MCNC: 178 MG/DL (ref 0–200)
CO2 SERPL-SCNC: 25.9 MMOL/L (ref 22–29)
CREAT SERPL-MCNC: 1.14 MG/DL (ref 0.57–1)
EGFRCR SERPLBLD CKD-EPI 2021: 53.9 ML/MIN/1.73
EOSINOPHIL # BLD AUTO: 0.28 10*3/MM3 (ref 0–0.4)
EOSINOPHIL NFR BLD AUTO: 2.9 % (ref 0.3–6.2)
ERYTHROCYTE [DISTWIDTH] IN BLOOD BY AUTOMATED COUNT: 13.6 % (ref 12.3–15.4)
GLOBULIN SER CALC-MCNC: 3.2 GM/DL
GLUCOSE SERPL-MCNC: 108 MG/DL (ref 65–99)
HCT VFR BLD AUTO: 36.4 % (ref 34–46.6)
HDLC SERPL-MCNC: 63 MG/DL (ref 40–60)
HGB BLD-MCNC: 11.4 G/DL (ref 12–15.9)
IMM GRANULOCYTES # BLD AUTO: 0.02 10*3/MM3 (ref 0–0.05)
IMM GRANULOCYTES NFR BLD AUTO: 0.2 % (ref 0–0.5)
LDLC SERPL CALC-MCNC: 95 MG/DL (ref 0–100)
LDLC/HDLC SERPL: 1.48 {RATIO}
LYMPHOCYTES # BLD AUTO: 1.84 10*3/MM3 (ref 0.7–3.1)
LYMPHOCYTES NFR BLD AUTO: 19 % (ref 19.6–45.3)
MCH RBC QN AUTO: 29.8 PG (ref 26.6–33)
MCHC RBC AUTO-ENTMCNC: 31.3 G/DL (ref 31.5–35.7)
MCV RBC AUTO: 95 FL (ref 79–97)
MONOCYTES # BLD AUTO: 0.69 10*3/MM3 (ref 0.1–0.9)
MONOCYTES NFR BLD AUTO: 7.1 % (ref 5–12)
NEUTROPHILS # BLD AUTO: 6.77 10*3/MM3 (ref 1.7–7)
NEUTROPHILS NFR BLD AUTO: 70.2 % (ref 42.7–76)
NRBC BLD AUTO-RTO: 0 /100 WBC (ref 0–0.2)
PLATELET # BLD AUTO: 270 10*3/MM3 (ref 140–450)
POTASSIUM SERPL-SCNC: 4.4 MMOL/L (ref 3.5–5.2)
PROT SERPL-MCNC: 7.2 G/DL (ref 6–8.5)
RBC # BLD AUTO: 3.83 10*6/MM3 (ref 3.77–5.28)
SODIUM SERPL-SCNC: 144 MMOL/L (ref 136–145)
TRIGL SERPL-MCNC: 110 MG/DL (ref 0–150)
VLDLC SERPL CALC-MCNC: 20 MG/DL (ref 5–40)
WBC # BLD AUTO: 9.66 10*3/MM3 (ref 3.4–10.8)

## 2025-07-25 PROCEDURE — 97110 THERAPEUTIC EXERCISES: CPT | Performed by: PHYSICAL THERAPIST

## 2025-07-25 PROCEDURE — 97112 NEUROMUSCULAR REEDUCATION: CPT | Performed by: PHYSICAL THERAPIST

## 2025-07-25 PROCEDURE — 97530 THERAPEUTIC ACTIVITIES: CPT | Performed by: PHYSICAL THERAPIST

## 2025-07-25 PROCEDURE — 97014 ELECTRIC STIMULATION THERAPY: CPT | Performed by: PHYSICAL THERAPIST

## 2025-07-27 NOTE — PROGRESS NOTES
"Physical Therapy Daily Treatment Note  7/28/2025  Visit Diagnoses:    ICD-10-CM ICD-9-CM   1. Spinal stenosis of lumbar region without neurogenic claudication  M48.061 724.02   2. Impaired functional mobility and activity tolerance  Z74.09 V49.89   3. Primary osteoarthritis of both knees  M17.0 715.16       VISIT#: 28      Pedrito Powell reports: things are about the same as her last PT visit. She is not getting any  more \"glitches\" in the pool like she used to get.   Current Pain Level:    2-3/10  Affected Area: across the lower back,        Objective  Presents: ambulating with RW. Forward flexed trunk, stiff-legged gait on RLE     Added to Program: resisted sidestepping        See Exercise, Manual, and Modality Logs for complete treatment.     Patient Education: Pt was educated on exercise biomechanical correctness, intensity, and speed.     Assessment:  Did bicycle in reverse w/o resistance today instead of scifit to get more r knee flexion. Pedrito, reported no pain but some soreness after 5 min. Hoping to improve her gait pattern to also decrease lumbar symptoms.  She is making small but steady progress in her back. R knee continues with persistent stiffness. Pt will continue to benefit from skilled PT interventions to address current functional deficits and impairments.       Plan: Progress to/Continue with current program.       Timed:  Manual Therapy:            0_    mins  99317;  Ultrasound:                     0      mins  18572;   Therapeutic Exercise:    37     mins  51997;     Neuromuscular Dion:   15     mins  36597;    Therapeutic Activity:      15     mins  18978;      Iontophoresis              _0__   mins  Dry Needling               _0____   mins         Untimed:  Work Conditioning: __0__ mins 85778  Electrical Stimulation:    15    mins  35699 ( );  Mechanical Traction:       0        mins  70222;   Paraffin                       __0___  mins   Ice/Heat: __15__ mins      Timed Treatment:   " 67   mins   Total Treatment:     82   mins          Sheila Galdamez, JULIETH  [place] License # [id number]  Physical Therapist Assistant

## 2025-07-28 ENCOUNTER — TREATMENT (OUTPATIENT)
Dept: PHYSICAL THERAPY | Facility: CLINIC | Age: 65
End: 2025-07-28
Payer: COMMERCIAL

## 2025-07-28 DIAGNOSIS — M48.061 SPINAL STENOSIS OF LUMBAR REGION WITHOUT NEUROGENIC CLAUDICATION: Primary | ICD-10-CM

## 2025-07-28 DIAGNOSIS — Z74.09 IMPAIRED FUNCTIONAL MOBILITY AND ACTIVITY TOLERANCE: ICD-10-CM

## 2025-07-28 DIAGNOSIS — M17.0 PRIMARY OSTEOARTHRITIS OF BOTH KNEES: ICD-10-CM

## 2025-07-30 ENCOUNTER — TREATMENT (OUTPATIENT)
Dept: PHYSICAL THERAPY | Facility: CLINIC | Age: 65
End: 2025-07-30
Payer: COMMERCIAL

## 2025-07-30 DIAGNOSIS — M17.0 PRIMARY OSTEOARTHRITIS OF BOTH KNEES: ICD-10-CM

## 2025-07-30 DIAGNOSIS — Z74.09 IMPAIRED FUNCTIONAL MOBILITY AND ACTIVITY TOLERANCE: ICD-10-CM

## 2025-07-30 DIAGNOSIS — M48.061 SPINAL STENOSIS OF LUMBAR REGION WITHOUT NEUROGENIC CLAUDICATION: Primary | ICD-10-CM

## 2025-07-30 NOTE — PROGRESS NOTES
Physical Therapy Daily Treatment Note  [facility] Physical Therapy [facility]  [address], [address]  [place] [address]    Patient: Pedrito Powell   : 1960  Referring practitioner: Tung Price MD  Today's Date: 2025  Patient seen for 29 sessions    Visit Diagnoses:    ICD-10-CM ICD-9-CM   1. Spinal stenosis of lumbar region without neurogenic claudication  M48.061 724.02   2. Impaired functional mobility and activity tolerance  Z74.09 V49.89   3. Primary osteoarthritis of both knees  M17.0 715.16       Subjective   Pedrito Powell reports: that she feels stiff all over this morning but notes that she has been much more active in the pool at home.  States that she is walking in the pool for longer periods of time without pain or dysfunction in the knee.      Objective   Presents with her rolling walker but notes that she does not use it at home    Minimal to no true WB on walker upon presentation    Standing in upright posture compared to flexed posture on initial evaluation    See Exercise, Manual, and Modality Logs for complete treatment.     Patient Education:    Assessment/Plan  Much improved activity tolerance compared to her initial status.  Able to stand taller and has fewer episodes of the left knee giving out.    Progress strengthening /stabilization /functional activity           Timed:  Manual Therapy:    0     mins  77015;  Therapeutic Exercise:    10/30     mins  73776;     Neuromuscular Dion:    10    mins  76607;    Therapeutic Activity:     10     mins  29333;     Ultrasound:      0     mins  03371;    Iontophoresis              __0_   mins        Untimed:  Electrical Stimulation:     0    mins  60426 ( );  Mechanical Traction:             mins  23099;   Paraffin                       _____  mins   Dry Needling               _____   mins      Timed Treatment:   30   mins   Total Treatment:     70   mins    Anais Aguilera, PT  [place] License #  [id number]  Physical Therapist    Electronically signed by Anais Aguilera, PT, 07/30/25, 7:28 AM EDT

## 2025-08-04 ENCOUNTER — TREATMENT (OUTPATIENT)
Dept: PHYSICAL THERAPY | Facility: CLINIC | Age: 65
End: 2025-08-04
Payer: COMMERCIAL

## 2025-08-04 DIAGNOSIS — M17.0 PRIMARY OSTEOARTHRITIS OF BOTH KNEES: ICD-10-CM

## 2025-08-04 DIAGNOSIS — M48.061 SPINAL STENOSIS OF LUMBAR REGION WITHOUT NEUROGENIC CLAUDICATION: Primary | ICD-10-CM

## 2025-08-04 DIAGNOSIS — Z74.09 IMPAIRED FUNCTIONAL MOBILITY AND ACTIVITY TOLERANCE: ICD-10-CM

## 2025-08-04 PROCEDURE — 97112 NEUROMUSCULAR REEDUCATION: CPT | Performed by: PHYSICAL THERAPIST

## 2025-08-04 PROCEDURE — 97014 ELECTRIC STIMULATION THERAPY: CPT | Performed by: PHYSICAL THERAPIST

## 2025-08-04 PROCEDURE — 97530 THERAPEUTIC ACTIVITIES: CPT | Performed by: PHYSICAL THERAPIST

## 2025-08-04 PROCEDURE — 97110 THERAPEUTIC EXERCISES: CPT | Performed by: PHYSICAL THERAPIST

## 2025-08-07 ENCOUNTER — TREATMENT (OUTPATIENT)
Dept: PHYSICAL THERAPY | Facility: CLINIC | Age: 65
End: 2025-08-07
Payer: COMMERCIAL

## 2025-08-07 DIAGNOSIS — M48.061 SPINAL STENOSIS OF LUMBAR REGION WITHOUT NEUROGENIC CLAUDICATION: Primary | ICD-10-CM

## 2025-08-07 DIAGNOSIS — M17.0 PRIMARY OSTEOARTHRITIS OF BOTH KNEES: ICD-10-CM

## 2025-08-07 DIAGNOSIS — Z74.09 IMPAIRED FUNCTIONAL MOBILITY AND ACTIVITY TOLERANCE: ICD-10-CM

## 2025-08-08 ENCOUNTER — HOSPITAL ENCOUNTER (OUTPATIENT)
Facility: SURGERY CENTER | Age: 65
Setting detail: HOSPITAL OUTPATIENT SURGERY
Discharge: HOME | End: 2025-08-08
Attending: ANESTHESIOLOGY | Admitting: ANESTHESIOLOGY
Payer: COMMERCIAL

## 2025-08-08 ENCOUNTER — HOSPITAL ENCOUNTER (OUTPATIENT)
Dept: GENERAL RADIOLOGY | Facility: SURGERY CENTER | Age: 65
End: 2025-08-08
Payer: COMMERCIAL

## 2025-08-08 VITALS
RESPIRATION RATE: 18 BRPM | TEMPERATURE: 97.6 F | BODY MASS INDEX: 45.16 KG/M2 | OXYGEN SATURATION: 98 % | HEART RATE: 58 BPM | DIASTOLIC BLOOD PRESSURE: 78 MMHG | HEIGHT: 60 IN | SYSTOLIC BLOOD PRESSURE: 148 MMHG | WEIGHT: 230 LBS

## 2025-08-08 DIAGNOSIS — M47.816 FACET ARTHRITIS OF LUMBAR REGION: ICD-10-CM

## 2025-08-08 DIAGNOSIS — Z41.9 SURGERY, ELECTIVE: ICD-10-CM

## 2025-08-08 PROCEDURE — 76000 FLUOROSCOPY <1 HR PHYS/QHP: CPT

## 2025-08-08 PROCEDURE — 25010000002 LIDOCAINE PF 1% 1 % SOLUTION 5 ML VIAL: Performed by: ANESTHESIOLOGY

## 2025-08-08 PROCEDURE — 64494 INJ PARAVERT F JNT L/S 2 LEV: CPT | Performed by: ANESTHESIOLOGY

## 2025-08-08 PROCEDURE — 25010000002 BUPIVACAINE (PF) 0.25 % SOLUTION 10 ML VIAL: Performed by: ANESTHESIOLOGY

## 2025-08-08 PROCEDURE — 77002 NEEDLE LOCALIZATION BY XRAY: CPT

## 2025-08-08 PROCEDURE — 64493 INJ PARAVERT F JNT L/S 1 LEV: CPT | Performed by: ANESTHESIOLOGY

## 2025-08-08 RX ORDER — DIAZEPAM 5 MG/1
10 TABLET ORAL ONCE
Status: COMPLETED | OUTPATIENT
Start: 2025-08-08 | End: 2025-08-08

## 2025-08-08 RX ADMIN — DIAZEPAM 10 MG: 5 TABLET ORAL at 11:09

## 2025-08-11 ENCOUNTER — TREATMENT (OUTPATIENT)
Dept: PHYSICAL THERAPY | Facility: CLINIC | Age: 65
End: 2025-08-11
Payer: COMMERCIAL

## 2025-08-11 DIAGNOSIS — M17.0 PRIMARY OSTEOARTHRITIS OF BOTH KNEES: ICD-10-CM

## 2025-08-11 DIAGNOSIS — M48.061 SPINAL STENOSIS OF LUMBAR REGION WITHOUT NEUROGENIC CLAUDICATION: Primary | ICD-10-CM

## 2025-08-11 DIAGNOSIS — Z74.09 IMPAIRED FUNCTIONAL MOBILITY AND ACTIVITY TOLERANCE: ICD-10-CM

## 2025-08-13 ENCOUNTER — TREATMENT (OUTPATIENT)
Dept: PHYSICAL THERAPY | Facility: CLINIC | Age: 65
End: 2025-08-13
Payer: COMMERCIAL

## 2025-08-13 DIAGNOSIS — M17.0 PRIMARY OSTEOARTHRITIS OF BOTH KNEES: ICD-10-CM

## 2025-08-13 DIAGNOSIS — M48.061 SPINAL STENOSIS OF LUMBAR REGION WITHOUT NEUROGENIC CLAUDICATION: Primary | ICD-10-CM

## 2025-08-13 DIAGNOSIS — Z74.09 IMPAIRED FUNCTIONAL MOBILITY AND ACTIVITY TOLERANCE: ICD-10-CM

## 2025-08-14 ENCOUNTER — OFFICE VISIT (OUTPATIENT)
Dept: PAIN MEDICINE | Facility: CLINIC | Age: 65
End: 2025-08-14
Payer: COMMERCIAL

## 2025-08-14 VITALS
TEMPERATURE: 98.2 F | HEIGHT: 60 IN | WEIGHT: 242.4 LBS | HEART RATE: 76 BPM | SYSTOLIC BLOOD PRESSURE: 148 MMHG | OXYGEN SATURATION: 99 % | BODY MASS INDEX: 47.59 KG/M2 | DIASTOLIC BLOOD PRESSURE: 85 MMHG

## 2025-08-14 DIAGNOSIS — M51.360 DEGENERATION OF INTERVERTEBRAL DISC OF LUMBAR REGION WITH DISCOGENIC BACK PAIN: ICD-10-CM

## 2025-08-14 DIAGNOSIS — M48.061 SPINAL STENOSIS, LUMBAR REGION, WITHOUT NEUROGENIC CLAUDICATION: ICD-10-CM

## 2025-08-14 DIAGNOSIS — M47.816 FACET ARTHRITIS OF LUMBAR REGION: Primary | ICD-10-CM

## 2025-08-14 PROCEDURE — 99214 OFFICE O/P EST MOD 30 MIN: CPT | Performed by: PHYSICIAN ASSISTANT

## 2025-08-20 ENCOUNTER — TREATMENT (OUTPATIENT)
Dept: PHYSICAL THERAPY | Facility: CLINIC | Age: 65
End: 2025-08-20
Payer: COMMERCIAL

## 2025-08-20 DIAGNOSIS — Z74.09 IMPAIRED FUNCTIONAL MOBILITY AND ACTIVITY TOLERANCE: ICD-10-CM

## 2025-08-20 DIAGNOSIS — M48.061 SPINAL STENOSIS OF LUMBAR REGION WITHOUT NEUROGENIC CLAUDICATION: Primary | ICD-10-CM

## 2025-08-20 DIAGNOSIS — M17.0 PRIMARY OSTEOARTHRITIS OF BOTH KNEES: ICD-10-CM

## 2025-08-22 ENCOUNTER — TREATMENT (OUTPATIENT)
Dept: PHYSICAL THERAPY | Facility: CLINIC | Age: 65
End: 2025-08-22
Payer: COMMERCIAL

## 2025-08-22 DIAGNOSIS — M48.061 SPINAL STENOSIS OF LUMBAR REGION WITHOUT NEUROGENIC CLAUDICATION: Primary | ICD-10-CM

## 2025-08-22 DIAGNOSIS — Z74.09 IMPAIRED FUNCTIONAL MOBILITY AND ACTIVITY TOLERANCE: ICD-10-CM

## 2025-08-22 DIAGNOSIS — M17.0 PRIMARY OSTEOARTHRITIS OF BOTH KNEES: ICD-10-CM

## 2025-08-22 PROCEDURE — 97530 THERAPEUTIC ACTIVITIES: CPT | Performed by: PHYSICAL THERAPIST

## 2025-08-22 PROCEDURE — 97112 NEUROMUSCULAR REEDUCATION: CPT | Performed by: PHYSICAL THERAPIST

## 2025-08-22 PROCEDURE — 97110 THERAPEUTIC EXERCISES: CPT | Performed by: PHYSICAL THERAPIST

## 2025-08-25 DIAGNOSIS — M54.59 INTRACTABLE LOW BACK PAIN: ICD-10-CM

## 2025-08-26 RX ORDER — METHOCARBAMOL 500 MG/1
500 TABLET, FILM COATED ORAL 3 TIMES DAILY
Qty: 90 TABLET | Refills: 0 | Status: SHIPPED | OUTPATIENT
Start: 2025-08-26

## 2025-08-27 ENCOUNTER — TREATMENT (OUTPATIENT)
Dept: PHYSICAL THERAPY | Facility: CLINIC | Age: 65
End: 2025-08-27
Payer: COMMERCIAL

## 2025-08-27 DIAGNOSIS — M17.0 PRIMARY OSTEOARTHRITIS OF BOTH KNEES: ICD-10-CM

## 2025-08-27 DIAGNOSIS — M48.061 SPINAL STENOSIS OF LUMBAR REGION WITHOUT NEUROGENIC CLAUDICATION: Primary | ICD-10-CM

## 2025-08-27 DIAGNOSIS — Z74.09 IMPAIRED FUNCTIONAL MOBILITY AND ACTIVITY TOLERANCE: ICD-10-CM

## 2025-08-29 ENCOUNTER — TREATMENT (OUTPATIENT)
Dept: PHYSICAL THERAPY | Facility: CLINIC | Age: 65
End: 2025-08-29
Payer: COMMERCIAL

## 2025-08-29 DIAGNOSIS — M17.0 PRIMARY OSTEOARTHRITIS OF BOTH KNEES: ICD-10-CM

## 2025-08-29 DIAGNOSIS — M48.061 SPINAL STENOSIS OF LUMBAR REGION WITHOUT NEUROGENIC CLAUDICATION: Primary | ICD-10-CM

## 2025-08-29 DIAGNOSIS — Z74.09 IMPAIRED FUNCTIONAL MOBILITY AND ACTIVITY TOLERANCE: ICD-10-CM

## (undated) DEVICE — GLV SURG BIOGEL LTX PF 8 1/2

## (undated) DEVICE — THE TORRENT IRRIGATION SCOPE CONNECTOR IS USED WITH THE TORRENT IRRIGATION TUBING TO PROVIDE IRRIGATION FLUIDS SUCH AS STERILE WATER DURING GASTROINTESTINAL ENDOSCOPIC PROCEDURES WHEN USED IN CONJUNCTION WITH AN IRRIGATION PUMP (OR ELECTROSURGICAL UNIT).: Brand: TORRENT

## (undated) DEVICE — DUAL CUT SAGITTAL BLADE

## (undated) DEVICE — GLV SURG SIGNATURE ESSENTIAL PF LTX SZ8.5

## (undated) DEVICE — DRAPE,U/ SHT,SPLIT,PLAS,STERIL: Brand: MEDLINE

## (undated) DEVICE — NEEDLE, QUINCKE, 22GX5": Brand: MEDLINE

## (undated) DEVICE — 3M™ IOBAN™ 2 ANTIMICROBIAL INCISE DRAPE 6640EZ: Brand: IOBAN™ 2

## (undated) DEVICE — FRCP BX RADJAW4 NDL 2.8 240CM LG OG BX40

## (undated) DEVICE — EPIDURAL TRAY: Brand: MEDLINE INDUSTRIES, INC.

## (undated) DEVICE — BITEBLOCK OMNI BLOC

## (undated) DEVICE — GLV SURG BIOGEL LTX PF 8

## (undated) DEVICE — PENCL E/S ULTRAVAC TELESCP NOSE HOLSTR 10FT

## (undated) DEVICE — TUBING, SUCTION, 1/4" X 10', STRAIGHT: Brand: MEDLINE

## (undated) DEVICE — APPL CHLORAPREP HI/LITE 26ML ORNG

## (undated) DEVICE — SUT SILK 2/0 TIES 18IN A185H

## (undated) DEVICE — HANDPIECE SET WITH COAXIAL HIGH FLOW TIP AND SUCTION TUBE: Brand: INTERPULSE

## (undated) DEVICE — GLV SURG TRIUMPH PF LTX 7.5 STRL

## (undated) DEVICE — SUT VIC 2/0 CT2 27IN J269H

## (undated) DEVICE — DRSNG SURESITE WNDW 4X4.5

## (undated) DEVICE — TRAP FLD MINIVAC MEGADYNE 100ML

## (undated) DEVICE — MAT FLR ABSORBENT LG 4FT 10 2.5FT

## (undated) DEVICE — PIN STNMAN 3.2MM 9IN
Type: IMPLANTABLE DEVICE | Site: SHOULDER | Status: NON-FUNCTIONAL
Removed: 2021-05-13

## (undated) DEVICE — GLV SURG PREMIERPRO ORTHO LTX PF SZ8 BRN

## (undated) DEVICE — PREP SOL POVIDONE/IODINE BT 4OZ

## (undated) DEVICE — ADHS SKIN SURG TISS VISC PREMIERPRO EXOFIN HI/VISC FAST/DRY

## (undated) DEVICE — SKIN PREP TRAY W/CHG: Brand: MEDLINE INDUSTRIES, INC.

## (undated) DEVICE — CANN NASL CO2 TRULINK W/O2 A/

## (undated) DEVICE — Device: Brand: DEFENDO AIR/WATER/SUCTION AND BIOPSY VALVE

## (undated) DEVICE — TUBING, SUCTION, 1/4" X 20', STRAIGHT: Brand: MEDLINE INDUSTRIES, INC.

## (undated) DEVICE — SOL ISO/ALC RUB 70PCT 4OZ

## (undated) DEVICE — PK SHLDR OPN 40

## (undated) DEVICE — DRSNG TELFA PAD NONADH STR 1S 3X8IN